# Patient Record
Sex: FEMALE | Race: WHITE | NOT HISPANIC OR LATINO | Employment: OTHER | URBAN - METROPOLITAN AREA
[De-identification: names, ages, dates, MRNs, and addresses within clinical notes are randomized per-mention and may not be internally consistent; named-entity substitution may affect disease eponyms.]

---

## 2017-04-17 ENCOUNTER — TELEPHONE (OUTPATIENT)
Dept: NEUROLOGY | Facility: CLINIC | Age: 72
End: 2017-04-17

## 2017-04-17 NOTE — TELEPHONE ENCOUNTER
Only seen once in 2015.  May be appropriate to see before asking about enrolling in clinical trial?

## 2017-04-17 NOTE — TELEPHONE ENCOUNTER
----- Message from Jadiel Billingsley MD sent at 4/11/2017  9:13 AM CDT -----  Regarding: RE: SURE PD 3  Yes.  Netta patient, cc'ing her.  Not seen in a while and nothing scheduled.    GL - let me know if you'd like to consider her for SURE-PD3 (inosine).  No red flags on your exam.  DaTSCAN performed as part of the study.    [facility]  ----- Message -----     From: Kayla Walter     Sent: 4/10/2017   3:29 PM       To: Jadiel Billingsley MD  Subject: SURE PD 3                                        Hey,    Maybe a possible candidate for sure pd3? Let me know what you think.    My best,  Kayla

## 2017-04-20 ENCOUNTER — OFFICE VISIT (OUTPATIENT)
Dept: FAMILY MEDICINE | Facility: CLINIC | Age: 72
End: 2017-04-20
Payer: MEDICARE

## 2017-04-20 ENCOUNTER — HOSPITAL ENCOUNTER (OUTPATIENT)
Dept: RADIOLOGY | Facility: CLINIC | Age: 72
Discharge: HOME | End: 2017-04-20
Attending: FAMILY MEDICINE
Payer: MEDICARE

## 2017-04-20 VITALS
HEART RATE: 61 BPM | SYSTOLIC BLOOD PRESSURE: 142 MMHG | HEIGHT: 64 IN | RESPIRATION RATE: 18 BRPM | DIASTOLIC BLOOD PRESSURE: 76 MMHG | BODY MASS INDEX: 21.68 KG/M2 | WEIGHT: 127 LBS | TEMPERATURE: 98 F

## 2017-04-20 DIAGNOSIS — S30.1XXA CONTUSION, FLANK, INITIAL ENCOUNTER: ICD-10-CM

## 2017-04-20 DIAGNOSIS — S30.1XXA CONTUSION, FLANK, INITIAL ENCOUNTER: Primary | ICD-10-CM

## 2017-04-20 PROCEDURE — 99213 OFFICE O/P EST LOW 20 MIN: CPT | Mod: S$PBB,,, | Performed by: FAMILY MEDICINE

## 2017-04-20 PROCEDURE — 71010 XR CHEST 1 VIEW: CPT | Mod: 26,,, | Performed by: RADIOLOGY

## 2017-04-20 PROCEDURE — 71010 XR CHEST 1 VIEW: CPT | Mod: TC,PO

## 2017-04-20 PROCEDURE — 71100 X-RAY EXAM RIBS UNI 2 VIEWS: CPT | Mod: 26,59,, | Performed by: RADIOLOGY

## 2017-04-20 PROCEDURE — 71100 X-RAY EXAM RIBS UNI 2 VIEWS: CPT | Mod: TC,PO

## 2017-04-20 PROCEDURE — 99999 PR PBB SHADOW E&M-EST. PATIENT-LVL III: CPT | Mod: PBBFAC,,, | Performed by: FAMILY MEDICINE

## 2017-04-20 RX ORDER — METHOCARBAMOL 750 MG/1
TABLET, FILM COATED ORAL
Refills: 0 | COMMUNITY
Start: 2017-01-13 | End: 2017-10-05

## 2017-04-20 NOTE — PROGRESS NOTES
Subjective:       Patient ID: Ariana Tiwari is a 72 y.o. female.    Chief Complaint: Fall (s/p fall X 4 days ago, c/o left lower rib pain and bruising)    Fall   The accident occurred 3 to 5 days ago. She landed on hard floor (Hit a waste basket). Point of impact: buttocks and left side. Pain location: left flank. The pain is mild. The symptoms are aggravated by flexion, rotation and pressure on injury. Pertinent negatives include no abdominal pain, fever, nausea, numbness or tingling. Associated symptoms comments: No SOB. She has tried heat for the symptoms. The treatment provided moderate relief.     Review of Systems   Constitutional: Negative for fever.   Respiratory: Negative for shortness of breath.    Cardiovascular: Negative for chest pain.   Gastrointestinal: Negative for abdominal pain and nausea.   Skin: Negative for rash.   Neurological: Negative for tingling and numbness.   All other systems reviewed and are negative.      Objective:      Physical Exam   Constitutional: She appears well-developed. No distress.   Cardiovascular: Normal rate and regular rhythm.    No murmur heard.  Pulmonary/Chest: Effort normal and breath sounds normal.   Musculoskeletal:   TTP at lower left chest wall area and below that is large contusion.   Skin: Skin is warm and dry.       Assessment:       1. Contusion, flank, initial encounter        Plan:         Ariana was seen today for fall.    Diagnoses and all orders for this visit:    Contusion, flank, initial encounter  -     X-Ray Ribs 2 View Left; Future  -     X-Ray Chest 1 View; Future    Rest; heat TID; ibuprofen prn

## 2017-04-20 NOTE — MR AVS SNAPSHOT
[facility]  [address]  [place] [address]  Phone: [phone]  Fax: [phone]                  Ariana Tiwari   2017 10:00 AM   Office Visit    Description:  Female : 1945   Provider:  Nba Lopes MD   Department:  [facility]           Reason for Visit     Fall           Diagnoses this Visit        Comments    Contusion, flank, initial encounter    -  Primary            To Do List           Future Appointments        Provider Department Dept Phone    2017 10:30 AM [facility] XR1 [facility]- X-Ray [phone]      Goals (5 Years of Data)     None      [facility]     [facility] Nurse Care Line -  Assistance  Unless otherwise directed by your provider, please contact [facility], our nurse care line that is available for  assistance.     Registered nurses in the [facility] provide: appointment scheduling, clinical advisement, health education, and other advisory services.  Call: [phone] (toll free)               Medications           Message regarding Medications     Verify the changes and/or additions to your medication regime listed below are the same as discussed with your clinician today.  If any of these changes or additions are incorrect, please notify your healthcare provider.        STOP taking these medications     nystatin-triamcinolone (MYCOLOG II) cream Apply topically 2 (two) times daily.    THEANINE ORAL Take 100 mg by mouth 3 (three) times daily. L-theanine           Verify that the below list of medications is an accurate representation of the medications you are currently taking.  If none reported, the list may be blank. If incorrect, please contact your healthcare provider. Carry this list with you in case of emergency.           Current Medications     aspirin (ECOTRIN) 81 MG EC tablet Take 81 mg by mouth once daily.    cholecalciferol, vitamin D3, (VITAMIN D3) 2,000 unit Cap Take 1  "capsule by mouth once daily.    cinnamon bark 500 mg capsule Take 500 mg by mouth once daily.    CYANOCOBALAMIN, VITAMIN B-12, (VITAMIN B-12 ORAL) Take 1 tablet by mouth once daily.    GLUCOSAM HCL/CHONDRO INFANTE A/C/MN (GLUCOSAMINE-CHONDROITIN COMPLX ORAL) Take 1 tablet by mouth once daily.     ibuprofen (ADVIL,MOTRIN) 200 MG tablet Take 200 mg by mouth every 6 (six) hours as needed for Pain.    levothyroxine (SYNTHROID) 50 MCG tablet TAKE ONE TABLET BY MOUTH ONCE DAILY    methocarbamol (ROBAXIN) 750 MG Tab take 1 tablet by mouth EVERY 8 TO 12 HOURS AS NEEDED FOR SPASM           Clinical Reference Information           Your Vitals Were     BP Pulse Temp Resp    142/76 (BP Location: Left arm, Patient Position: Sitting, BP Method: Manual) 61 97.8 °F (36.6 °C) (Oral) 18    Height Weight BMI    5' 3.5" (1.613 m) 57.6 kg (126 lb 15.8 oz) 22.14 kg/m2      Blood Pressure          Most Recent Value    BP  (!)  142/76      Allergies as of 4/20/2017     Gluten      Immunizations Administered on Date of Encounter - 4/20/2017     None      Orders Placed During Today's Visit     Future Labs/Procedures Expected by Expires    X-Ray Chest 1 View  4/20/2017 4/20/2018    X-Ray Ribs 2 View Left  4/20/2017 4/20/2018      [facility] Sign-Up     Activating your [facility] account is as easy as 1-2-3!     1) Visit [email or web address], select Sign Up Now, enter this activation code and your date of birth, then select Next.  [email or web address]  Expires: 6/4/2017 10:10 AM      2) Create a username and password to use when you visit [facility] in the future and select a security question in case you lose your password and select Next.    3) Enter your e-mail address and click Sign Up!    Additional Information  If you have questions, please e-mail [email or web address] or call [phone] to talk to our [facility] staff. Remember, [facility] is NOT to be used for urgent needs. For medical emergencies, dial 911.         Language Assistance Services     " ATTENTION: Language assistance services are available, free of charge. Please call [phone].      ATENCIÓN: Si habla cheli, tiene a santizo disposición servicios gratuitos de asistencia lingüística. Llame al [phone].     CHÚ Ý: N?u b?n nói Ti?ng Vi?t, có các d?ch v? h? tr? ngôn ng? mi?n phí dành cho b?n. G?i s? [phone].         [facility] complies with applicable Federal civil rights laws and does not discriminate on the basis of race, color, national origin, age, disability, or sex.

## 2017-08-21 DIAGNOSIS — E03.9 HYPOTHYROIDISM: ICD-10-CM

## 2017-08-21 DIAGNOSIS — Z78.0 MENOPAUSE: Primary | ICD-10-CM

## 2017-08-21 RX ORDER — LEVOTHYROXINE SODIUM 50 UG/1
TABLET ORAL
Qty: 30 TABLET | Refills: 0 | Status: SHIPPED | OUTPATIENT
Start: 2017-08-21 | End: 2017-08-23 | Stop reason: SDUPTHER

## 2017-08-23 DIAGNOSIS — E03.9 HYPOTHYROIDISM: ICD-10-CM

## 2017-08-23 DIAGNOSIS — Z78.0 MENOPAUSE: Primary | ICD-10-CM

## 2017-08-23 RX ORDER — LEVOTHYROXINE SODIUM 50 UG/1
TABLET ORAL
Qty: 30 TABLET | Refills: 0 | Status: SHIPPED | OUTPATIENT
Start: 2017-08-23 | End: 2017-10-19 | Stop reason: SDUPTHER

## 2017-08-23 NOTE — TELEPHONE ENCOUNTER
She is due for TSH level in September.  Orders are in    Has appt for dexa august 28, may want to do at the same time

## 2017-08-28 ENCOUNTER — HOSPITAL ENCOUNTER (OUTPATIENT)
Dept: RADIOLOGY | Facility: CLINIC | Age: 72
Discharge: HOME | End: 2017-08-28
Attending: FAMILY MEDICINE
Payer: MEDICARE

## 2017-08-28 DIAGNOSIS — Z12.31 ENCOUNTER FOR SCREENING MAMMOGRAM FOR MALIGNANT NEOPLASM OF BREAST: ICD-10-CM

## 2017-08-28 DIAGNOSIS — Z78.0 MENOPAUSE: ICD-10-CM

## 2017-08-28 PROCEDURE — 77063 BREAST TOMOSYNTHESIS BI: CPT | Mod: 26,,, | Performed by: RADIOLOGY

## 2017-08-28 PROCEDURE — 77067 SCR MAMMO BI INCL CAD: CPT | Mod: 26,,, | Performed by: RADIOLOGY

## 2017-08-28 PROCEDURE — 77080 DXA BONE DENSITY AXIAL: CPT | Mod: TC,PO

## 2017-08-28 PROCEDURE — 77080 DXA BONE DENSITY AXIAL: CPT | Mod: 26,,, | Performed by: RADIOLOGY

## 2017-09-28 ENCOUNTER — DOCUMENTATION ONLY (OUTPATIENT)
Dept: FAMILY MEDICINE | Facility: CLINIC | Age: 72
End: 2017-09-28

## 2017-09-28 NOTE — PROGRESS NOTES
Pre-Visit Chart Review  For Appointment Scheduled on 10-5-17    Health Maintenance Due   Topic Date Due    TETANUS VACCINE  03/28/1962    Pneumococcal (65+) (1 of 2 - PCV13) 03/28/2009    Influenza Vaccine  08/01/2017

## 2017-10-05 ENCOUNTER — OFFICE VISIT (OUTPATIENT)
Dept: FAMILY MEDICINE | Facility: CLINIC | Age: 72
End: 2017-10-05
Payer: MEDICARE

## 2017-10-05 VITALS
WEIGHT: 127.88 LBS | DIASTOLIC BLOOD PRESSURE: 75 MMHG | HEIGHT: 64 IN | HEART RATE: 61 BPM | BODY MASS INDEX: 21.83 KG/M2 | TEMPERATURE: 99 F | SYSTOLIC BLOOD PRESSURE: 116 MMHG

## 2017-10-05 DIAGNOSIS — M85.80 OSTEOPENIA, UNSPECIFIED LOCATION: Primary | ICD-10-CM

## 2017-10-05 DIAGNOSIS — Z23 IMMUNIZATION DUE: ICD-10-CM

## 2017-10-05 PROCEDURE — G0008 ADMIN INFLUENZA VIRUS VAC: HCPCS | Mod: PBBFAC,PO

## 2017-10-05 PROCEDURE — 99999 PR PBB SHADOW E&M-EST. PATIENT-LVL III: CPT | Mod: PBBFAC,,, | Performed by: FAMILY MEDICINE

## 2017-10-05 PROCEDURE — 99213 OFFICE O/P EST LOW 20 MIN: CPT | Mod: PBBFAC,PO | Performed by: FAMILY MEDICINE

## 2017-10-05 PROCEDURE — 99213 OFFICE O/P EST LOW 20 MIN: CPT | Mod: S$PBB,,, | Performed by: FAMILY MEDICINE

## 2017-10-05 RX ORDER — IBANDRONATE SODIUM 150 MG/1
150 TABLET, FILM COATED ORAL
Qty: 1 TABLET | Refills: 11 | Status: SHIPPED | OUTPATIENT
Start: 2017-10-05 | End: 2018-10-09 | Stop reason: SDUPTHER

## 2017-10-05 NOTE — PROGRESS NOTES
Subjective:       Patient ID: Ariana Tiwari is a 72 y.o. female.    Chief Complaint: Tremors    72-year-old female in to discuss results of her DEXA scan osteoporosis screening which did return positive for osteopenia with the FRAX score of 3.4 at the hip indicating need for treatment.  She's done her own research and it did not think she wanted to take injections but would prefer to use the pill.  She is actually a regular competitive runner and recently ran a race in [place] which she came in first.  We also are discussing vaccinations as she is due for a flu and Prevnar 13.  She's never taken a flu vaccine she generally does not like to take vaccines but she is willing to take the flu shot although she is quite ambivalent and changes her mind repeatedly during the interview.    Past Medical History:  No date: Allergy  No date: Gluten enteropathy  No date: Hernia  No date: Hypothyroidism  9/16/2015: PD (Parkinson's disease)      Comment: Right tremor type  No date: Right shoulder pain      Comment: Cirtisone injection 3/26/15 - Dr. Tolbert  No date: Ulcer    Past Surgical History:  No date: ADENOIDECTOMY  No date: COSMETIC SURGERY      Comment: Broken nose  left wrist: FRACTURE SURGERY      Comment: With pin  No date: HEMORRHOID SURGERY  4-9-2015: HERNIA REPAIR Left      Comment: Dr Lo   No date: RHINOPLASTY TIP  No date: TONSILLECTOMY      Current Outpatient Prescriptions:     aspirin (ECOTRIN) 81 MG EC tablet, Take 81 mg by mouth once daily., Disp: , Rfl:     cholecalciferol, vitamin D3, (VITAMIN D3) 2,000 unit Cap, Take 1 capsule by mouth once daily., Disp: , Rfl:     cinnamon bark 500 mg capsule, Take 500 mg by mouth once daily., Disp: , Rfl:     CYANOCOBALAMIN, VITAMIN B-12, (VITAMIN B-12 ORAL), Take 1 tablet by mouth once daily., Disp: , Rfl:     GLUCOSAM HCL/CHONDRO INFANTE A/C/MN (GLUCOSAMINE-CHONDROITIN COMPLX ORAL), Take 1 tablet by mouth once daily. , Disp: , Rfl:     ibuprofen (ADVIL,MOTRIN)  200 MG tablet, Take 200 mg by mouth every 6 (six) hours as needed for Pain., Disp: , Rfl:     levothyroxine (SYNTHROID) 50 MCG tablet, TAKE ONE TABLET BY MOUTH ONCE DAILY, Disp: 30 tablet, Rfl: 0    TETANUS VACCINE due on 03/28/1962-declines  Pneumococcal (65+)(1 of 2 - PCV13) due on 03/28/2009-declines  Influenza Vaccine due on 08/01/2017        Review of Systems   Constitutional: Negative for chills and fever.   Musculoskeletal: Negative for arthralgias and myalgias.       Objective:      Physical Exam   Constitutional: She is oriented to person, place, and time. She appears well-developed and well-nourished. No distress.   Good blood pressure control  Normal weight with BMI 22.3.  She is up 4.8 pounds from her last visit with me June 6, 2016   Neurological: She is alert and oriented to person, place, and time.   Skin: She is not diaphoretic.   Psychiatric: She has a normal mood and affect. Her behavior is normal. Thought content normal.   Nursing note and vitals reviewed.      Assessment:       1. Osteopenia, unspecified location    2. Immunization due        Plan:       1. Osteopenia, unspecified location  - ibandronate (BONIVA) 150 mg tablet; Take 1 tablet (150 mg total) by mouth every 30 days.  Dispense: 1 tablet; Refill: 11    2. Immunization due  - Influenza - High Dose (65+) (PF) (IM)

## 2017-10-19 DIAGNOSIS — E03.9 ACQUIRED HYPOTHYROIDISM: ICD-10-CM

## 2017-10-19 DIAGNOSIS — E03.9 HYPOTHYROIDISM: ICD-10-CM

## 2017-10-19 RX ORDER — LEVOTHYROXINE SODIUM 50 UG/1
50 TABLET ORAL DAILY
Qty: 90 TABLET | Refills: 2 | Status: SHIPPED | OUTPATIENT
Start: 2017-10-19 | End: 2018-07-11 | Stop reason: SDUPTHER

## 2017-10-19 RX ORDER — LEVOTHYROXINE SODIUM 50 UG/1
TABLET ORAL
Qty: 30 TABLET | Refills: 0 | Status: CANCELLED | OUTPATIENT
Start: 2017-10-19

## 2017-10-19 NOTE — TELEPHONE ENCOUNTER
----- Message from Adelaida Parks sent at 10/19/2017 11:34 AM CDT -----  Contact: self  Patient called regarding refill of medication. Stating pharmacy need a PA. Please contact [phone] (home)     levothyroxine (SYNTHROID) 50 MCG tablet    [id number]  [place] - [address]  [place] [address]  Phone: [phone] Fax: [phone]

## 2018-01-16 ENCOUNTER — OFFICE VISIT (OUTPATIENT)
Dept: GASTROENTEROLOGY | Facility: CLINIC | Age: 73
End: 2018-01-16
Payer: MEDICARE

## 2018-01-16 ENCOUNTER — HOSPITAL ENCOUNTER (OUTPATIENT)
Dept: RADIOLOGY | Facility: HOSPITAL | Age: 73
Discharge: HOME | End: 2018-01-16
Attending: NURSE PRACTITIONER
Payer: MEDICARE

## 2018-01-16 VITALS
BODY MASS INDEX: 21.94 KG/M2 | DIASTOLIC BLOOD PRESSURE: 75 MMHG | HEART RATE: 61 BPM | HEIGHT: 64 IN | SYSTOLIC BLOOD PRESSURE: 165 MMHG | WEIGHT: 128.5 LBS

## 2018-01-16 DIAGNOSIS — K52.9 CHRONIC DIARRHEA: Primary | ICD-10-CM

## 2018-01-16 DIAGNOSIS — R15.9 FULL INCONTINENCE OF FECES: ICD-10-CM

## 2018-01-16 DIAGNOSIS — R15.2 FECAL URGENCY: ICD-10-CM

## 2018-01-16 DIAGNOSIS — K92.1 HEMATOCHEZIA: ICD-10-CM

## 2018-01-16 DIAGNOSIS — Z86.19 HISTORY OF HELICOBACTER PYLORI INFECTION: ICD-10-CM

## 2018-01-16 DIAGNOSIS — K52.9 CHRONIC DIARRHEA: ICD-10-CM

## 2018-01-16 DIAGNOSIS — K90.41 GLUTEN INTOLERANCE: ICD-10-CM

## 2018-01-16 PROCEDURE — 74019 RADEX ABDOMEN 2 VIEWS: CPT | Mod: TC,FY

## 2018-01-16 PROCEDURE — 99214 OFFICE O/P EST MOD 30 MIN: CPT | Mod: S$PBB,,, | Performed by: NURSE PRACTITIONER

## 2018-01-16 PROCEDURE — 99999 PR PBB SHADOW E&M-EST. PATIENT-LVL IV: CPT | Mod: PBBFAC,,, | Performed by: NURSE PRACTITIONER

## 2018-01-16 PROCEDURE — 99214 OFFICE O/P EST MOD 30 MIN: CPT | Mod: PBBFAC,PO | Performed by: NURSE PRACTITIONER

## 2018-01-16 PROCEDURE — 74019 RADEX ABDOMEN 2 VIEWS: CPT | Mod: 26,,, | Performed by: RADIOLOGY

## 2018-01-16 RX ORDER — DICYCLOMINE HYDROCHLORIDE 10 MG/1
10 CAPSULE ORAL
Qty: 120 CAPSULE | Refills: 1 | Status: SHIPPED | OUTPATIENT
Start: 2018-01-16 | End: 2018-03-16 | Stop reason: SDUPTHER

## 2018-01-16 NOTE — PROGRESS NOTES
Subjective:       Patient ID: Ariana Tiwari is a 72 y.o. female Body mass index is 22.06 kg/m².    Chief Complaint: Diarrhea (stools too soft)    This patient is new to me.  Established patient of Dr. Gomez.    Diarrhea    This is a chronic problem. Episode onset: started in 2009. The problem occurs 2 to 4 times per day. The problem has been waxing and waning (improved since on gluten free diet 2009, but has worsened over the past 2 years while remaining on a gluten free diet). Diarrhea characteristics: mushy consistency; occurs in the morning. The patient states that diarrhea awakens (occasional) her from sleep. Associated symptoms include bloating (occasional if she eats potatoes or rice). Pertinent negatives include no abdominal pain, chills, coughing, fever, increased  flatus, vomiting or weight loss. Associated symptoms comments: Fecal incontinence occasional, started 1-2 years ago. The symptoms are aggravated by rye/wheat (caffeine, gluten). There are no known risk factors (denies antibiotic use/hospitalization, foreign travel (lived in [place] for 5 years), ill contacts, suspect food intake, or new/change in medications). Treatments tried: gluten free diet; avoiding caffeine & avoiding artifical sweetners. There is no history of inflammatory bowel disease.     Review of Systems   Constitutional: Negative for appetite change, chills, fatigue, fever, unexpected weight change and weight loss.        History of parkinson's disease   HENT: Negative for sore throat and trouble swallowing.    Respiratory: Negative for cough, choking and shortness of breath.    Cardiovascular: Negative for chest pain.   Gastrointestinal: Positive for anal bleeding (a drop of bright red blood in toilet bowl noticed a month ago; has resolved), bloating (occasional if she eats potatoes or rice) and diarrhea. Negative for abdominal pain, blood in stool, constipation, flatus, nausea, rectal pain and vomiting.   Neurological: Negative  for weakness.       Past Medical History:   Diagnosis Date    Allergy     Diverticulosis     Gluten enteropathy     Hernia     Hypothyroidism     PD (Parkinson's disease) 9/16/2015    Right tremor type    Peptic ulcer disease     Right shoulder pain     Cirtisone injection 3/26/15 - Dr. Tolbert    Ulcer      Past Surgical History:   Procedure Laterality Date    ADENOIDECTOMY      COLONOSCOPY  03/01/2012    Dr. Teresa, repeat in 10 years for screening    COSMETIC SURGERY      Broken nose    FRACTURE SURGERY  left wrist    With pin    HEMORRHOID SURGERY      HERNIA REPAIR Left 04/09/2015    Dr Lo; left inguinal    RHINOPLASTY TIP      TONSILLECTOMY      UPPER GASTROINTESTINAL ENDOSCOPY  05/21/2015    Dr. Gomez     Family History   Problem Relation Age of Onset    Diabetes Mother     Diabetes Son     Obesity Sister     Alcohol abuse Brother     Heart disease Brother     No Known Problems Father     No Known Problems Maternal Grandmother     No Known Problems Maternal Grandfather     No Known Problems Paternal Grandmother     No Known Problems Paternal Grandfather     Cancer Paternal Aunt     No Known Problems Daughter     No Known Problems Maternal Aunt     No Known Problems Maternal Uncle     No Known Problems Paternal Uncle     Breast cancer Neg Hx     Colon cancer Neg Hx     Ovarian cancer Neg Hx     Colon polyps Neg Hx     Crohn's disease Neg Hx     Ulcerative colitis Neg Hx     Celiac disease Neg Hx      Wt Readings from Last 10 Encounters:   01/16/18 58.3 kg (128 lb 8.5 oz)   10/05/17 58 kg (127 lb 13.9 oz)   04/20/17 57.6 kg (126 lb 15.8 oz)   08/01/16 55.5 kg (122 lb 5.7 oz)   07/01/16 54.5 kg (120 lb 2.4 oz)   06/28/16 54.7 kg (120 lb 9.5 oz)   06/06/16 55.8 kg (123 lb 0.3 oz)   09/16/15 62 kg (136 lb 11.2 oz)   09/01/15 61.4 kg (135 lb 5 oz)   05/21/15 59 kg (130 lb)     Lab Results   Component Value Date    WBC 7.75 04/21/2015    HGB 14.7 04/21/2015    HCT 44.1  04/21/2015    MCV 97 04/21/2015     04/21/2015     CMP  Sodium   Date Value Ref Range Status   04/21/2015 138 136 - 145 mmol/L Final     Potassium   Date Value Ref Range Status   04/21/2015 3.6 3.5 - 5.1 mmol/L Final     Chloride   Date Value Ref Range Status   04/21/2015 106 95 - 110 mmol/L Final     CO2   Date Value Ref Range Status   04/21/2015 19 (L) 23 - 29 mmol/L Final     Glucose   Date Value Ref Range Status   04/21/2015 89 70 - 110 mg/dL Final     BUN, Bld   Date Value Ref Range Status   04/21/2015 8 8 - 23 mg/dL Final     Creatinine   Date Value Ref Range Status   09/16/2015 0.6 0.5 - 1.4 mg/dL Final     Calcium   Date Value Ref Range Status   09/16/2015 9.4 8.7 - 10.5 mg/dL Final     Total Protein   Date Value Ref Range Status   04/21/2015 7.1 6.0 - 8.4 g/dL Final     Albumin   Date Value Ref Range Status   04/21/2015 4.1 3.5 - 5.2 g/dL Final     Total Bilirubin   Date Value Ref Range Status   04/21/2015 0.6 0.1 - 1.0 mg/dL Final     Comment:     For infants and newborns, interpretation of results should be based  on gestational age, weight and in agreement with clinical  observations.  Premature Infant recommended reference ranges:  Up to 24 hours.............<8.0 mg/dL  Up to 48 hours............<12.0 mg/dL  3-5 days..................<15.0 mg/dL  6-29 days.................<15.0 mg/dL       Alkaline Phosphatase   Date Value Ref Range Status   04/21/2015 95 55 - 135 U/L Final     AST   Date Value Ref Range Status   04/21/2015 23 10 - 40 U/L Final     ALT   Date Value Ref Range Status   04/21/2015 21 10 - 44 U/L Final     Anion Gap   Date Value Ref Range Status   04/21/2015 13 8 - 16 mmol/L Final     eGFR if    Date Value Ref Range Status   09/16/2015 >60.0 >60 mL/min/1.73 m^2 Final     eGFR if non    Date Value Ref Range Status   09/16/2015 >60.0 >60 mL/min/1.73 m^2 Final     Comment:     Calculation used to obtain the estimated glomerular filtration  rate (eGFR) is  "the CKD-EPI equation. Since race is unknown   in our information system, the eGFR values for   -American and Non--American patients are given   for each creatinine result.       Lab Results   Component Value Date    TSH 4.182 (H) 08/28/2017     5/21/15 EGD was reviewed and procedure report states:   "Findings:       The upper third of the esophagus and middle third of the esophagus        were normal.       The Z-line was irregular and was found 40 cm from the incisors.        Biopsies were taken with a cold forceps for histology.       A medium-sized hiatus hernia was present.       Diffuse moderate inflammation characterized by congestion (edema),        erosions, erythema and granularity was found in the gastric body and        in the gastric antrum. Biopsies were taken with a cold forceps for        histology.       Diffuse moderate inflammation characterized by congestion (edema),        erosions, erythema and granularity was found in the duodenal bulb        and in the second part of the duodenum. Biopsies were taken with a        cold forceps for histology. Biopsies for histology were taken with a        cold forceps for for evaluation of celiac disease.       A large diverticulum was found in the second part of the duodenum.       Food (residue) was found in the second part of the duodenum.  Impression:          - Normal upper third of esophagus and middle third                        of esophagus.                       - Z-line irregular, 40 cm from the incisors.                        Biopsied.                       - Medium-sized hiatus hernia.                       - Gastritis. Biopsied.                       - Duodenitis with erosions. Biopsied.                       - Duodenal diverticulum.                       - Retained food in the duodenum within the                        diverticulum.  Recommendation:      - Patient has a contact number available for                        " "emergencies. The signs and symptoms of potential                        delayed complications were discussed with the                        patient. Return to normal activities tomorrow.                        Written discharge instructions were provided to the                        patient.                       - Discharge patient to home (ambulatory).                       - Resume previous diet.                       - Continue present medications.                       - Use Protonix (pantoprazole) 40 mg PO daily for 8                        weeks.                       - No aspirin, ibuprofen, naproxen, or other                        non-steroidal anti-inflammatory drugs.                       - Await pathology results.                       - Return to my office in 8 weeks. ".  Biopsy results:   "FINAL PATHOLOGIC DIAGNOSIS  1. Duodenum, biopsy:  Subtle increase in intraepithelial lymphocytes. SEE COMMENT.  Villous architecture intact.  Negative for active inflammation.  Negative for dysplasia.  COMMENT: The findings are non-specific. Considerations include drug effect, post viral changes, early or partially  treated sprue/celiac among others.  2. Stomach, antrum and body, biopsy:  Chronic active gastritis.  POSITIVE for H. pylori species by immunostain with appropriately reactive controls.  Negative for dysplasia.  3. GE junction, biopsy:  Squamous mucosa with reactive atypia and no intraepithelial eosinophils.  Chronic active gastritis.  Negative for intestinal metaplasia, dysplasia, and malignancy."    3/1/12 Colonoscopy was reviewed and procedure report states:   " Findings:       The perianal exam was abnormal. Findings include non-thrombosed        internal hemorrhoids. A few medium-mouthed diverticula were found in        the sigmoid colon. The rectum, descending colon, transverse colon,        ascending colon and cecum appeared normal.  Impression:          - Non-thrombosed internal hemorrhoids.     " "                  - Diverticulosis in the sigmoid colon.                       - The rectum, descending colon, transverse colon,                        ascending colon and cecum are normal.  Recommendation:      - High fiber diet indefinitely.                       - Use Benefiber at one tablespoon in eight ounces of                        any non-dairy beverage or mixed with soft food by                        mouth once a day.                       - Repeat colonoscopy in 10 years for screening                        purposes. ".  Objective:      Physical Exam   Constitutional: She is oriented to person, place, and time. She appears well-developed and well-nourished. No distress.   HENT:   Mouth/Throat: Oropharynx is clear and moist and mucous membranes are normal. No oral lesions. No oropharyngeal exudate.   Eyes: Conjunctivae are normal. Pupils are equal, round, and reactive to light. No scleral icterus.   Cardiovascular: Normal rate.    Pulmonary/Chest: Effort normal and breath sounds normal. No respiratory distress. She has no wheezes.   Abdominal: Soft. Normal appearance and bowel sounds are normal. She exhibits no distension, no abdominal bruit and no mass. There is no hepatosplenomegaly. There is no tenderness. There is no rigidity, no rebound, no guarding, no tenderness at McBurney's point and negative Mitchell's sign. No hernia.   Patient declined rectal exam.   Neurological: She is alert and oriented to person, place, and time.   Skin: Skin is warm and dry. No rash noted. She is not diaphoretic. No erythema. No pallor.   Non-jaundiced   Psychiatric: She has a normal mood and affect. Her behavior is normal. Judgment and thought content normal.   Nursing note and vitals reviewed.      Assessment:       1. Chronic diarrhea    2. History of Helicobacter pylori infection    3. Gluten intolerance    4. Hematochezia    5. Full incontinence of feces    6. Fecal urgency        Plan:       Chronic diarrhea  -     " IgA; Future; Expected date: 01/16/2018  -     Tissue transglutaminase, IgA; Future; Expected date: 01/16/2018  -     Stool Exam-Ova,Cysts,Parasites; Future; Expected date: 01/16/2018  -     Adenovirus Antigen EIA, Stool; Future; Expected date: 01/16/2018  -     Giardia / Cryptosporidum, EIA; Future; Expected date: 01/16/2018  -     Fecal fat, qualitative; Future; Expected date: 01/16/2018  -     Occult blood x 1, stool; Future; Expected date: 01/16/2018  -     pH, stool; Future; Expected date: 01/16/2018  -     Rotavirus antigen, stool; Future; Expected date: 01/16/2018  -     WBC, Stool; Future; Expected date: 01/16/2018  -     Stool culture; Future; Expected date: 01/16/2018  -     Clostridium difficile EIA; Future; Expected date: 01/16/2018  -     CBC auto differential; Future; Expected date: 01/16/2018  -     Comprehensive metabolic panel; Future; Expected date: 01/16/2018  -     X-Ray Abdomen Flat And Erect; Future; Expected date: 01/16/2018  -  START   dicyclomine (BENTYL) 10 MG capsule; Take 1 capsule (10 mg total) by mouth 4 (four) times daily before meals and nightly.  Dispense: 120 capsule; Refill: 1  -     Case request GI: COLONOSCOPY, - schedule Colonoscopy, discussed procedure with the patient, patient verbalized understanding  - recommended OTC probiotic, such as Align or Culturelle, as directed  - avoid lactose  - continue gluten free diet since symptoms are better when on gluten free diet    History of Helicobacter pylori infection  -     H. pylori antigen, stool; Future; Expected date: 01/16/2018    Gluten intolerance  -     IgA; Future; Expected date: 01/16/2018  -     Tissue transglutaminase, IgA; Future; Expected date: 01/16/2018  - Discussed about diagnosis and that testing may be more accurate when gluten is in the diet; patient verbalized understanding but patient report she is not willing to add gluten to diet since her symptoms worsen when she eats gluten; continue gluten free diet since  symptoms are better when on gluten free diet    Hematochezia  -     Case request GI: COLONOSCOPY, - schedule Colonoscopy, discussed procedure with the patient, patient verbalized understanding  - discussed about different etiologies that can cause rectal bleeding, such as diverticulosis, polyps, colon inflammation or infection, anal fissure or hemorrhoids.   - You may resume normal activity as long as you feel well.  - Avoid/minimize aspirin and anti-inflammatory drugs such as ibuprofen (Advil, Motrin) and naproxen (Aleve and Naprosyn).  - Avoid alcohol.    Full incontinence of feces & Fecal urgency  - START    dicyclomine (BENTYL) 10 MG capsule; Take 1 capsule (10 mg total) by mouth 4 (four) times daily before meals and nightly.  Dispense: 120 capsule; Refill: 1  -     Case request GI: COLONOSCOPY, - schedule Colonoscopy, discussed procedure with the patient, patient verbalized understanding  - recommend high fiber diet to help bulk up the stool, especially soluble fiber since this can help bulk up the stool consistency and may help to slow down how fast the stool goes through the colon and can prevent diarrhea  - possible trial of daily low dose loperamide  - possible referral to general surgery ([facility]) for fecal incontinence surgical options, if symptoms persist despite use of above recommendations    Follow-up in about 1 month (around 2/16/2018), or if symptoms worsen or fail to improve.      If no improvement in symptoms or symptoms worsen, call/follow-up at clinic or go to ER.

## 2018-01-16 NOTE — PATIENT INSTRUCTIONS
Uncertain Causes of Diarrhea (Adult)    Diarrhea is when stools are loose and watery. This can be caused by:  · Viral infections  · Bacterial infections  · Food poisoning  · Parasites  · Irritable bowel syndrome (IBS)  · Inflammatory bowel diseases such as ulcerative colitis, Crohn's disease, and celiac disease  · Food intolerance, such as to lactose, the sugar found in milk and milk products  · Reaction to medicines like antibiotics, laxatives, cancer drugs, and antacids  Along with diarrhea, you may also have:  · Abdominal pain and cramping  · Nausea and vomiting  · Loss of bowel control  · Fever and chills  · Bloody stools  In some cases, antibiotics may help to treat diarrhea. You may have a stool sample test. This is done to see what is causing your diarrhea, and if antibiotics will help treat it. The results of a stool sample test may take up to 2 days. The healthcare provider may not give you antibiotics until he or she has the stool test results.  Diarrhea can cause dehydration. This is the loss of too much water and other fluids from the body. When this occurs, body fluid must be replaced. This can be done with oral rehydration solutions. Oral rehydration solutions are available at drugstores and grocery stores without a prescription.  Home care  Follow all instructions given by your healthcare provider. Rest at home for the next 24 hours, or until you feel better. Avoid caffeine, tobacco, and alcohol. These can make diarrhea, cramping, and pain worse.  If taking medicines:  · Dont take over-the-counter diarrhea or nausea medicines unless your healthcare provider tells you to.  · You may use acetaminophen or NSAID medicines like ibuprofen or naproxen to reduce pain and fever. Dont use these if you have chronic liver or kidney disease, or ever had a stomach ulcer or gastrointestinal bleeding. Don't use NSAID medicines if you are already taking one for another condition (like arthritis) or are on daily  aspirin therapy (such as for heart disease or after a stroke). Talk with your healthcare provider first.  · If antibiotics were prescribed, be sure you take them until they are finished. Dont stop taking them even when you feel better. Antibiotics must be taken as a full course.  To prevent the spread of illness:  · Remember that washing with soap and water and using alcohol-based  is the best way to prevent the spread of infection.  · Clean the toilet after each use.  · Wash your hands before eating.  · Wash your hands before and after preparing food. Keep in mind that people with diarrhea or vomiting should not prepare food for others.  · Wash your hands after using cutting boards, countertops, and knives that have been in contact with raw foods.  · Wash and then peel fruits and vegetables.  · Keep uncooked meats away from cooked and ready-to-eat foods.  · Use a food thermometer when cooking. Cook poultry to at least 165°F (74°C). Cook ground meat (beef, veal, pork, lamb) to at least 160°F (71°C). Cook fresh beef, veal, lamb, and pork to at least 145°F (63°C).  · Dont eat raw or undercooked eggs (poached or usama side up), poultry, meat, or unpasteurized milk and juices.  Food and drinks  The main goal while treating vomiting or diarrhea is to prevent dehydration. This is done by taking small amounts of liquids often.  · Keep in mind that liquids are more important than food right now.  · Drink only small amounts of liquids at a time.  · Dont force yourself to eat, especially if you are having cramping, vomiting, or diarrhea. Dont eat large amounts at a time, even if you are hungry.  · If you eat, avoid fatty, greasy, spicy, or fried foods.  · Dont eat dairy foods or drink milk if you have diarrhea. These can make diarrhea worse.  During the first 24 hours you can try:  · Oral rehydration solutions. Do not use sports drinks. They have too much sugar and not enough electrolytes.  · Soft drinks without  caffeine  · Ginger ale  · Water (plain or flavored)  · Decaf tea or coffee  · Clear broth, consommé, or bouillon  · Gelatin, popsicles, or frozen fruit juice bars  The second 24 hours, if you are feeling better, you can add:  · Hot cereal, plain toast, bread, rolls, or crackers  · Plain noodles, rice, mashed potatoes, chicken noodle soup, or rice soup  · Unsweetened canned fruit (no pineapple)  · Bananas  As you recover:  · Limit fat intake to less than 15 grams per day. Dont eat margarine, butter, oils, mayonnaise, sauces, gravies, fried foods, peanut butter, meat, poultry, or fish.  · Limit fiber. Dont eat raw or cooked vegetables, fresh fruits except bananas, or bran cereals.  · Limit caffeine and chocolate.  · Limit dairy.  · Dont use spices or seasonings except salt.  · Go back to your normal diet over time, as you feel better and your symptoms improve.  · If the symptoms come back, go back to a simple diet or clear liquids.  Follow-up care  Follow up with your healthcare provider, or as advised. If a stool sample was taken or cultures were done, call the healthcare provider for the results as instructed.  Call 911  Call 911 if you have any of these symptoms:  · Trouble breathing  · Confusion  · Extreme drowsiness or trouble walking  · Loss of consciousness  · Rapid heart rate  · Chest pain  · Stiff neck  · Seizure  When to seek medical advice  Call your healthcare provider right away if any of these occur:  · Abdominal pain that gets worse  · Constant lower right abdominal pain  · Continued vomiting and inability to keep liquids down  · Diarrhea more than 5 times a day  · Blood in vomit or stool  · Dark urine or no urine for 8 hours, dry mouth and tongue, tiredness, weakness, or dizziness  · Drowsiness  · New rash  · You dont get better in 2 to 3 days  · Fever of 100.4°F (38°C) or higher that doesnt get lower with medicine  Date Last Reviewed: 1/3/2016  © 9208-8136 [employer]. [address] [place] [address]  "[place] [address]. All rights reserved. This information is not intended as a substitute for professional medical care. Always follow your healthcare professional's instructions.        Gluten-Free Diet for Celiac Disease     Reading food labels will help you avoid foods with gluten.   You've been told that you have celiac disease. This means that you are sensitive to a protein called gluten. Gluten is found in certain grains. When you ingest gluten, your immune system causes harm to your small intestines. The treatment for celiac disease is to avoid foods and products that contain gluten. You will need to do this for the rest of your life. Avoid the temptation to "cheat," even a small amount of gluten can cause symptoms to return. And it can harm your body. This sheet gives you the basics about a gluten-free diet. If you need help, a registered dietitian (RD) can teach you what foods and other products have gluten and how to avoid them.  Always read labels!  Many foods may contain gluten, even if you think they don't. Get into the habit of reading ingredient labels before you eat.   Choosing foods  The most common source of gluten is wheat flour (this includes "white" flour). Wheat flour is used to make many baked goods, including breads, pastas, cereals, pastries, and pizza dough. But gluten is also found in many foods that you might not think would have it. You will need to read food labels to look for gluten in everything you eat. But your diet does not need to be boring. Many foods are naturally gluten-free. And many foods commonly made with wheat flour now come in gluten-free forms. But keep in mind that if something is labeled "wheat-free" it may not be also gluten-free.  Foods to Avoid Foods You Can Eat   Bread, cereals, pasta, pastries, couscous, or pizza dough made with wheat flour (this includes white flour, farina, farro, emmer, durum, margy, and semolina) Bread, cereals, pasta, pastries, or " "pizza dough made with rice flour, almond flour, beans, potatoes, and other gluten-free substitutes   Foods containing rye, barley (including malt), spelt, kamut, triticale, obrien's yeast, and bulgur Foods containing corn, cassava, rice, amaranth, buckwheat, millet, quinoa, arrowroot, teff, soy, and tapioca   Processed meats Fresh meats and seafood (beef, chicken, turkey, lamb, pork, fish, shellfish), beans, and tofu   Some dairy products with additives Many plain dairy products   Many sauces, gravies, dressings, and condiments, including traditional soy sauce Vinegar, oils, and gluten-free substitutes   Some granola bars and energy bars Gluten-free granola bars and energy bars   Some beers and spirits Wine, and gluten-free beers and distilled spirits   Some soups Gluten-free soups   Fruits and vegetables that are fried or breaded Fresh fruits and vegetables   Many packaged foods Packaged foods labeled "gluten-free"   Oats (check with your healthcare provider) Gluten-free oats   Communion wafers Gluten-free communion wafers   Avoiding accidental exposure to gluten  Staying gluten-free means always being aware. Even if you are very careful, mistakes can happen. The food you eat can't come into contact with gluten. Your meals must be made with utensils that have not touched foods that contain gluten. Shared knives, cutting boards, toasters, and storage containers are risks for gluten exposure. Shared condiments may have crumbs that contain gluten. At restaurants, parties, and other places where you eat food prepared by others, ask how the food was made. Gluten can also be found in some non-food items. Some medications contain gluten. So do some vitamin supplements. Ask your pharmacist before taking a medication or supplement. Also, some shampoos, lotions, toothpastes, makeup, lipstick (lip gloss and lip balm), glues, soaps, and other products contain gluten. It can be possible to ingest some gluten when using these " projects. This is called cross-contamination. For example, this can happen if you use a lotion that has gluten and then touch food you eat. Also note that Play-Olga and similar products have gluten. Any adult or child with celiac disease should wash their hands after handling these.  Coping with gluten-free living  Living gluten-free can be hard. While there are many gluten-free foods now that you can buy, it is still a big change for many people. You may be upset that you can't eat your favorite foods, eat freely at restaurants, parties, or over the holidays. Household members may also be upset by the strict controls over food. If you face problems like these, think about joining a celiac disease support group. Support groups offer tips on how to make a gluten-free lifestyle easier on you and the people you live with. You can find ways to involve the people in your household. There are many ways to make gluten-free group meals. See More Resources below for help in finding a group.  Bring safe foods that you enjoy to parties and school or work events. This can help you avoid the urge to grab something you shouldnt eat.   Following up with your health care provider  You should see your health care provider at least once a year for a celiac checkup. A simple blood test can show if your celiac disease is under control. If you are having symptoms, your health care provider can help you find sources of gluten you may have missed.  More resources  To learn more about managing celiac disease, try these resources:  · Celiac Disease Foundation  www.celiac.org  · Academy of Nutrition and Dietetics  www.eatright.org  · National Digestive Diseases Information Clearinghouse  www.digestive.niddk.nih.gov  Date Last Reviewed: 6/19/2015  © 8921-4170 [employer]. [address] [place] [address], [place] [address]. All rights reserved. This information is not intended as a substitute for professional medical care. Always follow  your healthcare professional's instructions.

## 2018-01-17 ENCOUNTER — TELEPHONE (OUTPATIENT)
Dept: GASTROENTEROLOGY | Facility: CLINIC | Age: 73
End: 2018-01-17

## 2018-01-17 DIAGNOSIS — R93.5 ABNORMAL X-RAY OF ABDOMEN: ICD-10-CM

## 2018-01-17 DIAGNOSIS — K52.9 CHRONIC DIARRHEA: Primary | ICD-10-CM

## 2018-01-17 NOTE — TELEPHONE ENCOUNTER
Please call to inform & review the results with the patient- radiology report of the Abdominal x-ray showed nonspecific bowel gas pattern with some fluid noted in the small bowel and colon which can indicate acute enterocolitis. Complete stool studies ASAP and repeat x-ray in 1-2 weeks. Possible ct scan pending results of testing and if symptoms persist. Otherwise unremarkable findings.    Some lab work is still processing and we will notify you once received and reviewed; the results we have received show normal electrolytes, kidney & liver function levels and stable blood counts (no anemia).  Continue with previous recommendations. If no improvement in symptoms or symptoms worsen, call/follow-up at clinic or go to ER.  Please release results to patient's mychart once you have discussed results and recommendations with patient.  Thanks,  Alma Delia JENSEN

## 2018-01-18 NOTE — TELEPHONE ENCOUNTER
----- Message from Ana Rosa Bennett sent at 1/18/2018 12:56 PM CST -----  Contact: Patient  Patient returned call for results. She can be contacted at [phone].    Thanks,  Ana Rosa

## 2018-01-18 NOTE — TELEPHONE ENCOUNTER
Pt notified labs, instr to bring in stool sample ASAP to any [facility] lab for testing. Instr has a fluid collection in colon may suggest infection. States will bring in tomorrow. Instr to repeat xray in 2 weeks, verb understanding

## 2018-01-19 ENCOUNTER — LAB VISIT (OUTPATIENT)
Dept: LAB | Facility: HOSPITAL | Age: 73
End: 2018-01-19
Attending: NURSE PRACTITIONER
Payer: MEDICARE

## 2018-01-19 DIAGNOSIS — K52.9 CHRONIC DIARRHEA: ICD-10-CM

## 2018-01-19 DIAGNOSIS — Z86.19 HISTORY OF HELICOBACTER PYLORI INFECTION: ICD-10-CM

## 2018-01-19 LAB
OB PNL STL: NEGATIVE
RV AG STL QL IA.RAPID: NEGATIVE
WBC #/AREA STL HPF: NORMAL /[HPF]

## 2018-01-19 PROCEDURE — 87427 SHIGA-LIKE TOXIN AG IA: CPT

## 2018-01-19 PROCEDURE — 87425 ROTAVIRUS AG IA: CPT

## 2018-01-19 PROCEDURE — 87338 HPYLORI STOOL AG IA: CPT

## 2018-01-19 PROCEDURE — 87209 SMEAR COMPLEX STAIN: CPT

## 2018-01-19 PROCEDURE — 89055 LEUKOCYTE ASSESSMENT FECAL: CPT

## 2018-01-19 PROCEDURE — 82272 OCCULT BLD FECES 1-3 TESTS: CPT

## 2018-01-19 PROCEDURE — 87449 NOS EACH ORGANISM AG IA: CPT

## 2018-01-19 PROCEDURE — 87045 FECES CULTURE AEROBIC BACT: CPT

## 2018-01-19 PROCEDURE — 87046 STOOL CULTR AEROBIC BACT EA: CPT | Mod: 59

## 2018-01-19 PROCEDURE — 83986 ASSAY PH BODY FLUID NOS: CPT

## 2018-01-19 PROCEDURE — 87301 ADENOVIRUS AG IA: CPT

## 2018-01-19 PROCEDURE — 89125 SPECIMEN FAT STAIN: CPT

## 2018-01-19 PROCEDURE — 87329 GIARDIA AG IA: CPT

## 2018-01-20 LAB
C DIFF GDH STL QL: NEGATIVE
C DIFF TOX A+B STL QL IA: NEGATIVE

## 2018-01-21 LAB
CRYPTOSP AG STL QL IA: NEGATIVE
E COLI SXT1 STL QL IA: NEGATIVE
E COLI SXT2 STL QL IA: NEGATIVE
G LAMBLIA AG STL QL IA: NEGATIVE

## 2018-01-22 LAB
FAT STL SUDAN IV STN: NORMAL
O+P STL TRI STN: NORMAL
PH STL: NORMAL [PH]

## 2018-01-23 LAB — BACTERIA STL CULT: NORMAL

## 2018-01-24 LAB — HADV AG STL QL IA: NOT DETECTED

## 2018-01-26 ENCOUNTER — TELEPHONE (OUTPATIENT)
Dept: GASTROENTEROLOGY | Facility: CLINIC | Age: 73
End: 2018-01-26

## 2018-01-26 DIAGNOSIS — A04.8 H. PYLORI INFECTION: Primary | ICD-10-CM

## 2018-01-26 LAB — H PYLORI AG STL QL: DETECTED

## 2018-01-26 RX ORDER — TETRACYCLINE HYDROCHLORIDE 500 MG/1
500 CAPSULE ORAL 4 TIMES DAILY
Qty: 56 CAPSULE | Refills: 0 | Status: SHIPPED | OUTPATIENT
Start: 2018-01-26 | End: 2018-02-09

## 2018-01-26 RX ORDER — OMEPRAZOLE 20 MG/1
20 CAPSULE, DELAYED RELEASE ORAL
Qty: 28 CAPSULE | Refills: 0 | Status: SHIPPED | OUTPATIENT
Start: 2018-01-26 | End: 2019-05-16

## 2018-01-26 RX ORDER — METRONIDAZOLE 250 MG/1
250 TABLET ORAL 4 TIMES DAILY
Qty: 56 TABLET | Refills: 0 | Status: SHIPPED | OUTPATIENT
Start: 2018-01-26 | End: 2018-02-09

## 2018-01-26 NOTE — TELEPHONE ENCOUNTER
Please call to inform & review the results with the patient- h pylori stool study was positive. We need to treat this with a course of antibiotics, bismuth, and acid reducing medication. These prescriptions have been sent to pharmacy on file. Repeat stool study in 2 months to confirm successful treatment.  Continue with previous recommendations. If no improvement in symptoms or symptoms worsen, call/follow-up at clinic or go to ER.  Please release results to patient's mychart once you have discussed results and recommendations with patient.  Thanks,  Alma Delia JENSEN

## 2018-01-26 NOTE — TELEPHONE ENCOUNTER
Spoke to Paramjit at [facility] pharmacy, states he could not reach pt by phone either. I instr pt we would like to stay with Tetracycline. Ok to order. Verb understanding

## 2018-01-26 NOTE — TELEPHONE ENCOUNTER
Patient was treated with amoxil and biaxin with previous h pylori infection in 2015. I recommend to continue with prescribed course of flagyl, tetracycline, bismuth and omeprazole. We can send in the prescription to a different pharmacy or patient can wait until they get medication. Let me know what she decides.  Thanks  LAINEY

## 2018-01-26 NOTE — TELEPHONE ENCOUNTER
----- Message from Jeanine Marshall LPN sent at 1/26/2018 11:26 AM CST -----  Contact: Gabriella       ----- Message -----  From: Mini Sibley  Sent: 1/26/2018  11:03 AM  To: Jaiden PEREZ Staff    [facility] calling about tetracycline (ACHROMYCIN,SUMYCIN) 500 MG capsule that was just e-scribed for the pt, not available to next week so wants to knwo if want to call in something else...  [id number] - [place], [place] - [address]  [place] [address]  Phone: [phone] Fax: [phone]

## 2018-01-29 NOTE — TELEPHONE ENCOUNTER
LM for pt to call clinic, urgent that we speak with her regarding stool studies & antibiotics. Letter sent also

## 2018-01-30 ENCOUNTER — TELEPHONE (OUTPATIENT)
Dept: GASTROENTEROLOGY | Facility: CLINIC | Age: 73
End: 2018-01-30

## 2018-01-30 NOTE — TELEPHONE ENCOUNTER
Pt notified stool study pos for H pylori meds sent to pharm.  Instr will need to repeat stool studies in 2 months to see if H pylori negative. Verbalized understanding

## 2018-01-30 NOTE — TELEPHONE ENCOUNTER
----- Message from Mere Aguillon sent at 1/30/2018  8:37 AM CST -----  Contact: self  Patient has been out of town and missed a few phone calls from office  Please call back [phone]

## 2018-02-01 ENCOUNTER — TELEPHONE (OUTPATIENT)
Dept: GASTROENTEROLOGY | Facility: CLINIC | Age: 73
End: 2018-02-01

## 2018-02-01 NOTE — TELEPHONE ENCOUNTER
----- Message from Vu Walter sent at 2/1/2018  1:38 PM CST -----  Contact: Patient  Ariana, [phone], would like to know if she should discontinue Rx dicyclomine (BENTYL) 10 MG capsule. Since she was prescribed two new medications. Please advise. Thanks.

## 2018-02-15 ENCOUNTER — TELEPHONE (OUTPATIENT)
Dept: GASTROENTEROLOGY | Facility: CLINIC | Age: 73
End: 2018-02-15

## 2018-02-15 NOTE — TELEPHONE ENCOUNTER
diarrhea is not a common side effect of bentyl. Constipation can occur as a side effect of bentyl.  Thanks  KTP

## 2018-02-15 NOTE — TELEPHONE ENCOUNTER
----- Message from Reyna Nicolas sent at 2/15/2018 11:28 AM CST -----  Contact: self  Patient [phone] is calling to ask if Dicyclomine should give her diarrhea/please advise

## 2018-02-21 ENCOUNTER — HOSPITAL ENCOUNTER (OUTPATIENT)
Facility: HOSPITAL | Age: 73
Discharge: HOME | End: 2018-02-21
Attending: INTERNAL MEDICINE | Admitting: INTERNAL MEDICINE
Payer: MEDICARE

## 2018-02-21 ENCOUNTER — ANESTHESIA EVENT (OUTPATIENT)
Dept: ENDOSCOPY | Facility: HOSPITAL | Age: 73
End: 2018-02-21
Payer: MEDICARE

## 2018-02-21 ENCOUNTER — SURGERY (OUTPATIENT)
Age: 73
End: 2018-02-21

## 2018-02-21 ENCOUNTER — ANESTHESIA (OUTPATIENT)
Dept: ENDOSCOPY | Facility: HOSPITAL | Age: 73
End: 2018-02-21
Payer: MEDICARE

## 2018-02-21 DIAGNOSIS — K52.9 CHRONIC DIARRHEA: ICD-10-CM

## 2018-02-21 PROBLEM — K64.9 HEMORRHOIDS: Status: ACTIVE | Noted: 2018-02-21

## 2018-02-21 PROBLEM — K57.90 DIVERTICULOSIS: Status: ACTIVE | Noted: 2018-02-21

## 2018-02-21 PROCEDURE — D9220A PRA ANESTHESIA: Mod: ANES,,, | Performed by: ANESTHESIOLOGY

## 2018-02-21 PROCEDURE — 45380 COLONOSCOPY AND BIOPSY: CPT | Performed by: INTERNAL MEDICINE

## 2018-02-21 PROCEDURE — D9220A PRA ANESTHESIA: Mod: CRNA,,, | Performed by: NURSE ANESTHETIST, CERTIFIED REGISTERED

## 2018-02-21 PROCEDURE — 25000003 PHARM REV CODE 250: Performed by: INTERNAL MEDICINE

## 2018-02-21 PROCEDURE — 27201012 HC FORCEPS, HOT/COLD, DISP: Performed by: INTERNAL MEDICINE

## 2018-02-21 PROCEDURE — 88305 TISSUE EXAM BY PATHOLOGIST: CPT | Performed by: PATHOLOGY

## 2018-02-21 PROCEDURE — 37000009 HC ANESTHESIA EA ADD 15 MINS: Performed by: INTERNAL MEDICINE

## 2018-02-21 PROCEDURE — 37000008 HC ANESTHESIA 1ST 15 MINUTES: Performed by: INTERNAL MEDICINE

## 2018-02-21 PROCEDURE — 63600175 PHARM REV CODE 636 W HCPCS: Performed by: NURSE ANESTHETIST, CERTIFIED REGISTERED

## 2018-02-21 PROCEDURE — 45380 COLONOSCOPY AND BIOPSY: CPT | Mod: ,,, | Performed by: INTERNAL MEDICINE

## 2018-02-21 RX ORDER — LIDOCAINE HCL/PF 100 MG/5ML
SYRINGE (ML) INTRAVENOUS
Status: DISCONTINUED | OUTPATIENT
Start: 2018-02-21 | End: 2018-02-21

## 2018-02-21 RX ORDER — SODIUM CHLORIDE 9 MG/ML
INJECTION, SOLUTION INTRAVENOUS CONTINUOUS
Status: DISCONTINUED | OUTPATIENT
Start: 2018-02-21 | End: 2018-02-21 | Stop reason: HOSPADM

## 2018-02-21 RX ORDER — PROPOFOL 10 MG/ML
VIAL (ML) INTRAVENOUS
Status: DISCONTINUED | OUTPATIENT
Start: 2018-02-21 | End: 2018-02-21

## 2018-02-21 RX ORDER — PROPOFOL 10 MG/ML
INJECTION, EMULSION INTRAVENOUS
Status: COMPLETED
Start: 2018-02-21 | End: 2018-02-21

## 2018-02-21 RX ORDER — LIDOCAINE HYDROCHLORIDE 20 MG/ML
INJECTION, SOLUTION EPIDURAL; INFILTRATION; INTRACAUDAL; PERINEURAL
Status: DISCONTINUED
Start: 2018-02-21 | End: 2018-02-21 | Stop reason: HOSPADM

## 2018-02-21 RX ADMIN — PROPOFOL 30 MG: 10 INJECTION, EMULSION INTRAVENOUS at 08:02

## 2018-02-21 RX ADMIN — LIDOCAINE HYDROCHLORIDE 75 MG: 20 INJECTION, SOLUTION INTRAVENOUS at 08:02

## 2018-02-21 RX ADMIN — PROPOFOL 20 MG: 10 INJECTION, EMULSION INTRAVENOUS at 08:02

## 2018-02-21 RX ADMIN — PROPOFOL 80 MG: 10 INJECTION, EMULSION INTRAVENOUS at 08:02

## 2018-02-21 RX ADMIN — SODIUM CHLORIDE: 0.9 INJECTION, SOLUTION INTRAVENOUS at 08:02

## 2018-02-21 NOTE — ANESTHESIA POSTPROCEDURE EVALUATION
"Anesthesia Post Evaluation    Patient: Ariana Tiwrai    Procedure(s) Performed: Procedure(s) (LRB):  COLONOSCOPY (N/A)    Final Anesthesia Type: general  Patient location during evaluation: PACU  Patient participation: Yes- Able to Participate  Level of consciousness: awake and alert  Post-procedure vital signs: reviewed and stable  Pain management: adequate  Airway patency: patent  PONV status at discharge: No PONV  Anesthetic complications: no      Cardiovascular status: blood pressure returned to baseline and hemodynamically stable  Respiratory status: unassisted  Hydration status: euvolemic  Follow-up not needed.        Visit Vitals  BP (!) 143/68   Pulse (!) 47   Temp 36.7 °C (98.1 °F)   Resp 16   Ht 5' 3.5" (1.613 m)   Wt 55.3 kg (122 lb)   SpO2 95%   BMI 21.27 kg/m²       Pain/Wade Score: Pain Assessment Performed: Yes (2/21/2018  9:43 AM)  Presence of Pain: denies (2/21/2018  9:43 AM)  Wade Score: 10 (2/21/2018  9:43 AM)      "

## 2018-02-21 NOTE — TRANSFER OF CARE
"Anesthesia Transfer of Care Note    Patient: Ariana Tiwari    Procedure(s) Performed: Procedure(s) (LRB):  COLONOSCOPY (N/A)    Patient location: PACU    Anesthesia Type: general    Transport from OR: Transported from OR on room air with adequate spontaneous ventilation    Post pain: adequate analgesia    Post assessment: no apparent anesthetic complications    Post vital signs: stable    Level of consciousness: awake    Nausea/Vomiting: no nausea/vomiting    Complications: none    Transfer of care protocol was followed      Last vitals:   Visit Vitals  BP (!) 154/67   Pulse (!) 55   Temp 36.7 °C (98.1 °F)   Resp 15   Ht 5' 3.5" (1.613 m)   Wt 55.3 kg (122 lb)   SpO2 98%   BMI 21.27 kg/m²     "

## 2018-02-21 NOTE — H&P
[facility] Note    CC: Diarrhea, blood in stool    HPI 72 y.o. female presents for evaluation of chronic diarrhea and blood in stool     Past Medical History:   Diagnosis Date    Allergy     Diverticulosis     Gluten enteropathy     Hernia     Hypothyroidism     PD (Parkinson's disease) 9/16/2015    Right tremor type    Peptic ulcer disease     Right shoulder pain     Cirtisone injection 3/26/15 - Dr. Tolbert    Ulcer          Review of Systems  General ROS: negative for - chills, fever or weight loss  Cardiovascular ROS: no chest pain or dyspnea on exertion  Gastrointestinal ROS: + diarrhea, + blood in stool, no hematemesis    Physical Examination  There were no vitals taken for this visit.  General appearance: alert, cooperative, no distress  HENT: Normocephalic, atraumatic, neck symmetrical, no nasal discharge, sclera anicteric   Lungs: clear to auscultation bilaterally, symmetric chest wall expansion bilaterally  Heart: regular rate and rhythm without rub; no displacement of the PMI   Abdomen: soft, nontender,nondistended, BS normoactive  Extremities: extremities symmetric; no clubbing, cyanosis, or edema        Labs:  Lab Results   Component Value Date    WBC 7.50 01/16/2018    HGB 14.1 01/16/2018    HCT 42.2 01/16/2018    MCV 91 01/16/2018     01/16/2018         Assessment:   72 y.o. female presents for evaluation of chronic diarrhea and blood in stool    Plan:  -Proceed to colonoscopy    Radha Deng MD  [facility]  [address], [address]  [place] [address]  Office: [phone]  Fax: [phone]

## 2018-02-21 NOTE — OR NURSING
Have you had a colonoscopy LESS THAN 3 years ago?   * If YES, answer these questions*: no 03/01/2012    1. Did patient have a prior colonic polyp in a previous surveillance/diagnostic colonoscopy and is 18 years or older on date of encounter?  2. Documentation of < 3 year interval since the patients last colonoscopy due to medical reasons (eg., last colonoscopy incomplete, last colonoscopy had inadequate prep, piecemeal removal of adenomas, or last colonoscopy found > 10 adenomas) ?

## 2018-02-21 NOTE — DISCHARGE INSTRUCTIONS
Understanding Diverticulosis and Diverticulitis     Pouches or diverticula usually occur in the lower part of the colon called the sigmoid.     The colon (large intestine) is the last part of the digestive tract. It absorbs water from stool and changes it from a liquid to a solid. In certain cases, small pouches called diverticula can form in the colon wall. This condition is called diverticulosis. The pouches can become infected. If this happens, it becomes a more serious problem called diverticulitis. These problems can be painful. But they can be managed.  Managing your condition  Diet changes or medicines may be prescribed.   If you have diverticulosis  Recommendations include:  · Diet changes are often enough to control symptoms. The main changes are adding fiber (roughage) and drinking more water. Fiber absorbs water as it travels through your colon. This helps your stool stay soft and move smoothly. Water helps this process.  · If needed, you may be told to take over-the-counter stool softeners.  · To help relieve pain, antispasmodic medicines may be prescribed.  · Watch for changes in your bowel movements. Tell the healthcare provider if you notice any changes.  · Begin an exercise program. Ask your healthcare provider how to get started.  · Get plenty of rest and sleep.   If you have diverticulitis  Treatment depends on how bad your symptoms are.  · For mild symptoms. You may be put on a liquid diet for a short time. Antibiotics are usually prescribed. If these two steps relieve your symptoms, you may then be prescribed a high-fiber diet. If you still have symptoms, your healthcare provider will discuss more treatment choices with you.  · For severe symptoms. You may need to be admitted to the hospital. There, you can be given IV antibiotics and fluids. You will also be put on a low-fiber or liquid diet. Although not common, surgery is needed in some people with severe symptoms.  [place] to colon health      Diverticulitis occurs when the pouches become infected or inflamed.     Help keep your colon healthy with a diet that includes plenty of high-fiber fruits, vegetables, and whole grains. Drink plenty of liquids like water and juice. Maintain a healthy lifestyle including regular exercise, stress management, and adequate rest and sleep.   Date Last Reviewed: 7/1/2016 © 2000-2017 [employer]. [address] [place] [address], [place] [address]. All rights reserved. This information is not intended as a substitute for professional medical care. Always follow your healthcare professional's instructions.        Hemorrhoids    Hemorrhoids are swollen and inflamed veins inside the rectum and near the anus. The rectum is the last several inches of the colon. The anus is the passage between the rectum and the outside of the body.  Causes  The veins can become swollen due to increased pressure in them. This is most often caused by:  · Chronic constipation or diarrhea  · Straining when having a bowel movement  · Sitting too long on the toilet  · A low-fiber diet  · Pregnancy  Symptoms  · Bleeding from the rectum (this may be noticeable after bowel movements)  · Lump near the anus  · Itching around the anus  · Pain around the anus  There are different types of hemorrhoids. Depending on the type you have and the severity, you may be able to treat yourself at home. In some cases, a procedure may be the best treatment option. Your healthcare provider can tell you more about this, if needed.  Home care  General care  · To get relief from pain or itching, try:  ¨ Topical products. Your healthcare provider may prescribe or recommend creams, ointments, or pads that can be applied to the hemorrhoid. Use these exactly as directed.  ¨ Medicines. Your healthcare provider may recommend stool softeners, suppositories, or laxatives to help manage constipation. Use these exactly as directed.  ¨ Sitz baths. A sitz bath involves sitting  in a few inches of warm bath water. Be careful not to make the water so hot that you burn yourself--test it before sitting in it. Soak for about 10 to 15 minutes a few times a day. This may help relieve pain.  Tips to help prevent hemorrhoids  · Eat more fiber. Fiber adds bulk to stool and absorbs water as it moves through your colon. This makes stool softer and easier to pass.  ¨ Increase the fiber in your diet with more fiber-rich foods. These include fresh fruit, vegetables, and whole grains.  ¨ Take a fiber supplement or bulking agent, if advised to by your provider. These include products such as psyllium or methylcellulose.  · Drink plenty of water, if directed to by your provider. This can help keep stool soft.  · Be more active. Frequent exercise aids digestion and helps prevent constipation. It may also help make bowel movements more regular.  · Dont strain during bowel movements. This can make hemorrhoids more likely. Also, dont sit on the toilet for long periods of time.  Follow-up care  Follow up with your healthcare provider, or as advised. If a culture or imaging tests were done, you will be notified of the results when they are ready. This may take a few days or longer.  When to seek medical advice  Call your healthcare provider right away if any of these occur:  · Increased bleeding from the rectum  · Increased pain around the rectum or anus  · Weakness or dizziness  Call 911  Call 911 or return to the emergency department right away if any of these occur:  · Trouble breathing or swallowing  · Fainting or loss of consciousness  · Unusually fast heart rate  · Vomiting blood  · Large amounts of blood in stool  Date Last Reviewed: 6/22/2015 © 2000-2017 [employer]. [address] [place] [address], [place] [address]. All rights reserved. This information is not intended as a substitute for professional medical care. Always follow your healthcare professional's instructions.

## 2018-02-21 NOTE — ANESTHESIA PREPROCEDURE EVALUATION
02/21/2018  Ariana Tiwari is a 72 y.o., female.    Anesthesia Evaluation    I have reviewed the Patient Summary Reports.    I have reviewed the Nursing Notes.      Review of Systems  Anesthesia Hx:  No problems with previous Anesthesia    Hepatic/GI:   PUD,    Neurological:   Tremor right arm   Endocrine:   Hypothyroidism        Physical Exam  General:  Well nourished                 Anesthesia Plan  Type of Anesthesia, risks & benefits discussed:  Anesthesia Type:  general  Patient's Preference:   Intra-op Monitoring Plan:   Intra-op Monitoring Plan Comments:   Post Op Pain Control Plan:   Post Op Pain Control Plan Comments:   Induction:   IV  Beta Blocker:  Patient is not currently on a Beta-Blocker (No further documentation required).       Informed Consent: Patient understands risks and agrees with Anesthesia plan.  Questions answered. Anesthesia consent signed with patient.  ASA Score: 2     Day of Surgery Review of History & Physical:    H&P update referred to the surgeon.         Ready For Surgery From Anesthesia Perspective.

## 2018-02-22 VITALS
TEMPERATURE: 98 F | BODY MASS INDEX: 20.83 KG/M2 | HEIGHT: 64 IN | RESPIRATION RATE: 16 BRPM | WEIGHT: 122 LBS | DIASTOLIC BLOOD PRESSURE: 68 MMHG | HEART RATE: 47 BPM | SYSTOLIC BLOOD PRESSURE: 143 MMHG | OXYGEN SATURATION: 95 %

## 2018-03-01 ENCOUNTER — HOSPITAL ENCOUNTER (OUTPATIENT)
Dept: RADIOLOGY | Facility: HOSPITAL | Age: 73
Discharge: HOME | End: 2018-03-01
Attending: INTERNAL MEDICINE
Payer: MEDICARE

## 2018-03-01 DIAGNOSIS — K52.9 CHRONIC DIARRHEA: ICD-10-CM

## 2018-03-01 PROCEDURE — 74018 RADEX ABDOMEN 1 VIEW: CPT | Mod: 26,,, | Performed by: RADIOLOGY

## 2018-03-01 PROCEDURE — 74018 RADEX ABDOMEN 1 VIEW: CPT | Mod: TC,FY

## 2018-03-07 DIAGNOSIS — K92.2 UGI BLEED: ICD-10-CM

## 2018-03-08 ENCOUNTER — TELEPHONE (OUTPATIENT)
Dept: GASTROENTEROLOGY | Facility: CLINIC | Age: 73
End: 2018-03-08

## 2018-03-08 NOTE — TELEPHONE ENCOUNTER
----- Message from Radha Deng MD sent at 3/7/2018  4:34 PM CST -----  Please let patient know her abdominal xray continues to show mild inflammation in her small bowel. I will order CT to further investigate.

## 2018-03-12 ENCOUNTER — HOSPITAL ENCOUNTER (OUTPATIENT)
Dept: RADIOLOGY | Facility: HOSPITAL | Age: 73
Discharge: HOME | End: 2018-03-12
Attending: INTERNAL MEDICINE
Payer: MEDICARE

## 2018-03-12 DIAGNOSIS — K92.2 UGI BLEED: ICD-10-CM

## 2018-03-12 DIAGNOSIS — R19.7 DIARRHEA, UNSPECIFIED TYPE: Primary | ICD-10-CM

## 2018-03-12 PROCEDURE — 74177 CT ABD & PELVIS W/CONTRAST: CPT | Mod: TC

## 2018-03-12 PROCEDURE — 74177 CT ABD & PELVIS W/CONTRAST: CPT | Mod: 26,,, | Performed by: RADIOLOGY

## 2018-03-12 PROCEDURE — 25500020 PHARM REV CODE 255

## 2018-03-12 RX ORDER — SODIUM CHLORIDE 9 MG/ML
INJECTION, SOLUTION INTRAVENOUS
Status: DISPENSED
Start: 2018-03-12 | End: 2018-03-12

## 2018-03-12 RX ADMIN — IOHEXOL 120 ML: 350 INJECTION, SOLUTION INTRAVENOUS at 09:03

## 2018-03-16 DIAGNOSIS — R15.2 FECAL URGENCY: ICD-10-CM

## 2018-03-16 DIAGNOSIS — K52.9 CHRONIC DIARRHEA: ICD-10-CM

## 2018-03-16 RX ORDER — DICYCLOMINE HYDROCHLORIDE 10 MG/1
CAPSULE ORAL
Qty: 120 CAPSULE | Refills: 1 | Status: SHIPPED | OUTPATIENT
Start: 2018-03-16 | End: 2018-05-20 | Stop reason: SDUPTHER

## 2018-03-19 ENCOUNTER — TELEPHONE (OUTPATIENT)
Dept: GASTROENTEROLOGY | Facility: CLINIC | Age: 73
End: 2018-03-19

## 2018-03-19 NOTE — TELEPHONE ENCOUNTER
----- Message from Radha Deng MD sent at 3/16/2018  3:07 PM CDT -----  Please let patient know her CT showed a pancreatic cyst for which I will refer her to a special pancreas clinic in [place]. It also shows very mild dilation of small bowel with very mild wall thickening. If her diarrhea continues we should schedule her for an outpatient capsule endoscopy. She should follow up with Ramandeep Hwang.

## 2018-03-21 ENCOUNTER — TELEPHONE (OUTPATIENT)
Dept: GASTROENTEROLOGY | Facility: CLINIC | Age: 73
End: 2018-03-21

## 2018-04-12 ENCOUNTER — OFFICE VISIT (OUTPATIENT)
Dept: GASTROENTEROLOGY | Facility: CLINIC | Age: 73
End: 2018-04-12
Payer: MEDICARE

## 2018-04-12 VITALS
HEART RATE: 74 BPM | SYSTOLIC BLOOD PRESSURE: 139 MMHG | HEIGHT: 63 IN | BODY MASS INDEX: 22.73 KG/M2 | DIASTOLIC BLOOD PRESSURE: 74 MMHG | WEIGHT: 128.31 LBS

## 2018-04-12 DIAGNOSIS — K86.2 PANCREAS CYST: ICD-10-CM

## 2018-04-12 PROCEDURE — 99214 OFFICE O/P EST MOD 30 MIN: CPT | Mod: S$PBB,,, | Performed by: INTERNAL MEDICINE

## 2018-04-12 PROCEDURE — 99999 PR PBB SHADOW E&M-EST. PATIENT-LVL III: CPT | Mod: PBBFAC,,, | Performed by: INTERNAL MEDICINE

## 2018-04-12 PROCEDURE — 99213 OFFICE O/P EST LOW 20 MIN: CPT | Mod: PBBFAC | Performed by: INTERNAL MEDICINE

## 2018-04-12 NOTE — PROGRESS NOTES
Gastroenterology: [facility]      SUBJECTIVE:         Chief Complaint: Here for evaluation of a pancreatic cyst     History of Present Illness:  Patient is a 73 y.o. female presents with a pancreatic cyst. The cyst was first noted last month 2018. It's located in the the tail.  It measures 10 mm  in size.  It is not symptomatic. Previous studies include only the  CT scan.   She had the CT in work up of her chronic diarrhea.  She also has a history of chronic diarrhea Previous FNA of the cyst has not been done    There is not a family history of pancreatic cancer     The patient does not smoke.    ECOG status 0      (Not in a hospital admission)    Review of patient's allergies indicates:   Allergen Reactions    Gluten Diarrhea        Past Medical History:   Diagnosis Date    Allergy     Diverticulosis     Gluten enteropathy     Hernia     Hypothyroidism     PD (Parkinson's disease) 9/16/2015    Right tremor type    Peptic ulcer disease     Right shoulder pain     Cirtisone injection 3/26/15 - Dr. Tolbert    Ulcer      Past Surgical History:   Procedure Laterality Date    ADENOIDECTOMY      COLONOSCOPY  03/01/2012    Dr. Teresa, repeat in 10 years for screening    COLONOSCOPY N/A 2/21/2018    Procedure: COLONOSCOPY;  Surgeon: Radha Deng MD;  [facility];  Service: Endoscopy;  Laterality: N/A;    COSMETIC SURGERY      Broken nose    FRACTURE SURGERY  left wrist    With pin    HEMORRHOID SURGERY      HERNIA REPAIR Left 04/09/2015    Dr Lo; left inguinal    RHINOPLASTY TIP      TONSILLECTOMY      UPPER GASTROINTESTINAL ENDOSCOPY  05/21/2015    Dr. Gomez     Family History   Problem Relation Age of Onset    Diabetes Mother     Diabetes Son     Obesity Sister     Alcohol abuse Brother     Heart disease Brother     No Known Problems Father     No Known Problems Maternal Grandmother     No Known Problems Maternal Grandfather     No Known Problems Paternal  Grandmother     No Known Problems Paternal Grandfather     Cancer Paternal Aunt     No Known Problems Daughter     No Known Problems Maternal Aunt     No Known Problems Maternal Uncle     No Known Problems Paternal Uncle     Breast cancer Neg Hx     Colon cancer Neg Hx     Ovarian cancer Neg Hx     Colon polyps Neg Hx     Crohn's disease Neg Hx     Ulcerative colitis Neg Hx     Celiac disease Neg Hx      Social History   Substance Use Topics    Smoking status: Never Smoker    Smokeless tobacco: Never Used    Alcohol use 7.0 oz/week     14 Standard drinks or equivalent per week      Comment: 2 glasses wine per dinner       Review of Systems:  A complete review of systems was performed and other than described in the HPI and below is negative   Gastrointestinal: diarrhea and old history of weight loss    OBJECTIVE:     Vital Signs (Most Recent)  Pulse: 74 (04/12/18 0945)  BP: 139/74 (04/12/18 0945)      Diagnostic Results:  CT: Reviewed        ASSESSMENT/PLAN:     Pancreatic cyst in the the tail. Measuring 10mm in size, This is the first imaging study.  Most likely etiology at this point is a undetermined at this point   Year 0  We discussed in detail the natural history of pancreatic cysts, the therapy involved, and the benefits of multimodality imaging including Ct, MRI, and EUS with FNA    We will be discussing her case at the pancreactic cyst clinic multidisciplinary conference to finalize a treatment plan.    Current ACR guideline would suggets a repeat imaging study in 2 years for her 10mm cyst discovered at the age of 73    Plan:   We'll tentatively plan for a repeat CT pancreas protocol in 2 years.    Given her chronic diarrhea, I would suggest a fecal elastase to her work up though her imaging and history are not greatly suggestive of pancreas insufficiency    We spent 25 minute in today's clinic with greater than 50% of the time dedicated to counseling and arranging care.

## 2018-04-26 ENCOUNTER — OFFICE VISIT (OUTPATIENT)
Dept: FAMILY MEDICINE | Facility: CLINIC | Age: 73
End: 2018-04-26
Attending: FAMILY MEDICINE
Payer: MEDICARE

## 2018-04-26 ENCOUNTER — HOSPITAL ENCOUNTER (OUTPATIENT)
Dept: RADIOLOGY | Facility: CLINIC | Age: 73
Discharge: HOME | End: 2018-04-26
Attending: FAMILY MEDICINE
Payer: MEDICARE

## 2018-04-26 VITALS
OXYGEN SATURATION: 98 % | RESPIRATION RATE: 16 BRPM | DIASTOLIC BLOOD PRESSURE: 80 MMHG | WEIGHT: 130.94 LBS | HEART RATE: 61 BPM | SYSTOLIC BLOOD PRESSURE: 142 MMHG | TEMPERATURE: 98 F | BODY MASS INDEX: 23.2 KG/M2 | HEIGHT: 63 IN

## 2018-04-26 DIAGNOSIS — M79.602 LEFT ARM PAIN: ICD-10-CM

## 2018-04-26 DIAGNOSIS — M25.512 ACUTE PAIN OF LEFT SHOULDER: ICD-10-CM

## 2018-04-26 DIAGNOSIS — M79.602 LEFT ARM PAIN: Primary | ICD-10-CM

## 2018-04-26 PROCEDURE — 73030 X-RAY EXAM OF SHOULDER: CPT | Mod: 26,LT,S$GLB, | Performed by: RADIOLOGY

## 2018-04-26 PROCEDURE — 99999 PR PBB SHADOW E&M-EST. PATIENT-LVL IV: CPT | Mod: PBBFAC,,, | Performed by: FAMILY MEDICINE

## 2018-04-26 PROCEDURE — 73030 X-RAY EXAM OF SHOULDER: CPT | Mod: TC,FY,PO,LT

## 2018-04-26 PROCEDURE — 73080 X-RAY EXAM OF ELBOW: CPT | Mod: 26,LT,S$GLB, | Performed by: RADIOLOGY

## 2018-04-26 PROCEDURE — 99214 OFFICE O/P EST MOD 30 MIN: CPT | Mod: PBBFAC,PO | Performed by: FAMILY MEDICINE

## 2018-04-26 PROCEDURE — 99213 OFFICE O/P EST LOW 20 MIN: CPT | Mod: S$PBB,,, | Performed by: FAMILY MEDICINE

## 2018-04-26 PROCEDURE — 73080 X-RAY EXAM OF ELBOW: CPT | Mod: TC,FY,PO,LT

## 2018-04-26 NOTE — PROGRESS NOTES
"Subjective:       Patient ID: Ariana Tiwari is a 73 y.o. female.    Chief Complaint: Shoulder Pain and Arm Pain    73-year-old female comes in with recent onset of pain she describes as being in the left arm and shoulder.  It started after about 3 months of using a resistance exercise device.  She states that she had not increased the resistance or intensity when it started and she had no fall or other injury but she began experiencing pain in the distal medial anterior left forearm above the medial epicondyle.  Pain is worse with abduction of the shoulder above 85-90° and with flexion above °.  Her greatest pain is when she tries to reach behind her back such as when taking off clothing.  The pain is referred to the distal forearm more than the shoulder with shoulder movement will produce the pain.  She has used some ibuprofen without relief.  She has not tried ice or heat.  She is unable to give a good description of the pain, when I asked her if it felt like it might be burning or tingling such as a neuropathic pain she stated "it might be".    Past Medical History:  No date: Allergy  No date: Diverticulosis  No date: Gluten enteropathy  No date: Hernia  No date: Hypothyroidism  9/16/2015: PD (Parkinson's disease)      Comment: Right tremor type  No date: Peptic ulcer disease  No date: Right shoulder pain      Comment: Cirtisone injection 3/26/15 - Dr. Tolbert  No date: Ulcer    Past Surgical History:  No date: ADENOIDECTOMY  03/01/2012: COLONOSCOPY      Comment: Dr. Teresa, repeat in 10 years for screening  2/21/2018: COLONOSCOPY N/A      Comment: Procedure: COLONOSCOPY;  Surgeon: Radha Deng MD;  Location: [facility];  Service:                Endoscopy;  Laterality: N/A;  No date: COSMETIC SURGERY      Comment: Broken nose  left wrist: FRACTURE SURGERY      Comment: With pin  No date: HEMORRHOID SURGERY  04/09/2015: HERNIA REPAIR Left      Comment: Dr Lo; left inguinal  No date: " RHINOPLASTY TIP  No date: TONSILLECTOMY  05/21/2015: UPPER GASTROINTESTINAL ENDOSCOPY      Comment: Dr. Gomez          Review of Systems   Musculoskeletal: Positive for arthralgias and myalgias. Negative for joint swelling.       Objective:      Physical Exam   Constitutional: She appears well-developed and well-nourished. No distress.   Mildly elevated systolic blood pressure  Normal weight with BMI 23.2 she is up 3 pounds from her last visit with me October 5, 2017   Musculoskeletal:        Left shoulder: She exhibits decreased range of motion and pain. She exhibits no tenderness, no bony tenderness, no swelling, no effusion, no crepitus and no deformity.        Left elbow: Normal. She exhibits normal range of motion, no swelling, no effusion and no deformity. No tenderness found. No radial head, no medial epicondyle, no lateral epicondyle and no olecranon process tenderness noted.        Left upper arm: She exhibits no tenderness, no bony tenderness, no swelling, no edema and no deformity.        Arms:  Skin: She is not diaphoretic.   Nursing note and vitals reviewed.      Assessment:       1. Left arm pain    2. Acute pain of left shoulder        Plan:       1. Left arm pain  Unclear what the etiology is of her pain.  It appears to be related more to shoulder movement but refers to the distal left arm  - X-Ray Elbow Complete Left; Future  - Ambulatory referral to Physical Medicine Rehab    2. Acute pain of left shoulder  - X-Ray Shoulder 2 or More Views Left; Future  - Ambulatory referral to Physical Medicine Rehab

## 2018-04-30 ENCOUNTER — TELEPHONE (OUTPATIENT)
Dept: GASTROENTEROLOGY | Facility: CLINIC | Age: 73
End: 2018-04-30

## 2018-05-02 ENCOUNTER — INITIAL CONSULT (OUTPATIENT)
Dept: PHYSICAL MEDICINE AND REHAB | Facility: CLINIC | Age: 73
End: 2018-05-02
Attending: FAMILY MEDICINE
Payer: MEDICARE

## 2018-05-02 VITALS
DIASTOLIC BLOOD PRESSURE: 80 MMHG | WEIGHT: 130 LBS | BODY MASS INDEX: 23.04 KG/M2 | HEIGHT: 63 IN | HEART RATE: 64 BPM | SYSTOLIC BLOOD PRESSURE: 154 MMHG

## 2018-05-02 DIAGNOSIS — M67.912 TENDINOPATHY OF LEFT ROTATOR CUFF: ICD-10-CM

## 2018-05-02 DIAGNOSIS — M25.519 CHRONIC SHOULDER PAIN, UNSPECIFIED LATERALITY: Primary | ICD-10-CM

## 2018-05-02 DIAGNOSIS — G89.29 CHRONIC SHOULDER PAIN, UNSPECIFIED LATERALITY: Primary | ICD-10-CM

## 2018-05-02 PROCEDURE — 99214 OFFICE O/P EST MOD 30 MIN: CPT | Mod: 25,S$PBB,, | Performed by: PHYSICAL MEDICINE & REHABILITATION

## 2018-05-02 PROCEDURE — 99213 OFFICE O/P EST LOW 20 MIN: CPT | Mod: PBBFAC,PN,25 | Performed by: PHYSICAL MEDICINE & REHABILITATION

## 2018-05-02 PROCEDURE — 76881 US COMPL JOINT R-T W/IMG: CPT | Mod: 26,S$PBB,, | Performed by: PHYSICAL MEDICINE & REHABILITATION

## 2018-05-02 PROCEDURE — 76881 US COMPL JOINT R-T W/IMG: CPT | Mod: PBBFAC,PN | Performed by: PHYSICAL MEDICINE & REHABILITATION

## 2018-05-02 PROCEDURE — 99999 PR PBB SHADOW E&M-EST. PATIENT-LVL III: CPT | Mod: PBBFAC,,, | Performed by: PHYSICAL MEDICINE & REHABILITATION
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
## 2018-05-09 DIAGNOSIS — M67.912 TENDINOPATHY OF LEFT ROTATOR CUFF: Primary | ICD-10-CM

## 2018-05-10 ENCOUNTER — CLINICAL SUPPORT (OUTPATIENT)
Dept: REHABILITATION | Facility: HOSPITAL | Age: 73
End: 2018-05-10
Attending: PHYSICAL MEDICINE & REHABILITATION
Payer: MEDICARE

## 2018-05-10 DIAGNOSIS — G89.29 CHRONIC LEFT SHOULDER PAIN: ICD-10-CM

## 2018-05-10 DIAGNOSIS — M25.512 CHRONIC LEFT SHOULDER PAIN: ICD-10-CM

## 2018-05-10 PROCEDURE — 97140 MANUAL THERAPY 1/> REGIONS: CPT | Mod: PN

## 2018-05-10 PROCEDURE — G8984 CARRY CURRENT STATUS: HCPCS | Mod: CK,PN

## 2018-05-10 PROCEDURE — 97110 THERAPEUTIC EXERCISES: CPT | Mod: PN

## 2018-05-10 PROCEDURE — 97161 PT EVAL LOW COMPLEX 20 MIN: CPT | Mod: PN

## 2018-05-10 PROCEDURE — G8985 CARRY GOAL STATUS: HCPCS | Mod: CJ,PN

## 2018-05-10 NOTE — PATIENT INSTRUCTIONS
Levator Stretch        Turn head toward other side and look down toward arm pit. Hold 30 seconds. Repeat on other side.  Repeat 3 times. Do 3 sessions per day.     [email or web address]     Scapular Retraction (Standing)        With arms at sides, pinch shoulder blades together.  Repeat __10__ times per set. Do __2__ sets per session. Do __3__ sessions per day.     [email or web address]     Copyright © I. All rights reserved.
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
## 2018-05-18 ENCOUNTER — PATIENT MESSAGE (OUTPATIENT)
Dept: PHYSICAL MEDICINE AND REHAB | Facility: CLINIC | Age: 73
End: 2018-05-18

## 2018-05-20 DIAGNOSIS — K52.9 CHRONIC DIARRHEA: ICD-10-CM

## 2018-05-20 DIAGNOSIS — R15.2 FECAL URGENCY: ICD-10-CM

## 2018-05-21 RX ORDER — DICYCLOMINE HYDROCHLORIDE 10 MG/1
CAPSULE ORAL
Qty: 120 CAPSULE | Refills: 0 | Status: SHIPPED | OUTPATIENT
Start: 2018-05-21 | End: 2018-06-25 | Stop reason: SDUPTHER

## 2018-05-24 ENCOUNTER — TELEPHONE (OUTPATIENT)
Dept: GASTROENTEROLOGY | Facility: CLINIC | Age: 73
End: 2018-05-24

## 2018-05-24 NOTE — TELEPHONE ENCOUNTER
Patient wants to schedule a procedure. Your note and cyst discussion was for CT in 2 years. Do you want to schedule her for an EUS?

## 2018-05-24 NOTE — TELEPHONE ENCOUNTER
----- Message from Johana Joshi sent at 5/24/2018 10:28 AM CDT -----  Contact: Son- Je- [phone]  Jacob- pts son called to schedule the pts procedure with Dr. James- please contact Je at [phone]

## 2018-05-25 NOTE — TELEPHONE ENCOUNTER
Message   Received: Today   Message Contents   MD Kerrie Bashir MA   Caller: Unspecified (Yesterday,  4:42 PM)             I spoke to her son, she was confused.  Keep to the 2 year surveillance   Thanks

## 2018-05-28 ENCOUNTER — PATIENT MESSAGE (OUTPATIENT)
Dept: GASTROENTEROLOGY | Facility: CLINIC | Age: 73
End: 2018-05-28

## 2018-05-28 ENCOUNTER — TELEPHONE (OUTPATIENT)
Dept: GASTROENTEROLOGY | Facility: CLINIC | Age: 73
End: 2018-05-28

## 2018-05-28 DIAGNOSIS — R93.3 ABNORMAL CT SCAN, SMALL BOWEL: Primary | ICD-10-CM

## 2018-05-28 NOTE — TELEPHONE ENCOUNTER
----- Message from Judith Ortega sent at 5/28/2018 11:07 AM CDT -----  Type:  Patient Returning Call    Who Called:  Ariana Montiel Left Message for Patient:  nurse  Does the patient know what this is regarding?:  appt for procedure  Best Call Back Number:  [phone]  Additional Information: n/a

## 2018-05-28 NOTE — TELEPHONE ENCOUNTER
Contacted pt to schedule her VCE with . Pt is scheduled for 6/6/18 at 7am. Instructions sent via message portal.

## 2018-05-30 ENCOUNTER — TELEPHONE (OUTPATIENT)
Dept: GASTROENTEROLOGY | Facility: CLINIC | Age: 73
End: 2018-05-30

## 2018-05-30 NOTE — TELEPHONE ENCOUNTER
Returned call to pt and her sister regarding her upcoming procedure instructions. Pt stated she can't get on the computer with the login/pw her son has this info. Pt also states her son will be back on Sun so maybe he could get her instructions from her message portal. Pt said she will call our office back.

## 2018-05-30 NOTE — TELEPHONE ENCOUNTER
----- Message from Shelly Randolph sent at 5/30/2018  9:09 AM CDT -----  Patient  Sister sarah  Is  Calling  To  Get  Instruction   For a  Test  Pt  Is  Taking // please call  Back [phone]

## 2018-05-30 NOTE — TELEPHONE ENCOUNTER
----- Message from Alisa Stone sent at 5/30/2018 10:14 AM CDT -----  Contact: Sister  Jojo, sister [phone] calling to speak with nurse in office to let her know they will be coming to  the pre-op instructions, please call when ready. Placed call to POD, no answer. Please advise. Thanks!

## 2018-06-05 ENCOUNTER — TELEPHONE (OUTPATIENT)
Dept: GASTROENTEROLOGY | Facility: CLINIC | Age: 73
End: 2018-06-05

## 2018-06-05 NOTE — OR NURSING
"Pt states not feeling well and does not want capsule study done "cannot go without eating" Dr Deng aware and I spoke to pt and was instructed by Dr Deng that she could eat until 12 N light lunch today  ([facility] 06/06/2018)  Pt then stated not interested in procedure and son going out of town.  Instructed pt to call us back when ready to do capsule.  Dr Deng aware and no further orders given.  Instructed pt to call office and [facility] appointment to speak to Dr Deng or seek further care as needed.  "

## 2018-06-05 NOTE — TELEPHONE ENCOUNTER
Bel SALAZAR from [facility] receive a call from pt stating she wanted to reschedule VCE. Pt have not decided on a date yet, says she will call our office back.

## 2018-06-25 DIAGNOSIS — R15.2 FECAL URGENCY: ICD-10-CM

## 2018-06-25 DIAGNOSIS — K52.9 CHRONIC DIARRHEA: ICD-10-CM

## 2018-06-25 RX ORDER — DICYCLOMINE HYDROCHLORIDE 10 MG/1
CAPSULE ORAL
Qty: 120 CAPSULE | Refills: 0 | Status: SHIPPED | OUTPATIENT
Start: 2018-06-25 | End: 2018-07-18 | Stop reason: SDUPTHER

## 2018-06-26 DIAGNOSIS — R15.2 FECAL URGENCY: ICD-10-CM

## 2018-06-26 DIAGNOSIS — K52.9 CHRONIC DIARRHEA: ICD-10-CM

## 2018-06-26 RX ORDER — DICYCLOMINE HYDROCHLORIDE 10 MG/1
CAPSULE ORAL
Qty: 120 CAPSULE | Refills: 0 | OUTPATIENT
Start: 2018-06-26

## 2018-06-26 NOTE — TELEPHONE ENCOUNTER
----- Message from Alisa Stone sent at 6/26/2018  8:47 AM CDT -----  Contact: Patient  Ariana, patient [phone] calling because she is having issues refilling her medication dicyclomine (BENTYL) 10 MG capsule. Please advise. Thanks.    [facility] [address]  [place] [address]  Phone: [phone] Fax: [phone]

## 2018-06-26 NOTE — TELEPHONE ENCOUNTER
""     Date/Time Signed: 6/25/2018 16:21       E-Prescribing Status: Receipt confirmed by pharmacy (6/25/2018  4:21 PM CDT)     "  "

## 2018-07-02 ENCOUNTER — TELEPHONE (OUTPATIENT)
Dept: GASTROENTEROLOGY | Facility: CLINIC | Age: 73
End: 2018-07-02

## 2018-07-02 NOTE — TELEPHONE ENCOUNTER
Returned call to pt regarding scheduling her VCE. Pt is scheduled for 7/10/18 at 7am. Pt has prep instructions.

## 2018-07-02 NOTE — TELEPHONE ENCOUNTER
----- Message from Dian Johnson sent at 7/2/2018  8:11 AM CDT -----  Contact: Self  Patient would like to reschedule her endo procedure that she had to cancel on 6/6.  Please call back at [phone] (home).  Thank you!

## 2018-07-10 ENCOUNTER — HOSPITAL ENCOUNTER (OUTPATIENT)
Facility: HOSPITAL | Age: 73
Discharge: HOME | End: 2018-07-10
Attending: INTERNAL MEDICINE | Admitting: INTERNAL MEDICINE
Payer: MEDICARE

## 2018-07-10 ENCOUNTER — SURGERY (OUTPATIENT)
Age: 73
End: 2018-07-10

## 2018-07-10 VITALS
TEMPERATURE: 99 F | HEART RATE: 64 BPM | SYSTOLIC BLOOD PRESSURE: 155 MMHG | RESPIRATION RATE: 20 BRPM | DIASTOLIC BLOOD PRESSURE: 68 MMHG | OXYGEN SATURATION: 97 %

## 2018-07-10 DIAGNOSIS — R93.89 ABNORMAL CT SCAN: ICD-10-CM

## 2018-07-10 PROCEDURE — 91110 GI TRC IMG INTRAL ESOPH-ILE: CPT | Performed by: INTERNAL MEDICINE

## 2018-07-10 PROCEDURE — 91110 GI TRC IMG INTRAL ESOPH-ILE: CPT | Mod: 26,,, | Performed by: INTERNAL MEDICINE

## 2018-07-10 PROCEDURE — 25000003 PHARM REV CODE 250: Performed by: INTERNAL MEDICINE

## 2018-07-10 RX ORDER — DEXTROMETHORPHAN/PSEUDOEPHED 2.5-7.5/.8
DROPS ORAL
Status: DISCONTINUED
Start: 2018-07-10 | End: 2018-07-10 | Stop reason: HOSPADM

## 2018-07-10 RX ORDER — DEXTROMETHORPHAN/PSEUDOEPHED 2.5-7.5/.8
40 DROPS ORAL ONCE
Status: COMPLETED | OUTPATIENT
Start: 2018-07-10 | End: 2018-07-10

## 2018-07-10 RX ADMIN — SIMETHICONE 40 MG: 20 SUSPENSION/ DROPS ORAL at 07:07

## 2018-07-10 NOTE — H&P
[facility] Note    CC: Diarrhea, abnormal small bowel imaging    HPI 73 y.o. female presents for evaluation of diarrhea with abnormal small bowel imaging    Past Medical History:   Diagnosis Date    Allergy     Diverticulosis     Gluten enteropathy     Hernia     Hypothyroidism     PD (Parkinson's disease) 9/16/2015    Right tremor type    Peptic ulcer disease     Right shoulder pain     Cirtisone injection 3/26/15 - Dr. Tolbert    Ulcer              Assessment:   73 y.o. female presents for evaluation of diarrhea with abnormal small bowel imaging    Plan:  -[facility]    [facility]  [facility], [address]  [place] [address]  Office: [phone]  Fax: [phone]

## 2018-07-11 DIAGNOSIS — E03.9 ACQUIRED HYPOTHYROIDISM: ICD-10-CM

## 2018-07-11 RX ORDER — LEVOTHYROXINE SODIUM 50 UG/1
TABLET ORAL
Qty: 90 TABLET | Refills: 0 | Status: SHIPPED | OUTPATIENT
Start: 2018-07-11 | End: 2018-07-13 | Stop reason: DRUGHIGH

## 2018-07-12 ENCOUNTER — LAB VISIT (OUTPATIENT)
Dept: LAB | Facility: HOSPITAL | Age: 73
End: 2018-07-12
Attending: FAMILY MEDICINE
Payer: MEDICARE

## 2018-07-12 DIAGNOSIS — E03.9 ACQUIRED HYPOTHYROIDISM: ICD-10-CM

## 2018-07-12 LAB
T4 FREE SERPL-MCNC: 0.82 NG/DL
TSH SERPL DL<=0.005 MIU/L-ACNC: 7.07 UIU/ML

## 2018-07-12 PROCEDURE — 36415 COLL VENOUS BLD VENIPUNCTURE: CPT | Mod: PO

## 2018-07-12 PROCEDURE — 84439 ASSAY OF FREE THYROXINE: CPT

## 2018-07-12 PROCEDURE — 84443 ASSAY THYROID STIM HORMONE: CPT

## 2018-07-13 ENCOUNTER — TELEPHONE (OUTPATIENT)
Dept: FAMILY MEDICINE | Facility: CLINIC | Age: 73
End: 2018-07-13

## 2018-07-13 DIAGNOSIS — R19.7 DIARRHEA, UNSPECIFIED TYPE: Primary | ICD-10-CM

## 2018-07-13 DIAGNOSIS — E03.9 HYPOTHYROIDISM, UNSPECIFIED TYPE: Primary | ICD-10-CM

## 2018-07-13 RX ORDER — LEVOTHYROXINE SODIUM 125 UG/1
125 TABLET ORAL DAILY
Qty: 45 TABLET | Refills: 0 | Status: SHIPPED | OUTPATIENT
Start: 2018-07-13 | End: 2018-10-05 | Stop reason: SDUPTHER

## 2018-07-13 NOTE — TELEPHONE ENCOUNTER
----- Message from Mahnaz Gusman sent at 7/13/2018 10:15 AM CDT -----  Contact: patient  Returning missed call. Please call back [phone]

## 2018-07-13 NOTE — TELEPHONE ENCOUNTER
----- Message from Nba Petty MD sent at 7/12/2018  7:22 PM CDT -----  Her thyroid is still a little low.  The next dose upward from her current one is a 75 µg which may be a little too strong.  We could use a 112 or 125 and take one half which would give us a 56 or 62 µg dose.  I would suggest taking one half of the 125.    Results sent by email

## 2018-07-13 NOTE — [employer] PATIENT INSTRUCTIONS
Discharge Summary/Instructions after an Endoscopic Procedure  Patient Name: Ariana Tiwari  Patient MRN: [id number]  Patient YOB: 1945  Tuesday, July 10, 2018  Radha Deng MD  RESTRICTIONS:  During your procedure today, you received medications for sedation.  These   medications may affect your judgment, balance and coordination.  Therefore,   for 24 hours, you have the following restrictions:   - DO NOT drive a car, operate machinery, make legal/financial decisions,   sign important papers or drink alcohol.    ACTIVITY:  Today: no heavy lifting, straining or running due to procedural   sedation/anesthesia.  The following day: return to full activity including work.  DIET:  Eat and drink normally unless instructed otherwise.     TREATMENT FOR COMMON SIDE EFFECTS:  - Mild abdominal pain, nausea, belching, bloating or excessive gas:  rest,   eat lightly and use a heating pad.  - Sore Throat: treat with throat lozenges and/or gargle with warm salt   water.  - Because air was used during the procedure, expelling large amounts of air   from your rectum or belching is normal.  - If a bowel prep was taken, you may not have a bowel movement for 1-3 days.    This is normal.  SYMPTOMS TO WATCH FOR AND REPORT TO YOUR PHYSICIAN:  1. Abdominal pain or bloating, other than gas cramps.  2. Chest pain.  3. Back pain.  4. Signs of infection such as: chills or fever occurring within 24 hours   after the procedure.  5. Rectal bleeding, which would show as bright red, maroon, or black stools.   (A tablespoon of blood from the rectum is not serious, especially if   hemorrhoids are present.)  6. Vomiting.  7. Weakness or dizziness.  GO DIRECTLY TO THE NEAREST EMERGENCY ROOM IF YOU HAVE ANY OF THE FOLLOWING:      Difficulty breathing              Chills and/or fever over 101 F   Persistent vomiting and/or vomiting blood   Severe abdominal pain   Severe chest pain   Black, tarry stools   Bleeding- more than one  tablespoon   Any other symptom or condition that you feel may need urgent attention  Your doctor recommends these additional instructions:  If any biopsies were taken, your doctors clinic will contact you in 1 to 2   weeks with any results.  -Abdominal xray early next week  -Schedule EGD with push enteroscopy  For questions, problems or results please call your physician - Radha Deng MD at Work:  [phone].  [facility] RO[facility] PHONE NUMBER: [phone]  IF A COMPLICATION OR EMERGENCY SITUATION ARISES AND YOU ARE UNABLE TO REACH   YOUR PHYSICIAN - GO DIRECTLY TO THE EMERGENCY ROOM.  Radha Deng MD  7/13/2018 1:45:30 PM  This report has been verified and signed electronically.  [employer]

## 2018-07-13 NOTE — TELEPHONE ENCOUNTER
----- Message from Modesta Justice sent at 7/13/2018 10:52 AM CDT -----  Type: Needs Medical Advice    Who Called:  Patient  Best Call Back Number: [phone]  Additional Information: Patient needs to speak with nurse concerning thyroid medication/has question/please call back to advise.

## 2018-07-13 NOTE — TELEPHONE ENCOUNTER
Lab results discussed with patient- sign order please-I phone in med since patient was at [facility]-  Levothyroxine 125mcg- half tab daily #45- lab recheck appt made for 8 wks.

## 2018-07-17 ENCOUNTER — TELEPHONE (OUTPATIENT)
Dept: GASTROENTEROLOGY | Facility: CLINIC | Age: 73
End: 2018-07-17

## 2018-07-17 ENCOUNTER — HOSPITAL ENCOUNTER (OUTPATIENT)
Dept: RADIOLOGY | Facility: HOSPITAL | Age: 73
Discharge: HOME | End: 2018-07-17
Attending: INTERNAL MEDICINE
Payer: MEDICARE

## 2018-07-17 DIAGNOSIS — R19.7 DIARRHEA, UNSPECIFIED TYPE: ICD-10-CM

## 2018-07-17 PROCEDURE — 74018 RADEX ABDOMEN 1 VIEW: CPT | Mod: 26,,, | Performed by: RADIOLOGY

## 2018-07-17 PROCEDURE — 74018 RADEX ABDOMEN 1 VIEW: CPT | Mod: TC,FY

## 2018-07-17 NOTE — TELEPHONE ENCOUNTER
----- Message from Mini Sibley sent at 7/17/2018 10:34 AM CDT -----  Contact: pt  Pt calling would like the result of the procedure down on 7/10,please....[phone] (home)

## 2018-07-17 NOTE — TELEPHONE ENCOUNTER
Spoke to pt, results given and recommendations from . Pt understands. Pt will have the X-ray done today and pt states she leaves for [place] on tomorrow when she returns she will contact our office to schedule next procedure.

## 2018-07-18 DIAGNOSIS — R15.2 FECAL URGENCY: ICD-10-CM

## 2018-07-18 DIAGNOSIS — K52.9 CHRONIC DIARRHEA: ICD-10-CM

## 2018-07-18 DIAGNOSIS — R93.3 ABNORMAL FINDINGS ON DIAGNOSTIC IMAGING OF OTHER PARTS OF DIGESTIVE TRACT: Primary | ICD-10-CM

## 2018-07-18 RX ORDER — DICYCLOMINE HYDROCHLORIDE 10 MG/1
CAPSULE ORAL
Qty: 120 CAPSULE | Refills: 0 | Status: SHIPPED | OUTPATIENT
Start: 2018-07-18 | End: 2018-09-20 | Stop reason: ALTCHOICE

## 2018-08-09 ENCOUNTER — SURGERY (OUTPATIENT)
Age: 73
End: 2018-08-09

## 2018-08-09 ENCOUNTER — ANESTHESIA EVENT (OUTPATIENT)
Dept: ENDOSCOPY | Facility: HOSPITAL | Age: 73
End: 2018-08-09
Payer: MEDICARE

## 2018-08-09 ENCOUNTER — HOSPITAL ENCOUNTER (OUTPATIENT)
Facility: HOSPITAL | Age: 73
Discharge: HOME | End: 2018-08-09
Attending: INTERNAL MEDICINE | Admitting: INTERNAL MEDICINE
Payer: MEDICARE

## 2018-08-09 ENCOUNTER — ANESTHESIA (OUTPATIENT)
Dept: ENDOSCOPY | Facility: HOSPITAL | Age: 73
End: 2018-08-09
Payer: MEDICARE

## 2018-08-09 DIAGNOSIS — R19.7 DIARRHEA: ICD-10-CM

## 2018-08-09 PROCEDURE — 25000003 PHARM REV CODE 250: Performed by: INTERNAL MEDICINE

## 2018-08-09 PROCEDURE — 27201012 HC FORCEPS, HOT/COLD, DISP: Performed by: INTERNAL MEDICINE

## 2018-08-09 PROCEDURE — D9220A PRA ANESTHESIA: Mod: ANES,,, | Performed by: ANESTHESIOLOGY

## 2018-08-09 PROCEDURE — 37000008 HC ANESTHESIA 1ST 15 MINUTES: Performed by: INTERNAL MEDICINE

## 2018-08-09 PROCEDURE — 88305 TISSUE EXAM BY PATHOLOGIST: CPT | Mod: 26,,, | Performed by: PATHOLOGY

## 2018-08-09 PROCEDURE — 63600175 PHARM REV CODE 636 W HCPCS: Performed by: NURSE ANESTHETIST, CERTIFIED REGISTERED

## 2018-08-09 PROCEDURE — 37000009 HC ANESTHESIA EA ADD 15 MINS: Performed by: INTERNAL MEDICINE

## 2018-08-09 PROCEDURE — 44361 SMALL BOWEL ENDOSCOPY/BIOPSY: CPT | Mod: ,,, | Performed by: INTERNAL MEDICINE

## 2018-08-09 PROCEDURE — 88341 IMHCHEM/IMCYTCHM EA ADD ANTB: CPT | Mod: 26,,, | Performed by: PATHOLOGY

## 2018-08-09 PROCEDURE — 88305 TISSUE EXAM BY PATHOLOGIST: CPT | Performed by: PATHOLOGY

## 2018-08-09 PROCEDURE — D9220A PRA ANESTHESIA: Mod: CRNA,,, | Performed by: NURSE ANESTHETIST, CERTIFIED REGISTERED

## 2018-08-09 PROCEDURE — 44361 SMALL BOWEL ENDOSCOPY/BIOPSY: CPT | Performed by: INTERNAL MEDICINE

## 2018-08-09 PROCEDURE — 88342 IMHCHEM/IMCYTCHM 1ST ANTB: CPT | Mod: 26,,, | Performed by: PATHOLOGY

## 2018-08-09 RX ORDER — LIDOCAINE HCL/PF 100 MG/5ML
SYRINGE (ML) INTRAVENOUS
Status: DISCONTINUED | OUTPATIENT
Start: 2018-08-09 | End: 2018-08-09

## 2018-08-09 RX ORDER — SODIUM CHLORIDE 9 MG/ML
INJECTION, SOLUTION INTRAVENOUS CONTINUOUS
Status: DISCONTINUED | OUTPATIENT
Start: 2018-08-09 | End: 2018-08-09 | Stop reason: HOSPADM

## 2018-08-09 RX ORDER — DEXTROMETHORPHAN/PSEUDOEPHED 2.5-7.5/.8
DROPS ORAL
Status: COMPLETED | OUTPATIENT
Start: 2018-08-09 | End: 2018-08-09

## 2018-08-09 RX ORDER — PROPOFOL 10 MG/ML
INJECTION, EMULSION INTRAVENOUS
Status: DISCONTINUED | OUTPATIENT
Start: 2018-08-09 | End: 2018-08-09

## 2018-08-09 RX ORDER — DEXTROMETHORPHAN/PSEUDOEPHED 2.5-7.5/.8
DROPS ORAL
Status: DISCONTINUED
Start: 2018-08-09 | End: 2018-08-09 | Stop reason: HOSPADM

## 2018-08-09 RX ADMIN — SIMETHICONE 40 MG: 20 SUSPENSION/ DROPS ORAL at 09:08

## 2018-08-09 RX ADMIN — PROPOFOL 50 MG: 10 INJECTION, EMULSION INTRAVENOUS at 09:08

## 2018-08-09 RX ADMIN — LIDOCAINE HYDROCHLORIDE 50 MG: 20 INJECTION, SOLUTION INTRAVENOUS at 09:08

## 2018-08-09 RX ADMIN — SODIUM CHLORIDE: 0.9 INJECTION, SOLUTION INTRAVENOUS at 09:08

## 2018-08-09 NOTE — ANESTHESIA POSTPROCEDURE EVALUATION
"Anesthesia Post Evaluation    Patient: Ariana Tiwari    Procedure(s) Performed: Procedure(s) (LRB):  ENTEROSCOPY (N/A)    Final Anesthesia Type: general  Patient location during evaluation: PACU  Patient participation: Yes- Able to Participate  Level of consciousness: awake and alert and oriented  Post-procedure vital signs: reviewed and stable  Pain management: adequate  Airway patency: patent  PONV status at discharge: No PONV  Anesthetic complications: no      Cardiovascular status: blood pressure returned to baseline and stable  Respiratory status: unassisted and spontaneous ventilation  Hydration status: euvolemic  Follow-up not needed.        Visit Vitals  /60   Pulse (!) 56   Temp 36.7 °C (98 °F)   Resp 17   Ht 5' 3" (1.6 m)   Wt 55.3 kg (122 lb)   SpO2 95%   Breastfeeding? No   BMI 21.61 kg/m²       Pain/Wade Score: Pain Assessment Performed: Yes (8/9/2018  9:50 AM)  Presence of Pain: denies (8/9/2018 10:30 AM)  Wade Score: 10 (8/9/2018 10:30 AM)      "

## 2018-08-09 NOTE — ANESTHESIA PREPROCEDURE EVALUATION
08/09/2018  Ariana Tiwari is a 73 y.o., female.    Anesthesia Evaluation    I have reviewed the Patient Summary Reports.    I have reviewed the Nursing Notes.   I have reviewed the Medications.     Review of Systems  Anesthesia Hx:  No problems with previous Anesthesia    Social:  Non-Smoker    Cardiovascular:  Cardiovascular Normal     Pulmonary:  Pulmonary Normal    Renal/:  Renal/ Normal     Hepatic/GI:   PUD,    Neurological:  Neurology Normal Parkinson's Dz   Endocrine:   Hypothyroidism        Physical Exam  General:  Well nourished    Airway/Jaw/Neck:  Airway Findings: Mouth Opening: Normal Tongue: Normal  General Airway Assessment: Adult  Oropharynx Findings:  Mallampati: II  Jaw/Neck Findings:  Neck ROM: Normal ROM     Eyes/Ears/Nose:  Eyes/Ears/Nose Findings:    Dental:  Dental Findings:   Chest/Lungs:  Chest/Lungs Findings: Normal Respiratory Rate     Heart/Vascular:  Heart Findings: Rate: Normal  Rhythm: Regular Rhythm        Mental Status:  Mental Status Findings:  Cooperative, Alert and Oriented         Anesthesia Plan  Type of Anesthesia, risks & benefits discussed:  Anesthesia Type:  general  Patient's Preference:   Intra-op Monitoring Plan: standard ASA monitors  Intra-op Monitoring Plan Comments:   Post Op Pain Control Plan: multimodal analgesia  Post Op Pain Control Plan Comments:   Induction:   IV  Beta Blocker:  Patient is not currently on a Beta-Blocker (No further documentation required).       Informed Consent: Patient understands risks and agrees with Anesthesia plan.  Questions answered. Anesthesia consent signed with patient.  ASA Score: 3     Day of Surgery Review of History & Physical:  There are no significant changes.   H&P completed by Anesthesiologist.       Ready For Surgery From Anesthesia Perspective.

## 2018-08-09 NOTE — TRANSFER OF CARE
"Anesthesia Transfer of Care Note    Patient: Ariana Tiwari    Procedure(s) Performed: Procedure(s) (LRB):  ENTEROSCOPY (N/A)    Patient location: PACU    Anesthesia Type: general    Transport from OR: Transported from OR on 2-3 L/min O2 by NC with adequate spontaneous ventilation    Post pain: adequate analgesia    Post assessment: no apparent anesthetic complications and tolerated procedure well    Post vital signs: stable    Level of consciousness: awake, alert and oriented    Nausea/Vomiting: no nausea/vomiting    Complications: none    Transfer of care protocol was followed      Last vitals:   Visit Vitals  BP (!) 146/68   Pulse 68   Temp 36.7 °C (98.1 °F)   Resp (!) 23   Ht 5' 3" (1.6 m)   Wt 55.3 kg (122 lb)   SpO2 96%   Breastfeeding? No   BMI 21.61 kg/m²     "

## 2018-08-09 NOTE — H&P
[facility]    CC: Abnormal small bowel imaging, diarrhea    HPI 73 y.o. female with history of intermittent diarrhea and abnormal small bowel dilation on CTE , presents for EGD with push enteroscopy    Past Medical History:   Diagnosis Date    Allergy     Diverticulosis     Gluten enteropathy     Hernia     Hypothyroidism     PD (Parkinson's disease) 9/16/2015    Right tremor type    Peptic ulcer disease     Right shoulder pain     Cirtisone injection 3/26/15 - Dr. Tolbert    Ulcer          Review of Systems  General ROS: negative for - chills, fever or weight loss  Cardiovascular ROS: no chest pain or dyspnea on exertion  Gastrointestinal ROS: + diarrhea, no abdominal pain     Physical Examination  There were no vitals taken for this visit.  General appearance: alert, cooperative, no distress  HENT: Normocephalic, atraumatic, neck symmetrical, no nasal discharge, sclera anicteric   Lungs: clear to auscultation bilaterally, symmetric chest wall expansion bilaterally  Heart: regular rate and rhythm without rub; no displacement of the PMI   Abdomen: soft, nontender,nondistended  Extremities: extremities symmetric; no clubbing, cyanosis, or edema      Assessment:   73 y.o. female with chronic diarrhea and abnormal small bowel on CTE presents for evaluation    Plan:  -Proceed to EGD with push enteroscopy     Radha Deng MD  [facility]  [address], [address]  [place] [address]  Office: [phone]  Fax: [phone]

## 2018-08-09 NOTE — DISCHARGE INSTRUCTIONS
Diverticulosis    Diverticulosis means that small pouches have formed in the wall of your large intestine (colon). Most often, this problem causes no symptoms and is common as people age. But the pouches in the colon are at risk of becoming infected. When this happens, the condition is called diverticulitis. Although most people with diverticulosis never develop diverticulitis, it is still not uncommon. Rectal bleeding can also occur and in less common situations, a type of colon inflammation called colitis.  While most people do not have symptoms, some people with diverticulosis may have:  · Abdominal cramps and pain  · Bloating  · Constipation  · Change in bowel habits  Causes  The exact cause of diverticulosis (and diverticulitis) has not been proved, but a few things are associated with the condition:  · Low-fiber diet  · Constipation  · Lack of exercise  Your healthcare provider will talk with you about how to manage your condition. Diet changes may be all that are needed to help control diverticulosis and prevent progression to diverticulitis. If you develop diverticulitis, you will likely need other treatments.  Home care  You may be told to take fiber supplements daily. Fiber adds bulk to the stool so that it passes through the colon more easily. Stool softeners may be recommended. You may also be given medications for pain relief. Be sure to take all medications as directed.  In the past, people were told to avoid corn, nuts, and seeds. This is no longer necessary.  Follow these guidelines when caring for yourself at home:  · Eat unprocessed foods that are high in fiber. Whole grains, fruits, and vegetables are good choices.  · Drink 6 to 8 glasses of water every day unless your healthcare provider has you limit how much fluid you should have.  · Watch for changes in your bowel movements. Tell your provider if you notice any changes.  · Begin an exercise program. Ask your provider how to get started.  Generally, walking is the best.  · Get plenty of rest and sleep.  Follow-up care  Follow up with your healthcare provider, or as advised. Regular visits may be needed to check on your health. Sometimes special procedures such as colonoscopy, are needed after an episode of diverticulitis or blooding. Be sure to keep all your appointments.  If a stool sample was taken, or cultures were done, you should be told if they are positive, or if your treatment needs to be changed. You can call as directed for the results.  If X-rays were done, a radiologist will look at them. You will be told if there is a change in your treatment.  If antibiotics were prescribed, be sure to finish them all.  When to seek medical advice  Call your healthcare provider right away if any of these occur:  · Fever of 100.4°F (38°C) or higher, or as directed by your healthcare provider  · Severe cramps in the lower left side of the abdomen or pain that is getting worse  · Tenderness in the lower left side of the abdomen or worsening pain throughout the abdomen  · Diarrhea or constipation that doesn't get better within 24 hours  · Nausea and vomiting  · Bleeding from the rectum  Call 911  Call emergency services if any of the following occur:  · Trouble breathing  · Confusion  · Very drowsy or trouble awakening  · Fainting or loss of consciousness  · Rapid heart rate  · Chest pain  Date Last Reviewed: 12/30/2015 © 2000-2017 [employer]. [address] [place] [address]. All rights reserved. This information is not intended as a substitute for professional medical care. Always follow your healthcare professional's instructions.      Discharge Instructions: After Your Surgery/Procedure  Youve just had surgery. During surgery you were given medicine called anesthesia to keep you relaxed and free of pain. After surgery you may have some pain or nausea. This is common. Here are some tips for feeling better and getting well after  "surgery.     Stay on schedule with your medication.   Going home  Your doctor or nurse will show you how to take care of yourself when you go home. He or she will also answer your questions. Have an adult family member or friend drive you home.      For your safety we recommend these precaution for the first 24 hours after your procedure:  · Do not drive or use heavy equipment.  · Do not make important decisions or sign legal papers.  · Do not drink alcohol.  · Have someone stay with you, if needed. He or she can watch for problems and help keep you safe.  · Your concentration, balance, coordination, and judgement may be impaired for many hours after anesthesia.  Use caution when ambulating or standing up.     · You may feel weak and "washed out" after anesthesia and surgery.      Subtle residual effects of general anesthesia or sedation with regional / local anesthesia can last more than 24 hours.  Rest for the remainder of the day or longer if your Doctor/Surgeon has advised you to do so.  Although you may feel normal within the first 24 hours, your reflexes and mental ability may be impaired without you realizing it.  You may feel dizzy, lightheaded or sleepy for 24 hours or longer.      Be sure to go to all follow-up visits with your doctor. And rest after your surgery for as long as your doctor tells you to.  Coping with pain  If you have pain after surgery, pain medicine will help you feel better. Take it as told, before pain becomes severe. Also, ask your doctor or pharmacist about other ways to control pain. This might be with heat, ice, or relaxation. And follow any other instructions your surgeon or nurse gives you.  Tips for taking pain medicine  To get the best relief possible, remember these points:  · Pain medicines can upset your stomach. Taking them with a little food may help.  · Most pain relievers taken by mouth need at least 20 to 30 minutes to start to work.  · Taking medicine on a schedule can " help you remember to take it. Try to time your medicine so that you can take it before starting an activity. This might be before you get dressed, go for a walk, or sit down for dinner.  · Constipation is a common side effect of pain medicines. Call your doctor before taking any medicines such as laxatives or stool softeners to help ease constipation. Also ask if you should skip any foods. Drinking lots of fluids and eating foods such as fruits and vegetables that are high in fiber can also help. Remember, do not take laxatives unless your surgeon has prescribed them.  · Drinking alcohol and taking pain medicine can cause dizziness and slow your breathing. It can even be deadly. Do not drink alcohol while taking pain medicine.  · Pain medicine can make you react more slowly to things. Do not drive or run machinery while taking pain medicine.  Your health care provider may tell you to take acetaminophen to help ease your pain. Ask him or her how much you are supposed to take each day. Acetaminophen or other pain relievers may interact with your prescription medicines or other over-the-counter (OTC) drugs. Some prescription medicines have acetaminophen and other ingredients. Using both prescription and OTC acetaminophen for pain can cause you to overdose. Read the labels on your OTC medicines with care. This will help you to clearly know the list of ingredients, how much to take, and any warnings. It may also help you not take too much acetaminophen. If you have questions or do not understand the information, ask your pharmacist or health care provider to explain it to you before you take the OTC medicine.  Managing nausea  Some people have an upset stomach after surgery. This is often because of anesthesia, pain, or pain medicine, or the stress of surgery. These tips will help you handle nausea and eat healthy foods as you get better. If you were on a special food plan before surgery, ask your doctor if you should  follow it while you get better. These tips may help:  · Do not push yourself to eat. Your body will tell you when to eat and how much.  · Start off with clear liquids and soup. They are easier to digest.  · Next try semi-solid foods, such as mashed potatoes, applesauce, and gelatin, as you feel ready.  · Slowly move to solid foods. Dont eat fatty, rich, or spicy foods at first.  · Do not force yourself to have 3 large meals a day. Instead eat smaller amounts more often.  · Take pain medicines with a small amount of solid food, such as crackers or toast, to avoid nausea.     Call your surgeon if  · You still have pain an hour after taking medicine. The medicine may not be strong enough.  · You feel too sleepy, dizzy, or groggy. The medicine may be too strong.  · You have side effects like nausea, vomiting, or skin changes, such as rash, itching, or hives.       If you have obstructive sleep apnea  You were given anesthesia medicine during surgery to keep you comfortable and free of pain. After surgery, you may have more apnea spells because of this medicine and other medicines you were given. The spells may last longer than usual.   At home:  · Keep using the continuous positive airway pressure (CPAP) device when you sleep. Unless your health care provider tells you not to, use it when you sleep, day or night. CPAP is a common device used to treat obstructive sleep apnea.  · Talk with your provider before taking any pain medicine, muscle relaxants, or sedatives. Your provider will tell you about the possible dangers of taking these medicines.  © 3562-6589 [employer]. [address] [place] [address], [place] [address]. All rights reserved. This information is not intended as a substitute for professional medical care. Always follow your healthcare professional's instructions.

## 2018-08-09 NOTE — [employer] PATIENT INSTRUCTIONS
Discharge Summary/Instructions after an Endoscopic Procedure  Patient Name: Ariana Tiwari  Patient MRN: [id number]  Patient YOB: 1945  Thursday, August 09, 2018  Radha Deng MD  RESTRICTIONS:  During your procedure today, you received medications for sedation.  These   medications may affect your judgment, balance and coordination.  Therefore,   for 24 hours, you have the following restrictions:   - DO NOT drive a car, operate machinery, make legal/financial decisions,   sign important papers or drink alcohol.    ACTIVITY:  Today: no heavy lifting, straining or running due to procedural   sedation/anesthesia.  The following day: return to full activity including work.  DIET:  Eat and drink normally unless instructed otherwise.     TREATMENT FOR COMMON SIDE EFFECTS:  - Mild abdominal pain, nausea, belching, bloating or excessive gas:  rest,   eat lightly and use a heating pad.  - Sore Throat: treat with throat lozenges and/or gargle with warm salt   water.  - Because air was used during the procedure, expelling large amounts of air   from your rectum or belching is normal.  - If a bowel prep was taken, you may not have a bowel movement for 1-3 days.    This is normal.  SYMPTOMS TO WATCH FOR AND REPORT TO YOUR PHYSICIAN:  1. Abdominal pain or bloating, other than gas cramps.  2. Chest pain.  3. Back pain.  4. Signs of infection such as: chills or fever occurring within 24 hours   after the procedure.  5. Rectal bleeding, which would show as bright red, maroon, or black stools.   (A tablespoon of blood from the rectum is not serious, especially if   hemorrhoids are present.)  6. Vomiting.  7. Weakness or dizziness.  GO DIRECTLY TO THE NEAREST EMERGENCY ROOM IF YOU HAVE ANY OF THE FOLLOWING:      Difficulty breathing              Chills and/or fever over 101 F   Persistent vomiting and/or vomiting blood   Severe abdominal pain   Severe chest pain   Black, tarry stools   Bleeding- more than one  tablespoon   Any other symptom or condition that you feel may need urgent attention  Your doctor recommends these additional instructions:  If any biopsies were taken, your doctors clinic will contact you in 1 to 2   weeks with any results.  -Continue dietary restrictions. If diarrhea does not improve or returns will   refer for deep enteroscopy  -Check CRP  - Discharge patient to home (with escort).   - Patient has a contact number available for emergencies.  The signs and   symptoms of potential delayed complications were discussed with the   patient.  Return to normal activities tomorrow.  Written discharge   instructions were provided to the patient.   - Resume previous diet.   - Return to my office in 6 months.  For questions, problems or results please call your physician - Radha Deng MD at Work:  [phone].  [facility], EMERGENCY ROOM PHONE NUMBER: [phone]  IF A COMPLICATION OR EMERGENCY SITUATION ARISES AND YOU ARE UNABLE TO REACH   YOUR PHYSICIAN - GO DIRECTLY TO THE EMERGENCY ROOM.  Radha Deng MD  8/9/2018 9:42:51 AM  This report has been verified and signed electronically.  [employer]

## 2018-08-10 VITALS
BODY MASS INDEX: 21.62 KG/M2 | TEMPERATURE: 98 F | RESPIRATION RATE: 17 BRPM | SYSTOLIC BLOOD PRESSURE: 138 MMHG | HEIGHT: 63 IN | WEIGHT: 122 LBS | DIASTOLIC BLOOD PRESSURE: 60 MMHG | OXYGEN SATURATION: 95 % | HEART RATE: 56 BPM

## 2018-08-29 DIAGNOSIS — K52.9 CHRONIC DIARRHEA: ICD-10-CM

## 2018-08-29 DIAGNOSIS — R15.2 FECAL URGENCY: ICD-10-CM

## 2018-08-30 ENCOUNTER — PATIENT MESSAGE (OUTPATIENT)
Dept: GASTROENTEROLOGY | Facility: CLINIC | Age: 73
End: 2018-08-30

## 2018-08-30 RX ORDER — DICYCLOMINE HYDROCHLORIDE 10 MG/1
CAPSULE ORAL
Qty: 120 CAPSULE | Refills: 0 | OUTPATIENT
Start: 2018-08-30

## 2018-08-30 RX ORDER — DICYCLOMINE HYDROCHLORIDE 10 MG/1
CAPSULE ORAL
Qty: 120 CAPSULE | Refills: 2 | Status: SHIPPED | OUTPATIENT
Start: 2018-08-30 | End: 2018-09-20 | Stop reason: ALTCHOICE

## 2018-09-13 ENCOUNTER — LAB VISIT (OUTPATIENT)
Dept: LAB | Facility: HOSPITAL | Age: 73
End: 2018-09-13
Attending: FAMILY MEDICINE
Payer: MEDICARE

## 2018-09-13 DIAGNOSIS — E03.9 HYPOTHYROIDISM, UNSPECIFIED TYPE: ICD-10-CM

## 2018-09-13 LAB — TSH SERPL DL<=0.005 MIU/L-ACNC: 3.29 UIU/ML

## 2018-09-13 PROCEDURE — 84443 ASSAY THYROID STIM HORMONE: CPT

## 2018-09-13 PROCEDURE — 36415 COLL VENOUS BLD VENIPUNCTURE: CPT

## 2018-09-14 ENCOUNTER — TELEPHONE (OUTPATIENT)
Dept: GASTROENTEROLOGY | Facility: CLINIC | Age: 73
End: 2018-09-14

## 2018-09-14 NOTE — TELEPHONE ENCOUNTER
----- Message from Baljeet Velazquez sent at 9/14/2018  1:36 PM CDT -----  Type: Needs Medical Advice    Who Called:  Patient    Pharmacy name and phone #:    [id number] [place] - [address] [facility]  [address] [facility] [place] [address]  Phone: [phone] Fax: [phone]      Best Call Back Number: [phone]  Additional Information: Patient calling.  States that she needs to talk to the doctor about her medications as her current prescription isn't helping.  dicyclomine (BENTYL) 10 MG capsule

## 2018-09-20 ENCOUNTER — TELEPHONE (OUTPATIENT)
Dept: GASTROENTEROLOGY | Facility: CLINIC | Age: 73
End: 2018-09-20

## 2018-09-20 RX ORDER — DIPHENOXYLATE HYDROCHLORIDE AND ATROPINE SULFATE 2.5; .025 MG/1; MG/1
1 TABLET ORAL 4 TIMES DAILY PRN
Qty: 90 TABLET | Refills: 1 | Status: SHIPPED | OUTPATIENT
Start: 2018-09-20 | End: 2018-10-20

## 2018-09-21 ENCOUNTER — TELEPHONE (OUTPATIENT)
Dept: GASTROENTEROLOGY | Facility: CLINIC | Age: 73
End: 2018-09-21

## 2018-09-21 NOTE — TELEPHONE ENCOUNTER
----- Message from Tomasa Washington sent at 9/21/2018  9:55 AM CDT -----  Contact: self  Returning your call-not sure what it is regarding. Please call back at [phone] (jnmb)

## 2018-09-21 NOTE — TELEPHONE ENCOUNTER
Returned call to pt. Informed pt on new medication ordered and made a F/U appt for 11/6 at 130pm. Pt requested new Rx is faxed and if not put in the mail.

## 2018-10-04 ENCOUNTER — TELEPHONE (OUTPATIENT)
Dept: GASTROENTEROLOGY | Facility: CLINIC | Age: 73
End: 2018-10-04

## 2018-10-04 NOTE — TELEPHONE ENCOUNTER
----- Message from Tony Wei sent at 10/4/2018 10:33 AM CDT -----  Contact: self   Patient need to speak with a nurse regarding a medication, please call back at [phone] (home)

## 2018-10-05 RX ORDER — LEVOTHYROXINE SODIUM 125 UG/1
TABLET ORAL
Qty: 45 TABLET | Refills: 0 | Status: SHIPPED | OUTPATIENT
Start: 2018-10-05 | End: 2018-12-10 | Stop reason: SDUPTHER

## 2018-10-09 DIAGNOSIS — M85.80 OSTEOPENIA, UNSPECIFIED LOCATION: ICD-10-CM

## 2018-10-09 RX ORDER — IBANDRONATE SODIUM 150 MG/1
TABLET, FILM COATED ORAL
Qty: 1 TABLET | Refills: 11 | Status: SHIPPED | OUTPATIENT
Start: 2018-10-09 | End: 2019-09-17 | Stop reason: DRUGHIGH

## 2018-11-01 ENCOUNTER — TELEPHONE (OUTPATIENT)
Dept: GASTROENTEROLOGY | Facility: CLINIC | Age: 73
End: 2018-11-01

## 2018-11-01 RX ORDER — DIPHENOXYLATE HYDROCHLORIDE AND ATROPINE SULFATE 2.5; .025 MG/1; MG/1
1 TABLET ORAL 4 TIMES DAILY PRN
Qty: 90 TABLET | Refills: 1 | Status: CANCELLED | OUTPATIENT
Start: 2018-11-01 | End: 2018-12-01

## 2018-11-01 RX ORDER — DIPHENOXYLATE HYDROCHLORIDE AND ATROPINE SULFATE 2.5; .025 MG/1; MG/1
1 TABLET ORAL 4 TIMES DAILY PRN
Qty: 40 TABLET | Refills: 1 | Status: SHIPPED | OUTPATIENT
Start: 2018-11-01 | End: 2018-11-11

## 2018-11-01 NOTE — TELEPHONE ENCOUNTER
Sent in a refill for lomotil and recommend scheduling a follow-up if diarrhea has persisted.  Thanks  LAINEY

## 2018-11-01 NOTE — TELEPHONE ENCOUNTER
----- Message from Linda Faria LPN sent at 11/1/2018 10:13 AM CDT -----  Patient is requesting a refill on Lomotil 2.5mg ( ordered it on 9/20/18). Please send to her [facility] pharmacy in her chart.    Thank you,  Linda HESS

## 2018-11-08 ENCOUNTER — NURSE TRIAGE (OUTPATIENT)
Dept: ADMINISTRATIVE | Facility: CLINIC | Age: 73
End: 2018-11-08

## 2018-11-08 ENCOUNTER — OFFICE VISIT (OUTPATIENT)
Dept: URGENT CARE | Facility: CLINIC | Age: 73
End: 2018-11-08
Payer: MEDICARE

## 2018-11-08 VITALS
SYSTOLIC BLOOD PRESSURE: 146 MMHG | HEART RATE: 71 BPM | OXYGEN SATURATION: 98 % | TEMPERATURE: 97 F | DIASTOLIC BLOOD PRESSURE: 69 MMHG | BODY MASS INDEX: 21.79 KG/M2 | WEIGHT: 123 LBS | HEIGHT: 63 IN

## 2018-11-08 DIAGNOSIS — R19.7 DIARRHEA, UNSPECIFIED TYPE: Primary | ICD-10-CM

## 2018-11-08 PROCEDURE — 99214 OFFICE O/P EST MOD 30 MIN: CPT | Mod: S$GLB,,, | Performed by: PHYSICIAN ASSISTANT

## 2018-11-09 ENCOUNTER — TELEPHONE (OUTPATIENT)
Dept: GASTROENTEROLOGY | Facility: CLINIC | Age: 73
End: 2018-11-09

## 2018-11-09 NOTE — TELEPHONE ENCOUNTER
----- Message from RT Ameya sent at 11/9/2018  4:47 PM CST -----  Contact: pt    pt , requesting medication refill: amna Ash.

## 2018-11-09 NOTE — TELEPHONE ENCOUNTER
"Hx of diarrhea due to gluten intolerance x several years, on medication for it, lomotil, it seemed to work for a few days, but it's really bad today.      Reason for Disposition   [1] SEVERE diarrhea (e.g., 7 or more times / day more than normal) AND [2]  age > 60 years    Answer Assessment - Initial Assessment Questions  1. DIARRHEA SEVERITY: "How bad is the diarrhea?" "How many extra stools have you had in the past 24 hours than normal?"     - MILD: Few loose or mushy BMs; increase of 1-3 stools over normal daily number of stools; mild increase in ostomy output.    - MODERATE: Increase of 4-6 stools daily over normal; moderate increase in ostomy output.    - SEVERE (or Worst Possible): Increase of 7 or more stools daily over normal; moderate increase in ostomy output; incontinence.     betweeen 10-30 times  2. ONSET: "When did the diarrhea begin?"       It's been increasing in frequency over the last few days, but today is the worst  3. BM CONSISTENCY: "How loose or watery is the diarrhea?"       Very watery today  4. VOMITING: "Are you also vomiting?" If so, ask: "How many times in the past 24 hours?"       no  5. ABDOMINAL PAIN: "Are you having any abdominal pain?" If yes: "What does it feel like?" (e.g., crampy, dull, intermittent, constant)       no  6. ABDOMINAL PAIN SEVERITY: If present, ask: "How bad is the pain?"  (e.g., Scale 1-10; mild, moderate, or severe)     - MILD (1-3): doesn't interfere with normal activities, abdomen soft and not tender to touch      - MODERATE (4-7): interferes with normal activities or awakens from sleep, tender to touch      - SEVERE (8-10): excruciating pain, doubled over, unable to do any normal activities        na  7. ORAL INTAKE: If vomiting, "Have you been able to drink liquids?" "How much fluids have you had in the past 24 hours?"      Able to drink lots of water, 1 cup of coffee this am ( first coffee in a week)  And a protein shake made with almond milk  8. HYDRATION: " ""Any signs of dehydration?" (e.g., dry mouth [not just dry lips], too weak to stand, dizziness, new weight loss) "When did you last urinate?"      Some dizziness, some  Mild weakness  9. EXPOSURE: "Have you traveled to a foreign country recently?" "Have you been exposed to anyone with diarrhea?" "Could you have eaten any food that was spoiled?"      no  10. OTHER SYMPTOMS: "Do you have any other symptoms?" (e.g., fever, blood in stool)        no  11. PREGNANCY: "Is there any chance you are pregnant?" "When was your last menstrual period?"        Na    Protocols used: ST DIARRHEA-A-      "

## 2018-11-09 NOTE — TELEPHONE ENCOUNTER
----- Message from Hollie Tovar sent at 11/9/2018  4:31 PM CST -----  Contact: self [phone]  1. What is the name of the medication you are requesting? diphen/atrop  2. What is the dose? 2.5mg  3. How do you take the medication? Orally, topically, etc? orally  4. How often do you take this medication? 4x daily  5. Do you need a 30 day or 90 day supply? 30 day  6. How many refills are you requesting? 3  7. What is your preferred pharmacy and location of the pharmacy?     [id number]  [place], [place] - [address] [facility].  [address] [place] [address]  [place] [address]  Phone: [phone] Fax: [phone]    8. Who can we contact with further questions? Pt [phone]

## 2018-11-09 NOTE — TELEPHONE ENCOUNTER
Returned call to pt. Pt stated she is out of pills (Lomotil) pt states she takes medication for diarrhea. Informed pt that a new script was sent to her pharmacy by Ramandeep ADLER on 11/1/18 and it was 40 tablets with one refill, pt should have tablets left. Pt stated she has 4 pills left and is going out of town. Offered pt an appt in clinic to see NP regarding long term diarrhea, pt declined at this time. Pt wanted number to NP Ramandeep's office to call for more pills. [facility] GI clinic number given to pt.

## 2018-11-09 NOTE — PROGRESS NOTES
"Subjective:       Patient ID: Ariana Tiwari is a 73 y.o. female.    Vitals:  height is 5' 3" (1.6 m) and weight is 55.8 kg (123 lb). Her oral temperature is 97.1 °F (36.2 °C). Her blood pressure is 146/69 (abnormal) and her pulse is 71. Her oxygen saturation is 98%.     Chief Complaint: Diarrhea    Diarrhea started about 1 week ago but was 10 times today.      Diarrhea    This is a chronic problem. The current episode started in the past 7 days. The problem occurs 5 to 10 times per day. The problem has been rapidly worsening. The stool consistency is described as watery. The patient states that diarrhea awakens her from sleep. Pertinent negatives include no abdominal pain, chills, fever or vomiting. She has tried anti-motility drug for the symptoms.     Review of Systems   Constitution: Negative for chills and fever.   Cardiovascular: Negative for chest pain and palpitations.   Respiratory: Negative for shortness of breath.    Musculoskeletal: Negative for back pain.   Gastrointestinal: Positive for diarrhea. Negative for abdominal pain, constipation, hematochezia, melena, nausea and vomiting.   Genitourinary: Negative for dysuria and flank pain.       Objective:      Physical Exam   Constitutional: She is oriented to person, place, and time. She appears well-developed and well-nourished.   HENT:   Head: Normocephalic and atraumatic.   Right Ear: External ear normal.   Left Ear: External ear normal.   Nose: Nose normal.   Mouth/Throat: Mucous membranes are normal.   Eyes: Conjunctivae and lids are normal.   Neck: Trachea normal and full passive range of motion without pain. Neck supple.   Cardiovascular: Normal rate, regular rhythm and normal heart sounds.   Pulmonary/Chest: Effort normal and breath sounds normal. No respiratory distress.   Abdominal: Soft. Normal appearance. She exhibits no distension, no abdominal bruit, no pulsatile midline mass and no mass. Bowel sounds are increased. There is no tenderness. "   Musculoskeletal: Normal range of motion. She exhibits no edema.   Neurological: She is alert and oriented to person, place, and time. She has normal strength.   Skin: Skin is warm, dry and intact. She is not diaphoretic. No pallor.   Psychiatric: She has a normal mood and affect. Her speech is normal and behavior is normal. Judgment and thought content normal. Cognition and memory are normal.   Nursing note and vitals reviewed.      Assessment:       1. Diarrhea, unspecified type        Plan:         Diarrhea, unspecified type  -     Ambulatory referral to Allergy     Patient was recently switched from Bentyl to Lomotil.  She states that she is slowly had an increase in number of stools per day since that time.  She denies any blood or mucus in the stool.  She denies any abdominal pain. No associated nausea vomiting or fever.  Patient has not been on any recent antibiotics.  She has not been in the hospital recently.  She has a history of gluten allergy.  She is wondering if she may be allergic to other things as well.  She would like a referral to have food allergy testing.  We had a long talk about tips for loose stools.  I encouraged her to switch back to the Bentyl which worked better for her.  I have also encouraged her to take fiber tablets.  I have asked her to call her GI physician tomorrow morning to set follow-up.  I have asked patient to go immediately to the ED if she develops any fever, chills, nausea, vomiting or abdominal pain along with diarrhea.  Patient states understanding and agrees with the plan.    Visit time was 25 min with over 50% spent counseling.

## 2018-11-11 ENCOUNTER — TELEPHONE (OUTPATIENT)
Dept: URGENT CARE | Facility: CLINIC | Age: 73
End: 2018-11-11

## 2018-11-11 NOTE — TELEPHONE ENCOUNTER
Pt is not eating the correct foods for her condition. She will contact her PCP to get in touch with nutriacian so can live with her condition.

## 2018-11-12 RX ORDER — DIPHENOXYLATE HYDROCHLORIDE AND ATROPINE SULFATE 2.5; .025 MG/1; MG/1
1 TABLET ORAL 3 TIMES DAILY PRN
Qty: 40 TABLET | Refills: 1 | OUTPATIENT
Start: 2018-11-12 | End: 2018-11-22

## 2018-11-12 NOTE — TELEPHONE ENCOUNTER
Refill for lomotil was sent on 11/1/18 with 1 additional refill. Patient should still have the additional refill available. I recommend scheduling a follow-up if diarrhea has persisted. Similar message sent on 11/1/18, patient is not currently set up for a follow-up appointment. Please assist patient with scheduling this. Also, patient should only be taking the lomotil PRN not around the clock.  Thanks  LAINEY

## 2018-11-13 ENCOUNTER — TELEPHONE (OUTPATIENT)
Dept: GASTROENTEROLOGY | Facility: CLINIC | Age: 73
End: 2018-11-13

## 2018-11-19 ENCOUNTER — TELEPHONE (OUTPATIENT)
Dept: GASTROENTEROLOGY | Facility: CLINIC | Age: 73
End: 2018-11-19

## 2018-11-19 NOTE — TELEPHONE ENCOUNTER
----- Message from Laila Lyn sent at 11/19/2018 10:25 AM CST -----  Contact: pt [phone]  Patient called to set up a procedure she discussed with you in the  Office. Patient is asking for a call back.

## 2018-11-19 NOTE — TELEPHONE ENCOUNTER
Returned call to pt. Pt wanted to see someone regarding removal of hemorrhoids. Scheduled pt with Gen Surg clinic 11/27/18 at 1015am.

## 2018-11-27 ENCOUNTER — OFFICE VISIT (OUTPATIENT)
Dept: SURGERY | Facility: CLINIC | Age: 73
End: 2018-11-27
Payer: MEDICARE

## 2018-11-27 VITALS
WEIGHT: 125.25 LBS | HEIGHT: 63 IN | DIASTOLIC BLOOD PRESSURE: 65 MMHG | HEART RATE: 65 BPM | BODY MASS INDEX: 22.19 KG/M2 | SYSTOLIC BLOOD PRESSURE: 148 MMHG | TEMPERATURE: 99 F

## 2018-11-27 DIAGNOSIS — K60.2 FISSURE IN ANO: Primary | ICD-10-CM

## 2018-11-27 PROCEDURE — 99213 OFFICE O/P EST LOW 20 MIN: CPT | Mod: PBBFAC,PO,25 | Performed by: SURGERY

## 2018-11-27 PROCEDURE — 46600 DIAGNOSTIC ANOSCOPY SPX: CPT | Mod: S$PBB,,, | Performed by: SURGERY

## 2018-11-27 PROCEDURE — 99999 PR PBB SHADOW E&M-EST. PATIENT-LVL III: CPT | Mod: PBBFAC,,, | Performed by: SURGERY

## 2018-11-27 PROCEDURE — 99203 OFFICE O/P NEW LOW 30 MIN: CPT | Mod: 25,S$PBB,, | Performed by: SURGERY

## 2018-11-27 PROCEDURE — 46600 DIAGNOSTIC ANOSCOPY SPX: CPT | Mod: PBBFAC,PO | Performed by: SURGERY

## 2018-11-27 NOTE — PROGRESS NOTES
Subjective:       Patient ID: Ariana Tiwari is a 73 y.o. female.    Chief Complaint: Consult (Discuss hemorrhoids)    HPI  Pleasant 74 yo F referred to me for evaluation of difficulty with BM and occasional rectal pain. Pt notes that she occasionally has difficulty with BMs, noting that she will have pain and inability to pass her BM. She feels as if a blockage is in the anus preventing BMs.  She thinks it may be her hemorrhoids.  She has had a colonoscopy earlier this year with no significant findings.  Pt denies bleeding per rectum  Pt suffers with Parkinsons disesae, , diarrhea.  SH ehas had some type of hemorrhoid surgery in the past  Review of Systems   Constitutional: Negative for activity change, appetite change, fever and unexpected weight change.   HENT: Negative for congestion and ear pain.    Respiratory: Negative for chest tightness, shortness of breath and wheezing.    Cardiovascular: Negative for chest pain.   Gastrointestinal: Positive for diarrhea and rectal pain. Negative for abdominal distention, abdominal pain, anal bleeding, blood in stool, constipation, nausea and vomiting.   Genitourinary: Negative for difficulty urinating, dysuria and frequency.   Skin: Negative for color change and wound.   Neurological: Positive for tremors. Negative for dizziness.   Hematological: Negative for adenopathy. Does not bruise/bleed easily.   Psychiatric/Behavioral: Positive for decreased concentration. Negative for agitation.       Objective:      Physical Exam   Constitutional: She is oriented to person, place, and time. She appears well-nourished. No distress.   HENT:   Head: Normocephalic and atraumatic.   Eyes: Pupils are equal, round, and reactive to light.   Cardiovascular: Normal rate and regular rhythm.   Pulmonary/Chest: Effort normal and breath sounds normal. No stridor. No respiratory distress.   Abdominal: Soft. She exhibits no distension. There is no rebound and no guarding.   Genitourinary:    Genitourinary Comments: Ext exam demonstrates moderatly large hemorrhoids in R post and L post position.  AUGUSTIN with normal sphincter tone.  No masses.  Anoscopy demonstrates a post midline fissure in ano   Neurological: She is alert and oriented to person, place, and time.   Skin: She is not diaphoretic.   Psychiatric: She has a normal mood and affect.   Vitals reviewed.      Assessment:     Fissure in ano  Slight hemorrhoids  Diarrha  No diagnosis found.    Plan:       Lengthy d/w pt.  Regarding the fissure  I have recommended and given prescription for topical diltiazem.  Instruction on use given. I have also recommended bulking agent with fiber.  Pt expresses understanding and will f/u with me in 6 weeks.

## 2018-12-10 RX ORDER — LEVOTHYROXINE SODIUM 125 UG/1
TABLET ORAL
Qty: 45 TABLET | Refills: 2 | Status: SHIPPED | OUTPATIENT
Start: 2018-12-10 | End: 2019-09-15 | Stop reason: SDUPTHER

## 2018-12-31 DIAGNOSIS — M85.80 OSTEOPENIA, UNSPECIFIED LOCATION: ICD-10-CM

## 2018-12-31 RX ORDER — IBANDRONATE SODIUM 150 MG/1
TABLET, FILM COATED ORAL
Qty: 1 TABLET | Refills: 11 | OUTPATIENT
Start: 2018-12-31

## 2019-05-13 ENCOUNTER — DOCUMENTATION ONLY (OUTPATIENT)
Dept: FAMILY MEDICINE | Facility: CLINIC | Age: 74
End: 2019-05-13

## 2019-05-13 NOTE — PROGRESS NOTES
Pre-Visit Chart Review  For Appointment Scheduled on 4-16-19    Health Maintenance Due   Topic Date Due    TETANUS VACCINE  03/28/1963    Pneumococcal Vaccine (65+ Low/Medium Risk) (1 of 2 - PCV13) 03/28/2009    Lipid Panel  04/23/2019

## 2019-05-16 ENCOUNTER — TELEPHONE (OUTPATIENT)
Dept: FAMILY MEDICINE | Facility: CLINIC | Age: 74
End: 2019-05-16

## 2019-05-16 ENCOUNTER — OFFICE VISIT (OUTPATIENT)
Dept: FAMILY MEDICINE | Facility: CLINIC | Age: 74
End: 2019-05-16
Attending: FAMILY MEDICINE
Payer: MEDICARE

## 2019-05-16 VITALS
TEMPERATURE: 97 F | HEIGHT: 63 IN | OXYGEN SATURATION: 95 % | DIASTOLIC BLOOD PRESSURE: 76 MMHG | WEIGHT: 125.44 LBS | RESPIRATION RATE: 12 BRPM | HEART RATE: 90 BPM | BODY MASS INDEX: 22.23 KG/M2 | SYSTOLIC BLOOD PRESSURE: 140 MMHG

## 2019-05-16 DIAGNOSIS — R19.7 DIARRHEA, UNSPECIFIED TYPE: Primary | ICD-10-CM

## 2019-05-16 DIAGNOSIS — G20.A1 PD (PARKINSON'S DISEASE): ICD-10-CM

## 2019-05-16 DIAGNOSIS — R14.0 ABDOMINAL BLOATING: ICD-10-CM

## 2019-05-16 DIAGNOSIS — J30.0 CHRONIC VASOMOTOR RHINITIS: ICD-10-CM

## 2019-05-16 DIAGNOSIS — K52.9 CHRONIC DIARRHEA: Primary | ICD-10-CM

## 2019-05-16 DIAGNOSIS — K90.41 GLUTEN ENTEROPATHY: ICD-10-CM

## 2019-05-16 DIAGNOSIS — K57.90 DIVERTICULOSIS OF INTESTINE WITHOUT BLEEDING, UNSPECIFIED INTESTINAL TRACT LOCATION: ICD-10-CM

## 2019-05-16 DIAGNOSIS — E03.9 HYPOTHYROIDISM, UNSPECIFIED TYPE: ICD-10-CM

## 2019-05-16 PROCEDURE — 99214 OFFICE O/P EST MOD 30 MIN: CPT | Mod: PBBFAC,PO | Performed by: FAMILY MEDICINE

## 2019-05-16 PROCEDURE — 99999 PR PBB SHADOW E&M-EST. PATIENT-LVL IV: ICD-10-PCS | Mod: PBBFAC,,, | Performed by: FAMILY MEDICINE

## 2019-05-16 PROCEDURE — 99214 OFFICE O/P EST MOD 30 MIN: CPT | Mod: S$PBB,,, | Performed by: FAMILY MEDICINE

## 2019-05-16 PROCEDURE — 99999 PR PBB SHADOW E&M-EST. PATIENT-LVL IV: CPT | Mod: PBBFAC,,, | Performed by: FAMILY MEDICINE

## 2019-05-16 PROCEDURE — 99214 PR OFFICE/OUTPT VISIT, EST, LEVL IV, 30-39 MIN: ICD-10-PCS | Mod: S$PBB,,, | Performed by: FAMILY MEDICINE

## 2019-05-16 RX ORDER — PRAMIPEXOLE 1.5 MG/1
TABLET, EXTENDED RELEASE ORAL
Refills: 2 | COMMUNITY
Start: 2019-04-15 | End: 2019-05-16 | Stop reason: DRUGHIGH

## 2019-05-16 RX ORDER — PRAMIPEXOLE 2.25 MG/1
1 TABLET, EXTENDED RELEASE ORAL DAILY
COMMUNITY
End: 2019-09-17

## 2019-05-16 RX ORDER — CHOLESTYRAMINE 4 G/9G
POWDER, FOR SUSPENSION ORAL
Qty: 270 PACKET | Refills: 3 | Status: SHIPPED | OUTPATIENT
Start: 2019-05-16 | End: 2019-09-17 | Stop reason: DRUGHIGH

## 2019-05-16 RX ORDER — IPRATROPIUM BROMIDE 42 UG/1
2 SPRAY, METERED NASAL 4 TIMES DAILY
Qty: 15 ML | Refills: 11 | Status: SHIPPED | OUTPATIENT
Start: 2019-05-16 | End: 2020-05-18

## 2019-05-16 NOTE — PROGRESS NOTES
Subjective:       Patient ID: Ariana Tiwari is a 74 y.o. female.    Chief Complaint: Hospital Follow Up    74 year old female with a 11 year history of chronic diarrhea often affected by changes in diet and within extensive workup in several locations over that time comes in for a hospital follow-up after a brief stay at [facility] on March 29th when she presented with increase in diarrhea following use of Augmentin for urinary tract infection associated with dehydration.  She was found to be dehydrated and with a low potassium and responded to rehydration and repletion of electrolytes.  She was discharged soon after in her normal state.  The patient has been diagnosed with Parkinson's disease and recently was placed on pramipexole by Dr. Polanco.  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  She has never had a cholecystectomy and supposedly her gallbladder was evaluated prior to moving to this area.  Reviewing the images revealed a finding of possible gallbladder polyps and adenomyosis of the gallbladder on the CT scan in March of 2018.  She was found to have a 10 mm pancreatic cyst at that time and was referred for an endoscopic ultrasound but never completed that evaluation.  She has no complaints of blood in the stool or melanotic stool but she does complain of bloating which is a relatively new complaint.  She is also complaining of profuse watery nasal discharge which is relatively new as well.  Mentally she is somewhat depressed with her physical limitations having been quite active and a regular runner before diagnosis of Parkinson's was made.  Her physical activity has been greatly impaired by her current condition.  She uses a adult tricycle and brief walks to get what exercise she can.    Past Medical History:  No  date: Allergy  No date: Diverticulosis  No date: Gluten enteropathy  No date: Gluten intolerance  No date: Hernia  No date: Hypothyroidism  9/16/2015: PD (Parkinson's disease)      Comment:  Right tremor type  No date: Peptic ulcer disease  No date: Right shoulder pain      Comment:  Cirtisone injection 3/26/15 - Dr. Tolbert  No date: Ulcer    Past Surgical History:  No date: ADENOIDECTOMY  03/01/2012: COLONOSCOPY      Comment:  Dr. Teresa, repeat in 10 years for screening  2/21/2018: COLONOSCOPY; N/A      Comment:  Performed by Radha Deng MD at [facility] ENDO  3/1/2012: COLONOSCOPY; N/A      Comment:  Performed by Molina Teresa MD at [facility] ENDO  No date: COSMETIC SURGERY      Comment:  Broken nose  8/9/2018: ENTEROSCOPY; N/A      Comment:  Performed by Radha Deng MD at [facility] ENDO  5/21/2015: ESOPHAGOGASTRODUODENOSCOPY (EGD); N/A      Comment:  Performed by Shiva Ernandez MD at [facility] ENDO  4/9/2015: EXCISION-CYST; Right      Comment:  Performed by Jf Lo MD at [facility] OR  left wrist: FRACTURE SURGERY      Comment:  With pin  No date: HEMORRHOID SURGERY  04/09/2015: HERNIA REPAIR; Left      Comment:  Dr Lo; left inguinal  7/10/2018: IMAGING, GI TRACT, INTRALUMINAL, VIA CAPSULE; N/A      Comment:  Performed by Radha Deng MD at [facility] ENDO  4/9/2015: REPAIR-HERNIA-INGUINAL; Left      Comment:  Performed by Jf Lo MD at [facility] OR  No date: RHINOPLASTY TIP  No date: TONSILLECTOMY  05/21/2015: UPPER GASTROINTESTINAL ENDOSCOPY      Comment:  Dr. Ernandez    Current Outpatient Medications on File Prior to Visit:  cholecalciferol, vitamin D3, (VITAMIN D3) 2,000 unit Cap, Take 1 capsule by mouth once daily., Disp: , Rfl:   cinnamon bark 500 mg capsule, Take 500 mg by mouth once daily., Disp: , Rfl:   CYANOCOBALAMIN, VITAMIN B-12, (VITAMIN B-12 ORAL), Take 1 tablet by mouth once daily., Disp: , Rfl:   GLUCOSAM HCL/CHONDRO INFANTE A/C/MN (GLUCOSAMINE-CHONDROITIN COMPLX ORAL), Take 1 tablet by mouth  once daily. , Disp: , Rfl:   ibandronate (BONIVA) 150 mg tablet, TAKE ONE TABLET BY MOUTH ONCE EVERY MONTH, Disp: 1 tablet, Rfl: 11  ibuprofen (ADVIL,MOTRIN) 200 MG tablet, Take 400 mg by mouth daily as needed for Pain. , Disp: , Rfl:   levothyroxine (SYNTHROID) 125 MCG tablet, TAKE 1/2 (ONE-HALF) TABLET BY MOUTH ONCE DAILY, Disp: 45 tablet, Rfl: 2  pramipexole (MIRAPEX ER) 2.25 mg Tb24, Take 1 tablet by mouth once daily., Disp: , Rfl:   (DISCONTINUED) diltiazem HCl (DILTIAZEM 2% CREAM), Apply topically 3 (three) times daily. Apply topically to anal area., Disp: 30 g, Rfl: 0  (DISCONTINUED) omeprazole (PRILOSEC) 20 MG capsule, Take 1 capsule (20 mg total) by mouth 2 (two) times daily before meals., Disp: 28 capsule, Rfl: 0  (DISCONTINUED) pramipexole 1.5 mg Tb24, , Disp: , Rfl: 2    No current facility-administered medications on file prior to visit.         Review of Systems   Constitutional: Negative for chills, diaphoresis and fever.   HENT: Positive for rhinorrhea and trouble swallowing. Negative for congestion, ear discharge, ear pain, hearing loss, sinus pressure and sinus pain.    Respiratory: Negative for chest tightness and shortness of breath.    Cardiovascular: Negative for chest pain and palpitations.   Gastrointestinal: Positive for abdominal distention and diarrhea. Negative for abdominal pain, blood in stool, constipation, nausea and vomiting.   Genitourinary: Negative for dysuria, frequency and hematuria.   Musculoskeletal: Positive for gait problem.   Neurological: Positive for tremors and weakness. Negative for seizures.   Psychiatric/Behavioral: Positive for dysphoric mood. The patient is nervous/anxious.        Objective:      Physical Exam   Constitutional: She is oriented to person, place, and time. She appears well-developed and well-nourished. No distress.   Normal weight with a BMI of 22.2 she is down 5.5 lb from her last visit with me April 26, 2018   HENT:   Head: Normocephalic and  atraumatic.   Eyes: Pupils are equal, round, and reactive to light. EOM are normal. No scleral icterus.   Cardiovascular: Normal rate, regular rhythm and normal heart sounds. Exam reveals no gallop and no friction rub.   No murmur heard.  Pulmonary/Chest: Effort normal and breath sounds normal. No stridor. No respiratory distress. She has no wheezes. She has no rales. She exhibits no tenderness.   Abdominal: Soft. Bowel sounds are normal. She exhibits no distension and no mass. There is no tenderness. There is no rebound and no guarding. No hernia.   Neurological: She is alert and oriented to person, place, and time. She displays tremor (Mild resting tremor). She displays no atrophy. She exhibits abnormal muscle tone. She displays no seizure activity. GCS eye subscore is 4. GCS verbal subscore is 5. GCS motor subscore is 6.   Skin: She is not diaphoretic.   Psychiatric: Her speech is normal. Judgment and thought content normal. Her mood appears not anxious. Her affect is not angry, not blunt, not labile and not inappropriate. She is slowed. Cognition and memory are normal. She does not express impulsivity or inappropriate judgment. She exhibits a depressed mood. She is attentive.   Nursing note and vitals reviewed.      Assessment:       1. Chronic diarrhea    2. Chronic vasomotor rhinitis    3. PD (Parkinson's disease)    4. Hypothyroidism, unspecified type    5. Gluten enteropathy    6. Diverticulosis of intestine without bleeding, unspecified intestinal tract location        Plan:       1. Chronic diarrhea  Longstanding and often improved after dietary changes but only for a short time.  We will try some Questran to see if we can give her relief of the diarrhea while continuing to evaluate her.  After review of the CT from March of 2018 I would like to repeat an ultrasound of the gallbladder  - cholestyramine (QUESTRAN) 4 gram packet; Take 1 a day for 3 days then 2 a day for 3 days, then 3 a day for 3 days up to  4 a day until diarrhea controlled.  Dispense: 270 packet; Refill: 3    2. Chronic vasomotor rhinitis  Trial Atrovent nasal spray.  May add Astelin to it if needed  - ipratropium (ATROVENT) 42 mcg (0.06 %) nasal spray; 2 sprays by Nasal route 4 (four) times daily.  Dispense: 15 mL; Refill: 11    3. PD (Parkinson's disease)  Followed by Dr. Polanco    4. Hypothyroidism, unspecified type  Lab Results   Component Value Date    TSH 3.285 09/13/2018         5. Gluten enteropathy  Questionable significance, multiple diet changes have all been shown to help temporarily and she has not been found to have a gluten allergy    6. Diverticulosis of intestine without bleeding, unspecified intestinal tract location  Does not appear to be symptomatic

## 2019-05-16 NOTE — TELEPHONE ENCOUNTER
After she left today I was going over all her old records looking for any clue as to what may be causing her problem with diarrhea and bloating.  When she had her CT enterography in March of 2018 she had some abnormalities in the gallbladder that looked like small polyps and/or invasion of the muscle layer with glandular tissue.  Her symptoms are similar to what can happen with gallbladder disease.  I would suggest we get a gallbladder ultrasound now to take a second look at it and see if we might get a hint of what is happening.    Order in for ultrasound of gallbladder.

## 2019-05-16 NOTE — PATIENT INSTRUCTIONS
Controlling High Blood Pressure  High blood pressure (hypertension) is often called the silent killer. This is because many people who have it dont know it. High blood pressure is defined as 140/90 mm Hg or higher. Know your blood pressure and remember to check it regularly. Doing so can save your life. Here are some things you can do to help control your blood pressure.    Choose heart-healthy foods  · Select low-salt, low-fat foods. Limit sodium intake to 2,400 mg per day or the amount suggested by your healthcare provider.  · Limit canned, dried, cured, packaged, and fast foods. These can contain a lot of salt.  · Eat 8 to 10 servings of fruits and vegetables every day.  · Choose lean meats, fish, or chicken.  · Eat whole-grain pasta, brown rice, and beans.  · Eat 2 to 3 servings of low-fat or fat-free dairy products.  · Ask your doctor about the DASH eating plan. This plan helps reduce blood pressure.  · When you go to a restaurant, ask that your meal be prepared with no added salt.  Maintain a healthy weight  · Ask your healthcare provider how many calories to eat a day. Then stick to that number.  · Ask your healthcare provider what weight range is healthiest for you. If you are overweight, a weight loss of only 3% to 5% of your body weight can help lower blood pressure. Generally, a good weight loss goal is to lose 10% of your body weight in a year.  · Limit snacks and sweets.  · Get regular exercise.  Get up and get active  · Choose activities you enjoy. Find ones you can do with friends or family. This includes bicycling, dancing, walking, and jogging.  · Park farther away from building entrances.  · Use stairs instead of the elevator.  · When you can, walk or bike instead of driving.  · [place] leaves, garden, or do household repairs.  · Be active at a moderate to vigorous level of physical activity for at least 40 minutes for a minimum of 3 to 4 days a week.   Manage stress  · Make time to relax and enjoy  life. Find time to laugh.  · Communicate your concerns with your loved ones and your healthcare provider.  · Visit with family and friends, and keep up with hobbies.  Limit alcohol and quit smoking  · Men should have no more than 2 drinks per day.  · Women should have no more than 1 drink per day.  · Talk with your healthcare provider about quitting smoking. Smoking significantly increases your risk for heart disease and stroke. Ask your healthcare provider about community smoking cessation programs and other options.  Medicines  If lifestyle changes arent enough, your healthcare provider may prescribe high blood pressure medicine. Take all medicines as prescribed. If you have any questions about your medicines, ask your healthcare provider before stopping or changing them.   Date Last Reviewed: 4/27/2016  © 8567-0787 [employer]. [address] [place] [address], [place] [address]. All rights reserved. This information is not intended as a substitute for professional medical care. Always follow your healthcare professional's instructions.

## 2019-05-29 ENCOUNTER — HOSPITAL ENCOUNTER (OUTPATIENT)
Dept: RADIOLOGY | Facility: CLINIC | Age: 74
Discharge: HOME | End: 2019-05-29
Attending: FAMILY MEDICINE
Payer: MEDICARE

## 2019-05-29 DIAGNOSIS — R19.7 DIARRHEA, UNSPECIFIED TYPE: ICD-10-CM

## 2019-05-29 DIAGNOSIS — R14.0 ABDOMINAL BLOATING: ICD-10-CM

## 2019-05-29 PROCEDURE — 76705 US ABDOMEN LIMITED: ICD-10-PCS | Mod: 26,,, | Performed by: RADIOLOGY

## 2019-05-29 PROCEDURE — 76705 ECHO EXAM OF ABDOMEN: CPT | Mod: TC,PO

## 2019-05-29 PROCEDURE — 76705 ECHO EXAM OF ABDOMEN: CPT | Mod: 26,,, | Performed by: RADIOLOGY

## 2019-09-15 DIAGNOSIS — Z13.6 ENCOUNTER FOR LIPID SCREENING FOR CARDIOVASCULAR DISEASE: ICD-10-CM

## 2019-09-15 DIAGNOSIS — Z51.81 THERAPEUTIC DRUG MONITORING: Primary | ICD-10-CM

## 2019-09-15 DIAGNOSIS — E78.5 HYPERLIPIDEMIA, UNSPECIFIED HYPERLIPIDEMIA TYPE: ICD-10-CM

## 2019-09-15 DIAGNOSIS — E03.9 ACQUIRED HYPOTHYROIDISM: ICD-10-CM

## 2019-09-15 DIAGNOSIS — Z13.220 ENCOUNTER FOR LIPID SCREENING FOR CARDIOVASCULAR DISEASE: ICD-10-CM

## 2019-09-16 RX ORDER — LEVOTHYROXINE SODIUM 125 UG/1
TABLET ORAL
Qty: 45 TABLET | Refills: 0 | Status: SHIPPED | OUTPATIENT
Start: 2019-09-16 | End: 2019-09-17 | Stop reason: DRUGHIGH

## 2019-09-17 ENCOUNTER — HOSPITAL ENCOUNTER (INPATIENT)
Facility: HOSPITAL | Age: 74
LOS: 3 days | Discharge: HOME | DRG: 388 | End: 2019-09-21
Attending: EMERGENCY MEDICINE | Admitting: FAMILY MEDICINE
Payer: MEDICARE

## 2019-09-17 ENCOUNTER — OFFICE VISIT (OUTPATIENT)
Dept: FAMILY MEDICINE | Facility: CLINIC | Age: 74
End: 2019-09-17
Payer: MEDICARE

## 2019-09-17 ENCOUNTER — LAB VISIT (OUTPATIENT)
Dept: LAB | Facility: HOSPITAL | Age: 74
End: 2019-09-17
Attending: FAMILY MEDICINE
Payer: MEDICARE

## 2019-09-17 VITALS
TEMPERATURE: 99 F | OXYGEN SATURATION: 97 % | SYSTOLIC BLOOD PRESSURE: 120 MMHG | RESPIRATION RATE: 18 BRPM | DIASTOLIC BLOOD PRESSURE: 62 MMHG | HEART RATE: 87 BPM | BODY MASS INDEX: 20.04 KG/M2 | HEIGHT: 63 IN | WEIGHT: 113.13 LBS

## 2019-09-17 DIAGNOSIS — E87.6 HYPOKALEMIA: ICD-10-CM

## 2019-09-17 DIAGNOSIS — R50.9 FEVER, UNSPECIFIED FEVER CAUSE: Primary | ICD-10-CM

## 2019-09-17 DIAGNOSIS — R50.9 FEVER, UNSPECIFIED FEVER CAUSE: ICD-10-CM

## 2019-09-17 DIAGNOSIS — Z13.220 ENCOUNTER FOR LIPID SCREENING FOR CARDIOVASCULAR DISEASE: ICD-10-CM

## 2019-09-17 DIAGNOSIS — N39.0 URINARY TRACT INFECTION WITH HEMATURIA, SITE UNSPECIFIED: Primary | ICD-10-CM

## 2019-09-17 DIAGNOSIS — R07.9 CHEST PAIN: ICD-10-CM

## 2019-09-17 DIAGNOSIS — Z13.6 ENCOUNTER FOR LIPID SCREENING FOR CARDIOVASCULAR DISEASE: ICD-10-CM

## 2019-09-17 DIAGNOSIS — Z51.81 THERAPEUTIC DRUG MONITORING: ICD-10-CM

## 2019-09-17 DIAGNOSIS — R30.0 DYSURIA: ICD-10-CM

## 2019-09-17 DIAGNOSIS — K56.609 SBO (SMALL BOWEL OBSTRUCTION): ICD-10-CM

## 2019-09-17 DIAGNOSIS — E03.9 ACQUIRED HYPOTHYROIDISM: ICD-10-CM

## 2019-09-17 DIAGNOSIS — K52.9 ENTERITIS: ICD-10-CM

## 2019-09-17 DIAGNOSIS — R31.9 URINARY TRACT INFECTION WITH HEMATURIA, SITE UNSPECIFIED: Primary | ICD-10-CM

## 2019-09-17 DIAGNOSIS — E83.42 HYPOMAGNESEMIA SYNDROME: ICD-10-CM

## 2019-09-17 LAB
ALBUMIN SERPL BCP-MCNC: 4.1 G/DL (ref 3.5–5.2)
ALP SERPL-CCNC: 90 U/L (ref 55–135)
ALT SERPL W/O P-5'-P-CCNC: 19 U/L (ref 10–44)
ANION GAP SERPL CALC-SCNC: 10 MMOL/L (ref 8–16)
ANION GAP SERPL CALC-SCNC: 13 MMOL/L (ref 8–16)
AST SERPL-CCNC: 13 U/L (ref 10–40)
BACTERIA #/AREA URNS HPF: NEGATIVE /HPF
BASOPHILS # BLD AUTO: 0.04 K/UL (ref 0–0.2)
BASOPHILS # BLD AUTO: 0.04 K/UL (ref 0–0.2)
BASOPHILS NFR BLD: 0.3 % (ref 0–1.9)
BASOPHILS NFR BLD: 0.3 % (ref 0–1.9)
BILIRUB SERPL-MCNC: 0.9 MG/DL (ref 0.1–1)
BILIRUB SERPL-MCNC: ABNORMAL MG/DL
BILIRUB UR QL STRIP: NEGATIVE
BLOOD URINE, POC: 250
BUN SERPL-MCNC: 9 MG/DL (ref 8–23)
BUN SERPL-MCNC: 9 MG/DL (ref 8–23)
CALCIUM SERPL-MCNC: 8.5 MG/DL (ref 8.7–10.5)
CALCIUM SERPL-MCNC: 9.1 MG/DL (ref 8.7–10.5)
CHLORIDE SERPL-SCNC: 103 MMOL/L (ref 95–110)
CHLORIDE SERPL-SCNC: 106 MMOL/L (ref 95–110)
CHOLEST SERPL-MCNC: 109 MG/DL (ref 120–199)
CHOLEST/HDLC SERPL: 2.3 {RATIO} (ref 2–5)
CK SERPL-CCNC: 37 U/L (ref 20–180)
CLARITY UR: CLEAR
CO2 SERPL-SCNC: 19 MMOL/L (ref 23–29)
CO2 SERPL-SCNC: 23 MMOL/L (ref 23–29)
COLOR UR: YELLOW
COLOR, POC UA: YELLOW
CREAT SERPL-MCNC: 0.4 MG/DL (ref 0.5–1.4)
CREAT SERPL-MCNC: 0.5 MG/DL (ref 0.5–1.4)
DIFFERENTIAL METHOD: ABNORMAL
DIFFERENTIAL METHOD: ABNORMAL
EOSINOPHIL # BLD AUTO: 0 K/UL (ref 0–0.5)
EOSINOPHIL # BLD AUTO: 0 K/UL (ref 0–0.5)
EOSINOPHIL NFR BLD: 0.1 % (ref 0–8)
EOSINOPHIL NFR BLD: 0.2 % (ref 0–8)
ERYTHROCYTE [DISTWIDTH] IN BLOOD BY AUTOMATED COUNT: 13.2 % (ref 11.5–14.5)
ERYTHROCYTE [DISTWIDTH] IN BLOOD BY AUTOMATED COUNT: 13.5 % (ref 11.5–14.5)
EST. GFR  (AFRICAN AMERICAN): >60 ML/MIN/1.73 M^2
EST. GFR  (AFRICAN AMERICAN): >60 ML/MIN/1.73 M^2
EST. GFR  (NON AFRICAN AMERICAN): >60 ML/MIN/1.73 M^2
EST. GFR  (NON AFRICAN AMERICAN): >60 ML/MIN/1.73 M^2
GLUCOSE SERPL-MCNC: 81 MG/DL (ref 70–110)
GLUCOSE SERPL-MCNC: 97 MG/DL (ref 70–110)
GLUCOSE UR QL STRIP: NEGATIVE
GLUCOSE UR QL STRIP: NORMAL
HCT VFR BLD AUTO: 39.8 % (ref 37–48.5)
HCT VFR BLD AUTO: 40.7 % (ref 37–48.5)
HDLC SERPL-MCNC: 47 MG/DL (ref 40–75)
HDLC SERPL: 43.1 % (ref 20–50)
HGB BLD-MCNC: 13 G/DL (ref 12–16)
HGB BLD-MCNC: 13.2 G/DL (ref 12–16)
HGB UR QL STRIP: ABNORMAL
HYALINE CASTS #/AREA URNS LPF: 4 /LPF
IMM GRANULOCYTES # BLD AUTO: 0.06 K/UL (ref 0–0.04)
IMM GRANULOCYTES # BLD AUTO: 0.08 K/UL (ref 0–0.04)
IMM GRANULOCYTES NFR BLD AUTO: 0.4 % (ref 0–0.5)
IMM GRANULOCYTES NFR BLD AUTO: 0.5 % (ref 0–0.5)
INFLUENZA A, MOLECULAR: NEGATIVE
INFLUENZA B, MOLECULAR: NEGATIVE
KETONES UR QL STRIP: ABNORMAL
KETONES UR QL STRIP: NEGATIVE
LACTATE SERPL-SCNC: 1.1 MMOL/L (ref 0.5–1.9)
LDLC SERPL CALC-MCNC: 55.8 MG/DL (ref 63–159)
LEUKOCYTE ESTERASE UR QL STRIP: NEGATIVE
LEUKOCYTE ESTERASE URINE, POC: ABNORMAL
LIPASE SERPL-CCNC: 24 U/L (ref 4–60)
LYMPHOCYTES # BLD AUTO: 1.2 K/UL (ref 1–4.8)
LYMPHOCYTES # BLD AUTO: 1.5 K/UL (ref 1–4.8)
LYMPHOCYTES NFR BLD: 10.4 % (ref 18–48)
LYMPHOCYTES NFR BLD: 8.2 % (ref 18–48)
MAGNESIUM SERPL-MCNC: 1.4 MG/DL (ref 1.6–2.6)
MCH RBC QN AUTO: 30.8 PG (ref 27–31)
MCH RBC QN AUTO: 31 PG (ref 27–31)
MCHC RBC AUTO-ENTMCNC: 32.4 G/DL (ref 32–36)
MCHC RBC AUTO-ENTMCNC: 32.7 G/DL (ref 32–36)
MCV RBC AUTO: 95 FL (ref 82–98)
MCV RBC AUTO: 95 FL (ref 82–98)
MICROSCOPIC COMMENT: ABNORMAL
MONOCYTES # BLD AUTO: 0.9 K/UL (ref 0.3–1)
MONOCYTES # BLD AUTO: 1.1 K/UL (ref 0.3–1)
MONOCYTES NFR BLD: 6 % (ref 4–15)
MONOCYTES NFR BLD: 7.5 % (ref 4–15)
NEUTROPHILS # BLD AUTO: 11.5 K/UL (ref 1.8–7.7)
NEUTROPHILS # BLD AUTO: 12.5 K/UL (ref 1.8–7.7)
NEUTROPHILS NFR BLD: 81.2 % (ref 38–73)
NEUTROPHILS NFR BLD: 84.9 % (ref 38–73)
NITRITE UR QL STRIP: NEGATIVE
NITRITE, POC UA: ABNORMAL
NONHDLC SERPL-MCNC: 62 MG/DL
NRBC BLD-RTO: 0 /100 WBC
NRBC BLD-RTO: 0 /100 WBC
PH UR STRIP: 6 [PH] (ref 5–8)
PH, POC UA: 5
PHOSPHATE SERPL-MCNC: 2.2 MG/DL (ref 2.7–4.5)
PLATELET # BLD AUTO: 166 K/UL (ref 150–350)
PLATELET # BLD AUTO: 175 K/UL (ref 150–350)
PMV BLD AUTO: 11.2 FL (ref 9.2–12.9)
PMV BLD AUTO: 11.5 FL (ref 9.2–12.9)
POTASSIUM SERPL-SCNC: 2.7 MMOL/L (ref 3.5–5.1)
POTASSIUM SERPL-SCNC: 3.2 MMOL/L (ref 3.5–5.1)
PROCALCITONIN SERPL IA-MCNC: <0.05 NG/ML (ref 0–0.5)
PROT SERPL-MCNC: 7.4 G/DL (ref 6–8.4)
PROT UR QL STRIP: ABNORMAL
PROTEIN, POC: 100
RBC # BLD AUTO: 4.2 M/UL (ref 4–5.4)
RBC # BLD AUTO: 4.28 M/UL (ref 4–5.4)
RBC #/AREA URNS HPF: 5 /HPF (ref 0–4)
SODIUM SERPL-SCNC: 136 MMOL/L (ref 136–145)
SODIUM SERPL-SCNC: 138 MMOL/L (ref 136–145)
SP GR UR STRIP: 1.01 (ref 1–1.03)
SPECIFIC GRAVITY, POC UA: 1.01
SPECIMEN SOURCE: NORMAL
SQUAMOUS #/AREA URNS HPF: 3 /HPF
TRIGL SERPL-MCNC: 31 MG/DL (ref 30–150)
TROPONIN I SERPL DL<=0.01 NG/ML-MCNC: <0.03 NG/ML (ref 0.02–0.04)
TSH SERPL DL<=0.005 MIU/L-ACNC: 2.78 UIU/ML (ref 0.4–4)
URN SPEC COLLECT METH UR: ABNORMAL
UROBILINOGEN UR STRIP-ACNC: NEGATIVE EU/DL
UROBILINOGEN, POC UA: NORMAL
WBC # BLD AUTO: 14.19 K/UL (ref 3.9–12.7)
WBC # BLD AUTO: 14.67 K/UL (ref 3.9–12.7)
WBC #/AREA URNS HPF: 2 /HPF (ref 0–5)

## 2019-09-17 PROCEDURE — 25000003 PHARM REV CODE 250: Performed by: EMERGENCY MEDICINE

## 2019-09-17 PROCEDURE — 80053 COMPREHEN METABOLIC PANEL: CPT

## 2019-09-17 PROCEDURE — 99999 PR PBB SHADOW E&M-EST. PATIENT-LVL IV: CPT | Mod: PBBFAC,,, | Performed by: NURSE PRACTITIONER

## 2019-09-17 PROCEDURE — 96375 TX/PRO/DX INJ NEW DRUG ADDON: CPT

## 2019-09-17 PROCEDURE — 85025 COMPLETE CBC W/AUTO DIFF WBC: CPT

## 2019-09-17 PROCEDURE — 87086 URINE CULTURE/COLONY COUNT: CPT | Mod: 59

## 2019-09-17 PROCEDURE — 83735 ASSAY OF MAGNESIUM: CPT

## 2019-09-17 PROCEDURE — 81002 URINALYSIS NONAUTO W/O SCOPE: CPT | Mod: PBBFAC,PO | Performed by: NURSE PRACTITIONER

## 2019-09-17 PROCEDURE — 83690 ASSAY OF LIPASE: CPT

## 2019-09-17 PROCEDURE — 83605 ASSAY OF LACTIC ACID: CPT

## 2019-09-17 PROCEDURE — 96367 TX/PROPH/DG ADDL SEQ IV INF: CPT

## 2019-09-17 PROCEDURE — 84443 ASSAY THYROID STIM HORMONE: CPT

## 2019-09-17 PROCEDURE — 99214 PR OFFICE/OUTPT VISIT, EST, LEVL IV, 30-39 MIN: ICD-10-PCS | Mod: S$PBB,,, | Performed by: NURSE PRACTITIONER

## 2019-09-17 PROCEDURE — 81001 URINALYSIS AUTO W/SCOPE: CPT

## 2019-09-17 PROCEDURE — 96366 THER/PROPH/DIAG IV INF ADDON: CPT

## 2019-09-17 PROCEDURE — 84484 ASSAY OF TROPONIN QUANT: CPT

## 2019-09-17 PROCEDURE — 99285 EMERGENCY DEPT VISIT HI MDM: CPT | Mod: 25

## 2019-09-17 PROCEDURE — 36415 COLL VENOUS BLD VENIPUNCTURE: CPT

## 2019-09-17 PROCEDURE — 80061 LIPID PANEL: CPT

## 2019-09-17 PROCEDURE — 36415 COLL VENOUS BLD VENIPUNCTURE: CPT | Mod: PO

## 2019-09-17 PROCEDURE — 87086 URINE CULTURE/COLONY COUNT: CPT

## 2019-09-17 PROCEDURE — 96368 THER/DIAG CONCURRENT INF: CPT

## 2019-09-17 PROCEDURE — 87040 BLOOD CULTURE FOR BACTERIA: CPT

## 2019-09-17 PROCEDURE — 80048 BASIC METABOLIC PNL TOTAL CA: CPT

## 2019-09-17 PROCEDURE — 85025 COMPLETE CBC W/AUTO DIFF WBC: CPT | Mod: 91

## 2019-09-17 PROCEDURE — 99214 OFFICE O/P EST MOD 30 MIN: CPT | Mod: PBBFAC,PO | Performed by: NURSE PRACTITIONER

## 2019-09-17 PROCEDURE — 99999 PR PBB SHADOW E&M-EST. PATIENT-LVL IV: ICD-10-PCS | Mod: PBBFAC,,, | Performed by: NURSE PRACTITIONER

## 2019-09-17 PROCEDURE — 63600175 PHARM REV CODE 636 W HCPCS: Performed by: EMERGENCY MEDICINE

## 2019-09-17 PROCEDURE — 84100 ASSAY OF PHOSPHORUS: CPT

## 2019-09-17 PROCEDURE — 82550 ASSAY OF CK (CPK): CPT

## 2019-09-17 PROCEDURE — 84145 PROCALCITONIN (PCT): CPT

## 2019-09-17 PROCEDURE — 87502 INFLUENZA DNA AMP PROBE: CPT

## 2019-09-17 PROCEDURE — 96365 THER/PROPH/DIAG IV INF INIT: CPT

## 2019-09-17 PROCEDURE — 99214 OFFICE O/P EST MOD 30 MIN: CPT | Mod: S$PBB,,, | Performed by: NURSE PRACTITIONER

## 2019-09-17 RX ORDER — SULFAMETHOXAZOLE AND TRIMETHOPRIM 800; 160 MG/1; MG/1
1 TABLET ORAL 2 TIMES DAILY
Qty: 14 TABLET | Refills: 0 | Status: SHIPPED | OUTPATIENT
Start: 2019-09-17 | End: 2019-09-24

## 2019-09-17 RX ORDER — ACETAMINOPHEN 10 MG/ML
1000 INJECTION, SOLUTION INTRAVENOUS EVERY 8 HOURS
Status: COMPLETED | OUTPATIENT
Start: 2019-09-17 | End: 2019-09-18

## 2019-09-17 RX ORDER — IBANDRONATE SODIUM 150 MG/1
150 TABLET, FILM COATED ORAL
COMMUNITY
End: 2019-11-03 | Stop reason: SDUPTHER

## 2019-09-17 RX ORDER — LEVOTHYROXINE SODIUM 125 UG/1
62.5 TABLET ORAL
COMMUNITY
End: 2020-01-06

## 2019-09-17 RX ORDER — CHOLESTYRAMINE 4 G/9G
4 POWDER, FOR SUSPENSION ORAL 4 TIMES DAILY
Status: ON HOLD | COMMUNITY
End: 2020-01-17 | Stop reason: HOSPADM

## 2019-09-17 RX ORDER — ACETAMINOPHEN 500 MG
500 TABLET ORAL EVERY 6 HOURS PRN
Status: ON HOLD | COMMUNITY
End: 2020-01-17 | Stop reason: HOSPADM

## 2019-09-17 RX ORDER — MAGNESIUM SULFATE 1 G/100ML
1 INJECTION INTRAVENOUS ONCE
Status: COMPLETED | OUTPATIENT
Start: 2019-09-17 | End: 2019-09-18

## 2019-09-17 RX ADMIN — SODIUM CHLORIDE 1566 ML: 9 INJECTION, SOLUTION INTRAVENOUS at 06:09

## 2019-09-17 RX ADMIN — POTASSIUM BICARBONATE 50 MEQ: 25 TABLET, EFFERVESCENT ORAL at 08:09

## 2019-09-17 RX ADMIN — MAGNESIUM SULFATE IN DEXTROSE 1 G: 10 INJECTION, SOLUTION INTRAVENOUS at 08:09

## 2019-09-17 RX ADMIN — PIPERACILLIN AND TAZOBACTAM 4.5 G: 4; .5 INJECTION, POWDER, LYOPHILIZED, FOR SOLUTION INTRAVENOUS; PARENTERAL at 08:09

## 2019-09-17 RX ADMIN — CEFTRIAXONE SODIUM 2 G: 2 INJECTION, SOLUTION INTRAVENOUS at 06:09

## 2019-09-17 RX ADMIN — ACETAMINOPHEN 1000 MG: 10 INJECTION, SOLUTION INTRAVENOUS at 07:09

## 2019-09-17 NOTE — PATIENT INSTRUCTIONS

## 2019-09-17 NOTE — PROGRESS NOTES
Subjective:       Patient ID: Ariana Tiwari is a 74 y.o. female.    Chief Complaint: Diarrhea; Fever; and Fatigue    Patient who is new to me presents with fever and urinary frequency. Past couple of days she has been more fatigue and weak. She typically rides her bike but she has been feeling too fatigued to ride her bike. Patient lives with her daughter who is here at the clinic with her.     Fever    This is a new problem. The current episode started yesterday. The problem occurs daily. The problem has been unchanged. The maximum temperature noted was 101 to 101.9 F. Associated symptoms include abdominal pain, congestion, coughing, diarrhea (chronic) and urinary pain. Pertinent negatives include no chest pain, ear pain, headaches, muscle aches, nausea, rash, sleepiness, sore throat, vomiting or wheezing. She has tried acetaminophen for the symptoms. The treatment provided moderate relief.   Urinary Tract Infection    This is a new problem. The current episode started yesterday. The problem occurs every urination. The problem has been gradually worsening. The pain is at a severity of 4/10. The maximum temperature recorded prior to her arrival was 101 - 101.9 F. She is not sexually active. There is no history of pyelonephritis. Associated symptoms include frequency and urgency. Pertinent negatives include no chills, discharge, flank pain, hematuria, hesitancy, nausea, vomiting, constipation or rash. She has tried increased fluids for the symptoms. The treatment provided mild relief.     Review of Systems   Constitutional: Positive for fever. Negative for chills and fatigue.   HENT: Positive for congestion. Negative for ear pain, sinus pressure, sinus pain, sneezing and sore throat.    Respiratory: Positive for cough. Negative for chest tightness, shortness of breath and wheezing.    Cardiovascular: Negative for chest pain, palpitations and leg swelling.   Gastrointestinal: Positive for abdominal pain and  diarrhea (chronic). Negative for abdominal distention, constipation, nausea and vomiting.   Genitourinary: Positive for dysuria, frequency and urgency. Negative for decreased urine volume, difficulty urinating, flank pain, hematuria and hesitancy.   Musculoskeletal: Negative for arthralgias, gait problem, joint swelling and myalgias.   Skin: Negative for rash and wound.   Neurological: Positive for dizziness (when standing) and weakness. Negative for light-headedness, numbness and headaches.       Objective:      Physical Exam   Constitutional: She is oriented to person, place, and time. She appears well-developed and well-nourished.   Frail appearing elderly woman   HENT:   Head: Normocephalic and atraumatic.   Right Ear: External ear normal.   Left Ear: External ear normal.   Nose: Nose normal.   Mouth/Throat: Oropharynx is clear and moist.   Eyes: Pupils are equal, round, and reactive to light.   Neck: Normal range of motion.   Cardiovascular: Normal rate, regular rhythm, normal heart sounds and intact distal pulses.   Pulmonary/Chest: Effort normal and breath sounds normal.   Abdominal: Soft. Bowel sounds are normal. There is tenderness in the suprapubic area. There is no rigidity, no rebound, no guarding and no CVA tenderness.   Musculoskeletal: Normal range of motion.   Neurological: She is alert and oriented to person, place, and time.   Fine tremor noted   Skin: Skin is warm and dry.   Nursing note and vitals reviewed.      Assessment:       1. Urinary tract infection with hematuria, site unspecified    2. Dysuria    3. Fever, unspecified fever cause        Plan:       Ariana was seen today for diarrhea, fever and fatigue.    Diagnoses and all orders for this visit:    Urinary tract infection with hematuria, site unspecified  -     POCT URINE DIPSTICK WITHOUT MICROSCOPE  -     Urine culture  -     sulfamethoxazole-trimethoprim 800-160mg (BACTRIM DS) 800-160 mg Tab; Take 1 tablet by mouth 2 (two) times daily.  for 7 days    Dysuria  -     POCT URINE DIPSTICK WITHOUT MICROSCOPE  -     Urine culture    Fever, unspecified fever cause  -     POCT URINE DIPSTICK WITHOUT MICROSCOPE  -     Urine culture    Will check labs that were drawn today prior to clinic. Discussed worsening signs/symptoms and when to return to clinic or go to ED.   Patient expresses understanding and agrees with treatment plan.   Advised to go to ER for fever over 100.4 and confusion. No fever in clinic today and vital signs stable. Will have patient follow up in 2-3 days.

## 2019-09-18 PROBLEM — E87.6 HYPOKALEMIA: Status: ACTIVE | Noted: 2019-09-18

## 2019-09-18 PROBLEM — R50.9 FEVER: Status: ACTIVE | Noted: 2019-09-18

## 2019-09-18 LAB
ALBUMIN SERPL BCP-MCNC: 3 G/DL (ref 3.5–5.2)
ALP SERPL-CCNC: 72 U/L (ref 55–135)
ALT SERPL W/O P-5'-P-CCNC: 15 U/L (ref 10–44)
AMYLASE SERPL-CCNC: 20 U/L (ref 20–110)
ANION GAP SERPL CALC-SCNC: 7 MMOL/L (ref 8–16)
AST SERPL-CCNC: 11 U/L (ref 10–40)
BASOPHILS # BLD AUTO: 0.03 K/UL (ref 0–0.2)
BASOPHILS NFR BLD: 0.3 % (ref 0–1.9)
BILIRUB SERPL-MCNC: 0.7 MG/DL (ref 0.1–1)
BUN SERPL-MCNC: 8 MG/DL (ref 8–23)
CALCIUM SERPL-MCNC: 7.6 MG/DL (ref 8.7–10.5)
CHLORIDE SERPL-SCNC: 109 MMOL/L (ref 95–110)
CO2 SERPL-SCNC: 24 MMOL/L (ref 23–29)
CREAT SERPL-MCNC: 0.4 MG/DL (ref 0.5–1.4)
DIFFERENTIAL METHOD: ABNORMAL
EOSINOPHIL # BLD AUTO: 0 K/UL (ref 0–0.5)
EOSINOPHIL NFR BLD: 0.4 % (ref 0–8)
ERYTHROCYTE [DISTWIDTH] IN BLOOD BY AUTOMATED COUNT: 13.3 % (ref 11.5–14.5)
EST. GFR  (AFRICAN AMERICAN): >60 ML/MIN/1.73 M^2
EST. GFR  (NON AFRICAN AMERICAN): >60 ML/MIN/1.73 M^2
ESTIMATED AVG GLUCOSE: 88 MG/DL (ref 68–131)
GLUCOSE SERPL-MCNC: 87 MG/DL (ref 70–110)
HBA1C MFR BLD HPLC: 4.7 % (ref 4.5–6.2)
HCT VFR BLD AUTO: 33.5 % (ref 37–48.5)
HGB BLD-MCNC: 10.9 G/DL (ref 12–16)
IMM GRANULOCYTES # BLD AUTO: 0.04 K/UL (ref 0–0.04)
IMM GRANULOCYTES NFR BLD AUTO: 0.4 % (ref 0–0.5)
INR PPP: 1.6
LIPASE SERPL-CCNC: 22 U/L (ref 4–60)
LYMPHOCYTES # BLD AUTO: 1.1 K/UL (ref 1–4.8)
LYMPHOCYTES NFR BLD: 10.6 % (ref 18–48)
MAGNESIUM SERPL-MCNC: 1.7 MG/DL (ref 1.6–2.6)
MCH RBC QN AUTO: 31 PG (ref 27–31)
MCHC RBC AUTO-ENTMCNC: 32.5 G/DL (ref 32–36)
MCV RBC AUTO: 95 FL (ref 82–98)
MONOCYTES # BLD AUTO: 0.8 K/UL (ref 0.3–1)
MONOCYTES NFR BLD: 7.1 % (ref 4–15)
NEUTROPHILS # BLD AUTO: 8.6 K/UL (ref 1.8–7.7)
NEUTROPHILS NFR BLD: 81.2 % (ref 38–73)
NRBC BLD-RTO: 0 /100 WBC
PHOSPHATE SERPL-MCNC: 1.9 MG/DL (ref 2.7–4.5)
PLATELET # BLD AUTO: 134 K/UL (ref 150–350)
PMV BLD AUTO: 11.1 FL (ref 9.2–12.9)
POTASSIUM SERPL-SCNC: 2.9 MMOL/L (ref 3.5–5.1)
PROT SERPL-MCNC: 5.7 G/DL (ref 6–8.4)
PROTHROMBIN TIME: 18.2 SEC (ref 11.7–14)
RBC # BLD AUTO: 3.52 M/UL (ref 4–5.4)
SODIUM SERPL-SCNC: 140 MMOL/L (ref 136–145)
TROPONIN I SERPL DL<=0.01 NG/ML-MCNC: <0.03 NG/ML (ref 0.02–0.04)
WBC # BLD AUTO: 10.58 K/UL (ref 3.9–12.7)

## 2019-09-18 PROCEDURE — 83690 ASSAY OF LIPASE: CPT

## 2019-09-18 PROCEDURE — 97116 GAIT TRAINING THERAPY: CPT

## 2019-09-18 PROCEDURE — 97530 THERAPEUTIC ACTIVITIES: CPT

## 2019-09-18 PROCEDURE — 82150 ASSAY OF AMYLASE: CPT

## 2019-09-18 PROCEDURE — 94761 N-INVAS EAR/PLS OXIMETRY MLT: CPT

## 2019-09-18 PROCEDURE — 63600175 PHARM REV CODE 636 W HCPCS: Performed by: FAMILY MEDICINE

## 2019-09-18 PROCEDURE — 83036 HEMOGLOBIN GLYCOSYLATED A1C: CPT

## 2019-09-18 PROCEDURE — 99900035 HC TECH TIME PER 15 MIN (STAT)

## 2019-09-18 PROCEDURE — 84484 ASSAY OF TROPONIN QUANT: CPT | Mod: 91

## 2019-09-18 PROCEDURE — 63600175 PHARM REV CODE 636 W HCPCS: Performed by: INTERNAL MEDICINE

## 2019-09-18 PROCEDURE — 97165 OT EVAL LOW COMPLEX 30 MIN: CPT

## 2019-09-18 PROCEDURE — 84100 ASSAY OF PHOSPHORUS: CPT

## 2019-09-18 PROCEDURE — 85025 COMPLETE CBC W/AUTO DIFF WBC: CPT

## 2019-09-18 PROCEDURE — 12000002 HC ACUTE/MED SURGE SEMI-PRIVATE ROOM

## 2019-09-18 PROCEDURE — 87086 URINE CULTURE/COLONY COUNT: CPT

## 2019-09-18 PROCEDURE — 80053 COMPREHEN METABOLIC PANEL: CPT

## 2019-09-18 PROCEDURE — 85610 PROTHROMBIN TIME: CPT

## 2019-09-18 PROCEDURE — 36415 COLL VENOUS BLD VENIPUNCTURE: CPT

## 2019-09-18 PROCEDURE — 83735 ASSAY OF MAGNESIUM: CPT

## 2019-09-18 PROCEDURE — 97162 PT EVAL MOD COMPLEX 30 MIN: CPT

## 2019-09-18 RX ORDER — POTASSIUM CHLORIDE 7.45 MG/ML
10 INJECTION INTRAVENOUS ONCE
Status: COMPLETED | OUTPATIENT
Start: 2019-09-18 | End: 2019-09-18

## 2019-09-18 RX ORDER — MAGNESIUM SULFATE HEPTAHYDRATE 40 MG/ML
2 INJECTION, SOLUTION INTRAVENOUS
Status: DISCONTINUED | OUTPATIENT
Start: 2019-09-18 | End: 2019-09-21 | Stop reason: HOSPADM

## 2019-09-18 RX ORDER — IPRATROPIUM BROMIDE AND ALBUTEROL SULFATE 2.5; .5 MG/3ML; MG/3ML
3 SOLUTION RESPIRATORY (INHALATION) EVERY 6 HOURS PRN
Status: DISCONTINUED | OUTPATIENT
Start: 2019-09-18 | End: 2019-09-18

## 2019-09-18 RX ORDER — SODIUM,POTASSIUM PHOSPHATES 280-250MG
2 POWDER IN PACKET (EA) ORAL EVERY 4 HOURS PRN
Status: DISCONTINUED | OUTPATIENT
Start: 2019-09-18 | End: 2019-09-21 | Stop reason: HOSPADM

## 2019-09-18 RX ORDER — LANOLIN ALCOHOL/MO/W.PET/CERES
400 CREAM (GRAM) TOPICAL EVERY 4 HOURS PRN
Status: DISCONTINUED | OUTPATIENT
Start: 2019-09-18 | End: 2019-09-21 | Stop reason: HOSPADM

## 2019-09-18 RX ORDER — IBUPROFEN 200 MG
16 TABLET ORAL
Status: DISCONTINUED | OUTPATIENT
Start: 2019-09-18 | End: 2019-09-21 | Stop reason: HOSPADM

## 2019-09-18 RX ORDER — GLUCAGON 1 MG
1 KIT INJECTION
Status: DISCONTINUED | OUTPATIENT
Start: 2019-09-18 | End: 2019-09-21 | Stop reason: HOSPADM

## 2019-09-18 RX ORDER — SODIUM,POTASSIUM PHOSPHATES 280-250MG
1 POWDER IN PACKET (EA) ORAL EVERY 4 HOURS PRN
Status: DISCONTINUED | OUTPATIENT
Start: 2019-09-18 | End: 2019-09-21 | Stop reason: HOSPADM

## 2019-09-18 RX ORDER — IBUPROFEN 200 MG
24 TABLET ORAL
Status: DISCONTINUED | OUTPATIENT
Start: 2019-09-18 | End: 2019-09-21 | Stop reason: HOSPADM

## 2019-09-18 RX ORDER — IPRATROPIUM BROMIDE AND ALBUTEROL SULFATE 2.5; .5 MG/3ML; MG/3ML
3 SOLUTION RESPIRATORY (INHALATION) EVERY 6 HOURS PRN
Status: DISCONTINUED | OUTPATIENT
Start: 2019-09-18 | End: 2019-09-21 | Stop reason: HOSPADM

## 2019-09-18 RX ORDER — POTASSIUM CHLORIDE 20 MEQ/1
20 TABLET, EXTENDED RELEASE ORAL
Status: DISCONTINUED | OUTPATIENT
Start: 2019-09-18 | End: 2019-09-21 | Stop reason: HOSPADM

## 2019-09-18 RX ORDER — ACETAMINOPHEN 650 MG/1
650 SUPPOSITORY RECTAL EVERY 4 HOURS PRN
Status: DISCONTINUED | OUTPATIENT
Start: 2019-09-18 | End: 2019-09-21 | Stop reason: HOSPADM

## 2019-09-18 RX ORDER — SODIUM CHLORIDE 0.9 % (FLUSH) 0.9 %
10 SYRINGE (ML) INJECTION
Status: DISCONTINUED | OUTPATIENT
Start: 2019-09-18 | End: 2019-09-21 | Stop reason: HOSPADM

## 2019-09-18 RX ORDER — SODIUM CHLORIDE AND POTASSIUM CHLORIDE 150; 900 MG/100ML; MG/100ML
INJECTION, SOLUTION INTRAVENOUS CONTINUOUS
Status: DISCONTINUED | OUTPATIENT
Start: 2019-09-18 | End: 2019-09-21 | Stop reason: HOSPADM

## 2019-09-18 RX ORDER — IBUPROFEN 400 MG/1
400 TABLET ORAL EVERY 6 HOURS PRN
Status: DISCONTINUED | OUTPATIENT
Start: 2019-09-18 | End: 2019-09-21 | Stop reason: HOSPADM

## 2019-09-18 RX ORDER — LANOLIN ALCOHOL/MO/W.PET/CERES
800 CREAM (GRAM) TOPICAL EVERY 4 HOURS PRN
Status: DISCONTINUED | OUTPATIENT
Start: 2019-09-18 | End: 2019-09-21 | Stop reason: HOSPADM

## 2019-09-18 RX ORDER — HYDRALAZINE HYDROCHLORIDE 20 MG/ML
10 INJECTION INTRAMUSCULAR; INTRAVENOUS EVERY 4 HOURS PRN
Status: DISCONTINUED | OUTPATIENT
Start: 2019-09-18 | End: 2019-09-21 | Stop reason: HOSPADM

## 2019-09-18 RX ORDER — ONDANSETRON 2 MG/ML
4 INJECTION INTRAMUSCULAR; INTRAVENOUS EVERY 6 HOURS PRN
Status: DISCONTINUED | OUTPATIENT
Start: 2019-09-18 | End: 2019-09-21 | Stop reason: HOSPADM

## 2019-09-18 RX ORDER — ENOXAPARIN SODIUM 100 MG/ML
40 INJECTION SUBCUTANEOUS EVERY 24 HOURS
Status: DISCONTINUED | OUTPATIENT
Start: 2019-09-18 | End: 2019-09-21 | Stop reason: HOSPADM

## 2019-09-18 RX ORDER — ACETAMINOPHEN 500 MG
1000 TABLET ORAL EVERY 8 HOURS PRN
Status: DISCONTINUED | OUTPATIENT
Start: 2019-09-18 | End: 2019-09-21 | Stop reason: HOSPADM

## 2019-09-18 RX ADMIN — POTASSIUM CHLORIDE 10 MEQ: 7.46 INJECTION, SOLUTION INTRAVENOUS at 09:09

## 2019-09-18 RX ADMIN — PIPERACILLIN SODIUM,TAZOBACTAM SODIUM 3.38 G: 3; .375 INJECTION, POWDER, FOR SOLUTION INTRAVENOUS at 11:09

## 2019-09-18 RX ADMIN — PIPERACILLIN SODIUM,TAZOBACTAM SODIUM 3.38 G: 3; .375 INJECTION, POWDER, FOR SOLUTION INTRAVENOUS at 05:09

## 2019-09-18 RX ADMIN — SODIUM CHLORIDE AND POTASSIUM CHLORIDE: 9; 1.49 INJECTION, SOLUTION INTRAVENOUS at 02:09

## 2019-09-18 RX ADMIN — ACETAMINOPHEN 1000 MG: 10 INJECTION, SOLUTION INTRAVENOUS at 02:09

## 2019-09-18 RX ADMIN — PIPERACILLIN SODIUM,TAZOBACTAM SODIUM 3.38 G: 3; .375 INJECTION, POWDER, FOR SOLUTION INTRAVENOUS at 09:09

## 2019-09-18 RX ADMIN — ACETAMINOPHEN 1000 MG: 10 INJECTION, SOLUTION INTRAVENOUS at 05:09

## 2019-09-18 RX ADMIN — ENOXAPARIN SODIUM 40 MG: 100 INJECTION SUBCUTANEOUS at 05:09

## 2019-09-18 NOTE — CONSULTS
[facility]  General Surgery  Consult Note    Inpatient consult to General Surgery  Consult performed by: Sterling Vigil III, MD  Consult ordered by: YESSI Isaacs MD  Reason for consult: Small-bowel obstruction versus enteritis with ileus        Subjective:     Chief Complaint/Reason for Admission:  Fever, weakness, abdominal pain    History of Present Illness:  Patient is 74-year-old female with past medical history of gluten allergy and chronic diarrhea.  She presented to the emergency department overnight with increasing abdominal distension with lower abdominal pain, fever to 102 and generalized weakness.  UA showed small amount of occult blood with 5 RBC.  White count elevated to 14.  CT scan chest, abdomen pelvis without IV or p.o. contrast was performed that showed appearance suspicious for segmental enteritis in the right lower quadrant of the abdomen with small bowel dilatation consistent with ileus or low-grade small-bowel obstruction.  There is no evidence of free air or ascites.  It was negative for pneumonia, ureteral or kidney stones.  She was admitted and NG tube was placed. She states her abdominal pain and distention are improved today.  Repeat x-ray today shows slight improvement in the gas bowel pattern. Patient states she had normal bowel movement yesterday.  She has not passed diarrhea or flatus since admission.  She has been hemodynamically stable.  She was febrile on admission but has not had fever today.  She reports chronic GI issues. She states she is chronically distended.  Her diarrhea has improved somewhat with diet modifications to exclude gluten.  She had CT enterography in March 2018 that showed nonspecific mild dilatation of the small bowel loops with mild wall thickening.  She had other incidental findings of a 10 mm pancreatic cyst and liver hemangioma. She had capsule endoscopy in June of 2018 that showed a large duodenal diverticulum that caused capsule  retention.  EGD showed normal mucosa found throughout the entire duodenum.  Duodenal diverticulum was appreciated as well.  Her last colonoscopy was in February 2018 which showed diverticulosis without any other abnormalities. She reports past abdominal surgical history of inguinal hernia, possible umbilical hernia repair.      No current facility-administered medications on file prior to encounter.      Current Outpatient Medications on File Prior to Encounter   Medication Sig    acetaminophen (TYLENOL) 500 MG tablet Take 500 mg by mouth every 6 (six) hours as needed for Pain.    cholecalciferol, vitamin D3, (VITAMIN D3) 2,000 unit Cap Take 1 capsule by mouth once daily.     cholestyramine (QUESTRAN) 4 gram packet Take 4 g by mouth 4 (four) times daily.    CINNAMON BARK ORAL Take 1,000 mg by mouth once daily.    GLUCOSAMINE HCL ORAL Take 1,500 mg by mouth once daily.    ibandronate (BONIVA) 150 mg tablet Take 150 mg by mouth every 30 days.    ibuprofen (ADVIL,MOTRIN) 200 MG tablet Take 400 mg by mouth daily as needed for Pain.     ipratropium (ATROVENT) 42 mcg (0.06 %) nasal spray 2 sprays by Nasal route 4 (four) times daily.    levothyroxine (SYNTHROID) 125 MCG tablet Take 62.5 mcg by mouth before breakfast.    sulfamethoxazole-trimethoprim 800-160mg (BACTRIM DS) 800-160 mg Tab Take 1 tablet by mouth 2 (two) times daily. for 7 days       Review of patient's allergies indicates:   Allergen Reactions    Gluten Diarrhea       Past Medical History:   Diagnosis Date    Allergy     Diverticulosis     Gluten enteropathy     Gluten intolerance     Hernia     Hypothyroidism     PD (Parkinson's disease) 9/16/2015    Right tremor type    Peptic ulcer disease     Right shoulder pain     Cirtisone injection 3/26/15 - Dr. Tolbert    Ulcer      Past Surgical History:   Procedure Laterality Date    ADENOIDECTOMY      COLONOSCOPY  03/01/2012    Dr. Teresa, repeat in 10 years for screening    COLONOSCOPY N/A  2/21/2018    [facility]    COLONOSCOPY N/A 3/1/2012    [facility] by Molina [facility] at [facility]    COSMETIC SURGERY      Broken nose    ENTEROSCOPY N/A 8/9/2018    [facility]    ESOPHAGOGASTRODUODENOSCOPY (EGD) N/A 5/21/2015    [facility] at [facility] ENDO    EXCISION-CYST Right 4/9/2015    [facility] at [facility] OR    FRACTURE SURGERY  left wrist    With pin    HEMORRHOID SURGERY      HERNIA REPAIR Left 04/09/2015    Dr Lo; left inguinal    IMAGING, GI TRACT, INTRALUMINAL, VIA CAPSULE N/A 7/10/2018    [facility]    REPAIR-HERNIA-INGUINAL Left 4/9/2015    [facility] at [facility] OR    RHINOPLASTY TIP      TONSILLECTOMY      UPPER GASTROINTESTINAL ENDOSCOPY  05/21/2015    Dr. Ernandez     Family History     Problem Relation (Age of Onset)    Alcohol abuse Brother    Diabetes Mother, Son    Early death Brother    Heart disease Brother    No Known Problems Father    Obesity Sister        Tobacco Use    Smoking status: Never Smoker    Smokeless tobacco: Never Used   Substance and Sexual Activity    Alcohol use: Yes     Alcohol/week: 7.0 oz     Types: 14 Standard drinks or equivalent per week     Comment: 2 glasses wine per dinner    Drug use: No    Sexual activity: Never     Review of Systems   Constitutional: Positive for fatigue. Negative for appetite change, chills, fever and unexpected weight change.   HENT: Negative for hearing loss, rhinorrhea, sore throat and voice change.    Eyes: Negative for photophobia and visual disturbance.   Respiratory: Negative for cough, choking and shortness of breath.    Cardiovascular: Negative for chest pain, palpitations and leg swelling.   Gastrointestinal: Positive for abdominal distention, abdominal pain and constipation. Negative for blood in stool, diarrhea, nausea and vomiting.   Endocrine:  Negative for cold intolerance, heat intolerance, polydipsia and polyuria.   Musculoskeletal: Negative for arthralgias, back pain, joint swelling and neck stiffness.   Skin: Negative for color change, pallor and rash.   Neurological: Negative for dizziness, seizures, syncope and headaches.   Hematological: Negative for adenopathy. Does not bruise/bleed easily.   Psychiatric/Behavioral: Negative for agitation, behavioral problems and confusion.     Objective:     Vital Signs (Most Recent):  Temp: 98 °F (36.7 °C) (09/18/19 1600)  Pulse: 64 (09/18/19 1600)  Resp: 16 (09/18/19 1600)  BP: 112/63 (09/18/19 1600)  SpO2: (!) 94 % (09/18/19 1600) Vital Signs (24h Range):  Temp:  [97.4 °F (36.3 °C)-100.3 °F (37.9 °C)] 98 °F (36.7 °C)  Pulse:  [62-93] 64  Resp:  [16-22] 16  SpO2:  [93 %-99 %] 94 %  BP: ()/(53-63) 112/63     Weight: 52.2 kg (115 lb)  Body mass index is 20.37 kg/m².      Intake/Output Summary (Last 24 hours) at 9/18/2019 1850  Last data filed at 9/18/2019 1814  Gross per 24 hour   Intake 2352.08 ml   Output 875 ml   Net 1477.08 ml       Physical Exam   Constitutional: She appears well-developed. She is cooperative.  Non-toxic appearance. No distress.   HENT:   Head: Normocephalic and atraumatic. Head is without abrasion and without laceration.   Right Ear: External ear normal.   Left Ear: External ear normal.   Nose: Nose normal.   Mouth/Throat: Oropharynx is clear and moist.   Eyes: Pupils are equal, round, and reactive to light. EOM are normal.   Neck: Trachea normal. Neck supple. No tracheal deviation and normal range of motion present.   Cardiovascular: Normal rate and regular rhythm.   Pulmonary/Chest: Effort normal. No accessory muscle usage. No tachypnea. No respiratory distress.   Abdominal: Soft. Normal appearance. She exhibits distension. She exhibits no mass. There is no hepatosplenomegaly. There is tenderness in the suprapubic area. There is no rigidity, no rebound and no guarding. No hernia.    Lymphadenopathy:        Right: No supraclavicular adenopathy present.        Left: No supraclavicular adenopathy present.   Neurological: She is alert. Coordination and gait normal.   Skin: Skin is warm and intact.   Psychiatric: She has a normal mood and affect. Her speech is normal and behavior is normal.       Significant Labs:  CBC:   Recent Labs   Lab 09/18/19  0541   WBC 10.58   RBC 3.52*   HGB 10.9*   HCT 33.5*   *   MCV 95   MCH 31.0   MCHC 32.5     CMP:   Recent Labs   Lab 09/18/19  0541   GLU 87   CALCIUM 7.6*   ALBUMIN 3.0*   PROT 5.7*      K 2.9*   CO2 24      BUN 8   CREATININE 0.4*   ALKPHOS 72   ALT 15   AST 11   BILITOT 0.7       Significant Diagnostics:  I have reviewed all pertinent imaging results/findings within the past 24 hours.    Assessment/Plan:     Active Diagnoses:    Diagnosis Date Noted POA    PRINCIPAL PROBLEM:  SBO (small bowel obstruction) [K56.609] 09/17/2019 Yes    Fever [R50.9] 09/18/2019 Yes    Hypokalemia [E87.6] 09/18/2019 Unknown    Enteritis [K52.9] 09/17/2019 Yes    Hypomagnesemia [E83.42] 09/17/2019 Yes    Chronic diarrhea [K52.9] 02/21/2018 Yes    PD (Parkinson's disease) [G20] 09/16/2015 Yes    Dysphagia [R13.10] 05/21/2015 Yes    Hypothyroidism [E03.9]  Yes    Gluten enteropathy [K90.41] 04/11/2014 Yes      Problems Resolved During this Admission:    Patient has been admitted and NG tube has been placed. She has evidence of small-bowel obstruction versus an enteritis with an ileus.  Recommend continue antibiotics, NG tube, IV fluids and NPO again today.  No indication for emergent surgery. Urine cultures are pending. Will continue to follow.    Thank you for your consult. I will follow-up with patient. Please contact us if you have any additional questions.    Sterling Vigil III, MD  General Surgery  [facility]

## 2019-09-18 NOTE — ASSESSMENT & PLAN NOTE
CT abd and KUB read discussed with the radiologist who expressed concern for small bowel volvulus  Patient afebrile, no signs of peritonitis as of now  Keep NPO  NGT to low intermittent suction with mod drain since last night  General surgery consulted  Will replace electrolytes

## 2019-09-18 NOTE — ED PROVIDER NOTES
"Encounter Date: 9/17/2019       History     Chief Complaint   Patient presents with    Fever     being treated for UTI-1st dose of bactrim today. fever 101.8 at home after "she woke up from a nap"      This is a 74-year-old female who presents with her family with report of fever over the past 2 days.  T-max at home was 101.  Patient has been fatigued with decreased activity level and decreased appetite.  She did complain of lower abdominal pain yesterday but has not complained of any pain or discomfort today.  She has a history of Parkinson's.  The family states that when she has fever she is more tremulous.  She has not had any seizure activity.  She denies headache neck pain or stiffness. She denies any current pain or discomfort.  Family also states that she has chronic diarrhea and has had multiple urinary tract infections in the past secondary to the chronic diarrhea.  Family deny coughing or respiratory problems.  She has not had any vomiting. The family and the patient deny any other problems or complaints.        Review of patient's allergies indicates:   Allergen Reactions    Gluten Diarrhea     Past Medical History:   Diagnosis Date    Allergy     Diverticulosis     Gluten enteropathy     Gluten intolerance     Hernia     Hypothyroidism     PD (Parkinson's disease) 9/16/2015    Right tremor type    Peptic ulcer disease     Right shoulder pain     Cirtisone injection 3/26/15 - Dr. Tolbert    Ulcer      Past Surgical History:   Procedure Laterality Date    ADENOIDECTOMY      COLONOSCOPY  03/01/2012    Dr. Teresa, repeat in 10 years for screening    COLONOSCOPY N/A 2/21/2018    [facility]    COLONOSCOPY N/A 3/1/2012    [facility] END[facility]    COSMETIC SURGERY      Broken nose    ENTEROSCOPY N/A 8/9/2018    [facility]    ESOPHAGOGASTRODUODENOSCOPY (EGD) N/A 5/21/2015    Performed by Shiva" [facility]ANNE [facility] ENDO    EXCISION-CYST Right 4/9/2015    [facility] at [facility] OR    FRACTURE SURGERY  left wrist    With pin    HEMORRHOID SURGERY      HERNIA REPAIR Left 04/09/2015    Dr Lo; left inguinal    IMAGING, GI TRACT, INTRALUMINAL, VIA CAPSULE N/A 7/10/2018    [facility] at [facility] ENDO    REPAIR-HERNIA-INGUINAL Left 4/9/2015    [facility] OR    RHINOPLASTY TIP      TONSILLECTOMY      UPPER GASTROINTESTINAL ENDOSCOPY  05/21/2015    Dr. Ernandez     Family History   Problem Relation Age of Onset    Diabetes Mother     Diabetes Son     Obesity Sister     Alcohol abuse Brother     Heart disease Brother     No Known Problems Father     Early death Brother         self    Breast cancer Neg Hx     Colon cancer Neg Hx     Ovarian cancer Neg Hx     Colon polyps Neg Hx     Crohn's disease Neg Hx     Ulcerative colitis Neg Hx     Celiac disease Neg Hx      Social History     Tobacco Use    Smoking status: Never Smoker    Smokeless tobacco: Never Used   Substance Use Topics    Alcohol use: Yes     Alcohol/week: 7.0 oz     Types: 14 Standard drinks or equivalent per week     Comment: 2 glasses wine per dinner    Drug use: No     Review of Systems   Constitutional: Positive for activity change, appetite change, chills, fatigue and fever.   HENT: Negative.  Negative for congestion, dental problem, ear pain, rhinorrhea, sinus pressure, sinus pain, sore throat and trouble swallowing.    Eyes: Negative.  Negative for photophobia, pain, redness and visual disturbance.   Respiratory: Negative.  Negative for cough, chest tightness, shortness of breath and wheezing.    Cardiovascular: Negative.  Negative for chest pain, palpitations and leg swelling.   Gastrointestinal: Positive for diarrhea (Chronic diarrhea for many years.  Placed on Questran for this.  No sudden change or worsening.). Negative for abdominal distention,  abdominal pain, anal bleeding, blood in stool, constipation, nausea and vomiting.   Genitourinary: Negative.  Negative for decreased urine volume, difficulty urinating, dysuria, flank pain, frequency, genital sores, hematuria, pelvic pain and urgency.        History of urinary tract infections in the past.  No current urinary symptoms.  Has not had symptoms with prior infections.   Musculoskeletal: Negative.  Negative for arthralgias, back pain, gait problem, joint swelling, myalgias, neck pain and neck stiffness.   Skin: Negative.  Negative for pallor and rash.   Neurological: Negative for dizziness, tremors, seizures, speech difficulty, weakness, light-headedness, numbness and headaches.   Hematological: Does not bruise/bleed easily.   Psychiatric/Behavioral: Negative for agitation and behavioral problems.        No change in her normal level of consciousness.  Is more fatigued when the fever is present but when fever is controlled return       Physical Exam     Initial Vitals [09/17/19 1523]   BP Pulse Resp Temp SpO2   129/60 88 18 98.9 °F (37.2 °C) 96 %      MAP       --         Physical Exam    Nursing note and vitals reviewed.  Constitutional: She is cooperative.  Non-toxic appearance. She does not appear ill. No distress.   HENT:   Head: Normocephalic.   Nose: Nose normal.   Mouth/Throat: Uvula is midline. Mucous membranes are dry. No oropharyngeal exudate, posterior oropharyngeal edema or posterior oropharyngeal erythema.   Eyes: Conjunctivae, EOM and lids are normal. Pupils are equal, round, and reactive to light. No scleral icterus.   Neck: Trachea normal, normal range of motion, full passive range of motion without pain and phonation normal. Neck supple. No thyroid mass present. No spinous process tenderness present. No edema, no erythema and normal range of motion present. No neck rigidity. Carotid bruit is not present. No JVD present.   Cardiovascular: Normal rate, regular rhythm, normal heart sounds,  intact distal pulses and normal pulses.   No murmur heard.  Pulmonary/Chest: Effort normal and breath sounds normal. No accessory muscle usage. No tachypnea. No respiratory distress.   Abdominal: Soft. Normal appearance and bowel sounds are normal. She exhibits distension. She exhibits no abdominal bruit, no pulsatile midline mass and no mass. There is no tenderness. There is no rigidity, no guarding and no CVA tenderness.   Family report that this is the normal size of her abdomen.   Neurological: She is alert. She has normal strength. No cranial nerve deficit or sensory deficit.   No focal deficits noted. Family report baseline mental status.   Skin: Skin is intact. Capillary refill takes less than 2 seconds. No ecchymosis and no rash noted. No erythema. No pallor.   Psychiatric: She has a normal mood and affect. Her speech is normal and behavior is normal. Cognition and memory are normal.         ED Course   Procedures  Labs Reviewed   CBC W/ AUTO DIFFERENTIAL - Abnormal; Notable for the following components:       Result Value    WBC 14.67 (*)     Gran # (ANC) 12.5 (*)     Immature Grans (Abs) 0.08 (*)     Gran% 84.9 (*)     Lymph% 8.2 (*)     All other components within normal limits   COMPREHENSIVE METABOLIC PANEL - Abnormal; Notable for the following components:    Potassium 2.7 (*)     Creatinine 0.4 (*)     Calcium 8.5 (*)     All other components within normal limits    Narrative:        Potassium critical result(s) called and verbal readback obtained   from Josr Tracy RN/ED, 09/17/2019 16:48   MAGNESIUM - Abnormal; Notable for the following components:    Magnesium 1.4 (*)     All other components within normal limits   PHOSPHORUS - Abnormal; Notable for the following components:    Phosphorus 2.2 (*)     All other components within normal limits   URINALYSIS, REFLEX TO URINE CULTURE - Abnormal; Notable for the following components:    Protein, UA 1+ (*)     Occult Blood UA 1+ (*)     All other  components within normal limits    Narrative:     Preferred Collection Type->Urine, Clean Catch  Specimen Source->Urine   URINALYSIS MICROSCOPIC - Abnormal; Notable for the following components:    RBC, UA 5 (*)     Hyaline Casts, UA 4 (*)     All other components within normal limits    Narrative:     Preferred Collection Type->Urine, Clean Catch  Specimen Source->Urine   CULTURE, BLOOD   CULTURE, BLOOD   CULTURE, URINE   LACTIC ACID, PLASMA   CK   CK   LIPASE   INFLUENZA A AND B ANTIGEN   TROPONIN I   PROCALCITONIN          Imaging Results          CT Chest Abdomen Pelvis Without Contrast (XPD) (In process)  Result time 09/17/19 19:29:14   Procedure changed from CT Chest Without Contrast                X-Ray Chest AP Portable (Final result)  Result time 09/17/19 16:23:40   Procedure changed from X-Ray Chest 1 View     Final result by Juan Manuel Mejia MD (09/17/19 16:23:40)                 Impression:      No evidence of active cardiopulmonary disease.      Electronically signed by: Juan Manuel Mejia MD  Date:    09/17/2019  Time:    16:23             Narrative:    EXAMINATION:  XR CHEST AP PORTABLE    CLINICAL HISTORY:  Sepsis; fever, UTI.    FINDINGS:  Portable chest radiograph at 16:12 hours compared to multiple prior exams shows the cardiomediastinal silhouette and pulmonary vasculature are within normal limits.    The lungs are normally expanded, with no consolidation, pleural effusion, or evidence of pulmonary edema. No confluent infiltrates or pneumothorax.  The bones are diffusely osteopenic, with no acute osseous abnormalities.                                 Medical Decision Making:   Clinical Tests:   Lab Tests: Reviewed  Radiological Study: Reviewed  Medical Tests: Reviewed  ED Management:  Patient has had nonfocal generalized weakness fatigue and malaise.  She does have evidence of hypomagnesemia and hypokalemia.  Supplementation has been ordered.  She does appear dehydration.  IV fluids have been given.  Has  been noted to have a 102.7 rectal temperature in the emergency room.  Abdomen is distended although family reports chronically distended.  As urine did not appear to be the source of infection, CT scan of the abdomen was obtained with evidence of a possible small-bowel obstruction versus enteritis.  Blood cultures have been obtained.  IV antibiotics have been given.  I have discussed the case with the hospitalist physician who will evaluate the patient for admission for further care.                      Clinical Impression:       ICD-10-CM ICD-9-CM   1. Fever, unspecified fever cause R50.9 780.60   2. Enteritis K52.9 558.9   3. Hypomagnesemia syndrome E83.42 275.2   4. Hypokalemia E87.6 276.8                                Kimberly Daugherty MD  09/17/19 2582

## 2019-09-18 NOTE — PT/OT/SLP EVAL
Physical Therapy Evaluation    Patient Name:  Ariana Tiwari   MRN:  [id number]    Recommendations:     Discharge Recommendations:  home   Discharge Equipment Recommendations: cane, straight   Barriers to discharge: None    Assessment:     Ariana Tiwari is a 74 y.o. female admitted with a medical diagnosis of SBO (small bowel obstruction).  She presents with the following impairments/functional limitations:  weakness, gait instability, impaired balance, impaired endurance, decreased safety awareness, impaired functional mobilty. Pt states she has a cane at home, but does not like it. Pt and daughter in law educated on need for cane pt likes to increase stability and safety during ambulation.     Rehab Prognosis: Good; patient would benefit from acute skilled PT services to address these deficits and reach maximum level of function.    Recent Surgery: * No surgery found *      Plan:     During this hospitalization, patient to be seen 5 x/week to address the identified rehab impairments via gait training, therapeutic activities, therapeutic exercises and progress toward the following goals:    · Plan of Care Expires:  10/18/19    Subjective     Chief Complaint: none  Patient/Family Comments/goals: home  Pain/Comfort:  ·      Patients cultural, spiritual, Alevism conflicts given the current situation:      Living Environment:  Pt lives with son and daughter in law. 8 stairs to enter without handrail. Pt also frequently climbs the 30 stairs to the boat house.  Prior to admission, patients level of function was independent. She was not using an AD. Recent fall attributed to dog tripping her. Has 3 wheeled bicycle that she rides around the pond.   Equipment used at home: cane, straight.  DME owned (not currently used): single point cane.  Upon discharge, patient will have assistance from son and DIL.    Objective:     Communicated with RN prior to session.  Patient found supine with peripheral IV, telemetry, NG  "tube  upon PT entry to room.    General Precautions: Standard, fall, NPO   Orthopedic Precautions:N/A   Braces:       Exams:  · Cognitive Exam:  Patient is oriented to Person, Place, Time and Situation  · RUE ROM: Deficits: sh flexion  · LUE ROM: Deficits: sh flexion  · RLE Strength: WFL  · LLE Strength: WFL    Functional Mobility:  · Bed Mobility:     · Supine to Sit: contact guard assistance; "I am not dizzy which is surprising. I usually get dizzy when I sit up."   · Transfers:     · Sit to Stand:  minimum assistance with no AD  · Gait: 125' CGA; CoM shifted forward  · Balance: Standing: fair      Therapeutic Activities and Exercises:   pt educated on safe ambulation and transfers, use of SC to increase stability and reduce risk of falls    AM-PAC 6 CLICK MOBILITY  Total Score:      Patient left up in chair with call button in reach and DIL present.    GOALS:   Multidisciplinary Problems     Physical Therapy Goals        Problem: Physical Therapy Goal    Goal Priority Disciplines Outcome Goal Variances Interventions   Physical Therapy Goal     PT, PT/OT      Description:  Goals to be met by: D/C     Patient will increase functional independence with mobility by performin. Supine to sit with Stand-by Assistance  2. Sit to stand transfer with Supervision  3. Bed to chair transfer with Stand-by Assistance using Single-point Cane   4. Gait  x 250 feet with Supervision using Single-point Cane .   5. Pt will ascend/descend 8 stairs with S without railing using AD to allow safe return home.                       History:     Past Medical History:   Diagnosis Date    Allergy     Diverticulosis     Gluten enteropathy     Gluten intolerance     Hernia     Hypothyroidism     PD (Parkinson's disease) 2015    Right tremor type    Peptic ulcer disease     Right shoulder pain     Cirtisone injection 3/26/15 - Dr. Tolbert    Ulcer        Past Surgical History:   Procedure Laterality Date    ADENOIDECTOMY   "    COLONOSCOPY  03/01/2012    Dr. Teresa, repeat in 10 years for screening    COLONOSCOPY N/A 2/21/2018    [facility]    COLONOSCOPY N/A 3/1/2012    [facility] by Molina [facility] at [facility] END[facility]    COSMETIC SURGERY      Broken nose    ENTEROSCOPY N/A 8/9/2018    [facility]neg [facility] at [facility]    ESOPHAGOGASTRODUODENOSCOPY (EGD) N/A 5/21/2015    [facility] Ernandez [facility] at [facility] ENDO    EXCISION-CYST Right 4/9/2015    [facility] Lo [facility] at [facility] OR    FRACTURE SURGERY  left wrist    With pin    HEMORRHOID SURGERY      HERNIA REPAIR Left 04/09/2015    Dr Lo; left inguinal    IMAGING, GI TRACT, INTRALUMINAL, VIA CAPSULE N/A 7/10/2018    [facility] at [facility]    REPAIR-HERNIA-INGUINAL Left 4/9/2015    [facility] at [facility] OR    RHINOPLASTY TIP      TONSILLECTOMY      UPPER GASTROINTESTINAL ENDOSCOPY  05/21/2015    Dr. Ernandez       Time Tracking:     PT Received On: 09/18/19  PT Start Time: 0101     PT Stop Time: 0140  PT Total Time (min): 39 min     Billable Minutes: Evaluation 9, Gait Training 15 and Therapeutic Activity 15      Kylie Pink, PT  09/18/2019

## 2019-09-18 NOTE — PLAN OF CARE
Problem: Fall Injury Risk  Goal: Absence of Fall and Fall-Related Injury  Pt remains free of falls

## 2019-09-18 NOTE — PT/OT/SLP EVAL
Occupational Therapy   Evaluation    Name: Ariana Tiwari  MRN: [id number]  Admitting Diagnosis:  SBO (small bowel obstruction)      Recommendations:     Discharge Recommendations: home  Discharge Equipment Recommendations:  cane, straight  Barriers to discharge:  None    Assessment:     Ariana Tiwari is a 74 y.o. female with a medical diagnosis of SBO (small bowel obstruction).  Pt agreeable to OT therapy evaluation. Performance deficits affecting function: weakness, impaired endurance, impaired self care skills, impaired balance, gait instability, impaired functional mobilty, decreased safety awareness, decreased upper extremity function. Pt's son and daughter-in-law present at bedside. Pt just getting re-connected to NG suction on OT arrival.     Rehab Prognosis: Fair; patient would benefit from acute skilled OT services to address these deficits and reach maximum level of function.       Plan:     Patient to be seen 5 x/week to address the above listed problems via self-care/home management, therapeutic exercises, therapeutic activities  · Plan of Care Expires: 10/18/19  · Plan of Care Reviewed with: patient, family    Subjective     Chief Complaint: Nose pain   Patient/Family Comments/goals: to return home    Occupational Profile:  Living Environment: Pt lives with son and daughter-in-law with 8 stairs to enter with a handrail. Pt has a walk-in shower with a shower chair and wall bars. Pt also reports climbing 30 steps to their boat house.  Previous level of function: Independent, no currently using AD; Pt goes out shopping with her friends and daughter-in-law and does not require a scooter, is able to walk the whole time. DIL cooks and cleans.   Roles and Routines: active; goes out shopping; rides a 3 wheeled bicycle  Equipment Used at Home:  shower chair, grab bar; Pt reports she has a cane at home but does not like it.  Assistance upon Discharge: yes, from family    Pain/Comfort:  · Pain Rating 1: (pt  unable to rate)  · Location 1: nose  · Pain Addressed 1: Nurse notified    Patients cultural, spiritual, Yarsanism conflicts given the current situation: no    Objective:     Communicated with: nursing/PT prior to session.  Patient found up in chair with telemetry, peripheral IV, NG tube upon OT entry to room.    General Precautions: Standard, fall, NPO   Orthopedic Precautions:N/A   Braces: N/A     Occupational Performance:    Bed Mobility:    · Patient completed Scooting/Bridging with maximal assistance  · Patient completed Sit to Supine with contact guard assistance    Functional Mobility/Transfers:  · Patient completed Sit <> Stand Transfer with contact guard assistance  with  no assistive device   · Patient completed Bed <> Chair Transfer using Step Transfer technique with contact guard assistance with no assistive device  · Functional Mobility: pt amb from chair <>bed with CGA and no AD    Activities of Daily Living:  · Pt just re-connected to NG tube and ready to get back into bed.    Cognitive/Visual Perceptual:  Cognitive/Psychosocial Skills:     -       Oriented to: Person, Place and Time   -       Follows Commands/attention:Follows two-step commands  -       Communication: clear/fluent  -       Safety awareness/insight to disability: impaired   -       Mood/Affect/Coping skills/emotional control: Appropriate to situation, Cooperative and Pleasant    Physical Exam:  Balance:    -       sitting fair+; standing fair  Upper Extremity Range of Motion:     -       Right Upper Extremity: ~85 degrees shoulder flexion; WFL distally  -       Left Upper Extremity: WFL  Upper Extremity Strength:    -       Right Upper Extremity: 2+/5  -       Left Upper Extremity: 3-/5   Strength: -       Right Upper Extremity: fair  -       Left Upper Extremity: fair    AMPAC 6 Click ADL:  AMPAC Total Score: 19    Treatment & Education:  Pt educated on role of OT, POC, and safety precautions with transfers and ambulation.    Education:    Patient left HOB elevated with all lines intact, call button in reach, nursing notified and family present    GOALS:   Multidisciplinary Problems     Occupational Therapy Goals        Problem: Occupational Therapy Goal    Goal Priority Disciplines Outcome Interventions   Occupational Therapy Goal     OT, PT/OT     Description:  Goals to be met by: discharge     Patient will increase functional independence with ADLs by performing:    LE Dressing with Supervision.  Grooming while standing at sink with Supervision.  Toileting from toilet with Supervision for hygiene and clothing management.   Toilet transfer to toilet with Supervision.                      History:     Past Medical History:   Diagnosis Date    Allergy     Diverticulosis     Gluten enteropathy     Gluten intolerance     Hernia     Hypothyroidism     PD (Parkinson's disease) 9/16/2015    Right tremor type    Peptic ulcer disease     Right shoulder pain     Cirtisone injection 3/26/15 - Dr. Tolbert    Ulcer        Past Surgical History:   Procedure Laterality Date    ADENOIDECTOMY      COLONOSCOPY  03/01/2012    Dr. Teresa, repeat in 10 years for screening    COLONOSCOPY N/A 2/21/2018    [facility]    COLONOSCOPY N/A 3/1/2012    [facility] at [facility] ENDO    COSMETIC SURGERY      Broken nose    ENTEROSCOPY N/A 8/9/2018    [facility] at [facility] ENDO    ESOPHAGOGASTRODUODENOSCOPY (EGD) N/A 5/21/2015    [facility]andez [facility] at [facility] ENDO    EXCISION-CYST Right 4/9/2015    [facility] Lo [facility] at [facility] OR    FRACTURE SURGERY  left wrist    With pin    HEMORRHOID SURGERY      HERNIA REPAIR Left 04/09/2015    Dr Lo; left inguinal    IMAGING, GI TRACT, INTRALUMINAL, VIA CAPSULE N/A 7/10/2018    [facility] at [facility] ENDO    REPAIR-HERNIA-INGUINAL Left 4/9/2015    [facility] at [facility]  OR    RHINOPLASTY TIP      TONSILLECTOMY      UPPER GASTROINTESTINAL ENDOSCOPY  05/21/2015    Dr. Gomez       Time Tracking:     OT Date of Treatment: 09/18/19  OT Start Time: 1421  OT Stop Time: 1443  OT Total Time (min): 22 min    Billable Minutes:Evaluation 13  Therapeutic Activity 9    Carolina Oviedo OT  9/18/2019

## 2019-09-18 NOTE — H&P
"History & Physical  Department of Hospital Medicine      SUBJECTIVE:     CC/Reason for Admission to Hospital:   Chief Complaint   Patient presents with    Fever     being treated for UTI-1st dose of bactrim today. fever 101.8 at home after "she woke up from a nap"        History of Present Illness:     74-year-old  female with a history of Parkinson's disease, gluten enteropathy, hypothyroidism, dysphagia, chronic diarrhea presenting with a fever of last 24-48 hours.  She had been treated for a presumed urinary tract infection by her primary care provider.  She received a dose of Bactrim earlier today.  She had a fever 101.8 at home after she woke up from a nap.  She was found to have decreased p.o. intake over the last 48 hr with worsening abdominal distention.  She has chronic diarrhea.  CT chest done and pelvis revealed significant small bowel air-fluid levels and distention.  Her abdomen is still distended and tender to palpation.  She reports nausea but no vomiting currently.  Given Zosyn in the emergency room.  No evidence of urinary tract infection presently.  Patient potassium low and given supplementation.  Magnesium low and given supplementation in the emergency room.  Patient also given IV fluid.  Patient confused.  Unable to provide any past medical past surgical family history review of systems.  History obtained from daughter and and son-in-law at bedside.  Patient full code.  Patient has a history of having EGD for chronic diarrhea.  She also had a capsule endoscopy that was apparently sequestered in a either a small bowel or large bowel diverticulum.  This was performed at [facility].    (Not in a hospital admission)    Review of patient's allergies indicates:   Allergen Reactions    Gluten Diarrhea        Past Medical History:   Diagnosis Date    Allergy     Diverticulosis     Gluten enteropathy     Gluten intolerance     Hernia     Hypothyroidism     PD (Parkinson's " disease) 9/16/2015    Right tremor type    Peptic ulcer disease     Right shoulder pain     Cirtisone injection 3/26/15 - Dr. Tolbert    Ulcer      Past Surgical History:   Procedure Laterality Date    ADENOIDECTOMY      COLONOSCOPY  03/01/2012    Dr. Teresa, repeat in 10 years for screening    COLONOSCOPY N/A 2/21/2018    [facility] ENDO    COLONOSCOPY N/A 3/1/2012    [facility] by [facility] at [facility] ENDO    COSMETIC SURGERY      Broken nose    ENTEROSCOPY N/A 8/9/2018    [facility] by [facility] at [facility] ENDO    ESOPHAGOGASTRODUODENOSCOPY (EGD) N/A 5/21/2015    [facility] by [facility] Ernandez [facility] at [facility] ENDO    EXCISION-CYST Right 4/9/2015    [facility] by [facility]f Lo [facility] at [facility] OR    FRACTURE SURGERY  left wrist    With pin    HEMORRHOID SURGERY      HERNIA REPAIR Left 04/09/2015    Dr Lo; left inguinal    IMAGING, GI TRACT, INTRALUMINAL, VIA CAPSULE N/A 7/10/2018    Performed by [facility] at [facility] ENDO    REPAIR-HERNIA-INGUINAL Left 4/9/2015    [facility] by [facility]f Lo [facility] at [facility] OR    RHINOPLASTY TIP      TONSILLECTOMY      UPPER GASTROINTESTINAL ENDOSCOPY  05/21/2015    Dr. Ernandez     Family History   Problem Relation Age of Onset    Diabetes Mother     Diabetes Son     Obesity Sister     Alcohol abuse Brother     Heart disease Brother     No Known Problems Father     Early death Brother         self    Breast cancer Neg Hx     Colon cancer Neg Hx     Ovarian cancer Neg Hx     Colon polyps Neg Hx     Crohn's disease Neg Hx     Ulcerative colitis Neg Hx     Celiac disease Neg Hx      Social History     Tobacco Use    Smoking status: Never Smoker    Smokeless tobacco: Never Used   Substance Use Topics    Alcohol use: Yes     Alcohol/week: 7.0 oz     Types: 14 Standard drinks or equivalent per week     Comment: 2 glasses wine per dinner    Drug use: No         Review of Systems:  Patient unable to give any  complete review of system secondary to confusion    OBJECTIVE:     Vital Signs (Most Recent):  Temp: 100.3 °F (37.9 °C) (09/17/19 2111)  Pulse: 64 (09/17/19 2111)  Resp: 20 (09/17/19 2111)  BP: (!) 109/53 (09/17/19 2111)  SpO2: 97 % (09/17/19 2111)       Physical Exam:  General- Resting in bed, NAD, thin elderly female.  HEENT- PERRLA, EOMI, OP clear, MMM  Neck- No JVD, no LAD, no Thyromegaly  CV- Regular rate and rhythm, No Murmur, rubs, or gallops.  Resp- Lungs CTA Bilaterally, No increased WOB  GI- Soft, markedly distended abdomen.  High-pitched bowel sounds noted. Diaper in place.  Generalized abdominal tenderness noted.  Extrem- No cyanosis, clubbing, edema. 2+ DPs bilaterally.  Neuro- CN II-XII grossly intact, no sensory or motor deficits noted.  PSYC:  Awake and alert.  Not oriented to person or place.      Laboratory and Radilogy Data:  CBC:   Recent Labs   Lab 09/17/19  1538   WBC 14.67*   RBC 4.20   HGB 13.0   HCT 39.8      MCV 95   MCH 31.0   MCHC 32.7     BMP:   Recent Labs   Lab 09/17/19  1538   GLU 97      K 2.7*      CO2 23   BUN 9   CREATININE 0.4*   CALCIUM 8.5*   MG 1.4*     CMP:   Recent Labs   Lab 09/17/19  1538   GLU 97   CALCIUM 8.5*   ALBUMIN 4.1   PROT 7.4      K 2.7*   CO2 23      BUN 9   CREATININE 0.4*   ALKPHOS 90   ALT 19   AST 13   BILITOT 0.9     LFTs:   Recent Labs   Lab 09/17/19  1538   ALT 19   AST 13   ALKPHOS 90   BILITOT 0.9   PROT 7.4   ALBUMIN 4.1     Coagulation: No results for input(s): LABPROT, INR, APTT in the last 168 hours.  Cardiac markers:   Recent Labs   Lab 09/17/19 1819   TROPONINI <0.030     Microbiology Results (last 7 days)     Procedure Component Value Units Date/Time    Urine culture - High Risk [[id number]] Collected:  09/17/19 1818    Order Status:  Sent Specimen:  Urine, Clean Catch Updated:  09/17/19 1831    Blood culture x two cultures. Draw prior to antibiotics [[id number]] Collected:  09/17/19 1639    Order Status:  Sent  Specimen:  Blood from Peripheral, Antecubital, Left Updated:  09/17/19 1648    Blood culture x two cultures. Draw prior to antibiotics [[id number]] Collected:  09/17/19 1540    Order Status:  Sent Specimen:  Blood from Peripheral, Antecubital, Left Updated:  09/17/19 1618        Recent Labs   Lab 09/17/19  1818   COLORU Yellow   SPECGRAV 1.010   PHUR 6.0   PROTEINUA 1+*   BACTERIA Negative   NITRITE Negative   LEUKOCYTESUR Negative   UROBILINOGEN Negative   HYALINECASTS 4*       ASSESSMENT/PLAN:     Active Hospital Problems    Diagnosis  POA    *SBO (small bowel obstruction) [K56.609]  Yes    Enteritis [K52.9]  Yes    Hypomagnesemia [E83.42]  Yes    Chronic diarrhea [K52.9]  Yes    PD (Parkinson's disease) [G20]  Yes     Right tremor type      Dysphagia [R13.10]  Yes    Hypothyroidism [E03.9]  Yes    Gluten enteropathy [K90.41]  Yes      Resolved Hospital Problems   No resolved problems to display.       Plan: See orders dated today for the complete plan. Case discussed with Dr. Daugherty in the ER.     Acute SBO- patient with acute small-bowel obstruction on CT scan involving the majority of the small bowel.  Place nasogastric tube to low intermittent wall suction.  Continue intravenous fluids with potassium supplementation and replace electrolytes including magnesium phosphorus except her as needed.  Likely related to patient's acute enteritis.  Place NG tube in ER to low intermittent wall suction.  Consult General surgery.  Patient does have a history of chronic diarrhea.  She is not currently taking any Parkinson's medications secondary to worsening of her chronic diarrhea.    Acute metabolic encephalopathy- present on admission secondary to acute enteritis.  Continue to monitor.  Obtain neurology checks.  No need to consult Neurology currently.  Monitor for delirium.  Patient very high risk for worsening delirium while hospitalized.    Acute enteritis- continue intravenous fluids as well as intravenous  antibiotics.    Acute hypomagnesemia- replace and continue to monitor.    Chronic diarrhea- patient with gluten insensitivity with history of chronic diarrhea made worse with Parkinson medication.  Currently taking cholestyramine.  This will be held presently that she will be NPO.  No diarrhea.  No worsening abdominal pain over the last 24-48 hours initially reported by patient did relate worsening abdominal pain and abdominal distention over the last 48 hr.    Chronic Parkinson's disease- patient with chronic Parkinson's disease with right tremor.  Currently not on any Parkinson medication.    Chronic hypothyroidism- continue p.o. Synthroid.    DVT prophylaxis with Lovenox      Patient be admitted to acute inpatient status due to severity of illness.  NG tube to be placed.  High risk of worsening small bowel obstruction.

## 2019-09-18 NOTE — PLAN OF CARE
Problem: Skin Injury Risk Increased  Goal: Skin Health and Integrity  Outcome: Ongoing (interventions implemented as appropriate)  Patient encouraged to move around in bed. Turn Q2Hrs.

## 2019-09-18 NOTE — HOSPITAL COURSE
NG tube placed to low intermittent suction, patient comfortable. Imaging read discussed with radiologist, and there is a concern for small bowel volvulus. Gen surgery consulted    Managed conservatively, NG tube was discontinued. Tolerated PO diet

## 2019-09-18 NOTE — SUBJECTIVE & OBJECTIVE
Interval History: see above    Review of Systems   Unable to perform ROS: Mental status change     Objective:     Vital Signs (Most Recent):  Temp: 98.4 °F (36.9 °C) (09/18/19 0800)  Pulse: 62 (09/18/19 0800)  Resp: 18 (09/18/19 0800)  BP: (!) 95/56 (09/18/19 0800)  SpO2: 96 % (09/18/19 0800) Vital Signs (24h Range):  Temp:  [97.8 °F (36.6 °C)-102.7 °F (39.3 °C)] 98.4 °F (36.9 °C)  Pulse:  [62-93] 62  Resp:  [18-22] 18  SpO2:  [93 %-99 %] 96 %  BP: ()/(53-62) 95/56     Weight: 52.2 kg (115 lb)  Body mass index is 20.37 kg/m².    Intake/Output Summary (Last 24 hours) at 9/18/2019 0841  Last data filed at 9/18/2019 0600  Gross per 24 hour   Intake 725 ml   Output 400 ml   Net 325 ml      Physical Exam   Constitutional: She is oriented to person, place, and time. No distress.   Eyes: Pupils are equal, round, and reactive to light. No scleral icterus.   Cardiovascular: Normal rate and regular rhythm.   No murmur heard.  Pulmonary/Chest: Breath sounds normal. She has no wheezes. She has no rales.   Abdominal: Soft. She exhibits distension. Bowel sounds are increased. There is no tenderness.   Neurological: She is alert and oriented to person, place, and time.   Skin: Skin is warm. Capillary refill takes less than 2 seconds. No rash noted.   Psychiatric: She has a normal mood and affect.   Nursing note and vitals reviewed.      Significant Labs:   BMP:   Recent Labs   Lab 09/18/19  0541   GLU 87      K 2.9*      CO2 24   BUN 8   CREATININE 0.4*   CALCIUM 7.6*   MG 1.7     CBC:   Recent Labs   Lab 09/17/19  0818 09/17/19  1538 09/18/19  0541   WBC 14.19* 14.67* 10.58   HGB 13.2 13.0 10.9*   HCT 40.7 39.8 33.5*    166 134*     CMP:   Recent Labs   Lab 09/17/19  0818 09/17/19  1538 09/18/19  0541    136 140   K 3.2* 2.7* 2.9*    103 109   CO2 19* 23 24   GLU 81 97 87   BUN 9 9 8   CREATININE 0.5 0.4* 0.4*   CALCIUM 9.1 8.5* 7.6*   PROT  --  7.4 5.7*   ALBUMIN  --  4.1 3.0*   BILITOT  --   0.9 0.7   ALKPHOS  --  90 72   AST  --  13 11   ALT  --  19 15   ANIONGAP 13 10 7*   EGFRNONAA >60.0 >60.0 >60.0     Cardiac Markers: No results for input(s): CKMB, MYOGLOBIN, BNP, TROPISTAT in the last 48 hours.  Lactic Acid:   Recent Labs   Lab 09/17/19  1538   LACTATE 1.1     Magnesium:   Recent Labs   Lab 09/17/19  1538 09/18/19  0541   MG 1.4* 1.7       Significant Imaging: I have reviewed all pertinent imaging results/findings within the past 24 hours.

## 2019-09-18 NOTE — CARE UPDATE
09/18/19 1152   Patient Assessment/Suction   Level of Consciousness (AVPU) alert   Respiratory Effort Normal;Unlabored   Expansion/Accessory Muscles/Retractions no use of accessory muscles   All Lung Fields Breath Sounds clear;diminished   PRE-TX-O2   O2 Device (Oxygen Therapy) room air   SpO2 96 %   $ Pulse Oximetry - Multiple Charge Pulse Oximetry - Multiple   Pulse 63   Resp 18   Temp 97.4 °F (36.3 °C)   /61   Aerosol Therapy   $ Aerosol Therapy Charges PRN treatment not required   Daily Review of Necessity (SVN) completed

## 2019-09-18 NOTE — PROGRESS NOTES
[facility]  Progress Note    Patient Name: Ariana Tiwari  MRN: [id number]  Patient Class: IP- Inpatient   Admission Date: 9/17/2019  Length of Stay: 0 days  Attending Physician: Dianne Ibarra MD  Primary Care Provider: Nba Petty MD        Subjective:     Principal Problem:SBO (small bowel obstruction)        HPI:  No notes on file    Overview/Hospital Course:  NG tube placed to low intermittent suction, patient comfortable. Imaging read discussed with radiologist, and there is a concern for small bowel volvulus. Gen surgery consulted    Interval History: see above    Review of Systems   Unable to perform ROS: Mental status change     Objective:     Vital Signs (Most Recent):  Temp: 98.4 °F (36.9 °C) (09/18/19 0800)  Pulse: 62 (09/18/19 0800)  Resp: 18 (09/18/19 0800)  BP: (!) 95/56 (09/18/19 0800)  SpO2: 96 % (09/18/19 0800) Vital Signs (24h Range):  Temp:  [97.8 °F (36.6 °C)-102.7 °F (39.3 °C)] 98.4 °F (36.9 °C)  Pulse:  [62-93] 62  Resp:  [18-22] 18  SpO2:  [93 %-99 %] 96 %  BP: ()/(53-62) 95/56     Weight: 52.2 kg (115 lb)  Body mass index is 20.37 kg/m².    Intake/Output Summary (Last 24 hours) at 9/18/2019 0841  Last data filed at 9/18/2019 0600  Gross per 24 hour   Intake 725 ml   Output 400 ml   Net 325 ml      Physical Exam   Constitutional: She is oriented to person, place, and time. No distress.   Eyes: Pupils are equal, round, and reactive to light. No scleral icterus.   Cardiovascular: Normal rate and regular rhythm.   No murmur heard.  Pulmonary/Chest: Breath sounds normal. She has no wheezes. She has no rales.   Abdominal: Soft. She exhibits distension. Bowel sounds are increased. There is no tenderness.   Neurological: She is alert and oriented to person, place, and time.   Skin: Skin is warm. Capillary refill takes less than 2 seconds. No rash noted.   Psychiatric: She has a normal mood and affect.   Nursing note and vitals  reviewed.      Significant Labs:   BMP:   Recent Labs   Lab 09/18/19  0541   GLU 87      K 2.9*      CO2 24   BUN 8   CREATININE 0.4*   CALCIUM 7.6*   MG 1.7     CBC:   Recent Labs   Lab 09/17/19  0818 09/17/19  1538 09/18/19  0541   WBC 14.19* 14.67* 10.58   HGB 13.2 13.0 10.9*   HCT 40.7 39.8 33.5*    166 134*     CMP:   Recent Labs   Lab 09/17/19  0818 09/17/19  1538 09/18/19  0541    136 140   K 3.2* 2.7* 2.9*    103 109   CO2 19* 23 24   GLU 81 97 87   BUN 9 9 8   CREATININE 0.5 0.4* 0.4*   CALCIUM 9.1 8.5* 7.6*   PROT  --  7.4 5.7*   ALBUMIN  --  4.1 3.0*   BILITOT  --  0.9 0.7   ALKPHOS  --  90 72   AST  --  13 11   ALT  --  19 15   ANIONGAP 13 10 7*   EGFRNONAA >60.0 >60.0 >60.0     Cardiac Markers: No results for input(s): CKMB, MYOGLOBIN, BNP, TROPISTAT in the last 48 hours.  Lactic Acid:   Recent Labs   Lab 09/17/19  1538   LACTATE 1.1     Magnesium:   Recent Labs   Lab 09/17/19  1538 09/18/19  0541   MG 1.4* 1.7       Significant Imaging: I have reviewed all pertinent imaging results/findings within the past 24 hours.           Assessment/Plan:      * SBO (small bowel obstruction)  CT abd and KUB read discussed with the radiologist who expressed concern for small bowel volvulus  Patient afebrile, no signs of peritonitis as of now  Keep NPO  NGT to low intermittent suction with mod drain since last night  General surgery consulted  Will replace electrolytes        Hypokalemia  Replace per IV electrolyte sliding scale protocol      Hypomagnesemia  Replaced.  Monitor Mg levels      Chronic diarrhea        PD (Parkinson's disease)        Dysphagia        Hypothyroidism        Gluten enteropathy          VTE Risk Mitigation (From admission, onward)        Ordered     enoxaparin injection 40 mg  Daily      09/18/19 0117     IP VTE HIGH RISK PATIENT  Once      09/18/19 0117                Dianne Ibarra MD  Department of Hospital Medicine   [facility]

## 2019-09-19 LAB
ALBUMIN SERPL BCP-MCNC: 3.1 G/DL (ref 3.5–5.2)
ALP SERPL-CCNC: 79 U/L (ref 55–135)
ALT SERPL W/O P-5'-P-CCNC: 13 U/L (ref 10–44)
AMYLASE SERPL-CCNC: 22 U/L (ref 20–110)
ANION GAP SERPL CALC-SCNC: 13 MMOL/L (ref 8–16)
AST SERPL-CCNC: 10 U/L (ref 10–40)
BACTERIA UR CULT: NORMAL
BACTERIA UR CULT: NORMAL
BASOPHILS # BLD AUTO: 0.04 K/UL (ref 0–0.2)
BASOPHILS NFR BLD: 0.5 % (ref 0–1.9)
BILIRUB SERPL-MCNC: 1.3 MG/DL (ref 0.1–1)
BUN SERPL-MCNC: 12 MG/DL (ref 8–23)
CALCIUM SERPL-MCNC: 8 MG/DL (ref 8.7–10.5)
CHLORIDE SERPL-SCNC: 108 MMOL/L (ref 95–110)
CO2 SERPL-SCNC: 25 MMOL/L (ref 23–29)
CREAT SERPL-MCNC: 0.4 MG/DL (ref 0.5–1.4)
DIFFERENTIAL METHOD: ABNORMAL
EOSINOPHIL # BLD AUTO: 0.1 K/UL (ref 0–0.5)
EOSINOPHIL NFR BLD: 1.6 % (ref 0–8)
ERYTHROCYTE [DISTWIDTH] IN BLOOD BY AUTOMATED COUNT: 13.2 % (ref 11.5–14.5)
EST. GFR  (AFRICAN AMERICAN): >60 ML/MIN/1.73 M^2
EST. GFR  (NON AFRICAN AMERICAN): >60 ML/MIN/1.73 M^2
GLUCOSE SERPL-MCNC: 72 MG/DL (ref 70–110)
HCT VFR BLD AUTO: 35.8 % (ref 37–48.5)
HGB BLD-MCNC: 11.6 G/DL (ref 12–16)
IMM GRANULOCYTES # BLD AUTO: 0.02 K/UL (ref 0–0.04)
IMM GRANULOCYTES NFR BLD AUTO: 0.3 % (ref 0–0.5)
INR PPP: 1.4
LIPASE SERPL-CCNC: 22 U/L (ref 4–60)
LYMPHOCYTES # BLD AUTO: 1 K/UL (ref 1–4.8)
LYMPHOCYTES NFR BLD: 12.9 % (ref 18–48)
MAGNESIUM SERPL-MCNC: 1.7 MG/DL (ref 1.6–2.6)
MCH RBC QN AUTO: 31.1 PG (ref 27–31)
MCHC RBC AUTO-ENTMCNC: 32.4 G/DL (ref 32–36)
MCV RBC AUTO: 96 FL (ref 82–98)
MONOCYTES # BLD AUTO: 0.5 K/UL (ref 0.3–1)
MONOCYTES NFR BLD: 6.5 % (ref 4–15)
NEUTROPHILS # BLD AUTO: 5.8 K/UL (ref 1.8–7.7)
NEUTROPHILS NFR BLD: 78.2 % (ref 38–73)
NRBC BLD-RTO: 0 /100 WBC
PHOSPHATE SERPL-MCNC: 2.1 MG/DL (ref 2.7–4.5)
PLATELET # BLD AUTO: 144 K/UL (ref 150–350)
PMV BLD AUTO: 11.1 FL (ref 9.2–12.9)
POTASSIUM SERPL-SCNC: 2.9 MMOL/L (ref 3.5–5.1)
PROT SERPL-MCNC: 6.1 G/DL (ref 6–8.4)
PROTHROMBIN TIME: 16.4 SEC (ref 11.7–14)
RBC # BLD AUTO: 3.73 M/UL (ref 4–5.4)
SODIUM SERPL-SCNC: 146 MMOL/L (ref 136–145)
WBC # BLD AUTO: 7.42 K/UL (ref 3.9–12.7)

## 2019-09-19 PROCEDURE — 83735 ASSAY OF MAGNESIUM: CPT

## 2019-09-19 PROCEDURE — 97530 THERAPEUTIC ACTIVITIES: CPT

## 2019-09-19 PROCEDURE — 83690 ASSAY OF LIPASE: CPT

## 2019-09-19 PROCEDURE — 80053 COMPREHEN METABOLIC PANEL: CPT

## 2019-09-19 PROCEDURE — 85025 COMPLETE CBC W/AUTO DIFF WBC: CPT

## 2019-09-19 PROCEDURE — 97116 GAIT TRAINING THERAPY: CPT

## 2019-09-19 PROCEDURE — 36415 COLL VENOUS BLD VENIPUNCTURE: CPT

## 2019-09-19 PROCEDURE — 12000002 HC ACUTE/MED SURGE SEMI-PRIVATE ROOM

## 2019-09-19 PROCEDURE — 82150 ASSAY OF AMYLASE: CPT

## 2019-09-19 PROCEDURE — 63600175 PHARM REV CODE 636 W HCPCS: Performed by: FAMILY MEDICINE

## 2019-09-19 PROCEDURE — 97535 SELF CARE MNGMENT TRAINING: CPT

## 2019-09-19 PROCEDURE — 84100 ASSAY OF PHOSPHORUS: CPT

## 2019-09-19 PROCEDURE — 94761 N-INVAS EAR/PLS OXIMETRY MLT: CPT

## 2019-09-19 PROCEDURE — 85610 PROTHROMBIN TIME: CPT

## 2019-09-19 PROCEDURE — 99900035 HC TECH TIME PER 15 MIN (STAT)

## 2019-09-19 PROCEDURE — 25000003 PHARM REV CODE 250: Performed by: FAMILY MEDICINE

## 2019-09-19 PROCEDURE — 63600175 PHARM REV CODE 636 W HCPCS: Performed by: INTERNAL MEDICINE

## 2019-09-19 RX ORDER — POTASSIUM CHLORIDE 7.45 MG/ML
10 INJECTION INTRAVENOUS
Status: DISPENSED | OUTPATIENT
Start: 2019-09-19 | End: 2019-09-19

## 2019-09-19 RX ORDER — POTASSIUM CHLORIDE 7.45 MG/ML
20 INJECTION INTRAVENOUS ONCE
Status: DISCONTINUED | OUTPATIENT
Start: 2019-09-19 | End: 2019-09-19

## 2019-09-19 RX ADMIN — SODIUM CHLORIDE AND POTASSIUM CHLORIDE: 9; 1.49 INJECTION, SOLUTION INTRAVENOUS at 08:09

## 2019-09-19 RX ADMIN — POTASSIUM CHLORIDE 10 MEQ: 10 INJECTION, SOLUTION INTRAVENOUS at 10:09

## 2019-09-19 RX ADMIN — PIPERACILLIN SODIUM,TAZOBACTAM SODIUM 3.38 G: 3; .375 INJECTION, POWDER, FOR SOLUTION INTRAVENOUS at 09:09

## 2019-09-19 RX ADMIN — ACETAMINOPHEN 1000 MG: 500 TABLET, FILM COATED ORAL at 12:09

## 2019-09-19 RX ADMIN — POTASSIUM CHLORIDE 10 MEQ: 10 INJECTION, SOLUTION INTRAVENOUS at 12:09

## 2019-09-19 RX ADMIN — PIPERACILLIN SODIUM,TAZOBACTAM SODIUM 3.38 G: 3; .375 INJECTION, POWDER, FOR SOLUTION INTRAVENOUS at 05:09

## 2019-09-19 NOTE — PT/OT/SLP PROGRESS
Occupational Therapy   Treatment    Name: Ariana Tiwari  MRN: [id number]  Admitting Diagnosis:  SBO (small bowel obstruction)       Recommendations:     Discharge Recommendations: home  Discharge Equipment Recommendations:  cane, straight  Barriers to discharge:  None    Assessment:     Ariana Tiwari is a 74 y.o. female with a medical diagnosis of SBO (small bowel obstruction).  Pt agreeable to OT session. Performance deficits affecting function are weakness, impaired endurance, impaired self care skills, impaired balance, decreased safety awareness, decreased upper extremity function, impaired functional mobilty, gait instability. Pt more ready to participate in therapy today, reports feeling much better.    Rehab Prognosis:  Good; patient would benefit from acute skilled OT services to address these deficits and reach maximum level of function.       Plan:     Patient to be seen 5 x/week to address the above listed problems via self-care/home management, therapeutic activities, therapeutic exercises  · Plan of Care Expires: 10/18/19  · Plan of Care Reviewed with: patient    Subjective     Pain/Comfort:  · Pain Rating 1: 0/10    Objective:     Communicated with: nursing prior to session.  Patient found HOB elevated with telemetry, peripheral IV upon OT entry to room.    General Precautions: Standard, fall   Orthopedic Precautions:N/A   Braces:       Occupational Performance:     Bed Mobility:    · Patient completed Scooting/Bridging with stand by assistance  · Patient completed Supine to Sit with minimum assistance  · Patient completed Sit to Supine with stand by assistance     Functional Mobility/Transfers:  · Patient completed Sit <> Stand Transfer with stand by assistance.  · Functional Mobility: pt amb from bed <> sink with CGA    Activities of Daily Living:  · Grooming: contact guard assistance standing at sink    Treatment & Education:  Pt educated on safety while ambulating, with transfers, and during  ADLs. OT educated patient/family on importance of out of bed ADL activity to negate effects of prolonged bed rest.       Patient left HOB elevated with all lines intact, call button in reach, nursing notified and family presentEducation:      GOALS:   Multidisciplinary Problems     Occupational Therapy Goals        Problem: Occupational Therapy Goal    Goal Priority Disciplines Outcome Interventions   Occupational Therapy Goal     OT, PT/OT Ongoing (interventions implemented as appropriate)    Description:  Goals to be met by: discharge     Patient will increase functional independence with ADLs by performing:    LE Dressing with Supervision.  Grooming while standing at sink with Supervision.  Toileting from toilet with Supervision for hygiene and clothing management.   Toilet transfer to toilet with Supervision.                      Time Tracking:     OT Date of Treatment: 09/19/19  OT Start Time: 1427  OT Stop Time: 1447  OT Total Time (min): 20 min    Billable Minutes:Self Care/Home Management 20    Carolina Oviedo OT  9/19/2019

## 2019-09-19 NOTE — PROGRESS NOTES
[facility]  Progress Note    Patient Name: Ariana Tiwari  MRN: [id number]  Patient Class: IP- Inpatient   Admission Date: 9/17/2019  Length of Stay: 1 days  Attending Physician: Dianne Ibarra MD  Primary Care Provider: Nba Petty MD        Subjective:     Principal Problem:SBO (small bowel obstruction)        HPI:  No notes on file    Overview/Hospital Course:  NG tube placed to low intermittent suction, patient comfortable. Imaging read discussed with radiologist, and there is a concern for small bowel volvulus. Gen surgery consulted    Interval History: feels better this morning, ambulating to the bathroom. NGT drained 1200ml in the past 24 hrs     Review of Systems   Constitutional: Negative for chills and fever.   HENT: Negative for congestion.    Respiratory: Negative for shortness of breath and wheezing.    Cardiovascular: Negative for chest pain and palpitations.   Gastrointestinal: Positive for abdominal distention and nausea. Negative for constipation, diarrhea and vomiting.   Genitourinary: Negative for dysuria, flank pain, urgency and vaginal discharge.   Musculoskeletal: Negative for back pain and myalgias.   Neurological: Positive for headaches.     Objective:     Vital Signs (Most Recent):  Temp: 98.2 °F (36.8 °C) (09/19/19 0756)  Pulse: 66 (09/19/19 0835)  Resp: 18 (09/19/19 0835)  BP: (!) 153/76 (09/19/19 0756)  SpO2: 96 % (09/19/19 0835) Vital Signs (24h Range):  Temp:  [97.4 °F (36.3 °C)-98.5 °F (36.9 °C)] 98.2 °F (36.8 °C)  Pulse:  [53-72] 66  Resp:  [16-18] 18  SpO2:  [92 %-96 %] 96 %  BP: (102-153)/(54-76) 153/76     Weight: 52.2 kg (115 lb)  Body mass index is 20.37 kg/m².    Intake/Output Summary (Last 24 hours) at 9/19/2019 1101  Last data filed at 9/19/2019 0508  Gross per 24 hour   Intake 2177.08 ml   Output 1200 ml   Net 977.08 ml      Physical Exam   Constitutional: She is oriented to person, place, and time. No distress.   Eyes: Pupils are  equal, round, and reactive to light. No scleral icterus.   Cardiovascular: Normal rate and regular rhythm.   No murmur heard.  Pulmonary/Chest: Breath sounds normal. She has no wheezes. She has no rales.   Abdominal: Soft. She exhibits distension. There is no tenderness.   Neurological: She is alert and oriented to person, place, and time.   Skin: Skin is warm. Capillary refill takes less than 2 seconds. No rash noted.   Psychiatric: She has a normal mood and affect.   Nursing note and vitals reviewed.      Significant Labs:   BMP:   Recent Labs   Lab 09/19/19  0511   GLU 72   *   K 2.9*      CO2 25   BUN 12   CREATININE 0.4*   CALCIUM 8.0*   MG 1.7     CBC:   Recent Labs   Lab 09/17/19  1538 09/18/19  0541 09/19/19  0511   WBC 14.67* 10.58 7.42   HGB 13.0 10.9* 11.6*   HCT 39.8 33.5* 35.8*    134* 144*     CMP:   Recent Labs   Lab 09/17/19  1538 09/18/19  0541 09/19/19  0511    140 146*   K 2.7* 2.9* 2.9*    109 108   CO2 23 24 25   GLU 97 87 72   BUN 9 8 12   CREATININE 0.4* 0.4* 0.4*   CALCIUM 8.5* 7.6* 8.0*   PROT 7.4 5.7* 6.1   ALBUMIN 4.1 3.0* 3.1*   BILITOT 0.9 0.7 1.3*   ALKPHOS 90 72 79   AST 13 11 10   ALT 19 15 13   ANIONGAP 10 7* 13   EGFRNONAA >60.0 >60.0 >60.0       Significant Imaging: I have reviewed all pertinent imaging results/findings within the past 24 hours.      Assessment/Plan:      * SBO (small bowel obstruction)  Continue NGT to low suction  Serial KUB      Hypokalemia  20mEQ KCl in IV fluid  10mEq q1hr x3  Recheck K       Hypomagnesemia  Replaced.  Monitor Mg levels      Chronic diarrhea        PD (Parkinson's disease)        Dysphagia        Hypothyroidism        Gluten enteropathy          VTE Risk Mitigation (From admission, onward)        Ordered     enoxaparin injection 40 mg  Daily      09/18/19 0117     IP VTE HIGH RISK PATIENT  Once      09/18/19 0117                Dianne Ibarra MD  Department of Hospital Medicine   [facility]

## 2019-09-19 NOTE — PLAN OF CARE
Problem: Physical Therapy Goal  Goal: Physical Therapy Goal  Goals to be met by: D/C     Patient will increase functional independence with mobility by performin. Supine to sit with Stand-by Assistance  2. Sit to stand transfer with Supervision  3. Bed to chair transfer with Stand-by Assistance using Single-point Cane   4. Gait  x 250 feet with Supervision using Single-point Cane .      Outcome: Ongoing (interventions implemented as appropriate)  Patient participated in gait training, therapeutic exercise and therapeutic activities to improve and strength functional mobility and improve ADL's to reduce fall risk.

## 2019-09-19 NOTE — PLAN OF CARE
Problem: Occupational Therapy Goal  Goal: Occupational Therapy Goal  Goals to be met by: discharge     Patient will increase functional independence with ADLs by performing:    LE Dressing with Supervision.  Grooming while standing at sink with Supervision.  Toileting from toilet with Supervision for hygiene and clothing management.   Toilet transfer to toilet with Supervision.     Outcome: Ongoing (interventions implemented as appropriate)  Carolina Oviedo OT  09/19/2019

## 2019-09-19 NOTE — PLAN OF CARE
09/18/19 2100   Patient Assessment/Suction   Level of Consciousness (AVPU) alert   Respiratory Effort Unlabored   Expansion/Accessory Muscles/Retractions no use of accessory muscles   All Lung Fields Breath Sounds clear   PRE-TX-O2   O2 Device (Oxygen Therapy) room air   SpO2 95 %   Pulse 70   Resp 18   Aerosol Therapy   $ Aerosol Therapy Charges PRN treatment not required   Respiratory Treatment Status (SVN) PRN treatment not required

## 2019-09-19 NOTE — SUBJECTIVE & OBJECTIVE
Interval History: feels better this morning, ambulating to the bathroom. NGT drained 1200ml in the past 24 hrs     Review of Systems   Constitutional: Negative for chills and fever.   HENT: Negative for congestion.    Respiratory: Negative for shortness of breath and wheezing.    Cardiovascular: Negative for chest pain and palpitations.   Gastrointestinal: Positive for abdominal distention and nausea. Negative for constipation, diarrhea and vomiting.   Genitourinary: Negative for dysuria, flank pain, urgency and vaginal discharge.   Musculoskeletal: Negative for back pain and myalgias.   Neurological: Positive for headaches.     Objective:     Vital Signs (Most Recent):  Temp: 98.2 °F (36.8 °C) (09/19/19 0756)  Pulse: 66 (09/19/19 0835)  Resp: 18 (09/19/19 0835)  BP: (!) 153/76 (09/19/19 0756)  SpO2: 96 % (09/19/19 0835) Vital Signs (24h Range):  Temp:  [97.4 °F (36.3 °C)-98.5 °F (36.9 °C)] 98.2 °F (36.8 °C)  Pulse:  [53-72] 66  Resp:  [16-18] 18  SpO2:  [92 %-96 %] 96 %  BP: (102-153)/(54-76) 153/76     Weight: 52.2 kg (115 lb)  Body mass index is 20.37 kg/m².    Intake/Output Summary (Last 24 hours) at 9/19/2019 1101  Last data filed at 9/19/2019 0508  Gross per 24 hour   Intake 2177.08 ml   Output 1200 ml   Net 977.08 ml      Physical Exam   Constitutional: She is oriented to person, place, and time. No distress.   Eyes: Pupils are equal, round, and reactive to light. No scleral icterus.   Cardiovascular: Normal rate and regular rhythm.   No murmur heard.  Pulmonary/Chest: Breath sounds normal. She has no wheezes. She has no rales.   Abdominal: Soft. She exhibits distension. There is no tenderness.   Neurological: She is alert and oriented to person, place, and time.   Skin: Skin is warm. Capillary refill takes less than 2 seconds. No rash noted.   Psychiatric: She has a normal mood and affect.   Nursing note and vitals reviewed.      Significant Labs:   BMP:   Recent Labs   Lab 09/19/19  0511   GLU 72   *    K 2.9*      CO2 25   BUN 12   CREATININE 0.4*   CALCIUM 8.0*   MG 1.7     CBC:   Recent Labs   Lab 09/17/19  1538 09/18/19  0541 09/19/19  0511   WBC 14.67* 10.58 7.42   HGB 13.0 10.9* 11.6*   HCT 39.8 33.5* 35.8*    134* 144*     CMP:   Recent Labs   Lab 09/17/19  1538 09/18/19  0541 09/19/19  0511    140 146*   K 2.7* 2.9* 2.9*    109 108   CO2 23 24 25   GLU 97 87 72   BUN 9 8 12   CREATININE 0.4* 0.4* 0.4*   CALCIUM 8.5* 7.6* 8.0*   PROT 7.4 5.7* 6.1   ALBUMIN 4.1 3.0* 3.1*   BILITOT 0.9 0.7 1.3*   ALKPHOS 90 72 79   AST 13 11 10   ALT 19 15 13   ANIONGAP 10 7* 13   EGFRNONAA >60.0 >60.0 >60.0       Significant Imaging: I have reviewed all pertinent imaging results/findings within the past 24 hours.

## 2019-09-19 NOTE — PT/OT/SLP PROGRESS
Physical Therapy Treatment    Patient Name:  Ariana Tiwari   MRN:  [id number]    Recommendations:     Discharge Recommendations:  home   Discharge Equipment Recommendations: cane, straight   Barriers to discharge: None    Assessment:     Ariana Tiwari is a 74 y.o. female admitted with a medical diagnosis of SBO (small bowel obstruction).  She presents with the following impairments/functional limitations:  weakness, impaired endurance, impaired self care skills, impaired balance, gait instability, impaired functional mobilty, decreased safety awareness, decreased upper extremity function.  Pt ambulated 100' with SPC then 125' with RW. Pt not compliant with learning as she does not want to use either. She states she is not going to use anything at home because she is getting stronger every day.Pt is not safe ambulating without AD but needs to use is correctly. Continue with PT's POC.    Rehab Prognosis: Good; patient would benefit from acute skilled PT services to address these deficits and reach maximum level of function.    Recent Surgery: * No surgery found *      Plan:     During this hospitalization, patient to be seen 5 x/week to address the identified rehab impairments via gait training, therapeutic activities, therapeutic exercises and progress toward the following goals:    · Plan of Care Expires:  10/18/19    Subjective     Chief Complaint: NG tube bothers her  Patient/Family Comments/goals: ready to go home wants NG tube removed; hates it.  Pain/Comfort:  · Pain Rating 1: 0/10  · Pain Rating Post-Intervention 1: 0/10      Objective:     Communicated with MARIE Chung prior to session.  Patient found with HOB elevated with telemetry, peripheral IV, NG tube upon PT entry to room.     General Precautions: Standard, fall, NPO   Orthopedic Precautions:N/A   Braces: N/A     Functional Mobility:  · Bed Mobility:     · Rolling Right: modified independence  · Scooting: modified independence  · Supine to Sit:  minimum assistance  · Transfers:     · Sit to Stand:  minimum assistance with rolling walker  · Bed to Chair: minimum assistance with  rolling walker  using  Stand Pivot  · Gait: 100 with SPC and 125' with RW. Unsteady with SPC and RW due to non compliant with training for AD.      AM-PAC 6 CLICK MOBILITY          Therapeutic Activities and Exercises:   Patient sat on EOB for spinal orientation and to increase proprioception in sitting. Patient transferred to chair but only remained for a few minutes and then went BTB with min assistance.    Patient left up with HOB elevated and pillows placed at left side to assist pt with pressure relief and all lines intact, call button in reach and family present..    GOALS:   Multidisciplinary Problems     Physical Therapy Goals        Problem: Physical Therapy Goal    Goal Priority Disciplines Outcome Goal Variances Interventions   Physical Therapy Goal     PT, PT/OT Ongoing (interventions implemented as appropriate)     Description:  Goals to be met by: D/C     Patient will increase functional independence with mobility by performin. Supine to sit with Stand-by Assistance  2. Sit to stand transfer with Supervision  3. Bed to chair transfer with Stand-by Assistance using Single-point Cane   4. Gait  x 250 feet with Supervision using Single-point Cane .                       Time Tracking:     PT Received On: 19  PT Start Time: 1125     PT Stop Time: 1152  PT Total Time (min): 27 min     Billable Minutes: Gait Training 17 and Therapeutic Activity 10    Treatment Type: Treatment  PT/PTA: PTA     PTA Visit Number: 1     Tomasa Dailey, SALAZAR  2019

## 2019-09-19 NOTE — PROGRESS NOTES
"Subjective:       Patient ID: Ariana Tiwari is a 74 y.o. female.    Chief Complaint: Fever (being treated for UTI-1st dose of bactrim today. fever 101.8 at home after "she woke up from a nap" )    Fever       No acute events overnight.  Patient states she started passing flatus this morning.  She has not had any bowel movements.  X-ray shows gas throughout the small bowel and colon with a nonobstructive pattern.  Patient states her abdominal pain is much improved.  She states her abdominal distension is more to baseline.  She has been afebrile overnight.          Objective:      Physical Exam   Constitutional: She is oriented to person, place, and time. She appears well-developed. She is cooperative.  Non-toxic appearance. No distress.   Abdominal: Soft. She exhibits distension. There is tenderness in the suprapubic area. There is rebound. There is no guarding.   Distention much improved.  Very mild tenderness to palpation over the suprapubic position.  No guarding no rebound   Neurological: She is alert and oriented to person, place, and time.       Assessment:       1. Fever, unspecified fever cause    2. Enteritis    3. Hypomagnesemia syndrome    4. Hypokalemia    5. Chest pain        Plan:       Patient now passing flatus.  Will plan to clamp NG tube and start sips of clears.  I would continue the antibiotics to treat the enteritis.  No indication for emergent surgery today.      "

## 2019-09-20 LAB
ALBUMIN SERPL BCP-MCNC: 2.7 G/DL (ref 3.5–5.2)
ALP SERPL-CCNC: 65 U/L (ref 55–135)
ALT SERPL W/O P-5'-P-CCNC: 11 U/L (ref 10–44)
AMYLASE SERPL-CCNC: 21 U/L (ref 20–110)
ANION GAP SERPL CALC-SCNC: 8 MMOL/L (ref 8–16)
AST SERPL-CCNC: 10 U/L (ref 10–40)
BACTERIA UR CULT: NO GROWTH
BACTERIA UR CULT: NO GROWTH
BASOPHILS # BLD AUTO: 0.03 K/UL (ref 0–0.2)
BASOPHILS NFR BLD: 0.7 % (ref 0–1.9)
BILIRUB SERPL-MCNC: 0.3 MG/DL (ref 0.1–1)
BUN SERPL-MCNC: 6 MG/DL (ref 8–23)
CALCIUM SERPL-MCNC: 7.4 MG/DL (ref 8.7–10.5)
CHLORIDE SERPL-SCNC: 108 MMOL/L (ref 95–110)
CO2 SERPL-SCNC: 25 MMOL/L (ref 23–29)
CREAT SERPL-MCNC: 0.3 MG/DL (ref 0.5–1.4)
DIFFERENTIAL METHOD: ABNORMAL
EOSINOPHIL # BLD AUTO: 0.2 K/UL (ref 0–0.5)
EOSINOPHIL NFR BLD: 4 % (ref 0–8)
ERYTHROCYTE [DISTWIDTH] IN BLOOD BY AUTOMATED COUNT: 13.2 % (ref 11.5–14.5)
EST. GFR  (AFRICAN AMERICAN): >60 ML/MIN/1.73 M^2
EST. GFR  (NON AFRICAN AMERICAN): >60 ML/MIN/1.73 M^2
GLUCOSE SERPL-MCNC: 99 MG/DL (ref 70–110)
HCT VFR BLD AUTO: 33.3 % (ref 37–48.5)
HGB BLD-MCNC: 10.4 G/DL (ref 12–16)
IMM GRANULOCYTES # BLD AUTO: 0.01 K/UL (ref 0–0.04)
IMM GRANULOCYTES NFR BLD AUTO: 0.2 % (ref 0–0.5)
INR PPP: 1.3
LIPASE SERPL-CCNC: 20 U/L (ref 4–60)
LYMPHOCYTES # BLD AUTO: 1 K/UL (ref 1–4.8)
LYMPHOCYTES NFR BLD: 22.8 % (ref 18–48)
MAGNESIUM SERPL-MCNC: 1.5 MG/DL (ref 1.6–2.6)
MCH RBC QN AUTO: 30.1 PG (ref 27–31)
MCHC RBC AUTO-ENTMCNC: 31.2 G/DL (ref 32–36)
MCV RBC AUTO: 96 FL (ref 82–98)
MONOCYTES # BLD AUTO: 0.4 K/UL (ref 0.3–1)
MONOCYTES NFR BLD: 9.3 % (ref 4–15)
NEUTROPHILS # BLD AUTO: 2.7 K/UL (ref 1.8–7.7)
NEUTROPHILS NFR BLD: 63 % (ref 38–73)
NRBC BLD-RTO: 0 /100 WBC
PHOSPHATE SERPL-MCNC: 1.9 MG/DL (ref 2.7–4.5)
PLATELET # BLD AUTO: 148 K/UL (ref 150–350)
PMV BLD AUTO: 11 FL (ref 9.2–12.9)
POTASSIUM SERPL-SCNC: 2.8 MMOL/L (ref 3.5–5.1)
PROT SERPL-MCNC: 5.3 G/DL (ref 6–8.4)
PROTHROMBIN TIME: 15.3 SEC (ref 11.7–14)
RBC # BLD AUTO: 3.46 M/UL (ref 4–5.4)
SODIUM SERPL-SCNC: 141 MMOL/L (ref 136–145)
WBC # BLD AUTO: 4.21 K/UL (ref 3.9–12.7)

## 2019-09-20 PROCEDURE — 84100 ASSAY OF PHOSPHORUS: CPT

## 2019-09-20 PROCEDURE — 94761 N-INVAS EAR/PLS OXIMETRY MLT: CPT

## 2019-09-20 PROCEDURE — 25000003 PHARM REV CODE 250: Performed by: FAMILY MEDICINE

## 2019-09-20 PROCEDURE — 97530 THERAPEUTIC ACTIVITIES: CPT

## 2019-09-20 PROCEDURE — 63600175 PHARM REV CODE 636 W HCPCS: Performed by: FAMILY MEDICINE

## 2019-09-20 PROCEDURE — 83690 ASSAY OF LIPASE: CPT

## 2019-09-20 PROCEDURE — 97116 GAIT TRAINING THERAPY: CPT

## 2019-09-20 PROCEDURE — 25000003 PHARM REV CODE 250: Performed by: INTERNAL MEDICINE

## 2019-09-20 PROCEDURE — 85025 COMPLETE CBC W/AUTO DIFF WBC: CPT

## 2019-09-20 PROCEDURE — 99900035 HC TECH TIME PER 15 MIN (STAT)

## 2019-09-20 PROCEDURE — 85610 PROTHROMBIN TIME: CPT

## 2019-09-20 PROCEDURE — 83735 ASSAY OF MAGNESIUM: CPT

## 2019-09-20 PROCEDURE — 12000002 HC ACUTE/MED SURGE SEMI-PRIVATE ROOM

## 2019-09-20 PROCEDURE — 82150 ASSAY OF AMYLASE: CPT

## 2019-09-20 PROCEDURE — 80053 COMPREHEN METABOLIC PANEL: CPT

## 2019-09-20 PROCEDURE — 36415 COLL VENOUS BLD VENIPUNCTURE: CPT

## 2019-09-20 RX ORDER — POTASSIUM CHLORIDE 20 MEQ/1
40 TABLET, EXTENDED RELEASE ORAL ONCE
Status: COMPLETED | OUTPATIENT
Start: 2019-09-20 | End: 2019-09-20

## 2019-09-20 RX ORDER — LANOLIN ALCOHOL/MO/W.PET/CERES
400 CREAM (GRAM) TOPICAL ONCE
Status: COMPLETED | OUTPATIENT
Start: 2019-09-20 | End: 2019-09-20

## 2019-09-20 RX ADMIN — PIPERACILLIN SODIUM,TAZOBACTAM SODIUM 3.38 G: 3; .375 INJECTION, POWDER, FOR SOLUTION INTRAVENOUS at 08:09

## 2019-09-20 RX ADMIN — SODIUM CHLORIDE AND POTASSIUM CHLORIDE: 9; 1.49 INJECTION, SOLUTION INTRAVENOUS at 12:09

## 2019-09-20 RX ADMIN — POTASSIUM CHLORIDE 40 MEQ: 20 TABLET, EXTENDED RELEASE ORAL at 10:09

## 2019-09-20 RX ADMIN — PIPERACILLIN SODIUM,TAZOBACTAM SODIUM 3.38 G: 3; .375 INJECTION, POWDER, FOR SOLUTION INTRAVENOUS at 03:09

## 2019-09-20 RX ADMIN — LEVOTHYROXINE SODIUM 50 MCG: 0.03 TABLET ORAL at 05:09

## 2019-09-20 RX ADMIN — PIPERACILLIN SODIUM,TAZOBACTAM SODIUM 3.38 G: 3; .375 INJECTION, POWDER, FOR SOLUTION INTRAVENOUS at 12:09

## 2019-09-20 RX ADMIN — MAGNESIUM OXIDE 400 MG: 400 TABLET ORAL at 10:09

## 2019-09-20 RX ADMIN — PIPERACILLIN SODIUM,TAZOBACTAM SODIUM 3.38 G: 3; .375 INJECTION, POWDER, FOR SOLUTION INTRAVENOUS at 11:09

## 2019-09-20 RX ADMIN — SODIUM CHLORIDE AND POTASSIUM CHLORIDE: 9; 1.49 INJECTION, SOLUTION INTRAVENOUS at 10:09

## 2019-09-20 RX ADMIN — SODIUM CHLORIDE AND POTASSIUM CHLORIDE: 9; 1.49 INJECTION, SOLUTION INTRAVENOUS at 11:09

## 2019-09-20 RX ADMIN — ENOXAPARIN SODIUM 40 MG: 100 INJECTION SUBCUTANEOUS at 05:09

## 2019-09-20 NOTE — PT/OT/SLP PROGRESS
"Physical Therapy Treatment    Patient Name:  Ariana Tiwari   MRN:  [id number]    Recommendations:     Discharge Recommendations:  home   Discharge Equipment Recommendations: cane, straight   Barriers to discharge: Pt with poor safetyu awareness    Assessment:     Ariaan Tiwari is a 74 y.o. female admitted with a medical diagnosis of SBO (small bowel obstruction).  She presents with the following impairments/functional limitations:  weakness, impaired endurance, impaired self care skills, gait instability, decreased safety awareness, impaired balance . Pt ambulated in echeverria with RW and requested ambulate without RW. Pt continued ambulation with HHA and IV pole noting no major LOB. Pt with poor safety awareness throughout tx.  Continue with PT and POC.     Rehab Prognosis: Good; patient would benefit from acute skilled PT services to address these deficits and reach maximum level of function.    Recent Surgery: * No surgery found *      Plan:     During this hospitalization, patient to be seen 5 x/week to address the identified rehab impairments via gait training, therapeutic activities, therapeutic exercises and progress toward the following goals:    · Plan of Care Expires:  10/18/19    Subjective     Chief Complaint: "I don'd like using that RW."  Patient/Family Comments/goals: return home  Pain/Comfort:  · Pain Rating 1: 0/10      Objective:     Communicated with RN prior to session.  Patient found HOB elevated with telemetry, peripheral IV family present upon PT entry to room.     General Precautions: Standard, fall, NPO   Orthopedic Precautions:N/A   Braces:       Functional Mobility:  · Bed Mobility:     · Supine to Sit: stand by assistance  · Transfers:     · Sit to Stand:  stand by assistance with rolling walker  · Bed to Chair: contact guard assistance with  hand-held assist  using  Step Transfer  · Gait: 20 feet with RW and CGa; 100 feet with HHA and IV pole      AM-PAC 6 CLICK MOBILITY   "        Therapeutic Activities and Exercises:   bed mobility; sitting EOB for trunk control and midline orientation; sit<> stands; transfer training; gait training     Patient left up in chair with all lines intact, call button in reach and family present..    GOALS:   Multidisciplinary Problems     Physical Therapy Goals        Problem: Physical Therapy Goal    Goal Priority Disciplines Outcome Goal Variances Interventions   Physical Therapy Goal     PT, PT/OT Ongoing (interventions implemented as appropriate)     Description:  Goals to be met by: D/C     Patient will increase functional independence with mobility by performin. Supine to sit with Stand-by Assistance  2. Sit to stand transfer with Supervision  3. Bed to chair transfer with Stand-by Assistance using Single-point Cane   4. Gait  x 250 feet with Supervision using Single-point Cane .                       Time Tracking:     PT Received On: 19  PT Start Time: 1035     PT Stop Time: 1048  PT Total Time (min): 13 min     Billable Minutes: Gait Training 13    Treatment Type: Treatment  PT/PTA: PTA     PTA Visit Number: 2     Hillary Hammant, PTA  2019

## 2019-09-20 NOTE — PT/OT/SLP PROGRESS
Occupational Therapy   Treatment    Name: Ariana Tiwari  MRN: [id number]  Admitting Diagnosis:  SBO (small bowel obstruction)       Recommendations:     Discharge Recommendations: home  Discharge Equipment Recommendations:  cane, straight  Barriers to discharge:  None    Assessment:     Ariana Tiwari is a 74 y.o. female with a medical diagnosis of SBO (small bowel obstruction).  She presents with stoic affect with daughter in the room . Performance deficits affecting function are weakness, impaired endurance, impaired self care skills, impaired balance, gait instability, impaired functional mobilty.     Rehab Prognosis:  Good; patient would benefit from acute skilled OT services to address these deficits and reach maximum level of function.       Plan:     Patient to be seen 5 x/week to address the above listed problems via self-care/home management, therapeutic activities, therapeutic exercises  · Plan of Care Expires: 10/18/19  · Plan of Care Reviewed with: patient, daughter    Subjective     Pain/Comfort:  · Pain Rating 1: 0/10    Objective:     Communicated with: Nursing prior to session.  Patient found up in chair with peripheral IV, telemetry upon OT entry to room.    General Precautions: Standard, NPO   Orthopedic Precautions:N/A   Braces: N/A     Occupational Performance without DME per pt's desire at this time therefore CG provided with room mobility and daughter reporting that she is providing the pt with SBA in the room for increased mobility with family members       Functional Mobility/Transfers:  · Patient completed Sit <> Stand Transfer with stand by assistance  with  no assistive device and close proximity at this time needed due to unsteadiness    · Patient completed Bed <> Chair Transfer using Step Transfer technique with stand by assistance and close proximity (Close SBA)  with no assistive device  · Functional Mobility: in the room from bedside chair to sink to then stand at sink to then  return back to chair area; ~ 2 x 15 feet ambulation with close SBA/light CG     Activities of Daily Living:  · Lower Body Dressing: stand by assistance to doff and diane both socks seated in the chair while crossing her legs       AMPAC 6 Click ADL: 15    Treatment & Education:  Balance training: stepping forward and backwards with returning to neutral each step for each LE of 2 x 5 reps with light CG needed intermittently especially with swing phase of R LE  Patient left up in chair with all lines intact, call button in reach, nursing  notified and daughter  presentEducation:      GOALS:   Multidisciplinary Problems     Occupational Therapy Goals        Problem: Occupational Therapy Goal    Goal Priority Disciplines Outcome Interventions   Occupational Therapy Goal     OT, PT/OT Ongoing (interventions implemented as appropriate)    Description:  Goals to be met by: discharge     Patient will increase functional independence with ADLs by performing:    LE Dressing with Supervision.  Grooming while standing at sink with Supervision.  Toileting from toilet with Supervision for hygiene and clothing management.   Toilet transfer to toilet with Supervision.                      Time Tracking:     OT Date of Treatment: 09/20/19  OT Start Time: 1148  OT Stop Time: 1204  OT Total Time (min): 16 min    Billable Minutes:Therapeutic Activity 16    Amanda Ricci OT  9/20/2019

## 2019-09-20 NOTE — CONSULTS
"  [facility]  Adult Nutrition  Consult Note    SUMMARY     Recommendations    Recommendation/Intervention: 1.) Continue PO diet as tolerated by pt.  Goals: 1.) Pt to meet >75% estimated needs via oral diet  Nutrition Goal Status: new    Reason for Assessment    Reason For Assessment: consult  Diagnosis: other (see comments)(SBO)  Relevant Medical History: hx: Gluten intolerance, hypothyroidism, Parkinsons    Nutrition Risk Screen    Nutrition Risk Screen: no indicators present    Nutrition/Diet History    Patient Reported Diet/Restrictions/Preferences: gluten-free, general  Spiritual, Cultural Beliefs, Catholic Practices, Values that Affect Care: no  Food Allergies: other (see comments)(gluten)  Factors Affecting Nutritional Intake: None identified at this time    Anthropometrics    Temp: 97.8 °F (36.6 °C)  Height Method: Stated  Height: 5' 3" (160 cm)  Height (inches): 63 in  Weight Method: Bed Scale  Weight: 52.2 kg (115 lb)  Weight (lb): 115 lb  Ideal Body Weight (IBW), Female: 115 lb  % Ideal Body Weight, Female (lb): 100 lb  BMI (Calculated): 20.4  BMI Grade: 18.5-24.9 - normal  Weight Loss: (stable per family)       Lab/Procedures/Meds    Pertinent Labs Reviewed: reviewed    Lab Results   Component Value Date    WBC 4.21 09/20/2019    HGB 10.4 (L) 09/20/2019    HCT 33.3 (L) 09/20/2019    MCV 96 09/20/2019     (L) 09/20/2019     BMP  Lab Results   Component Value Date     09/20/2019    K 2.8 (LL) 09/20/2019     09/20/2019    CO2 25 09/20/2019    BUN 6 (L) 09/20/2019    CREATININE 0.3 (L) 09/20/2019    CALCIUM 7.4 (L) 09/20/2019    ANIONGAP 8 09/20/2019    ESTGFRAFRICA >60.0 09/20/2019    EGFRNONAA >60.0 09/20/2019       Pertinent Medications Reviewed: reviewed    Scheduled Meds:   enoxaparin  40 mg Subcutaneous Daily    levothyroxine  62.5 mcg Oral Before breakfast    piperacillin-tazobactam (ZOSYN) IVPB  3.375 g Intravenous Q8H     Continuous Infusions:   0/9% NACL & " POTASSIUM CHLORIDE 20 MEQ/L 125 mL/hr at 09/20/19 1024     Estimated/Assessed Needs    Weight Used For Calorie Calculations: 52.2 kg (115 lb 1.3 oz)  Energy Calorie Requirements (kcal): 1273-5177 (25-30 kcal/kg)  Energy Need Method: Kcal/kg  Protein Requirements: 62-78 g (1.2-1.5 g/kg)  Weight Used For Protein Calculations: 52.2 kg (115 lb 1.3 oz)     Estimated Fluid Requirement Method: RDA Method  RDA Method (mL): 1305         Nutrition Prescription Ordered    Current Diet Order: cardiac; gluten free    Evaluation of Received Nutrient/Fluid Intake    IV Fluid (mL): 3000(NS + 20 KCl)  Energy Calories Required: meeting needs  Protein Required: meeting needs  Fluid Required: meeting needs  Tolerance: tolerating  % Meal Intake: 75 - 100 %    Nutrition Risk    Level of Risk/Frequency of Follow-up: moderate - high     Assessment and Plan    Pt assessed 2' consult. Noted SBO resolved. + flatus, diarrhea improving. Consumed 90% lunch per pt. No N/V. Has on/off issues with diarrhea. No known cause, ? Accidental ingestion of gluten, ? MSG. Encouraged daughter to keep food diary which may reveal cause. Typical food diary reviewed; encouraged smaller meals, increased fiber when able, and increase fluids.    Monitor and Evaluation    Food and Nutrient Intake: food and beverage intake, energy intake  Food and Nutrient Adminstration: diet order  Physical Activity and Function: nutrition-related ADLs and IADLs  Anthropometric Measurements: weight change, weight  Biochemical Data, Medical Tests and Procedures: gastrointestinal profile, glucose/endocrine profile, electrolyte and renal panel  Nutrition-Focused Physical Findings: overall appearance       Nutrition Follow-Up    RD Follow-up?: Yes     Seema Fulton  09/20/2019  2:47 PM

## 2019-09-20 NOTE — NURSING
Called MD and informed about pt's low potassium. Potassium will be covered as soon as she puts the order.

## 2019-09-20 NOTE — PLAN OF CARE
Problem: Physical Therapy Goal  Goal: Physical Therapy Goal  Goals to be met by: D/C     Patient will increase functional independence with mobility by performin. Supine to sit with Stand-by Assistance  2. Sit to stand transfer with Supervision  3. Bed to chair transfer with Stand-by Assistance using Single-point Cane   4. Gait  x 250 feet with Supervision using Single-point Cane .      Outcome: Ongoing (interventions implemented as appropriate)    1. Supine to sit with Stand-by Assistance(MET)2019   2. Sit to stand transfer with Supervision  3. Bed to chair transfer with Stand-by Assistance using Single-point Cane   4. Gait x 250 feet with Supervision using Single-point Cane .

## 2019-09-20 NOTE — SUBJECTIVE & OBJECTIVE
Interval History: Patient's NG tube was pulled yesterday. She tolerated full liquid diet this morning. She also had a BM     Review of Systems   Constitutional: Negative for appetite change and fatigue.   Respiratory: Negative for cough and shortness of breath.    Cardiovascular: Negative for chest pain.   Gastrointestinal: Positive for diarrhea. Negative for abdominal pain, blood in stool, nausea and vomiting.   Genitourinary: Negative for dysuria and flank pain.   Neurological: Negative for dizziness and light-headedness.     Objective:     Vital Signs (Most Recent):  Temp: 97.5 °F (36.4 °C) (09/20/19 0752)  Pulse: (!) 57 (09/20/19 0752)  Resp: 18 (09/20/19 0752)  BP: 134/71 (09/20/19 0752)  SpO2: 97 % (09/20/19 0752) Vital Signs (24h Range):  Temp:  [97.5 °F (36.4 °C)-97.9 °F (36.6 °C)] 97.5 °F (36.4 °C)  Pulse:  [56-70] 57  Resp:  [16-18] 18  SpO2:  [93 %-97 %] 97 %  BP: (107-135)/(59-73) 134/71     Weight: 52.2 kg (115 lb)  Body mass index is 20.37 kg/m².    Intake/Output Summary (Last 24 hours) at 9/20/2019 1043  Last data filed at 9/20/2019 0600  Gross per 24 hour   Intake 3492.08 ml   Output 500 ml   Net 2992.08 ml      Physical Exam   Constitutional: She is oriented to person, place, and time. No distress.   Eyes: Pupils are equal, round, and reactive to light. No scleral icterus.   Cardiovascular: Normal rate and regular rhythm.   No murmur heard.  Pulmonary/Chest: Breath sounds normal. She has no wheezes. She has no rales.   Abdominal: Soft. She exhibits distension. Bowel sounds are increased. There is no tenderness.   Neurological: She is alert and oriented to person, place, and time.   Skin: Skin is warm. Capillary refill takes less than 2 seconds. No rash noted.   Psychiatric: She has a normal mood and affect.   Nursing note and vitals reviewed.      Significant Labs:   BMP:   Recent Labs   Lab 09/20/19  0556   GLU 99      K 2.8*      CO2 25   BUN 6*   CREATININE 0.3*   CALCIUM 7.4*   MG  1.5*     CBC:   Recent Labs   Lab 09/19/19  0511 09/20/19  0557   WBC 7.42 4.21   HGB 11.6* 10.4*   HCT 35.8* 33.3*   * 148*     Magnesium:   Recent Labs   Lab 09/19/19  0511 09/20/19  0556   MG 1.7 1.5*       Significant Imaging: I have reviewed all pertinent imaging results/findings within the past 24 hours.     KUB:  Impression       1.  Interval removal of the enteric tube.    2.  Essentially unchanged numerous gas and fluid-filled loops of small bowel throughout the abdomen suggesting a partial low grade mechanical small bowel obstruction versus ileus (with gas and stool seen to the level of the rectum).

## 2019-09-20 NOTE — PROGRESS NOTES
[facility]  General Surgery  Progress Note    Subjective:     Interval History: Patient is feeling better.  She is passing flatus and having bowel movements.  Pain is resolved.     Post-Op Info:  * No surgery found *          Medications:  Continuous Infusions:   0/9% NACL & POTASSIUM CHLORIDE 20 MEQ/L 125 mL/hr at 09/20/19 1024     Scheduled Meds:   enoxaparin  40 mg Subcutaneous Daily    levothyroxine  62.5 mcg Oral Before breakfast    piperacillin-tazobactam (ZOSYN) IVPB  3.375 g Intravenous Q8H     PRN Meds:acetaminophen, acetaminophen, albuterol-ipratropium, dextrose 50%, dextrose 50%, glucagon (human recombinant), glucose, glucose, hydrALAZINE, ibuprofen, magnesium oxide, magnesium oxide, magnesium oxide, magnesium sulfate IVPB, magnesium sulfate IVPB, ondansetron, phenol, potassium chloride, potassium chloride, potassium chloride, potassium, sodium phosphates, potassium, sodium phosphates, sodium chloride 0.9%, sodium phosphate IVPB, sodium phosphate IVPB     Objective:     Vital Signs (Most Recent):  Temp: 97.8 °F (36.6 °C) (09/20/19 1122)  Pulse: (!) 53 (09/20/19 1122)  Resp: 18 (09/20/19 1122)  BP: (!) 172/77 (09/20/19 1122)  SpO2: 97 % (09/20/19 1122) Vital Signs (24h Range):  Temp:  [97.5 °F (36.4 °C)-97.8 °F (36.6 °C)] 97.8 °F (36.6 °C)  Pulse:  [53-69] 53  Resp:  [16-18] 18  SpO2:  [95 %-97 %] 97 %  BP: (107-172)/(59-77) 172/77       Intake/Output Summary (Last 24 hours) at 9/20/2019 1607  Last data filed at 9/20/2019 0600  Gross per 24 hour   Intake 3012.08 ml   Output 500 ml   Net 2512.08 ml       Physical Exam   Constitutional: She is oriented to person, place, and time. She appears well-developed. She is cooperative.  Non-toxic appearance. No distress.   Abdominal: Soft. She exhibits no distension. There is no tenderness. There is no rigidity, no rebound and no guarding.   Neurological: She is alert and oriented to person, place, and time.         Assessment/Plan:     Active  Diagnoses:    Diagnosis Date Noted POA    PRINCIPAL PROBLEM:  SBO (small bowel obstruction) [K56.609] 09/17/2019 Yes    Fever [R50.9] 09/18/2019 Yes    Hypokalemia [E87.6] 09/18/2019 Unknown    Enteritis [K52.9] 09/17/2019 Yes    Hypomagnesemia [E83.42] 09/17/2019 Yes    Chronic diarrhea [K52.9] 02/21/2018 Yes    PD (Parkinson's disease) [G20] 09/16/2015 Yes    Hypothyroidism [E03.9]  Yes    Gluten enteropathy [K90.41] 04/11/2014 Yes      Problems Resolved During this Admission:    Diagnosis Date Noted Date Resolved POA    Dysphagia [R13.10] 05/21/2015 09/20/2019 Yes     OK to resume solid diet form surgery standpoint. OK to DC from surgery standpoint if tolerating diet. She has expressed concern about her chronic daily symptoms of diarrhea and bloating.  She has had several studies done for workup. She was found to have a gluten allergy.  I have recommended GI follow up as outpatient.     Sterling Vigil III, MD  General Surgery  [facility]

## 2019-09-20 NOTE — PLAN OF CARE
Problem: Diarrhea  Goal: Fluid and Electrolyte Balance  Outcome: Ongoing (interventions implemented as appropriate)  Tolerating PO diet; intake good. Diarrhea improving. Replace electrolytes as warranted.

## 2019-09-20 NOTE — PROGRESS NOTES
[facility] Medicine  Progress Note    Patient Name: Ariana Tiwari  MRN: [id number]  Patient Class: IP- Inpatient   Admission Date: 9/17/2019  Length of Stay: 2 days  Attending Physician: Dianne Ibarra MD  Primary Care Provider: Nba Petty MD        Subjective:     Principal Problem:SBO (small bowel obstruction)        HPI:  No notes on file    Overview/Hospital Course:  NG tube placed to low intermittent suction, patient comfortable. Imaging read discussed with radiologist, and there is a concern for small bowel volvulus. Gen surgery consulted    Interval History: Patient's NG tube was pulled yesterday. She tolerated full liquid diet this morning. She also had a BM     Review of Systems   Constitutional: Negative for appetite change and fatigue.   Respiratory: Negative for cough and shortness of breath.    Cardiovascular: Negative for chest pain.   Gastrointestinal: Positive for diarrhea. Negative for abdominal pain, blood in stool, nausea and vomiting.   Genitourinary: Negative for dysuria and flank pain.   Neurological: Negative for dizziness and light-headedness.     Objective:     Vital Signs (Most Recent):  Temp: 97.5 °F (36.4 °C) (09/20/19 0752)  Pulse: (!) 57 (09/20/19 0752)  Resp: 18 (09/20/19 0752)  BP: 134/71 (09/20/19 0752)  SpO2: 97 % (09/20/19 0752) Vital Signs (24h Range):  Temp:  [97.5 °F (36.4 °C)-97.9 °F (36.6 °C)] 97.5 °F (36.4 °C)  Pulse:  [56-70] 57  Resp:  [16-18] 18  SpO2:  [93 %-97 %] 97 %  BP: (107-135)/(59-73) 134/71     Weight: 52.2 kg (115 lb)  Body mass index is 20.37 kg/m².    Intake/Output Summary (Last 24 hours) at 9/20/2019 1043  Last data filed at 9/20/2019 0600  Gross per 24 hour   Intake 3492.08 ml   Output 500 ml   Net 2992.08 ml      Physical Exam   Constitutional: She is oriented to person, place, and time. No distress.   Eyes: Pupils are equal, round, and reactive to light. No scleral icterus.   Cardiovascular: Normal rate and regular  rhythm.   No murmur heard.  Pulmonary/Chest: Breath sounds normal. She has no wheezes. She has no rales.   Abdominal: Soft. She exhibits distension. Bowel sounds are increased. There is no tenderness.   Neurological: She is alert and oriented to person, place, and time.   Skin: Skin is warm. Capillary refill takes less than 2 seconds. No rash noted.   Psychiatric: She has a normal mood and affect.   Nursing note and vitals reviewed.      Significant Labs:   BMP:   Recent Labs   Lab 09/20/19  0556   GLU 99      K 2.8*      CO2 25   BUN 6*   CREATININE 0.3*   CALCIUM 7.4*   MG 1.5*     CBC:   Recent Labs   Lab 09/19/19  0511 09/20/19  0557   WBC 7.42 4.21   HGB 11.6* 10.4*   HCT 35.8* 33.3*   * 148*     Magnesium:   Recent Labs   Lab 09/19/19  0511 09/20/19  0556   MG 1.7 1.5*       Significant Imaging: I have reviewed all pertinent imaging results/findings within the past 24 hours.     KUB:  Impression       1.  Interval removal of the enteric tube.    2.  Essentially unchanged numerous gas and fluid-filled loops of small bowel throughout the abdomen suggesting a partial low grade mechanical small bowel obstruction versus ileus (with gas and stool seen to the level of the rectum).             Assessment/Plan:      * SBO (small bowel obstruction)  Resolving  Advance diet as tolerated  Increase mobiity  Continue IV antibiotics      Hypokalemia  Replace with oral potassium      Hypomagnesemia  Replaced.  Monitor Mg levels      Chronic diarrhea        PD (Parkinson's disease)        Hypothyroidism        Gluten enteropathy  Patient concerned about her diet after she goes home. She is anticipating diarrhea  Dietician consult        VTE Risk Mitigation (From admission, onward)        Ordered     enoxaparin injection 40 mg  Daily      09/18/19 0117     IP VTE HIGH RISK PATIENT  Once      09/18/19 0117                Dianne Ibarra MD  Department of Hospital Medicine   [facility]

## 2019-09-20 NOTE — ASSESSMENT & PLAN NOTE
Patient concerned about her diet after she goes home. She is anticipating diarrhea  Dietician consult

## 2019-09-21 VITALS
HEART RATE: 61 BPM | SYSTOLIC BLOOD PRESSURE: 121 MMHG | BODY MASS INDEX: 16.21 KG/M2 | RESPIRATION RATE: 18 BRPM | DIASTOLIC BLOOD PRESSURE: 70 MMHG | WEIGHT: 91.5 LBS | TEMPERATURE: 98 F | OXYGEN SATURATION: 99 % | HEIGHT: 63 IN

## 2019-09-21 PROBLEM — K56.609 SBO (SMALL BOWEL OBSTRUCTION): Status: RESOLVED | Noted: 2019-09-17 | Resolved: 2019-09-21

## 2019-09-21 PROBLEM — K52.9 ENTERITIS: Status: RESOLVED | Noted: 2019-09-17 | Resolved: 2019-09-21

## 2019-09-21 PROBLEM — R50.9 FEVER: Status: RESOLVED | Noted: 2019-09-18 | Resolved: 2019-09-21

## 2019-09-21 PROBLEM — E83.42 HYPOMAGNESEMIA: Status: RESOLVED | Noted: 2019-09-17 | Resolved: 2019-09-21

## 2019-09-21 PROBLEM — E87.6 HYPOKALEMIA: Status: RESOLVED | Noted: 2019-09-18 | Resolved: 2019-09-21

## 2019-09-21 LAB
ALBUMIN SERPL BCP-MCNC: 2.9 G/DL (ref 3.5–5.2)
ALP SERPL-CCNC: 67 U/L (ref 55–135)
ALT SERPL W/O P-5'-P-CCNC: 13 U/L (ref 10–44)
AMYLASE SERPL-CCNC: 37 U/L (ref 20–110)
ANION GAP SERPL CALC-SCNC: 4 MMOL/L (ref 8–16)
AST SERPL-CCNC: 14 U/L (ref 10–40)
BASOPHILS # BLD AUTO: 0.03 K/UL (ref 0–0.2)
BASOPHILS NFR BLD: 0.6 % (ref 0–1.9)
BILIRUB SERPL-MCNC: 0.3 MG/DL (ref 0.1–1)
BUN SERPL-MCNC: 8 MG/DL (ref 8–23)
CALCIUM SERPL-MCNC: 8 MG/DL (ref 8.7–10.5)
CHLORIDE SERPL-SCNC: 107 MMOL/L (ref 95–110)
CO2 SERPL-SCNC: 28 MMOL/L (ref 23–29)
CREAT SERPL-MCNC: 0.4 MG/DL (ref 0.5–1.4)
DIFFERENTIAL METHOD: ABNORMAL
EOSINOPHIL # BLD AUTO: 0.2 K/UL (ref 0–0.5)
EOSINOPHIL NFR BLD: 3.2 % (ref 0–8)
ERYTHROCYTE [DISTWIDTH] IN BLOOD BY AUTOMATED COUNT: 13.3 % (ref 11.5–14.5)
EST. GFR  (AFRICAN AMERICAN): >60 ML/MIN/1.73 M^2
EST. GFR  (NON AFRICAN AMERICAN): >60 ML/MIN/1.73 M^2
GLUCOSE SERPL-MCNC: 105 MG/DL (ref 70–110)
HCT VFR BLD AUTO: 33.7 % (ref 37–48.5)
HGB BLD-MCNC: 10.6 G/DL (ref 12–16)
IMM GRANULOCYTES # BLD AUTO: 0.02 K/UL (ref 0–0.04)
IMM GRANULOCYTES NFR BLD AUTO: 0.4 % (ref 0–0.5)
INR PPP: 1.1
LIPASE SERPL-CCNC: 23 U/L (ref 4–60)
LYMPHOCYTES # BLD AUTO: 1 K/UL (ref 1–4.8)
LYMPHOCYTES NFR BLD: 19 % (ref 18–48)
MAGNESIUM SERPL-MCNC: 1.6 MG/DL (ref 1.6–2.6)
MCH RBC QN AUTO: 30.1 PG (ref 27–31)
MCHC RBC AUTO-ENTMCNC: 31.5 G/DL (ref 32–36)
MCV RBC AUTO: 96 FL (ref 82–98)
MONOCYTES # BLD AUTO: 0.4 K/UL (ref 0.3–1)
MONOCYTES NFR BLD: 7.5 % (ref 4–15)
NEUTROPHILS # BLD AUTO: 3.7 K/UL (ref 1.8–7.7)
NEUTROPHILS NFR BLD: 69.3 % (ref 38–73)
NRBC BLD-RTO: 0 /100 WBC
PHOSPHATE SERPL-MCNC: 2.6 MG/DL (ref 2.7–4.5)
PLATELET # BLD AUTO: 170 K/UL (ref 150–350)
PMV BLD AUTO: 10.7 FL (ref 9.2–12.9)
POTASSIUM SERPL-SCNC: 3.4 MMOL/L (ref 3.5–5.1)
PROT SERPL-MCNC: 5.6 G/DL (ref 6–8.4)
PROTHROMBIN TIME: 13.5 SEC (ref 11.7–14)
RBC # BLD AUTO: 3.52 M/UL (ref 4–5.4)
SODIUM SERPL-SCNC: 139 MMOL/L (ref 136–145)
WBC # BLD AUTO: 5.36 K/UL (ref 3.9–12.7)

## 2019-09-21 PROCEDURE — 85610 PROTHROMBIN TIME: CPT

## 2019-09-21 PROCEDURE — 25000003 PHARM REV CODE 250: Performed by: INTERNAL MEDICINE

## 2019-09-21 PROCEDURE — 83690 ASSAY OF LIPASE: CPT

## 2019-09-21 PROCEDURE — 25000003 PHARM REV CODE 250: Performed by: FAMILY MEDICINE

## 2019-09-21 PROCEDURE — 83735 ASSAY OF MAGNESIUM: CPT

## 2019-09-21 PROCEDURE — 84100 ASSAY OF PHOSPHORUS: CPT

## 2019-09-21 PROCEDURE — 99900035 HC TECH TIME PER 15 MIN (STAT)

## 2019-09-21 PROCEDURE — 82150 ASSAY OF AMYLASE: CPT

## 2019-09-21 PROCEDURE — 80053 COMPREHEN METABOLIC PANEL: CPT

## 2019-09-21 PROCEDURE — 63600175 PHARM REV CODE 636 W HCPCS: Performed by: FAMILY MEDICINE

## 2019-09-21 PROCEDURE — 94761 N-INVAS EAR/PLS OXIMETRY MLT: CPT

## 2019-09-21 PROCEDURE — 85025 COMPLETE CBC W/AUTO DIFF WBC: CPT

## 2019-09-21 PROCEDURE — 36415 COLL VENOUS BLD VENIPUNCTURE: CPT

## 2019-09-21 RX ORDER — POTASSIUM CHLORIDE 20 MEQ/1
40 TABLET, EXTENDED RELEASE ORAL ONCE
Status: COMPLETED | OUTPATIENT
Start: 2019-09-21 | End: 2019-09-21

## 2019-09-21 RX ORDER — LEVOFLOXACIN 500 MG/1
500 TABLET, FILM COATED ORAL DAILY
Qty: 5 TABLET | Refills: 0 | Status: ON HOLD | OUTPATIENT
Start: 2019-09-21 | End: 2020-01-17 | Stop reason: HOSPADM

## 2019-09-21 RX ORDER — POTASSIUM CHLORIDE 750 MG/1
10 CAPSULE, EXTENDED RELEASE ORAL DAILY
Qty: 7 CAPSULE | Refills: 0 | Status: SHIPPED | OUTPATIENT
Start: 2019-09-21 | End: 2019-09-28

## 2019-09-21 RX ORDER — METRONIDAZOLE 500 MG/1
500 TABLET ORAL EVERY 8 HOURS
Qty: 15 TABLET | Refills: 0 | Status: SHIPPED | OUTPATIENT
Start: 2019-09-21 | End: 2019-09-26

## 2019-09-21 RX ADMIN — SODIUM CHLORIDE AND POTASSIUM CHLORIDE: 9; 1.49 INJECTION, SOLUTION INTRAVENOUS at 05:09

## 2019-09-21 RX ADMIN — POTASSIUM CHLORIDE 40 MEQ: 20 TABLET, EXTENDED RELEASE ORAL at 10:09

## 2019-09-21 RX ADMIN — LEVOTHYROXINE SODIUM 62.5 MCG: 0.03 TABLET ORAL at 05:09

## 2019-09-21 RX ADMIN — PIPERACILLIN SODIUM,TAZOBACTAM SODIUM 3.38 G: 3; .375 INJECTION, POWDER, FOR SOLUTION INTRAVENOUS at 09:09

## 2019-09-21 NOTE — NURSING
"Pt said "I am ready to go home. I want to go home."    She asked to speed her IV ABX. She is waiting to finish it to go home.  "

## 2019-09-21 NOTE — PLAN OF CARE
09/20/19 2001   Patient Assessment/Suction   Level of Consciousness (AVPU) alert   Respiratory Effort Normal;Unlabored   Expansion/Accessory Muscles/Retractions no use of accessory muscles;no retractions;expansion symmetric   All Lung Fields Breath Sounds clear   Rhythm/Pattern, Respiratory unlabored;pattern regular;depth regular   Cough Frequency no cough   PRE-TX-O2   O2 Device (Oxygen Therapy) room air   SpO2 96 %   Pulse Oximetry Type Intermittent   $ Pulse Oximetry - Multiple Charge Pulse Oximetry - Multiple   Pulse 60   Resp 18   Aerosol Therapy   $ Aerosol Therapy Charges PRN treatment not required   Respiratory Treatment Status (SVN) PRN treatment not required

## 2019-09-21 NOTE — NURSING
Pt DCed home. Instructions & teaching given (to pt and her daughter). Pt verbalized understanding. Pt wheeled outside by Andria charge nurse.

## 2019-09-21 NOTE — PLAN OF CARE
Problem: Fall Injury Risk  Goal: Absence of Fall and Fall-Related Injury    Intervention: Promote Injury-Free Environment     09/20/19 1710 09/20/19 1938   Optimize [place] and Functional Mobility   Environmental Safety Modification  --  assistive device/personal items within reach   Optimize Balance and Safe Activity   Safety Promotion/Fall Prevention assistive device/personal item within reach;bed alarm refused;Fall Risk reviewed with patient/family;lighting adjusted;medications reviewed;muscle strengthening facilitated;nonskid shoes/socks when out of bed;Fall Risk signage in place  --

## 2019-09-21 NOTE — PLAN OF CARE
Problem: Fall Injury Risk  Goal: Absence of Fall and Fall-Related Injury    Intervention: Identify and Manage Contributors to Fall Injury Risk     09/21/19 0527   Manage Acute Allergic Reaction   Medication Review/Management medications reviewed   Identify and Manage Contributors to Fall Injury Risk   Self-Care Promotion BADL personal objects within reach            3/2019, I haven't had sex, I'm still breast feeding 3/2019, I haven't had sex, I'm still breast feeding.  I'm pretty sure I'm not pregnant

## 2019-09-21 NOTE — DISCHARGE SUMMARY
[facility] Medicine  Discharge Summary      Patient Name: Ariana Tiwari  MRN: [id number]  Admission Date: 9/17/2019  Hospital Length of Stay: 3 days  Discharge Date and Time:  09/21/2019 12:05 PM  Attending Physician: No att. providers found   Discharging Provider: Jonathon Ibarra MD  Primary Care Provider: Nba Petty MD      HPI:   No notes on file    * No surgery found *      Hospital Course:   NG tube placed to low intermittent suction, patient comfortable. Imaging read discussed with radiologist, and there is a concern for small bowel volvulus. Gen surgery consulted    Managed conservatively, NG tube was discontinued. Tolerated PO diet     Consults:   Consults (From admission, onward)        Status Ordering Provider     Inpatient consult to General Surgery  Once     Provider:  Sterling Vigil III, MD    Completed YESSI WEATHERS     Inpatient consult to Hospitalist  Once     Provider:  Nba Flanagan MD    Acknowledged YESSI WEATHERS     Inpatient consult to Registered Dietitian/Nutritionist  Once     Provider:  (Not yet assigned)    JONATHON Najera new Assessment & Plan notes have been filed under this hospital service since the last note was generated.  Service: Hospital Medicine    Final Active Diagnoses:    Diagnosis Date Noted POA    Chronic diarrhea [K52.9] 02/21/2018 Yes    PD (Parkinson's disease) [G20] 09/16/2015 Yes    Hypothyroidism [E03.9]  Yes    Gluten enteropathy [K90.41] 04/11/2014 Yes      Problems Resolved During this Admission:    Diagnosis Date Noted Date Resolved POA    PRINCIPAL PROBLEM:  SBO (small bowel obstruction) [K56.609] 09/17/2019 09/21/2019 Yes    Hypokalemia [E87.6] 09/18/2019 09/21/2019 Yes    Hypomagnesemia [E83.42] 09/17/2019 09/21/2019 Yes    Fever [R50.9] 09/18/2019 09/21/2019 Yes    Enteritis [K52.9] 09/17/2019 09/21/2019 Yes    Dysphagia [R13.10] 05/21/2015 09/20/2019 Yes       Discharged  Condition: good    Disposition: Home or Self Care    Follow Up:  Follow-up Information     Nba Petty MD In 1 week.    Specialty:  Family Medicine  Contact information:  [address] [place] [address] [phone]                 Patient Instructions:      Diet Adult Regular     Activity as tolerated       Significant Diagnostic Studies: Labs:   BMP:   Recent Labs   Lab 09/20/19  0556 09/21/19  0533   GLU 99 105    139   K 2.8* 3.4*    107   CO2 25 28   BUN 6* 8   CREATININE 0.3* 0.4*   CALCIUM 7.4* 8.0*   MG 1.5* 1.6    and CBC   Recent Labs   Lab 09/20/19  0557 09/21/19  0533   WBC 4.21 5.36   HGB 10.4* 10.6*   HCT 33.3* 33.7*   * 170       Pending Diagnostic Studies:     None         Medications:  Reconciled Home Medications:      Medication List      START taking these medications    levoFLOXacin 500 MG tablet  Commonly known as:  LEVAQUIN  Take 1 tablet (500 mg total) by mouth once daily.     metroNIDAZOLE 500 MG tablet  Commonly known as:  FLAGYL  Take 1 tablet (500 mg total) by mouth every 8 (eight) hours. for 5 days     potassium chloride 10 MEQ Cpsr  Commonly known as:  MICRO-K  Take 1 capsule (10 mEq total) by mouth once daily. for 7 doses        CONTINUE taking these medications    acetaminophen 500 MG tablet  Commonly known as:  TYLENOL  Take 500 mg by mouth every 6 (six) hours as needed for Pain.     cholestyramine 4 gram packet  Commonly known as:  QUESTRAN  Take 4 g by mouth 4 (four) times daily.     CINNAMON BARK ORAL  Take 1,000 mg by mouth once daily.     GLUCOSAMINE HCL ORAL  Take 1,500 mg by mouth once daily.     ibandronate 150 mg tablet  Commonly known as:  BONIVA  Take 150 mg by mouth every 30 days.     ibuprofen 200 MG tablet  Commonly known as:  ADVIL,MOTRIN  Take 400 mg by mouth daily as needed for Pain.     ipratropium 42 mcg (0.06 %) nasal spray  Commonly known as:  ATROVENT  2 sprays by Nasal route 4 (four) times daily.     levothyroxine 125 MCG  tablet  Commonly known as:  SYNTHROID  Take 62.5 mcg by mouth before breakfast.     sulfamethoxazole-trimethoprim 800-160mg 800-160 mg Tab  Commonly known as:  BACTRIM DS  Take 1 tablet by mouth 2 (two) times daily. for 7 days     VITAMIN D3 2,000 unit Cap  Generic drug:  cholecalciferol (vitamin D3)  Take 1 capsule by mouth once daily.            Indwelling Lines/Drains at time of discharge:   Lines/Drains/Airways          None          Time spent on the discharge of patient: 25 minutes  Patient was seen and examined on the date of discharge and determined to be suitable for discharge.         Dianne Ibarra MD  Department of Hospital Medicine  [facility]

## 2019-09-21 NOTE — PLAN OF CARE
09/21/19 0749   Patient Assessment/Suction   Level of Consciousness (AVPU) alert   Respiratory Effort Normal;Unlabored   Expansion/Accessory Muscles/Retractions no retractions;no use of accessory muscles   All Lung Fields Breath Sounds clear   PRE-TX-O2   O2 Device (Oxygen Therapy) room air   SpO2 99 %   Pulse Oximetry Type Intermittent   $ Pulse Oximetry - Multiple Charge Pulse Oximetry - Multiple   Pulse 67   Resp 18   Aerosol Therapy   $ Aerosol Therapy Charges PRN treatment not required

## 2019-09-22 LAB
BACTERIA BLD CULT: NORMAL
BACTERIA BLD CULT: NORMAL

## 2019-09-25 NOTE — PT/OT/SLP DISCHARGE
Occupational Therapy Discharge Summary    Ariana Tiwari  MRN: [id number]   Principal Problem: SBO (small bowel obstruction)      Patient Discharged from acute Occupational Therapy on 09/21/2019.  Please refer to prior OT note dated 09/20/2019 for functional status.    Assessment:      Patient appropriate for care in another setting.    Objective:     GOALS:   Multidisciplinary Problems     Occupational Therapy Goals     Not on file          Multidisciplinary Problems (Resolved)        Problem: Occupational Therapy Goal    Goal Priority Disciplines Outcome Interventions   Occupational Therapy Goal   (Resolved)     OT, PT/OT Resolved for Upgrade    Description:  Goals to be met by: discharge     Patient will increase functional independence with ADLs by performing:    LE Dressing with Supervision.  Grooming while standing at sink with Supervision.  Toileting from toilet with Supervision for hygiene and clothing management.   Toilet transfer to toilet with Supervision.                      Reasons for Discontinuation of Therapy Services  Transfer to alternate level of care.      Plan:     Patient Discharged to: Home    Carolina Oviedo, OT  9/25/2019

## 2019-09-26 ENCOUNTER — TELEPHONE (OUTPATIENT)
Dept: FAMILY MEDICINE | Facility: CLINIC | Age: 74
End: 2019-09-26

## 2019-09-26 NOTE — TELEPHONE ENCOUNTER
Spoke with patient son informed him that Belle ADLER is out of the office today states he will call back at a later date to reschedule office visit.

## 2019-09-26 NOTE — TELEPHONE ENCOUNTER
----- Message from Laila Lyn sent at 9/26/2019  9:35 AM CDT -----  Contact: Daughter in law Micaela  Te [phone]  Patient's daughter in law Micaela called and asked for an hosp f/u today or tomorrow. Her appt was cancelled today.

## 2019-10-02 ENCOUNTER — OFFICE VISIT (OUTPATIENT)
Dept: FAMILY MEDICINE | Facility: CLINIC | Age: 74
End: 2019-10-02
Payer: MEDICARE

## 2019-10-02 ENCOUNTER — HOSPITAL ENCOUNTER (OUTPATIENT)
Dept: RADIOLOGY | Facility: CLINIC | Age: 74
Discharge: HOME | End: 2019-10-02
Attending: NURSE PRACTITIONER
Payer: MEDICARE

## 2019-10-02 VITALS
WEIGHT: 117.94 LBS | HEIGHT: 63 IN | DIASTOLIC BLOOD PRESSURE: 60 MMHG | SYSTOLIC BLOOD PRESSURE: 122 MMHG | TEMPERATURE: 98 F | BODY MASS INDEX: 20.9 KG/M2 | OXYGEN SATURATION: 97 % | HEART RATE: 70 BPM

## 2019-10-02 DIAGNOSIS — R14.0 ABDOMINAL BLOATING: ICD-10-CM

## 2019-10-02 DIAGNOSIS — K56.609 SBO (SMALL BOWEL OBSTRUCTION): Primary | ICD-10-CM

## 2019-10-02 DIAGNOSIS — K52.9 CHRONIC DIARRHEA: ICD-10-CM

## 2019-10-02 DIAGNOSIS — G20.A1 PD (PARKINSON'S DISEASE): ICD-10-CM

## 2019-10-02 DIAGNOSIS — E87.6 HYPOKALEMIA: ICD-10-CM

## 2019-10-02 DIAGNOSIS — E03.9 HYPOTHYROIDISM, UNSPECIFIED TYPE: ICD-10-CM

## 2019-10-02 DIAGNOSIS — K56.609 SBO (SMALL BOWEL OBSTRUCTION): ICD-10-CM

## 2019-10-02 PROCEDURE — 99999 PR PBB SHADOW E&M-EST. PATIENT-LVL IV: ICD-10-PCS | Mod: PBBFAC,,, | Performed by: NURSE PRACTITIONER

## 2019-10-02 PROCEDURE — 74018 RADEX ABDOMEN 1 VIEW: CPT | Mod: 26,,, | Performed by: RADIOLOGY

## 2019-10-02 PROCEDURE — 99999 PR PBB SHADOW E&M-EST. PATIENT-LVL IV: CPT | Mod: PBBFAC,,, | Performed by: NURSE PRACTITIONER

## 2019-10-02 PROCEDURE — 74018 RADEX ABDOMEN 1 VIEW: CPT | Mod: TC,FY,PO

## 2019-10-02 PROCEDURE — 74018 XR ABDOMEN AP 1 VIEW: ICD-10-PCS | Mod: 26,,, | Performed by: RADIOLOGY

## 2019-10-02 PROCEDURE — 99214 OFFICE O/P EST MOD 30 MIN: CPT | Mod: PBBFAC,25,PO | Performed by: NURSE PRACTITIONER

## 2019-10-02 PROCEDURE — 99214 PR OFFICE/OUTPT VISIT, EST, LEVL IV, 30-39 MIN: ICD-10-PCS | Mod: S$PBB,,, | Performed by: NURSE PRACTITIONER

## 2019-10-02 PROCEDURE — 99214 OFFICE O/P EST MOD 30 MIN: CPT | Mod: S$PBB,,, | Performed by: NURSE PRACTITIONER

## 2019-10-02 RX ORDER — BROMPHENIRAMINE MALEATE, DEXTROMETHORPHAN HBR, PHENYLEPHRINE HCL, DIPHENHYDRAMINE HCL, PHENYLEPHRINE HCL 0.52G
0.52 KIT ORAL 2 TIMES DAILY
Status: ON HOLD | COMMUNITY
End: 2020-01-17 | Stop reason: HOSPADM

## 2019-10-02 NOTE — PROGRESS NOTES
Subjective:       Patient ID: Ariana Tiwari is a 74 y.o. female.    Chief Complaint: Hospital Follow Up (small bowel obstruction)    Admission Date: 9/17/2019  Hospital Length of Stay: 3 days  Discharge Date and Time:  09/21/2019 12:05 PM      Patient presents today with daughter for Hospital follow up. Patient has a recent 3 day stay at [facility] for small bowel obstruction. Patient has a NG tube for decompression. Patient was followed by General surgery. Patient pulled out NG tube due to confusion. Patient tolerated diet, passing gas and had a BM. Patient was discharge         Past Medical History:   Diagnosis Date    Allergy     Diverticulosis     Gluten enteropathy     Gluten intolerance     Hernia     Hypothyroidism     PD (Parkinson's disease) 9/16/2015    Right tremor type    Peptic ulcer disease     Right shoulder pain     Cirtisone injection 3/26/15 - Dr. Tolbert    Ulcer        Review of patient's allergies indicates:   Allergen Reactions    Gluten Diarrhea         Current Outpatient Medications:     cholecalciferol, vitamin D3, (VITAMIN D3) 2,000 unit Cap, Take 1 capsule by mouth once daily. , Disp: , Rfl:     cholestyramine (QUESTRAN) 4 gram packet, Take 4 g by mouth 4 (four) times daily., Disp: , Rfl:     CINNAMON BARK ORAL, Take 1,000 mg by mouth once daily., Disp: , Rfl:     GLUCOSAMINE HCL ORAL, Take 1,500 mg by mouth once daily., Disp: , Rfl:     ibandronate (BONIVA) 150 mg tablet, Take 150 mg by mouth every 30 days., Disp: , Rfl:     ipratropium (ATROVENT) 42 mcg (0.06 %) nasal spray, 2 sprays by Nasal route 4 (four) times daily., Disp: 15 mL, Rfl: 11    levothyroxine (SYNTHROID) 125 MCG tablet, Take 62.5 mcg by mouth before breakfast., Disp: , Rfl:     psyllium 0.52 gram capsule, Take 0.52 g by mouth 2 (two) times daily., Disp: , Rfl:     acetaminophen (TYLENOL) 500 MG tablet, Take 500 mg by mouth every 6 (six) hours as needed for Pain., Disp: , Rfl:      "ibuprofen (ADVIL,MOTRIN) 200 MG tablet, Take 400 mg by mouth daily as needed for Pain. , Disp: , Rfl:     levoFLOXacin (LEVAQUIN) 500 MG tablet, Take 1 tablet (500 mg total) by mouth once daily. (Patient not taking: Reported on 10/2/2019), Disp: 5 tablet, Rfl: 0    Review of Systems   Constitutional: Negative for unexpected weight change.   HENT: Negative for trouble swallowing.    Eyes: Negative for visual disturbance.   Respiratory: Negative for shortness of breath.    Cardiovascular: Negative for chest pain, palpitations and leg swelling.   Gastrointestinal: Positive for abdominal distention. Negative for abdominal pain, blood in stool, constipation, diarrhea, nausea and vomiting.   Genitourinary: Negative for hematuria.   Skin: Negative for rash.   Allergic/Immunologic: Negative for immunocompromised state.   Neurological: Negative for headaches.   Hematological: Does not bruise/bleed easily.   Psychiatric/Behavioral: Negative for agitation. The patient is not nervous/anxious.        Objective:      /60   Pulse 70   Temp 97.6 °F (36.4 °C)   Ht 5' 3" (1.6 m)   Wt 53.5 kg (117 lb 15.1 oz)   LMP  (LMP Unknown)   SpO2 97%   BMI 20.89 kg/m²   Physical Exam   Constitutional: She is oriented to person, place, and time. She appears well-developed and well-nourished.   Eyes: Pupils are equal, round, and reactive to light. EOM are normal.   Neck: Normal range of motion.   Cardiovascular: Normal rate, regular rhythm and normal heart sounds.   Pulmonary/Chest: Effort normal and breath sounds normal.   Abdominal: Soft. She exhibits distension. Bowel sounds are increased. There is no tenderness.   Musculoskeletal: Normal range of motion.   Neurological: She is alert and oriented to person, place, and time.   Skin: Skin is warm and dry.   Psychiatric: She has a normal mood and affect. Her behavior is normal. Judgment and thought content normal.       Assessment:       1. SBO (small bowel obstruction)    2. " Abdominal bloating    3. Chronic diarrhea    4. Hypokalemia    5. PD (Parkinson's disease)    6. Hypothyroidism, unspecified type    7. BMI 20.0-20.9, adult        Plan:       SBO (small bowel obstruction)  -     Ambulatory referral to Gastroenterology  -     X-Ray Abdomen AP 1 View; Future; Expected date: 10/02/2019    Abdominal bloating  -     Ambulatory referral to Gastroenterology  -     X-Ray Abdomen AP 1 View; Future; Expected date: 10/02/2019    Chronic diarrhea  -     Ambulatory referral to Gastroenterology  -     X-Ray Abdomen AP 1 View; Future; Expected date: 10/02/2019    Hypokalemia  -     Comprehensive metabolic panel; Future; Expected date: 10/02/2019  -     Magnesium; Future; Expected date: 10/02/2019  -     PHOSPHORUS; Future; Expected date: 10/02/2019    PD (Parkinson's disease)  Stable, continue medication  Followed by Neurology-Dr.Thomas Polanco  Hypothyroidism, unspecified type  Stable, continue medication  BMI 20.0-20.9, adult  Healthy weight in patinet        Time spent with patient: 40 minutes    Patient with be reevaluated in 6 months or sooner prn    Greater than 50% of this visit was spent counseling as described in above documentation:Yes

## 2019-10-14 NOTE — PROGRESS NOTES
Spoke with patient regarding Mrs. Waller response (question stated on 10/9) patient expressed understanding.

## 2019-11-03 DIAGNOSIS — M85.80 OSTEOPENIA, UNSPECIFIED LOCATION: ICD-10-CM

## 2019-11-03 RX ORDER — IBANDRONATE SODIUM 150 MG/1
TABLET, FILM COATED ORAL
Qty: 1 TABLET | Refills: 11 | Status: SHIPPED | OUTPATIENT
Start: 2019-11-03 | End: 2020-01-11 | Stop reason: CLARIF

## 2019-11-20 DIAGNOSIS — R10.32 LEFT LOWER QUADRANT PAIN: Primary | ICD-10-CM

## 2019-11-22 ENCOUNTER — HOSPITAL ENCOUNTER (OUTPATIENT)
Dept: RADIOLOGY | Facility: HOSPITAL | Age: 74
Discharge: HOME | End: 2019-11-22
Attending: INTERNAL MEDICINE
Payer: MEDICARE

## 2019-11-22 DIAGNOSIS — R10.32 LEFT LOWER QUADRANT PAIN: ICD-10-CM

## 2019-11-22 PROCEDURE — 74177 CT ABDOMEN PELVIS WITH CONTRAST: ICD-10-PCS | Mod: 26,,, | Performed by: RADIOLOGY

## 2019-11-22 PROCEDURE — 74177 CT ABD & PELVIS W/CONTRAST: CPT | Mod: TC

## 2019-11-22 PROCEDURE — 74177 CT ABD & PELVIS W/CONTRAST: CPT | Mod: 26,,, | Performed by: RADIOLOGY

## 2019-11-22 PROCEDURE — 25500020 PHARM REV CODE 255: Performed by: INTERNAL MEDICINE

## 2019-11-22 RX ADMIN — IOHEXOL 75 ML: 350 INJECTION, SOLUTION INTRAVENOUS at 09:11

## 2019-11-26 DIAGNOSIS — K56.609 UNSPECIFIED INTESTINAL OBSTRUCTION, UNSPECIFIED AS TO PARTIAL VERSUS COMPLETE OBSTRUCTION: Primary | ICD-10-CM

## 2019-12-09 ENCOUNTER — HOSPITAL ENCOUNTER (OUTPATIENT)
Dept: RADIOLOGY | Facility: HOSPITAL | Age: 74
Discharge: HOME | End: 2019-12-09
Attending: INTERNAL MEDICINE
Payer: MEDICARE

## 2019-12-09 DIAGNOSIS — K56.609 UNSPECIFIED INTESTINAL OBSTRUCTION, UNSPECIFIED AS TO PARTIAL VERSUS COMPLETE OBSTRUCTION: ICD-10-CM

## 2019-12-09 PROCEDURE — 74245 FL UPPER GI WITH SMALL BOWEL: CPT | Mod: 26,,, | Performed by: RADIOLOGY

## 2019-12-09 PROCEDURE — 74245 FL UPPER GI WITH SMALL BOWEL: CPT | Mod: TC,FY

## 2019-12-09 PROCEDURE — 25500020 PHARM REV CODE 255: Performed by: INTERNAL MEDICINE

## 2019-12-09 PROCEDURE — A9698 NON-RAD CONTRAST MATERIALNOC: HCPCS | Performed by: INTERNAL MEDICINE

## 2019-12-09 PROCEDURE — 74245 FL UPPER GI WITH SMALL BOWEL: ICD-10-PCS | Mod: 26,,, | Performed by: RADIOLOGY

## 2019-12-09 RX ADMIN — BARIUM SULFATE 60 ML: 980 POWDER, FOR SUSPENSION ORAL at 01:12

## 2020-01-06 RX ORDER — LEVOTHYROXINE SODIUM 125 UG/1
TABLET ORAL
Qty: 45 TABLET | Refills: 2 | Status: SHIPPED | OUTPATIENT
Start: 2020-01-06 | End: 2020-01-11 | Stop reason: CLARIF

## 2020-01-10 PROCEDURE — 96372 THER/PROPH/DIAG INJ SC/IM: CPT | Mod: 59

## 2020-01-10 PROCEDURE — 96361 HYDRATE IV INFUSION ADD-ON: CPT

## 2020-01-10 PROCEDURE — 96375 TX/PRO/DX INJ NEW DRUG ADDON: CPT

## 2020-01-10 PROCEDURE — 99285 EMERGENCY DEPT VISIT HI MDM: CPT | Mod: 25

## 2020-01-10 PROCEDURE — 96365 THER/PROPH/DIAG IV INF INIT: CPT

## 2020-01-11 ENCOUNTER — ANESTHESIA EVENT (OUTPATIENT)
Dept: SURGERY | Facility: HOSPITAL | Age: 75
DRG: 344 | End: 2020-01-11
Payer: MEDICARE

## 2020-01-11 ENCOUNTER — HOSPITAL ENCOUNTER (INPATIENT)
Facility: HOSPITAL | Age: 75
LOS: 6 days | Discharge: HOME | DRG: 344 | End: 2020-01-17
Attending: EMERGENCY MEDICINE | Admitting: INTERNAL MEDICINE
Payer: MEDICARE

## 2020-01-11 ENCOUNTER — ANESTHESIA (OUTPATIENT)
Dept: SURGERY | Facility: HOSPITAL | Age: 75
DRG: 344 | End: 2020-01-11
Payer: MEDICARE

## 2020-01-11 DIAGNOSIS — A41.9 SEPSIS, DUE TO UNSPECIFIED ORGANISM, UNSPECIFIED WHETHER ACUTE ORGAN DYSFUNCTION PRESENT: ICD-10-CM

## 2020-01-11 DIAGNOSIS — R10.9 ABDOMINAL PAIN, UNSPECIFIED ABDOMINAL LOCATION: ICD-10-CM

## 2020-01-11 DIAGNOSIS — A41.9 SEPSIS: ICD-10-CM

## 2020-01-11 DIAGNOSIS — K57.00 PERFORATED DIVERTICULUM OF SMALL INTESTINE: Primary | ICD-10-CM

## 2020-01-11 DIAGNOSIS — K56.2 VOLVULUS: ICD-10-CM

## 2020-01-11 LAB
ALBUMIN SERPL BCP-MCNC: 4.4 G/DL (ref 3.5–5.2)
ALP SERPL-CCNC: 133 U/L (ref 55–135)
ALT SERPL W/O P-5'-P-CCNC: 23 U/L (ref 10–44)
ANION GAP SERPL CALC-SCNC: 13 MMOL/L (ref 8–16)
AST SERPL-CCNC: 21 U/L (ref 10–40)
BACTERIA #/AREA URNS HPF: NEGATIVE /HPF
BASOPHILS # BLD AUTO: 0.05 K/UL (ref 0–0.2)
BASOPHILS NFR BLD: 0.4 % (ref 0–1.9)
BILIRUB SERPL-MCNC: 1.2 MG/DL (ref 0.1–1)
BILIRUB UR QL STRIP: NEGATIVE
BUN SERPL-MCNC: 15 MG/DL (ref 8–23)
CALCIUM SERPL-MCNC: 8.6 MG/DL (ref 8.7–10.5)
CHLORIDE SERPL-SCNC: 101 MMOL/L (ref 95–110)
CLARITY UR: CLEAR
CO2 SERPL-SCNC: 22 MMOL/L (ref 23–29)
COLOR UR: YELLOW
CREAT SERPL-MCNC: 0.4 MG/DL (ref 0.5–1.4)
DIFFERENTIAL METHOD: ABNORMAL
EOSINOPHIL # BLD AUTO: 0 K/UL (ref 0–0.5)
EOSINOPHIL NFR BLD: 0.1 % (ref 0–8)
ERYTHROCYTE [DISTWIDTH] IN BLOOD BY AUTOMATED COUNT: 15.7 % (ref 11.5–14.5)
EST. GFR  (AFRICAN AMERICAN): >60 ML/MIN/1.73 M^2
EST. GFR  (NON AFRICAN AMERICAN): >60 ML/MIN/1.73 M^2
GLUCOSE SERPL-MCNC: 152 MG/DL (ref 70–110)
GLUCOSE UR QL STRIP: NEGATIVE
HCT VFR BLD AUTO: 45.9 % (ref 37–48.5)
HGB BLD-MCNC: 14.6 G/DL (ref 12–16)
HGB UR QL STRIP: ABNORMAL
HYALINE CASTS #/AREA URNS LPF: 5 /LPF
IMM GRANULOCYTES # BLD AUTO: 0.06 K/UL (ref 0–0.04)
IMM GRANULOCYTES NFR BLD AUTO: 0.5 % (ref 0–0.5)
KETONES UR QL STRIP: NEGATIVE
LACTATE SERPL-SCNC: 0.9 MMOL/L (ref 0.5–1.9)
LACTATE SERPL-SCNC: 2.1 MMOL/L (ref 0.5–1.9)
LEUKOCYTE ESTERASE UR QL STRIP: NEGATIVE
LIPASE SERPL-CCNC: 26 U/L (ref 4–60)
LYMPHOCYTES # BLD AUTO: 0.9 K/UL (ref 1–4.8)
LYMPHOCYTES NFR BLD: 6.9 % (ref 18–48)
MCH RBC QN AUTO: 28.3 PG (ref 27–31)
MCHC RBC AUTO-ENTMCNC: 31.8 G/DL (ref 32–36)
MCV RBC AUTO: 89 FL (ref 82–98)
MICROSCOPIC COMMENT: ABNORMAL
MONOCYTES # BLD AUTO: 0.6 K/UL (ref 0.3–1)
MONOCYTES NFR BLD: 4.5 % (ref 4–15)
NEUTROPHILS # BLD AUTO: 11.5 K/UL (ref 1.8–7.7)
NEUTROPHILS NFR BLD: 87.6 % (ref 38–73)
NITRITE UR QL STRIP: NEGATIVE
NRBC BLD-RTO: 0 /100 WBC
PH UR STRIP: 6 [PH] (ref 5–8)
PLATELET # BLD AUTO: 170 K/UL (ref 150–350)
PMV BLD AUTO: 10.4 FL (ref 9.2–12.9)
POTASSIUM SERPL-SCNC: 3.4 MMOL/L (ref 3.5–5.1)
PROT SERPL-MCNC: 8 G/DL (ref 6–8.4)
PROT UR QL STRIP: ABNORMAL
RBC # BLD AUTO: 5.15 M/UL (ref 4–5.4)
RBC #/AREA URNS HPF: 4 /HPF (ref 0–4)
SODIUM SERPL-SCNC: 136 MMOL/L (ref 136–145)
SP GR UR STRIP: >1.03 (ref 1–1.03)
SQUAMOUS #/AREA URNS HPF: 1 /HPF
URN SPEC COLLECT METH UR: ABNORMAL
UROBILINOGEN UR STRIP-ACNC: NEGATIVE EU/DL
WBC # BLD AUTO: 13.15 K/UL (ref 3.9–12.7)
WBC #/AREA URNS HPF: 1 /HPF (ref 0–5)

## 2020-01-11 PROCEDURE — 36415 COLL VENOUS BLD VENIPUNCTURE: CPT

## 2020-01-11 PROCEDURE — 27000221 HC OXYGEN, UP TO 24 HOURS

## 2020-01-11 PROCEDURE — 87206 SMEAR FLUORESCENT/ACID STAI: CPT

## 2020-01-11 PROCEDURE — 83605 ASSAY OF LACTIC ACID: CPT | Mod: 91

## 2020-01-11 PROCEDURE — 83605 ASSAY OF LACTIC ACID: CPT

## 2020-01-11 PROCEDURE — 87205 SMEAR GRAM STAIN: CPT

## 2020-01-11 PROCEDURE — 27201107 HC STYLET, STANDARD: Performed by: ANESTHESIOLOGY

## 2020-01-11 PROCEDURE — 87185 SC STD ENZYME DETCJ PER NZM: CPT | Mod: 59

## 2020-01-11 PROCEDURE — 63600175 PHARM REV CODE 636 W HCPCS: Performed by: EMERGENCY MEDICINE

## 2020-01-11 PROCEDURE — 87040 BLOOD CULTURE FOR BACTERIA: CPT

## 2020-01-11 PROCEDURE — 36000707: Performed by: SURGERY

## 2020-01-11 PROCEDURE — 27202107 HC XP QUATRO SENSOR: Performed by: ANESTHESIOLOGY

## 2020-01-11 PROCEDURE — 83690 ASSAY OF LIPASE: CPT

## 2020-01-11 PROCEDURE — 44120 REMOVAL OF SMALL INTESTINE: CPT | Mod: ,,, | Performed by: SURGERY

## 2020-01-11 PROCEDURE — 87116 MYCOBACTERIA CULTURE: CPT

## 2020-01-11 PROCEDURE — 21400001 HC TELEMETRY ROOM

## 2020-01-11 PROCEDURE — 27000653 HC CATH, IV CATHLN: Performed by: ANESTHESIOLOGY

## 2020-01-11 PROCEDURE — 25000003 PHARM REV CODE 250: Performed by: NURSE ANESTHETIST, CERTIFIED REGISTERED

## 2020-01-11 PROCEDURE — 27000675 HC TUBING MICRODRIP: Performed by: ANESTHESIOLOGY

## 2020-01-11 PROCEDURE — 25000003 PHARM REV CODE 250: Performed by: EMERGENCY MEDICINE

## 2020-01-11 PROCEDURE — 27202103: Performed by: ANESTHESIOLOGY

## 2020-01-11 PROCEDURE — 27000673 HC TUBING BLOOD Y: Performed by: ANESTHESIOLOGY

## 2020-01-11 PROCEDURE — 71000033 HC RECOVERY, INTIAL HOUR: Performed by: SURGERY

## 2020-01-11 PROCEDURE — 93005 ELECTROCARDIOGRAM TRACING: CPT

## 2020-01-11 PROCEDURE — 87118 MYCOBACTERIC IDENTIFICATION: CPT

## 2020-01-11 PROCEDURE — 63600175 PHARM REV CODE 636 W HCPCS: Performed by: SURGERY

## 2020-01-11 PROCEDURE — 27201423 OPTIME MED/SURG SUP & DEVICES STERILE SUPPLY: Performed by: SURGERY

## 2020-01-11 PROCEDURE — 87076 CULTURE ANAEROBE IDENT EACH: CPT

## 2020-01-11 PROCEDURE — 85025 COMPLETE CBC W/AUTO DIFF WBC: CPT

## 2020-01-11 PROCEDURE — 36000706: Performed by: SURGERY

## 2020-01-11 PROCEDURE — 87070 CULTURE OTHR SPECIMN AEROBIC: CPT

## 2020-01-11 PROCEDURE — S0028 INJECTION, FAMOTIDINE, 20 MG: HCPCS | Performed by: NURSE ANESTHETIST, CERTIFIED REGISTERED

## 2020-01-11 PROCEDURE — 37000008 HC ANESTHESIA 1ST 15 MINUTES: Performed by: SURGERY

## 2020-01-11 PROCEDURE — 99223 PR INITIAL HOSPITAL CARE,LEVL III: ICD-10-PCS | Mod: 57,,, | Performed by: SURGERY

## 2020-01-11 PROCEDURE — 87102 FUNGUS ISOLATION CULTURE: CPT

## 2020-01-11 PROCEDURE — 63600175 PHARM REV CODE 636 W HCPCS: Performed by: ANESTHESIOLOGY

## 2020-01-11 PROCEDURE — 71000039 HC RECOVERY, EACH ADD'L HOUR: Performed by: SURGERY

## 2020-01-11 PROCEDURE — 99900035 HC TECH TIME PER 15 MIN (STAT)

## 2020-01-11 PROCEDURE — 44120 PR RESECT SMALL INTEST,SINGL RESEC/ANAS: ICD-10-PCS | Mod: ,,, | Performed by: SURGERY

## 2020-01-11 PROCEDURE — 87077 CULTURE AEROBIC IDENTIFY: CPT

## 2020-01-11 PROCEDURE — 87075 CULTR BACTERIA EXCEPT BLOOD: CPT

## 2020-01-11 PROCEDURE — 88307 TISSUE EXAM BY PATHOLOGIST: CPT | Mod: TC

## 2020-01-11 PROCEDURE — 63600175 PHARM REV CODE 636 W HCPCS: Performed by: NURSE ANESTHETIST, CERTIFIED REGISTERED

## 2020-01-11 PROCEDURE — 27000671 HC TUBING MICROBORE EXT: Performed by: ANESTHESIOLOGY

## 2020-01-11 PROCEDURE — 63600175 PHARM REV CODE 636 W HCPCS: Performed by: INTERNAL MEDICINE

## 2020-01-11 PROCEDURE — 99223 1ST HOSP IP/OBS HIGH 75: CPT | Mod: 57,,, | Performed by: SURGERY

## 2020-01-11 PROCEDURE — 25500020 PHARM REV CODE 255: Performed by: EMERGENCY MEDICINE

## 2020-01-11 PROCEDURE — 81001 URINALYSIS AUTO W/SCOPE: CPT

## 2020-01-11 PROCEDURE — 37000009 HC ANESTHESIA EA ADD 15 MINS: Performed by: SURGERY

## 2020-01-11 PROCEDURE — 87186 SC STD MICRODIL/AGAR DIL: CPT | Mod: 59

## 2020-01-11 PROCEDURE — 94761 N-INVAS EAR/PLS OXIMETRY MLT: CPT

## 2020-01-11 PROCEDURE — 80053 COMPREHEN METABOLIC PANEL: CPT

## 2020-01-11 RX ORDER — MEPERIDINE HYDROCHLORIDE 50 MG/ML
12.5 INJECTION INTRAMUSCULAR; INTRAVENOUS; SUBCUTANEOUS EVERY 10 MIN PRN
Status: DISCONTINUED | OUTPATIENT
Start: 2020-01-11 | End: 2020-01-11

## 2020-01-11 RX ORDER — DEXAMETHASONE SODIUM PHOSPHATE 4 MG/ML
INJECTION, SOLUTION INTRA-ARTICULAR; INTRALESIONAL; INTRAMUSCULAR; INTRAVENOUS; SOFT TISSUE
Status: DISCONTINUED | OUTPATIENT
Start: 2020-01-11 | End: 2020-01-11

## 2020-01-11 RX ORDER — ONDANSETRON 2 MG/ML
4 INJECTION INTRAMUSCULAR; INTRAVENOUS
Status: COMPLETED | OUTPATIENT
Start: 2020-01-11 | End: 2020-01-11

## 2020-01-11 RX ORDER — MAGNESIUM SULFATE HEPTAHYDRATE 40 MG/ML
2 INJECTION, SOLUTION INTRAVENOUS
Status: DISCONTINUED | OUTPATIENT
Start: 2020-01-11 | End: 2020-01-17 | Stop reason: HOSPADM

## 2020-01-11 RX ORDER — ONDANSETRON 2 MG/ML
4 INJECTION INTRAMUSCULAR; INTRAVENOUS EVERY 8 HOURS PRN
Status: DISCONTINUED | OUTPATIENT
Start: 2020-01-11 | End: 2020-01-17 | Stop reason: HOSPADM

## 2020-01-11 RX ORDER — SUCCINYLCHOLINE CHLORIDE 20 MG/ML
INJECTION INTRAMUSCULAR; INTRAVENOUS
Status: DISCONTINUED | OUTPATIENT
Start: 2020-01-11 | End: 2020-01-11

## 2020-01-11 RX ORDER — OXYCODONE HYDROCHLORIDE 5 MG/1
5 TABLET ORAL
Status: DISCONTINUED | OUTPATIENT
Start: 2020-01-11 | End: 2020-01-11

## 2020-01-11 RX ORDER — MIDAZOLAM HYDROCHLORIDE 1 MG/ML
INJECTION INTRAMUSCULAR; INTRAVENOUS
Status: DISCONTINUED | OUTPATIENT
Start: 2020-01-11 | End: 2020-01-11

## 2020-01-11 RX ORDER — DIPHENHYDRAMINE HYDROCHLORIDE 50 MG/ML
12.5 INJECTION INTRAMUSCULAR; INTRAVENOUS
Status: DISCONTINUED | OUTPATIENT
Start: 2020-01-11 | End: 2020-01-11

## 2020-01-11 RX ORDER — ENOXAPARIN SODIUM 100 MG/ML
40 INJECTION SUBCUTANEOUS EVERY 24 HOURS
Status: DISCONTINUED | OUTPATIENT
Start: 2020-01-12 | End: 2020-01-17 | Stop reason: HOSPADM

## 2020-01-11 RX ORDER — POTASSIUM CHLORIDE 20 MEQ/1
20 TABLET, EXTENDED RELEASE ORAL
Status: DISCONTINUED | OUTPATIENT
Start: 2020-01-11 | End: 2020-01-17 | Stop reason: HOSPADM

## 2020-01-11 RX ORDER — FAMOTIDINE 10 MG/ML
INJECTION INTRAVENOUS
Status: DISCONTINUED | OUTPATIENT
Start: 2020-01-11 | End: 2020-01-11

## 2020-01-11 RX ORDER — IBANDRONATE SODIUM 150 MG/1
150 TABLET, FILM COATED ORAL
Status: ON HOLD | COMMUNITY
End: 2020-01-17 | Stop reason: HOSPADM

## 2020-01-11 RX ORDER — HYDROMORPHONE HYDROCHLORIDE 1 MG/ML
0.5 INJECTION, SOLUTION INTRAMUSCULAR; INTRAVENOUS; SUBCUTANEOUS EVERY 4 HOURS PRN
Status: DISCONTINUED | OUTPATIENT
Start: 2020-01-11 | End: 2020-01-17 | Stop reason: HOSPADM

## 2020-01-11 RX ORDER — LEVOTHYROXINE SODIUM 125 UG/1
62.5 TABLET ORAL DAILY
Status: ON HOLD | COMMUNITY
End: 2020-01-17 | Stop reason: HOSPADM

## 2020-01-11 RX ORDER — POTASSIUM CHLORIDE 20 MEQ/1
40 TABLET, EXTENDED RELEASE ORAL
Status: DISCONTINUED | OUTPATIENT
Start: 2020-01-11 | End: 2020-01-17 | Stop reason: HOSPADM

## 2020-01-11 RX ORDER — ROCURONIUM BROMIDE 10 MG/ML
INJECTION, SOLUTION INTRAVENOUS
Status: DISCONTINUED | OUTPATIENT
Start: 2020-01-11 | End: 2020-01-11

## 2020-01-11 RX ORDER — SODIUM CHLORIDE, SODIUM LACTATE, POTASSIUM CHLORIDE, CALCIUM CHLORIDE 600; 310; 30; 20 MG/100ML; MG/100ML; MG/100ML; MG/100ML
INJECTION, SOLUTION INTRAVENOUS CONTINUOUS
Status: DISCONTINUED | OUTPATIENT
Start: 2020-01-11 | End: 2020-01-17 | Stop reason: HOSPADM

## 2020-01-11 RX ORDER — MAGNESIUM SULFATE HEPTAHYDRATE 40 MG/ML
4 INJECTION, SOLUTION INTRAVENOUS
Status: DISCONTINUED | OUTPATIENT
Start: 2020-01-11 | End: 2020-01-17 | Stop reason: HOSPADM

## 2020-01-11 RX ORDER — POTASSIUM CHLORIDE 7.45 MG/ML
INJECTION INTRAVENOUS
Status: DISCONTINUED | OUTPATIENT
Start: 2020-01-11 | End: 2020-01-11

## 2020-01-11 RX ORDER — CALCIUM CHLORIDE IN 0.9 % NACL 1 G/100 ML
1 INTRAVENOUS SOLUTION, PIGGYBACK (ML) INTRAVENOUS
Status: DISCONTINUED | OUTPATIENT
Start: 2020-01-11 | End: 2020-01-17 | Stop reason: HOSPADM

## 2020-01-11 RX ORDER — MORPHINE SULFATE 4 MG/ML
4 INJECTION, SOLUTION INTRAMUSCULAR; INTRAVENOUS
Status: COMPLETED | OUTPATIENT
Start: 2020-01-11 | End: 2020-01-11

## 2020-01-11 RX ORDER — SODIUM CHLORIDE 9 MG/ML
INJECTION, SOLUTION INTRAVENOUS CONTINUOUS PRN
Status: DISCONTINUED | OUTPATIENT
Start: 2020-01-11 | End: 2020-01-11

## 2020-01-11 RX ORDER — ONDANSETRON 2 MG/ML
INJECTION INTRAMUSCULAR; INTRAVENOUS
Status: DISCONTINUED | OUTPATIENT
Start: 2020-01-11 | End: 2020-01-11

## 2020-01-11 RX ORDER — MORPHINE SULFATE 4 MG/ML
4 INJECTION, SOLUTION INTRAMUSCULAR; INTRAVENOUS
Status: DISCONTINUED | OUTPATIENT
Start: 2020-01-11 | End: 2020-01-17 | Stop reason: HOSPADM

## 2020-01-11 RX ORDER — HYDROMORPHONE HYDROCHLORIDE 1 MG/ML
0.2 INJECTION, SOLUTION INTRAMUSCULAR; INTRAVENOUS; SUBCUTANEOUS
Status: DISCONTINUED | OUTPATIENT
Start: 2020-01-11 | End: 2020-01-11

## 2020-01-11 RX ORDER — ONDANSETRON 2 MG/ML
4 INJECTION INTRAMUSCULAR; INTRAVENOUS EVERY 12 HOURS PRN
Status: DISCONTINUED | OUTPATIENT
Start: 2020-01-11 | End: 2020-01-11

## 2020-01-11 RX ORDER — PANTOPRAZOLE SODIUM 40 MG/1
40 TABLET, DELAYED RELEASE ORAL
Status: DISCONTINUED | OUTPATIENT
Start: 2020-01-12 | End: 2020-01-17 | Stop reason: HOSPADM

## 2020-01-11 RX ORDER — KETAMINE HYDROCHLORIDE 50 MG/ML
INJECTION, SOLUTION INTRAMUSCULAR; INTRAVENOUS
Status: DISCONTINUED | OUTPATIENT
Start: 2020-01-11 | End: 2020-01-11

## 2020-01-11 RX ORDER — POTASSIUM CHLORIDE 7.45 MG/ML
40 INJECTION INTRAVENOUS
Status: DISCONTINUED | OUTPATIENT
Start: 2020-01-11 | End: 2020-01-17 | Stop reason: HOSPADM

## 2020-01-11 RX ORDER — MORPHINE SULFATE 2 MG/ML
2 INJECTION, SOLUTION INTRAMUSCULAR; INTRAVENOUS
Status: DISCONTINUED | OUTPATIENT
Start: 2020-01-11 | End: 2020-01-17 | Stop reason: HOSPADM

## 2020-01-11 RX ORDER — FENTANYL CITRATE 50 UG/ML
INJECTION, SOLUTION INTRAMUSCULAR; INTRAVENOUS
Status: DISCONTINUED | OUTPATIENT
Start: 2020-01-11 | End: 2020-01-11

## 2020-01-11 RX ORDER — PROPOFOL 10 MG/ML
VIAL (ML) INTRAVENOUS
Status: DISCONTINUED | OUTPATIENT
Start: 2020-01-11 | End: 2020-01-11

## 2020-01-11 RX ORDER — HYDROCODONE BITARTRATE AND ACETAMINOPHEN 5; 325 MG/1; MG/1
1 TABLET ORAL EVERY 4 HOURS PRN
Status: DISCONTINUED | OUTPATIENT
Start: 2020-01-11 | End: 2020-01-17 | Stop reason: HOSPADM

## 2020-01-11 RX ORDER — ONDANSETRON 2 MG/ML
4 INJECTION INTRAMUSCULAR; INTRAVENOUS DAILY PRN
Status: DISCONTINUED | OUTPATIENT
Start: 2020-01-11 | End: 2020-01-11

## 2020-01-11 RX ORDER — ENOXAPARIN SODIUM 100 MG/ML
40 INJECTION SUBCUTANEOUS EVERY 24 HOURS
Status: DISCONTINUED | OUTPATIENT
Start: 2020-01-12 | End: 2020-01-11

## 2020-01-11 RX ORDER — LIDOCAINE HYDROCHLORIDE 20 MG/ML
INJECTION, SOLUTION EPIDURAL; INFILTRATION; INTRACAUDAL; PERINEURAL
Status: DISCONTINUED | OUTPATIENT
Start: 2020-01-11 | End: 2020-01-11

## 2020-01-11 RX ORDER — MAGNESIUM SULFATE 1 G/100ML
1 INJECTION INTRAVENOUS
Status: DISCONTINUED | OUTPATIENT
Start: 2020-01-11 | End: 2020-01-17 | Stop reason: HOSPADM

## 2020-01-11 RX ORDER — LANOLIN ALCOHOL/MO/W.PET/CERES
800 CREAM (GRAM) TOPICAL
Status: DISCONTINUED | OUTPATIENT
Start: 2020-01-11 | End: 2020-01-17 | Stop reason: HOSPADM

## 2020-01-11 RX ORDER — SODIUM CHLORIDE 0.9 % (FLUSH) 0.9 %
10 SYRINGE (ML) INJECTION
Status: DISCONTINUED | OUTPATIENT
Start: 2020-01-11 | End: 2020-01-11

## 2020-01-11 RX ORDER — POTASSIUM CHLORIDE 7.45 MG/ML
20 INJECTION INTRAVENOUS
Status: DISCONTINUED | OUTPATIENT
Start: 2020-01-11 | End: 2020-01-17 | Stop reason: HOSPADM

## 2020-01-11 RX ORDER — SODIUM CHLORIDE, SODIUM LACTATE, POTASSIUM CHLORIDE, CALCIUM CHLORIDE 600; 310; 30; 20 MG/100ML; MG/100ML; MG/100ML; MG/100ML
INJECTION, SOLUTION INTRAVENOUS CONTINUOUS PRN
Status: DISCONTINUED | OUTPATIENT
Start: 2020-01-11 | End: 2020-01-11

## 2020-01-11 RX ORDER — VANCOMYCIN HCL IN 5 % DEXTROSE 1G/250ML
1000 PLASTIC BAG, INJECTION (ML) INTRAVENOUS ONCE
Status: COMPLETED | OUTPATIENT
Start: 2020-01-11 | End: 2020-01-11

## 2020-01-11 RX ORDER — VANCOMYCIN HCL IN 5 % DEXTROSE 1G/250ML
1000 PLASTIC BAG, INJECTION (ML) INTRAVENOUS
Status: DISCONTINUED | OUTPATIENT
Start: 2020-01-12 | End: 2020-01-13

## 2020-01-11 RX ADMIN — PROPOFOL 30 MG: 10 INJECTION, EMULSION INTRAVENOUS at 02:01

## 2020-01-11 RX ADMIN — MIDAZOLAM HYDROCHLORIDE 0.5 MG: 1 INJECTION, SOLUTION INTRAMUSCULAR; INTRAVENOUS at 01:01

## 2020-01-11 RX ADMIN — FENTANYL CITRATE 25 MCG: 50 INJECTION INTRAMUSCULAR; INTRAVENOUS at 02:01

## 2020-01-11 RX ADMIN — SODIUM CHLORIDE, SODIUM LACTATE, POTASSIUM CHLORIDE, AND CALCIUM CHLORIDE: .6; .31; .03; .02 INJECTION, SOLUTION INTRAVENOUS at 05:01

## 2020-01-11 RX ADMIN — LIDOCAINE HYDROCHLORIDE 60 MG: 20 INJECTION, SOLUTION EPIDURAL; INFILTRATION; INTRACAUDAL; PERINEURAL at 01:01

## 2020-01-11 RX ADMIN — SODIUM CHLORIDE: 9 INJECTION, SOLUTION INTRAVENOUS at 02:01

## 2020-01-11 RX ADMIN — FAMOTIDINE 20 MG: 10 INJECTION, SOLUTION INTRAVENOUS at 02:01

## 2020-01-11 RX ADMIN — MORPHINE SULFATE 4 MG: 4 INJECTION INTRAVENOUS at 03:01

## 2020-01-11 RX ADMIN — HYDROMORPHONE HYDROCHLORIDE 0.2 MG: 1 INJECTION, SOLUTION INTRAMUSCULAR; INTRAVENOUS; SUBCUTANEOUS at 04:01

## 2020-01-11 RX ADMIN — VANCOMYCIN HYDROCHLORIDE 1000 MG: 1 INJECTION, POWDER, LYOPHILIZED, FOR SOLUTION INTRAVENOUS at 12:01

## 2020-01-11 RX ADMIN — ACETAMINOPHEN 975 MG: 325 SUPPOSITORY RECTAL at 09:01

## 2020-01-11 RX ADMIN — ONDANSETRON HYDROCHLORIDE 4 MG: 2 INJECTION, SOLUTION INTRAMUSCULAR; INTRAVENOUS at 03:01

## 2020-01-11 RX ADMIN — KETAMINE HYDROCHLORIDE 25 MG: 50 INJECTION INTRAMUSCULAR; INTRAVENOUS at 02:01

## 2020-01-11 RX ADMIN — SUGAMMADEX 110 MG: 100 INJECTION, SOLUTION INTRAVENOUS at 03:01

## 2020-01-11 RX ADMIN — POTASSIUM CHLORIDE 10 MEQ: 7.46 INJECTION, SOLUTION INTRAVENOUS at 02:01

## 2020-01-11 RX ADMIN — ROCURONIUM BROMIDE 15 MG: 10 INJECTION, SOLUTION INTRAVENOUS at 02:01

## 2020-01-11 RX ADMIN — PROPOFOL 100 MG: 10 INJECTION, EMULSION INTRAVENOUS at 02:01

## 2020-01-11 RX ADMIN — FENTANYL CITRATE 50 MCG: 50 INJECTION INTRAMUSCULAR; INTRAVENOUS at 01:01

## 2020-01-11 RX ADMIN — ROCURONIUM BROMIDE 5 MG: 10 INJECTION, SOLUTION INTRAVENOUS at 02:01

## 2020-01-11 RX ADMIN — SUCCINYLCHOLINE CHLORIDE 100 MG: 20 INJECTION, SOLUTION INTRAMUSCULAR; INTRAVENOUS at 02:01

## 2020-01-11 RX ADMIN — IOHEXOL 100 ML: 350 INJECTION, SOLUTION INTRAVENOUS at 05:01

## 2020-01-11 RX ADMIN — SODIUM CHLORIDE, SODIUM LACTATE, POTASSIUM CHLORIDE, AND CALCIUM CHLORIDE 1000 ML: .6; .31; .03; .02 INJECTION, SOLUTION INTRAVENOUS at 03:01

## 2020-01-11 RX ADMIN — PIPERACILLIN SODIUM AND TAZOBACTAM SODIUM 3.38 G: 3; .375 INJECTION, POWDER, LYOPHILIZED, FOR SOLUTION INTRAVENOUS at 05:01

## 2020-01-11 RX ADMIN — DEXAMETHASONE SODIUM PHOSPHATE 8 MG: 4 INJECTION, SOLUTION INTRAMUSCULAR; INTRAVENOUS at 02:01

## 2020-01-11 RX ADMIN — PIPERACILLIN AND TAZOBACTAM 3.38 G: 3; .375 INJECTION, POWDER, LYOPHILIZED, FOR SOLUTION INTRAVENOUS; PARENTERAL at 08:01

## 2020-01-11 RX ADMIN — SODIUM CHLORIDE, SODIUM LACTATE, POTASSIUM CHLORIDE, AND CALCIUM CHLORIDE: .6; .31; .03; .02 INJECTION, SOLUTION INTRAVENOUS at 01:01

## 2020-01-11 RX ADMIN — SODIUM CHLORIDE, SODIUM LACTATE, POTASSIUM CHLORIDE, AND CALCIUM CHLORIDE 566 ML: .6; .31; .03; .02 INJECTION, SOLUTION INTRAVENOUS at 08:01

## 2020-01-11 RX ADMIN — ONDANSETRON 4 MG: 2 INJECTION INTRAMUSCULAR; INTRAVENOUS at 01:01

## 2020-01-11 NOTE — H&P
[facility] Medicine  History & Physical    Patient Name: Ariana Tiwari  MRN: [id number]  Admission Date: 1/11/2020  Attending Physician: Je Thomas MD  Primary Care Provider: Nba Petty MD         Patient information was obtained from patient, relative(s) and ER records.     Subjective:     Principal Problem:Volvulus    Chief Complaint:   Chief Complaint   Patient presents with    Abdominal Pain     c/o marked abd. distention and abd. pain.      Has had sharp pain since Friday morning, but has had chronic abd. pain and distention for long period        HPI: 74 year old female presented to ED complaining of 1 day history of LLQ pain: aching to sharp, waxing and waning of own accord 7-8/10. She has had nausea, but no vomiting until she drank contrast in ED. Then the vomitus was contrast material only. She was given antiemetic and was able to finish drinking contrast. No nausea currently. Pain is persisting. CT Abdo/Pelvis = volvulus. Spoke personally with ED physician, who related the patient had spiked temp 102F. She was cultured and started on vancomycin and piperacillin. General Surgery was notified, and related to place NGT and patient would possibly require surgery. The patient denies CP/SOB. Labs personally reviewed: there is leukocytosis, no anemia; mild hypokalemia with normal renal function. Telemetry personally reviewed = NSR      Past Medical History:   Diagnosis Date    Allergy     Diverticulosis     Gluten enteropathy     Gluten intolerance     Hernia     Hypothyroidism     PD (Parkinson's disease) 9/16/2015    Right tremor type    Peptic ulcer disease     Right shoulder pain     Cirtisone injection 3/26/15 - Dr. Tolbert    Ulcer        Past Surgical History:   Procedure Laterality Date    ADENOIDECTOMY      COLONOSCOPY  03/01/2012    Dr. Teresa, repeat in 10 years for screening    COLONOSCOPY N/A 2/21/2018    Procedure: COLONOSCOPY;  Surgeon: Radha TOWNSEND  MD Ish;  Location: [facility] ENDO;  Service: Endoscopy;  Laterality: N/A;    COSMETIC SURGERY      Broken nose    FRACTURE SURGERY  left wrist    With pin    HEMORRHOID SURGERY      HERNIA REPAIR Left 04/09/2015    Dr Lo; left inguinal    INTRALUMINAL GASTROINTESTINAL TRACT IMAGING VIA CAPSULE N/A 7/10/2018    Procedure: IMAGING, GI TRACT, INTRALUMINAL, VIA CAPSULE;  Surgeon: Radha Deng MD;  Location: [facility] ENDO;  Service: Endoscopy;  Laterality: N/A;    RHINOPLASTY TIP      TONSILLECTOMY      UPPER GASTROINTESTINAL ENDOSCOPY  05/21/2015    Dr. Gomez       Review of patient's allergies indicates:   Allergen Reactions    Gluten Diarrhea       No current facility-administered medications on file prior to encounter.      Current Outpatient Medications on File Prior to Encounter   Medication Sig    acetaminophen (TYLENOL) 500 MG tablet Take 500 mg by mouth every 6 (six) hours as needed for Pain.    cholecalciferol, vitamin D3, (VITAMIN D3) 2,000 unit Cap Take 1 capsule by mouth once daily.     cholestyramine (QUESTRAN) 4 gram packet Take 4 g by mouth 4 (four) times daily.    CINNAMON BARK ORAL Take 1,000 mg by mouth once daily.    GLUCOSAMINE HCL ORAL Take 1,500 mg by mouth once daily.    ibandronate (BONIVA) 150 mg tablet Take 150 mg by mouth every 30 days.    ipratropium (ATROVENT) 42 mcg (0.06 %) nasal spray 2 sprays by Nasal route 4 (four) times daily.    L-THREONINE MISC Take 100 mg by mouth daily as needed.    levothyroxine (SYNTHROID) 125 MCG tablet Take 62.5 mcg by mouth once daily.    ibuprofen (ADVIL,MOTRIN) 200 MG tablet Take 400 mg by mouth daily as needed for Pain.     levoFLOXacin (LEVAQUIN) 500 MG tablet Take 1 tablet (500 mg total) by mouth once daily. (Patient not taking: Reported on 10/2/2019)    psyllium 0.52 gram capsule Take 0.52 g by mouth 2 (two) times daily.    [DISCONTINUED] ibandronate (BONIVA) 150 mg tablet  TAKE 1 TABLET BY MOUTH EVERY MONTH    [DISCONTINUED]  levothyroxine (SYNTHROID) 125 MCG tablet TAKE 1/2 (ONE-HALF) TABLET BY MOUTH ONCE DAILY     Family History     Problem Relation (Age of Onset)    Alcohol abuse Brother    Diabetes Mother, Son    Early death Brother    Heart disease Brother    No Known Problems Father    Obesity Sister        Tobacco Use    Smoking status: Never Smoker    Smokeless tobacco: Never Used   Substance and Sexual Activity    Alcohol use: Yes     Alcohol/week: 11.7 standard drinks     Types: 14 Standard drinks or equivalent per week     Comment: 2 glasses wine per dinner    Drug use: No    Sexual activity: Never     Review of Systems   Constitutional: Negative.    HENT: Negative.    Eyes: Negative.    Respiratory: Negative.    Cardiovascular: Negative.    Gastrointestinal: Positive for abdominal distention, abdominal pain and nausea.   Endocrine: Negative.    Genitourinary: Negative.    Musculoskeletal: Negative.    Skin: Negative.    Allergic/Immunologic: Negative.    Neurological: Negative.    Hematological: Negative.    All other systems reviewed and are negative.    Objective:     Vital Signs (Most Recent):  Temp: (!) 102.2 °F (39 °C) (01/11/20 0814)  Pulse: 96 (01/11/20 0930)  Resp: (!) 27 (01/11/20 0930)  BP: 123/60 (01/11/20 0930)  SpO2: (!) 91 % (01/11/20 0930) Vital Signs (24h Range):  Temp:  [97.8 °F (36.6 °C)-102.2 °F (39 °C)] 102.2 °F (39 °C)  Pulse:  [77-96] 96  Resp:  [20-33] 27  SpO2:  [88 %-98 %] 91 %  BP: (118-189)/(58-87) 123/60     Weight: 52.2 kg (115 lb)  Body mass index is 20.37 kg/m².    Physical Exam   Constitutional: She is oriented to person, place, and time. She appears well-developed and well-nourished.   HENT:   Head: Normocephalic and atraumatic.   Eyes: Pupils are equal, round, and reactive to light. Conjunctivae and EOM are normal.   Neck: Normal range of motion. Neck supple.   Cardiovascular: Normal rate, regular rhythm, normal heart sounds and intact distal pulses.   Pulmonary/Chest: Effort normal and  breath sounds normal.   Decreased entry bases without adventitious sounds   Abdominal: Bowel sounds are normal. She exhibits distension. There is tenderness.   Tender LLQ to minimal palpation   Musculoskeletal: Normal range of motion.   Neurological: She is alert and oriented to person, place, and time.   Skin: Skin is warm and dry. Capillary refill takes less than 2 seconds.   Psychiatric: She has a normal mood and affect. Her behavior is normal. Judgment and thought content normal.   Nursing note and vitals reviewed.        CRANIAL NERVES     CN III, IV, VI   Pupils are equal, round, and reactive to light.  Extraocular motions are normal.        Significant Labs:   CBC:   Recent Labs   Lab 01/11/20  0330   WBC 13.15*   HGB 14.6   HCT 45.9        CMP:   Recent Labs   Lab 01/11/20  0330      K 3.4*      CO2 22*   *   BUN 15   CREATININE 0.4*   CALCIUM 8.6*   PROT 8.0   ALBUMIN 4.4   BILITOT 1.2*   ALKPHOS 133   AST 21   ALT 23   ANIONGAP 13   EGFRNONAA >60.0       Significant Imaging: I have reviewed and interpreted all pertinent imaging results/findings within the past 24 hours.    Assessment/Plan:     No notes have been filed under this hospital service.  Service: Hospital Medicine    VTE Risk Mitigation (From admission, onward)    None             Je Thomas MD  Department of Hospital Medicine   [facility]

## 2020-01-11 NOTE — HPI
74-year-old  female presents to the emergency department with acute onset of generalized abdominal pain. She reports she has had episodes of small-bowel obstruction which have been treated conservatively in the past.  She reports that the pain has got much severe this morning and brought to the emergency department for further treatment.  She does not answer questions well as she appears to be in distress.

## 2020-01-11 NOTE — HPI
74 year old female presented to ED complaining of 1 day history of LLQ pain: aching to sharp, waxing and waning of own accord 7-8/10. She has had nausea, but no vomiting until she drank contrast in ED. Then the vomitus was contrast material only. She was given antiemetic and was able to finish drinking contrast. No nausea currently. Pain is persisting. CT Abdo/Pelvis = volvulus. Spoke personally with ED physician, who related the patient had spiked temp 102F. She was cultured and started on vancomycin and piperacillin. General Surgery was notified, and related to place NGT and patient would possibly require surgery. The patient denies CP/SOB. Labs personally reviewed: there is leukocytosis, no anemia; mild hypokalemia with normal renal function. Telemetry personally reviewed = NSR

## 2020-01-11 NOTE — SUBJECTIVE & OBJECTIVE
No current facility-administered medications on file prior to encounter.      Current Outpatient Medications on File Prior to Encounter   Medication Sig    acetaminophen (TYLENOL) 500 MG tablet Take 500 mg by mouth every 6 (six) hours as needed for Pain.    cholecalciferol, vitamin D3, (VITAMIN D3) 2,000 unit Cap Take 1 capsule by mouth once daily.     cholestyramine (QUESTRAN) 4 gram packet Take 4 g by mouth 4 (four) times daily.    CINNAMON BARK ORAL Take 1,000 mg by mouth once daily.    GLUCOSAMINE HCL ORAL Take 1,500 mg by mouth once daily.    ibandronate (BONIVA) 150 mg tablet Take 150 mg by mouth every 30 days.    ipratropium (ATROVENT) 42 mcg (0.06 %) nasal spray 2 sprays by Nasal route 4 (four) times daily.    L-THREONINE MISC Take 100 mg by mouth daily as needed.    levothyroxine (SYNTHROID) 125 MCG tablet Take 62.5 mcg by mouth once daily.    ibuprofen (ADVIL,MOTRIN) 200 MG tablet Take 400 mg by mouth daily as needed for Pain.     levoFLOXacin (LEVAQUIN) 500 MG tablet Take 1 tablet (500 mg total) by mouth once daily. (Patient not taking: Reported on 10/2/2019)    psyllium 0.52 gram capsule Take 0.52 g by mouth 2 (two) times daily.    [DISCONTINUED] ibandronate (BONIVA) 150 mg tablet  TAKE 1 TABLET BY MOUTH EVERY MONTH    [DISCONTINUED] levothyroxine (SYNTHROID) 125 MCG tablet TAKE 1/2 (ONE-HALF) TABLET BY MOUTH ONCE DAILY       Review of patient's allergies indicates:   Allergen Reactions    Gluten Diarrhea       Past Medical History:   Diagnosis Date    Allergy     Diverticulosis     Gluten enteropathy     Gluten intolerance     Hernia     Hypothyroidism     PD (Parkinson's disease) 9/16/2015    Right tremor type    Peptic ulcer disease     Right shoulder pain     Cirtisone injection 3/26/15 - Dr. Tolbert    Ulcer      Past Surgical History:   Procedure Laterality Date    ADENOIDECTOMY      COLONOSCOPY  03/01/2012    Dr. Teresa, repeat in 10 years for screening    COLONOSCOPY  N/A 2/21/2018    Procedure: COLONOSCOPY;  Surgeon: Radha Deng MD;  Location: [facility];  Service: Endoscopy;  Laterality: N/A;    COSMETIC SURGERY      Broken nose    FRACTURE SURGERY  left wrist    With pin    HEMORRHOID SURGERY      HERNIA REPAIR Left 04/09/2015    Dr Lo; left inguinal    INTRALUMINAL GASTROINTESTINAL TRACT IMAGING VIA CAPSULE N/A 7/10/2018    Procedure: IMAGING, GI TRACT, INTRALUMINAL, VIA CAPSULE;  Surgeon: Radha Deng MD;  Location: [facility];  Service: Endoscopy;  Laterality: N/A;    RHINOPLASTY TIP      TONSILLECTOMY      UPPER GASTROINTESTINAL ENDOSCOPY  05/21/2015    Dr. Gomez     Family History     Problem Relation (Age of Onset)    Alcohol abuse Brother    Diabetes Mother, Son    Early death Brother    Heart disease Brother    No Known Problems Father    Obesity Sister        Tobacco Use    Smoking status: Never Smoker    Smokeless tobacco: Never Used   Substance and Sexual Activity    Alcohol use: Yes     Alcohol/week: 11.7 standard drinks     Types: 14 Standard drinks or equivalent per week     Comment: 2 glasses wine per dinner    Drug use: No    Sexual activity: Never     Review of Systems   Unable to perform ROS: Acuity of condition     Objective:     Vital Signs (Most Recent):  Temp: 100 °F (37.8 °C) (01/11/20 1238)  Pulse: 90 (01/11/20 1234)  Resp: (!) 26 (01/11/20 1234)  BP: 125/65 (01/11/20 1234)  SpO2: (!) 90 % (01/11/20 1234) Vital Signs (24h Range):  Temp:  [97.8 °F (36.6 °C)-102.2 °F (39 °C)] 100 °F (37.8 °C)  Pulse:  [77-96] 90  Resp:  [20-37] 26  SpO2:  [88 %-98 %] 90 %  BP: (118-189)/(56-87) 125/65     Weight: 52.2 kg (115 lb)  Body mass index is 20.37 kg/m².    Physical Exam   Constitutional: She is oriented to person, place, and time. She appears well-developed and well-nourished. No distress.   HENT:   Head: Normocephalic and atraumatic.   Mouth/Throat: No oropharyngeal exudate.   Eyes: Pupils are equal, round, and reactive to light.  Conjunctivae and EOM are normal. No scleral icterus.   Neck: Normal range of motion. Neck supple. No JVD present. No tracheal deviation present. No thyromegaly present.   Cardiovascular: Normal rate, regular rhythm and normal heart sounds. Exam reveals no gallop and no friction rub.   No murmur heard.  Pulmonary/Chest: Effort normal and breath sounds normal. No stridor. No respiratory distress. She has no wheezes. She has no rales. She exhibits no tenderness.   Abdominal: Soft. Bowel sounds are normal. She exhibits no distension and no mass. There is tenderness. There is rebound and guarding.   Peritoneal signs all along the left side of the abdomen she is tender on the right side of the abdomen as well   Musculoskeletal: Normal range of motion. She exhibits edema. She exhibits no tenderness.   Lymphadenopathy:     She has no cervical adenopathy.   Neurological: She is alert and oriented to person, place, and time. No cranial nerve deficit.   Skin: Skin is warm and dry. No rash noted. She is not diaphoretic. No erythema.   Psychiatric: She has a normal mood and affect. Her behavior is normal.   Nursing note and vitals reviewed.      Significant Labs:  CBC:   Recent Labs   Lab 01/11/20  0330   WBC 13.15*   RBC 5.15   HGB 14.6   HCT 45.9      MCV 89   MCH 28.3   MCHC 31.8*     BMP:   Recent Labs   Lab 01/11/20  0330   *      K 3.4*      CO2 22*   BUN 15   CREATININE 0.4*   CALCIUM 8.6*       Significant Diagnostics:  CT: I have reviewed all pertinent results/findings within the past 24 hours and my personal findings are:  Mesenteric swirling seen, signs of obstruction in her intestines

## 2020-01-11 NOTE — ED NOTES
Blood cultures in progress.  Reassessed by MD. NEWBY.  No RD. Remains with tachypnea and febrile. Opens eyes and can say name.

## 2020-01-11 NOTE — ANESTHESIA PROCEDURE NOTES
Intubation  Performed by: Dana Del Castillo CRNA  Authorized by: Nicola Dixon MD     Intubation:     Induction:  Intravenous    Intubated:  Postinduction    Mask Ventilation:  N/a (RSI)    Attempts:  1    Attempted By:  CRNA    Method of Intubation:  Direct    Blade:  Odell 2    Laryngeal View Grade: Grade IIA - cords partially seen      Difficult Airway Encountered?: No      Complications:  None    Airway Device:  Oral endotracheal tube    Airway Device Size:  7.5    Style/Cuff Inflation:  Cuffed    Tube secured:  21    Secured at:  The lips    Placement Verified By:  Capnometry    Complicating Factors:  Small mouth    Findings Post-Intubation:  BS equal bilateral and atraumatic/condition of teeth unchanged

## 2020-01-11 NOTE — PROGRESS NOTES
VANCOMYCIN PHARMACOKINETIC NOTE:  Vancomycin Day # 1    Objective/Assessment:    Diagnosis/Indication for Vancomycin: Intra-abdominal     74 y.o., female; Actual Body Weight = 52.2 kg (115 lb).    The patient has the following labs:     1/11/2020 Estimated Creatinine Clearance: 100.1 mL/min (A) (based on SCr of 0.4 mg/dL (L)). Lab Results   Component Value Date    BUN 15 01/11/2020       Lab Results   Component Value Date    WBC 13.15 (H) 01/11/2020          Plan:  Adjust vancomycin dose and/or frequency based on the patient's actual weight and renal function:  Initiate Vancomycin 1000 mg IV every 12 hours.  Orders have been entered into patient's chart.    Vancomycin dose = 19.2 mg/kg actual body weight    Vancomycin trough level has been ordered for prior to the 4th dose.    Pharmacy will manage vancomycin therapy, monitor serum vancomycin levels, monitor renal function and adjust regimen as necessary.    Thank you for allowing us to participate in this patient's care.     Rahul Katz 1/11/2020 12:43 PM  Department of Pharmacy  Ext [phone]

## 2020-01-11 NOTE — ED PROVIDER NOTES
Encounter Date: 1/10/2020       History     Chief Complaint   Patient presents with    Abdominal Pain     c/o marked abd. distention and abd. pain.      Has had sharp pain since Friday morning, but has had chronic abd. pain and distention for long period     HPI   74-year-old female with history of left inguinal hernia, recent partial bowel obstruction, Parkinson's, diverticulosis presenting with acute on chronic abdominal pain.  Patient has had abdominal distention and baseline pain for over 1 year.  However, she states that pain became severe and localized to the left lower quadrant earlier this morning.  Denies radiation of pain.  Describes it as sharp.  Constipated intermittently worsens but no particular exacerbating factors.  Reports nausea without vomiting. Last BM 2 days ago.  Passing flatus today.  Of note, patient had recent hospital admission in September 2019 for partial small-bowel obstruction that was managed nonsurgically.  Denies fever, urinary symptoms.  Review of patient's allergies indicates:   Allergen Reactions    Gluten Diarrhea     Past Medical History:   Diagnosis Date    Allergy     Diverticulosis     Gluten enteropathy     Gluten intolerance     Hernia     Hypothyroidism     PD (Parkinson's disease) 9/16/2015    Right tremor type    Peptic ulcer disease     Right shoulder pain     Cirtisone injection 3/26/15 - Dr. Tolbert    Ulcer      Past Surgical History:   Procedure Laterality Date    ADENOIDECTOMY      COLONOSCOPY  03/01/2012    Dr. Teresa, repeat in 10 years for screening    COLONOSCOPY N/A 2/21/2018    Procedure: COLONOSCOPY;  Surgeon: Radha Deng MD;  [facility];  Service: Endoscopy;  Laterality: N/A;    COSMETIC SURGERY      Broken nose    FRACTURE SURGERY  left wrist    With pin    HEMORRHOID SURGERY      HERNIA REPAIR Left 04/09/2015    Dr Lo; left inguinal    INTRALUMINAL GASTROINTESTINAL TRACT IMAGING VIA CAPSULE N/A 7/10/2018    Procedure:  IMAGING, GI TRACT, INTRALUMINAL, VIA CAPSULE;  Surgeon: Radha Deng MD;  Location: [facility];  Service: Endoscopy;  Laterality: N/A;    RHINOPLASTY TIP      TONSILLECTOMY      UPPER GASTROINTESTINAL ENDOSCOPY  05/21/2015    Dr. Gomez     Family History   Problem Relation Age of Onset    Diabetes Mother     Diabetes Son     Obesity Sister     Alcohol abuse Brother     Heart disease Brother     No Known Problems Father     Early death Brother         self    Breast cancer Neg Hx     Colon cancer Neg Hx     Ovarian cancer Neg Hx     Colon polyps Neg Hx     Crohn's disease Neg Hx     Ulcerative colitis Neg Hx     Celiac disease Neg Hx      Social History     Tobacco Use    Smoking status: Never Smoker    Smokeless tobacco: Never Used   Substance Use Topics    Alcohol use: Yes     Alcohol/week: 11.7 standard drinks     Types: 14 Standard drinks or equivalent per week     Comment: 2 glasses wine per dinner    Drug use: No     Review of Systems   Constitutional: Positive for chills. Negative for fever.   HENT: Negative for sore throat and trouble swallowing.    Eyes: Negative for photophobia and visual disturbance.   Respiratory: Negative for shortness of breath and wheezing.    Cardiovascular: Negative for chest pain and palpitations.   Gastrointestinal: Positive for abdominal distention, abdominal pain and nausea. Negative for vomiting.   Genitourinary: Negative for dysuria and hematuria.   Musculoskeletal: Negative for back pain and joint swelling.   Skin: Negative for rash and wound.   Neurological: Positive for tremors. Negative for weakness.   Hematological: Does not bruise/bleed easily.   Psychiatric/Behavioral: Negative for confusion and decreased concentration.       Physical Exam     Initial Vitals [01/11/20 0126]   BP Pulse Resp Temp SpO2   137/62 78 20 97.8 °F (36.6 °C) 98 %      MAP       --         Physical Exam    Nursing note and vitals reviewed.  Constitutional: She appears  well-developed and well-nourished. She is not diaphoretic. She appears distressed (secondary to pain).   HENT:   Head: Normocephalic and atraumatic.   Eyes: Conjunctivae and EOM are normal. Right eye exhibits no discharge. Left eye exhibits no discharge. No scleral icterus.   Neck: Normal range of motion. Neck supple.   Cardiovascular: Normal rate, regular rhythm, normal heart sounds and intact distal pulses. Exam reveals no gallop and no friction rub.    No murmur heard.  Pulmonary/Chest: Breath sounds normal. No respiratory distress. She has no wheezes. She has no rhonchi. She has no rales. She exhibits no tenderness.   Abdominal: Soft. Bowel sounds are normal. She exhibits distension ( markedly distended). There is tenderness ( moderate left lower quadrant tenderness to palpation with guarding. Mild tenderness diffusely). There is guarding. There is no rebound.   Musculoskeletal: Normal range of motion. She exhibits no edema.   Neurological: She is alert and oriented to person, place, and time. GCS score is 15. GCS eye subscore is 4. GCS verbal subscore is 5. GCS motor subscore is 6.   Tremor noted, greater on the right   Skin: Skin is warm and dry. Capillary refill takes 2 to 3 seconds.   Psychiatric: She has a normal mood and affect.         ED Course   Procedures  Labs Reviewed   CBC W/ AUTO DIFFERENTIAL   COMPREHENSIVE METABOLIC PANEL   LIPASE   URINALYSIS   LACTIC ACID, PLASMA          Imaging Results    None                APC / Resident Notes:   74-year-old female with history of left inguinal hernia, partial small bowel obstruction, diverticulosis, Parkinson's presenting with acute on chronic left lower quadrant abdominal pain that began approximately 1 day ago.  Afebrile, vital signs stable, uncomfortable appearing secondary to pain. Differential diagnosis includes not limited to diverticulitis, partial SBO, incarcerated hernia, constipation, pancreatitis, urinary tract infection, infectious colitis.  We  will obtain GI workup and CT abdomen pelvis.  Pain control and hydration while workup pending.    Je Callaway, PGY-4  2:46 AM        Labs notable for leukocytosis with WBC 13.2k, CMP with mild decrease in bicarb at 22 with no elevated anion gap.  Lipase within normal limits. Lactic acid 2.1.  CT abdomen pelvis is pending.    Je Callaway, PGY-4  5:52 AM          Attending Attestation:             Attending ED Notes:   This 74-year-old female presented with complaints of abdominal pain, had a CT scan of the abdomen and pelvis which showed possible volvulus at the root of the mesentery with a partial obstruction.  Labs showed a normal urinalysis.  Lactate was 2.1.  Blood sugar is 152 and a potassium of 3.4.  CBC had a white count of 13.2 and H&H of 14.6 and 45.9.  During the ED course the patient did spike a fever of 102 cultures of blood and urine were obtained. She was given 30 cc/kilos crystalloid.  An NG tube will be placed.  The patient is started on vancomycin and Zosyn.  A consult was placed to General surgery Dr. Mcarthur is aware of the patient will be seeing her in consultation.  Hospital Medicine is consulted Dr. Thomas will be admitting the patient.                        Clinical Impression:       ICD-10-CM ICD-9-CM   1. Abdominal pain, unspecified abdominal location R10.9 789.00   2. Volvulus K56.2 560.2   3. Sepsis, due to unspecified organism, unspecified whether acute organ dysfunction present A41.9 038.9     995.91                             Jonathon Vasquez Jr., MD  01/11/20 [id number]

## 2020-01-11 NOTE — CONSULTS
[facility]  General Surgery  Consult Note    Patient Name: Ariana Tiwari  MRN: [id number]  Code Status: Prior  Admission Date: 1/11/2020  Hospital Length of Stay: 0 days  Attending Physician: Jonathon Vasquez Jr., *  Primary Care Provider: Nba Petty MD    Patient information was obtained from patient and ER records.     Inpatient consult to General surgery  Consult performed by: Sandra Mcarthur MD  Consult ordered by: Jonathon Vasquez Jr., MD  Reason for consult: volvulus  Assessment/Recommendations: Urgent surgery        Subjective:     Principal Problem: Volvulus    History of Present Illness: 74-year-old  female presents to the emergency department with acute onset of generalized abdominal pain. She reports she has had episodes of small-bowel obstruction which have been treated conservatively in the past.  She reports that the pain has got much severe this morning and brought to the emergency department for further treatment.  She does not answer questions well as she appears to be in distress.    No current facility-administered medications on file prior to encounter.      Current Outpatient Medications on File Prior to Encounter   Medication Sig    acetaminophen (TYLENOL) 500 MG tablet Take 500 mg by mouth every 6 (six) hours as needed for Pain.    cholecalciferol, vitamin D3, (VITAMIN D3) 2,000 unit Cap Take 1 capsule by mouth once daily.     cholestyramine (QUESTRAN) 4 gram packet Take 4 g by mouth 4 (four) times daily.    CINNAMON BARK ORAL Take 1,000 mg by mouth once daily.    GLUCOSAMINE HCL ORAL Take 1,500 mg by mouth once daily.    ibandronate (BONIVA) 150 mg tablet Take 150 mg by mouth every 30 days.    ipratropium (ATROVENT) 42 mcg (0.06 %) nasal spray 2 sprays by Nasal route 4 (four) times daily.    L-THREONINE MISC Take 100 mg by mouth daily as needed.    levothyroxine (SYNTHROID) 125 MCG tablet Take 62.5 mcg by mouth once daily.    ibuprofen  (ADVIL,MOTRIN) 200 MG tablet Take 400 mg by mouth daily as needed for Pain.     levoFLOXacin (LEVAQUIN) 500 MG tablet Take 1 tablet (500 mg total) by mouth once daily. (Patient not taking: Reported on 10/2/2019)    psyllium 0.52 gram capsule Take 0.52 g by mouth 2 (two) times daily.    [DISCONTINUED] ibandronate (BONIVA) 150 mg tablet  TAKE 1 TABLET BY MOUTH EVERY MONTH    [DISCONTINUED] levothyroxine (SYNTHROID) 125 MCG tablet TAKE 1/2 (ONE-HALF) TABLET BY MOUTH ONCE DAILY       Review of patient's allergies indicates:   Allergen Reactions    Gluten Diarrhea       Past Medical History:   Diagnosis Date    Allergy     Diverticulosis     Gluten enteropathy     Gluten intolerance     Hernia     Hypothyroidism     PD (Parkinson's disease) 9/16/2015    Right tremor type    Peptic ulcer disease     Right shoulder pain     Cirtisone injection 3/26/15 - Dr. Tolbert    Ulcer      Past Surgical History:   Procedure Laterality Date    ADENOIDECTOMY      COLONOSCOPY  03/01/2012    Dr. Teresa, repeat in 10 years for screening    COLONOSCOPY N/A 2/21/2018    Procedure: COLONOSCOPY;  Surgeon: Radha Deng MD;  Location: [facility] END[facility];  Service: Endoscopy;  Laterality: N/A;    COSMETIC SURGERY      Broken nose    FRACTURE SURGERY  left wrist    With pin    HEMORRHOID SURGERY      HERNIA REPAIR Left 04/09/2015    Dr Lo; left inguinal    INTRALUMINAL GASTROINTESTINAL TRACT IMAGING VIA CAPSULE N/A 7/10/2018    Procedure: IMAGING, GI TRACT, INTRALUMINAL, VIA CAPSULE;  Surgeon: Radha Deng MD;  Location: [facility] ENDO;  Service: Endoscopy;  Laterality: N/A;    RHINOPLASTY TIP      TONSILLECTOMY      UPPER GASTROINTESTINAL ENDOSCOPY  05/21/2015    Dr. Gomez     Family History     Problem Relation (Age of Onset)    Alcohol abuse Brother    Diabetes Mother, Son    Early death Brother    Heart disease Brother    No Known Problems Father    Obesity Sister        Tobacco Use    Smoking status: Never Smoker     Smokeless tobacco: Never Used   Substance and Sexual Activity    Alcohol use: Yes     Alcohol/week: 11.7 standard drinks     Types: 14 Standard drinks or equivalent per week     Comment: 2 glasses wine per dinner    Drug use: No    Sexual activity: Never     Review of Systems   Unable to perform ROS: Acuity of condition     Objective:     Vital Signs (Most Recent):  Temp: 100 °F (37.8 °C) (01/11/20 1238)  Pulse: 90 (01/11/20 1234)  Resp: (!) 26 (01/11/20 1234)  BP: 125/65 (01/11/20 1234)  SpO2: (!) 90 % (01/11/20 1234) Vital Signs (24h Range):  Temp:  [97.8 °F (36.6 °C)-102.2 °F (39 °C)] 100 °F (37.8 °C)  Pulse:  [77-96] 90  Resp:  [20-37] 26  SpO2:  [88 %-98 %] 90 %  BP: (118-189)/(56-87) 125/65     Weight: 52.2 kg (115 lb)  Body mass index is 20.37 kg/m².    Physical Exam   Constitutional: She is oriented to person, place, and time. She appears well-developed and well-nourished. No distress.   HENT:   Head: Normocephalic and atraumatic.   Mouth/Throat: No oropharyngeal exudate.   Eyes: Pupils are equal, round, and reactive to light. Conjunctivae and EOM are normal. No scleral icterus.   Neck: Normal range of motion. Neck supple. No JVD present. No tracheal deviation present. No thyromegaly present.   Cardiovascular: Normal rate, regular rhythm and normal heart sounds. Exam reveals no gallop and no friction rub.   No murmur heard.  Pulmonary/Chest: Effort normal and breath sounds normal. No stridor. No respiratory distress. She has no wheezes. She has no rales. She exhibits no tenderness.   Abdominal: Soft. Bowel sounds are normal. She exhibits no distension and no mass. There is tenderness. There is rebound and guarding.   Peritoneal signs all along the left side of the abdomen she is tender on the right side of the abdomen as well   Musculoskeletal: Normal range of motion. She exhibits edema. She exhibits no tenderness.   Lymphadenopathy:     She has no cervical adenopathy.   Neurological: She is alert and  oriented to person, place, and time. No cranial nerve deficit.   Skin: Skin is warm and dry. No rash noted. She is not diaphoretic. No erythema.   Psychiatric: She has a normal mood and affect. Her behavior is normal.   Nursing note and vitals reviewed.      Significant Labs:  CBC:   Recent Labs   Lab 01/11/20  0330   WBC 13.15*   RBC 5.15   HGB 14.6   HCT 45.9      MCV 89   MCH 28.3   MCHC 31.8*     BMP:   Recent Labs   Lab 01/11/20  0330   *      K 3.4*      CO2 22*   BUN 15   CREATININE 0.4*   CALCIUM 8.6*       Significant Diagnostics:  CT: I have reviewed all pertinent results/findings within the past 24 hours and my personal findings are:  Mesenteric swirling seen, signs of obstruction in her intestines    Assessment/Plan:     * Volvulus  Appears to have an acute abdomen as well as a volvulus.  She needs an urgent exploratory laparotomy and possible resection of intestine.  I have discussed this with the family and the patient - gone over the risks and benefits of the surgery. She is aware that she may come out of the surgery on a ventilator in the ICU with a colostomy.  She is aware that this may result in a prolonged hospitalization.  We did discuss that should we do nothing that she will be very high risk of death      VTE Risk Mitigation (From admission, onward)    None          Thank you for your consult. I will follow-up with patient. Please contact us if you have any additional questions.    Sandra Mcarthur MD  General Surgery  [facility]

## 2020-01-11 NOTE — ASSESSMENT & PLAN NOTE
Appears to have an acute abdomen as well as a volvulus.  She needs an urgent exploratory laparotomy and possible resection of intestine.  I have discussed this with the family and the patient - gone over the risks and benefits of the surgery. She is aware that she may come out of the surgery on a ventilator in the ICU with a colostomy.  She is aware that this may result in a prolonged hospitalization.  We did discuss that should we do nothing that she will be very high risk of death

## 2020-01-11 NOTE — ANESTHESIA POSTPROCEDURE EVALUATION
Anesthesia Post Evaluation    Patient: Ariana Tiwari    Procedure(s) Performed: Procedure(s) (LRB):  LAPAROTOMY, EXPLORATORY (N/A)    Final Anesthesia Type: general    Patient location during evaluation: PACU  Patient participation: Yes- Able to Participate  Level of consciousness: awake and alert  Post-procedure vital signs: reviewed and stable  Pain management: adequate  Airway patency: patent    PONV status at discharge: No PONV  Anesthetic complications: no      Cardiovascular status: blood pressure returned to baseline and stable  Respiratory status: unassisted and room air  Hydration status: euvolemic  Follow-up not needed.          Vitals Value Taken Time   /59 1/11/2020  4:45 PM   Temp 36.4 °C (97.6 °F) 1/11/2020  4:45 PM   Pulse 69 1/11/2020  4:49 PM   Resp 16 1/11/2020  4:49 PM   SpO2 94 % 1/11/2020  4:49 PM   Vitals shown include unvalidated device data.      No case tracking events are documented in the log.      Pain/Wade Score: Pain Rating Prior to Med Admin: 6 (1/11/2020  4:27 PM)  Pain Rating Post Med Admin: 4 (1/11/2020  4:27 PM)  Wade Score: 8 (1/11/2020  4:45 PM)

## 2020-01-11 NOTE — TRANSFER OF CARE
"Anesthesia Transfer of Care Note    Patient: Ariana Tiwari    Procedure(s) Performed: Procedure(s) (LRB):  LAPAROTOMY, EXPLORATORY (N/A)    Patient location: PACU    Anesthesia Type: general    Transport from OR: Transported from OR on room air with adequate spontaneous ventilation    Post pain: adequate analgesia    Post assessment: no apparent anesthetic complications and tolerated procedure well    Post vital signs: stable    Level of consciousness: awake and alert    Nausea/Vomiting: no nausea/vomiting    Complications: none    Transfer of care protocol was followed      Last vitals:   Visit Vitals  BP (!) 152/66 (BP Location: Right arm, Patient Position: Lying)   Pulse 75   Temp 37 °C (98.6 °F) (Axillary)   Resp 18   Ht 5' 3" (1.6 m)   Wt 52.2 kg (115 lb)   LMP  (LMP Unknown)   SpO2 96%   BMI 20.37 kg/m²     "

## 2020-01-11 NOTE — OP NOTE
Exploratory laparotomy small bowel resection Procedure Note    Date of procedure:   01/11/2020    Indications:  Acute abdomen    Pre-operative Diagnosis:  Acute abdomen; small bowel volvulus    Post-operative Diagnosis:  Acute abdomen; perforated small bowel mass; peritonitis    Surgeon: Sandra Mcarthur MD    Assistants:  None    Anesthesia: General endotracheal anesthesia    ASA Class:   3     Procedure Details   The patient was seen in the Holding Room. The risks, benefits, complications, treatment options, and expected outcomes were discussed with the patient. The possibilities of reaction to medication, pulmonary aspiration, perforation of viscus, bleeding, recurrent infection, the need for additional procedures, failure to diagnose a condition, and creating a complication requiring transfusion or operation were discussed with the patient. The patient concurred with the proposed plan, giving informed consent. The site of surgery properly noted/marked. The patient was taken to Operating Room  identified as Ariana Tiwari and the procedure verified as exploratory laparotomy. A Time Out was held and the above information confirmed.    Full general anesthesia was induced with orotracheal intubation. The patient was prepped and draped in a supine position. Appropriate antibiotics were given intravenously. Arms were out.    A 10.  Blade was used to make an incision within the midline starting at the umbilicus down to the pubic symphysis.  The cautery was used to expose the linea alba which was then sharply divided to provide exposure into the peritoneal cavity. Immediately noticed was pus throughout the entire abdominal cavity. I began the procedure after extending the linea alba incision and then running the small bowel.  In the mid small bowel there was a perforated mass which appeared to be a perforated diverticula.  Proximal to this small bowel, all of the small bowel was dilated with multiple large mouth  diverticula.  Distal to this perforation was normal caliber if not small in size small intestine.  I suspect that this was the point of chronic obstruction. I elected to resect this perforated small bowel mass between 2 blue load LEATHA staples.  The mesentery was taken using silk sutures to ligate the vessels.  The mesenteric defect was closed with Vicryl sutures. A side-to-side stapled anastomosis was then created between both portions of the small bowel. The enterotomies were closed with a TA blue load stapler.    The rest of the small bowel was run and appeared normal. No obvious volvulus was seen.  The colon was inspected without any obvious abnormalities present within.  The NG tube was confirmed to be in the appropriate position using palpation.  The liver was palpated and appeared normal as well. The abdomen was then irrigated with 2 L of normal saline this was suctioned.  The pelvis was drained with a Sridhar-Oviedo drain left within the pelvis and sutured through the abdominal wall using silk suture.    The linea alba was then approximated with PDS sutures in the skin approximated with staples dressing was placed    The patient tolerated the procedure well    Instrument, sponge, and needle counts were correct prior to wound closure and at the conclusion of the case.     Findings:  Multiple small bowel diverticula to the point of an obstruction or mass which was perforated, normal small bowel past this point    Estimated Blood Loss:   100 cc     Drains: pelvic    Total IV Fluids: see anesthesia    Specimens: cultures of peritoneal fluid, small bowel mass with perforation    Implants: none    Complications:  None; patient tolerated the procedure well.    Disposition: PACU - hemodynamically stable.    Condition: stable    Attending Attestation: I was present and scrubbed for the entire procedure.

## 2020-01-11 NOTE — SUBJECTIVE & OBJECTIVE
Past Medical History:   Diagnosis Date    Allergy     Diverticulosis     Gluten enteropathy     Gluten intolerance     Hernia     Hypothyroidism     PD (Parkinson's disease) 9/16/2015    Right tremor type    Peptic ulcer disease     Right shoulder pain     Cirtisone injection 3/26/15 - Dr. Tolbert    Ulcer        Past Surgical History:   Procedure Laterality Date    ADENOIDECTOMY      COLONOSCOPY  03/01/2012    Dr. Teresa, repeat in 10 years for screening    COLONOSCOPY N/A 2/21/2018    Procedure: COLONOSCOPY;  Surgeon: Radha Deng MD;  Location: [facility] [place] ENDO;  Service: Endoscopy;  Laterality: N/A;    COSMETIC SURGERY      Broken nose    FRACTURE SURGERY  left wrist    With pin    HEMORRHOID SURGERY      HERNIA REPAIR Left 04/09/2015    Dr Lo; left inguinal    INTRALUMINAL GASTROINTESTINAL TRACT IMAGING VIA CAPSULE N/A 7/10/2018    Procedure: IMAGING, GI TRACT, INTRALUMINAL, VIA CAPSULE;  Surgeon: Radha Deng MD;  Location: [facility] [place] ENDO;  Service: Endoscopy;  Laterality: N/A;    RHINOPLASTY TIP      TONSILLECTOMY      UPPER GASTROINTESTINAL ENDOSCOPY  05/21/2015    Dr. Gomez       Review of patient's allergies indicates:   Allergen Reactions    Gluten Diarrhea       No current facility-administered medications on file prior to encounter.      Current Outpatient Medications on File Prior to Encounter   Medication Sig    acetaminophen (TYLENOL) 500 MG tablet Take 500 mg by mouth every 6 (six) hours as needed for Pain.    cholecalciferol, vitamin D3, (VITAMIN D3) 2,000 unit Cap Take 1 capsule by mouth once daily.     cholestyramine (QUESTRAN) 4 gram packet Take 4 g by mouth 4 (four) times daily.    CINNAMON BARK ORAL Take 1,000 mg by mouth once daily.    GLUCOSAMINE HCL ORAL Take 1,500 mg by mouth once daily.    ibandronate (BONIVA) 150 mg tablet Take 150 mg by mouth every 30 days.    ipratropium (ATROVENT) 42 mcg (0.06 %) nasal spray 2 sprays by Nasal route 4 (four) times  daily.    L-THREONINE MISC Take 100 mg by mouth daily as needed.    levothyroxine (SYNTHROID) 125 MCG tablet Take 62.5 mcg by mouth once daily.    ibuprofen (ADVIL,MOTRIN) 200 MG tablet Take 400 mg by mouth daily as needed for Pain.     levoFLOXacin (LEVAQUIN) 500 MG tablet Take 1 tablet (500 mg total) by mouth once daily. (Patient not taking: Reported on 10/2/2019)    psyllium 0.52 gram capsule Take 0.52 g by mouth 2 (two) times daily.    [DISCONTINUED] ibandronate (BONIVA) 150 mg tablet  TAKE 1 TABLET BY MOUTH EVERY MONTH    [DISCONTINUED] levothyroxine (SYNTHROID) 125 MCG tablet TAKE 1/2 (ONE-HALF) TABLET BY MOUTH ONCE DAILY     Family History     Problem Relation (Age of Onset)    Alcohol abuse Brother    Diabetes Mother, Son    Early death Brother    Heart disease Brother    No Known Problems Father    Obesity Sister        Tobacco Use    Smoking status: Never Smoker    Smokeless tobacco: Never Used   Substance and Sexual Activity    Alcohol use: Yes     Alcohol/week: 11.7 standard drinks     Types: 14 Standard drinks or equivalent per week     Comment: 2 glasses wine per dinner    Drug use: No    Sexual activity: Never     Review of Systems   Constitutional: Negative.    HENT: Negative.    Eyes: Negative.    Respiratory: Negative.    Cardiovascular: Negative.    Gastrointestinal: Positive for abdominal distention, abdominal pain and nausea.   Endocrine: Negative.    Genitourinary: Negative.    Musculoskeletal: Negative.    Skin: Negative.    Allergic/Immunologic: Negative.    Neurological: Negative.    Hematological: Negative.    All other systems reviewed and are negative.    Objective:     Vital Signs (Most Recent):  Temp: (!) 102.2 °F (39 °C) (01/11/20 0814)  Pulse: 96 (01/11/20 0930)  Resp: (!) 27 (01/11/20 0930)  BP: 123/60 (01/11/20 0930)  SpO2: (!) 91 % (01/11/20 0930) Vital Signs (24h Range):  Temp:  [97.8 °F (36.6 °C)-102.2 °F (39 °C)] 102.2 °F (39 °C)  Pulse:  [77-96] 96  Resp:  [20-33]  27  SpO2:  [88 %-98 %] 91 %  BP: (118-189)/(58-87) 123/60     Weight: 52.2 kg (115 lb)  Body mass index is 20.37 kg/m².    Physical Exam   Constitutional: She is oriented to person, place, and time. She appears well-developed and well-nourished.   HENT:   Head: Normocephalic and atraumatic.   Eyes: Pupils are equal, round, and reactive to light. Conjunctivae and EOM are normal.   Neck: Normal range of motion. Neck supple.   Cardiovascular: Normal rate, regular rhythm, normal heart sounds and intact distal pulses.   Pulmonary/Chest: Effort normal and breath sounds normal.   Decreased entry bases without adventitious sounds   Abdominal: Bowel sounds are normal. She exhibits distension. There is tenderness.   Tender LLQ to minimal palpation   Musculoskeletal: Normal range of motion.   Neurological: She is alert and oriented to person, place, and time.   Skin: Skin is warm and dry. Capillary refill takes less than 2 seconds.   Psychiatric: She has a normal mood and affect. Her behavior is normal. Judgment and thought content normal.   Nursing note and vitals reviewed.        CRANIAL NERVES     CN III, IV, VI   Pupils are equal, round, and reactive to light.  Extraocular motions are normal.        Significant Labs:   CBC:   Recent Labs   Lab 01/11/20  0330   WBC 13.15*   HGB 14.6   HCT 45.9        CMP:   Recent Labs   Lab 01/11/20  0330      K 3.4*      CO2 22*   *   BUN 15   CREATININE 0.4*   CALCIUM 8.6*   PROT 8.0   ALBUMIN 4.4   BILITOT 1.2*   ALKPHOS 133   AST 21   ALT 23   ANIONGAP 13   EGFRNONAA >60.0       Significant Imaging: I have reviewed and interpreted all pertinent imaging results/findings within the past 24 hours.

## 2020-01-11 NOTE — ANESTHESIA PREPROCEDURE EVALUATION
01/11/2020  Ariana Tiwari is a 74 y.o., female.  Patient Active Problem List   Diagnosis    Gluten enteropathy    Hypothyroidism    Breast lump on right side at 1 o'clock position    Lump of skin of back    Incarcerated inguinal hernia, unilateral    PD (Parkinson's disease)    Chronic diarrhea    Diverticulosis    Hemorrhoids    Pancreas cyst    Chronic left shoulder pain    Abnormal CT scan    Diarrhea    Volvulus       Past Surgical History:   Procedure Laterality Date    ADENOIDECTOMY      COLONOSCOPY  03/01/2012    Dr. Teresa, repeat in 10 years for screening    COLONOSCOPY N/A 2/21/2018    Procedure: COLONOSCOPY;  Surgeon: Radha Deng MD;  Location: [facility] END[facility];  Service: Endoscopy;  Laterality: N/A;    COSMETIC SURGERY      Broken nose    FRACTURE SURGERY  left wrist    With pin    HEMORRHOID SURGERY      HERNIA REPAIR Left 04/09/2015    Dr Lo; left inguinal    INTRALUMINAL GASTROINTESTINAL TRACT IMAGING VIA CAPSULE N/A 7/10/2018    Procedure: IMAGING, GI TRACT, INTRALUMINAL, VIA CAPSULE;  Surgeon: Radha Deng MD;  Location: [facility] ENDO;  Service: Endoscopy;  Laterality: N/A;    RHINOPLASTY TIP      TONSILLECTOMY      UPPER GASTROINTESTINAL ENDOSCOPY  05/21/2015    Dr. Gomez        Tobacco Use:  The patient  reports that she has never smoked. She has never used smokeless tobacco.     Results for orders placed or performed during the hospital encounter of 01/11/20   EKG 12-lead    Collection Time: 01/11/20  9:56 AM    Narrative    Test Reason : A41.9,    Vent. Rate : 091 BPM     Atrial Rate : 091 BPM     P-R Int : 150 ms          QRS Dur : 086 ms      QT Int : 356 ms       P-R-T Axes : 050 -30 129 degrees     QTc Int : 437 ms    Normal sinus rhythm  Left axis deviation  Septal infarct ,age undetermined  T wave abnormality, consider lateral ischemia  Abnormal  ECG  When compared with ECG of 11-JAN-2020 09:56,  No significant change was found    Referred By: [email or web address]   SELF           Confirmed By:         Imaging Results          X-Ray Chest AP Portable (Final result)  Result time 01/11/20 10:06:21    Final result by Terry Dailey MD (01/11/20 10:06:21)                 Impression:      Right infrahilar airspace opacity could reflect atelectasis; however pneumonia is also possible in the appropriate clinical setting.  Consider follow-up PA and lateral chest radiography with improved inspiratory effort.      Electronically signed by: Terry Dailey MD  Date:    01/11/2020  Time:    10:06             Narrative:    EXAMINATION:  XR CHEST AP PORTABLE    CLINICAL HISTORY:  Volvulus;    COMPARISON:  09/17/2019    FINDINGS:  Patchy airspace disease has developed within the medial right lung base/right infrahilar region.  Left lung is clear.  No large pleural effusion or pneumothorax.  Cardiac silhouette size is stable from prior.  Atherosclerotic calcification of the aorta.                               CT Abdomen Pelvis With Contrast (Final result)  Result time 01/11/20 06:27:58    Final result by Terry Dailey MD (01/11/20 06:27:58)                 Impression:      Gaseous distension and dilation of proximal small bowel loops consistent with partial mechanical small bowel obstruction.  Swirling of mesenteric vasculature raises the possibility of internal hernia, and appears similar to the reference exam.    Small volume free fluid within the abdomen and pelvis.    Ground-glass/reticular densities involving dependent lower lobes bilaterally likely atelectatic in the absence of signs and symptoms of pulmonary infection.  Please correlate.    Atherosclerosis and other incidental findings as above.      Electronically signed by: Terry Dailey MD  Date:    01/11/2020  Time:    06:27             Narrative:    EXAMINATION:  CT ABDOMEN PELVIS WITH CONTRAST    CLINICAL  HISTORY:  LLQ pain, hx of hernia and obstruction;    TECHNIQUE:  100 cc Omnipaque 350 was administered.    CMS Mandated Quality Data-CT Radiation Dose-436    All CT scans at this facility dose modulation, iterative reconstruction, and or weight-based dosing when appropriate to reduce radiation dose to as low as reasonably achievable.    COMPARISON:  CT abdomen and pelvis 11/22/2019    FINDINGS:  Images through the lower thorax demonstrate bilateral dependent lower lobe reticular and ground-glass opacities, right greater than left.  Bone window images demonstrate no acute or aggressive osseous abnormality.    No focal hepatic lesion.  Gallbladder is distended.  No intra or extrahepatic bile duct dilation.  Spleen is unremarkable.  Pancreas is unremarkable.  Duodenal diverticulum.  Right adrenal gland is unremarkable.  Nodular thickening of left adrenal gland, stable.  No renal calculi or hydronephrosis.  Ureters are nondilated.  Urinary bladder is unremarkable.    Stomach is unremarkable.  Oral contrast is present within jejunal loops, and has not yet reached ileal loops or colon.  Gaseous distension and dilation of small bowel suggesting partial mechanical obstruction.  There is substantial mesenteric swirling within the mid abdomen raising the possibility of internal hernia.  This is similar to the reference exam.  Small bowel feces are present distally.  Moderate gas and fecal material within the proximal colon.  No evidence of acute colitis.  Small volume free fluid within the abdomen and pelvis.    Atherosclerotic calcification of the aorta and its branch vessels.  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                                 Lab Results   Component Value Date    WBC 13.15 (H) 01/11/2020    HGB 14.6 01/11/2020    HCT 45.9 01/11/2020    MCV 89 01/11/2020     01/11/2020     BMP  Lab Results   Component Value Date     01/11/2020    K 3.4 (L) 01/11/2020      01/11/2020    CO2 22 (L) 01/11/2020    BUN 15 01/11/2020    CREATININE 0.4 (L) 01/11/2020    CALCIUM 8.6 (L) 01/11/2020    ANIONGAP 13 01/11/2020    ESTGFRAFRICA >60.0 01/11/2020    EGFRNONAA >60.0 01/11/2020           Pre-op Assessment         Review of Systems  Hepatic/GI:   PUD, (remote) Diverticulosis, chronic abdominal distention. Left inguinal hernia   Musculoskeletal:  Musculoskeletal General/Symptoms: (Parkinson's)   Endocrine:   Hypothyroidism        Physical Exam  General:  Well nourished    Airway/Jaw/Neck:  Airway Findings: Mouth Opening: Normal Tongue: Normal  General Airway Assessment: Adult  Mallampati: II  TM Distance: Normal, at least 6 cm  Jaw/Neck Findings:  Neck ROM: Normal ROM     Eyes/Ears/Nose:  Eyes/Ears/Nose Findings:    Dental:  Dental Findings: (bilateral incisors chipped)   Chest/Lungs:  Chest/Lungs Findings: Clear to auscultation, Normal Respiratory Rate     Heart/Vascular:  Heart Findings: Rate: Normal  Rhythm: Regular Rhythm  Sounds: Normal     Abdomen:  Abdomen Findings:     Musculoskeletal:  Musculoskeletal Findings:    Skin:  Skin Findings:     Mental Status:  Mental Status Findings:  Cooperative, Alert and Oriented         Anesthesia Plan  Type of Anesthesia, risks & benefits discussed:  Anesthesia Type:  general  Patient's Preference:   Intra-op Monitoring Plan: standard ASA monitors  Intra-op Monitoring Plan Comments:   Post Op Pain Control Plan: multimodal analgesia  Post Op Pain Control Plan Comments:   Induction:   IV  Beta Blocker:         Informed Consent: Patient understands risks and agrees with Anesthesia plan.  Questions answered. Anesthesia consent signed with patient.  ASA Score: 3  emergent   Day of Surgery Review of History & Physical:    H&P update referred to the surgeon.     Anesthesia Plan Notes: Multimodal with ketamine 25mg, decadron 8mg, acetaminophen (given in ER).

## 2020-01-12 PROBLEM — K57.00 PERFORATED DIVERTICULUM OF SMALL INTESTINE: Status: ACTIVE | Noted: 2020-01-12

## 2020-01-12 LAB
ALBUMIN SERPL BCP-MCNC: 2.8 G/DL (ref 3.5–5.2)
ALP SERPL-CCNC: 68 U/L (ref 55–135)
ALT SERPL W/O P-5'-P-CCNC: 17 U/L (ref 10–44)
ANION GAP SERPL CALC-SCNC: 10 MMOL/L (ref 8–16)
AST SERPL-CCNC: 17 U/L (ref 10–40)
BASOPHILS # BLD AUTO: 0.02 K/UL (ref 0–0.2)
BASOPHILS NFR BLD: 0.3 % (ref 0–1.9)
BILIRUB SERPL-MCNC: 0.9 MG/DL (ref 0.1–1)
BUN SERPL-MCNC: 7 MG/DL (ref 8–23)
CALCIUM SERPL-MCNC: 7.3 MG/DL (ref 8.7–10.5)
CHLORIDE SERPL-SCNC: 100 MMOL/L (ref 95–110)
CO2 SERPL-SCNC: 26 MMOL/L (ref 23–29)
CREAT SERPL-MCNC: 0.3 MG/DL (ref 0.5–1.4)
DIFFERENTIAL METHOD: ABNORMAL
EOSINOPHIL # BLD AUTO: 0 K/UL (ref 0–0.5)
EOSINOPHIL NFR BLD: 0 % (ref 0–8)
ERYTHROCYTE [DISTWIDTH] IN BLOOD BY AUTOMATED COUNT: 15.8 % (ref 11.5–14.5)
EST. GFR  (AFRICAN AMERICAN): >60 ML/MIN/1.73 M^2
EST. GFR  (NON AFRICAN AMERICAN): >60 ML/MIN/1.73 M^2
GLUCOSE SERPL-MCNC: 146 MG/DL (ref 70–110)
GRAM STN SPEC: NORMAL
GRAM STN SPEC: NORMAL
HCT VFR BLD AUTO: 38.4 % (ref 37–48.5)
HGB BLD-MCNC: 13.1 G/DL (ref 12–16)
IMM GRANULOCYTES # BLD AUTO: 0.01 K/UL (ref 0–0.04)
IMM GRANULOCYTES NFR BLD AUTO: 0.1 % (ref 0–0.5)
LYMPHOCYTES # BLD AUTO: 0.5 K/UL (ref 1–4.8)
LYMPHOCYTES NFR BLD: 7.9 % (ref 18–48)
MCH RBC QN AUTO: 29.1 PG (ref 27–31)
MCHC RBC AUTO-ENTMCNC: 34.1 G/DL (ref 32–36)
MCV RBC AUTO: 85 FL (ref 82–98)
MONOCYTES # BLD AUTO: 0.4 K/UL (ref 0.3–1)
MONOCYTES NFR BLD: 5.7 % (ref 4–15)
NEUTROPHILS # BLD AUTO: 5.9 K/UL (ref 1.8–7.7)
NEUTROPHILS NFR BLD: 86 % (ref 38–73)
NRBC BLD-RTO: 0 /100 WBC
PLATELET # BLD AUTO: 163 K/UL (ref 150–350)
PMV BLD AUTO: 10.7 FL (ref 9.2–12.9)
POTASSIUM SERPL-SCNC: 2.5 MMOL/L (ref 3.5–5.1)
PROT SERPL-MCNC: 5.9 G/DL (ref 6–8.4)
RBC # BLD AUTO: 4.5 M/UL (ref 4–5.4)
SODIUM SERPL-SCNC: 136 MMOL/L (ref 136–145)
WBC # BLD AUTO: 6.82 K/UL (ref 3.9–12.7)

## 2020-01-12 PROCEDURE — 25000003 PHARM REV CODE 250: Performed by: INTERNAL MEDICINE

## 2020-01-12 PROCEDURE — 25000003 PHARM REV CODE 250: Performed by: SURGERY

## 2020-01-12 PROCEDURE — 21400001 HC TELEMETRY ROOM

## 2020-01-12 PROCEDURE — 80053 COMPREHEN METABOLIC PANEL: CPT

## 2020-01-12 PROCEDURE — 63600175 PHARM REV CODE 636 W HCPCS: Performed by: INTERNAL MEDICINE

## 2020-01-12 PROCEDURE — 99900035 HC TECH TIME PER 15 MIN (STAT)

## 2020-01-12 PROCEDURE — 27000221 HC OXYGEN, UP TO 24 HOURS

## 2020-01-12 PROCEDURE — 63600175 PHARM REV CODE 636 W HCPCS: Performed by: SURGERY

## 2020-01-12 PROCEDURE — 94761 N-INVAS EAR/PLS OXIMETRY MLT: CPT

## 2020-01-12 PROCEDURE — 36415 COLL VENOUS BLD VENIPUNCTURE: CPT

## 2020-01-12 PROCEDURE — 85025 COMPLETE CBC W/AUTO DIFF WBC: CPT

## 2020-01-12 RX ORDER — SODIUM CHLORIDE AND POTASSIUM CHLORIDE 150; 900 MG/100ML; MG/100ML
INJECTION, SOLUTION INTRAVENOUS CONTINUOUS
Status: CANCELLED | OUTPATIENT
Start: 2020-01-12

## 2020-01-12 RX ORDER — POTASSIUM CHLORIDE 20 MEQ/1
40 TABLET, EXTENDED RELEASE ORAL
Status: COMPLETED | OUTPATIENT
Start: 2020-01-12 | End: 2020-01-12

## 2020-01-12 RX ADMIN — HYDROCODONE BITARTRATE AND ACETAMINOPHEN 1 TABLET: 5; 325 TABLET ORAL at 02:01

## 2020-01-12 RX ADMIN — POTASSIUM CHLORIDE 40 MEQ: 20 TABLET, EXTENDED RELEASE ORAL at 05:01

## 2020-01-12 RX ADMIN — SODIUM CHLORIDE, SODIUM LACTATE, POTASSIUM CHLORIDE, AND CALCIUM CHLORIDE: .6; .31; .03; .02 INJECTION, SOLUTION INTRAVENOUS at 08:01

## 2020-01-12 RX ADMIN — PIPERACILLIN SODIUM AND TAZOBACTAM SODIUM 3.38 G: 3; .375 INJECTION, POWDER, LYOPHILIZED, FOR SOLUTION INTRAVENOUS at 04:01

## 2020-01-12 RX ADMIN — VANCOMYCIN HYDROCHLORIDE 1000 MG: 1 INJECTION, POWDER, LYOPHILIZED, FOR SOLUTION INTRAVENOUS at 02:01

## 2020-01-12 RX ADMIN — POTASSIUM CHLORIDE 40 MEQ: 20 TABLET, EXTENDED RELEASE ORAL at 08:01

## 2020-01-12 RX ADMIN — PIPERACILLIN SODIUM AND TAZOBACTAM SODIUM 3.38 G: 3; .375 INJECTION, POWDER, LYOPHILIZED, FOR SOLUTION INTRAVENOUS at 08:01

## 2020-01-12 RX ADMIN — HYDROMORPHONE HYDROCHLORIDE 0.5 MG: 1 INJECTION, SOLUTION INTRAMUSCULAR; INTRAVENOUS; SUBCUTANEOUS at 10:01

## 2020-01-12 RX ADMIN — LEVOTHYROXINE SODIUM 62.5 MCG: 25 TABLET ORAL at 08:01

## 2020-01-12 RX ADMIN — PIPERACILLIN SODIUM AND TAZOBACTAM SODIUM 3.38 G: 3; .375 INJECTION, POWDER, LYOPHILIZED, FOR SOLUTION INTRAVENOUS at 03:01

## 2020-01-12 RX ADMIN — PANTOPRAZOLE SODIUM 40 MG: 40 TABLET, DELAYED RELEASE ORAL at 08:01

## 2020-01-12 RX ADMIN — VANCOMYCIN HYDROCHLORIDE 1000 MG: 1 INJECTION, POWDER, LYOPHILIZED, FOR SOLUTION INTRAVENOUS at 01:01

## 2020-01-12 RX ADMIN — POTASSIUM CHLORIDE 40 MEQ: 20 TABLET, EXTENDED RELEASE ORAL at 06:01

## 2020-01-12 RX ADMIN — HYDROCODONE BITARTRATE AND ACETAMINOPHEN 1 TABLET: 5; 325 TABLET ORAL at 10:01

## 2020-01-12 RX ADMIN — HYDROCODONE BITARTRATE AND ACETAMINOPHEN 1 TABLET: 5; 325 TABLET ORAL at 08:01

## 2020-01-12 RX ADMIN — ENOXAPARIN SODIUM 40 MG: 100 INJECTION SUBCUTANEOUS at 04:01

## 2020-01-12 NOTE — PROGRESS NOTES
Critical access hospital Medicine  Progress Note    Patient Name: Ariana Tiwari  MRN: [id number]  Patient Class: IP- Inpatient   Admission Date: 1/11/2020  Length of Stay: 1 days  Attending Physician: Je Thomas MD  Primary Care Provider: Nba Petty MD        Subjective:     Principal Problem:Perforated diverticulum of small intestine        HPI:  74 year old female presented to ED complaining of 1 day history of LLQ pain: aching to sharp, waxing and waning of own accord 7-8/10. She has had nausea, but no vomiting until she drank contrast in ED. Then the vomitus was contrast material only. She was given antiemetic and was able to finish drinking contrast. No nausea currently. Pain is persisting. CT Abdo/Pelvis = volvulus. Spoke personally with ED physician, who related the patient had spiked temp 102F. She was cultured and started on vancomycin and piperacillin. General Surgery was notified, and related to place NGT and patient would possibly require surgery. The patient denies CP/SOB. Labs personally reviewed: there is leukocytosis, no anemia; mild hypokalemia with normal renal function. Telemetry personally reviewed = NSR      Overview/Hospital Course:  01/12 Op report noted/appreciated. Labs personally reviewed: leukocytosis has resolved; has hypokalemia (on sliding scale)--otherwise chemistry and renal function normal. EKG personally reviewed = NSR with poor R wave progression; no acute segments. CXR personally reviewed = RLL opacity (infiltrate vs atelectasis)--CXR was taken just before surgery. No cough/sputum. No CP/SOB. Prior to surgery and now post-operatively has discomfort taking deep breaths. Discussed patient personally with Dr. Mcarthur: start incentive spirometry      Interval History: post-op with atelectasis    Review of Systems   Constitutional: Negative.    HENT: Negative.    Eyes: Negative.    Respiratory: Negative.    Cardiovascular: Negative.    Gastrointestinal: Positive  for abdominal distention and abdominal pain.   Endocrine: Negative.    Genitourinary: Negative.    Musculoskeletal: Negative.    Skin: Negative.    Allergic/Immunologic: Negative.    Neurological: Negative.    Hematological: Negative.    All other systems reviewed and are negative.    Objective:     Vital Signs (Most Recent):  Temp: 98.3 °F (36.8 °C) (01/12/20 0821)  Pulse: 83 (01/12/20 0821)  Resp: 20 (01/12/20 0821)  BP: 122/71 (01/12/20 0821)  SpO2: (!) 93 % (01/12/20 0821) Vital Signs (24h Range):  Temp:  [97.6 °F (36.4 °C)-100 °F (37.8 °C)] 98.3 °F (36.8 °C)  Pulse:  [63-90] 83  Resp:  [16-35] 20  SpO2:  [89 %-96 %] 93 %  BP: (120-181)/(56-71) 122/71     Weight: 58.7 kg (129 lb 6.6 oz)  Body mass index is 22.21 kg/m².    Intake/Output Summary (Last 24 hours) at 1/12/2020 1107  Last data filed at 1/12/2020 0400  Gross per 24 hour   Intake 2610 ml   Output 2150 ml   Net 460 ml      Physical Exam   Constitutional: She is oriented to person, place, and time. She appears well-developed and well-nourished.   HENT:   Head: Normocephalic and atraumatic.   Eyes: Pupils are equal, round, and reactive to light. Conjunctivae and EOM are normal.   Neck: Normal range of motion. Neck supple.   Cardiovascular: Normal rate, regular rhythm, normal heart sounds and intact distal pulses.   Pulmonary/Chest: Effort normal and breath sounds normal.   Abdominal:   Wound dry, drain in place, absent BS   Musculoskeletal: Normal range of motion.   Neurological: She is alert and oriented to person, place, and time.   Skin: Skin is warm and dry. Capillary refill takes less than 2 seconds.   Psychiatric: She has a normal mood and affect. Her behavior is normal. Judgment and thought content normal.   Nursing note and vitals reviewed.      Significant Labs:   BMP:   Recent Labs   Lab 01/12/20  0450   *      K 2.5*      CO2 26   BUN 7*   CREATININE 0.3*   CALCIUM 7.3*     CBC:   Recent Labs   Lab 01/11/20  4766  01/12/20  0450   WBC 13.15* 6.82   HGB 14.6 13.1   HCT 45.9 38.4    163       Significant Imaging: I have reviewed and interpreted all pertinent imaging results/findings within the past 24 hours.      Assessment/Plan:      * Perforated diverticulum of small intestine  Continue post-op care  Incentive spirometry      Abdominal pain  S/p laporotomy        VTE Risk Mitigation (From admission, onward)         Ordered     enoxaparin injection 40 mg  Daily      01/11/20 1735     IP VTE HIGH RISK PATIENT  Once      01/11/20 1735     Place CAPRICE hose  Until discontinued      01/11/20 1735     Place CAPRICE hose  Until discontinued      01/11/20 1547     Place sequential compression device  Until discontinued      01/11/20 1547                      Je Thomas MD  Department of Hospital Medicine   [facility]

## 2020-01-12 NOTE — SUBJECTIVE & OBJECTIVE
Interval History: post-op with atelectasis    Review of Systems   Constitutional: Negative.    HENT: Negative.    Eyes: Negative.    Respiratory: Negative.    Cardiovascular: Negative.    Gastrointestinal: Positive for abdominal distention and abdominal pain.   Endocrine: Negative.    Genitourinary: Negative.    Musculoskeletal: Negative.    Skin: Negative.    Allergic/Immunologic: Negative.    Neurological: Negative.    Hematological: Negative.    All other systems reviewed and are negative.    Objective:     Vital Signs (Most Recent):  Temp: 98.3 °F (36.8 °C) (01/12/20 0821)  Pulse: 83 (01/12/20 0821)  Resp: 20 (01/12/20 0821)  BP: 122/71 (01/12/20 0821)  SpO2: (!) 93 % (01/12/20 0821) Vital Signs (24h Range):  Temp:  [97.6 °F (36.4 °C)-100 °F (37.8 °C)] 98.3 °F (36.8 °C)  Pulse:  [63-90] 83  Resp:  [16-35] 20  SpO2:  [89 %-96 %] 93 %  BP: (120-181)/(56-71) 122/71     Weight: 58.7 kg (129 lb 6.6 oz)  Body mass index is 22.21 kg/m².    Intake/Output Summary (Last 24 hours) at 1/12/2020 1107  Last data filed at 1/12/2020 0400  Gross per 24 hour   Intake 2610 ml   Output 2150 ml   Net 460 ml      Physical Exam   Constitutional: She is oriented to person, place, and time. She appears well-developed and well-nourished.   HENT:   Head: Normocephalic and atraumatic.   Eyes: Pupils are equal, round, and reactive to light. Conjunctivae and EOM are normal.   Neck: Normal range of motion. Neck supple.   Cardiovascular: Normal rate, regular rhythm, normal heart sounds and intact distal pulses.   Pulmonary/Chest: Effort normal and breath sounds normal.   Abdominal:   Wound dry, drain in place, absent BS   Musculoskeletal: Normal range of motion.   Neurological: She is alert and oriented to person, place, and time.   Skin: Skin is warm and dry. Capillary refill takes less than 2 seconds.   Psychiatric: She has a normal mood and affect. Her behavior is normal. Judgment and thought content normal.   Nursing note and vitals  reviewed.      Significant Labs:   BMP:   Recent Labs   Lab 01/12/20  0450   *      K 2.5*      CO2 26   BUN 7*   CREATININE 0.3*   CALCIUM 7.3*     CBC:   Recent Labs   Lab 01/11/20  0330 01/12/20  0450   WBC 13.15* 6.82   HGB 14.6 13.1   HCT 45.9 38.4    163       Significant Imaging: I have reviewed and interpreted all pertinent imaging results/findings within the past 24 hours.

## 2020-01-12 NOTE — HOSPITAL COURSE
"01/12 Op report noted/appreciated. Labs personally reviewed: leukocytosis has resolved; has hypokalemia (on sliding scale)--otherwise chemistry and renal function normal. EKG personally reviewed = NSR with poor R wave progression; no acute segments. CXR personally reviewed = RLL opacity (infiltrate vs atelectasis)--CXR was taken just before surgery. No cough/sputum. No CP/SOB. Prior to surgery and now post-operatively has discomfort taking deep breaths. Discussed patient personally with Dr. Mcarthur: start incentive spirometry  01/13 Labs personally reviewed: normocytic post-op anemia, no further leukocytosis, hypokalemia (on sliding scale), renal function is good,  Telemetry personally reviewed = sinus. Intraoperative culture growing lactose fermenting gram neg rods, on Zosyn. Feels "Better". Using incentive spirometer. No nausea. No flatus. No CP/SOB  "

## 2020-01-12 NOTE — PLAN OF CARE
Pt's pain controlled with prn meds. Pt aox3. Pt remains free from injury. NG tube on low intermittent suction. Pt educated on plan of care and safety.

## 2020-01-12 NOTE — PROGRESS NOTES
Progress Note  Gen Surg    Admit Date: 1/11/2020  Attending: Blue  S/P: Procedure(s) (LRB):  LAPAROTOMY, EXPLORATORY (N/A)    Post-operative Day: 1 Day Post-Op    Hospital Day: 2    SUBJECTIVE:     Feels better more alert     OBJECTIVE:     Vital Signs (Most Recent)  Temp: 98 °F (36.7 °C) (01/12/20 1228)  Pulse: 80 (01/12/20 1228)  Resp: 20 (01/12/20 1228)  BP: 126/61 (01/12/20 1228)  SpO2: (!) 94 % (01/12/20 1228)    Vital Signs Range (Last 24H):  Temp:  [97.6 °F (36.4 °C)-99.9 °F (37.7 °C)]   Pulse:  [63-87]   Resp:  [16-26]   BP: (122-181)/(58-71)   SpO2:  [91 %-96 %]     I & O (Last 24H):    Intake/Output Summary (Last 24 hours) at 1/12/2020 1258  Last data filed at 1/12/2020 0400  Gross per 24 hour   Intake 2610 ml   Output 2150 ml   Net 460 ml       Scheduled medications:   enoxaparin  40 mg Subcutaneous Daily    levothyroxine  62.5 mcg Oral Daily    pantoprazole  40 mg Oral Before breakfast    piperacillin-tazobactam (ZOSYN) IVPB  3.375 g Intravenous Q8H    vancomycin (VANCOCIN) IVPB  1,000 mg Intravenous Q12H       Physical Exam:  General: no distress  Lungs:  clear to auscultation bilaterally and normal respiratory effort  Heart: regular rate and rhythm, S1, S2 normal, no murmur, rub or gallop  Abdomen: distended, appropriately tender, drain ss    Wound/Incision:  clean, dry, intact    Laboratory:  Labs within the past 24 hours have been reviewed.    ASSESSMENT/PLAN:     He is postop day 1 from small-bowel resection for perforated small bowel diverticula from chronic obstruction    Continue NG tube for postoperative ileus    Continue antibiotics for perforated intestine    Await return of GI function heralded by flatus    Okay for sips of clear liquids with NG tube in place    Pain control, DVT protocol prophylaxis, gi ulcer prophylaxis    Sandra Mcarthur MD

## 2020-01-13 PROBLEM — K56.2 VOLVULUS: Status: RESOLVED | Noted: 2020-01-11 | Resolved: 2020-01-13

## 2020-01-13 PROBLEM — K63.1 PERFORATED BOWEL: Status: ACTIVE | Noted: 2020-01-13

## 2020-01-13 LAB
ANION GAP SERPL CALC-SCNC: 10 MMOL/L (ref 8–16)
BASOPHILS # BLD AUTO: 0.02 K/UL (ref 0–0.2)
BASOPHILS # BLD AUTO: 0.02 K/UL (ref 0–0.2)
BASOPHILS NFR BLD: 0.3 % (ref 0–1.9)
BASOPHILS NFR BLD: 0.3 % (ref 0–1.9)
BUN SERPL-MCNC: 9 MG/DL (ref 8–23)
CALCIUM SERPL-MCNC: 7.6 MG/DL (ref 8.7–10.5)
CHLORIDE SERPL-SCNC: 104 MMOL/L (ref 95–110)
CO2 SERPL-SCNC: 28 MMOL/L (ref 23–29)
CREAT SERPL-MCNC: 0.4 MG/DL (ref 0.5–1.4)
DIFFERENTIAL METHOD: ABNORMAL
DIFFERENTIAL METHOD: ABNORMAL
EOSINOPHIL # BLD AUTO: 0 K/UL (ref 0–0.5)
EOSINOPHIL # BLD AUTO: 0 K/UL (ref 0–0.5)
EOSINOPHIL NFR BLD: 0.6 % (ref 0–8)
EOSINOPHIL NFR BLD: 0.6 % (ref 0–8)
ERYTHROCYTE [DISTWIDTH] IN BLOOD BY AUTOMATED COUNT: 15.8 % (ref 11.5–14.5)
ERYTHROCYTE [DISTWIDTH] IN BLOOD BY AUTOMATED COUNT: 15.8 % (ref 11.5–14.5)
EST. GFR  (AFRICAN AMERICAN): >60 ML/MIN/1.73 M^2
EST. GFR  (NON AFRICAN AMERICAN): >60 ML/MIN/1.73 M^2
GLUCOSE SERPL-MCNC: 91 MG/DL (ref 70–110)
HCT VFR BLD AUTO: 35.5 % (ref 37–48.5)
HCT VFR BLD AUTO: 35.5 % (ref 37–48.5)
HGB BLD-MCNC: 11.6 G/DL (ref 12–16)
HGB BLD-MCNC: 11.6 G/DL (ref 12–16)
IMM GRANULOCYTES # BLD AUTO: 0.02 K/UL (ref 0–0.04)
IMM GRANULOCYTES # BLD AUTO: 0.02 K/UL (ref 0–0.04)
IMM GRANULOCYTES NFR BLD AUTO: 0.3 % (ref 0–0.5)
IMM GRANULOCYTES NFR BLD AUTO: 0.3 % (ref 0–0.5)
LYMPHOCYTES # BLD AUTO: 0.7 K/UL (ref 1–4.8)
LYMPHOCYTES # BLD AUTO: 0.7 K/UL (ref 1–4.8)
LYMPHOCYTES NFR BLD: 9.4 % (ref 18–48)
LYMPHOCYTES NFR BLD: 9.4 % (ref 18–48)
MCH RBC QN AUTO: 28.7 PG (ref 27–31)
MCH RBC QN AUTO: 28.7 PG (ref 27–31)
MCHC RBC AUTO-ENTMCNC: 32.7 G/DL (ref 32–36)
MCHC RBC AUTO-ENTMCNC: 32.7 G/DL (ref 32–36)
MCV RBC AUTO: 88 FL (ref 82–98)
MCV RBC AUTO: 88 FL (ref 82–98)
MONOCYTES # BLD AUTO: 0.4 K/UL (ref 0.3–1)
MONOCYTES # BLD AUTO: 0.4 K/UL (ref 0.3–1)
MONOCYTES NFR BLD: 5.6 % (ref 4–15)
MONOCYTES NFR BLD: 5.6 % (ref 4–15)
NEUTROPHILS # BLD AUTO: 5.9 K/UL (ref 1.8–7.7)
NEUTROPHILS # BLD AUTO: 5.9 K/UL (ref 1.8–7.7)
NEUTROPHILS NFR BLD: 83.8 % (ref 38–73)
NEUTROPHILS NFR BLD: 83.8 % (ref 38–73)
NRBC BLD-RTO: 0 /100 WBC
NRBC BLD-RTO: 0 /100 WBC
PLATELET # BLD AUTO: 140 K/UL (ref 150–350)
PLATELET # BLD AUTO: 140 K/UL (ref 150–350)
PMV BLD AUTO: 10.3 FL (ref 9.2–12.9)
PMV BLD AUTO: 10.3 FL (ref 9.2–12.9)
POTASSIUM SERPL-SCNC: 3 MMOL/L (ref 3.5–5.1)
RBC # BLD AUTO: 4.04 M/UL (ref 4–5.4)
RBC # BLD AUTO: 4.04 M/UL (ref 4–5.4)
SODIUM SERPL-SCNC: 142 MMOL/L (ref 136–145)
VANCOMYCIN TROUGH SERPL-MCNC: 5 UG/ML (ref 10–22)
WBC # BLD AUTO: 7.01 K/UL (ref 3.9–12.7)
WBC # BLD AUTO: 7.01 K/UL (ref 3.9–12.7)

## 2020-01-13 PROCEDURE — 85025 COMPLETE CBC W/AUTO DIFF WBC: CPT

## 2020-01-13 PROCEDURE — 94761 N-INVAS EAR/PLS OXIMETRY MLT: CPT

## 2020-01-13 PROCEDURE — 63600175 PHARM REV CODE 636 W HCPCS

## 2020-01-13 PROCEDURE — 80202 ASSAY OF VANCOMYCIN: CPT

## 2020-01-13 PROCEDURE — 25000003 PHARM REV CODE 250: Performed by: INTERNAL MEDICINE

## 2020-01-13 PROCEDURE — 80048 BASIC METABOLIC PNL TOTAL CA: CPT

## 2020-01-13 PROCEDURE — 12000002 HC ACUTE/MED SURGE SEMI-PRIVATE ROOM

## 2020-01-13 PROCEDURE — 36415 COLL VENOUS BLD VENIPUNCTURE: CPT

## 2020-01-13 PROCEDURE — 63600175 PHARM REV CODE 636 W HCPCS: Performed by: INTERNAL MEDICINE

## 2020-01-13 PROCEDURE — 63600175 PHARM REV CODE 636 W HCPCS: Performed by: SURGERY

## 2020-01-13 PROCEDURE — 25000003 PHARM REV CODE 250: Performed by: SURGERY

## 2020-01-13 RX ORDER — VANCOMYCIN HCL IN 5 % DEXTROSE 1G/250ML
1000 PLASTIC BAG, INJECTION (ML) INTRAVENOUS
Status: DISCONTINUED | OUTPATIENT
Start: 2020-01-13 | End: 2020-01-16

## 2020-01-13 RX ADMIN — PANTOPRAZOLE SODIUM 40 MG: 40 TABLET, DELAYED RELEASE ORAL at 06:01

## 2020-01-13 RX ADMIN — PIPERACILLIN SODIUM AND TAZOBACTAM SODIUM 3.38 G: 3; .375 INJECTION, POWDER, LYOPHILIZED, FOR SOLUTION INTRAVENOUS at 06:01

## 2020-01-13 RX ADMIN — HYDROCODONE BITARTRATE AND ACETAMINOPHEN 1 TABLET: 5; 325 TABLET ORAL at 08:01

## 2020-01-13 RX ADMIN — PIPERACILLIN SODIUM AND TAZOBACTAM SODIUM 3.38 G: 3; .375 INJECTION, POWDER, LYOPHILIZED, FOR SOLUTION INTRAVENOUS at 12:01

## 2020-01-13 RX ADMIN — VANCOMYCIN HYDROCHLORIDE 1000 MG: 1 INJECTION, POWDER, LYOPHILIZED, FOR SOLUTION INTRAVENOUS at 03:01

## 2020-01-13 RX ADMIN — POTASSIUM CHLORIDE 40 MEQ: 7.46 INJECTION, SOLUTION INTRAVENOUS at 08:01

## 2020-01-13 RX ADMIN — ENOXAPARIN SODIUM 40 MG: 100 INJECTION SUBCUTANEOUS at 06:01

## 2020-01-13 RX ADMIN — VANCOMYCIN HYDROCHLORIDE 1000 MG: 1 INJECTION, POWDER, LYOPHILIZED, FOR SOLUTION INTRAVENOUS at 12:01

## 2020-01-13 RX ADMIN — PROMETHAZINE HYDROCHLORIDE 6.25 MG: 25 INJECTION INTRAMUSCULAR; INTRAVENOUS at 01:01

## 2020-01-13 RX ADMIN — LEVOTHYROXINE SODIUM 62.5 MCG: 25 TABLET ORAL at 06:01

## 2020-01-13 RX ADMIN — VANCOMYCIN HYDROCHLORIDE 1000 MG: 1 INJECTION, POWDER, LYOPHILIZED, FOR SOLUTION INTRAVENOUS at 07:01

## 2020-01-13 RX ADMIN — PIPERACILLIN SODIUM AND TAZOBACTAM SODIUM 3.38 G: 3; .375 INJECTION, POWDER, LYOPHILIZED, FOR SOLUTION INTRAVENOUS at 08:01

## 2020-01-13 NOTE — NURSING
PT IN BED WITH LOW INTERMITTENT SUCTION TO LEFT NARE NG TUBE. GREEN LIQUID NOTED SMALL AMOUNT  . ABDOMEN DISTENDED .LESLI BULB COMPRESSED TO ABDOMINAL SITE WITH NO NEW DRAINAGE NOTED . MIDLINE FOAM ABDOMINAL DRESSING CDI

## 2020-01-13 NOTE — PROGRESS NOTES
[facility] Medicine  Progress Note    Patient Name: Ariana Tiwari  MRN: [id number]  Patient Class: IP- Inpatient   Admission Date: 1/11/2020  Length of Stay: 2 days  Attending Physician: Je Thomas MD  Primary Care Provider: Nba Petty MD        Subjective:     Principal Problem:Perforated diverticulum of small intestine        HPI:  74 year old female presented to ED complaining of 1 day history of LLQ pain: aching to sharp, waxing and waning of own accord 7-8/10. She has had nausea, but no vomiting until she drank contrast in ED. Then the vomitus was contrast material only. She was given antiemetic and was able to finish drinking contrast. No nausea currently. Pain is persisting. CT Abdo/Pelvis = volvulus. Spoke personally with ED physician, who related the patient had spiked temp 102F. She was cultured and started on vancomycin and piperacillin. General Surgery was notified, and related to place NGT and patient would possibly require surgery. The patient denies CP/SOB. Labs personally reviewed: there is leukocytosis, no anemia; mild hypokalemia with normal renal function. Telemetry personally reviewed = NSR      Overview/Hospital Course:  01/12 Op report noted/appreciated. Labs personally reviewed: leukocytosis has resolved; has hypokalemia (on sliding scale)--otherwise chemistry and renal function normal. EKG personally reviewed = NSR with poor R wave progression; no acute segments. CXR personally reviewed = RLL opacity (infiltrate vs atelectasis)--CXR was taken just before surgery. No cough/sputum. No CP/SOB. Prior to surgery and now post-operatively has discomfort taking deep breaths. Discussed patient personally with Dr. Mcarthur: start incentive spirometry  01/13 Labs personally reviewed: normocytic post-op anemia, no further leukocytosis, hypokalemia (on sliding scale), renal function is good,  Telemetry personally reviewed = sinus. Intraoperative culture growing lactose  "fermenting gram neg rods, on Zosyn. Feels "Better". Using incentive spirometer. No nausea. No flatus. No CP/SOB    Interval History: stable p-op    Review of Systems   Constitutional: Negative.    HENT: Negative.    Eyes: Negative.    Respiratory: Negative.    Cardiovascular: Negative.    Gastrointestinal: Positive for abdominal distention.   Endocrine: Negative.    Genitourinary: Negative.    Musculoskeletal: Negative.    Skin: Negative.    Allergic/Immunologic: Negative.    Neurological: Negative.    Hematological: Negative.    All other systems reviewed and are negative.    Objective:     Vital Signs (Most Recent):  Temp: 98.1 °F (36.7 °C) (01/13/20 0746)  Pulse: 67 (01/13/20 0746)  Resp: 16 (01/13/20 0746)  BP: 130/71 (01/13/20 0746)  SpO2: (!) 94 % (01/13/20 0746) Vital Signs (24h Range):  Temp:  [98 °F (36.7 °C)-99.1 °F (37.3 °C)] 98.1 °F (36.7 °C)  Pulse:  [67-97] 67  Resp:  [16-20] 16  SpO2:  [93 %-96 %] 94 %  BP: (124-148)/(60-73) 130/71     Weight: 56.9 kg (125 lb 7.1 oz)  Body mass index is 21.53 kg/m².    Intake/Output Summary (Last 24 hours) at 1/13/2020 1134  Last data filed at 1/13/2020 0600  Gross per 24 hour   Intake 350 ml   Output 2250 ml   Net -1900 ml      Physical Exam   Constitutional: She is oriented to person, place, and time. She appears well-developed and well-nourished.   HENT:   Head: Normocephalic and atraumatic.   Eyes: Pupils are equal, round, and reactive to light. Conjunctivae and EOM are normal.   Neck: Normal range of motion. Neck supple.   Cardiovascular: Normal rate, regular rhythm, normal heart sounds and intact distal pulses.   Pulmonary/Chest: Effort normal and breath sounds normal.   Decreased entry with no adventitious sounds   Abdominal: Soft.   NGT, no BS   Musculoskeletal: Normal range of motion.   Neurological: She is alert and oriented to person, place, and time.   Skin: Skin is warm and dry. Capillary refill takes less than 2 seconds.   Psychiatric: She has a normal " mood and affect. Her behavior is normal. Judgment and thought content normal.   Nursing note and vitals reviewed.      Significant Labs:   BMP:   Recent Labs   Lab 01/13/20  0144   GLU 91      K 3.0*      CO2 28   BUN 9   CREATININE 0.4*   CALCIUM 7.6*     CBC:   Recent Labs   Lab 01/12/20  0450 01/13/20  0144   WBC 6.82 7.01  7.01   HGB 13.1 11.6*  11.6*   HCT 38.4 35.5*  35.5*    140*  140*       Significant Imaging: I have reviewed and interpreted all pertinent imaging results/findings within the past 24 hours.      Assessment/Plan:      * Perforated diverticulum of small intestine  Continue post-op care  Incentive spirometry      Abdominal pain  S/p laporotomy        VTE Risk Mitigation (From admission, onward)         Ordered     enoxaparin injection 40 mg  Daily      01/11/20 1735     IP VTE HIGH RISK PATIENT  Once      01/11/20 1735     Place CAPRICE hose  Until discontinued      01/11/20 1735     Place CAPRICE hose  Until discontinued      01/11/20 1547     Place sequential compression device  Until discontinued      01/11/20 1547                      Je Thomas MD  Department of Hospital Medicine   [facility]

## 2020-01-13 NOTE — PHYSICIAN QUERY
PT Name: Ariana Tiwari  MR #: [id number]     Physician Query Form - Diagnosis Clarification      CDS/: Katie Negron RN, CCDS               Contact information:  [phone]    This form is a permanent document in the medical record.     Query Date: January 13, 2020    By submitting this query, we are merely seeking further clarification of documentation.  Please utilize your independent clinical judgment when addressing the question(s) below.     The medical record contains the following:      Findings Supporting Clinical Information Location in Medical Record   volvulus.     Volvulus  volvulus.          Pre-operative Diagnosis:  Acute abdomen; small bowel volvulus          No obvious volvulus was seen.   CT Abdo/Pelvis = volvulus.     Volvulus  Appears to have an acute abdomen as well as a volvulus.  She needs an urgent exploratory laparotomy and possible resection of intestine.    Exploratory laparotomy small bowel resection Procedure Note  Pre-operative Diagnosis:  Acute abdomen; small bowel volvulus  Post-operative Diagnosis:  Acute abdomen; perforated small bowel mass; peritonitis    The rest of the small bowel was run and appeared normal. No obvious volvulus was seen.   01/11/20 H & P    01/11/20 General Surgery consult          01/11/20 Op note             01/11/20 Op note      Please clarify if the volvulus diagnosis has been:    [  ] Ruled In   [  ] Ruled In, Now Resolved   [ x will ] Ruled Out   [  ] Other/Clarification of findings (please specify):   [  ] Clinically insignificant     [  ] Clinically undetermined     Please document in your progress notes daily for the duration of treatment, until resolved, and include in your discharge summary.

## 2020-01-13 NOTE — PROGRESS NOTES
Progress Note  Gen Surg    Admit Date: 1/11/2020  Attending: Blue  S/P: Procedure(s) (LRB):  LAPAROTOMY, EXPLORATORY (N/A)    Post-operative Day: 2 Days Post-Op    Hospital Day: 3    SUBJECTIVE:     Doing well  No flatus     OBJECTIVE:     Vital Signs (Most Recent)  Temp: 98.1 °F (36.7 °C) (01/13/20 0746)  Pulse: 67 (01/13/20 0746)  Resp: 16 (01/13/20 0746)  BP: 130/71 (01/13/20 0746)  SpO2: (!) 94 % (01/13/20 0746)    Vital Signs Range (Last 24H):  Temp:  [98 °F (36.7 °C)-99.1 °F (37.3 °C)]   Pulse:  [67-97]   Resp:  [16-20]   BP: (124-148)/(60-73)   SpO2:  [93 %-96 %]     I & O (Last 24H):    Intake/Output Summary (Last 24 hours) at 1/13/2020 1058  Last data filed at 1/13/2020 0600  Gross per 24 hour   Intake 350 ml   Output 2250 ml   Net -1900 ml       Scheduled medications:   enoxaparin  40 mg Subcutaneous Daily    levothyroxine  62.5 mcg Oral Daily    pantoprazole  40 mg Oral Before breakfast    piperacillin-tazobactam (ZOSYN) IVPB  3.375 g Intravenous Q8H    vancomycin (VANCOCIN) IVPB  1,000 mg Intravenous Q8H       Physical Exam:  General: no distress  Lungs:  clear to auscultation bilaterally and normal respiratory effort  Heart: regular rate and rhythm, S1, S2 normal, no murmur, rub or gallop  Abdomen: distended, soft    Wound/Incision:  clean, dry, intact    Laboratory:  Labs within the past 24 hours have been reviewed.    ASSESSMENT/PLAN:     Pod 2    Doing well  Continue ngt  Continue antibiotics  Gi/dvt prophylaxis      Sandra Mcarthur MD

## 2020-01-13 NOTE — PHYSICIAN QUERY
PT Name: Ariana Tiwari  MR #: [id number]    Physician Query Form - Postoperative Relationship Clarification     CDS/: Katie Negron RN, CCDS               Contact information:  [phone]    This form is a permanent document in the medical record.     Query Date: January 13, 2020    Dear Provider,    By submitting this query, we are merely seeking further clarification of documentation. Please utilize your independent clinical judgment when addressing the question(s) below.    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                 [ x  ]   Condition occurred in the postoperative period (not a complication of surgery)               [   ]   Condition is a complication of surgery               [   ]   Condition is a complication of anesthesia               [   ]   Other intended meaning (please specify) ____________________________

## 2020-01-13 NOTE — PROGRESS NOTES
"VANCOMYCIN PHARMACOKINETIC NOTE:  Vancomycin Day #3    Objective:    74 y.o., female, Actual Body Weight = 58.7 kg (129 lb 6.6 oz)    Diagnosis/Indication for Vancomycin:  Intra-abdominal infection   Desired Vancomycin Peak:  30-35 mcg/ml; Desired Trough: 10-15 mcg/ml    Current Vancomycin Regimen:  1000 mg IV every 12 hours    Receiving other antibiotics:    Antibiotics (From admission, onward)      Start     Stop Route Frequency Ordered    01/13/20 0300  vancomycin in dextrose 5 % 1 gram/250 mL IVPB 1,000 mg     Note to Pharmacy:  Ht: 5' 3" (1.6 m)  Wt: 52.2 kg (115 lb)  Estimated Creatinine Clearance: 100.1 mL/min (A) (based on SCr of 0.4 mg/dL (L)).  Body mass index is 20.37 kg/m².    -- IV Every 8 hours (non-standard times) 01/13/20 0232    01/11/20 1700  piperacillin-tazobactam 3.375 g in dextrose 5 % 50 mL IVPB (ready to mix system)     Note to Pharmacy:  Ht: 5' 3" (1.6 m)  Wt: 52.2 kg (115 lb)  Estimated Creatinine Clearance: 100.1 mL/min (A) (based on SCr of 0.4 mg/dL (L)).  Body mass index is 20.37 kg/m².    -- IV Every 8 hours (non-standard times) 01/11/20 1009    01/11/20 1107  vancomycin - pharmacy to dose  (vancomycin IVPB)      -- IV pharmacy to manage frequency 01/11/20 1009             The patient has the following labs:     1/13/2020 Estimated Creatinine Clearance: 104.9 mL/min (A) (based on SCr of 0.4 mg/dL (L)). Lab Results   Component Value Date    BUN 9 01/13/2020       Lab Results   Component Value Date    WBC 7.01 01/13/2020    WBC 7.01 01/13/2020          Vancomycin Trough:  5.0 mcg/mL collected 11.7 hours after Dose #3 (drawn late).    Assessment:  Renal function is: stable  Vancomycin trough is below goal range.    Vancomycin Pharmacokinetic Parameters  Elimination rate constant (ke) - 0.151 hr-1  Elimination half-life (t½) = 4.59 hours    Plan:  Will change vancomycin to 1000 mg IV every 8 hours.    Will obtain vancomycin trough after 3 more dose(s), prior to dose due 1/14/20 at " 2:00    Pharmacy will continue to manage vancomycin therapy and adjust regimen as necessary.    Thank you for allowing us to participate in this patient's care.     Neno Hagen 1/13/2020 2:33 AM  Department of Pharmacy  Ext [phone]

## 2020-01-13 NOTE — SUBJECTIVE & OBJECTIVE
Interval History: stable p-op    Review of Systems   Constitutional: Negative.    HENT: Negative.    Eyes: Negative.    Respiratory: Negative.    Cardiovascular: Negative.    Gastrointestinal: Positive for abdominal distention.   Endocrine: Negative.    Genitourinary: Negative.    Musculoskeletal: Negative.    Skin: Negative.    Allergic/Immunologic: Negative.    Neurological: Negative.    Hematological: Negative.    All other systems reviewed and are negative.    Objective:     Vital Signs (Most Recent):  Temp: 98.1 °F (36.7 °C) (01/13/20 0746)  Pulse: 67 (01/13/20 0746)  Resp: 16 (01/13/20 0746)  BP: 130/71 (01/13/20 0746)  SpO2: (!) 94 % (01/13/20 0746) Vital Signs (24h Range):  Temp:  [98 °F (36.7 °C)-99.1 °F (37.3 °C)] 98.1 °F (36.7 °C)  Pulse:  [67-97] 67  Resp:  [16-20] 16  SpO2:  [93 %-96 %] 94 %  BP: (124-148)/(60-73) 130/71     Weight: 56.9 kg (125 lb 7.1 oz)  Body mass index is 21.53 kg/m².    Intake/Output Summary (Last 24 hours) at 1/13/2020 1134  Last data filed at 1/13/2020 0600  Gross per 24 hour   Intake 350 ml   Output 2250 ml   Net -1900 ml      Physical Exam   Constitutional: She is oriented to person, place, and time. She appears well-developed and well-nourished.   HENT:   Head: Normocephalic and atraumatic.   Eyes: Pupils are equal, round, and reactive to light. Conjunctivae and EOM are normal.   Neck: Normal range of motion. Neck supple.   Cardiovascular: Normal rate, regular rhythm, normal heart sounds and intact distal pulses.   Pulmonary/Chest: Effort normal and breath sounds normal.   Decreased entry with no adventitious sounds   Abdominal: Soft.   NGT, no BS   Musculoskeletal: Normal range of motion.   Neurological: She is alert and oriented to person, place, and time.   Skin: Skin is warm and dry. Capillary refill takes less than 2 seconds.   Psychiatric: She has a normal mood and affect. Her behavior is normal. Judgment and thought content normal.   Nursing note and vitals  reviewed.      Significant Labs:   BMP:   Recent Labs   Lab 01/13/20 0144   GLU 91      K 3.0*      CO2 28   BUN 9   CREATININE 0.4*   CALCIUM 7.6*     CBC:   Recent Labs   Lab 01/12/20  0450 01/13/20 0144   WBC 6.82 7.01  7.01   HGB 13.1 11.6*  11.6*   HCT 38.4 35.5*  35.5*    140*  140*       Significant Imaging: I have reviewed and interpreted all pertinent imaging results/findings within the past 24 hours.

## 2020-01-14 LAB
ANION GAP SERPL CALC-SCNC: 11 MMOL/L (ref 8–16)
BACTERIA SPEC AEROBE CULT: ABNORMAL
BASOPHILS # BLD AUTO: 0.02 K/UL (ref 0–0.2)
BASOPHILS # BLD AUTO: 0.02 K/UL (ref 0–0.2)
BASOPHILS NFR BLD: 0.3 % (ref 0–1.9)
BASOPHILS NFR BLD: 0.3 % (ref 0–1.9)
BUN SERPL-MCNC: 7 MG/DL (ref 8–23)
CALCIUM SERPL-MCNC: 7.6 MG/DL (ref 8.7–10.5)
CHLORIDE SERPL-SCNC: 101 MMOL/L (ref 95–110)
CO2 SERPL-SCNC: 28 MMOL/L (ref 23–29)
CREAT SERPL-MCNC: <0.3 MG/DL (ref 0.5–1.4)
DIFFERENTIAL METHOD: ABNORMAL
DIFFERENTIAL METHOD: ABNORMAL
EOSINOPHIL # BLD AUTO: 0.2 K/UL (ref 0–0.5)
EOSINOPHIL # BLD AUTO: 0.2 K/UL (ref 0–0.5)
EOSINOPHIL NFR BLD: 2.6 % (ref 0–8)
EOSINOPHIL NFR BLD: 2.6 % (ref 0–8)
ERYTHROCYTE [DISTWIDTH] IN BLOOD BY AUTOMATED COUNT: 15.4 % (ref 11.5–14.5)
ERYTHROCYTE [DISTWIDTH] IN BLOOD BY AUTOMATED COUNT: 15.4 % (ref 11.5–14.5)
EST. GFR  (AFRICAN AMERICAN): >60 ML/MIN/1.73 M^2
EST. GFR  (NON AFRICAN AMERICAN): >60 ML/MIN/1.73 M^2
GLUCOSE SERPL-MCNC: 86 MG/DL (ref 70–110)
HCT VFR BLD AUTO: 36.5 % (ref 37–48.5)
HCT VFR BLD AUTO: 36.5 % (ref 37–48.5)
HGB BLD-MCNC: 11.7 G/DL (ref 12–16)
HGB BLD-MCNC: 11.7 G/DL (ref 12–16)
IMM GRANULOCYTES # BLD AUTO: 0.03 K/UL (ref 0–0.04)
IMM GRANULOCYTES # BLD AUTO: 0.03 K/UL (ref 0–0.04)
IMM GRANULOCYTES NFR BLD AUTO: 0.4 % (ref 0–0.5)
IMM GRANULOCYTES NFR BLD AUTO: 0.4 % (ref 0–0.5)
LYMPHOCYTES # BLD AUTO: 0.6 K/UL (ref 1–4.8)
LYMPHOCYTES # BLD AUTO: 0.6 K/UL (ref 1–4.8)
LYMPHOCYTES NFR BLD: 8.6 % (ref 18–48)
LYMPHOCYTES NFR BLD: 8.6 % (ref 18–48)
MCH RBC QN AUTO: 28.4 PG (ref 27–31)
MCH RBC QN AUTO: 28.4 PG (ref 27–31)
MCHC RBC AUTO-ENTMCNC: 32.1 G/DL (ref 32–36)
MCHC RBC AUTO-ENTMCNC: 32.1 G/DL (ref 32–36)
MCV RBC AUTO: 89 FL (ref 82–98)
MCV RBC AUTO: 89 FL (ref 82–98)
MONOCYTES # BLD AUTO: 0.5 K/UL (ref 0.3–1)
MONOCYTES # BLD AUTO: 0.5 K/UL (ref 0.3–1)
MONOCYTES NFR BLD: 6.5 % (ref 4–15)
MONOCYTES NFR BLD: 6.5 % (ref 4–15)
NEUTROPHILS # BLD AUTO: 5.9 K/UL (ref 1.8–7.7)
NEUTROPHILS # BLD AUTO: 5.9 K/UL (ref 1.8–7.7)
NEUTROPHILS NFR BLD: 81.6 % (ref 38–73)
NEUTROPHILS NFR BLD: 81.6 % (ref 38–73)
NRBC BLD-RTO: 0 /100 WBC
NRBC BLD-RTO: 0 /100 WBC
PLATELET # BLD AUTO: 165 K/UL (ref 150–350)
PLATELET # BLD AUTO: 165 K/UL (ref 150–350)
PMV BLD AUTO: 10.4 FL (ref 9.2–12.9)
PMV BLD AUTO: 10.4 FL (ref 9.2–12.9)
POTASSIUM SERPL-SCNC: 2.6 MMOL/L (ref 3.5–5.1)
RBC # BLD AUTO: 4.12 M/UL (ref 4–5.4)
RBC # BLD AUTO: 4.12 M/UL (ref 4–5.4)
SODIUM SERPL-SCNC: 140 MMOL/L (ref 136–145)
VANCOMYCIN TROUGH SERPL-MCNC: 11 UG/ML (ref 10–22)
WBC # BLD AUTO: 7.2 K/UL (ref 3.9–12.7)
WBC # BLD AUTO: 7.2 K/UL (ref 3.9–12.7)

## 2020-01-14 PROCEDURE — 97162 PT EVAL MOD COMPLEX 30 MIN: CPT

## 2020-01-14 PROCEDURE — 63600175 PHARM REV CODE 636 W HCPCS: Performed by: INTERNAL MEDICINE

## 2020-01-14 PROCEDURE — 25000003 PHARM REV CODE 250: Performed by: INTERNAL MEDICINE

## 2020-01-14 PROCEDURE — 80048 BASIC METABOLIC PNL TOTAL CA: CPT

## 2020-01-14 PROCEDURE — 97166 OT EVAL MOD COMPLEX 45 MIN: CPT

## 2020-01-14 PROCEDURE — 97530 THERAPEUTIC ACTIVITIES: CPT

## 2020-01-14 PROCEDURE — 27000221 HC OXYGEN, UP TO 24 HOURS

## 2020-01-14 PROCEDURE — 12000002 HC ACUTE/MED SURGE SEMI-PRIVATE ROOM

## 2020-01-14 PROCEDURE — 36415 COLL VENOUS BLD VENIPUNCTURE: CPT

## 2020-01-14 PROCEDURE — 80202 ASSAY OF VANCOMYCIN: CPT

## 2020-01-14 PROCEDURE — 85025 COMPLETE CBC W/AUTO DIFF WBC: CPT

## 2020-01-14 PROCEDURE — 94761 N-INVAS EAR/PLS OXIMETRY MLT: CPT

## 2020-01-14 RX ADMIN — HYDROCODONE BITARTRATE AND ACETAMINOPHEN 1 TABLET: 5; 325 TABLET ORAL at 01:01

## 2020-01-14 RX ADMIN — VANCOMYCIN HYDROCHLORIDE 1000 MG: 1 INJECTION, POWDER, LYOPHILIZED, FOR SOLUTION INTRAVENOUS at 04:01

## 2020-01-14 RX ADMIN — LEVOTHYROXINE SODIUM 62.5 MCG: 25 TABLET ORAL at 05:01

## 2020-01-14 RX ADMIN — SODIUM CHLORIDE, SODIUM LACTATE, POTASSIUM CHLORIDE, AND CALCIUM CHLORIDE: .6; .31; .03; .02 INJECTION, SOLUTION INTRAVENOUS at 03:01

## 2020-01-14 RX ADMIN — ENOXAPARIN SODIUM 40 MG: 100 INJECTION SUBCUTANEOUS at 04:01

## 2020-01-14 RX ADMIN — PIPERACILLIN SODIUM AND TAZOBACTAM SODIUM 3.38 G: 3; .375 INJECTION, POWDER, LYOPHILIZED, FOR SOLUTION INTRAVENOUS at 12:01

## 2020-01-14 RX ADMIN — HYDROCODONE BITARTRATE AND ACETAMINOPHEN 1 TABLET: 5; 325 TABLET ORAL at 04:01

## 2020-01-14 RX ADMIN — HYDROCODONE BITARTRATE AND ACETAMINOPHEN 1 TABLET: 5; 325 TABLET ORAL at 10:01

## 2020-01-14 RX ADMIN — PIPERACILLIN SODIUM AND TAZOBACTAM SODIUM 3.38 G: 3; .375 INJECTION, POWDER, LYOPHILIZED, FOR SOLUTION INTRAVENOUS at 04:01

## 2020-01-14 RX ADMIN — VANCOMYCIN HYDROCHLORIDE 1000 MG: 1 INJECTION, POWDER, LYOPHILIZED, FOR SOLUTION INTRAVENOUS at 12:01

## 2020-01-14 RX ADMIN — POTASSIUM CHLORIDE 40 MEQ: 7.46 INJECTION, SOLUTION INTRAVENOUS at 04:01

## 2020-01-14 RX ADMIN — VANCOMYCIN HYDROCHLORIDE 1000 MG: 1 INJECTION, POWDER, LYOPHILIZED, FOR SOLUTION INTRAVENOUS at 08:01

## 2020-01-14 RX ADMIN — PIPERACILLIN SODIUM AND TAZOBACTAM SODIUM 3.38 G: 3; .375 INJECTION, POWDER, LYOPHILIZED, FOR SOLUTION INTRAVENOUS at 09:01

## 2020-01-14 NOTE — SUBJECTIVE & OBJECTIVE
Interval History:   AF since 1/13/20 @1537 (100.4) VSS, good uop (3.25L), -bm/-flatus, NGT in place with about 250 drainage.  Pt reports low back discomfort and states that she was restless overnight.  Denies any CP, SOB, N/V/D, headaches, fever or chills.       Scheduled Meds:   enoxaparin  40 mg Subcutaneous Daily    levothyroxine  62.5 mcg Oral Daily    pantoprazole  40 mg Oral Before breakfast    piperacillin-tazobactam (ZOSYN) IVPB  3.375 g Intravenous Q8H    vancomycin (VANCOCIN) IVPB  1,000 mg Intravenous Q8H     Continuous Infusions:   lactated ringers 75 mL/hr at 01/14/20 0344     PRN Meds:calcium chloride IVPB, calcium chloride IVPB, calcium chloride IVPB, HYDROcodone-acetaminophen, HYDROmorphone, magnesium oxide, magnesium sulfate IVPB, magnesium sulfate IVPB, magnesium sulfate IVPB, magnesium sulfate IVPB, morphine, morphine, ondansetron, potassium chloride in water, potassium chloride in water, potassium chloride in water, potassium chloride in water, potassium chloride, potassium chloride, potassium chloride, potassium chloride, promethazine (PHENERGAN) IVPB, sodium phosphate IVPB, sodium phosphate IVPB, sodium phosphate IVPB, sodium phosphate IVPB, sodium phosphate IVPB, Pharmacy to dose Vancomycin consult **AND** vancomycin - pharmacy to dose    Review of patient's allergies indicates:   Allergen Reactions    Gluten Diarrhea         Review of Systems   Per interval history, all other systems reviewed and negative.     Objective:     Vital Signs (Most Recent):  Temp: 98.8 °F (37.1 °C) (01/14/20 0745)  Pulse: 66 (01/14/20 0829)  Resp: 18 (01/14/20 0829)  BP: (!) 182/82 (01/14/20 0745)  SpO2: 96 % (01/14/20 0829) Vital Signs (24h Range):  Temp:  [98.4 °F (36.9 °C)-100.4 °F (38 °C)] 98.8 °F (37.1 °C)  Pulse:  [66-75] 66  Resp:  [16-18] 18  SpO2:  [95 %-96 %] 96 %  BP: (132-182)/(63-82) 182/82     Weight: 56.9 kg (125 lb 7.1 oz)  Body mass index is 21.53 kg/m².    Intake/Output Summary (Last 24  hours) at 1/14/2020 0918  Last data filed at 1/14/2020 0703  Gross per 24 hour   Intake 950 ml   Output 3500 ml   Net -2550 ml      Physical Exam   Constitutional: She is oriented to person, place, and time. She appears well-developed and well-nourished.   HENT:   Head: Normocephalic and atraumatic.   Eyes: Pupils are equal, round, and reactive to light. Conjunctivae and EOM are normal.   Neck: Normal range of motion. Neck supple.   Cardiovascular: Normal rate, regular rhythm, normal heart sounds and intact distal pulses.   Pulmonary/Chest: Effort normal and breath sounds normal. She has no wheezes. She has no rales.   Abdominal: Soft.   NGT in place  Surgical dressing, c/d/i   Musculoskeletal: Normal range of motion.   Neurological: She is alert and oriented to person, place, and time.   Skin: Skin is warm and dry. Capillary refill takes less than 2 seconds.   Psychiatric: She has a normal mood and affect. Her behavior is normal. Judgment and thought content normal.   Nursing note and vitals reviewed.      Significant Labs:   CBC:   Recent Labs   Lab 01/13/20  0144 01/14/20  0317   WBC 7.01  7.01 7.20  7.20   HGB 11.6*  11.6* 11.7*  11.7*   HCT 35.5*  35.5* 36.5*  36.5*   *  140* 165  165     CMP:   Recent Labs   Lab 01/13/20  0144 01/14/20 0317    140   K 3.0* 2.6*    101   CO2 28 28   GLU 91 86   BUN 9 7*   CREATININE 0.4* <0.3*   CALCIUM 7.6* 7.6*   ANIONGAP 10 11   EGFRNONAA >60.0 >60.0     Aerobic cx: abscess from abdomen:   Klebsiella and e coli       Klebsiella pneumoniae    CULTURE, AEROBIC  (SPECIFY SOURCE)    Amox/K Clav'ate <=8/4 mcg/mL Sensitive    Amp/Sulbactam <=8/4 mcg/mL Sensitive    Cefazolin <=8 mcg/mL Sensitive    Cefepime <=4 mcg/mL Sensitive    Ceftriaxone <=8 mcg/mL Sensitive    Ciprofloxacin <=1 mcg/mL Sensitive    Ertapenem <=2 mcg/mL Sensitive    Gentamicin <=4 mcg/mL Sensitive    Levofloxacin <=2 mcg/mL Sensitive    Meropenem <=1 mcg/mL Sensitive     Piperacillin/Tazo <=16 mcg/mL Sensitive    Tetracycline <=4 mcg/mL Sensitive    Tobramycin <=4 mcg/mL Sensitive    Trimeth/Sulfa <=2/38 mcg/mL Sensitive         -           Escherichia coli    CULTURE, AEROBIC  (SPECIFY SOURCE)    Amox/K Clav'ate <=8/4 mcg/mL Sensitive    Amp/Sulbactam <=8/4 mcg/mL Sensitive    Ampicillin <=8 mcg/mL Sensitive    Cefazolin <=8 mcg/mL Sensitive    Cefepime <=4 mcg/mL Sensitive    Ceftriaxone <=8 mcg/mL Sensitive    Ciprofloxacin <=1 mcg/mL Sensitive    Ertapenem <=2 mcg/mL Sensitive    Gentamicin <=4 mcg/mL Sensitive    Levofloxacin <=2 mcg/mL Sensitive    Meropenem <=1 mcg/mL Sensitive    Piperacillin/Tazo <=16 mcg/mL Sensitive    Tetracycline <=4 mcg/mL Sensitive    Tobramycin <=4 mcg/mL Sensitive    Trimeth/Sulfa <=2/38 mcg/mL Sensitive        All pertinent labs within the past 24 hours have been reviewed.    Significant Imaging:  No new images last 24 hours

## 2020-01-14 NOTE — PLAN OF CARE
Problem: Skin Injury Risk Increased  Goal: Skin Health and Integrity  Outcome: Ongoing, Progressing     Problem: Oral Intake Inadequate  Goal: Improved Oral Intake  Outcome: Ongoing, Progressing       Recommendation/Intervention: 1.) Recommend advancing PO diet as medically appropriate, nutrition support recommended if unable to progress diet w/in 48 hrs.  Goals: 1.) PO diet initiated or nutrition support started w/in 48 hrs.

## 2020-01-14 NOTE — PROGRESS NOTES
"Replaced by [facility]  Adult Nutrition   Progress Note (Initial Assessment)     SUMMARY     Recommendations  Recommendation/Intervention: 1.) Recommend advancing PO diet as medically appropriate, nutrition support recommended if unable to progress diet w/in 48 hrs.  Goals: 1.) PO diet initiated or nutrition support started w/in 48 hrs.  Nutrition Goal Status: new    Reason for Assessment    Reason For Assessment: other (see comments)(NPO x 3 days)  Diagnosis: surgery/postoperative complications(perforated diverticulum)  Relevant Medical History: diverticulosis, gluten intolerance, hypothyroidism    Nutrition Risk Screen    Nutrition Risk Screen: no indicators present     MST Score: 0  Have you recently lost weight without trying?: No  Weight loss score: 0  Have you been eating poorly because of a decreased appetite?: No  Appetite score: 0       Nutrition/Diet History    Patient Reported Diet/Restrictions/Preferences: general, gluten-free  Spiritual, Cultural Beliefs, Anglican Practices, Values that Affect Care: no  Food Allergies: other (see comments)(gluten)  Factors Affecting Nutritional Intake: NPO    Anthropometrics    Temp: 98.4 °F (36.9 °C)  Height Method: Stated(by family member)  Height: 5' 4" (162.6 cm)  Height (inches): 64 in  Weight Method: Bed Scale  Weight: 56.9 kg (125 lb 7.1 oz)  Weight (lb): 125.44 lb  Ideal Body Weight (IBW), Female: 120 lb  % Ideal Body Weight, Female (lb): 104.53 %  BMI (Calculated): 21.5  BMI Grade: 18.5-24.9 - normal       Weight History:  Wt Readings from Last 10 Encounters:   01/14/20 56.9 kg (125 lb 7.1 oz)   10/02/19 53.5 kg (117 lb 15.1 oz)   09/21/19 41.5 kg (91 lb 7.9 oz)   09/17/19 51.3 kg (113 lb 1.5 oz)   05/16/19 56.9 kg (125 lb 7.1 oz)   11/27/18 56.8 kg (125 lb 3.5 oz)   11/08/18 55.8 kg (123 lb)   08/09/18 55.3 kg (122 lb)   05/02/18 59 kg (130 lb)   04/26/18 59.4 kg (130 lb 15.3 oz)       Lab/Procedures/Meds: Pertinent Labs Reviewed  Clinical " Chemistry:  Recent Labs   Lab 01/11/20  0330 01/12/20  0450  01/14/20 0317    136   < > 140   K 3.4* 2.5*   < > 2.6*    100   < > 101   CO2 22* 26   < > 28   * 146*   < > 86   BUN 15 7*   < > 7*   CREATININE 0.4* 0.3*   < > <0.3*   CALCIUM 8.6* 7.3*   < > 7.6*   PROT 8.0 5.9*  --   --    ALBUMIN 4.4 2.8*  --   --    BILITOT 1.2* 0.9  --   --    ALKPHOS 133 68  --   --    AST 21 17  --   --    ALT 23 17  --   --    ANIONGAP 13 10   < > 11   ESTGFRAFRICA >60.0 >60.0   < > >60.0   EGFRNONAA >60.0 >60.0   < > >60.0   LIPASE 26  --   --   --     < > = values in this interval not displayed.     CBC:   Recent Labs   Lab 01/14/20 0317   WBC 7.20  7.20   RBC 4.12  4.12   HGB 11.7*  11.7*   HCT 36.5*  36.5*     165   MCV 89  89   MCH 28.4  28.4   MCHC 32.1  32.1       Medications: Pertinent Medications reviewed  Scheduled Meds:   enoxaparin  40 mg Subcutaneous Daily    levothyroxine  62.5 mcg Oral Daily    pantoprazole  40 mg Oral Before breakfast    piperacillin-tazobactam (ZOSYN) IVPB  3.375 g Intravenous Q8H    vancomycin (VANCOCIN) IVPB  1,000 mg Intravenous Q8H     Continuous Infusions:   lactated ringers 75 mL/hr at 01/14/20 0344     PRN Meds:.calcium chloride IVPB, calcium chloride IVPB, calcium chloride IVPB, HYDROcodone-acetaminophen, HYDROmorphone, magnesium oxide, magnesium sulfate IVPB, magnesium sulfate IVPB, magnesium sulfate IVPB, magnesium sulfate IVPB, morphine, morphine, ondansetron, potassium chloride in water, potassium chloride in water, potassium chloride in water, potassium chloride in water, potassium chloride, potassium chloride, potassium chloride, potassium chloride, promethazine (PHENERGAN) IVPB, sodium phosphate IVPB, sodium phosphate IVPB, sodium phosphate IVPB, sodium phosphate IVPB, sodium phosphate IVPB, Pharmacy to dose Vancomycin consult **AND** vancomycin - pharmacy to dose    Estimated/Assessed Needs    Weight Used For Calorie Calculations: 56.9 kg  (125 lb 7.1 oz)  Energy Calorie Requirements (kcal): 7284-3617 (25-30 kcal/kg)  Energy Need Method: Kcal/kg  Protein Requirements: 68-85 g (1.2-1.5 g/kg)  Weight Used For Protein Calculations: 56.9 kg (125 lb 7.1 oz)     Estimated Fluid Requirement Method: RDA Method  RDA Method (mL): 1422       Nutrition Prescription Ordered    Current Diet Order: NPO     Evaluation of Received Nutrient/Fluid Intake    IV Fluid (mL): 1800  Energy Calories Required: not meeting needs  Protein Required: not meeting needs  Fluid Required: meeting needs  Tolerance: other (see comments)(NPO)     Intake/Output Summary (Last 24 hours) at 1/14/2020 0759  Last data filed at 1/14/2020 0600  Gross per 24 hour   Intake 950 ml   Output 3500 ml   Net -2550 ml        % Meal Intake: NPO    Dietitian Rounds Brief    Pt assessed 2' NPO x 3 days. Noted s/p exp lap (1/11). LESLI drain in place--+ output. NGT to suction. RD to follow & monitor diet progression/tolerance.     Nutrition Risk    Level of Risk/Frequency of Follow-up: high     Monitor and Evaluation    Food and Nutrient Intake: energy intake  Food and Nutrient Adminstration: diet order  Physical Activity and Function: nutrition-related ADLs and IADLs  Anthropometric Measurements: weight change, weight  Biochemical Data, Medical Tests and Procedures: gastrointestinal profile, electrolyte and renal panel, glucose/endocrine profile  Nutrition-Focused Physical Findings: overall appearance     Nutrition Follow-Up    RD Follow-up?: Yes    Seema Fulton RD 01/14/2020 7:59 AM

## 2020-01-14 NOTE — ASSESSMENT & PLAN NOTE
S/p ex lap POD 3  Continue routine post-op care  Wound cx: + klebseilla and ecoli  IVabx: vanc and zosyn  IVF  NGT  IS

## 2020-01-14 NOTE — PROGRESS NOTES
Progress Note  Gen Surg    Admit Date: 1/11/2020  Attending: Blue  S/P: Procedure(s) (LRB):  LAPAROTOMY, EXPLORATORY (N/A)    Post-operative Day: 3 Days Post-Op    Hospital Day: 4    SUBJECTIVE:     Doing well  No flatus yet  No n/v  Pain controlled     OBJECTIVE:     Vital Signs (Most Recent)  Temp: 97.3 °F (36.3 °C) (01/14/20 1157)  Pulse: 73 (01/14/20 1157)  Resp: 18 (01/14/20 1157)  BP: (!) 170/83 (01/14/20 1157)  SpO2: 95 % (01/14/20 1157)    Vital Signs Range (Last 24H):  Temp:  [97.3 °F (36.3 °C)-100.4 °F (38 °C)]   Pulse:  [66-75]   Resp:  [16-18]   BP: (132-182)/(63-83)   SpO2:  [95 %-96 %]     I & O (Last 24H):    Intake/Output Summary (Last 24 hours) at 1/14/2020 1434  Last data filed at 1/14/2020 0703  Gross per 24 hour   Intake 950 ml   Output 3500 ml   Net -2550 ml       Scheduled medications:   enoxaparin  40 mg Subcutaneous Daily    levothyroxine  62.5 mcg Oral Daily    pantoprazole  40 mg Oral Before breakfast    piperacillin-tazobactam (ZOSYN) IVPB  3.375 g Intravenous Q8H    vancomycin (VANCOCIN) IVPB  1,000 mg Intravenous Q8H       Physical Exam:  General: no distress  Lungs:  clear to auscultation bilaterally and normal respiratory effort  Heart: regular rate and rhythm, S1, S2 normal, no murmur, rub or gallop  Abdomen: soft, non-tender non-distented; bowel sounds normal; no masses,  no organomegaly    Wound/Incision:  clean, dry, intact- drain ss    Laboratory:  Labs within the past 24 hours have been reviewed.    ASSESSMENT/PLAN:     Postoperative day 3    Exploratory laparotomy with small-bowel resection    Doing well will plan remove the NG tube.  She will need to continue on her antibiotics for now as she did have an enteric perforation at the time surgery. Continue current medical management.  Will give a clear liquid diet.    Sandra Mcarthur MD

## 2020-01-14 NOTE — PT/OT/SLP EVAL
Occupational Therapy   Evaluation    Name: Ariana Tiwari  MRN: [id number]  Admitting Diagnosis:  Perforated diverticulum of small intestine 3 Days Post-Op    Recommendations:     Discharge Recommendations: home health OT, home health PT  Discharge Equipment Recommendations:  none  Barriers to discharge:  None    Assessment:     Ariana Tiwari is a 74 y.o. female with a medical diagnosis of Perforated diverticulum of small intestine.  Pt agreeable to OT therapy eval this AM. Performance deficits affecting function: weakness, impaired endurance, impaired self care skills, impaired functional mobilty, impaired balance, decreased upper extremity function, decreased safety awareness, pain.  Patient oriented X 4, family at bedside. Pt with max A bed mobility, sat EOB ~15 mins, then requested to lay back down. While seated EOB, patient's NG tub tape started to come off and slip down, nursing notified and nursing came reapply tape. Rec HHOT/PT at d/c.     Rehab Prognosis: Fair; patient would benefit from acute skilled OT services to address these deficits and reach maximum level of function.       Plan:     Patient to be seen 5 x/week to address the above listed problems via self-care/home management, therapeutic exercises, therapeutic activities  · Plan of Care Expires: 02/14/20  · Plan of Care Reviewed with: patient, family    Subjective     Chief Complaint: Pain in abdomen with movement  Patient/Family Comments/goals: to get better    Occupational Profile:  Living Environment: Pt lives with son and DIL in 1 story home with threshold step. Pt has a walk-in shower with a shower chair, raised toilet, and wallbars present  Previous level of function: Independent. Pt's DIL and son take care of IADLs; Pt does not drive; Pt reports she sleeps in a recliner at home  Roles and Routines: likes to go shopping with DIL/sisters; retired marathon runner  Equipment Used at Home:  shower chair, grab bar  Assistance upon Discharge:  yes, from family    Pain/Comfort:  · Pain Rating 1: (not rated, reported only pain when moving)  · Location 1: abdomen  · Pain Addressed 1: Reposition, Cessation of Activity    Patients cultural, spiritual, Mosque conflicts given the current situation:      Objective:     Communicated with: nursing prior to session.  Patient found HOB elevated with padilla catheter, oxygen, NG tube, LESLI drain, peripheral IV, telemetry upon OT entry to room.    General Precautions: Standard, fall   Orthopedic Precautions:N/A   Braces: N/A     Occupational Performance:    Bed Mobility:    · Patient completed Rolling/Turning to Left with  moderate assistance  · Patient completed Scooting/Bridging with minimum assistance  · Patient completed Supine to Sit with maximal assistance  · Patient completed Sit to Supine with moderate assistance   · Patient sat EOB ~15 mins with CGA for balance    Activities of Daily Living:  · Grooming: contact guard assistance seated EOB to wash face    Cognitive/Visual Perceptual:  Cognitive/Psychosocial Skills:     -       Oriented to: Person, Place, Time and Situation   -       Follows Commands/attention:Follows two-step commands  -       Communication: clear/fluent  -       Safety awareness/insight to disability: impaired   -       Mood/Affect/Coping skills/emotional control: Appropriate to situation, Cooperative and Pleasant    Physical Exam:  Balance:    -       CGA seated balance  Upper Extremity Range of Motion:     -       Right Upper Extremity: ~90 degrees shoulder flex, WFL distally  -       Left Upper Extremity: ~90 degrees shoulder flex, WFL distally  Upper Extremity Strength:    -       Right Upper Extremity: 3+/5  -       Left Upper Extremity: 3+/5   Strength:    -       Right Upper Extremity: fair  -       Left Upper Extremity: fair  Gross motor coordination:   pt with tremors, affecting R side more    AMPAC 6 Click ADL:  AMPAC Total Score: 15    Treatment & Education:  Pt educated on  role of OT/POC and safety during bed mobility and ADLs. Pt/family educated on importance of OOB activity to negate the effects of prolonged bed rest.   Education:    Patient left HOB elevated with all lines intact, call button in reach, bed alarm on, nursing notified and family present    GOALS:   Multidisciplinary Problems     Occupational Therapy Goals        Problem: Occupational Therapy Goal    Goal Priority Disciplines Outcome Interventions   Occupational Therapy Goal     OT, PT/OT     Description:  Goals to be met by: discharge    Patient will increase functional independence with ADLs by performing:    LE Dressing with Supervision.  Grooming while standing at sink with Supervision.  Toileting from bedside commode with Supervision for hygiene and clothing management.   Toilet transfer to bedside commode with Supervision.  Increased functional strength to WFL to maximize patient's ability to perform ADLs safely and independently.                       History:     Past Medical History:   Diagnosis Date    Allergy     Diverticulosis     Gluten enteropathy     Gluten intolerance     Hernia     Hypothyroidism     PD (Parkinson's disease) 9/16/2015    Right tremor type    Peptic ulcer disease     Right shoulder pain     Cirtisone injection 3/26/15 - Dr. Tolbert    Ulcer        Past Surgical History:   Procedure Laterality Date    ADENOIDECTOMY      COLONOSCOPY  03/01/2012    Dr. Teresa, repeat in 10 years for screening    COLONOSCOPY N/A 2/21/2018    Procedure: COLONOSCOPY;  Surgeon: Radha Deng MD;  [facility];  Service: Endoscopy;  Laterality: N/A;    COSMETIC SURGERY      Broken nose    FRACTURE SURGERY  left wrist    With pin    HEMORRHOID SURGERY      HERNIA REPAIR Left 04/09/2015    Dr Lo; left inguinal    INTRALUMINAL GASTROINTESTINAL TRACT IMAGING VIA CAPSULE N/A 7/10/2018    Procedure: IMAGING, GI TRACT, INTRALUMINAL, VIA CAPSULE;  Surgeon: Radha Deng MD;   [facility];  Service: Endoscopy;  Laterality: N/A;    RHINOPLASTY TIP      TONSILLECTOMY      UPPER GASTROINTESTINAL ENDOSCOPY  05/21/2015    Dr. Gomez       Time Tracking:     OT Date of Treatment: 01/14/20  OT Start Time: 0925  OT Stop Time: 0953  OT Total Time (min): 28 min    Billable Minutes:Evaluation 10  Therapeutic Activity 18    Carolina Oviedo OT  1/14/2020

## 2020-01-14 NOTE — PT/OT/SLP EVAL
Physical Therapy Evaluation    Patient Name:  Ariana Tiwari   MRN:  [id number]    Recommendations:     Discharge Recommendations:  home health PT   Discharge Equipment Recommendations: other (see comments)(TBD RW?)   Barriers to discharge: None    Assessment:     Ariana Tiwari is a 74 y.o. female admitted with a medical diagnosis of Perforated diverticulum of small intestine.  She presents with the following impairments/functional limitations:  weakness, impaired functional mobilty, decreased safety awareness, impaired endurance, gait instability, impaired balance, impaired self care skills. Pt supine in NAD; visiting with family; states worked with OT earlier and sat EOB; states has not been OOB post op. Agreeable to PT eval and treatment.     Rehab Prognosis: Good; patient would benefit from acute skilled PT services to address these deficits and reach maximum level of function.    Recent Surgery: Procedure(s) (LRB):  LAPAROTOMY, EXPLORATORY (N/A) 3 Days Post-Op    Plan:     During this hospitalization, patient to be seen 6 x/week to address the identified rehab impairments via gait training, therapeutic activities, therapeutic exercises and progress toward the following goals:    · Plan of Care Expires:  02/14/20    Subjective     Chief Complaint: abdominal pain at incision sites  Patient/Family Comments/goals: home with family  Pain/Comfort:  · Pain Rating 1: 4/10  · Location 1: abdomen  · Pain Addressed 1: Reposition, Cessation of Activity  · Pain Rating Post-Intervention 1: 4/10    Patients cultural, spiritual, Jain conflicts given the current situation:      Living Environment:  Pt states she lives at home with son and daughter in law. Home is one story with threshold step to enter.  Prior to admission, patients level of function was Independent no assistive device; denies falls.  Equipment used at home: none.  DME owned (not currently used): none.  Upon discharge, patient will have assistance from  family.    Objective:     Communicated with RN prior to session.  Patient found supine with oxygen, padilla catheter, telemetry, peripheral IV, NG tube  upon PT entry to room.    General Precautions: Standard, fall   Orthopedic Precautions:    Braces:       Exams:  · Cognitive Exam:  Patient is oriented to Person, Place, Time and Situation  · RLE ROM: WFL  · RLE Strength: WFL  · LLE ROM: WFL  · LLE Strength: WFL    Functional Mobility:  · Bed Mobility:     · Supine to Sit: minimum assistance  · Transfers:     · Sit to Stand:  minimum assistance with hand-held assist  · Bed to Chair: minimum assistance with  hand-held assist  using  Stand Pivot  · Balance: good in sitting; poor in standing      Therapeutic Activities and Exercises:   bed mobility; transfer training; PT educated pt/ family on importance of out of bed to chair and functional mobility to negate negative effects of prolonged bed rest. Will progress activity as tolerated by patient; At this time patient only able to tolerate positional changes and transfers due to overall deconditioning.       AM-PAC 6 CLICK MOBILITY  Total Score:16     Patient left up in chair with all lines intact, call button in reach, RN notified and  family present.    GOALS:   Multidisciplinary Problems     Physical Therapy Goals        Problem: Physical Therapy Goal    Goal Priority Disciplines Outcome Goal Variances Interventions   Physical Therapy Goal     PT, PT/OT      Description:  Goals to be met by: discharge     Patient will increase functional independence with mobility by performin. Supine to sit with Supervision  2. Sit to stand transfer with Supervision  3. Bed to chair transfer with Supervision using Rolling Walker  4. Gait  x 150 feet with Supervision using Rolling Walker.                       History:     Past Medical History:   Diagnosis Date    Allergy     Diverticulosis     Gluten enteropathy     Gluten intolerance     Hernia     Hypothyroidism     PD  (Parkinson's disease) 9/16/2015    Right tremor type    Peptic ulcer disease     Right shoulder pain     Cirtisone injection 3/26/15 - Dr. Tolbert    Ulcer        Past Surgical History:   Procedure Laterality Date    ADENOIDECTOMY      COLONOSCOPY  03/01/2012    Dr. Teresa, repeat in 10 years for screening    COLONOSCOPY N/A 2/21/2018    Procedure: COLONOSCOPY;  Surgeon: aRdha Deng MD;  Location: [facility] ENDO;  Service: Endoscopy;  Laterality: N/A;    COSMETIC SURGERY      Broken nose    FRACTURE SURGERY  left wrist    With pin    HEMORRHOID SURGERY      HERNIA REPAIR Left 04/09/2015    Dr Lo; left inguinal    INTRALUMINAL GASTROINTESTINAL TRACT IMAGING VIA CAPSULE N/A 7/10/2018    Procedure: IMAGING, GI TRACT, INTRALUMINAL, VIA CAPSULE;  Surgeon: Radha Deng MD;  Location: [facility] ENDO;  Service: Endoscopy;  Laterality: N/A;    RHINOPLASTY TIP      TONSILLECTOMY      UPPER GASTROINTESTINAL ENDOSCOPY  05/21/2015    Dr. Gomez       Time Tracking:     PT Received On: 01/14/20  PT Start Time: 1059     PT Stop Time: 1111  PT Total Time (min): 12 min     Billable Minutes: Evaluation 4 and Therapeutic Activity 8      Marisa Amin, PT  01/14/2020

## 2020-01-14 NOTE — PROGRESS NOTES
[facility] Medicine  Progress Note    Patient Name: Ariana Tiwari  MRN: [id number]  Patient Class: IP- Inpatient   Admission Date: 1/11/2020  Length of Stay: 3 days  Attending Physician: Desirae Bhat DO  Primary Care Provider: Nba Petty MD        Subjective:     Principal Problem:Perforated diverticulum of small intestine    Interval History:   AF since 1/13/20 @1537 (100.4) VSS, good uop (3.25L), -bm/-flatus, NGT in place with about 250 drainage.  Pt reports low back discomfort and states that she was restless overnight.  Denies any CP, SOB, N/V/D, headaches, fever or chills.       Scheduled Meds:   enoxaparin  40 mg Subcutaneous Daily    levothyroxine  62.5 mcg Oral Daily    pantoprazole  40 mg Oral Before breakfast    piperacillin-tazobactam (ZOSYN) IVPB  3.375 g Intravenous Q8H    vancomycin (VANCOCIN) IVPB  1,000 mg Intravenous Q8H     Continuous Infusions:   lactated ringers 75 mL/hr at 01/14/20 0344     PRN Meds:calcium chloride IVPB, calcium chloride IVPB, calcium chloride IVPB, HYDROcodone-acetaminophen, HYDROmorphone, magnesium oxide, magnesium sulfate IVPB, magnesium sulfate IVPB, magnesium sulfate IVPB, magnesium sulfate IVPB, morphine, morphine, ondansetron, potassium chloride in water, potassium chloride in water, potassium chloride in water, potassium chloride in water, potassium chloride, potassium chloride, potassium chloride, potassium chloride, promethazine (PHENERGAN) IVPB, sodium phosphate IVPB, sodium phosphate IVPB, sodium phosphate IVPB, sodium phosphate IVPB, sodium phosphate IVPB, Pharmacy to dose Vancomycin consult **AND** vancomycin - pharmacy to dose    Review of patient's allergies indicates:   Allergen Reactions    Gluten Diarrhea         Review of Systems   Per interval history, all other systems reviewed and negative.     Objective:     Vital Signs (Most Recent):  Temp: 98.8 °F (37.1 °C) (01/14/20 0745)  Pulse: 66 (01/14/20 0829)  Resp: 18  (01/14/20 0829)  BP: (!) 182/82 (01/14/20 0745)  SpO2: 96 % (01/14/20 0829) Vital Signs (24h Range):  Temp:  [98.4 °F (36.9 °C)-100.4 °F (38 °C)] 98.8 °F (37.1 °C)  Pulse:  [66-75] 66  Resp:  [16-18] 18  SpO2:  [95 %-96 %] 96 %  BP: (132-182)/(63-82) 182/82     Weight: 56.9 kg (125 lb 7.1 oz)  Body mass index is 21.53 kg/m².    Intake/Output Summary (Last 24 hours) at 1/14/2020 0918  Last data filed at 1/14/2020 0703  Gross per 24 hour   Intake 950 ml   Output 3500 ml   Net -2550 ml      Physical Exam   Constitutional: She is oriented to person, place, and time. She appears well-developed and well-nourished.   HENT:   Head: Normocephalic and atraumatic.   Eyes: Pupils are equal, round, and reactive to light. Conjunctivae and EOM are normal.   Neck: Normal range of motion. Neck supple.   Cardiovascular: Normal rate, regular rhythm, normal heart sounds and intact distal pulses.   Pulmonary/Chest: Effort normal and breath sounds normal. She has no wheezes. She has no rales.   Abdominal: Soft.   NGT in place  Surgical dressing, c/d/i   Musculoskeletal: Normal range of motion.   Neurological: She is alert and oriented to person, place, and time.   Skin: Skin is warm and dry. Capillary refill takes less than 2 seconds.   Psychiatric: She has a normal mood and affect. Her behavior is normal. Judgment and thought content normal.   Nursing note and vitals reviewed.      Significant Labs:   CBC:   Recent Labs   Lab 01/13/20  0144 01/14/20 0317   WBC 7.01  7.01 7.20  7.20   HGB 11.6*  11.6* 11.7*  11.7*   HCT 35.5*  35.5* 36.5*  36.5*   *  140* 165  165     CMP:   Recent Labs   Lab 01/13/20  0144 01/14/20 0317    140   K 3.0* 2.6*    101   CO2 28 28   GLU 91 86   BUN 9 7*   CREATININE 0.4* <0.3*   CALCIUM 7.6* 7.6*   ANIONGAP 10 11   EGFRNONAA >60.0 >60.0     Aerobic cx: abscess from abdomen:   Klebsiella and e coli       Klebsiella pneumoniae    CULTURE, AEROBIC  (SPECIFY SOURCE)    Amox/K  Clav'ate <=8/4 mcg/mL Sensitive    Amp/Sulbactam <=8/4 mcg/mL Sensitive    Cefazolin <=8 mcg/mL Sensitive    Cefepime <=4 mcg/mL Sensitive    Ceftriaxone <=8 mcg/mL Sensitive    Ciprofloxacin <=1 mcg/mL Sensitive    Ertapenem <=2 mcg/mL Sensitive    Gentamicin <=4 mcg/mL Sensitive    Levofloxacin <=2 mcg/mL Sensitive    Meropenem <=1 mcg/mL Sensitive    Piperacillin/Tazo <=16 mcg/mL Sensitive    Tetracycline <=4 mcg/mL Sensitive    Tobramycin <=4 mcg/mL Sensitive    Trimeth/Sulfa <=2/38 mcg/mL Sensitive         -           Escherichia coli    CULTURE, AEROBIC  (SPECIFY SOURCE)    Amox/K Clav'ate <=8/4 mcg/mL Sensitive    Amp/Sulbactam <=8/4 mcg/mL Sensitive    Ampicillin <=8 mcg/mL Sensitive    Cefazolin <=8 mcg/mL Sensitive    Cefepime <=4 mcg/mL Sensitive    Ceftriaxone <=8 mcg/mL Sensitive    Ciprofloxacin <=1 mcg/mL Sensitive    Ertapenem <=2 mcg/mL Sensitive    Gentamicin <=4 mcg/mL Sensitive    Levofloxacin <=2 mcg/mL Sensitive    Meropenem <=1 mcg/mL Sensitive    Piperacillin/Tazo <=16 mcg/mL Sensitive    Tetracycline <=4 mcg/mL Sensitive    Tobramycin <=4 mcg/mL Sensitive    Trimeth/Sulfa <=2/38 mcg/mL Sensitive        All pertinent labs within the past 24 hours have been reviewed.    Significant Imaging:  No new images last 24 hours        Assessment/Plan:      * Perforated diverticulum of small intestine  S/p ex lap POD 3  Continue routine post-op care per GS  Wound cx: + klebseilla and ecoli  IVabx: vanc and zosyn  IVF  NGT  IS         GI ppx: protonix  DVT ppx: lovenox  Diet: NPO  Code: full    VTE Risk Mitigation (From admission, onward)         Ordered     enoxaparin injection 40 mg  Daily      01/11/20 1735     IP VTE HIGH RISK PATIENT  Once      01/11/20 1735     Place CAPRICE hose  Until discontinued      01/11/20 1735     Place CAPRICE hose  Until discontinued      01/11/20 1547     Place sequential compression device  Until discontinued      01/11/20 1547                  Desirae Bhat DO  Department of  [facility] Medicine   [facility]

## 2020-01-14 NOTE — PLAN OF CARE
Problem: Adult Inpatient Plan of Care  Goal: Plan of Care Review  Outcome: Ongoing, Progressing  Flowsheets (Taken 1/13/2020 1858)  Plan of Care Reviewed With: patient; daughter; son     Problem: Fall Injury Risk  Goal: Absence of Fall and Fall-Related Injury  Outcome: Ongoing, Progressing  Intervention: Promote Injury-Free Environment  Flowsheets (Taken 1/13/2020 1858)  Safety Promotion/Fall Prevention: assistive device/personal item within reach; bed alarm set; Fall Risk reviewed with patient/family; diversional activities provided; Fall Risk signage in place; high risk medications identified; medications reviewed; nonskid shoes/socks when out of bed; muscle strengthening facilitated; side rails raised x 3; instructed to call staff for mobility  Environmental Safety Modification: assistive device/personal items within reach; clutter free environment maintained     Problem: Infection  Goal: Infection Symptom Resolution  Outcome: Ongoing, Progressing     Problem: Adult Inpatient Plan of Care  Goal: Patient-Specific Goal (Individualization)  Flowsheets (Taken 1/13/2020 1858)  Individualized Care Needs: NONE  Anxieties, Fears or Concerns: NONE  Patient-Specific Goals (Include Timeframe): PAIN CONTROL

## 2020-01-15 LAB
ANION GAP SERPL CALC-SCNC: 11 MMOL/L (ref 8–16)
BASOPHILS # BLD AUTO: 0.02 K/UL (ref 0–0.2)
BASOPHILS NFR BLD: 0.2 % (ref 0–1.9)
BUN SERPL-MCNC: 6 MG/DL (ref 8–23)
CALCIUM SERPL-MCNC: 7.9 MG/DL (ref 8.7–10.5)
CHLORIDE SERPL-SCNC: 97 MMOL/L (ref 95–110)
CO2 SERPL-SCNC: 30 MMOL/L (ref 23–29)
CREAT SERPL-MCNC: <0.3 MG/DL (ref 0.5–1.4)
DIFFERENTIAL METHOD: ABNORMAL
EOSINOPHIL # BLD AUTO: 0.2 K/UL (ref 0–0.5)
EOSINOPHIL NFR BLD: 2.2 % (ref 0–8)
ERYTHROCYTE [DISTWIDTH] IN BLOOD BY AUTOMATED COUNT: 14.8 % (ref 11.5–14.5)
EST. GFR  (AFRICAN AMERICAN): >60 ML/MIN/1.73 M^2
EST. GFR  (NON AFRICAN AMERICAN): >60 ML/MIN/1.73 M^2
GLUCOSE SERPL-MCNC: 124 MG/DL (ref 70–110)
HCT VFR BLD AUTO: 37.9 % (ref 37–48.5)
HGB BLD-MCNC: 12.3 G/DL (ref 12–16)
IMM GRANULOCYTES # BLD AUTO: 0.03 K/UL (ref 0–0.04)
IMM GRANULOCYTES NFR BLD AUTO: 0.4 % (ref 0–0.5)
LYMPHOCYTES # BLD AUTO: 0.6 K/UL (ref 1–4.8)
LYMPHOCYTES NFR BLD: 7.9 % (ref 18–48)
MAGNESIUM SERPL-MCNC: 1.7 MG/DL (ref 1.6–2.6)
MCH RBC QN AUTO: 28.5 PG (ref 27–31)
MCHC RBC AUTO-ENTMCNC: 32.5 G/DL (ref 32–36)
MCV RBC AUTO: 88 FL (ref 82–98)
MONOCYTES # BLD AUTO: 0.6 K/UL (ref 0.3–1)
MONOCYTES NFR BLD: 7.1 % (ref 4–15)
NEUTROPHILS # BLD AUTO: 6.7 K/UL (ref 1.8–7.7)
NEUTROPHILS NFR BLD: 82.2 % (ref 38–73)
NRBC BLD-RTO: 0 /100 WBC
PHOSPHATE SERPL-MCNC: 2.2 MG/DL (ref 2.7–4.5)
PLATELET # BLD AUTO: 195 K/UL (ref 150–350)
PMV BLD AUTO: 10.4 FL (ref 9.2–12.9)
POTASSIUM SERPL-SCNC: 2.7 MMOL/L (ref 3.5–5.1)
RBC # BLD AUTO: 4.31 M/UL (ref 4–5.4)
SODIUM SERPL-SCNC: 138 MMOL/L (ref 136–145)
WBC # BLD AUTO: 8.12 K/UL (ref 3.9–12.7)

## 2020-01-15 PROCEDURE — 63600175 PHARM REV CODE 636 W HCPCS: Performed by: STUDENT IN AN ORGANIZED HEALTH CARE EDUCATION/TRAINING PROGRAM

## 2020-01-15 PROCEDURE — 84100 ASSAY OF PHOSPHORUS: CPT

## 2020-01-15 PROCEDURE — 97530 THERAPEUTIC ACTIVITIES: CPT | Mod: CQ

## 2020-01-15 PROCEDURE — 25000003 PHARM REV CODE 250: Performed by: INTERNAL MEDICINE

## 2020-01-15 PROCEDURE — 80048 BASIC METABOLIC PNL TOTAL CA: CPT

## 2020-01-15 PROCEDURE — 85025 COMPLETE CBC W/AUTO DIFF WBC: CPT

## 2020-01-15 PROCEDURE — 97535 SELF CARE MNGMENT TRAINING: CPT

## 2020-01-15 PROCEDURE — 12000002 HC ACUTE/MED SURGE SEMI-PRIVATE ROOM

## 2020-01-15 PROCEDURE — 83735 ASSAY OF MAGNESIUM: CPT

## 2020-01-15 PROCEDURE — 63600175 PHARM REV CODE 636 W HCPCS: Performed by: INTERNAL MEDICINE

## 2020-01-15 PROCEDURE — 36415 COLL VENOUS BLD VENIPUNCTURE: CPT

## 2020-01-15 PROCEDURE — 97116 GAIT TRAINING THERAPY: CPT | Mod: CQ

## 2020-01-15 PROCEDURE — 27000221 HC OXYGEN, UP TO 24 HOURS

## 2020-01-15 PROCEDURE — 25000003 PHARM REV CODE 250: Performed by: STUDENT IN AN ORGANIZED HEALTH CARE EDUCATION/TRAINING PROGRAM

## 2020-01-15 PROCEDURE — 94761 N-INVAS EAR/PLS OXIMETRY MLT: CPT

## 2020-01-15 RX ORDER — POTASSIUM CHLORIDE 7.45 MG/ML
40 INJECTION INTRAVENOUS ONCE
Status: COMPLETED | OUTPATIENT
Start: 2020-01-15 | End: 2020-01-15

## 2020-01-15 RX ORDER — POTASSIUM CHLORIDE 20 MEQ/1
40 TABLET, EXTENDED RELEASE ORAL ONCE
Status: COMPLETED | OUTPATIENT
Start: 2020-01-15 | End: 2020-01-15

## 2020-01-15 RX ADMIN — POTASSIUM CHLORIDE 40 MEQ: 7.46 INJECTION, SOLUTION INTRAVENOUS at 09:01

## 2020-01-15 RX ADMIN — PANTOPRAZOLE SODIUM 40 MG: 40 TABLET, DELAYED RELEASE ORAL at 05:01

## 2020-01-15 RX ADMIN — PIPERACILLIN SODIUM AND TAZOBACTAM SODIUM 3.38 G: 3; .375 INJECTION, POWDER, LYOPHILIZED, FOR SOLUTION INTRAVENOUS at 12:01

## 2020-01-15 RX ADMIN — PIPERACILLIN SODIUM AND TAZOBACTAM SODIUM 3.38 G: 3; .375 INJECTION, POWDER, LYOPHILIZED, FOR SOLUTION INTRAVENOUS at 06:01

## 2020-01-15 RX ADMIN — VANCOMYCIN HYDROCHLORIDE 1000 MG: 1 INJECTION, POWDER, LYOPHILIZED, FOR SOLUTION INTRAVENOUS at 03:01

## 2020-01-15 RX ADMIN — POTASSIUM CHLORIDE 40 MEQ: 20 TABLET, EXTENDED RELEASE ORAL at 10:01

## 2020-01-15 RX ADMIN — HYDROCODONE BITARTRATE AND ACETAMINOPHEN 1 TABLET: 5; 325 TABLET ORAL at 05:01

## 2020-01-15 RX ADMIN — LEVOTHYROXINE SODIUM 62.5 MCG: 25 TABLET ORAL at 05:01

## 2020-01-15 RX ADMIN — PIPERACILLIN SODIUM AND TAZOBACTAM SODIUM 3.38 G: 3; .375 INJECTION, POWDER, LYOPHILIZED, FOR SOLUTION INTRAVENOUS at 10:01

## 2020-01-15 RX ADMIN — VANCOMYCIN HYDROCHLORIDE 1000 MG: 1 INJECTION, POWDER, LYOPHILIZED, FOR SOLUTION INTRAVENOUS at 08:01

## 2020-01-15 RX ADMIN — VANCOMYCIN HYDROCHLORIDE 1000 MG: 1 INJECTION, POWDER, LYOPHILIZED, FOR SOLUTION INTRAVENOUS at 01:01

## 2020-01-15 RX ADMIN — HYDROCODONE BITARTRATE AND ACETAMINOPHEN 1 TABLET: 5; 325 TABLET ORAL at 01:01

## 2020-01-15 RX ADMIN — ENOXAPARIN SODIUM 40 MG: 100 INJECTION SUBCUTANEOUS at 05:01

## 2020-01-15 NOTE — SUBJECTIVE & OBJECTIVE
Interval History: pod4  AF VSS, good uop, -bm/-flatus, tolerating clear diet without n/v, sitting up in chair. Pain controlled.   Denies any CP, SOB, N/V/D, headaches, fever or chills.       Scheduled Meds:   enoxaparin  40 mg Subcutaneous Daily    levothyroxine  62.5 mcg Oral Daily    pantoprazole  40 mg Oral Before breakfast    piperacillin-tazobactam (ZOSYN) IVPB  3.375 g Intravenous Q8H    vancomycin (VANCOCIN) IVPB  1,000 mg Intravenous Q8H     Continuous Infusions:   lactated ringers 75 mL/hr at 01/14/20 0344     PRN Meds:calcium chloride IVPB, calcium chloride IVPB, calcium chloride IVPB, HYDROcodone-acetaminophen, HYDROmorphone, magnesium oxide, magnesium sulfate IVPB, magnesium sulfate IVPB, magnesium sulfate IVPB, magnesium sulfate IVPB, morphine, morphine, ondansetron, potassium chloride in water, potassium chloride in water, potassium chloride in water, potassium chloride in water, potassium chloride, potassium chloride, potassium chloride, potassium chloride, promethazine (PHENERGAN) IVPB, sodium phosphate IVPB, sodium phosphate IVPB, sodium phosphate IVPB, sodium phosphate IVPB, sodium phosphate IVPB, Pharmacy to dose Vancomycin consult **AND** vancomycin - pharmacy to dose    Review of patient's allergies indicates:   Allergen Reactions    Gluten Diarrhea       Review of Systems   Per interval history, all other systems reviewed and negative.     Objective:     Vital Signs (Most Recent):  Temp: 98.2 °F (36.8 °C) (01/15/20 0737)  Pulse: 73 (01/15/20 0737)  Resp: 17 (01/15/20 0737)  BP: 135/71 (01/15/20 0737)  SpO2: 95 % (01/15/20 0737) Vital Signs (24h Range):  Temp:  [97.3 °F (36.3 °C)-99.2 °F (37.3 °C)] 98.2 °F (36.8 °C)  Pulse:  [] 73  Resp:  [15-19] 17  SpO2:  [82 %-95 %] 95 %  BP: (122-170)/(66-96) 135/71     Weight: 56.9 kg (125 lb 7.1 oz)  Body mass index is 21.53 kg/m².  No intake or output data in the 24 hours ending 01/15/20 0844   Physical Exam   Constitutional: She is oriented  to person, place, and time. She appears well-developed. No distress.   Sitting up in bedside chair   HENT:   Head: Normocephalic and atraumatic.   Eyes: Pupils are equal, round, and reactive to light. Conjunctivae and EOM are normal.   Neck: Normal range of motion. Neck supple.   Cardiovascular: Normal rate, regular rhythm, normal heart sounds and intact distal pulses.   Pulmonary/Chest: Effort normal and breath sounds normal. She has no wheezes. She has no rales.   2L NC   Abdominal: Soft.   Surgical dressing, c/d/i   Musculoskeletal: Normal range of motion.   Neurological: She is alert and oriented to person, place, and time.   Skin: Skin is warm and dry. Capillary refill takes less than 2 seconds.   Psychiatric: She has a normal mood and affect. Her behavior is normal.   Nursing note and vitals reviewed.      Significant Labs:   CBC:   Recent Labs   Lab 01/14/20  0317 01/15/20  0502   WBC 7.20  7.20 8.12   HGB 11.7*  11.7* 12.3   HCT 36.5*  36.5* 37.9     165 195     Bmp, mg, phos pending.    All pertinent labs within the past 24 hours have been reviewed.    Significant Imaging: no new images last 24 hours

## 2020-01-15 NOTE — PLAN OF CARE
Problem: Occupational Therapy Goal  Goal: Occupational Therapy Goal  Description  Goals to be met by: discharge    Patient will increase functional independence with ADLs by performing:    LE Dressing with Supervision.  Grooming while standing at sink with Supervision.  Toileting from bedside commode with Supervision for hygiene and clothing management.   Toilet transfer to bedside commode with Supervision.  Increased functional strength to WFL to maximize patient's ability to perform ADLs safely and independently.      Outcome: Ongoing, Progressing

## 2020-01-15 NOTE — PT/OT/SLP PROGRESS
Physical Therapy Treatment    Patient Name:  Ariana Tiwari   MRN:  [id number]    Recommendations:     Discharge Recommendations:  home with home health   Discharge Equipment Recommendations: other (see comments)(TBD RW?)   Barriers to discharge: None    Assessment:     Ariana Tiwari is a 74 y.o. female admitted with a medical diagnosis of Perforated diverticulum of small intestine.  She presents with the following impairments/functional limitations:  weakness, impaired endurance .    Rehab Prognosis: Fair; patient would benefit from acute skilled PT services to address these deficits and reach maximum level of function.    Recent Surgery: Procedure(s) (LRB):  LAPAROTOMY, EXPLORATORY (N/A) 4 Days Post-Op    Plan:     During this hospitalization, patient to be seen 6 x/week to address the identified rehab impairments via gait training, therapeutic activities, therapeutic exercises and progress toward the following goals:    · Plan of Care Expires:  02/14/20    Subjective     Chief Complaint: none  Patient/Family Comments/goals: home  Pain/Comfort:  ·        Objective:     Communicated with NSG prior to session.  Patient found supine with   upon PT entry to room.     General Precautions: Standard, fall   Orthopedic Precautions:    Braces:       Functional Mobility:  · Gait: Pt amb 50 ft r/w min assist for safety. Pt a little unsteady but no major lob. sats 86% RA.      AM-PAC 6 CLICK MOBILITY          Therapeutic Activities and Exercises:   Pt performed bed mobility supine to sit min assist . Pt sit eob for midline orientation with supervision.    Patient left up in chair with call button in reach..    GOALS:   Multidisciplinary Problems     Physical Therapy Goals        Problem: Physical Therapy Goal    Goal Priority Disciplines Outcome Goal Variances Interventions   Physical Therapy Goal     PT, PT/OT Ongoing, Progressing     Description:  Goals to be met by: discharge     Patient will increase functional  independence with mobility by performin. Supine to sit with Supervision  2. Sit to stand transfer with Supervision  3. Bed to chair transfer with Supervision using Rolling Walker  4. Gait  x 150 feet with Supervision using Rolling Walker.                       Time Tracking:     PT Received On: 01/15/20  PT Start Time: 1120     PT Stop Time: 1143  PT Total Time (min): 23 min     Billable Minutes: Gait Training 13min and Therapeutic Activity 10min    Treatment Type: Treatment  PT/PTA: PTA     PTA Visit Number: 1     Kvng Waller PTA  01/15/2020

## 2020-01-15 NOTE — PROGRESS NOTES
[facility] Medicine  Progress Note    Patient Name: Ariana Tiwari  MRN: [id number]  Patient Class: IP- Inpatient   Admission Date: 1/11/2020  Length of Stay: 4 days  Attending Physician: Desirae Bhat DO  Primary Care Provider: Nba Petty MD        Subjective:     Principal Problem:Perforated diverticulum of small intestine  Chief Complaint   Patient presents with    Abdominal Pain     c/o marked abd. distention and abd. pain.      Has had sharp pain since Friday morning, but has had chronic abd. pain and distention for long period     Interval History: pod4  AF VSS, good uop, -bm/-flatus, tolerating clear diet without n/v, sitting up in chair. Pain controlled.   Denies any CP, SOB, N/V/D, headaches, fever or chills.       Scheduled Meds:   enoxaparin  40 mg Subcutaneous Daily    levothyroxine  62.5 mcg Oral Daily    pantoprazole  40 mg Oral Before breakfast    piperacillin-tazobactam (ZOSYN) IVPB  3.375 g Intravenous Q8H    vancomycin (VANCOCIN) IVPB  1,000 mg Intravenous Q8H     Continuous Infusions:   lactated ringers 75 mL/hr at 01/14/20 0344     PRN Meds:calcium chloride IVPB, calcium chloride IVPB, calcium chloride IVPB, HYDROcodone-acetaminophen, HYDROmorphone, magnesium oxide, magnesium sulfate IVPB, magnesium sulfate IVPB, magnesium sulfate IVPB, magnesium sulfate IVPB, morphine, morphine, ondansetron, potassium chloride in water, potassium chloride in water, potassium chloride in water, potassium chloride in water, potassium chloride, potassium chloride, potassium chloride, potassium chloride, promethazine (PHENERGAN) IVPB, sodium phosphate IVPB, sodium phosphate IVPB, sodium phosphate IVPB, sodium phosphate IVPB, sodium phosphate IVPB, Pharmacy to dose Vancomycin consult **AND** vancomycin - pharmacy to dose    Review of patient's allergies indicates:   Allergen Reactions    Gluten Diarrhea       Review of Systems   Per interval history, all other systems reviewed  and negative.     Objective:     Vital Signs (Most Recent):  Temp: 98.2 °F (36.8 °C) (01/15/20 0737)  Pulse: 73 (01/15/20 0737)  Resp: 17 (01/15/20 0737)  BP: 135/71 (01/15/20 0737)  SpO2: 95 % (01/15/20 0737) Vital Signs (24h Range):  Temp:  [97.3 °F (36.3 °C)-99.2 °F (37.3 °C)] 98.2 °F (36.8 °C)  Pulse:  [] 73  Resp:  [15-19] 17  SpO2:  [82 %-95 %] 95 %  BP: (122-170)/(66-96) 135/71     Weight: 56.9 kg (125 lb 7.1 oz)  Body mass index is 21.53 kg/m².  No intake or output data in the 24 hours ending 01/15/20 0844   Physical Exam   Constitutional: She is oriented to person, place, and time. She appears well-developed. No distress.   Sitting up in bedside chair   HENT:   Head: Normocephalic and atraumatic.   Eyes: Pupils are equal, round, and reactive to light. Conjunctivae and EOM are normal.   Neck: Normal range of motion. Neck supple.   Cardiovascular: Normal rate, regular rhythm, normal heart sounds and intact distal pulses.   Pulmonary/Chest: Effort normal and breath sounds normal. She has no wheezes. She has no rales.   2L NC   Abdominal: Soft.   Surgical dressing, c/d/i   Musculoskeletal: Normal range of motion.   Neurological: She is alert and oriented to person, place, and time.   Skin: Skin is warm and dry. Capillary refill takes less than 2 seconds.   Psychiatric: She has a normal mood and affect. Her behavior is normal.   Nursing note and vitals reviewed.      Significant Labs:   CBC:   Recent Labs   Lab 01/14/20  0317 01/15/20  0502   WBC 7.20  7.20 8.12   HGB 11.7*  11.7* 12.3   HCT 36.5*  36.5* 37.9     165 195     Bmp, mg, phos pending.    Aerobic cx: abscess from abdomen:   Klebsiella and e coli                Klebsiella pneumoniae     CULTURE, AEROBIC  (SPECIFY SOURCE)     Amox/K Clav'ate <=8/4 mcg/mL Sensitive     Amp/Sulbactam <=8/4 mcg/mL Sensitive     Cefazolin <=8 mcg/mL Sensitive     Cefepime <=4 mcg/mL Sensitive     Ceftriaxone <=8 mcg/mL Sensitive     Ciprofloxacin <=1  mcg/mL Sensitive     Ertapenem <=2 mcg/mL Sensitive     Gentamicin <=4 mcg/mL Sensitive     Levofloxacin <=2 mcg/mL Sensitive     Meropenem <=1 mcg/mL Sensitive     Piperacillin/Tazo <=16 mcg/mL Sensitive     Tetracycline <=4 mcg/mL Sensitive     Tobramycin <=4 mcg/mL Sensitive     Trimeth/Sulfa <=2/38 mcg/mL Sensitive             -                       Escherichia coli     CULTURE, AEROBIC  (SPECIFY SOURCE)     Amox/K Clav'ate <=8/4 mcg/mL Sensitive     Amp/Sulbactam <=8/4 mcg/mL Sensitive     Ampicillin <=8 mcg/mL Sensitive     Cefazolin <=8 mcg/mL Sensitive     Cefepime <=4 mcg/mL Sensitive     Ceftriaxone <=8 mcg/mL Sensitive     Ciprofloxacin <=1 mcg/mL Sensitive     Ertapenem <=2 mcg/mL Sensitive     Gentamicin <=4 mcg/mL Sensitive     Levofloxacin <=2 mcg/mL Sensitive     Meropenem <=1 mcg/mL Sensitive     Piperacillin/Tazo <=16 mcg/mL Sensitive     Tetracycline <=4 mcg/mL Sensitive     Tobramycin <=4 mcg/mL Sensitive     Trimeth/Sulfa <=2/38 mcg/mL Sensitive         All pertinent labs within the past 24 hours have been reviewed.    Significant Imaging: no new images last 24 hours      Assessment/Plan:      * Perforated diverticulum of small intestine  S/p ex lap POD 4  S/p NGT  Continue routine post-op care per GS  Wound cx: + klebseilla and ecoli  IVabx: vanc and zosyn  IVF  IS   OOB        GI ppx: protonix  DVT ppx: lovenox  Diet: clears  Code: full    VTE Risk Mitigation (From admission, onward)         Ordered     enoxaparin injection 40 mg  Daily      01/11/20 1735     IP VTE HIGH RISK PATIENT  Once      01/11/20 1735     Place CAPRICE hose  Until discontinued      01/11/20 1735     Place CAPRICE hose  Until discontinued      01/11/20 1547     Place sequential compression device  Until discontinued      01/11/20 1547                    Desirae Bhat DO  Department of Hospital Medicine   [facility]

## 2020-01-15 NOTE — CARE UPDATE
01/15/20 0930   Patient Assessment/Suction   Level of Consciousness (AVPU) alert   All Lung Fields Breath Sounds clear   Cough Frequency infrequent   PRE-TX-O2   O2 Device (Oxygen Therapy) nasal cannula  (pt axel wearing)   $ Is the patient on Low Flow Oxygen? Yes   Flow (L/min) 2   SpO2 96 %   Pulse Oximetry Type Intermittent   $ Pulse Oximetry - Multiple Charge Pulse Oximetry - Multiple   Pulse 77   Resp 16

## 2020-01-15 NOTE — PT/OT/SLP PROGRESS
Occupational Therapy   Treatment    Name: Ariana Tiwari  MRN: [id number]  Admitting Diagnosis:  Perforated diverticulum of small intestine  4 Days Post-Op    Recommendations:     Discharge Recommendations: home with home health, home health PT, home health OT  Discharge Equipment Recommendations:  none  Barriers to discharge:  None    Assessment:     Ariana Tiwari is a 74 y.o. female with a medical diagnosis of Perforated diverticulum of small intestine.  Pt agreeable to OT therapy session. Performance deficits affecting function are weakness, impaired endurance, impaired self care skills, impaired functional mobilty, impaired balance, gait instability, pain. Pt found in chair ready to get back into bed.    Rehab Prognosis:  Good; patient would benefit from acute skilled OT services to address these deficits and reach maximum level of function.       Plan:     Patient to be seen 5 x/week to address the above listed problems via self-care/home management, therapeutic activities, therapeutic exercises  · Plan of Care Expires: 02/14/20  · Plan of Care Reviewed with: patient, sibling    Subjective     Pain/Comfort:  · Pain Rating 1: (not rated)  · Location 1: abdomen  · Pain Addressed 1: Reposition, Nurse notified    Objective:     Communicated with: nursing prior to session.  Patient found up in chair with LESLI drain, oxygen, peripheral IV, telemetry upon OT entry to room.    General Precautions: Standard, fall   Orthopedic Precautions:N/A   Braces: N/A     Occupational Performance:     Bed Mobility:    · Patient completed Sit to Supine with minimum assistance     Functional Mobility/Transfers:  · Patient completed Sit <> Stand Transfer with minimum assistance  with  hand-held assist   · Patient completed Bed <> Chair Transfer using Stand Pivot technique with minimum assistance with hand-held assist    Activities of Daily Living:  · Declined 2/2 increased fatigue     Treatment & Education:  Pt educated on safety  during transfers and safety during bed mobility.     Patient left HOB elevated with all lines intact, call button in reach, bed alarm on and sibling and nursing presentEducation:      GOALS:   Multidisciplinary Problems     Occupational Therapy Goals        Problem: Occupational Therapy Goal    Goal Priority Disciplines Outcome Interventions   Occupational Therapy Goal     OT, PT/OT Ongoing, Progressing    Description:  Goals to be met by: discharge    Patient will increase functional independence with ADLs by performing:    LE Dressing with Supervision.  Grooming while standing at sink with Supervision.  Toileting from bedside commode with Supervision for hygiene and clothing management.   Toilet transfer to bedside commode with Supervision.  Increased functional strength to WFL to maximize patient's ability to perform ADLs safely and independently.                       Time Tracking:     OT Date of Treatment: 01/15/20  OT Start Time: 1330  OT Stop Time: 1346  OT Total Time (min): 16 min    Billable Minutes:Self Care/Home Management 16    Carolina Oviedo OT  1/15/2020

## 2020-01-16 LAB
BACTERIA BLD CULT: NORMAL
BACTERIA BLD CULT: NORMAL
BASOPHILS # BLD AUTO: 0.02 K/UL (ref 0–0.2)
BASOPHILS NFR BLD: 0.2 % (ref 0–1.9)
DIFFERENTIAL METHOD: ABNORMAL
EOSINOPHIL # BLD AUTO: 0.2 K/UL (ref 0–0.5)
EOSINOPHIL NFR BLD: 2.5 % (ref 0–8)
ERYTHROCYTE [DISTWIDTH] IN BLOOD BY AUTOMATED COUNT: 15.2 % (ref 11.5–14.5)
HCT VFR BLD AUTO: 34.3 % (ref 37–48.5)
HGB BLD-MCNC: 11.2 G/DL (ref 12–16)
IMM GRANULOCYTES # BLD AUTO: 0.06 K/UL (ref 0–0.04)
IMM GRANULOCYTES NFR BLD AUTO: 0.7 % (ref 0–0.5)
LYMPHOCYTES # BLD AUTO: 0.6 K/UL (ref 1–4.8)
LYMPHOCYTES NFR BLD: 6.5 % (ref 18–48)
MAGNESIUM SERPL-MCNC: 1.6 MG/DL (ref 1.6–2.6)
MCH RBC QN AUTO: 29.1 PG (ref 27–31)
MCHC RBC AUTO-ENTMCNC: 32.7 G/DL (ref 32–36)
MCV RBC AUTO: 89 FL (ref 82–98)
MONOCYTES # BLD AUTO: 0.7 K/UL (ref 0.3–1)
MONOCYTES NFR BLD: 7.7 % (ref 4–15)
NEUTROPHILS # BLD AUTO: 7.5 K/UL (ref 1.8–7.7)
NEUTROPHILS NFR BLD: 82.4 % (ref 38–73)
NRBC BLD-RTO: 0 /100 WBC
PHOSPHATE SERPL-MCNC: 2.5 MG/DL (ref 2.7–4.5)
PLATELET # BLD AUTO: 149 K/UL (ref 150–350)
PMV BLD AUTO: 11.4 FL (ref 9.2–12.9)
RBC # BLD AUTO: 3.85 M/UL (ref 4–5.4)
VANCOMYCIN TROUGH SERPL-MCNC: 21.8 UG/ML (ref 10–22)
VANCOMYCIN TROUGH SERPL-MCNC: 38.4 UG/ML (ref 10–22)
WBC # BLD AUTO: 9.05 K/UL (ref 3.9–12.7)

## 2020-01-16 PROCEDURE — 36415 COLL VENOUS BLD VENIPUNCTURE: CPT

## 2020-01-16 PROCEDURE — 80202 ASSAY OF VANCOMYCIN: CPT | Mod: 91

## 2020-01-16 PROCEDURE — 63600175 PHARM REV CODE 636 W HCPCS: Performed by: INTERNAL MEDICINE

## 2020-01-16 PROCEDURE — 94799 UNLISTED PULMONARY SVC/PX: CPT

## 2020-01-16 PROCEDURE — 94761 N-INVAS EAR/PLS OXIMETRY MLT: CPT

## 2020-01-16 PROCEDURE — 80202 ASSAY OF VANCOMYCIN: CPT

## 2020-01-16 PROCEDURE — 84100 ASSAY OF PHOSPHORUS: CPT

## 2020-01-16 PROCEDURE — 97116 GAIT TRAINING THERAPY: CPT | Mod: CQ

## 2020-01-16 PROCEDURE — 25000003 PHARM REV CODE 250: Performed by: INTERNAL MEDICINE

## 2020-01-16 PROCEDURE — 85025 COMPLETE CBC W/AUTO DIFF WBC: CPT

## 2020-01-16 PROCEDURE — 83735 ASSAY OF MAGNESIUM: CPT

## 2020-01-16 PROCEDURE — 12000002 HC ACUTE/MED SURGE SEMI-PRIVATE ROOM

## 2020-01-16 PROCEDURE — 97110 THERAPEUTIC EXERCISES: CPT | Mod: CQ

## 2020-01-16 PROCEDURE — 99900035 HC TECH TIME PER 15 MIN (STAT)

## 2020-01-16 RX ADMIN — ENOXAPARIN SODIUM 40 MG: 100 INJECTION SUBCUTANEOUS at 04:01

## 2020-01-16 RX ADMIN — PIPERACILLIN SODIUM AND TAZOBACTAM SODIUM 3.38 G: 3; .375 INJECTION, POWDER, LYOPHILIZED, FOR SOLUTION INTRAVENOUS at 01:01

## 2020-01-16 RX ADMIN — LEVOTHYROXINE SODIUM 62.5 MCG: 25 TABLET ORAL at 05:01

## 2020-01-16 RX ADMIN — PANTOPRAZOLE SODIUM 40 MG: 40 TABLET, DELAYED RELEASE ORAL at 05:01

## 2020-01-16 RX ADMIN — SODIUM CHLORIDE, SODIUM LACTATE, POTASSIUM CHLORIDE, AND CALCIUM CHLORIDE: .6; .31; .03; .02 INJECTION, SOLUTION INTRAVENOUS at 06:01

## 2020-01-16 RX ADMIN — HYDROCODONE BITARTRATE AND ACETAMINOPHEN 1 TABLET: 5; 325 TABLET ORAL at 04:01

## 2020-01-16 RX ADMIN — HYDROCODONE BITARTRATE AND ACETAMINOPHEN 1 TABLET: 5; 325 TABLET ORAL at 01:01

## 2020-01-16 RX ADMIN — PIPERACILLIN SODIUM AND TAZOBACTAM SODIUM 3.38 G: 3; .375 INJECTION, POWDER, LYOPHILIZED, FOR SOLUTION INTRAVENOUS at 04:01

## 2020-01-16 RX ADMIN — HYDROCODONE BITARTRATE AND ACETAMINOPHEN 1 TABLET: 5; 325 TABLET ORAL at 11:01

## 2020-01-16 RX ADMIN — PIPERACILLIN SODIUM AND TAZOBACTAM SODIUM 3.38 G: 3; .375 INJECTION, POWDER, LYOPHILIZED, FOR SOLUTION INTRAVENOUS at 08:01

## 2020-01-16 NOTE — PROGRESS NOTES
[facility] Medicine  Progress Note    Patient Name: Ariana Tiwari  MRN: [id number]  Patient Class: IP- Inpatient   Admission Date: 1/11/2020  Length of Stay: 5 days  Attending Physician: Desirae Bhat DO  Primary Care Provider: Nba Petty MD        Subjective:     Principal Problem:Perforated diverticulum of small intestine  Chief Complaint   Patient presents with    Abdominal Pain     c/o marked abd. distention and abd. pain.      Has had sharp pain since Friday morning, but has had chronic abd. pain and distention for long period     Interval History:   Pod 5: AF VSS, good uop, +bm/+flatus, tolerating diet without n/v, ambulating around floor with PT. LESLI drain in place about 15 cc recorded today.   Pt reports feeling stronger and having more energy.   Denies any CP, SOB, N/V/D, abdominal pain, headaches, fever or chills.       Scheduled Meds:   enoxaparin  40 mg Subcutaneous Daily    levothyroxine  62.5 mcg Oral Daily    pantoprazole  40 mg Oral Before breakfast    piperacillin-tazobactam (ZOSYN) IVPB  3.375 g Intravenous Q8H     Continuous Infusions:   lactated ringers Stopped (01/15/20 0600)     PRN Meds:calcium chloride IVPB, calcium chloride IVPB, calcium chloride IVPB, HYDROcodone-acetaminophen, HYDROmorphone, magnesium oxide, magnesium sulfate IVPB, magnesium sulfate IVPB, magnesium sulfate IVPB, magnesium sulfate IVPB, morphine, morphine, ondansetron, potassium chloride in water, potassium chloride in water, potassium chloride in water, potassium chloride in water, potassium chloride, potassium chloride, potassium chloride, potassium chloride, promethazine (PHENERGAN) IVPB, sodium phosphate IVPB, sodium phosphate IVPB, sodium phosphate IVPB, sodium phosphate IVPB, sodium phosphate IVPB, Pharmacy to dose Vancomycin consult **AND** vancomycin - pharmacy to dose    Review of patient's allergies indicates:   Allergen Reactions    Gluten Diarrhea         Review of Systems    Per interval history, all other systems reviewed and negative.     Objective:     Vital Signs (Most Recent):  Temp: 97.8 °F (36.6 °C) (01/16/20 1207)  Pulse: 76 (01/16/20 1207)  Resp: 16 (01/16/20 1207)  BP: 110/67 (01/16/20 1207)  SpO2: 96 % (01/16/20 1207) Vital Signs (24h Range):  Temp:  [97.7 °F (36.5 °C)-98.9 °F (37.2 °C)] 97.8 °F (36.6 °C)  Pulse:  [] 76  Resp:  [14-18] 16  SpO2:  [94 %-97 %] 96 %  BP: (110-135)/(67-80) 110/67     Weight: 56.9 kg (125 lb 7.1 oz)  Body mass index is 21.53 kg/m².    Intake/Output Summary (Last 24 hours) at 1/16/2020 1336  Last data filed at 1/16/2020 0528  Gross per 24 hour   Intake --   Output 15 ml   Net -15 ml      Physical Exam   Constitutional: She is oriented to person, place, and time. She appears well-developed. No distress.   Sitting up in bedside chair   HENT:   Head: Normocephalic and atraumatic.   Eyes: Pupils are equal, round, and reactive to light. Conjunctivae and EOM are normal.   Neck: Normal range of motion. Neck supple.   Cardiovascular: Normal rate, regular rhythm, normal heart sounds and intact distal pulses.   Pulmonary/Chest: Effort normal and breath sounds normal. She has no wheezes. She has no rales.   2L NC   Abdominal: Soft.   Surgical dressing, c/d/i   Musculoskeletal: Normal range of motion.   Neurological: She is alert and oriented to person, place, and time.   Skin: Skin is warm and dry. Capillary refill takes less than 2 seconds.   Psychiatric: She has a normal mood and affect. Her behavior is normal.   Nursing note and vitals reviewed.      Significant Labs:   CBC:   Recent Labs   Lab 01/15/20  0502 01/16/20  0145   WBC 8.12 9.05   HGB 12.3 11.2*   HCT 37.9 34.3*    149*     CMP:   Recent Labs   Lab 01/15/20  0503      K 2.7*   CL 97   CO2 30*   *   BUN 6*   CREATININE <0.3*   CALCIUM 7.9*   ANIONGAP 11   EGFRNONAA >60.0     All pertinent labs within the past 24 hours have been reviewed.    Significant Imaging: no new  images today    Anaerobic Culture BACTEROIDES THETAIOTAOMICRON   Moderate   Beta Lactamase positive   Klebsiella pneumoniae     Antibiotic Interpretation Value   Amp/Sulbactam Sensitive <=8/4 mcg/mL   Amox/K Clav'ate Sensitive <=8/4 mcg/mL   Ceftriaxone Sensitive <=8 mcg/mL   Cefazolin Sensitive <=8 mcg/mL   Ciprofloxacin Sensitive <=1 mcg/mL   Cefepime Sensitive <=4 mcg/mL   Ertapenem Sensitive <=2 mcg/mL   Gentamicin Sensitive <=4 mcg/mL   Levofloxacin Sensitive <=2 mcg/mL   Meropenem Sensitive <=1 mcg/mL   Piperacillin/Tazo Sensitive <=16 mcg/mL   Trimeth/Sulfa Sensitive <=2/38 mcg/mL   Tetracycline Sensitive <=4 mcg/mL   Tobramycin Sensitive <=4 mcg/mL   Escherichia coli     Antibiotic Interpretation Value   Amp/Sulbactam Sensitive <=8/4 mcg/mL   Ampicillin Sensitive <=8 mcg/mL   Amox/K Clav'ate Sensitive <=8/4 mcg/mL   Ceftriaxone Sensitive <=8 mcg/mL   Cefazolin Sensitive <=8 mcg/mL   Ciprofloxacin Sensitive <=1 mcg/mL   Cefepime Sensitive <=4 mcg/mL   Ertapenem Sensitive <=2 mcg/mL   Gentamicin Sensitive <=4 mcg/mL   Levofloxacin Sensitive <=2 mcg/mL   Meropenem Sensitive <=1 mcg/mL   Piperacillin/Tazo Sensitive <=16 mcg/mL   Trimeth/Sulfa Sensitive <=2/38 mcg/mL   Tetracycline Sensitive <=4 mcg/mL   Tobramycin Sensitive <=4 mcg/mL           Assessment/Plan:      * Perforated diverticulum of small intestine  S/p ex lap POD 5  S/p NGT and now tolerating diet  Continue routine post-op care per GS  Per GS note, plans to remove LESLI drain today if stable and likely dc soon  Wound cx: + klebseilla and ecoli and BACTEROIDES THETAIOTAOMICRON   IVabx: vanc and zosyn (started 1/11)   Will need to discuss home abx regimen with GS  IVF  IS   OOB          GI ppx: protonix  DVT ppx: lovenox  Diet: gluten free diet  VTE Risk Mitigation (From admission, onward)         Ordered     enoxaparin injection 40 mg  Daily      01/11/20 1735     IP VTE HIGH RISK PATIENT  Once      01/11/20 1735     Place CAPRICE hose  Until discontinued       01/11/20 1735     Place CAPRICE hose  Until discontinued      01/11/20 1547     Place sequential compression device  Until discontinued      01/11/20 1547                      Desirae Bhat DO  Department of Hospital Medicine   [facility]

## 2020-01-16 NOTE — PLAN OF CARE
Problem: Physical Therapy Goal  Goal: Physical Therapy Goal  Description  Goals to be met by: discharge     Patient will increase functional independence with mobility by performin. Supine to sit with Supervision  2. Sit to stand transfer with Supervision  3. Bed to chair transfer with Supervision using Rolling Walker  4. Gait  x 150 feet with Supervision using Rolling Walker.      Outcome: Ongoing, Progressing   Pt progressing to reach goals.

## 2020-01-16 NOTE — PROGRESS NOTES
"[facility]  Adult Nutrition   Progress Note (Follow-Up)    SUMMARY     Recommendations  Recommendation/Intervention: 1.) Continue current diet as tolerated by pt 2.) Menu  to assist with meal choices daily 3.)  Recommend replacing Phos  Goals: 1.) Pt to meet at least 75% estimated needs via PO diet.  Nutrition Goal Status: progressing towards goal    Reason for Assessment    Reason For Assessment: RD follow-up  Diagnosis: surgery/postoperative complications(perforated diverticulum)  Relevant Medical History: diverticulosis, gluten intolerance, hypothyroidism    Nutrition Risk Screen    Nutrition Risk Screen: no indicators present     MST Score: 0  Have you recently lost weight without trying?: No  Weight loss score: 0  Have you been eating poorly because of a decreased appetite?: No  Appetite score: 0       Nutrition/Diet History    Patient Reported Diet/Restrictions/Preferences: general, gluten-free  Spiritual, Cultural Beliefs, Mosque Practices, Values that Affect Care: no  Food Allergies: other (see comments)(gluten)  Factors Affecting Nutritional Intake: None identified at this time    Anthropometrics    Temp: 98.3 °F (36.8 °C)  Height Method: Stated(by family member)  Height: 5' 4" (162.6 cm)  Height (inches): 64 in  Weight Method: Bed Scale  Weight: 56.9 kg (125 lb 7.1 oz)  Weight (lb): 125.44 lb  Ideal Body Weight (IBW), Female: 120 lb  % Ideal Body Weight, Female (lb): 104.53 %  BMI (Calculated): 21.5  BMI Grade: 18.5-24.9 - normal       Weight History:  Wt Readings from Last 10 Encounters:   01/14/20 56.9 kg (125 lb 7.1 oz)   10/02/19 53.5 kg (117 lb 15.1 oz)   09/21/19 41.5 kg (91 lb 7.9 oz)   09/17/19 51.3 kg (113 lb 1.5 oz)   05/16/19 56.9 kg (125 lb 7.1 oz)   11/27/18 56.8 kg (125 lb 3.5 oz)   11/08/18 55.8 kg (123 lb)   08/09/18 55.3 kg (122 lb)   05/02/18 59 kg (130 lb)   04/26/18 59.4 kg (130 lb 15.3 oz)       Lab/Procedures/Meds: Pertinent Labs Reviewed  Clinical " Chemistry:  Recent Labs   Lab 01/11/20  0330 01/12/20  0450  01/15/20  0503 01/16/20  0145    136   < > 138  --    K 3.4* 2.5*   < > 2.7*  --     100   < > 97  --    CO2 22* 26   < > 30*  --    * 146*   < > 124*  --    BUN 15 7*   < > 6*  --    CREATININE 0.4* 0.3*   < > <0.3*  --    CALCIUM 8.6* 7.3*   < > 7.9*  --    PROT 8.0 5.9*  --   --   --    ALBUMIN 4.4 2.8*  --   --   --    BILITOT 1.2* 0.9  --   --   --    ALKPHOS 133 68  --   --   --    AST 21 17  --   --   --    ALT 23 17  --   --   --    ANIONGAP 13 10   < > 11  --    ESTGFRAFRICA >60.0 >60.0   < > >60.0  --    EGFRNONAA >60.0 >60.0   < > >60.0  --    MG  --   --    < > 1.7 1.6   PHOS  --   --   --  2.2* 2.5*   LIPASE 26  --   --   --   --     < > = values in this interval not displayed.     CBC:   Recent Labs   Lab 01/16/20 0145   WBC 9.05   RBC 3.85*   HGB 11.2*   HCT 34.3*   *   MCV 89   MCH 29.1   MCHC 32.7       Medications: Pertinent Medications reviewed  Scheduled Meds:   enoxaparin  40 mg Subcutaneous Daily    levothyroxine  62.5 mcg Oral Daily    pantoprazole  40 mg Oral Before breakfast    piperacillin-tazobactam (ZOSYN) IVPB  3.375 g Intravenous Q8H     Continuous Infusions:   lactated ringers Stopped (01/15/20 0600)     PRN Meds:.calcium chloride IVPB, calcium chloride IVPB, calcium chloride IVPB, HYDROcodone-acetaminophen, HYDROmorphone, magnesium oxide, magnesium sulfate IVPB, magnesium sulfate IVPB, magnesium sulfate IVPB, magnesium sulfate IVPB, morphine, morphine, ondansetron, potassium chloride in water, potassium chloride in water, potassium chloride in water, potassium chloride in water, potassium chloride, potassium chloride, potassium chloride, potassium chloride, promethazine (PHENERGAN) IVPB, sodium phosphate IVPB, sodium phosphate IVPB, sodium phosphate IVPB, sodium phosphate IVPB, sodium phosphate IVPB, Pharmacy to dose Vancomycin consult **AND** vancomycin - pharmacy to  dose    Estimated/Assessed Needs    Weight Used For Calorie Calculations: 56.9 kg (125 lb 7.1 oz)  Energy Calorie Requirements (kcal): 9807-3879 (25-30 kcal/kg)  Energy Need Method: Kcal/kg  Protein Requirements: 68-85 g (1.2-1.5 g/kg)  Weight Used For Protein Calculations: 56.9 kg (125 lb 7.1 oz)     Estimated Fluid Requirement Method: RDA Method  RDA Method (mL): 1422       Nutrition Prescription Ordered    Current Diet Order: gluten free     Evaluation of Received Nutrient/Fluid Intake    IV Fluid (mL): 1800  Energy Calories Required: meeting needs  Protein Required: meeting needs  Fluid Required: meeting needs  Tolerance: tolerating     Intake/Output Summary (Last 24 hours) at 1/16/2020 0939  Last data filed at 1/16/2020 0528  Gross per 24 hour   Intake --   Output 265 ml   Net -265 ml        % Meal Intake: 75 - 100 %    Dietitian Rounds Brief    Pt seen for f/u. Tolerating diet; intake good. Observed bkfst tray; 100% consumed per RD visual. Denies any N/V. LBM 1/16.  NGT out. No needs voiced at this time.     Nutrition Risk    Level of Risk/Frequency of Follow-up: moderate     Monitor and Evaluation    Food and Nutrient Intake: energy intake, food and beverage intake  Food and Nutrient Adminstration: diet order  Physical Activity and Function: nutrition-related ADLs and IADLs  Anthropometric Measurements: weight change, weight  Biochemical Data, Medical Tests and Procedures: gastrointestinal profile, electrolyte and renal panel, glucose/endocrine profile  Nutrition-Focused Physical Findings: overall appearance     Nutrition Follow-Up    RD Follow-up?: Yes    Seema Fulton RD 01/16/2020 9:39 AM

## 2020-01-16 NOTE — SUBJECTIVE & OBJECTIVE
Interval History:   Pod 5: AF VSS, good uop, +bm/+flatus, tolerating diet without n/v, ambulating around floor with PT. LESLI drain in place about 15 cc recorded today.   Pt reports feeling stronger and having more energy.   Denies any CP, SOB, N/V/D, abdominal pain, headaches, fever or chills.       Scheduled Meds:   enoxaparin  40 mg Subcutaneous Daily    levothyroxine  62.5 mcg Oral Daily    pantoprazole  40 mg Oral Before breakfast    piperacillin-tazobactam (ZOSYN) IVPB  3.375 g Intravenous Q8H     Continuous Infusions:   lactated ringers Stopped (01/15/20 0600)     PRN Meds:calcium chloride IVPB, calcium chloride IVPB, calcium chloride IVPB, HYDROcodone-acetaminophen, HYDROmorphone, magnesium oxide, magnesium sulfate IVPB, magnesium sulfate IVPB, magnesium sulfate IVPB, magnesium sulfate IVPB, morphine, morphine, ondansetron, potassium chloride in water, potassium chloride in water, potassium chloride in water, potassium chloride in water, potassium chloride, potassium chloride, potassium chloride, potassium chloride, promethazine (PHENERGAN) IVPB, sodium phosphate IVPB, sodium phosphate IVPB, sodium phosphate IVPB, sodium phosphate IVPB, sodium phosphate IVPB, Pharmacy to dose Vancomycin consult **AND** vancomycin - pharmacy to dose    Review of patient's allergies indicates:   Allergen Reactions    Gluten Diarrhea         Review of Systems   Per interval history, all other systems reviewed and negative.     Objective:     Vital Signs (Most Recent):  Temp: 97.8 °F (36.6 °C) (01/16/20 1207)  Pulse: 76 (01/16/20 1207)  Resp: 16 (01/16/20 1207)  BP: 110/67 (01/16/20 1207)  SpO2: 96 % (01/16/20 1207) Vital Signs (24h Range):  Temp:  [97.7 °F (36.5 °C)-98.9 °F (37.2 °C)] 97.8 °F (36.6 °C)  Pulse:  [] 76  Resp:  [14-18] 16  SpO2:  [94 %-97 %] 96 %  BP: (110-135)/(67-80) 110/67     Weight: 56.9 kg (125 lb 7.1 oz)  Body mass index is 21.53 kg/m².    Intake/Output Summary (Last 24 hours) at 1/16/2020  1336  Last data filed at 1/16/2020 0528  Gross per 24 hour   Intake --   Output 15 ml   Net -15 ml      Physical Exam   Constitutional: She is oriented to person, place, and time. She appears well-developed. No distress.   Sitting up in bedside chair   HENT:   Head: Normocephalic and atraumatic.   Eyes: Pupils are equal, round, and reactive to light. Conjunctivae and EOM are normal.   Neck: Normal range of motion. Neck supple.   Cardiovascular: Normal rate, regular rhythm, normal heart sounds and intact distal pulses.   Pulmonary/Chest: Effort normal and breath sounds normal. She has no wheezes. She has no rales.   2L NC   Abdominal: Soft.   Surgical dressing, c/d/i   Musculoskeletal: Normal range of motion.   Neurological: She is alert and oriented to person, place, and time.   Skin: Skin is warm and dry. Capillary refill takes less than 2 seconds.   Psychiatric: She has a normal mood and affect. Her behavior is normal.   Nursing note and vitals reviewed.      Significant Labs:   CBC:   Recent Labs   Lab 01/15/20  0502 01/16/20  0145   WBC 8.12 9.05   HGB 12.3 11.2*   HCT 37.9 34.3*    149*     CMP:   Recent Labs   Lab 01/15/20  0503      K 2.7*   CL 97   CO2 30*   *   BUN 6*   CREATININE <0.3*   CALCIUM 7.9*   ANIONGAP 11   EGFRNONAA >60.0     All pertinent labs within the past 24 hours have been reviewed.    Significant Imaging: no new images today

## 2020-01-16 NOTE — PLAN OF CARE
01/16/20 0859   Patient Assessment/Suction   Level of Consciousness (AVPU) alert   Respiratory Effort Normal;Unlabored   Expansion/Accessory Muscles/Retractions no use of accessory muscles   PRE-TX-O2   O2 Device (Oxygen Therapy) room air   SpO2 (!) 94 %   Pulse Oximetry Type Intermittent   $ Pulse Oximetry - Multiple Charge Pulse Oximetry - Multiple   Pulse 84   Resp 14   Incentive Spirometer   $ Incentive Spirometer Charges done with encouragement   Incentive Spirometer Predicted Level (mL) 1500   Administration (IS) instruction provided, follow-up   Number of Repetitions (IS) 10   Level Incentive Spirometer (mL) 750   Patient Tolerance (IS) good

## 2020-01-16 NOTE — PROGRESS NOTES
Progress Note  Gen Surg    Admit Date: 1/11/2020  Attending: Blue  S/P: Procedure(s) (LRB):  LAPAROTOMY, EXPLORATORY (N/A)    Post-operative Day: 5 Days Post-Op    Hospital Day: 6    SUBJECTIVE:     Doing well tolerating regular diet     OBJECTIVE:     Vital Signs (Most Recent)  Temp: 98.4 °F (36.9 °C) (01/16/20 1637)  Pulse: 74 (01/16/20 1637)  Resp: 16 (01/16/20 1637)  BP: 117/71 (01/16/20 1637)  SpO2: 98 % (01/16/20 1637)    Vital Signs Range (Last 24H):  Temp:  [97.7 °F (36.5 °C)-98.4 °F (36.9 °C)]   Pulse:  []   Resp:  [14-18]   BP: (110-135)/(67-80)   SpO2:  [94 %-98 %]     I & O (Last 24H):    Intake/Output Summary (Last 24 hours) at 1/16/2020 1759  Last data filed at 1/16/2020 1624  Gross per 24 hour   Intake --   Output 425 ml   Net -425 ml       Scheduled medications:   enoxaparin  40 mg Subcutaneous Daily    levothyroxine  62.5 mcg Oral Daily    pantoprazole  40 mg Oral Before breakfast    piperacillin-tazobactam (ZOSYN) IVPB  3.375 g Intravenous Q8H       Physical Exam:  General: no distress  Lungs:  clear to auscultation bilaterally and normal respiratory effort  Heart: regular rate and rhythm, S1, S2 normal, no murmur, rub or gallop  Abdomen: soft, non-tender non-distented; bowel sounds normal; no masses,  no organomegaly    Wound/Incision:  clean, dry, intact    Laboratory:  Labs within the past 24 hours have been reviewed.    ASSESSMENT/PLAN:     Remove drain today  Regular diet  Home tomorrow   Ambulate  IS  Antibiotics- will discuss with medical team    Follow up with me in 2 weeks  Shower over incision daily with soap and water  No lifting over 30 pounds for 6 weeks.    Sandra Mcarthur MD

## 2020-01-16 NOTE — PT/OT/SLP PROGRESS
Occupational Therapy      Patient Name:  Ariana Kramer Te   MRN:  [id number]    Patient not seen today X 2 attempts secondary to Patient unwilling to participate Will follow-up next service date.    Carolina Oviedo OT  1/16/2020

## 2020-01-16 NOTE — PLAN OF CARE
Problem: Adult Inpatient Plan of Care  Goal: Plan of Care Review  Outcome: Ongoing, Progressing  Goal: Patient-Specific Goal (Individualization)  Outcome: Ongoing, Progressing  Goal: Absence of Hospital-Acquired Illness or Injury  Outcome: Ongoing, Progressing  Goal: Optimal Comfort and Wellbeing  Outcome: Ongoing, Progressing  Goal: Readiness for Transition of Care  Outcome: Ongoing, Progressing  Goal: Rounds/Family Conference  Outcome: Ongoing, Progressing     Problem: Skin Injury Risk Increased  Goal: Skin Health and Integrity  Outcome: Ongoing, Progressing     Problem: Fall Injury Risk  Goal: Absence of Fall and Fall-Related Injury  Outcome: Ongoing, Progressing     Problem: Infection  Goal: Infection Symptom Resolution  Outcome: Ongoing, Progressing     Problem: Oral Intake Inadequate  Goal: Improved Oral Intake  Outcome: Ongoing, Progressing

## 2020-01-16 NOTE — PT/OT/SLP PROGRESS
Physical Therapy Treatment    Patient Name:  Ariana Tiwari   MRN:  [id number]    Recommendations:     Discharge Recommendations:  home with home health   Discharge Equipment Recommendations: other (see comments)(TBD RW?)   Barriers to discharge: None    Assessment:     Ariana Tiwari is a 74 y.o. female admitted with a medical diagnosis of Perforated diverticulum of small intestine.  She presents with the following impairments/functional limitations:  weakness, impaired endurance.    Rehab Prognosis: Good; patient would benefit from acute skilled PT services to address these deficits and reach maximum level of function.    Recent Surgery: Procedure(s) (LRB):  LAPAROTOMY, EXPLORATORY (N/A) 5 Days Post-Op    Plan:     During this hospitalization, patient to be seen 6 x/week to address the identified rehab impairments via gait training, therapeutic activities, therapeutic exercises and progress toward the following goals:    · Plan of Care Expires:  20    Subjective     Chief Complaint: none  Patient/Family Comments/goals: home  Pain/Comfort:  ·        Objective:     Communicated with NSG prior to session.  Patient found supine with   upon PT entry to room.     General Precautions: Standard, fall   Orthopedic Precautions:    Braces:       Functional Mobility:  · Gait: Pt amb 175 ft r/w cga for safety. Pt amb without lob cga for safety.      AM-PAC 6 CLICK MOBILITY          Therapeutic Activities and Exercises:   Pt performed bed mobility supine to sit min assist . Sit to stand cga.    Patient left up in chair with call button in reach..    GOALS:   Multidisciplinary Problems     Physical Therapy Goals        Problem: Physical Therapy Goal    Goal Priority Disciplines Outcome Goal Variances Interventions   Physical Therapy Goal     PT, PT/OT Ongoing, Progressing     Description:  Goals to be met by: discharge     Patient will increase functional independence with mobility by performin. Supine to sit with  Supervision  2. Sit to stand transfer with Supervision  3. Bed to chair transfer with Supervision using Rolling Walker  4. Gait  x 150 feet with Supervision using Rolling Walker.                       Time Tracking:     PT Received On: 01/16/20  PT Start Time: 0935     PT Stop Time: 0958  PT Total Time (min): 23 min     Billable Minutes: Gait Training 13min and Therapeutic Activity 10min    Treatment Type: Treatment  PT/PTA: PTA     PTA Visit Number: 2     Kvng Waller, SALAZAR  01/16/2020

## 2020-01-16 NOTE — PROGRESS NOTES
"VANCOMYCIN PHARMACOKINETIC NOTE:  Vancomycin Day #6    Objective:    74 y.o., female, Actual Body Weight = 56.9 kg (125 lb 7.1 oz)    Diagnosis/Indication for Vancomycin:  Intra-abdominal infection   Desired Vancomycin Peak:  30-35 mcg/ml; Desired Trough: 10-15 mcg/ml    Current Vancomycin Regimen:  1000 mg IV every 8 hours    Receiving other antibiotics:    Antibiotics (From admission, onward)      Start     Stop Route Frequency Ordered    01/11/20 1700  piperacillin-tazobactam 3.375 g in dextrose 5 % 50 mL IVPB (ready to mix system)     Note to Pharmacy:  Ht: 5' 3" (1.6 m)  Wt: 52.2 kg (115 lb)  Estimated Creatinine Clearance: 100.1 mL/min (A) (based on SCr of 0.4 mg/dL (L)).  Body mass index is 20.37 kg/m².    -- IV Every 8 hours (non-standard times) 01/11/20 1009    01/11/20 1107  vancomycin - pharmacy to dose  (vancomycin IVPB)      -- IV pharmacy to manage frequency 01/11/20 1009             The patient has the following labs:     1/16/2020 CrCl cannot be calculated (This lab value cannot be used to calculate CrCl because it is not a number: <0.3). Lab Results   Component Value Date    BUN 6 (L) 01/15/2020       Lab Results   Component Value Date    WBC 9.05 01/16/2020            Vancomycin Trough:  38.4 mcg/mL collected 5.1 hours after Dose #12 (drawn correctly).      Assessment:  Renal function is: stable/  Vancomycin trough is above goal range.    Vancomycin Pharmacokinetic Parameters  Elimination rate constant (ke) - 0.065 hr-1  Elimination half-life (t½) = 10.6 hours    Plan:  Vancomycin held.   Will obtain Trough vancomycin level 1/16/20 @ 21:00    Pharmacy will continue to manage vancomycin therapy and adjust regimen as necessary.    Thank you for allowing us to participate in this patient's care.     Neno Hagen 1/16/2020 2:26 AM  Department of Pharmacy  Ext [phone]   "

## 2020-01-16 NOTE — PROGRESS NOTES
Progress Note  Gen Surg    Admit Date: 1/11/2020  Attending: Blue  S/P: Procedure(s) (LRB):  LAPAROTOMY, EXPLORATORY (N/A)    Post-operative Day: 4 Days Post-Op    Hospital Day: 5    SUBJECTIVE:     Doing well  Having flatus  Tolerating liquids     OBJECTIVE:     Vital Signs (Most Recent)  Temp: 98.9 °F (37.2 °C) (01/15/20 1600)  Pulse: 74 (01/15/20 1600)  Resp: 16 (01/15/20 1600)  BP: 119/73 (01/15/20 1600)  SpO2: 96 % (01/15/20 1600)    Vital Signs Range (Last 24H):  Temp:  [97.6 °F (36.4 °C)-99.2 °F (37.3 °C)]   Pulse:  [69-81]   Resp:  [15-19]   BP: (119-144)/(66-79)   SpO2:  [93 %-96 %]     I & O (Last 24H):    Intake/Output Summary (Last 24 hours) at 1/15/2020 1840  Last data filed at 1/15/2020 1200  Gross per 24 hour   Intake --   Output 250 ml   Net -250 ml       Scheduled medications:   enoxaparin  40 mg Subcutaneous Daily    levothyroxine  62.5 mcg Oral Daily    pantoprazole  40 mg Oral Before breakfast    piperacillin-tazobactam (ZOSYN) IVPB  3.375 g Intravenous Q8H    vancomycin (VANCOCIN) IVPB  1,000 mg Intravenous Q8H       Physical Exam:  General: no distress  Lungs:  clear to auscultation bilaterally and normal respiratory effort   Heart: regular rate and rhythm, S1, S2 normal, no murmur, rub or gallop  Abdomen: soft, non-tender non-distented; bowel sounds normal; no masses,  no organomegaly    Wound/Incision:  clean, dry, intact    Laboratory:  Labs within the past 24 hours have been reviewed.    ASSESSMENT/PLAN:       Postoperative day 4    Status post exploratory laparotomy with small-bowel resection for perforation of small bowel diverticulum    Doing well with NG tube out.  Will advance diet to regular diet as she is having GI functioning.  If continues to do well will plan to remove LESLI drain tomorrow and discharge planning soon there after.        Sandra Mcarthur MD

## 2020-01-17 VITALS
RESPIRATION RATE: 18 BRPM | BODY MASS INDEX: 21.42 KG/M2 | OXYGEN SATURATION: 95 % | WEIGHT: 125.44 LBS | HEART RATE: 76 BPM | SYSTOLIC BLOOD PRESSURE: 121 MMHG | HEIGHT: 64 IN | DIASTOLIC BLOOD PRESSURE: 70 MMHG | TEMPERATURE: 98 F

## 2020-01-17 LAB
ALBUMIN SERPL BCP-MCNC: 2.7 G/DL (ref 3.5–5.2)
ALP SERPL-CCNC: 65 U/L (ref 55–135)
ALT SERPL W/O P-5'-P-CCNC: 14 U/L (ref 10–44)
ANION GAP SERPL CALC-SCNC: 12 MMOL/L (ref 8–16)
AST SERPL-CCNC: 22 U/L (ref 10–40)
BASOPHILS # BLD AUTO: 0.02 K/UL (ref 0–0.2)
BASOPHILS NFR BLD: 0.2 % (ref 0–1.9)
BILIRUB SERPL-MCNC: 0.6 MG/DL (ref 0.1–1)
BUN SERPL-MCNC: 21 MG/DL (ref 8–23)
CALCIUM SERPL-MCNC: 8.1 MG/DL (ref 8.7–10.5)
CHLORIDE SERPL-SCNC: 101 MMOL/L (ref 95–110)
CO2 SERPL-SCNC: 26 MMOL/L (ref 23–29)
CREAT SERPL-MCNC: 1.6 MG/DL (ref 0.5–1.4)
DIFFERENTIAL METHOD: ABNORMAL
EOSINOPHIL # BLD AUTO: 0.3 K/UL (ref 0–0.5)
EOSINOPHIL NFR BLD: 2.6 % (ref 0–8)
ERYTHROCYTE [DISTWIDTH] IN BLOOD BY AUTOMATED COUNT: 15.3 % (ref 11.5–14.5)
EST. GFR  (AFRICAN AMERICAN): 36.1 ML/MIN/1.73 M^2
EST. GFR  (NON AFRICAN AMERICAN): 31.3 ML/MIN/1.73 M^2
GLUCOSE SERPL-MCNC: 113 MG/DL (ref 70–110)
HCT VFR BLD AUTO: 34.2 % (ref 37–48.5)
HGB BLD-MCNC: 10.6 G/DL (ref 12–16)
IMM GRANULOCYTES # BLD AUTO: 0.07 K/UL (ref 0–0.04)
IMM GRANULOCYTES NFR BLD AUTO: 0.7 % (ref 0–0.5)
LYMPHOCYTES # BLD AUTO: 0.8 K/UL (ref 1–4.8)
LYMPHOCYTES NFR BLD: 8.8 % (ref 18–48)
MAGNESIUM SERPL-MCNC: 1.6 MG/DL (ref 1.6–2.6)
MCH RBC QN AUTO: 28.2 PG (ref 27–31)
MCHC RBC AUTO-ENTMCNC: 31 G/DL (ref 32–36)
MCV RBC AUTO: 91 FL (ref 82–98)
MONOCYTES # BLD AUTO: 0.9 K/UL (ref 0.3–1)
MONOCYTES NFR BLD: 8.9 % (ref 4–15)
NEUTROPHILS # BLD AUTO: 7.6 K/UL (ref 1.8–7.7)
NEUTROPHILS NFR BLD: 78.8 % (ref 38–73)
NRBC BLD-RTO: 0 /100 WBC
PHOSPHATE SERPL-MCNC: 3.4 MG/DL (ref 2.7–4.5)
PLATELET # BLD AUTO: 208 K/UL (ref 150–350)
PLATELET BLD QL SMEAR: ABNORMAL
PMV BLD AUTO: 10.4 FL (ref 9.2–12.9)
POTASSIUM SERPL-SCNC: 3.2 MMOL/L (ref 3.5–5.1)
PROT SERPL-MCNC: 5.8 G/DL (ref 6–8.4)
RBC # BLD AUTO: 3.76 M/UL (ref 4–5.4)
SODIUM SERPL-SCNC: 139 MMOL/L (ref 136–145)
VANCOMYCIN SERPL-MCNC: 19.2 UG/ML
WBC # BLD AUTO: 9.6 K/UL (ref 3.9–12.7)

## 2020-01-17 PROCEDURE — 63600175 PHARM REV CODE 636 W HCPCS: Performed by: INTERNAL MEDICINE

## 2020-01-17 PROCEDURE — 83735 ASSAY OF MAGNESIUM: CPT

## 2020-01-17 PROCEDURE — 85025 COMPLETE CBC W/AUTO DIFF WBC: CPT

## 2020-01-17 PROCEDURE — 80202 ASSAY OF VANCOMYCIN: CPT

## 2020-01-17 PROCEDURE — 25000003 PHARM REV CODE 250: Performed by: STUDENT IN AN ORGANIZED HEALTH CARE EDUCATION/TRAINING PROGRAM

## 2020-01-17 PROCEDURE — 80053 COMPREHEN METABOLIC PANEL: CPT

## 2020-01-17 PROCEDURE — 36415 COLL VENOUS BLD VENIPUNCTURE: CPT

## 2020-01-17 PROCEDURE — 94761 N-INVAS EAR/PLS OXIMETRY MLT: CPT

## 2020-01-17 PROCEDURE — 25000003 PHARM REV CODE 250: Performed by: INTERNAL MEDICINE

## 2020-01-17 PROCEDURE — 84100 ASSAY OF PHOSPHORUS: CPT

## 2020-01-17 PROCEDURE — 63600175 PHARM REV CODE 636 W HCPCS: Performed by: STUDENT IN AN ORGANIZED HEALTH CARE EDUCATION/TRAINING PROGRAM

## 2020-01-17 RX ORDER — ONDANSETRON 4 MG/1
4 TABLET, ORALLY DISINTEGRATING ORAL EVERY 12 HOURS PRN
Qty: 20 TABLET | Refills: 0 | Status: SHIPPED | OUTPATIENT
Start: 2020-01-17 | End: 2020-08-19

## 2020-01-17 RX ORDER — POTASSIUM CHLORIDE 20 MEQ/1
40 TABLET, EXTENDED RELEASE ORAL ONCE
Status: COMPLETED | OUTPATIENT
Start: 2020-01-17 | End: 2020-01-17

## 2020-01-17 RX ORDER — METRONIDAZOLE 375 MG/1
375 CAPSULE ORAL 2 TIMES DAILY
Qty: 20 CAPSULE | Refills: 0 | Status: SHIPPED | OUTPATIENT
Start: 2020-01-17 | End: 2020-01-27

## 2020-01-17 RX ORDER — CIPROFLOXACIN 500 MG/1
500 TABLET ORAL 2 TIMES DAILY
Qty: 20 TABLET | Refills: 0 | Status: SHIPPED | OUTPATIENT
Start: 2020-01-17 | End: 2020-01-27

## 2020-01-17 RX ORDER — LEVOTHYROXINE SODIUM 125 UG/1
62.5 TABLET ORAL DAILY
Qty: 15 TABLET | Refills: 11 | Status: SHIPPED | OUTPATIENT
Start: 2020-01-18 | End: 2021-03-24 | Stop reason: DRUGHIGH

## 2020-01-17 RX ORDER — MAGNESIUM SULFATE HEPTAHYDRATE 40 MG/ML
2 INJECTION, SOLUTION INTRAVENOUS ONCE
Status: COMPLETED | OUTPATIENT
Start: 2020-01-17 | End: 2020-01-17

## 2020-01-17 RX ORDER — HYDROCODONE BITARTRATE AND ACETAMINOPHEN 5; 325 MG/1; MG/1
1 TABLET ORAL EVERY 6 HOURS PRN
Qty: 28 TABLET | Refills: 0 | Status: SHIPPED | OUTPATIENT
Start: 2020-01-17 | End: 2020-01-24

## 2020-01-17 RX ORDER — PANTOPRAZOLE SODIUM 40 MG/1
40 TABLET, DELAYED RELEASE ORAL
Qty: 30 TABLET | Refills: 0 | Status: SHIPPED | OUTPATIENT
Start: 2020-01-18 | End: 2020-08-19

## 2020-01-17 RX ADMIN — MAGNESIUM SULFATE 2 G: 2 INJECTION INTRAVENOUS at 08:01

## 2020-01-17 RX ADMIN — PIPERACILLIN SODIUM AND TAZOBACTAM SODIUM 3.38 G: 3; .375 INJECTION, POWDER, LYOPHILIZED, FOR SOLUTION INTRAVENOUS at 10:01

## 2020-01-17 RX ADMIN — PIPERACILLIN SODIUM AND TAZOBACTAM SODIUM 3.38 G: 3; .375 INJECTION, POWDER, LYOPHILIZED, FOR SOLUTION INTRAVENOUS at 12:01

## 2020-01-17 RX ADMIN — LEVOTHYROXINE SODIUM 62.5 MCG: 25 TABLET ORAL at 05:01

## 2020-01-17 RX ADMIN — ONDANSETRON HYDROCHLORIDE 4 MG: 2 INJECTION, SOLUTION INTRAMUSCULAR; INTRAVENOUS at 05:01

## 2020-01-17 RX ADMIN — POTASSIUM CHLORIDE 40 MEQ: 20 TABLET, EXTENDED RELEASE ORAL at 10:01

## 2020-01-17 RX ADMIN — PANTOPRAZOLE SODIUM 40 MG: 40 TABLET, DELAYED RELEASE ORAL at 05:01

## 2020-01-17 NOTE — PLAN OF CARE
01/16/20 2053   Patient Assessment/Suction   Level of Consciousness (AVPU) alert   Respiratory Effort Normal;Unlabored   Expansion/Accessory Muscles/Retractions no use of accessory muscles   All Lung Fields Breath Sounds   (slightly coarse)   PRE-TX-O2   O2 Device (Oxygen Therapy) room air   SpO2 98 %   Pulse Oximetry Type Intermittent   $ Pulse Oximetry - Multiple Charge Pulse Oximetry - Multiple   Pulse 75   Resp 14   Incentive Spirometer   $ Incentive Spirometer Charges done with encouragement   Incentive Spirometer Predicted Level (mL) 1500   Administration (IS) mouthpiece   Number of Repetitions (IS) 10   Level Incentive Spirometer (mL) 750   Patient Tolerance (IS) fair  (c/o pain)   continue to encourage incentive/pt. C/o pain with deep breaths

## 2020-01-17 NOTE — PLAN OF CARE
01/17/20 1026   Discharge Assessment   Assessment Type Discharge Planning Assessment   Confirmed/corrected address and phone number on facesheet? Yes   Assessment information obtained from? Patient;Medical Record;Caregiver   Communicated expected length of stay with patient/caregiver yes   Prior to hospitilization cognitive status: Alert/Oriented   Prior to hospitalization functional status: Independent   Current cognitive status: Alert/Oriented   Current Functional Status: Independent   Facility Arrived From: Home   Lives With child(malka), adult   Able to Return to Prior Arrangements yes   Is patient able to care for self after discharge? No   Who are your caregiver(s) and their phone number(s)? Pt has not addressed advance directives, is , and has 3 adult children.  She lives with her son and daughter-in-law, Je Tiwari ([phone]) and Micaela Cedillon ([phone]).   Readmission Within the Last 30 Days no previous admission in last 30 days   Patient currently being followed by outpatient case management? No   Patient currently receives any other outside agency services? No   Equipment Currently Used at Home none   Do you have any problems affording any of your prescribed medications? No   Is the patient taking medications as prescribed? yes   Does the patient have transportation home? Yes   Transportation Anticipated family or friend will provide   Dialysis Name and Scheduled days N/A   Does the patient receive services at the Coumadin Clinic? No   Discharge Plan A Home Health   DME Needed Upon Discharge  none   Patient/Family in Agreement with Plan yes

## 2020-01-17 NOTE — DISCHARGE SUMMARY
[facility] Hospital Medicine  Discharge Summary      Patient Name: Ariana Tiwari  MRN: [id number]  Admission Date: 1/11/2020  Hospital Length of Stay: 6 days  Discharge Date and Time: 1/17/2020  2:04 PM  Attending Physician: Desirae Bhat DO   Discharging Provider: Desirae Bhat DO  Primary Care Provider: Nba Petty MD      HPI per Dr. Thomas on 1/11  74 year old female presented to ED complaining of 1 day history of LLQ pain: aching to sharp, waxing and waning of own accord 7-8/10. She has had nausea, but no vomiting until she drank contrast in ED. Then the vomitus was contrast material only. She was given antiemetic and was able to finish drinking contrast. No nausea currently. Pain is persisting. CT Abdo/Pelvis = volvulus. Spoke personally with ED physician, who related the patient had spiked temp 102F. She was cultured and started on vancomycin and piperacillin. General Surgery was notified, and related to place NGT and patient would possibly require surgery. The patient denies CP/SOB. Labs personally reviewed: there is leukocytosis, no anemia; mild hypokalemia with normal renal function. Telemetry personally reviewed = NSR      Procedure(s) (LRB):  LAPAROTOMY, EXPLORATORY (N/A)      Hospital Course:   Pt was admitted on 1/11 for acute abdomen.  IV fluids, vancomycin and NG tube was placed.  General surgery was consulted and patient was taken to OR for exploratory laparotomy.  In OR patient was noted to have perforated diverticulum of small bowel and peritonitis.  Patient did well and was returned to floor with continuation of IV antibiotics, NG tube and LESLI drain.  On postop day 3 NG tube was removed.  Postop day 5 LESLI drain was removed by General surgery.  Wound cultures obtained from or rib positive for Klebsiella, E coli and Bacteroides THETAIOTAOMICRON.  Physical and occupational therapy evaluated patient and recommended home with home health PT OT.  On postop day 6 patient was  "deemed hemodynamically stable and appropriate for discharge home.  She was tolerating diet, ambulating around room and is stable on room air.  She will be discharged home with Cipro and Flagyl for antibiotic coverage with recommendations to follow up with General surgery in 1-2 weeks.    Physical exam on day of discharge.  /70 (BP Location: Left arm, Patient Position: Sitting)   Pulse 76   Temp 97.9 °F (36.6 °C)   Resp 18   Ht 5' 4" (1.626 m)   Wt 56.9 kg (125 lb 7.1 oz)   LMP  (LMP Unknown)   SpO2 95%   Breastfeeding? No   BMI 21.53 kg/m²   Gen: nad, laying comfortably in bed  CV: rrr no mrg  Resp: CTA B/l  AB: +bs soft mild ttp  Skin: dry, warm      Consults:   Consults (From admission, onward)        Status Ordering Provider     Inpatient consult to General surgery  Once     Provider:  Sandra Mcarthur MD    Completed KB FRIAS JR     Inpatient consult to General Surgery  Once     Provider:  Sandra Mcarthur MD    Acknowledged MADELIN THOMAS     Inpatient consult to Hospitalist  Once     Provider:  Madelin Thomas MD    Acknowledged MADELIN THOMAS     Pharmacy to dose Vancomycin consult  Once     Provider:  (Not yet assigned)    Acknowledged MADELIN THOMAS          No new Assessment & Plan notes have been filed under this hospital service since the last note was generated.  Service: Hospital Medicine    Final Active Diagnoses:    Diagnosis Date Noted POA    PRINCIPAL PROBLEM:  Perforated diverticulum of small intestine [K57.00] 01/12/2020 Yes    Perforated bowel [K63.1] 01/13/2020 Yes    Abdominal pain [R10.9] 01/11/2020 Yes      Problems Resolved During this Admission:    Diagnosis Date Noted Date Resolved POA    Volvulus [K56.2] 01/11/2020 01/13/2020 Yes       Discharged Condition: stable    Disposition: Home with home health    Follow Up:  Follow-up Information     Schedule an appointment as soon as possible for a visit with Nba Petty MD.    Specialty:  Family " Medicine  Why:  hospital follow up  Contact information:  [address]  [place] [address]  [phone]             Sandra Mcarthur MD. Schedule an appointment as soon as possible for a visit in 1 week.    Specialties:  General Surgery, Bariatrics, Surgery  Why:  hospital follow up  Contact information:  [address]  [address]  [place] [address]  [phone]                 Patient Instructions:      Diet Cardiac     Notify your health care provider if you experience any of the following:  temperature >100.4     Notify your health care provider if you experience any of the following:  persistent nausea and vomiting or diarrhea     Notify your health care provider if you experience any of the following:  severe uncontrolled pain     Notify your health care provider if you experience any of the following:  redness, tenderness, or signs of infection (pain, swelling, redness, odor or green/yellow discharge around incision site)     Notify your health care provider if you experience any of the following:  severe persistent headache     Notify your health care provider if you experience any of the following:  persistent dizziness, light-headedness, or visual disturbances     Leave dressing on - Keep it clean, dry, and intact until clinic visit     Activity as tolerated       Significant Diagnostic Studies: Labs: All labs within the past 24 hours have been reviewed    Pending Diagnostic Studies:     None         Medications:  Reconciled Home Medications:      Medication List      START taking these medications    ciprofloxacin HCl 500 MG tablet  Commonly known as:  CIPRO  Take 1 tablet (500 mg total) by mouth 2 (two) times daily. for 10 days     HYDROcodone-acetaminophen 5-325 mg per tablet  Commonly known as:  NORCO  Take 1 tablet by mouth every 6 (six) hours as needed.     metroNIDAZOLE 375 mg capsule  Commonly known as:  FLAGYL  Take 1 capsule (375 mg total) by mouth 2 (two) times daily. for 10 days      ondansetron 4 MG Tbdl  Commonly known as:  ZOFRAN-ODT  Take 1 tablet (4 mg total) by mouth every 12 (twelve) hours as needed.     pantoprazole 40 MG tablet  Commonly known as:  PROTONIX  Take 1 tablet (40 mg total) by mouth before breakfast.  Start taking on:  January 18, 2020        CONTINUE taking these medications    ipratropium 42 mcg (0.06 %) nasal spray  Commonly known as:  ATROVENT  2 sprays by Nasal route 4 (four) times daily.     levothyroxine 125 MCG tablet  Commonly known as:  SYNTHROID  Take 0.5 tablets (62.5 mcg total) by mouth once daily.  Start taking on:  January 18, 2020     Vitamin D3 50 mcg (2,000 unit) Cap  Generic drug:  cholecalciferol (vitamin D3)  Take 1 capsule by mouth once daily.        STOP taking these medications    acetaminophen 500 MG tablet  Commonly known as:  TYLENOL     cholestyramine 4 gram packet  Commonly known as:  QUESTRAN     CINNAMON BARK ORAL     GLUCOSAMINE HCL ORAL     ibandronate 150 mg tablet  Commonly known as:  BONIVA     ibuprofen 200 MG tablet  Commonly known as:  ADVIL,MOTRIN     L-THREONINE MISC     levoFLOXacin 500 MG tablet  Commonly known as:  LEVAQUIN     psyllium 0.52 gram capsule            Indwelling Lines/Drains at time of discharge:   Lines/Drains/Airways     None                 Time spent on the discharge of patient: 34 minutes  Patient was seen and examined on the date of discharge and determined to be suitable for discharge.         Desirae Bhat DO  Department of Hospital Medicine  formerly [facility]

## 2020-01-17 NOTE — NURSING
Discharged to home as per MD orders. Prescriptions were faxed to [facility]. Patient notified of same. Pain med paper pres. handed to patient & copy left in chart. Discharge orders explained and given to patient. Brought out to family vehicle in  and released to care of sister without incident.

## 2020-01-17 NOTE — PT/OT/SLP PROGRESS
Occupational Therapy      Patient Name:  Ariana Kramer Te   MRN:  [id number]    Patient not seen today secondary to Other (Comment)(Pt declined 2/2 being d/c'd from the hospital today). Will follow-up if d/c falls through.    Carolina Oviedo OT  1/17/2020

## 2020-01-17 NOTE — PROGRESS NOTES
Pharmacokinetic Assessment Follow Up: IV Vancomycin    Vancomycin serum concentration assessment(s):    The random level was drawn correctly and can be used to guide therapy at this time. The measurement is above the desired definitive target range of 10 to 15 mcg/mL.    Vancomycin Regimen Plan:    Re-dose when the random level is less than 15 mcg/mL, next level to be drawn at AM labs on 1/18     Drug levels (last 3 results):  Recent Labs   Lab Result Units 01/16/20  0145 01/16/20 2019 01/17/20  0513   Vancomycin, Random ug/mL  --   --  19.2   Vancomycin-Trough ug/mL 38.4* 21.8  --        Pharmacy will continue to follow and monitor vancomycin.    Please contact pharmacy at extension [phone] for questions regarding this assessment.    Thank you for the consult,   Sammie Osuna       Patient brief summary:  Ariana Tiwari is a 74 y.o. female initiated on antimicrobial therapy with IV Vancomycin for treatment of intra-abdominal infection    The patient's current regimen is Intermittent Dosing following random levels    Drug Allergies:   Review of patient's allergies indicates:   Allergen Reactions    Gluten Diarrhea       Actual Body Weight:   56.9 kg    Renal Function:   Estimated Creatinine Clearance: 26.2 mL/min (A) (based on SCr of 1.6 mg/dL (H)).,     Dialysis Method (if applicable):  N/A    CBC (last 72 hours):  Recent Labs   Lab Result Units 01/15/20  0502 01/16/20 0145 01/17/20  0513   WBC K/uL 8.12 9.05 9.60   Hemoglobin g/dL 12.3 11.2* 10.6*   Hematocrit % 37.9 34.3* 34.2*   Platelets K/uL 195 149* 208   Gran% % 82.2* 82.4* 78.8*   Lymph% % 7.9* 6.5* 8.8*   Mono% % 7.1 7.7 8.9   Eosinophil% % 2.2 2.5 2.6   Basophil% % 0.2 0.2 0.2   Differential Method  Automated Automated Automated       Metabolic Panel (last 72 hours):  Recent Labs   Lab Result Units 01/15/20  0503 01/16/20 0145 01/17/20  0513   Sodium mmol/L 138  --  139   Potassium mmol/L 2.7*  --  3.2*   Chloride mmol/L 97  --  101   CO2 mmol/L 30*  --  26    Glucose mg/dL 124*  --  113*   BUN, Bld mg/dL 6*  --  21   Creatinine mg/dL <0.3*  --  1.6*   Albumin g/dL  --   --  2.7*   Total Bilirubin mg/dL  --   --  0.6   Alkaline Phosphatase U/L  --   --  65   AST U/L  --   --  22   ALT U/L  --   --  14   Magnesium mg/dL 1.7 1.6 1.6   Phosphorus mg/dL 2.2* 2.5* 3.4       Vancomycin Administrations:  vancomycin given in the last 96 hours                     vancomycin in dextrose 5 % 1 gram/250 mL IVPB 1,000 mg (mg) 1,000 mg New Bag 01/15/20 2038     1,000 mg New Bag  1341     1,000 mg New Bag  0347     1,000 mg New Bag 01/14/20 2010     1,000 mg New Bag  1224     1,000 mg New Bag  0402     1,000 mg New Bag 01/13/20 1931     1,000 mg New Bag  1242                      Microbiologic Results:  Microbiology Results (last 7 days)       Procedure Component Value Units Date/Time    Blood Culture #1 **CANNOT BE ORDERED STAT** [[id number]] Collected:  01/11/20 0832    Order Status:  Completed Specimen:  Blood Updated:  01/16/20 1032     Blood Culture, Routine No growth after 5 days.    Blood Culture #1 **CANNOT BE ORDERED STAT** [[id number]] Collected:  01/11/20 0840    Order Status:  Completed Specimen:  Blood Updated:  01/16/20 1032     Blood Culture, Routine No growth after 5 days.    Culture, Anaerobic [[id number]]  (Abnormal) Collected:  01/11/20 1452    Order Status:  Completed Specimen:  Abscess from Abdomen Updated:  01/15/20 1330     Anaerobic Culture BACTEROIDES THETAIOTAOMICRON  Moderate  Beta Lactamase positive      AFB Culture & Smear [[id number]] Collected:  01/11/20 1452    Order Status:  Completed Specimen:  Abscess from Abdomen Updated:  01/15/20 1206     AFB CULTURE STAIN No acid fast bacilli seen.     AFB CULTURE STAIN Testing performed by:     AFB CULTURE STAIN [facility]     AFB CULTURE STAIN [address] [facility] [place]     [place] [address]     AFB CULTURE STAIN Dr.Brian Karlo MD    Aerobic culture [[id number]]  (Abnormal)   (Susceptibility) Collected:  01/11/20 1452    Order Status:  Completed Specimen:  Abscess from Abdomen Updated:  01/14/20 0637     Aerobic Bacterial Culture Skin inocencio also isolated      KLEBSIELLA PNEUMONIAE  Few        ESCHERICHIA COLI  Few      Gram stain [[id number]] Collected:  01/11/20 1452    Order Status:  Completed Specimen:  Abscess from Abdomen Updated:  01/12/20 1348     Gram Stain Result Few WBC's      Few Gram negative rods    Fungus culture [[id number]] Collected:  01/11/20 1452    Order Status:  Sent Specimen:  Abscess from Abdomen Updated:  01/11/20 1650    Aerobic culture [[id number]]     Order Status:  Canceled Specimen:  Abscess from Abdomen     Culture, Anaerobic [[id number]]     Order Status:  Canceled Specimen:  Abscess from Abdomen     AFB Culture & Smear [[id number]]     Order Status:  Canceled Specimen:  Abscess from Abdomen     Fungus culture [[id number]]     Order Status:  Canceled Specimen:  Abscess from Abdomen     Gram stain [[id number]]     Order Status:  Canceled Specimen:  Abscess from Abdomen

## 2020-01-17 NOTE — PLAN OF CARE
01/17/20 1030   Final Note   Assessment Type Final Discharge Note   Anticipated Discharge Disposition Home-Health     NATHALY consulted for HH at discharge, and met with Pt and sister at bedside to discuss.  Pt was educated on Medicare freedom of choice and provided with [facility] HH list.  Pt has no preference in HH agency at this time, and requested that SW contact her daughter-in-law this afternoon after she gets off of work re: options.  Pt choice form signed by Pt at 1025 and will be scanned to chart.  SW to follow up with daughter-in-law this afternoon.    1/20/2020  0905  SW able to reach Pt's daughter-in-law Micaela Te at [phone] this morning re: preference in HH.  SW discussed options, and after reviewing [facility] HH list brought home by Pt, her preference is [facility].  SW explained process for HH and that someone will be contacting her today to set up home visit.  She verbalized agreement with this plan and having no further questions at this time.  SW sending referral via fax.  No further needs addressed at this time.

## 2020-01-17 NOTE — PT/OT/SLP PROGRESS
Physical Therapy      Patient Name:  Ariana Kramer Te   MRN:  [id number]    Patient not seen today secondary to Patient unwilling to participate. States gassy and wants to rest prior to possible discharge later. Will follow-up 01/18/20 if patient still in house.    Marisa Amin, PT

## 2020-01-20 LAB — BACTERIA SPEC ANAEROBE CULT: ABNORMAL

## 2020-01-21 ENCOUNTER — DOCUMENTATION ONLY (OUTPATIENT)
Dept: FAMILY MEDICINE | Facility: CLINIC | Age: 75
End: 2020-01-21

## 2020-01-21 PROCEDURE — G0180 PR HOME HEALTH MD CERTIFICATION: ICD-10-PCS | Mod: ,,, | Performed by: FAMILY MEDICINE

## 2020-01-21 PROCEDURE — G0180 MD CERTIFICATION HHA PATIENT: HCPCS | Mod: ,,, | Performed by: FAMILY MEDICINE

## 2020-01-21 NOTE — PROGRESS NOTES
Pre-Visit Chart Review  For Appointment Scheduled on 1-22-20    Health Maintenance Due   Topic Date Due    TETANUS VACCINE  03/28/1963    Pneumococcal Vaccine (65+ Low/Medium Risk) (1 of 2 - PCV13) 03/28/2009

## 2020-01-22 ENCOUNTER — OFFICE VISIT (OUTPATIENT)
Dept: FAMILY MEDICINE | Facility: CLINIC | Age: 75
End: 2020-01-22
Attending: FAMILY MEDICINE
Payer: MEDICARE

## 2020-01-22 ENCOUNTER — LAB VISIT (OUTPATIENT)
Dept: LAB | Facility: HOSPITAL | Age: 75
End: 2020-01-22
Attending: FAMILY MEDICINE
Payer: MEDICARE

## 2020-01-22 VITALS
TEMPERATURE: 98 F | SYSTOLIC BLOOD PRESSURE: 134 MMHG | BODY MASS INDEX: 19.91 KG/M2 | OXYGEN SATURATION: 96 % | RESPIRATION RATE: 16 BRPM | HEIGHT: 64 IN | HEART RATE: 73 BPM | WEIGHT: 116.63 LBS | DIASTOLIC BLOOD PRESSURE: 72 MMHG

## 2020-01-22 DIAGNOSIS — K57.80 PERFORATED DIVERTICULUM: ICD-10-CM

## 2020-01-22 DIAGNOSIS — D64.9 ANEMIA, UNSPECIFIED TYPE: ICD-10-CM

## 2020-01-22 DIAGNOSIS — G20.A1 PD (PARKINSON'S DISEASE): ICD-10-CM

## 2020-01-22 DIAGNOSIS — E87.6 HYPOKALEMIA: ICD-10-CM

## 2020-01-22 DIAGNOSIS — K57.80 PERFORATED DIVERTICULUM: Primary | ICD-10-CM

## 2020-01-22 LAB
ANION GAP SERPL CALC-SCNC: 14 MMOL/L (ref 8–16)
BASOPHILS # BLD AUTO: 0.05 K/UL (ref 0–0.2)
BASOPHILS NFR BLD: 0.3 % (ref 0–1.9)
BUN SERPL-MCNC: 15 MG/DL (ref 8–23)
CALCIUM SERPL-MCNC: 8.2 MG/DL (ref 8.7–10.5)
CHLORIDE SERPL-SCNC: 102 MMOL/L (ref 95–110)
CO2 SERPL-SCNC: 24 MMOL/L (ref 23–29)
CREAT SERPL-MCNC: 1.4 MG/DL (ref 0.5–1.4)
DIFFERENTIAL METHOD: ABNORMAL
EOSINOPHIL # BLD AUTO: 0.1 K/UL (ref 0–0.5)
EOSINOPHIL NFR BLD: 0.9 % (ref 0–8)
ERYTHROCYTE [DISTWIDTH] IN BLOOD BY AUTOMATED COUNT: 14.9 % (ref 11.5–14.5)
EST. GFR  (AFRICAN AMERICAN): 42.4 ML/MIN/1.73 M^2
EST. GFR  (NON AFRICAN AMERICAN): 36.8 ML/MIN/1.73 M^2
GLUCOSE SERPL-MCNC: 97 MG/DL (ref 70–110)
HCT VFR BLD AUTO: 31.9 % (ref 37–48.5)
HGB BLD-MCNC: 10.2 G/DL (ref 12–16)
IMM GRANULOCYTES # BLD AUTO: 0.13 K/UL (ref 0–0.04)
IMM GRANULOCYTES NFR BLD AUTO: 0.9 % (ref 0–0.5)
LYMPHOCYTES # BLD AUTO: 1.2 K/UL (ref 1–4.8)
LYMPHOCYTES NFR BLD: 7.9 % (ref 18–48)
MCH RBC QN AUTO: 29.1 PG (ref 27–31)
MCHC RBC AUTO-ENTMCNC: 32 G/DL (ref 32–36)
MCV RBC AUTO: 91 FL (ref 82–98)
MONOCYTES # BLD AUTO: 1.3 K/UL (ref 0.3–1)
MONOCYTES NFR BLD: 8.4 % (ref 4–15)
NEUTROPHILS # BLD AUTO: 12.3 K/UL (ref 1.8–7.7)
NEUTROPHILS NFR BLD: 81.6 % (ref 38–73)
NRBC BLD-RTO: 0 /100 WBC
PLATELET # BLD AUTO: 463 K/UL (ref 150–350)
PMV BLD AUTO: 10.6 FL (ref 9.2–12.9)
POTASSIUM SERPL-SCNC: 3.2 MMOL/L (ref 3.5–5.1)
RBC # BLD AUTO: 3.51 M/UL (ref 4–5.4)
SODIUM SERPL-SCNC: 140 MMOL/L (ref 136–145)
WBC # BLD AUTO: 15.08 K/UL (ref 3.9–12.7)

## 2020-01-22 PROCEDURE — 99999 PR PBB SHADOW E&M-EST. PATIENT-LVL III: CPT | Mod: PBBFAC,,, | Performed by: FAMILY MEDICINE

## 2020-01-22 PROCEDURE — 1125F PR PAIN SEVERITY QUANTIFIED, PAIN PRESENT: ICD-10-PCS | Mod: ,,, | Performed by: FAMILY MEDICINE

## 2020-01-22 PROCEDURE — 1159F MED LIST DOCD IN RCRD: CPT | Mod: ,,, | Performed by: FAMILY MEDICINE

## 2020-01-22 PROCEDURE — 85025 COMPLETE CBC W/AUTO DIFF WBC: CPT

## 2020-01-22 PROCEDURE — 99214 OFFICE O/P EST MOD 30 MIN: CPT | Mod: S$PBB,,, | Performed by: FAMILY MEDICINE

## 2020-01-22 PROCEDURE — 1125F AMNT PAIN NOTED PAIN PRSNT: CPT | Mod: ,,, | Performed by: FAMILY MEDICINE

## 2020-01-22 PROCEDURE — 80048 BASIC METABOLIC PNL TOTAL CA: CPT

## 2020-01-22 PROCEDURE — 36415 COLL VENOUS BLD VENIPUNCTURE: CPT | Mod: PO

## 2020-01-22 PROCEDURE — 99999 PR PBB SHADOW E&M-EST. PATIENT-LVL III: ICD-10-PCS | Mod: PBBFAC,,, | Performed by: FAMILY MEDICINE

## 2020-01-22 PROCEDURE — 99213 OFFICE O/P EST LOW 20 MIN: CPT | Mod: PBBFAC,PO | Performed by: FAMILY MEDICINE

## 2020-01-22 PROCEDURE — 1159F PR MEDICATION LIST DOCUMENTED IN MEDICAL RECORD: ICD-10-PCS | Mod: ,,, | Performed by: FAMILY MEDICINE

## 2020-01-22 PROCEDURE — 99214 PR OFFICE/OUTPT VISIT, EST, LEVL IV, 30-39 MIN: ICD-10-PCS | Mod: S$PBB,,, | Performed by: FAMILY MEDICINE

## 2020-01-22 RX ORDER — IBANDRONATE SODIUM 150 MG/1
150 TABLET, FILM COATED ORAL
Status: ON HOLD | COMMUNITY
End: 2020-09-16 | Stop reason: HOSPADM

## 2020-01-22 NOTE — PT/OT/SLP DISCHARGE
Occupational Therapy Discharge Summary    Ariana Tiwari  MRN: [id number]   Principal Problem: Perforated diverticulum of small intestine      Patient Discharged from acute Occupational Therapy on 1/17/2020.  Please refer to prior OT note dated 1/15/2020 for functional status.    Assessment:      Patient has not met goals.    Objective:     GOALS:   Multidisciplinary Problems     Occupational Therapy Goals     Not on file          Multidisciplinary Problems (Resolved)        Problem: Occupational Therapy Goal    Goal Priority Disciplines Outcome Interventions   Occupational Therapy Goal   (Resolved)     OT, PT/OT Met    Description:  Goals to be met by: discharge    Patient will increase functional independence with ADLs by performing:    LE Dressing with Supervision.  Grooming while standing at sink with Supervision.  Toileting from bedside commode with Supervision for hygiene and clothing management.   Toilet transfer to bedside commode with Supervision.  Increased functional strength to WFL to maximize patient's ability to perform ADLs safely and independently.                       Reasons for Discontinuation of Therapy Services  Patient d/c home.      Plan:     Patient Discharged to: Home with Home Health Service    Ventura Lambert, OT  1/22/2020

## 2020-01-22 NOTE — PROGRESS NOTES
Subjective:       Patient ID: Ariana Tiwari is a 74 y.o. female.    Chief Complaint: Hospital Follow Up    74-year-old female coming in for a follow-up from a one week stay in [facility] secondary to perforated diverticulum and peritonitis.  She presented to the emergency room on January 11th complaining of left lower quadrant pain of one day duration with nausea but no vomiting.  Her white blood cell count was increased over 13,000 and she had what appeared to be a volvulus on imaging.  She was taken to surgery for an exploratory laparotomy finding a perforated diverticulum and peritonitis with cultures growing Klebsiella, E coli, and Bacteroides.  Blood cultures were negative.  She was on IV antibiotics through the hospital stay and discharged on the 17th on Flagyl and Cipro.  She has a follow-up on February 3rd with surgery.  She has some abdominal distention and bloating but no nausea or vomiting and bowel movements are fairly regular.  She has no fever or chills.  History includes gluten intolerance, diverticulosis, hypothyroidism, peptic ulcer disease, and parkinsonism.    Past Medical History:  No date: Allergy  No date: Diverticulosis  No date: Gluten enteropathy  No date: Gluten intolerance  No date: Hernia  No date: Hypothyroidism  9/16/2015: PD (Parkinson's disease)      Comment:  Right tremor type  No date: Peptic ulcer disease  No date: Right shoulder pain      Comment:  Cirtisone injection 3/26/15 - Dr. Tolbert  No date: Ulcer    Past Surgical History:  No date: ADENOIDECTOMY  03/01/2012: COLONOSCOPY      Comment:  Dr. Teresa, repeat in 10 years for screening  2/21/2018: COLONOSCOPY; N/A      Comment:  Procedure: COLONOSCOPY;  Surgeon: Radha Deng MD;               Location: [facility];  Service: Endoscopy;  Laterality:                N/A;  No date: COSMETIC SURGERY      Comment:  Broken nose  left wrist: FRACTURE SURGERY      Comment:  With pin  No date: HEMORRHOID SURGERY  04/09/2015: HERNIA  REPAIR; Left      Comment:  Dr Lo; left inguinal  7/10/2018: INTRALUMINAL GASTROINTESTINAL TRACT IMAGING VIA CAPSULE; N/  A      Comment:  Procedure: IMAGING, GI TRACT, INTRALUMINAL, VIA CAPSULE;               Surgeon: Radha Deng MD;  Location: [facility];                 Service: Endoscopy;  Laterality: N/A;  No date: RHINOPLASTY TIP  No date: TONSILLECTOMY  05/21/2015: UPPER GASTROINTESTINAL ENDOSCOPY      Comment:  Dr. Gomez    Current Outpatient Medications on File Prior to Visit:  cholecalciferol, vitamin D3, (VITAMIN D3) 2,000 unit Cap, Take 1 capsule by mouth once daily. , Disp: , Rfl:   ciprofloxacin HCl (CIPRO) 500 MG tablet, Take 1 tablet (500 mg total) by mouth 2 (two) times daily. for 10 days, Disp: 20 tablet, Rfl: 0  HYDROcodone-acetaminophen (NORCO) 5-325 mg per tablet, Take 1 tablet by mouth every 6 (six) hours as needed. (Patient taking differently: Take 0.5 tablets by mouth every 6 (six) hours as needed. ), Disp: 28 tablet, Rfl: 0  ibandronate (BONIVA) 150 mg tablet, TAKE 1 TABLET BY MOUTH ONCE EVERY MONTH, Disp: , Rfl:   ipratropium (ATROVENT) 42 mcg (0.06 %) nasal spray, 2 sprays by Nasal route 4 (four) times daily., Disp: 15 mL, Rfl: 11  levothyroxine (SYNTHROID) 125 MCG tablet, Take 0.5 tablets (62.5 mcg total) by mouth once daily., Disp: 15 tablet, Rfl: 11  metroNIDAZOLE (FLAGYL) 375 mg capsule, Take 1 capsule (375 mg total) by mouth 2 (two) times daily. for 10 days, Disp: 20 capsule, Rfl: 0  ondansetron (ZOFRAN-ODT) 4 MG TbDL, Take 1 tablet (4 mg total) by mouth every 12 (twelve) hours as needed., Disp: 20 tablet, Rfl: 0  pantoprazole (PROTONIX) 40 MG tablet, Take 1 tablet (40 mg total) by mouth before breakfast., Disp: 30 tablet, Rfl: 0    No current facility-administered medications on file prior to visit.         Review of Systems   Constitutional: Negative for chills and fever.   Respiratory: Negative for chest tightness and shortness of breath.    Cardiovascular: Negative for  chest pain and palpitations.   Gastrointestinal: Positive for abdominal pain (She had a single episode of reflux last night but otherwise has been controlled with her Protonix). Negative for blood in stool, constipation, diarrhea, nausea and vomiting.   Skin: Positive for color change and wound.       Objective:      Physical Exam   Constitutional: She is oriented to person, place, and time. She appears well-developed. No distress.   Good blood pressure control  Afebrile  Normal respiration and oxygenation  Normal weight with a BMI of 20.0 she is down 1.2 lb from her last visit with [facility] October 2, 2019   HENT:   Head: Normocephalic and atraumatic.   Cardiovascular: Normal rate, regular rhythm and normal heart sounds. Exam reveals no gallop and no friction rub.   No murmur heard.  Pulmonary/Chest: Effort normal and breath sounds normal. No stridor. No respiratory distress. She has no wheezes. She has no rales. She exhibits no tenderness.   Abdominal: Soft. She exhibits no mass. Bowel sounds are increased. There is no hepatosplenomegaly. There is no tenderness. There is no rebound, no guarding, no tenderness at McBurney's point and negative Mitchell's sign.       Neurological: She is alert and oriented to person, place, and time.   Mild parkinsonian tremor of hands, walking with a [employer] type walker   Skin: She is not diaphoretic.   Psychiatric: She has a normal mood and affect. Her behavior is normal. Judgment and thought content normal.   Nursing note and vitals reviewed.      Assessment:       1. Perforated diverticulum    2. Anemia, unspecified type    3. Hypokalemia    4. PD (Parkinson's disease)        Plan:       1. Perforated diverticulum  Status post resection and treatment for peritonitis still on Flagyl and Cipro  - CBC auto differential; Future  - Basic metabolic panel; Future    2. Anemia, unspecified type  Needs follow-up lab  - CBC auto differential; Future    3. Hypokalemia  Needs follow-up lab,  discharge potassium was 3.0, lowest point was 2.6  - Basic metabolic panel; Future    4. PD (Parkinson's disease)

## 2020-01-23 ENCOUNTER — PATIENT MESSAGE (OUTPATIENT)
Dept: FAMILY MEDICINE | Facility: CLINIC | Age: 75
End: 2020-01-23

## 2020-01-23 DIAGNOSIS — D72.829 LEUKOCYTOSIS, UNSPECIFIED TYPE: Primary | ICD-10-CM

## 2020-01-23 DIAGNOSIS — N39.0 URINARY TRACT INFECTION ASSOCIATED WITH CATHETERIZATION OF URINARY TRACT, UNSPECIFIED INDWELLING URINARY CATHETER TYPE, INITIAL ENCOUNTER: ICD-10-CM

## 2020-01-23 DIAGNOSIS — T83.511A URINARY TRACT INFECTION ASSOCIATED WITH CATHETERIZATION OF URINARY TRACT, UNSPECIFIED INDWELLING URINARY CATHETER TYPE, INITIAL ENCOUNTER: ICD-10-CM

## 2020-01-24 RX ORDER — OSELTAMIVIR PHOSPHATE 75 MG/1
75 CAPSULE ORAL DAILY
Qty: 10 CAPSULE | Refills: 0 | Status: ON HOLD | OUTPATIENT
Start: 2020-01-24 | End: 2020-02-04 | Stop reason: HOSPADM

## 2020-01-24 NOTE — TELEPHONE ENCOUNTER
Called patient- spoke to dil Cristi- order cancelled at clinic-orders faxed to [facility] at [phone].    Cristi and patients sister have the flu. Patient did not get a flu shot. Per  will send Tamiflu to pharmacy.

## 2020-01-27 ENCOUNTER — TELEPHONE (OUTPATIENT)
Dept: FAMILY MEDICINE | Facility: CLINIC | Age: 75
End: 2020-01-27

## 2020-01-27 DIAGNOSIS — N30.01 ACUTE CYSTITIS WITH HEMATURIA: Primary | ICD-10-CM

## 2020-01-27 RX ORDER — AMOXICILLIN AND CLAVULANATE POTASSIUM 500; 125 MG/1; MG/1
1 TABLET, FILM COATED ORAL 2 TIMES DAILY
Qty: 20 TABLET | Refills: 0 | Status: SHIPPED | OUTPATIENT
Start: 2020-01-27 | End: 2020-02-06

## 2020-01-27 NOTE — TELEPHONE ENCOUNTER
Urinalysis done at [facility] in [place] is weakly positive for urinary tract infection.  We had an order for a culture here, I do not know if [facility] is doing one or not.    She recently use Flagyl and Cipro and kidney function was down to the point I would not feel safe using Bactrim.  Recommend we put her on Augmentin.  I have not had any reply from Dr. Mcarthur or his staff.  How is she feeling?

## 2020-01-27 NOTE — TELEPHONE ENCOUNTER
Augmentin 500 mg twice daily for 10 days sent to [facility] at [place].  Urine culture is preliminary, waiting for final

## 2020-01-27 NOTE — TELEPHONE ENCOUNTER
Clemente escobedo [facility]  phone [phone] will be faxing culture report.   Sister and patient informed. [facility]  Patient requested we notifie Micaela [phone]  Patient stated she is ok but no better.

## 2020-01-29 ENCOUNTER — EXTERNAL HOME HEALTH (OUTPATIENT)
Dept: HOME HEALTH SERVICES | Facility: HOSPITAL | Age: 75
End: 2020-01-29
Payer: MEDICARE

## 2020-01-30 ENCOUNTER — EXTERNAL HOME HEALTH (OUTPATIENT)
Dept: HOME HEALTH SERVICES | Facility: HOSPITAL | Age: 75
End: 2020-01-30
Payer: MEDICARE

## 2020-01-31 RX ORDER — TRAZODONE HYDROCHLORIDE 50 MG/1
50 TABLET ORAL NIGHTLY
Qty: 30 TABLET | Refills: 5 | Status: SHIPPED | OUTPATIENT
Start: 2020-01-31 | End: 2020-08-19

## 2020-01-31 NOTE — TELEPHONE ENCOUNTER
----- Message from Dunia Eugene sent at 1/30/2020  4:29 PM CST -----  Contact: Diaughter in law Micaela Te [phone]  Patient is having trouble sleeping at night.  Also she is complaining of irritation in her vaginal area.  Please call her.  Thank you!

## 2020-01-31 NOTE — TELEPHONE ENCOUNTER
We can try some trazodone.  I'd suggest starting with one half of a 50mg (25mg) to make sure she does not stay drowsy all day.

## 2020-01-31 NOTE — TELEPHONE ENCOUNTER
Called patient- she is not able to fall asleep at night or even to nap very well. Asking for medication to help with this.       Patient has some vaginal itch-no discharge- advised to try Monistat.

## 2020-02-03 ENCOUNTER — HOSPITAL ENCOUNTER (INPATIENT)
Facility: HOSPITAL | Age: 75
LOS: 1 days | Discharge: HOME | DRG: 390 | End: 2020-02-04
Attending: EMERGENCY MEDICINE | Admitting: FAMILY MEDICINE
Payer: MEDICARE

## 2020-02-03 ENCOUNTER — TELEPHONE (OUTPATIENT)
Dept: BARIATRICS | Facility: CLINIC | Age: 75
End: 2020-02-03

## 2020-02-03 DIAGNOSIS — K56.609 SMALL BOWEL OBSTRUCTION: Primary | ICD-10-CM

## 2020-02-03 PROBLEM — D64.9 ANEMIA: Status: ACTIVE | Noted: 2020-02-03

## 2020-02-03 PROBLEM — D36.9 ADENOMA: Status: ACTIVE | Noted: 2020-02-03

## 2020-02-03 LAB
ALBUMIN SERPL BCP-MCNC: 3.4 G/DL (ref 3.5–5.2)
ALP SERPL-CCNC: 56 U/L (ref 55–135)
ALT SERPL W/O P-5'-P-CCNC: 19 U/L (ref 10–44)
ANION GAP SERPL CALC-SCNC: 11 MMOL/L (ref 8–16)
AST SERPL-CCNC: 21 U/L (ref 10–40)
BACTERIA #/AREA URNS HPF: NEGATIVE /HPF
BASOPHILS # BLD AUTO: 0.05 K/UL (ref 0–0.2)
BASOPHILS NFR BLD: 0.8 % (ref 0–1.9)
BILIRUB SERPL-MCNC: 0.3 MG/DL (ref 0.1–1)
BILIRUB UR QL STRIP: NEGATIVE
BUN SERPL-MCNC: 14 MG/DL (ref 8–23)
CALCIUM SERPL-MCNC: 8.5 MG/DL (ref 8.7–10.5)
CHLORIDE SERPL-SCNC: 101 MMOL/L (ref 95–110)
CLARITY UR: CLEAR
CO2 SERPL-SCNC: 30 MMOL/L (ref 23–29)
COLOR UR: YELLOW
CREAT SERPL-MCNC: 0.7 MG/DL (ref 0.5–1.4)
DIFFERENTIAL METHOD: ABNORMAL
EOSINOPHIL # BLD AUTO: 0.2 K/UL (ref 0–0.5)
EOSINOPHIL NFR BLD: 3.1 % (ref 0–8)
ERYTHROCYTE [DISTWIDTH] IN BLOOD BY AUTOMATED COUNT: 14.3 % (ref 11.5–14.5)
EST. GFR  (AFRICAN AMERICAN): >60 ML/MIN/1.73 M^2
EST. GFR  (NON AFRICAN AMERICAN): >60 ML/MIN/1.73 M^2
GLUCOSE SERPL-MCNC: 85 MG/DL (ref 70–110)
GLUCOSE UR QL STRIP: NEGATIVE
HCT VFR BLD AUTO: 30 % (ref 37–48.5)
HGB BLD-MCNC: 9.3 G/DL (ref 12–16)
HGB UR QL STRIP: NEGATIVE
HYALINE CASTS #/AREA URNS LPF: 4 /LPF
IMM GRANULOCYTES # BLD AUTO: 0.04 K/UL (ref 0–0.04)
IMM GRANULOCYTES NFR BLD AUTO: 0.6 % (ref 0–0.5)
KETONES UR QL STRIP: NEGATIVE
LACTATE SERPL-SCNC: 1.1 MMOL/L (ref 0.5–1.9)
LEUKOCYTE ESTERASE UR QL STRIP: ABNORMAL
LIPASE SERPL-CCNC: 33 U/L (ref 4–60)
LYMPHOCYTES # BLD AUTO: 1.4 K/UL (ref 1–4.8)
LYMPHOCYTES NFR BLD: 22.1 % (ref 18–48)
MCH RBC QN AUTO: 28.1 PG (ref 27–31)
MCHC RBC AUTO-ENTMCNC: 31 G/DL (ref 32–36)
MCV RBC AUTO: 91 FL (ref 82–98)
MICROSCOPIC COMMENT: ABNORMAL
MONOCYTES # BLD AUTO: 0.5 K/UL (ref 0.3–1)
MONOCYTES NFR BLD: 8.2 % (ref 4–15)
NEUTROPHILS # BLD AUTO: 4.2 K/UL (ref 1.8–7.7)
NEUTROPHILS NFR BLD: 65.2 % (ref 38–73)
NITRITE UR QL STRIP: NEGATIVE
NRBC BLD-RTO: 0 /100 WBC
PH UR STRIP: 7 [PH] (ref 5–8)
PLATELET # BLD AUTO: 290 K/UL (ref 150–350)
PMV BLD AUTO: 10.5 FL (ref 9.2–12.9)
POTASSIUM SERPL-SCNC: 3.7 MMOL/L (ref 3.5–5.1)
PROT SERPL-MCNC: 6.8 G/DL (ref 6–8.4)
PROT UR QL STRIP: NEGATIVE
RBC # BLD AUTO: 3.31 M/UL (ref 4–5.4)
RBC #/AREA URNS HPF: 3 /HPF (ref 0–4)
SODIUM SERPL-SCNC: 142 MMOL/L (ref 136–145)
SP GR UR STRIP: 1 (ref 1–1.03)
SQUAMOUS #/AREA URNS HPF: 2 /HPF
TSH SERPL DL<=0.005 MIU/L-ACNC: 3.56 UIU/ML (ref 0.34–5.6)
URN SPEC COLLECT METH UR: ABNORMAL
UROBILINOGEN UR STRIP-ACNC: NEGATIVE EU/DL
WBC # BLD AUTO: 6.47 K/UL (ref 3.9–12.7)
WBC #/AREA URNS HPF: 8 /HPF (ref 0–5)

## 2020-02-03 PROCEDURE — 99285 EMERGENCY DEPT VISIT HI MDM: CPT | Mod: 25

## 2020-02-03 PROCEDURE — 12000002 HC ACUTE/MED SURGE SEMI-PRIVATE ROOM

## 2020-02-03 PROCEDURE — 83690 ASSAY OF LIPASE: CPT

## 2020-02-03 PROCEDURE — 84443 ASSAY THYROID STIM HORMONE: CPT

## 2020-02-03 PROCEDURE — 25500020 PHARM REV CODE 255: Performed by: EMERGENCY MEDICINE

## 2020-02-03 PROCEDURE — 85025 COMPLETE CBC W/AUTO DIFF WBC: CPT

## 2020-02-03 PROCEDURE — 83605 ASSAY OF LACTIC ACID: CPT

## 2020-02-03 PROCEDURE — 36415 COLL VENOUS BLD VENIPUNCTURE: CPT

## 2020-02-03 PROCEDURE — 80053 COMPREHEN METABOLIC PANEL: CPT

## 2020-02-03 PROCEDURE — 81001 URINALYSIS AUTO W/SCOPE: CPT

## 2020-02-03 RX ORDER — ENOXAPARIN SODIUM 100 MG/ML
40 INJECTION SUBCUTANEOUS EVERY 24 HOURS
Status: DISCONTINUED | OUTPATIENT
Start: 2020-02-04 | End: 2020-02-04 | Stop reason: HOSPADM

## 2020-02-03 RX ORDER — CARBIDOPA AND LEVODOPA 25; 100 MG/1; MG/1
1 TABLET, ORALLY DISINTEGRATING ORAL 3 TIMES DAILY
COMMUNITY
End: 2020-08-19

## 2020-02-03 RX ORDER — ONDANSETRON 2 MG/ML
4 INJECTION INTRAMUSCULAR; INTRAVENOUS EVERY 8 HOURS PRN
Status: DISCONTINUED | OUTPATIENT
Start: 2020-02-03 | End: 2020-02-04 | Stop reason: HOSPADM

## 2020-02-03 RX ORDER — MORPHINE SULFATE 4 MG/ML
4 INJECTION, SOLUTION INTRAMUSCULAR; INTRAVENOUS EVERY 4 HOURS PRN
Status: DISCONTINUED | OUTPATIENT
Start: 2020-02-03 | End: 2020-02-04 | Stop reason: HOSPADM

## 2020-02-03 RX ORDER — SODIUM CHLORIDE 9 MG/ML
INJECTION, SOLUTION INTRAVENOUS CONTINUOUS
Status: DISCONTINUED | OUTPATIENT
Start: 2020-02-03 | End: 2020-02-04 | Stop reason: HOSPADM

## 2020-02-03 RX ORDER — SODIUM CHLORIDE 0.9 % (FLUSH) 0.9 %
10 SYRINGE (ML) INJECTION
Status: DISCONTINUED | OUTPATIENT
Start: 2020-02-03 | End: 2020-02-04 | Stop reason: HOSPADM

## 2020-02-03 RX ORDER — ACETAMINOPHEN 325 MG/1
650 TABLET ORAL EVERY 8 HOURS PRN
Status: DISCONTINUED | OUTPATIENT
Start: 2020-02-03 | End: 2020-02-04 | Stop reason: HOSPADM

## 2020-02-03 RX ADMIN — IOHEXOL 100 ML: 350 INJECTION, SOLUTION INTRAVENOUS at 08:02

## 2020-02-03 NOTE — TELEPHONE ENCOUNTER
Called pt's daughter in law, adivsed if pt is in too much pain to go to ed, she stated that they are on their way there now to [facility]. no further action needed.

## 2020-02-03 NOTE — TELEPHONE ENCOUNTER
----- Message from Shital Li sent at 2/3/2020  4:17 PM CST -----  Contact: daughter  Type: Needs Medical Advice    Who Called:  Micaela Akins Call Back Number: [phone]  Additional Information: the patient said her stomach is hurting wants to know ifs he can come in today if the Dr is working later please call to advise

## 2020-02-04 VITALS
SYSTOLIC BLOOD PRESSURE: 146 MMHG | BODY MASS INDEX: 19.8 KG/M2 | DIASTOLIC BLOOD PRESSURE: 73 MMHG | WEIGHT: 111.75 LBS | OXYGEN SATURATION: 98 % | RESPIRATION RATE: 16 BRPM | HEART RATE: 93 BPM | TEMPERATURE: 98 F | HEIGHT: 63 IN

## 2020-02-04 PROBLEM — K56.609 SMALL BOWEL OBSTRUCTION: Status: RESOLVED | Noted: 2020-02-03 | Resolved: 2020-02-04

## 2020-02-04 LAB
ANION GAP SERPL CALC-SCNC: 9 MMOL/L (ref 8–16)
BASOPHILS # BLD AUTO: 0.03 K/UL (ref 0–0.2)
BASOPHILS NFR BLD: 0.6 % (ref 0–1.9)
BUN SERPL-MCNC: 11 MG/DL (ref 8–23)
CALCIUM SERPL-MCNC: 8.5 MG/DL (ref 8.7–10.5)
CHLORIDE SERPL-SCNC: 106 MMOL/L (ref 95–110)
CO2 SERPL-SCNC: 31 MMOL/L (ref 23–29)
CREAT SERPL-MCNC: 0.6 MG/DL (ref 0.5–1.4)
DIFFERENTIAL METHOD: ABNORMAL
EOSINOPHIL # BLD AUTO: 0.3 K/UL (ref 0–0.5)
EOSINOPHIL NFR BLD: 4.9 % (ref 0–8)
ERYTHROCYTE [DISTWIDTH] IN BLOOD BY AUTOMATED COUNT: 14.3 % (ref 11.5–14.5)
EST. GFR  (AFRICAN AMERICAN): >60 ML/MIN/1.73 M^2
EST. GFR  (NON AFRICAN AMERICAN): >60 ML/MIN/1.73 M^2
GLUCOSE SERPL-MCNC: 91 MG/DL (ref 70–110)
HCT VFR BLD AUTO: 30.6 % (ref 37–48.5)
HGB BLD-MCNC: 9.6 G/DL (ref 12–16)
IMM GRANULOCYTES # BLD AUTO: 0.03 K/UL (ref 0–0.04)
IMM GRANULOCYTES NFR BLD AUTO: 0.6 % (ref 0–0.5)
LYMPHOCYTES # BLD AUTO: 1.2 K/UL (ref 1–4.8)
LYMPHOCYTES NFR BLD: 23.8 % (ref 18–48)
MAGNESIUM SERPL-MCNC: 1.4 MG/DL (ref 1.6–2.6)
MCH RBC QN AUTO: 28.1 PG (ref 27–31)
MCHC RBC AUTO-ENTMCNC: 31.4 G/DL (ref 32–36)
MCV RBC AUTO: 90 FL (ref 82–98)
MONOCYTES # BLD AUTO: 0.5 K/UL (ref 0.3–1)
MONOCYTES NFR BLD: 9 % (ref 4–15)
NEUTROPHILS # BLD AUTO: 3.1 K/UL (ref 1.8–7.7)
NEUTROPHILS NFR BLD: 61.1 % (ref 38–73)
NRBC BLD-RTO: 0 /100 WBC
PLATELET # BLD AUTO: 266 K/UL (ref 150–350)
PMV BLD AUTO: 10.2 FL (ref 9.2–12.9)
POTASSIUM SERPL-SCNC: 3.2 MMOL/L (ref 3.5–5.1)
RBC # BLD AUTO: 3.42 M/UL (ref 4–5.4)
SODIUM SERPL-SCNC: 146 MMOL/L (ref 136–145)
WBC # BLD AUTO: 5.13 K/UL (ref 3.9–12.7)

## 2020-02-04 PROCEDURE — 63600175 PHARM REV CODE 636 W HCPCS: Performed by: FAMILY MEDICINE

## 2020-02-04 PROCEDURE — 83735 ASSAY OF MAGNESIUM: CPT

## 2020-02-04 PROCEDURE — 63600175 PHARM REV CODE 636 W HCPCS: Performed by: NURSE PRACTITIONER

## 2020-02-04 PROCEDURE — 25000003 PHARM REV CODE 250: Performed by: INTERNAL MEDICINE

## 2020-02-04 PROCEDURE — 85025 COMPLETE CBC W/AUTO DIFF WBC: CPT

## 2020-02-04 PROCEDURE — 25000003 PHARM REV CODE 250: Performed by: SURGERY

## 2020-02-04 PROCEDURE — 25000003 PHARM REV CODE 250: Performed by: FAMILY MEDICINE

## 2020-02-04 PROCEDURE — 63600175 PHARM REV CODE 636 W HCPCS: Performed by: INTERNAL MEDICINE

## 2020-02-04 PROCEDURE — 36415 COLL VENOUS BLD VENIPUNCTURE: CPT

## 2020-02-04 PROCEDURE — 25000003 PHARM REV CODE 250: Performed by: NURSE PRACTITIONER

## 2020-02-04 PROCEDURE — 80048 BASIC METABOLIC PNL TOTAL CA: CPT

## 2020-02-04 RX ORDER — POTASSIUM CHLORIDE 20 MEQ/15ML
60 SOLUTION ORAL ONCE
Status: COMPLETED | OUTPATIENT
Start: 2020-02-04 | End: 2020-02-04

## 2020-02-04 RX ORDER — IPRATROPIUM BROMIDE 42 UG/1
2 SPRAY, METERED NASAL 4 TIMES DAILY
Status: DISCONTINUED | OUTPATIENT
Start: 2020-02-04 | End: 2020-02-04 | Stop reason: HOSPADM

## 2020-02-04 RX ORDER — BISACODYL 10 MG
10 SUPPOSITORY, RECTAL RECTAL ONCE
Status: COMPLETED | OUTPATIENT
Start: 2020-02-04 | End: 2020-02-04

## 2020-02-04 RX ORDER — CARBIDOPA AND LEVODOPA 25; 100 MG/1; MG/1
1 TABLET ORAL 3 TIMES DAILY
Status: DISCONTINUED | OUTPATIENT
Start: 2020-02-04 | End: 2020-02-04

## 2020-02-04 RX ORDER — CARBIDOPA AND LEVODOPA 25; 100 MG/1; MG/1
1 TABLET ORAL 3 TIMES DAILY
Status: DISCONTINUED | OUTPATIENT
Start: 2020-02-04 | End: 2020-02-04 | Stop reason: HOSPADM

## 2020-02-04 RX ORDER — MAGNESIUM SULFATE HEPTAHYDRATE 40 MG/ML
2 INJECTION, SOLUTION INTRAVENOUS ONCE
Status: COMPLETED | OUTPATIENT
Start: 2020-02-04 | End: 2020-02-04

## 2020-02-04 RX ADMIN — BISACODYL 10 MG: 10 SUPPOSITORY RECTAL at 09:02

## 2020-02-04 RX ADMIN — SODIUM CHLORIDE: 0.9 INJECTION, SOLUTION INTRAVENOUS at 12:02

## 2020-02-04 RX ADMIN — POTASSIUM CHLORIDE 60 MEQ: 20 SOLUTION ORAL at 07:02

## 2020-02-04 RX ADMIN — LEVOTHYROXINE SODIUM 62.5 MCG: 25 TABLET ORAL at 05:02

## 2020-02-04 RX ADMIN — IPRATROPIUM BROMIDE 2 SPRAY: 42 SPRAY, METERED NASAL at 12:02

## 2020-02-04 RX ADMIN — CARBIDOPA AND LEVODOPA 1 TABLET: 25; 100 TABLET ORAL at 04:02

## 2020-02-04 RX ADMIN — CARBIDOPA AND LEVODOPA 1 TABLET: 25; 100 TABLET ORAL at 09:02

## 2020-02-04 RX ADMIN — MAGNESIUM SULFATE 2 G: 2 INJECTION INTRAVENOUS at 07:02

## 2020-02-04 RX ADMIN — CARBIDOPA AND LEVODOPA 1 TABLET: 25; 100 TABLET ORAL at 08:02

## 2020-02-04 RX ADMIN — ENOXAPARIN SODIUM 40 MG: 100 INJECTION SUBCUTANEOUS at 04:02

## 2020-02-04 RX ADMIN — IPRATROPIUM BROMIDE 2 SPRAY: 42 SPRAY, METERED NASAL at 04:02

## 2020-02-04 NOTE — NURSING
Admitted from the ED.  Awake, alert, orientated.  Abd. Rounded, distended.  Bowel sounds hypoactive.  Midline lower abd. Incision with staples, wee proximated with redness of the edges of the incision.

## 2020-02-04 NOTE — HPI
The patient is a 74-year-old female with a history hypothyroidism and Parkinson's. She  presented to the ER with abdominal pain. She was admitted here on 01/17/2020.  She was found to have perforated diverticulum of small bowel and peritonitis.  She underwent exploratory lap and small bowel resection.  She was discharged home on 1/11/2020.  She states that she has been having some abdominal pain since discharge. Today, she reports more frequent diffuse sharp moderate to severe abdominal pain, associated with some nausea without vomiting or fever, with no aggravating/alleviating factors. She reports passing flatus and had a normal bowel movement today.    CT abdomen pelvis showed 1. multiple dilated air and fluid-filled small bowel loops with multiplem air-fluid levels, likely acute small bowel obstruction, 2.  Bulky left adrenal gland, likely adenoma.

## 2020-02-04 NOTE — ED PROVIDER NOTES
Encounter Date: 2/3/2020       History     Chief Complaint   Patient presents with    Abdominal Pain     74-year-old female with a history hypothyroidism, Parkinson's presents to the ER with abdominal pain. Patient recently had a bowel resection secondary to volvulus and perforated diverticulum on January 11th.  Since then, she has had some pain across her abdomen.  This became worse yesterday.  She cannot describe the pain.  Also noticed that she was constipated when she has been dealing with chronic diarrhea for many years.  She had black stool shortly after she was discharged, but is currently dark brown.  Denies fever, nausea or vomiting, chest pain, shortness of breath, or changes in bladder function. Endorses passing flatus, which improved her pain.        Review of patient's allergies indicates:   Allergen Reactions    Gluten Diarrhea     Past Medical History:   Diagnosis Date    Allergy     Diverticulosis     Gluten enteropathy     Gluten intolerance     Hernia     Hypothyroidism     PD (Parkinson's disease) 9/16/2015    Right tremor type    Peptic ulcer disease     Right shoulder pain     Cirtisone injection 3/26/15 - Dr. Tolbert    Ulcer      Past Surgical History:   Procedure Laterality Date    ADENOIDECTOMY      COLONOSCOPY  03/01/2012    Dr. Teresa, repeat in 10 years for screening    COLONOSCOPY N/A 2/21/2018    Procedure: COLONOSCOPY;  Surgeon: Radha Deng MD;  Location: [facility] ENDO;  Service: Endoscopy;  Laterality: N/A;    COSMETIC SURGERY      Broken nose    FRACTURE SURGERY  left wrist    With pin    HEMORRHOID SURGERY      HERNIA REPAIR Left 04/09/2015    Dr Lo; left inguinal    INTRALUMINAL GASTROINTESTINAL TRACT IMAGING VIA CAPSULE N/A 7/10/2018    Procedure: IMAGING, GI TRACT, INTRALUMINAL, VIA CAPSULE;  Surgeon: Radha Deng MD;  Location: [facility] ENDO;  Service: Endoscopy;  Laterality: N/A;    RHINOPLASTY TIP      TONSILLECTOMY      UPPER GASTROINTESTINAL  ENDOSCOPY  05/21/2015    Dr. Gomez     Family History   Problem Relation Age of Onset    Diabetes Mother     Diabetes Son     Obesity Sister     Alcohol abuse Brother     Heart disease Brother     No Known Problems Father     Early death Brother         self    Breast cancer Neg Hx     Colon cancer Neg Hx     Ovarian cancer Neg Hx     Colon polyps Neg Hx     Crohn's disease Neg Hx     Ulcerative colitis Neg Hx     Celiac disease Neg Hx      Social History     Tobacco Use    Smoking status: Never Smoker    Smokeless tobacco: Never Used   Substance Use Topics    Alcohol use: Yes     Alcohol/week: 11.7 standard drinks     Types: 14 Standard drinks or equivalent per week     Comment: 2 glasses wine per dinner    Drug use: No     Review of Systems   Constitutional: Negative for fever.   HENT: Negative for sore throat.    Respiratory: Negative for shortness of breath.    Cardiovascular: Negative for chest pain.   Gastrointestinal: Positive for abdominal distention, abdominal pain and constipation. Negative for nausea and vomiting.   Genitourinary: Negative for dysuria.   Musculoskeletal: Negative for back pain.   Skin: Negative for rash.   Neurological: Negative for weakness.   Hematological: Does not bruise/bleed easily.   All other systems reviewed and are negative.      Physical Exam     Initial Vitals   BP Pulse Resp Temp SpO2   02/03/20 1713 02/03/20 1717 02/03/20 1717 02/03/20 1713 02/03/20 1717   (!) 153/67 69 20 97.7 °F (36.5 °C) 100 %      MAP       --                Physical Exam    Constitutional: She appears well-developed and well-nourished. No distress.   HENT:   Head: Normocephalic and atraumatic.   Mouth/Throat: Oropharynx is clear and moist.   Eyes: Conjunctivae and EOM are normal. Pupils are equal, round, and reactive to light.   Neck: Normal range of motion.   Cardiovascular: Normal rate, regular rhythm, normal heart sounds and intact distal pulses.   No murmur  heard.  Pulmonary/Chest: Breath sounds normal. No respiratory distress. She has no wheezes.   Abdominal: Soft. Bowel sounds are normal. She exhibits distension. There is tenderness in the suprapubic area and left lower quadrant. There is no rigidity, no rebound and no guarding.       Abdomen is mildly distended, but soft.  Very mild tenderness about the lower abdomen.  No rebound or guarding.   Musculoskeletal: Normal range of motion. She exhibits no edema or tenderness.   Neurological: She is alert.   Skin: Skin is warm and dry.   Psychiatric: She has a normal mood and affect. Thought content normal.         ED Course   Procedures  Labs Reviewed   CBC W/ AUTO DIFFERENTIAL - Abnormal; Notable for the following components:       Result Value    RBC 3.31 (*)     Hemoglobin 9.3 (*)     Hematocrit 30.0 (*)     Mean Corpuscular Hemoglobin Conc 31.0 (*)     Immature Granulocytes 0.6 (*)     All other components within normal limits    Narrative:     For upper or mid abdominal pain.   COMPREHENSIVE METABOLIC PANEL - Abnormal; Notable for the following components:    CO2 30 (*)     Calcium 8.5 (*)     Albumin 3.4 (*)     All other components within normal limits    Narrative:     For upper or mid abdominal pain.   URINALYSIS, REFLEX TO URINE CULTURE - Abnormal; Notable for the following components:    Leukocytes, UA 1+ (*)     All other components within normal limits    Narrative:     In and Out Cath as needed it patient unable to void  Preferred Collection Type->Urine, Clean Catch  Specimen Source->Urine   URINALYSIS MICROSCOPIC - Abnormal; Notable for the following components:    WBC, UA 8 (*)     Hyaline Casts, UA 4 (*)     All other components within normal limits    Narrative:     In and Out Cath as needed it patient unable to void  Preferred Collection Type->Urine, Clean Catch  Specimen Source->Urine   LIPASE    Narrative:     For upper or mid abdominal pain.   LACTIC ACID, PLASMA   TSH   TSH          Imaging Results           CT Abdomen Pelvis With Contrast (Final result)  Result time 02/03/20 20:10:27    Final result by Varun uDran MD (02/03/20 20:10:27)                 Narrative:        Exam: CT OF THE ABDOMEN/PELVIS WITH IV CONTRAST    Clinical data: Abdominal distension.    Technique: Direct contiguous axial CT images were acquired through the abdomen  and pelvis with intravenous contrast using soft tissue and bone algorithms. Oral  contrast was not administered. Reformatted/MPR images were performed. Contrast:  Applied. Radiation dose:  CTDIvol = 6.20 mGy, DLP = 317.40 mGy x cm.    Limitations: Lack of oral contrast limits evaluation of the bowel loops.    Prior Studies: CT of the abdomen and pelvis dated 01/11/2020.    Findings: Lung bases: Fibrotic/atelectatic changes are noted along bilateral  lower lobes.    Liver:   Unremarkable size and contour. Normal density. No evidence of mass. No  evidence of dilated ducts.    Gallbladder:  Unremarkable    Spleen:  Grossly unremarkable.    Pancreas:   Grossly unremarkable size, contour and density.    Left adrenal gland is bulky.    Kidneys:   In anatomic position. Grossly unremarkable renal size, contour and  density. No renal or ureteral calculi. No evidence of a renal mass or cyst.  Perinephric space is unremarkable.    Retroperitoneum: No enlarged retroperitoneal lymphadenopathy. The aorta and IVC  appear unremarkable.  Atherosclerotic calcifications are noted along the aorta.    Peritoneal cavity:  No evidence of free air or ascites.    Gastrointestinal tract: Multiple dilated air and fluid-filled small bowel loops  with multiple air-fluid levels are noted in the peritoneal cavity.    Appendix: Not visualized.    Pelvis:  Solid and hollow viscera grossly unremarkable.    Osseous structures:  No acute or destructive bony process identified.    IMPRESSION:  1.  Multiple dilated air and fluid-filled small bowel loops with multiple  air-fluid levels, likely acute small bowel  obstruction.  2.  Bulky left adrenal gland, likely adenoma.    Recommendation: Follow up as clinically indicated.    All CT scans at this facility utilize dose modulation, iterative reconstruction,  and/or weight based dosing when appropriate to reduce radiation dose to as low  as reasonably achievable.      Electronically Signed by BROOKE HENSON MD at 2/3/2020 10:30:57 PM EST                               Medical Decision Making:   Initial Assessment:   74-year-old female with a history hypothyroidism, Parkinson's presents to the ER with abdominal pain.  ED Management:  Plan:  Afebrile, vital signs stable. Labs.  CT AP.  Her abdomen is soft and not peritoneal.  She is very high risk for obstruction.    Reassessed. Pt lying in bed in NAD. No significant pain or emesis. Labs show WBC 6.47. BUN 14, Cr 0.7. Lipase 33. Lactate 1.1. UA with RBC 3, WBC 8, Bacteria negative. CT suggests acute SBO. Possible adrenal adenoma. Discussed case with Dr. Mcarthur. He does not suspect SBO as she is not having emesis and endorses flatus. Recommends admission for further evaluation. Does not recommend NGT at this time. Admit to the hospitalist.                                 Clinical Impression:       ICD-10-CM ICD-9-CM   1. Small bowel obstruction K56.609 560.9                             Eagle Rosenbaum MD  02/12/20 7800

## 2020-02-04 NOTE — SUBJECTIVE & OBJECTIVE
Past Medical History:   Diagnosis Date    Allergy     Diverticulosis     Gluten enteropathy     Gluten intolerance     Hernia     Hypothyroidism     PD (Parkinson's disease) 9/16/2015    Right tremor type    Peptic ulcer disease     Right shoulder pain     Cirtisone injection 3/26/15 - Dr. Tolbert    Ulcer        Past Surgical History:   Procedure Laterality Date    ADENOIDECTOMY      COLONOSCOPY  03/01/2012    Dr. Teresa, repeat in 10 years for screening    COLONOSCOPY N/A 2/21/2018    Procedure: COLONOSCOPY;  Surgeon: Radha Deng MD;  Location: [facility] ENDO;  Service: Endoscopy;  Laterality: N/A;    COSMETIC SURGERY      Broken nose    FRACTURE SURGERY  left wrist    With pin    HEMORRHOID SURGERY      HERNIA REPAIR Left 04/09/2015    Dr Lo; left inguinal    INTRALUMINAL GASTROINTESTINAL TRACT IMAGING VIA CAPSULE N/A 7/10/2018    Procedure: IMAGING, GI TRACT, INTRALUMINAL, VIA CAPSULE;  Surgeon: Radha Deng MD;  Location: [facility] ENDO;  Service: Endoscopy;  Laterality: N/A;    RHINOPLASTY TIP      TONSILLECTOMY      UPPER GASTROINTESTINAL ENDOSCOPY  05/21/2015    Dr. Gomez       Review of patient's allergies indicates:   Allergen Reactions    Gluten Diarrhea       No current facility-administered medications on file prior to encounter.      Current Outpatient Medications on File Prior to Encounter   Medication Sig    carbidopa-levodopa (PARCOPA)  mg per disintegrating tablet Take 1 tablet by mouth 3 (three) times daily.    cholecalciferol, vitamin D3, (VITAMIN D3) 2,000 unit Cap Take 1 capsule by mouth once daily.     ibandronate (BONIVA) 150 mg tablet Take 150 mg by mouth every 30 days.     ipratropium (ATROVENT) 42 mcg (0.06 %) nasal spray 2 sprays by Nasal route 4 (four) times daily.    levothyroxine (SYNTHROID) 125 MCG tablet Take 0.5 tablets (62.5 mcg total) by mouth once daily.    pantoprazole (PROTONIX) 40 MG tablet Take 1 tablet (40 mg total) by mouth before  breakfast.    amoxicillin-clavulanate 500-125mg (AUGMENTIN) 500-125 mg Tab Take 1 tablet (500 mg total) by mouth 2 (two) times daily. for 10 days    ondansetron (ZOFRAN-ODT) 4 MG TbDL Take 1 tablet (4 mg total) by mouth every 12 (twelve) hours as needed.    oseltamivir (TAMIFLU) 75 MG capsule Take 1 capsule (75 mg total) by mouth once daily. for 10 days    traZODone (DESYREL) 50 MG tablet Take 1 tablet (50 mg total) by mouth every evening.     Family History     Problem Relation (Age of Onset)    Alcohol abuse Brother    Diabetes Mother, Son    Early death Brother    Heart disease Brother    No Known Problems Father    Obesity Sister        Tobacco Use    Smoking status: Never Smoker    Smokeless tobacco: Never Used   Substance and Sexual Activity    Alcohol use: Yes     Alcohol/week: 11.7 standard drinks     Types: 14 Standard drinks or equivalent per week     Comment: 2 glasses wine per dinner    Drug use: No    Sexual activity: Never     Review of Systems   All other systems reviewed and are negative.    Objective:     Vital Signs (Most Recent):  Temp: 97.7 °F (36.5 °C) (02/03/20 1713)  Pulse: 62 (02/03/20 2244)  Resp: 16 (02/03/20 2244)  BP: (!) 144/67 (02/03/20 2244)  SpO2: 100 % (02/03/20 2244) Vital Signs (24h Range):  Temp:  [97.7 °F (36.5 °C)] 97.7 °F (36.5 °C)  Pulse:  [62-69] 62  Resp:  [16-20] 16  SpO2:  [100 %] 100 %  BP: (144-153)/(67) 144/67     Weight: 52.2 kg (115 lb)  Body mass index is 20.37 kg/m².    Physical Exam   Constitutional: She appears well-developed. No distress.   Thin elderly female   HENT:   Head: Normocephalic and atraumatic.   Eyes: Right eye exhibits no discharge. Left eye exhibits no discharge.   Neck: Normal range of motion.   Cardiovascular: Normal rate and regular rhythm.   No murmur heard.  Pulmonary/Chest: Effort normal and breath sounds normal.   Abdominal: Soft.   Hyperactive/high pitch bowel sounds  Midline abdominal incision intact with staples, mild redness at  distal edge, no discharge noted   Genitourinary: Vagina normal.   Genitourinary Comments: No CVA tenderness   Musculoskeletal: Normal range of motion. She exhibits no edema or deformity.   Neurological: She is alert.   Mild tremors arms/hands   Skin: Skin is warm and dry. Capillary refill takes less than 2 seconds. She is not diaphoretic.   Psychiatric: She has a normal mood and affect.   Vitals reviewed.          Significant Labs:   CBC:   Recent Labs   Lab 02/03/20  1756   WBC 6.47   HGB 9.3*   HCT 30.0*        CMP:   Recent Labs   Lab 02/03/20  1756      K 3.7      CO2 30*   GLU 85   BUN 14   CREATININE 0.7   CALCIUM 8.5*   PROT 6.8   ALBUMIN 3.4*   BILITOT 0.3   ALKPHOS 56   AST 21   ALT 19   ANIONGAP 11   EGFRNONAA >60.0     Urine Studies:   Recent Labs   Lab 02/03/20  2000   COLORU Yellow   APPEARANCEUA Clear   PHUR 7.0   SPECGRAV 1.005   PROTEINUA Negative   GLUCUA Negative   KETONESU Negative   BILIRUBINUA Negative   OCCULTUA Negative   NITRITE Negative   UROBILINOGEN Negative   LEUKOCYTESUR 1+*   RBCUA 3   WBCUA 8*   BACTERIA Negative   SQUAMEPITHEL 2   HYALINECASTS 4*       Significant Imaging: I have reviewed all pertinent imaging results/findings within the past 24 hours.   Ct Abdomen Pelvis With Contrast    Result Date: 2/3/2020  Exam: CT OF THE ABDOMEN/PELVIS WITH IV CONTRAST Clinical data: Abdominal distension. Technique: Direct contiguous axial CT images were acquired through the abdomen and pelvis with intravenous contrast using soft tissue and bone algorithms. Oral contrast was not administered. Reformatted/MPR images were performed. Contrast: Applied. Radiation dose:  CTDIvol = 6.20 mGy, DLP = 317.40 mGy x cm. Limitations: Lack of oral contrast limits evaluation of the bowel loops. Prior Studies: CT of the abdomen and pelvis dated 01/11/2020. Findings: Lung bases: Fibrotic/atelectatic changes are noted along bilateral lower lobes. Liver:   Unremarkable size and contour. Normal  density. No evidence of mass. No evidence of dilated ducts. Gallbladder:  Unremarkable Spleen:  Grossly unremarkable. Pancreas:   Grossly unremarkable size, contour and density. Left adrenal gland is bulky. Kidneys:   In anatomic position. Grossly unremarkable renal size, contour and density. No renal or ureteral calculi. No evidence of a renal mass or cyst. Perinephric space is unremarkable. Retroperitoneum: No enlarged retroperitoneal lymphadenopathy. The aorta and IVC appear unremarkable.  Atherosclerotic calcifications are noted along the aorta. Peritoneal cavity:  No evidence of free air or ascites. Gastrointestinal tract: Multiple dilated air and fluid-filled small bowel loops with multiple air-fluid levels are noted in the peritoneal cavity. Appendix: Not visualized. Pelvis:  Solid and hollow viscera grossly unremarkable. Osseous structures:  No acute or destructive bony process identified. IMPRESSION: 1.  Multiple dilated air and fluid-filled small bowel loops with multiple air-fluid levels, likely acute small bowel obstruction. 2.  Bulky left adrenal gland, likely adenoma. Recommendation: Follow up as clinically indicated. All CT scans at this facility utilize dose modulation, iterative reconstruction, and/or weight based dosing when appropriate to reduce radiation dose to as low as reasonably achievable. Electronically Signed by BROOKE HENSON MD at 2/3/2020 10:30:57 PM EST

## 2020-02-04 NOTE — ASSESSMENT & PLAN NOTE
Hemoglobin hematocrit trending down slightly  Results for KEVIN SAMS (MRN [id number]) as of 2/3/2020 22:21   Ref. Range 1/15/2020 05:02 1/16/2020 01:45 1/17/2020 05:13 1/22/2020 11:30 2/3/2020 17:56   Hemoglobin Latest Ref Range: 12.0 - 16.0 g/dL 12.3 11.2 (L) 10.6 (L) 10.2 (L) 9.3 (L)   Hematocrit Latest Ref Range: 37.0 - 48.5 % 37.9 34.3 (L) 34.2 (L) 31.9 (L) 30.0 (L)   Monitor

## 2020-02-04 NOTE — ASSESSMENT & PLAN NOTE
Incidental findings on CT abdomen pelvis:  Bulky left adrenal gland, likely adenoma  The patient and family were informed.  Need outpatient follow-up

## 2020-02-04 NOTE — PROGRESS NOTES
Patient is Known to me for a recent laparotomy with small bowel resection for obstruction. I saw her this morning after she presented to the emergency department with abdominal discomfort. She reports that she has been having some gas with very minimal stools and felt uncomfortable from the bloating of her stomach.    Upon my interview she was already feeling better.    Her vital signs are reviewed  On exam her abdomen is soft  and mildly distended without any tenderness. Her incision is very well healed with staples still present.    CT of abdomen is reviewed: dilated small intestine and large intestine with some air fluid levels within the small intestine    Assessment and plan  Abdominal discomfort after surgery from a suspected ileus. She has signs of constipation on her CT scan and likely has an ileus not a small bowel obstruction. I will give  her a Dulcolax suppository and allow her to try some liquids. If she tolerates this and does well she may be discharged to home.

## 2020-02-04 NOTE — ASSESSMENT & PLAN NOTE
Status post Ex lap with small-bowel resection for perforated diverticulum of small bowel and peritonitis  Had mild nausea earlier, no vomiting  Reports a normal BM and passing flatus  Consult surgery  NPO  KUB in a.m.  No NG tube per surgery recommendations  Monitor

## 2020-02-04 NOTE — PLAN OF CARE
Problem: Adult Inpatient Plan of Care  Goal: Plan of Care Review  Outcome: Ongoing, Progressing  Goal: Patient-Specific Goal (Individualization)  Outcome: Ongoing, Progressing  Goal: Absence of Hospital-Acquired Illness or Injury  Outcome: Ongoing, Progressing  Goal: Optimal Comfort and Wellbeing  Outcome: Ongoing, Progressing  Goal: Readiness for Transition of Care  Outcome: Ongoing, Progressing  Goal: Rounds/Family Conference  Outcome: Ongoing, Progressing     Problem: Fall Injury Risk  Goal: Absence of Fall and Fall-Related Injury  Outcome: Ongoing, Progressing     Problem: Skin Injury Risk Increased  Goal: Skin Health and Integrity  Outcome: Ongoing, Progressing

## 2020-02-05 ENCOUNTER — OFFICE VISIT (OUTPATIENT)
Dept: SURGERY | Facility: CLINIC | Age: 75
End: 2020-02-05
Payer: MEDICARE

## 2020-02-05 VITALS
HEART RATE: 79 BPM | SYSTOLIC BLOOD PRESSURE: 125 MMHG | BODY MASS INDEX: 19.75 KG/M2 | TEMPERATURE: 98 F | RESPIRATION RATE: 16 BRPM | HEIGHT: 63 IN | DIASTOLIC BLOOD PRESSURE: 69 MMHG | WEIGHT: 111.44 LBS

## 2020-02-05 DIAGNOSIS — K57.00 PERFORATED DIVERTICULUM OF SMALL INTESTINE: Primary | ICD-10-CM

## 2020-02-05 PROCEDURE — 99999 PR PBB SHADOW E&M-EST. PATIENT-LVL III: CPT | Mod: PBBFAC,,, | Performed by: SURGERY

## 2020-02-05 PROCEDURE — 99213 OFFICE O/P EST LOW 20 MIN: CPT | Mod: PBBFAC,PO | Performed by: SURGERY

## 2020-02-05 PROCEDURE — 99999 PR PBB SHADOW E&M-EST. PATIENT-LVL III: ICD-10-PCS | Mod: PBBFAC,,, | Performed by: SURGERY

## 2020-02-05 PROCEDURE — 99024 POSTOP FOLLOW-UP VISIT: CPT | Mod: POP,,, | Performed by: SURGERY

## 2020-02-05 PROCEDURE — 99024 PR POST-OP FOLLOW-UP VISIT: ICD-10-PCS | Mod: POP,,, | Performed by: SURGERY

## 2020-02-05 NOTE — PLAN OF CARE
02/05/20 0932   Final Note   Assessment Type Final Discharge Note   Anticipated Discharge Disposition Home

## 2020-02-05 NOTE — PROGRESS NOTES
Recently admitted to the hospital for ileus.  Continues to have a bloated abdomen however the discomfort has resolved and she is having gas and stools.    Vitals are reviewed  Abdomen is distended but soft      Pathology was reviewed perforated diverticulitis with a small bowel    Assessment plan  Perforated small bowel diverticulitis from chronic small bowel obstruction, now with signs a resolving ileus    She is tolerating a diet and should continue this as needed.  She return in a week to evaluate her symptoms.  She reports that she is chronically bloated and that this is her normal abdomen.  She appears bloated to me however not obstructed she is having gas and stools.  We will remove her staples in clinic today and see her in a week.  She should come to the emergency department if she has any abdominal pain nausea vomiting.

## 2020-02-05 NOTE — NURSING
Reviewed discharge instructions with pt and family members. Discontinued the IV; catheter intact and pressure applied to site. Vital signs as per flowsheet, stable condition. Pt belongings gathered and pt wheeled to private vehicle per wheelchair with family.

## 2020-02-06 NOTE — DISCHARGE SUMMARY
[facility] Medicine Discharge Summary    Date of Admit: 2/3/2020  Date of Discharge: 2/5/2020    Discharge to: Home or Self Care  Condition: good    Discharge Diagnoses     Problem Noted   Anemia 2/3/2020   Adenoma 2/3/2020   Pd (Parkinson's Disease) 9/16/2015    Right tremor type     Hypothyroidism    Small Bowel Obstruction (Resolved) 2/3/2020        Patient Active Problem List   Diagnosis    Gluten enteropathy    Hypothyroidism    Breast lump on right side at 1 o'clock position    Lump of skin of back    Incarcerated inguinal hernia, unilateral    PD (Parkinson's disease)    Chronic diarrhea    Diverticulosis    Hemorrhoids    Pancreas cyst    Chronic left shoulder pain    Abnormal CT scan    Diarrhea    Abdominal pain    Perforated diverticulum of small intestine    Perforated bowel    Anemia    Adenoma        Brief History of Present Illness      Ariana Tiwari is a 74 y.o. female who  has a past medical history of Allergy, Diverticulosis, Gluten enteropathy, Gluten intolerance, Hernia, Hypothyroidism, PD (Parkinson's disease) (9/16/2015), Peptic ulcer disease, Right shoulder pain, and Ulcer.  The patient presented to [facility] on 2/3/2020 with a primary complaint of Abdominal Pain  .       For the full HPI please refer to the History & Physical from this admission.    Hospital Course     Pt had a recent laparotomy for SBO w/ small bowel resection. She was readmitted on this admission with concerns for possible recurrent SBO. Seen in conjunction with general surgery and felt to be ileus. She is tolerating PO and denies pain at the time of discharge and is having flatus/BMs.   She will f/u closely with surgery and her PCP. Advised her also to return to the ED with any new, worsening, or recurrent symptoms.     Physical exam     Physical Exam   Constitutional: She is oriented to person, place, and time. She appears well-developed and well-nourished. No distress.   HENT:   Head: Normocephalic and  atraumatic.   Eyes: Pupils are equal, round, and reactive to light. EOM are normal.   Neck: Normal range of motion. Neck supple.   Cardiovascular: Normal rate, regular rhythm and intact distal pulses.   Pulmonary/Chest: Effort normal. No respiratory distress.   Abdominal: Soft. Bowel sounds are normal. She exhibits distension. There is no tenderness. There is no rebound and no guarding.   Neurological: She is alert and oriented to person, place, and time.   Skin: Skin is warm and dry. Capillary refill takes less than 2 seconds.   Psychiatric: She has a normal mood and affect. Her behavior is normal.   Vitals reviewed.        Discharge Medications        Medication List      CONTINUE taking these medications    amoxicillin-clavulanate 500-125mg 500-125 mg Tab  Commonly known as:  AUGMENTIN  Take 1 tablet (500 mg total) by mouth 2 (two) times daily. for 10 days     carbidopa-levodopa  mg per disintegrating tablet  Commonly known as:  PARCOPA     ibandronate 150 mg tablet  Commonly known as:  BONIVA     ipratropium 42 mcg (0.06 %) nasal spray  Commonly known as:  ATROVENT  2 sprays by Nasal route 4 (four) times daily.     levothyroxine 125 MCG tablet  Commonly known as:  SYNTHROID  Take 0.5 tablets (62.5 mcg total) by mouth once daily.     ondansetron 4 MG Tbdl  Commonly known as:  ZOFRAN-ODT  Take 1 tablet (4 mg total) by mouth every 12 (twelve) hours as needed.     pantoprazole 40 MG tablet  Commonly known as:  PROTONIX  Take 1 tablet (40 mg total) by mouth before breakfast.     traZODone 50 MG tablet  Commonly known as:  DESYREL  Take 1 tablet (50 mg total) by mouth every evening.     Vitamin D3 50 mcg (2,000 unit) Cap  Generic drug:  cholecalciferol (vitamin D3)        STOP taking these medications    oseltamivir 75 MG capsule  Commonly known as:  TAMIFLU            Instructions:  1. Take all medications as prescribed  2. Keep all follow-up appointments  3. Return to the hospital or call your primary care  physicians for any new, worsening, or recurrent symptoms.    Follow-Up:  Follow-up Information     Nba Petty MD In 2 weeks.    Specialty:  Family Medicine  Contact information:  [address]  [place] [address] [phone]             Sandra Mcarthur MD.    Specialties:  General Surgery, Bariatrics, Surgery  Why:  1-2 weeks  Contact information:  [address]  [place] [id number] [phone]                   Total time spent on discharge was 34 minutes.     Nba Flanagan MD  7:35 PM  02/05/2020

## 2020-02-10 LAB — FUNGUS SPEC CULT: NORMAL

## 2020-02-12 ENCOUNTER — OFFICE VISIT (OUTPATIENT)
Dept: SURGERY | Facility: CLINIC | Age: 75
End: 2020-02-12
Payer: MEDICARE

## 2020-02-12 VITALS
BODY MASS INDEX: 19.59 KG/M2 | TEMPERATURE: 98 F | HEART RATE: 80 BPM | RESPIRATION RATE: 16 BRPM | WEIGHT: 110.56 LBS | DIASTOLIC BLOOD PRESSURE: 66 MMHG | SYSTOLIC BLOOD PRESSURE: 128 MMHG | HEIGHT: 63 IN

## 2020-02-12 DIAGNOSIS — K56.609 SBO (SMALL BOWEL OBSTRUCTION): Primary | ICD-10-CM

## 2020-02-12 PROCEDURE — 99213 OFFICE O/P EST LOW 20 MIN: CPT | Mod: PBBFAC,PO | Performed by: SURGERY

## 2020-02-12 PROCEDURE — 99999 PR PBB SHADOW E&M-EST. PATIENT-LVL III: CPT | Mod: PBBFAC,,, | Performed by: SURGERY

## 2020-02-12 PROCEDURE — 99024 POSTOP FOLLOW-UP VISIT: CPT | Mod: POP,,, | Performed by: SURGERY

## 2020-02-12 PROCEDURE — 99999 PR PBB SHADOW E&M-EST. PATIENT-LVL III: ICD-10-PCS | Mod: PBBFAC,,, | Performed by: SURGERY

## 2020-02-12 PROCEDURE — 99024 PR POST-OP FOLLOW-UP VISIT: ICD-10-PCS | Mod: POP,,, | Performed by: SURGERY

## 2020-02-13 NOTE — PROGRESS NOTES
Abdomen is still distended however her pain has resolved.  She has been tolerating regular diet without any nausea or vomiting    Vitals are reviewed    Abdomen is distended but soft    Assessment plan  Perforated small bowel diverticulitis with chronic small bowel obstruction     I suspect she is having a chronic dysfunction of her intestine given the years that has been chronically obstructed.  At the surgery her small bowel had diverticulum and was thin walled and distended from the obstruction site proximally. I suspect this is worsening and will take time for her intestines to resolve and may not resolve ever.  As long as she is able to tolerate a diet and still passing gas I think she is doing well.  Should she develop fever severe abdominal pain or vomiting she should return to the emergency department.

## 2020-02-27 LAB
ACID FAST MOD KINY STN SPEC: NORMAL
MYCOBACTERIUM SPEC QL CULT: NORMAL

## 2020-03-09 ENCOUNTER — TELEPHONE (OUTPATIENT)
Dept: HOME HEALTH SERVICES | Facility: HOSPITAL | Age: 75
End: 2020-03-09

## 2020-04-13 ENCOUNTER — TELEPHONE (OUTPATIENT)
Dept: BARIATRICS | Facility: CLINIC | Age: 75
End: 2020-04-13

## 2020-04-14 ENCOUNTER — TELEPHONE (OUTPATIENT)
Dept: BARIATRICS | Facility: CLINIC | Age: 75
End: 2020-04-14

## 2020-04-14 NOTE — TELEPHONE ENCOUNTER
"    RD first spoke to patient who reported her swelling was "nearly as much as it was at surgery."  She reported the swelling will subside some days.  Pt stated she wanted any changes to appt to be made through her emergency contact Micaela MAC.        Dr. Mcarthur directed Micaela to go to ER if concerned for any reason or if condition worsens. Pt will keep in office appt on May 13.  Confirmed with Micaela.  Micaela reports Pt is not in any pain like she was at surgery.  She reports swelling has remained consistent.    "

## 2020-04-21 ENCOUNTER — PATIENT OUTREACH (OUTPATIENT)
Dept: ADMINISTRATIVE | Facility: OTHER | Age: 75
End: 2020-04-21

## 2020-04-22 ENCOUNTER — OFFICE VISIT (OUTPATIENT)
Dept: SURGERY | Facility: CLINIC | Age: 75
End: 2020-04-22
Payer: MEDICARE

## 2020-04-22 VITALS
RESPIRATION RATE: 16 BRPM | HEART RATE: 67 BPM | BODY MASS INDEX: 20.49 KG/M2 | TEMPERATURE: 98 F | WEIGHT: 115.63 LBS | HEIGHT: 63 IN | SYSTOLIC BLOOD PRESSURE: 111 MMHG | DIASTOLIC BLOOD PRESSURE: 80 MMHG

## 2020-04-22 DIAGNOSIS — K56.609 SBO (SMALL BOWEL OBSTRUCTION): Primary | ICD-10-CM

## 2020-04-22 DIAGNOSIS — K57.00 PERFORATED DIVERTICULUM OF SMALL INTESTINE: ICD-10-CM

## 2020-04-22 PROCEDURE — 99213 OFFICE O/P EST LOW 20 MIN: CPT | Mod: S$PBB,,, | Performed by: SURGERY

## 2020-04-22 PROCEDURE — 99999 PR PBB SHADOW E&M-EST. PATIENT-LVL IV: ICD-10-PCS | Mod: PBBFAC,,, | Performed by: SURGERY

## 2020-04-22 PROCEDURE — 99214 OFFICE O/P EST MOD 30 MIN: CPT | Mod: PBBFAC,PO | Performed by: SURGERY

## 2020-04-22 PROCEDURE — 99999 PR PBB SHADOW E&M-EST. PATIENT-LVL IV: CPT | Mod: PBBFAC,,, | Performed by: SURGERY

## 2020-04-22 PROCEDURE — 99213 PR OFFICE/OUTPT VISIT, EST, LEVL III, 20-29 MIN: ICD-10-PCS | Mod: S$PBB,,, | Performed by: SURGERY

## 2020-04-22 NOTE — PROGRESS NOTES
Abdomen still distended  No pain associated with it  Having regular bowel movements  Able to eat - gained 5 pounds    Vitals:    04/22/20 0911   BP: 111/80   Pulse: 67   Resp: 16   Temp: 97.5 °F (36.4 °C)     Abdomen distended  Better than before    A/P    Follow up with primary care  Follow up with GI  Try fodmap diet

## 2020-05-18 DIAGNOSIS — J30.0 CHRONIC VASOMOTOR RHINITIS: ICD-10-CM

## 2020-05-18 RX ORDER — IPRATROPIUM BROMIDE 42 UG/1
SPRAY, METERED NASAL
Qty: 15 ML | Refills: 5 | Status: SHIPPED | OUTPATIENT
Start: 2020-05-18 | End: 2020-11-17

## 2020-06-11 DIAGNOSIS — R14.0 ABDOMINAL DISTENSION: Primary | ICD-10-CM

## 2020-07-19 ENCOUNTER — PATIENT OUTREACH (OUTPATIENT)
Dept: ADMINISTRATIVE | Facility: OTHER | Age: 75
End: 2020-07-19

## 2020-07-19 NOTE — PROGRESS NOTES
Requested updates within Care Everywhere.  Patient's chart was reviewed for overdue AHMET topics.  Immunizations reconciled.

## 2020-07-21 ENCOUNTER — OFFICE VISIT (OUTPATIENT)
Dept: SURGERY | Facility: CLINIC | Age: 75
End: 2020-07-21
Payer: MEDICARE

## 2020-07-21 VITALS
HEART RATE: 70 BPM | SYSTOLIC BLOOD PRESSURE: 150 MMHG | TEMPERATURE: 98 F | BODY MASS INDEX: 21.76 KG/M2 | HEIGHT: 63 IN | WEIGHT: 122.81 LBS | RESPIRATION RATE: 17 BRPM | DIASTOLIC BLOOD PRESSURE: 78 MMHG

## 2020-07-21 DIAGNOSIS — K56.609 SBO (SMALL BOWEL OBSTRUCTION): Primary | ICD-10-CM

## 2020-07-21 PROCEDURE — 99999 PR PBB SHADOW E&M-EST. PATIENT-LVL III: CPT | Mod: PBBFAC,,, | Performed by: SURGERY

## 2020-07-21 PROCEDURE — 99213 OFFICE O/P EST LOW 20 MIN: CPT | Mod: PBBFAC,PO | Performed by: SURGERY

## 2020-07-21 PROCEDURE — 99024 PR POST-OP FOLLOW-UP VISIT: ICD-10-PCS | Mod: POP,,, | Performed by: SURGERY

## 2020-07-21 PROCEDURE — 99999 PR PBB SHADOW E&M-EST. PATIENT-LVL III: ICD-10-PCS | Mod: PBBFAC,,, | Performed by: SURGERY

## 2020-07-21 PROCEDURE — 99024 POSTOP FOLLOW-UP VISIT: CPT | Mod: POP,,, | Performed by: SURGERY

## 2020-07-21 RX ORDER — IBUPROFEN 200 MG
TABLET ORAL
Status: ON HOLD | COMMUNITY
End: 2020-09-16 | Stop reason: HOSPADM

## 2020-07-21 NOTE — PROGRESS NOTES
Still having bowel movements and tolerating a diet  Abdomen remains bloated    Vitals:    07/21/20 0912   BP: (!) 150/78   Pulse: 70   Resp: 17   Temp: 98 °F (36.7 °C)     Abdomen is soft and distended but benign.      A/P    Chronic small bowel dilation   Suspect this is related to her previous obstruction being present for some time.  She has signs of chronic obstruction with the small bowel diverticula.  Suspect she has intestinal dysmotility.  This is not bothering her at this time and she is not obstructed.  Will continue to observe.    Gi ordered:  sbft  Cbc, cmp, mg  Xray abdomen     Get labs and x ray follow for results in 1 month    - call daughter Micaela Te: [phone]

## 2020-07-28 ENCOUNTER — HOSPITAL ENCOUNTER (OUTPATIENT)
Dept: RADIOLOGY | Facility: HOSPITAL | Age: 75
Discharge: HOME | End: 2020-07-28
Attending: SURGERY
Payer: MEDICARE

## 2020-07-28 DIAGNOSIS — K56.609 SBO (SMALL BOWEL OBSTRUCTION): ICD-10-CM

## 2020-07-28 PROCEDURE — A9698 NON-RAD CONTRAST MATERIALNOC: HCPCS | Performed by: SURGERY

## 2020-07-28 PROCEDURE — 25500020 PHARM REV CODE 255: Performed by: SURGERY

## 2020-07-28 PROCEDURE — 74250 X-RAY XM SM INT 1CNTRST STD: CPT | Mod: 26,,, | Performed by: RADIOLOGY

## 2020-07-28 PROCEDURE — 74250 FL SMALL BOWEL FOLLOW THROUGH: ICD-10-PCS | Mod: 26,,, | Performed by: RADIOLOGY

## 2020-07-28 PROCEDURE — 74250 X-RAY XM SM INT 1CNTRST STD: CPT | Mod: TC,FY

## 2020-07-28 RX ADMIN — BARIUM SULFATE 710 ML: 0.6 SUSPENSION ORAL at 10:07

## 2020-07-30 ENCOUNTER — TELEPHONE (OUTPATIENT)
Dept: SURGERY | Facility: HOSPITAL | Age: 75
End: 2020-07-30

## 2020-07-30 ENCOUNTER — TELEPHONE (OUTPATIENT)
Dept: BARIATRICS | Facility: CLINIC | Age: 75
End: 2020-07-30

## 2020-07-30 NOTE — TELEPHONE ENCOUNTER
called pt and left message that labs and xray were normal per Dr. Mcarthur and to keep follow up appt

## 2020-08-19 ENCOUNTER — PATIENT OUTREACH (OUTPATIENT)
Dept: ADMINISTRATIVE | Facility: OTHER | Age: 75
End: 2020-08-19

## 2020-08-19 ENCOUNTER — OFFICE VISIT (OUTPATIENT)
Dept: FAMILY MEDICINE | Facility: CLINIC | Age: 75
End: 2020-08-19
Payer: MEDICARE

## 2020-08-19 VITALS
BODY MASS INDEX: 22.54 KG/M2 | SYSTOLIC BLOOD PRESSURE: 128 MMHG | WEIGHT: 127.19 LBS | HEIGHT: 63 IN | OXYGEN SATURATION: 99 % | DIASTOLIC BLOOD PRESSURE: 70 MMHG | HEART RATE: 62 BPM | TEMPERATURE: 97 F

## 2020-08-19 DIAGNOSIS — M79.675 TOE PAIN, LEFT: Primary | ICD-10-CM

## 2020-08-19 DIAGNOSIS — M20.42 HAMMER TOE OF LEFT FOOT: ICD-10-CM

## 2020-08-19 PROCEDURE — 99214 OFFICE O/P EST MOD 30 MIN: CPT | Mod: S$PBB,,, | Performed by: NURSE PRACTITIONER

## 2020-08-19 PROCEDURE — 99999 PR PBB SHADOW E&M-EST. PATIENT-LVL IV: ICD-10-PCS | Mod: PBBFAC,,, | Performed by: NURSE PRACTITIONER

## 2020-08-19 PROCEDURE — 99214 PR OFFICE/OUTPT VISIT, EST, LEVL IV, 30-39 MIN: ICD-10-PCS | Mod: S$PBB,,, | Performed by: NURSE PRACTITIONER

## 2020-08-19 PROCEDURE — 99999 PR PBB SHADOW E&M-EST. PATIENT-LVL IV: CPT | Mod: PBBFAC,,, | Performed by: NURSE PRACTITIONER

## 2020-08-19 PROCEDURE — 99214 OFFICE O/P EST MOD 30 MIN: CPT | Mod: PBBFAC,PO | Performed by: NURSE PRACTITIONER

## 2020-08-19 NOTE — PROGRESS NOTES
Health Maintenance Due   Topic Date Due    TETANUS VACCINE  03/28/1963    Shingles Vaccine (1 of 2) 03/28/1994    Pneumococcal Vaccine (65+ Low/Medium Risk) (1 of 2 - PCV13) 03/28/2009     Updates were requested from care everywhere.  Chart was reviewed for overdue Proactive [facility] Encounters (AHMET) topics (CRS, Breast Cancer Screening, Eye exam)  Health Maintenance has been updated.  LINKS immunization registry triggered.  Immunizations were reconciled.

## 2020-08-20 ENCOUNTER — HOSPITAL ENCOUNTER (OUTPATIENT)
Dept: RADIOLOGY | Facility: CLINIC | Age: 75
Discharge: HOME | End: 2020-08-20
Attending: NURSE PRACTITIONER
Payer: MEDICARE

## 2020-08-20 ENCOUNTER — OFFICE VISIT (OUTPATIENT)
Dept: SURGERY | Facility: CLINIC | Age: 75
End: 2020-08-20
Payer: MEDICARE

## 2020-08-20 VITALS
BODY MASS INDEX: 22.73 KG/M2 | TEMPERATURE: 97 F | DIASTOLIC BLOOD PRESSURE: 68 MMHG | RESPIRATION RATE: 16 BRPM | HEIGHT: 63 IN | WEIGHT: 128.31 LBS | HEART RATE: 62 BPM | SYSTOLIC BLOOD PRESSURE: 137 MMHG

## 2020-08-20 DIAGNOSIS — M20.42 HAMMER TOE OF LEFT FOOT: Primary | ICD-10-CM

## 2020-08-20 DIAGNOSIS — M79.675 TOE PAIN, LEFT: ICD-10-CM

## 2020-08-20 DIAGNOSIS — R93.3 ABNORMAL X-RAY OF SMALL BOWEL: Primary | ICD-10-CM

## 2020-08-20 PROCEDURE — 99213 OFFICE O/P EST LOW 20 MIN: CPT | Mod: PBBFAC,25,PO | Performed by: SURGERY

## 2020-08-20 PROCEDURE — 99999 PR PBB SHADOW E&M-EST. PATIENT-LVL III: ICD-10-PCS | Mod: PBBFAC,,, | Performed by: SURGERY

## 2020-08-20 PROCEDURE — 73630 X-RAY EXAM OF FOOT: CPT | Mod: 26,LT,S$GLB, | Performed by: RADIOLOGY

## 2020-08-20 PROCEDURE — 73630 X-RAY EXAM OF FOOT: CPT | Mod: TC,FY,PO,LT

## 2020-08-20 PROCEDURE — 99213 PR OFFICE/OUTPT VISIT, EST, LEVL III, 20-29 MIN: ICD-10-PCS | Mod: S$PBB,,, | Performed by: SURGERY

## 2020-08-20 PROCEDURE — 99999 PR PBB SHADOW E&M-EST. PATIENT-LVL III: CPT | Mod: PBBFAC,,, | Performed by: SURGERY

## 2020-08-20 PROCEDURE — 73630 XR FOOT COMPLETE 3 VIEW LEFT: ICD-10-PCS | Mod: 26,LT,S$GLB, | Performed by: RADIOLOGY

## 2020-08-20 PROCEDURE — 99213 OFFICE O/P EST LOW 20 MIN: CPT | Mod: S$PBB,,, | Performed by: SURGERY

## 2020-08-20 NOTE — PROGRESS NOTES
Patient ID: Ariana Tiwari is a 75 y.o. female.    Chief Complaint:   Toe Pain    Toe Pain   The incident occurred more than 1 week ago (Incident occured 3-6 months ago). The incident occurred at home. The injury mechanism was a fall (Patient slipped in the tub causing her to hit her toe on the drain). The pain is present in the left foot. The quality of the pain is described as aching. The pain is mild. The pain has been fluctuating since onset. Pertinent negatives include no inability to bear weight, loss of motion, loss of sensation, muscle weakness, numbness or tingling. She reports no foreign bodies present. The symptoms are aggravated by palpation. She has tried NSAIDs for the symptoms. The treatment provided mild relief.      Patient is new to me. Reviewed past medical and social history.    Past Medical History:   Diagnosis Date    Allergy     Diverticulosis     Gluten enteropathy     Gluten intolerance     Hernia     Hypothyroidism     PD (Parkinson's disease) 9/16/2015    Right tremor type    Peptic ulcer disease     Right shoulder pain     Cirtisone injection 3/26/15 - Dr. Tolbert    Ulcer      Past Surgical History:   Procedure Laterality Date    ADENOIDECTOMY      COLONOSCOPY  03/01/2012    Dr. Teresa, repeat in 10 years for screening    COLONOSCOPY N/A 2/21/2018    Procedure: COLONOSCOPY;  Surgeon: Radha Deng MD;  Location: [facility] ENDO;  Service: Endoscopy;  Laterality: N/A;    COSMETIC SURGERY      Broken nose    FRACTURE SURGERY  left wrist    With pin    HEMORRHOID SURGERY      HERNIA REPAIR Left 04/09/2015    Dr Lo; left inguinal    INTRALUMINAL GASTROINTESTINAL TRACT IMAGING VIA CAPSULE N/A 7/10/2018    Procedure: IMAGING, GI TRACT, INTRALUMINAL, VIA CAPSULE;  Surgeon: Radha Deng MD;  Location: [facility] ENDO;  Service: Endoscopy;  Laterality: N/A;    RHINOPLASTY TIP      TONSILLECTOMY      UPPER GASTROINTESTINAL ENDOSCOPY  05/21/2015    Dr. Gomez      Current Outpatient Medications on File Prior to Visit   Medication Sig Dispense Refill    ibandronate (BONIVA) 150 mg tablet Take 150 mg by mouth every 30 days.       ibuprofen (ADVIL,MOTRIN) 200 MG tablet ibuprofen      ipratropium (ATROVENT) 42 mcg (0.06 %) nasal spray USE 2 SPRAY(S) IN EACH NOSTRIL 4 TIMES DAILY 15 mL 5    levothyroxine (SYNTHROID) 125 MCG tablet Take 0.5 tablets (62.5 mcg total) by mouth once daily. 15 tablet 11    simethicone (GAS-X ORAL) Gas-X       No current facility-administered medications on file prior to visit.        Review of Systems   Constitutional: Negative for appetite change, chills, fatigue and fever.   HENT: Negative for congestion, ear pain, sinus pain, sore throat and tinnitus.    Eyes: Negative for pain and visual disturbance.   Respiratory: Negative for cough, shortness of breath and wheezing.    Cardiovascular: Negative for chest pain and palpitations.   Gastrointestinal: Negative for abdominal distention, blood in stool, diarrhea, nausea and vomiting.   Endocrine: Negative for polydipsia, polyphagia and polyuria.   Genitourinary: Negative for difficulty urinating, dysuria, hematuria, menstrual problem, pelvic pain, urgency, vaginal bleeding, vaginal discharge and vaginal pain.   Musculoskeletal: Positive for arthralgias and joint swelling. Negative for back pain, gait problem and neck pain.   Skin: Negative for rash and wound.   Neurological: Negative for dizziness, tingling, seizures, syncope, weakness, numbness and headaches.   Hematological: Negative for adenopathy.   Psychiatric/Behavioral: Negative for confusion, sleep disturbance and suicidal ideas.   All other systems reviewed and are negative.      Objective:      Physical Exam  Vitals signs reviewed.   Constitutional:       Appearance: Normal appearance. She is well-developed and normal weight.   HENT:      Head: Normocephalic and atraumatic.   Eyes:      Conjunctiva/sclera: Conjunctivae normal.       Pupils: Pupils are equal, round, and reactive to light.   Neck:      Musculoskeletal: Normal range of motion and neck supple.   Cardiovascular:      Rate and Rhythm: Normal rate and regular rhythm.      Heart sounds: Normal heart sounds.   Pulmonary:      Effort: Pulmonary effort is normal.      Breath sounds: Normal breath sounds.   Abdominal:      General: Bowel sounds are normal.      Palpations: Abdomen is soft.   Musculoskeletal: Normal range of motion.      Left foot: Bony tenderness, swelling and deformity present. No laceration.        Feet:    Skin:     General: Skin is warm and dry.   Neurological:      Mental Status: She is alert and oriented to person, place, and time.   Psychiatric:         Mood and Affect: Mood normal.         Behavior: Behavior normal.         Assessment:       1. Toe pain, left    2. Hammer toe of left foot        Plan:       Ariana was seen today for toe pain.    Diagnoses and all orders for this visit:    Toe pain, left  -     X-Ray Foot Complete Left; Future    Hammer toe of left foot    Ice and NSAIDs for pain to left second toe  Ortho referral placed  Patient education provided.  All questions and concerns addressed  RTC PRN and if symptoms worsen or fail to improve  Patient verbalizes understanding        Patient Instructions     Treating Mallet, Hammer, and Claw Toes  Definitions  A hammer toe has an abnormal bend in the middle joint of your toe (toe is bent upward at joint).  Mallet toe affects the joint nearest your toenail (toe is bent downward at joint).  Claw toe affects the joint at the ball of your foot (toe is bent upward at joint), as well as both toe joints (toe is bent downward at both joints).  Hammer toe and mallet toe are most likely to occur in the toe next to your big toe.  Causes  The most common cause for all 3 deformities is poorly fitting shoes and tight shoes, especially high heels for women. Trauma and nerve damage from various diseases like diabetes may  also cause these deformities.  Treatment  Buying shoes with more room in the toes, filing down corns and calluses, and padding, taping, or strapping the toe most often relieve the pain. Toe stretching and exercises may also be helpful. If these steps dont work, you may need surgery to straighten your toes.  Shoes  Buy low-heeled shoes with plenty of room in the front. This keeps your toes from being jammed against the end of your shoe. It also keeps your shoe from rubbing the tops of your toes.  Corns and calluses    To file down a corn or callus, soak your foot in warm water. This softens the hard skin. Dry your foot. Then gently rub the corn or callus with a pumice stone or nail file.  Pads and splints  If you still have pain, you may need to put a pad or splint on your toe. This helps take pressure off the painful corn or callus.  · For a mallet toe, you can put a gel pad on the toe. This keeps the tip of the toe from rubbing against the bottom of the shoe.  · For a hammer or claw toe, you can put a felt or foam pad over the bent joint. This keeps the toe from rubbing on the top of the shoe.  · For a hammer or claw toe that is still flexible, you can put a splint on the toe. This keeps it straight so it doesn't rub on the top of the shoe.    Date Last Reviewed: 9/29/2015  © 1575-9880 [employer]. [address] [place] [address], [place] [address]. All rights reserved. This information is not intended as a substitute for professional medical care. Always follow your healthcare professional's instructions.

## 2020-08-20 NOTE — PATIENT INSTRUCTIONS
Treating Mallet, Hammer, and Claw Toes  Definitions  A hammer toe has an abnormal bend in the middle joint of your toe (toe is bent upward at joint).  Mallet toe affects the joint nearest your toenail (toe is bent downward at joint).  Claw toe affects the joint at the ball of your foot (toe is bent upward at joint), as well as both toe joints (toe is bent downward at both joints).  Hammer toe and mallet toe are most likely to occur in the toe next to your big toe.  Causes  The most common cause for all 3 deformities is poorly fitting shoes and tight shoes, especially high heels for women. Trauma and nerve damage from various diseases like diabetes may also cause these deformities.  Treatment  Buying shoes with more room in the toes, filing down corns and calluses, and padding, taping, or strapping the toe most often relieve the pain. Toe stretching and exercises may also be helpful. If these steps dont work, you may need surgery to straighten your toes.  Shoes  Buy low-heeled shoes with plenty of room in the front. This keeps your toes from being jammed against the end of your shoe. It also keeps your shoe from rubbing the tops of your toes.  Corns and calluses    To file down a corn or callus, soak your foot in warm water. This softens the hard skin. Dry your foot. Then gently rub the corn or callus with a pumice stone or nail file.  Pads and splints  If you still have pain, you may need to put a pad or splint on your toe. This helps take pressure off the painful corn or callus.  · For a mallet toe, you can put a gel pad on the toe. This keeps the tip of the toe from rubbing against the bottom of the shoe.  · For a hammer or claw toe, you can put a felt or foam pad over the bent joint. This keeps the toe from rubbing on the top of the shoe.  · For a hammer or claw toe that is still flexible, you can put a splint on the toe. This keeps it straight so it doesn't rub on the top of the shoe.    Date Last Reviewed:  9/29/2015  © 9182-3059 [employer]. [address] [place] [address], [place] [address]. All rights reserved. This information is not intended as a substitute for professional medical care. Always follow your healthcare professional's instructions.

## 2020-08-24 ENCOUNTER — TELEPHONE (OUTPATIENT)
Dept: FAMILY MEDICINE | Facility: CLINIC | Age: 75
End: 2020-08-24

## 2020-08-24 ENCOUNTER — OFFICE VISIT (OUTPATIENT)
Dept: ORTHOPEDICS | Facility: CLINIC | Age: 75
End: 2020-08-24
Payer: MEDICARE

## 2020-08-24 VITALS
DIASTOLIC BLOOD PRESSURE: 81 MMHG | BODY MASS INDEX: 22.73 KG/M2 | HEART RATE: 69 BPM | HEIGHT: 63 IN | WEIGHT: 128.31 LBS | SYSTOLIC BLOOD PRESSURE: 127 MMHG

## 2020-08-24 DIAGNOSIS — Z01.818 PREOP TESTING: ICD-10-CM

## 2020-08-24 DIAGNOSIS — M20.42 HAMMER TOE OF LEFT FOOT: ICD-10-CM

## 2020-08-24 DIAGNOSIS — E55.9 VITAMIN D DEFICIENCY: ICD-10-CM

## 2020-08-24 PROCEDURE — 99213 OFFICE O/P EST LOW 20 MIN: CPT | Mod: PBBFAC,PN | Performed by: ORTHOPAEDIC SURGERY

## 2020-08-24 PROCEDURE — 99204 OFFICE O/P NEW MOD 45 MIN: CPT | Mod: S$PBB,,, | Performed by: ORTHOPAEDIC SURGERY

## 2020-08-24 PROCEDURE — 99204 PR OFFICE/OUTPT VISIT, NEW, LEVL IV, 45-59 MIN: ICD-10-PCS | Mod: S$PBB,,, | Performed by: ORTHOPAEDIC SURGERY

## 2020-08-24 PROCEDURE — 99999 PR PBB SHADOW E&M-EST. PATIENT-LVL III: CPT | Mod: PBBFAC,,, | Performed by: ORTHOPAEDIC SURGERY

## 2020-08-24 PROCEDURE — 99999 PR PBB SHADOW E&M-EST. PATIENT-LVL III: ICD-10-PCS | Mod: PBBFAC,,, | Performed by: ORTHOPAEDIC SURGERY

## 2020-08-24 NOTE — TELEPHONE ENCOUNTER
Left a voicemail for patient to return call regarding Xray results. Informed patient that she can also check her patient portal for her results of her Xray.

## 2020-08-24 NOTE — NURSING
Instructed patient on preop instructions. Patient to stop taking any blood thinning medications at least seven days prior to scheduled surgery. Patient to not take any NSAIDS at least 7 days prior to surgery and will resume when Dr. Sprague instructs them to as it can delay bone healing. Nothing to eat/drink after midnight the night prior to surgery.   Patient instructed on postop care. Instructions included to remain non weight bearing until instructed otherwise by Dr. Sprague. Post op dressing can not get wet. If it gets wet patient to call office immediately or go to [facility] ER to have it replaced. Patient to keep affected limb elevated higher than the heart at all times after surgery to decrease swelling. Further instructions given to patient on preop/postop instruction handout. No NSAID form given to patient. Patient has walker at home for use of surgery. Covid test scheduled for 9/13/2020. Surgery scheduled for 9/16/2020.  Patient and daughter verbalized understanding.

## 2020-08-25 PROBLEM — M20.42 HAMMER TOE OF LEFT FOOT: Status: ACTIVE | Noted: 2020-08-25

## 2020-08-25 NOTE — PROGRESS NOTES
She continues do well as tolerating diet.  At times she over eats she feels a little nauseated but no vomiting.  She is passing flatus and having stools.    Vitals are reviewed    Abdomen is benign and distended which is stable no tenderness    Assessment plan  Chronic bowel dilation  Labs and upper GI reviewed  No obvious abnormalities associated with labs or upper GI with small-bowel follow-through.  The bowel does appear dilated on the small-bowel follow-through but the contrast passes easily through the small bowel.  I suspect she has chronic dilation of her small bowel from a history chronic obstruction.  She is doing well and will follow up with me as needed.

## 2020-08-25 NOTE — PROGRESS NOTES
"HPI: Ariana Tiwari is a  75 y.o. female who was referred to me by Mariella and was seen in consultation today for left foot pain. She rates her pain as 4/10 today. She says she may have injured it when she kicked the drain in a hot tub 4-5 months ago. She has Parkinson' disease and says she doesn't do a lot of walking anymore.   She says the pain is mainly with shoe wear as the 2nd toe is pushing on the big toe. She is here with her daughter in law today.     PAST MEDICAL/SURGICAL/FAMILY/SOCIAL/ HISTORY: REVIEWED    ALLERGIES/MEDICATIONS: REVIEWED       Review of Systems:     Constitution: Negative.   HEENT: Negative.   Eyes: Negative.   Cardiovascular: Negative.   Respiratory: Negative.   Endocrine: Negative.   Hematologic/Lymphatic: Negative.   Skin: Negative.   Musculoskeletal: Positive for left foot pain   Gastrointestinal: Negative.   Genitourinary: Negative.   Neurological: Negative.   Psychiatric/Behavioral: Negative.   Allergic/Immunologic: Negative.       PHYSICAL EXAM:  Vitals:    08/24/20 1550   BP: 127/81   Pulse: 69     Ht Readings from Last 1 Encounters:   08/24/20 5' 3" (1.6 m)     Wt Readings from Last 1 Encounters:   08/24/20 58.2 kg (128 lb 4.9 oz)       GENERAL: Well developed, well nourished, no acute distress.  SKIN: Skin is intact. No atrophy, abrasions or lesions are noted.   Neurological: Normal mental status. Appropriate and conversant. Alert and oriented x 3.    Left lower extremity compared with RLE:  2+ dorsalis pedis pulse.  Capillary refill < 3 seconds.  Normal range of motion tibiotalar and subtalar joints. Normal alignment of the forefoot and the hindfoot.  5/5 strength EHL, FHL, tibialis anterior, gastrocsoleus, tibialis posterior and peroneals. Sensation to light touch intact sural, saphenous, superficial peroneal and deep peroneal nerves. + swelling, and  tenderness to palpation at the PIPJ of the 2nd toe with medial deviation.   No lymphadenopathy, no masses or tumors palpated. "       XRAYS:   3 views of left foot obtained and reviewed today reveal osteoarthritis of the lesser toes. 2nd toe hammertoe.       ASSESSMENT:        Encounter Diagnosis   Name Primary?    Hammer toe of left foot         PLAN:   I spent 35 minutes in consulation with the patient today. More than half the time was spent counseling the patient on her condition and the options for operative versus non-operative care.  She would like to proceed with surgical treatment.   We discussed surgical tx for the patient's condition at length and in detail including post-operative course, complications, outcomes and prognosis using diagrams and models where appropriate. I recommend Left 2nd toe PIPJ resection and EDB tendon transfer with k-wire fixation of the 2nd  metatarsalphalangeal joint.  Informed consent was obtained, patient understands risks and benefits of procedure.  To OR 9/16/20.

## 2020-08-26 RX ORDER — SODIUM CHLORIDE 9 MG/ML
INJECTION, SOLUTION INTRAVENOUS CONTINUOUS
Status: CANCELLED | OUTPATIENT
Start: 2020-08-26

## 2020-09-13 ENCOUNTER — LAB VISIT (OUTPATIENT)
Dept: FAMILY MEDICINE | Facility: CLINIC | Age: 75
End: 2020-09-13
Payer: MEDICARE

## 2020-09-13 DIAGNOSIS — Z01.818 PREOP TESTING: ICD-10-CM

## 2020-09-13 PROCEDURE — U0003 INFECTIOUS AGENT DETECTION BY NUCLEIC ACID (DNA OR RNA); SEVERE ACUTE RESPIRATORY SYNDROME CORONAVIRUS 2 (SARS-COV-2) (CORONAVIRUS DISEASE [COVID-19]), AMPLIFIED PROBE TECHNIQUE, MAKING USE OF HIGH THROUGHPUT TECHNOLOGIES AS DESCRIBED BY CMS-2020-01-R: HCPCS

## 2020-09-14 LAB — SARS-COV-2 RNA RESP QL NAA+PROBE: NOT DETECTED

## 2020-09-15 ENCOUNTER — ANESTHESIA EVENT (OUTPATIENT)
Dept: SURGERY | Facility: HOSPITAL | Age: 75
End: 2020-09-15
Payer: MEDICARE

## 2020-09-15 RX ORDER — CHOLECALCIFEROL (VITAMIN D3) 25 MCG
1000 TABLET ORAL DAILY
COMMUNITY
End: 2020-11-17

## 2020-09-16 ENCOUNTER — ANESTHESIA (OUTPATIENT)
Dept: SURGERY | Facility: HOSPITAL | Age: 75
End: 2020-09-16
Payer: MEDICARE

## 2020-09-16 ENCOUNTER — HOSPITAL ENCOUNTER (OUTPATIENT)
Facility: HOSPITAL | Age: 75
Discharge: HOME | End: 2020-09-16
Attending: ORTHOPAEDIC SURGERY | Admitting: ORTHOPAEDIC SURGERY
Payer: MEDICARE

## 2020-09-16 ENCOUNTER — HOSPITAL ENCOUNTER (OUTPATIENT)
Dept: RADIOLOGY | Facility: HOSPITAL | Age: 75
Discharge: HOME | End: 2020-09-16
Attending: ORTHOPAEDIC SURGERY | Admitting: ORTHOPAEDIC SURGERY
Payer: MEDICARE

## 2020-09-16 DIAGNOSIS — M20.42 HAMMER TOE OF LEFT FOOT: Primary | ICD-10-CM

## 2020-09-16 DIAGNOSIS — M20.42 HAMMER TOE OF LEFT FOOT: ICD-10-CM

## 2020-09-16 DIAGNOSIS — E55.9 VITAMIN D DEFICIENCY: ICD-10-CM

## 2020-09-16 LAB — 25(OH)D3+25(OH)D2 SERPL-MCNC: 30 NG/ML (ref 30–96)

## 2020-09-16 PROCEDURE — 27200651 HC AIRWAY, LMA: Mod: PO | Performed by: ANESTHESIOLOGY

## 2020-09-16 PROCEDURE — D9220A PRA ANESTHESIA: ICD-10-PCS | Mod: ANES,,, | Performed by: ANESTHESIOLOGY

## 2020-09-16 PROCEDURE — D9220A PRA ANESTHESIA: ICD-10-PCS | Mod: CRNA,,, | Performed by: NURSE ANESTHETIST, CERTIFIED REGISTERED

## 2020-09-16 PROCEDURE — 63600175 PHARM REV CODE 636 W HCPCS: Mod: PO | Performed by: NURSE ANESTHETIST, CERTIFIED REGISTERED

## 2020-09-16 PROCEDURE — 36000706: Mod: PO | Performed by: ORTHOPAEDIC SURGERY

## 2020-09-16 PROCEDURE — S0020 INJECTION, BUPIVICAINE HYDRO: HCPCS | Mod: PO | Performed by: ORTHOPAEDIC SURGERY

## 2020-09-16 PROCEDURE — 36415 COLL VENOUS BLD VENIPUNCTURE: CPT | Mod: PO

## 2020-09-16 PROCEDURE — 63600175 PHARM REV CODE 636 W HCPCS: Mod: PO | Performed by: ANESTHESIOLOGY

## 2020-09-16 PROCEDURE — C1776 JOINT DEVICE (IMPLANTABLE): HCPCS | Mod: PO | Performed by: ORTHOPAEDIC SURGERY

## 2020-09-16 PROCEDURE — 71000033 HC RECOVERY, INTIAL HOUR: Mod: PO | Performed by: ORTHOPAEDIC SURGERY

## 2020-09-16 PROCEDURE — 28645 PR OPEN TX METATARSOPHALANGEAL JOINT DISLOCATION: ICD-10-PCS | Mod: T1,,, | Performed by: ORTHOPAEDIC SURGERY

## 2020-09-16 PROCEDURE — 71000015 HC POSTOP RECOV 1ST HR: Mod: PO | Performed by: ORTHOPAEDIC SURGERY

## 2020-09-16 PROCEDURE — 36000707: Mod: PO | Performed by: ORTHOPAEDIC SURGERY

## 2020-09-16 PROCEDURE — 28645 REPAIR TOE DISLOCATION: CPT | Mod: T1,,, | Performed by: ORTHOPAEDIC SURGERY

## 2020-09-16 PROCEDURE — D9220A PRA ANESTHESIA: Mod: ANES,,, | Performed by: ANESTHESIOLOGY

## 2020-09-16 PROCEDURE — 37000008 HC ANESTHESIA 1ST 15 MINUTES: Mod: PO | Performed by: ORTHOPAEDIC SURGERY

## 2020-09-16 PROCEDURE — 63600175 PHARM REV CODE 636 W HCPCS: Mod: PO | Performed by: ORTHOPAEDIC SURGERY

## 2020-09-16 PROCEDURE — D9220A PRA ANESTHESIA: Mod: CRNA,,, | Performed by: NURSE ANESTHETIST, CERTIFIED REGISTERED

## 2020-09-16 PROCEDURE — 25000003 PHARM REV CODE 250: Mod: PO | Performed by: ORTHOPAEDIC SURGERY

## 2020-09-16 PROCEDURE — 28285 REPAIR OF HAMMERTOE: CPT | Mod: 59,51,T1, | Performed by: ORTHOPAEDIC SURGERY

## 2020-09-16 PROCEDURE — 28285 PR REPAIR OF HAMMERTOE,ONE: ICD-10-PCS | Mod: 59,51,T1, | Performed by: ORTHOPAEDIC SURGERY

## 2020-09-16 PROCEDURE — 82306 VITAMIN D 25 HYDROXY: CPT

## 2020-09-16 PROCEDURE — 28313 REPAIR DEFORMITY OF TOE: CPT | Mod: 51,T1,, | Performed by: ORTHOPAEDIC SURGERY

## 2020-09-16 PROCEDURE — 76000 FLUOROSCOPY <1 HR PHYS/QHP: CPT | Mod: TC,PO

## 2020-09-16 PROCEDURE — 28313 PR RECONSTRUC TOE DEFORM,SOFT TISSUE: ICD-10-PCS | Mod: 51,T1,, | Performed by: ORTHOPAEDIC SURGERY

## 2020-09-16 PROCEDURE — 37000009 HC ANESTHESIA EA ADD 15 MINS: Mod: PO | Performed by: ORTHOPAEDIC SURGERY

## 2020-09-16 DEVICE — TOE HAMMERTOE SMALL: Type: IMPLANTABLE DEVICE | Site: FOOT | Status: FUNCTIONAL

## 2020-09-16 RX ORDER — LIDOCAINE HYDROCHLORIDE 10 MG/ML
1 INJECTION, SOLUTION EPIDURAL; INFILTRATION; INTRACAUDAL; PERINEURAL ONCE
Status: DISCONTINUED | OUTPATIENT
Start: 2020-09-16 | End: 2020-09-16 | Stop reason: HOSPADM

## 2020-09-16 RX ORDER — ACETAMINOPHEN 10 MG/ML
INJECTION, SOLUTION INTRAVENOUS
Status: DISCONTINUED | OUTPATIENT
Start: 2020-09-16 | End: 2020-09-16

## 2020-09-16 RX ORDER — SODIUM CHLORIDE 9 MG/ML
INJECTION, SOLUTION INTRAVENOUS CONTINUOUS
Status: DISCONTINUED | OUTPATIENT
Start: 2020-09-16 | End: 2020-09-16 | Stop reason: HOSPADM

## 2020-09-16 RX ORDER — PROPOFOL 10 MG/ML
VIAL (ML) INTRAVENOUS
Status: DISCONTINUED | OUTPATIENT
Start: 2020-09-16 | End: 2020-09-16

## 2020-09-16 RX ORDER — OXYCODONE HYDROCHLORIDE 5 MG/1
5 TABLET ORAL ONCE AS NEEDED
Status: DISCONTINUED | OUTPATIENT
Start: 2020-09-16 | End: 2020-09-16 | Stop reason: HOSPADM

## 2020-09-16 RX ORDER — ONDANSETRON 2 MG/ML
4 INJECTION INTRAMUSCULAR; INTRAVENOUS ONCE AS NEEDED
Status: DISCONTINUED | OUTPATIENT
Start: 2020-09-16 | End: 2020-09-16 | Stop reason: HOSPADM

## 2020-09-16 RX ORDER — FENTANYL CITRATE 50 UG/ML
25 INJECTION, SOLUTION INTRAMUSCULAR; INTRAVENOUS EVERY 5 MIN PRN
Status: DISCONTINUED | OUTPATIENT
Start: 2020-09-16 | End: 2020-09-16 | Stop reason: HOSPADM

## 2020-09-16 RX ORDER — LIDOCAINE HYDROCHLORIDE 20 MG/ML
INJECTION, SOLUTION EPIDURAL; INFILTRATION; INTRACAUDAL; PERINEURAL
Status: DISCONTINUED | OUTPATIENT
Start: 2020-09-16 | End: 2020-09-16 | Stop reason: HOSPADM

## 2020-09-16 RX ORDER — MUPIROCIN 20 MG/G
OINTMENT TOPICAL
Status: DISCONTINUED | OUTPATIENT
Start: 2020-09-16 | End: 2020-09-16 | Stop reason: HOSPADM

## 2020-09-16 RX ORDER — ONDANSETRON 4 MG/1
8 TABLET, ORALLY DISINTEGRATING ORAL EVERY 8 HOURS PRN
Qty: 6 TABLET | Refills: 1 | Status: SHIPPED | OUTPATIENT
Start: 2020-09-16 | End: 2020-11-17

## 2020-09-16 RX ORDER — FENTANYL CITRATE 50 UG/ML
INJECTION, SOLUTION INTRAMUSCULAR; INTRAVENOUS
Status: DISCONTINUED | OUTPATIENT
Start: 2020-09-16 | End: 2020-09-16

## 2020-09-16 RX ORDER — LIDOCAINE HYDROCHLORIDE 20 MG/ML
INJECTION INTRAVENOUS
Status: DISCONTINUED | OUTPATIENT
Start: 2020-09-16 | End: 2020-09-16

## 2020-09-16 RX ORDER — ONDANSETRON 2 MG/ML
INJECTION INTRAMUSCULAR; INTRAVENOUS
Status: DISCONTINUED | OUTPATIENT
Start: 2020-09-16 | End: 2020-09-16

## 2020-09-16 RX ORDER — BUPIVACAINE HYDROCHLORIDE 5 MG/ML
INJECTION, SOLUTION EPIDURAL; INTRACAUDAL
Status: DISCONTINUED | OUTPATIENT
Start: 2020-09-16 | End: 2020-09-16 | Stop reason: HOSPADM

## 2020-09-16 RX ORDER — HYDROCODONE BITARTRATE AND ACETAMINOPHEN 5; 325 MG/1; MG/1
1 TABLET ORAL EVERY 4 HOURS PRN
Qty: 24 TABLET | Refills: 0 | Status: SHIPPED | OUTPATIENT
Start: 2020-09-16 | End: 2020-11-17

## 2020-09-16 RX ORDER — CEFAZOLIN SODIUM 2 G/50ML
2 SOLUTION INTRAVENOUS
Status: COMPLETED | OUTPATIENT
Start: 2020-09-16 | End: 2020-09-16

## 2020-09-16 RX ORDER — SODIUM CHLORIDE, SODIUM LACTATE, POTASSIUM CHLORIDE, CALCIUM CHLORIDE 600; 310; 30; 20 MG/100ML; MG/100ML; MG/100ML; MG/100ML
INJECTION, SOLUTION INTRAVENOUS CONTINUOUS
Status: DISCONTINUED | OUTPATIENT
Start: 2020-09-16 | End: 2020-09-16 | Stop reason: HOSPADM

## 2020-09-16 RX ADMIN — ACETAMINOPHEN 1000 MG: 10 INJECTION, SOLUTION INTRAVENOUS at 07:09

## 2020-09-16 RX ADMIN — CEFAZOLIN SODIUM 2 G: 2 SOLUTION INTRAVENOUS at 07:09

## 2020-09-16 RX ADMIN — FENTANYL CITRATE 25 MCG: 50 INJECTION, SOLUTION INTRAMUSCULAR; INTRAVENOUS at 06:09

## 2020-09-16 RX ADMIN — PROPOFOL 100 MG: 10 INJECTION, EMULSION INTRAVENOUS at 07:09

## 2020-09-16 RX ADMIN — LIDOCAINE HYDROCHLORIDE 100 MG: 20 INJECTION, SOLUTION INTRAVENOUS at 07:09

## 2020-09-16 RX ADMIN — SODIUM CHLORIDE, SODIUM LACTATE, POTASSIUM CHLORIDE, AND CALCIUM CHLORIDE: .6; .31; .03; .02 INJECTION, SOLUTION INTRAVENOUS at 06:09

## 2020-09-16 RX ADMIN — ONDANSETRON 4 MG: 2 INJECTION, SOLUTION INTRAMUSCULAR; INTRAVENOUS at 07:09

## 2020-09-16 NOTE — ANESTHESIA PREPROCEDURE EVALUATION
09/16/2020  Ariana Tiwari is a 75 y.o., female.    Pre-op Assessment    I have reviewed the Patient Summary Reports.     I have reviewed the Nursing Notes. I have reviewed the NPO Status.   I have reviewed the Medications.     Review of Systems  Anesthesia Hx:  No problems with previous Anesthesia    Social:  Non-Smoker    Cardiovascular:  Cardiovascular Normal     Pulmonary:  Pulmonary Normal    Renal/:  Renal/ Normal     Hepatic/GI:   PUD,    Musculoskeletal:   Hammer toe of left foot    Neurological:  Neurology Normal Parkinson's Dz   Endocrine:   Hypothyroidism        Physical Exam  General:  Well nourished    Airway/Jaw/Neck:  Airway Findings: Mouth Opening: Normal Tongue: Normal  General Airway Assessment: Adult  Oropharynx Findings:  Mallampati: II  Jaw/Neck Findings:  Neck ROM: Normal ROM     Eyes/Ears/Nose:  Eyes/Ears/Nose Findings:    Dental:  Dental Findings:   Chest/Lungs:  Chest/Lungs Findings: Normal Respiratory Rate     Heart/Vascular:  Heart Findings: Rate: Normal  Rhythm: Regular Rhythm        Mental Status:  Mental Status Findings:  Cooperative, Alert and Oriented         Anesthesia Plan  Type of Anesthesia, risks & benefits discussed:  Anesthesia Type:  general  Patient's Preference:   Intra-op Monitoring Plan: standard ASA monitors  Intra-op Monitoring Plan Comments:   Post Op Pain Control Plan: multimodal analgesia  Post Op Pain Control Plan Comments:   Induction:   IV  Beta Blocker:  Patient is not currently on a Beta-Blocker (No further documentation required).       Informed Consent: Patient understands risks and agrees with Anesthesia plan.  Questions answered. Anesthesia consent signed with patient.  ASA Score: 3     Day of Surgery Review of History & Physical: I have interviewed and examined the patient. I have reviewed the patient's H&P dated:    H&P update referred to the  surgeon.         Ready For Surgery From Anesthesia Perspective.

## 2020-09-16 NOTE — ANESTHESIA POSTPROCEDURE EVALUATION
Anesthesia Post Evaluation    Patient: Ariana Tiwari    Procedure(s) Performed: Procedure(s) (LRB):  CORRECTION, HAMMER TOE (Left)    Final Anesthesia Type: general    Patient location during evaluation: PACU  Patient participation: Yes- Able to Participate  Level of consciousness: awake and alert  Post-procedure vital signs: reviewed and stable  Pain management: adequate  Airway patency: patent    PONV status at discharge: No PONV  Anesthetic complications: no      Cardiovascular status: hemodynamically stable  Respiratory status: unassisted and room air  Hydration status: euvolemic  Follow-up not needed.          Vitals Value Taken Time   /74 09/16/20 0822   Temp 36.6 °C (97.8 °F) 09/16/20 0757   Pulse 68 09/16/20 0822   Resp 16 09/16/20 0822   SpO2 98 % 09/16/20 0822         Event Time   Out of Recovery 08:00:00         Pain/Wade Score: Waed Score: 10 (9/16/2020  8:22 AM)

## 2020-09-16 NOTE — H&P
"HPI: Ariana Tiwari is a  75 y.o. female who was referred to me by Mariella and was seen in consultation today for left foot pain. She rates her pain as 4/10 today. She says she may have injured it when she kicked the drain in a hot tub 4-5 months ago. She has Parkinson' disease and says she doesn't do a lot of walking anymore.   She says the pain is mainly with shoe wear as the 2nd toe is pushing on the big toe. She is here with her daughter in law today.      PAST MEDICAL/SURGICAL/FAMILY/SOCIAL/ HISTORY: REVIEWED    ALLERGIES/MEDICATIONS: REVIEWED         Review of Systems:     Constitution: Negative.   HEENT: Negative.   Eyes: Negative.   Cardiovascular: Negative.   Respiratory: Negative.   Endocrine: Negative.   Hematologic/Lymphatic: Negative.   Skin: Negative.   Musculoskeletal: Positive for left foot pain   Gastrointestinal: Negative.   Genitourinary: Negative.   Neurological: Negative.   Psychiatric/Behavioral: Negative.   Allergic/Immunologic: Negative.         PHYSICAL EXAM:      Vitals:     08/24/20 1550   BP: 127/81   Pulse: 69          Ht Readings from Last 1 Encounters:   08/24/20 5' 3" (1.6 m)          Wt Readings from Last 1 Encounters:   08/24/20 58.2 kg (128 lb 4.9 oz)        GENERAL: Well developed, well nourished, no acute distress.  SKIN: Skin is intact. No atrophy, abrasions or lesions are noted.   Neurological: Normal mental status. Appropriate and conversant. Alert and oriented x 3.     Left lower extremity compared with RLE:  2+ dorsalis pedis pulse.  Capillary refill < 3 seconds.  Normal range of motion tibiotalar and subtalar joints. Normal alignment of the forefoot and the hindfoot.  5/5 strength EHL, FHL, tibialis anterior, gastrocsoleus, tibialis posterior and peroneals. Sensation to light touch intact sural, saphenous, superficial peroneal and deep peroneal nerves. + swelling, and  tenderness to palpation at the PIPJ of the 2nd toe with medial deviation.   No lymphadenopathy, no " masses or tumors palpated.       XRAYS:   3 views of left foot obtained and reviewed today reveal osteoarthritis of the lesser toes. 2nd toe hammertoe.         ASSESSMENT:           Encounter Diagnosis   Name Primary?    Hammer toe of left foot          PLAN:   I spent 35 minutes in consulation with the patient today. More than half the time was spent counseling the patient on her condition and the options for operative versus non-operative care.  She would like to proceed with surgical treatment.   We discussed surgical tx for the patient's condition at length and in detail including post-operative course, complications, outcomes and prognosis using diagrams and models where appropriate. I recommend Left 2nd toe PIPJ resection and EDB tendon transfer with k-wire fixation of the 2nd  metatarsalphalangeal joint.  Informed consent was obtained, patient understands risks and benefits of procedure.  To OR 9/16/20.

## 2020-09-16 NOTE — BRIEF OP NOTE
DATE:9/16/2020 TIME: 7:50 AM     PATIENT NAME: Ariana Tiwari     PRE-OPERATIVE DIAGNOSIS: 1) Left 2nd toe hammertoe 2)  Left 2nd  metatarsalphalangeal joint dislocation 3) Left 2nd toe valgus deformity      POST-OPERATIVE DIAGNOSIS: 1) Left 2nd toe hammertoe 2)  Left 2nd  metatarsalphalangeal joint dislocation 3) Left 2nd toe valgus deformity      PROCEDURE:  1) Left 2nd toe hammertoe correction with PIPJ resection arthroplasty 2) Open reduction internal fixation Left 2nd metatarsalphalangeal joint dislocation 3) Correction Left 2nd toe valgus deformity with Extensor Digitorum Brevis Tendon transfer    SURGEON: Wililam Sprague MD    ANESTHESIA TYPE: LMA    SPECIMENS SENT: NONE     COMPLICATIONS: NONE     BLOOD LOSS: < 5 cc    ASSISTANT: ANALISA Hurtado

## 2020-09-16 NOTE — DISCHARGE SUMMARY
[facility]  Discharge Note  Short Stay    Procedure(s) (LRB):  CORRECTION, HAMMER TOE (Left)    OUTCOME: Patient tolerated treatment/procedure well without complication and is now ready for discharge.    DISPOSITION: Home or Self Care    FINAL DIAGNOSIS:  Hammer toe of left foot    FOLLOWUP: In clinic 2 weeks    DISCHARGE INSTRUCTIONS:    Discharge Procedure Orders   Diet general     Call MD for:  temperature >100.4     Call MD for:  persistent nausea and vomiting     Call MD for:  severe uncontrolled pain     Call MD for:  difficulty breathing, headache or visual disturbances     Call MD for:  redness, tenderness, or signs of infection (pain, swelling, redness, odor or green/yellow discharge around incision site)     Call MD for:  hives     Call MD for:  persistent dizziness or light-headedness     Call MD for:  extreme fatigue     Keep surgical extremity elevated     No driving, operating heavy equipment or signing legal documents while taking pain medication     Leave dressing on - Keep it clean, dry, and intact until clinic visit     Weight bearing restrictions (specify)   Order Comments: Weight bearing as tolerated on heel only in darco shoe

## 2020-09-16 NOTE — DISCHARGE INSTRUCTIONS
Post Operative Instructions    Dressing:    You will have a soft dressing on the foot following surgery    The dressing will remain in place for  2 weeks    You will be Weight bearing as tolerated on the heel in a darco shoe for the first 2 weeks.      Wound:    The surgical incision has been closed with sutures    Do not get the dressing/splint wet and do not remove the dressing/splint for 2 weeks. When showering place a bag over the dressing and secure with tape or rubberband to your leg to avoid the cast and wound getting wet and still put the leg out of the shower      Do not remove the dressing until your 2 week appointment - the first dressing change will occur at your 2 week appointment    Stitches will be removed at your 2 week appointment if the incision is healed    Once the dressing is removed and sutures are removed you can shower and wash the foot     Do not immerse the foot in water (bath, hot tub, pool, lake, pond, river, ocean) for 4 weeks    You will apply scar cream and pain cream together to the incisions once in the boot    Weight Bearing:    You will be weight bearing on the heel for the first two weeks in the darco shoe.  You will be given crutches or a walker    After 2-4 weeks you may begin fully walking on the foot    Medications:    You will be given a prescription for pain medication     Pain medication should be used regularly for the first 24-48 hours, when required for the first 1 to 2 weeks, followed by Regular Tylenol     Driving:     For right foot surgery you are not permitted to drive for 6 weeks    For left foot surgery, please contact your insurance company to see if you are permitted to drive    Driving is not permitted while on narcotics    Work:    Two weeks off work is recommended for initial recovery    If you are able to get to work safely, and will be seated with the foot elevated for the majority of the day, you may return to work a couple days after surgery. This is  assuming you are not taking narcotic pain mediation.    From 2-6 weeks sedentary duties is recommended    By 6 weeks you can slowly return to your normal duties    If your job is physically demanding, return to full duties is usually possible around 12 weeks post operatively    Follow Up:    You will have your first appointment 2 weeks after surgery in the Clinic      Recovery:    It is normal to experience mild to moderate pain, numbness, or tingling for the first 2 weeks following surgery    Please come to the emergency department if you are suffering from severe pain    You will get back to most of your activities by 3 months    Swelling often remains for 6-12 months    You are expected to experience a maximal improvement from surgery in 9-12 months    Physiotherapy:    Formal physiotherapy is usually not necessary     Do not remove your dressing or let anyone else including a physician remove your dressing unless otherwise instructed by Dr. Sprague. You may over wrap the dressing if there is any blood or fluid oozing through the ace bandage.

## 2020-09-16 NOTE — OP NOTE
DATE:9/16/2020 TIME: 7:50 AM     PATIENT NAME: Ariana Tiwari     PRE-OPERATIVE DIAGNOSIS: 1) Left 2nd toe hammertoe 2)  Left 2nd  metatarsalphalangeal joint dislocation 3) Left 2nd toe valgus deformity      POST-OPERATIVE DIAGNOSIS: 1) Left 2nd toe hammertoe 2)  Left 2nd  metatarsalphalangeal joint dislocation 3) Left 2nd toe valgus deformity      PROCEDURE:  1) Left 2nd toe hammertoe correction with PIPJ resection arthroplasty 2) Open reduction internal fixation Left 2nd metatarsalphalangeal joint dislocation 3) Correction Left 2nd toe valgus deformity with Extensor Digitorum Brevis Tendon transfer    SURGEON: William Sprague MD    ANESTHESIA TYPE: LMA    SPECIMENS SENT: NONE     COMPLICATIONS: NONE     BLOOD LOSS: < 5 cc    ASSISTANT: ANALISA Hurtado      Procedure in detail: After appropriate informed consent was obtained the patient was taken to the OR and placed in the supine position on the operating table. The Left lower  extremity was prepped and draped in the usual sterile fashion. Calf tourniquet was raised to 250 mmHg. An approximately 7-cm incision was made longitudinally using a #15 blade overlying the dorsum of the 2nd toe and 2nd metatarsal shaft.     The extensor digitorum longus tendon was lengthening in a Z-type fashion.  Medial capsulotomy was performed at the 2nd  metatarsalphalangeal joint which was subluxated dorsally.     The Proximal interphalangeal joint was exposed. Proximal interphalangeal joint resection arthroplasty was performed using a sagittal saw. A [place] small toe tac was used to fixate the PIPJ.   Next the extensor digitorum brevis tendon was dissected out as far proximally as possible. A 2.0 mm drill bit was used to drill a bone tunnel across the 2nd metatarsal neck. The extensor digitorum brevis tendon was then passed through the bone tunnel to the medial aspect of the 2nd  metatarsalphalangeal joint. The tendon transfer was used to correct the valgus deformity of the  2nd toe and prevent recurrence of the valgus toe deformity. The tendon end was sutured into the medial capsule of the 2nd metatarsalphalangeal joint using 2.0 ethibond interrupted sutures.     Next the 2nd  metatarsalphalangeal joint was reduced and then the proximal interphalangeal joint was pinned across the 2nd metatarsalphalangeal joint using a 0.045 K-wire in a retrograde fashion in order to reduce the dislocated 2nd metatarso-phalangeal joint. There was very good alignment of the joints and good position of wire on AP/LAT/Oblique views of the foot under fluoroscopy.  The K-wire was cut and jurgan's ball was placed.      The ends of the extensor digitorum longus tendon were re-approximated using 3.0 moncryl in an interrupted fashion.    The incision was irrigated with normal saline. The tourniquet was let down, adequate hemostasis was achieved using bovie cautery. The deep layer was re-approximated using 3.0-monocyrl in an interrupted fashion. The subcutaneous layer was re-approximated using 3.0-monocyrl in an interrupted fashion. The skin incisions were re-approximated using 3.0 nylon in a running fashion. Sterile dressing using xeroform, bacitracin, 4x8s, cast padding,ace wrap were placed. Patient tolerated the procedure well without complications.  I was present and scrubbed for the entire case.

## 2020-09-16 NOTE — TRANSFER OF CARE
"Anesthesia Transfer of Care Note    Patient: Ariana Tiwari    Procedure(s) Performed: Procedure(s) (LRB):  CORRECTION, HAMMER TOE (Left)    Patient location: PACU    Anesthesia Type: general    Transport from OR: Transported from OR on room air with adequate spontaneous ventilation    Post pain: adequate analgesia    Post assessment: no apparent anesthetic complications    Post vital signs: stable    Level of consciousness: sedated    Nausea/Vomiting: no nausea/vomiting    Complications: none    Transfer of care protocol was followed      Last vitals:   Visit Vitals  BP (!) 117/57 (BP Location: Left arm, Patient Position: Lying)   Pulse 60   Temp 36.6 °C (97.8 °F) (Skin)   Resp 18   Ht 5' 3" (1.6 m)   Wt 58.1 kg (128 lb)   LMP  (LMP Unknown)   SpO2 96%   Breastfeeding No   BMI 22.67 kg/m²     "

## 2020-09-17 VITALS
SYSTOLIC BLOOD PRESSURE: 120 MMHG | DIASTOLIC BLOOD PRESSURE: 74 MMHG | OXYGEN SATURATION: 98 % | RESPIRATION RATE: 16 BRPM | HEART RATE: 68 BPM | TEMPERATURE: 98 F | WEIGHT: 128 LBS | BODY MASS INDEX: 22.68 KG/M2 | HEIGHT: 63 IN

## 2020-09-18 ENCOUNTER — TELEPHONE (OUTPATIENT)
Dept: ORTHOPEDICS | Facility: CLINIC | Age: 75
End: 2020-09-18

## 2020-09-18 DIAGNOSIS — M20.42 HAMMER TOE OF LEFT FOOT: Primary | ICD-10-CM

## 2020-09-18 NOTE — TELEPHONE ENCOUNTER
Called pt to scheduled her post op appt with Dr. Sprague. Pt had me speak to her daughter. Appt scheduled. Thanks, Maryse

## 2020-09-29 ENCOUNTER — ANESTHESIA EVENT (OUTPATIENT)
Dept: SURGERY | Facility: HOSPITAL | Age: 75
DRG: 336 | End: 2020-09-29
Payer: MEDICARE

## 2020-09-29 ENCOUNTER — ANESTHESIA (OUTPATIENT)
Dept: SURGERY | Facility: HOSPITAL | Age: 75
DRG: 336 | End: 2020-09-29
Payer: MEDICARE

## 2020-09-29 ENCOUNTER — HOSPITAL ENCOUNTER (INPATIENT)
Facility: HOSPITAL | Age: 75
LOS: 5 days | Discharge: HOME | DRG: 336 | End: 2020-10-04
Attending: EMERGENCY MEDICINE | Admitting: INTERNAL MEDICINE
Payer: MEDICARE

## 2020-09-29 DIAGNOSIS — I21.4 NSTEMI (NON-ST ELEVATED MYOCARDIAL INFARCTION): ICD-10-CM

## 2020-09-29 DIAGNOSIS — R07.9 CHEST PAIN: ICD-10-CM

## 2020-09-29 DIAGNOSIS — R41.82 AMS (ALTERED MENTAL STATUS): ICD-10-CM

## 2020-09-29 DIAGNOSIS — R19.8 PERFORATED ABDOMINAL VISCUS: Primary | ICD-10-CM

## 2020-09-29 DIAGNOSIS — K57.00 PERFORATED DIVERTICULUM OF SMALL INTESTINE: ICD-10-CM

## 2020-09-29 LAB
ALBUMIN SERPL BCP-MCNC: 4.4 G/DL (ref 3.5–5.2)
ALP SERPL-CCNC: 84 U/L (ref 55–135)
ALT SERPL W/O P-5'-P-CCNC: 19 U/L (ref 10–44)
ANION GAP SERPL CALC-SCNC: 14 MMOL/L (ref 8–16)
APTT PPP: 30.4 SEC (ref 23.6–33.3)
AST SERPL-CCNC: 22 U/L (ref 10–40)
BACTERIA #/AREA URNS HPF: NEGATIVE /HPF
BASOPHILS # BLD AUTO: 0.03 K/UL (ref 0–0.2)
BASOPHILS NFR BLD: 0.3 % (ref 0–1.9)
BILIRUB SERPL-MCNC: 0.6 MG/DL (ref 0.1–1)
BILIRUB UR QL STRIP: NEGATIVE
BNP SERPL-MCNC: 172 PG/ML (ref 0–99)
BUN SERPL-MCNC: 17 MG/DL (ref 8–23)
CALCIUM SERPL-MCNC: 9.3 MG/DL (ref 8.7–10.5)
CHLORIDE SERPL-SCNC: 100 MMOL/L (ref 95–110)
CLARITY UR: CLEAR
CO2 SERPL-SCNC: 24 MMOL/L (ref 23–29)
COLOR UR: YELLOW
CREAT SERPL-MCNC: 0.4 MG/DL (ref 0.5–1.4)
CREAT SERPL-MCNC: 0.4 MG/DL (ref 0.5–1.4)
DIFFERENTIAL METHOD: ABNORMAL
EOSINOPHIL # BLD AUTO: 0 K/UL (ref 0–0.5)
EOSINOPHIL NFR BLD: 0.1 % (ref 0–8)
ERYTHROCYTE [DISTWIDTH] IN BLOOD BY AUTOMATED COUNT: 12 % (ref 11.5–14.5)
EST. GFR  (AFRICAN AMERICAN): >60 ML/MIN/1.73 M^2
EST. GFR  (NON AFRICAN AMERICAN): >60 ML/MIN/1.73 M^2
GLUCOSE SERPL-MCNC: 102 MG/DL (ref 70–110)
GLUCOSE UR QL STRIP: NEGATIVE
HCT VFR BLD AUTO: 42.4 % (ref 37–48.5)
HGB BLD-MCNC: 14.4 G/DL (ref 12–16)
HGB UR QL STRIP: ABNORMAL
HYALINE CASTS #/AREA URNS LPF: 3 /LPF
IMM GRANULOCYTES # BLD AUTO: 0.04 K/UL (ref 0–0.04)
IMM GRANULOCYTES NFR BLD AUTO: 0.4 % (ref 0–0.5)
INR PPP: 1.2
KETONES UR QL STRIP: NEGATIVE
LACTATE SERPL-SCNC: 0.9 MMOL/L (ref 0.5–1.9)
LEUKOCYTE ESTERASE UR QL STRIP: NEGATIVE
LYMPHOCYTES # BLD AUTO: 0.7 K/UL (ref 1–4.8)
LYMPHOCYTES NFR BLD: 7.8 % (ref 18–48)
MAGNESIUM SERPL-MCNC: 1.8 MG/DL (ref 1.6–2.6)
MCH RBC QN AUTO: 31.2 PG (ref 27–31)
MCHC RBC AUTO-ENTMCNC: 34 G/DL (ref 32–36)
MCV RBC AUTO: 92 FL (ref 82–98)
MICROSCOPIC COMMENT: ABNORMAL
MONOCYTES # BLD AUTO: 0.6 K/UL (ref 0.3–1)
MONOCYTES NFR BLD: 6.2 % (ref 4–15)
NEUTROPHILS # BLD AUTO: 8.1 K/UL (ref 1.8–7.7)
NEUTROPHILS NFR BLD: 85.2 % (ref 38–73)
NITRITE UR QL STRIP: NEGATIVE
NRBC BLD-RTO: 0 /100 WBC
PH UR STRIP: 6 [PH] (ref 5–8)
PLATELET # BLD AUTO: 187 K/UL (ref 150–350)
PMV BLD AUTO: 10.4 FL (ref 9.2–12.9)
POTASSIUM SERPL-SCNC: 3.3 MMOL/L (ref 3.5–5.1)
PROT SERPL-MCNC: 7.3 G/DL (ref 6–8.4)
PROT UR QL STRIP: ABNORMAL
PROTHROMBIN TIME: 14.4 SEC (ref 10.6–14.8)
RBC # BLD AUTO: 4.62 M/UL (ref 4–5.4)
RBC #/AREA URNS HPF: 5 /HPF (ref 0–4)
SAMPLE: ABNORMAL
SARS-COV-2 RDRP RESP QL NAA+PROBE: NEGATIVE
SODIUM SERPL-SCNC: 138 MMOL/L (ref 136–145)
SP GR UR STRIP: 1.02 (ref 1–1.03)
SQUAMOUS #/AREA URNS HPF: 1 /HPF
T4 FREE SERPL-MCNC: 1 NG/DL (ref 0.71–1.51)
TROPONIN I SERPL DL<=0.01 NG/ML-MCNC: 0.21 NG/ML
TROPONIN I SERPL DL<=0.01 NG/ML-MCNC: 0.28 NG/ML
TSH SERPL DL<=0.005 MIU/L-ACNC: 11.26 UIU/ML (ref 0.34–5.6)
URN SPEC COLLECT METH UR: ABNORMAL
UROBILINOGEN UR STRIP-ACNC: NEGATIVE EU/DL
WBC # BLD AUTO: 9.52 K/UL (ref 3.9–12.7)
WBC #/AREA URNS HPF: 1 /HPF (ref 0–5)

## 2020-09-29 PROCEDURE — 80053 COMPREHEN METABOLIC PANEL: CPT

## 2020-09-29 PROCEDURE — 93010 ELECTROCARDIOGRAM REPORT: CPT | Mod: ,,, | Performed by: SPECIALIST

## 2020-09-29 PROCEDURE — 44005 PR FREEING BOWEL ADHESION,ENTEROLYSIS: ICD-10-PCS | Mod: ,,, | Performed by: SURGERY

## 2020-09-29 PROCEDURE — 85610 PROTHROMBIN TIME: CPT

## 2020-09-29 PROCEDURE — 27000671 HC TUBING MICROBORE EXT: Performed by: ANESTHESIOLOGY

## 2020-09-29 PROCEDURE — 27202107 HC XP QUATRO SENSOR: Performed by: ANESTHESIOLOGY

## 2020-09-29 PROCEDURE — 27000284 HC CANNULA NASAL: Performed by: ANESTHESIOLOGY

## 2020-09-29 PROCEDURE — C1765 ADHESION BARRIER: HCPCS | Performed by: SURGERY

## 2020-09-29 PROCEDURE — 85730 THROMBOPLASTIN TIME PARTIAL: CPT

## 2020-09-29 PROCEDURE — 63600175 PHARM REV CODE 636 W HCPCS: Performed by: EMERGENCY MEDICINE

## 2020-09-29 PROCEDURE — C9290 INJ, BUPIVACAINE LIPOSOME: HCPCS | Performed by: SURGERY

## 2020-09-29 PROCEDURE — 85025 COMPLETE CBC W/AUTO DIFF WBC: CPT

## 2020-09-29 PROCEDURE — 25000003 PHARM REV CODE 250: Performed by: INTERNAL MEDICINE

## 2020-09-29 PROCEDURE — 84443 ASSAY THYROID STIM HORMONE: CPT

## 2020-09-29 PROCEDURE — 93010 EKG 12-LEAD: ICD-10-PCS | Mod: ,,, | Performed by: SPECIALIST

## 2020-09-29 PROCEDURE — 25000003 PHARM REV CODE 250: Performed by: NURSE ANESTHETIST, CERTIFIED REGISTERED

## 2020-09-29 PROCEDURE — 99223 1ST HOSP IP/OBS HIGH 75: CPT | Mod: ,,, | Performed by: SURGERY

## 2020-09-29 PROCEDURE — 63600175 PHARM REV CODE 636 W HCPCS: Performed by: SURGERY

## 2020-09-29 PROCEDURE — U0002 COVID-19 LAB TEST NON-CDC: HCPCS

## 2020-09-29 PROCEDURE — 99223 PR INITIAL HOSPITAL CARE,LEVL III: ICD-10-PCS | Mod: ,,, | Performed by: SURGERY

## 2020-09-29 PROCEDURE — 36000706: Performed by: SURGERY

## 2020-09-29 PROCEDURE — 25500020 PHARM REV CODE 255: Performed by: EMERGENCY MEDICINE

## 2020-09-29 PROCEDURE — 94761 N-INVAS EAR/PLS OXIMETRY MLT: CPT

## 2020-09-29 PROCEDURE — 84484 ASSAY OF TROPONIN QUANT: CPT

## 2020-09-29 PROCEDURE — 81001 URINALYSIS AUTO W/SCOPE: CPT

## 2020-09-29 PROCEDURE — 51702 INSERT TEMP BLADDER CATH: CPT

## 2020-09-29 PROCEDURE — 37000009 HC ANESTHESIA EA ADD 15 MINS: Performed by: SURGERY

## 2020-09-29 PROCEDURE — 27201107 HC STYLET, STANDARD: Performed by: ANESTHESIOLOGY

## 2020-09-29 PROCEDURE — 93005 ELECTROCARDIOGRAM TRACING: CPT | Performed by: SPECIALIST

## 2020-09-29 PROCEDURE — 27000654 HC CATH IV JELCO: Performed by: ANESTHESIOLOGY

## 2020-09-29 PROCEDURE — 20000000 HC ICU ROOM

## 2020-09-29 PROCEDURE — 37000008 HC ANESTHESIA 1ST 15 MINUTES: Performed by: SURGERY

## 2020-09-29 PROCEDURE — 36415 COLL VENOUS BLD VENIPUNCTURE: CPT

## 2020-09-29 PROCEDURE — 87040 BLOOD CULTURE FOR BACTERIA: CPT | Mod: 59

## 2020-09-29 PROCEDURE — 83605 ASSAY OF LACTIC ACID: CPT

## 2020-09-29 PROCEDURE — 63600175 PHARM REV CODE 636 W HCPCS: Performed by: INTERNAL MEDICINE

## 2020-09-29 PROCEDURE — 44005 FREEING OF BOWEL ADHESION: CPT | Mod: ,,, | Performed by: SURGERY

## 2020-09-29 PROCEDURE — 84484 ASSAY OF TROPONIN QUANT: CPT | Mod: 91

## 2020-09-29 PROCEDURE — 83735 ASSAY OF MAGNESIUM: CPT

## 2020-09-29 PROCEDURE — 96365 THER/PROPH/DIAG IV INF INIT: CPT

## 2020-09-29 PROCEDURE — 84439 ASSAY OF FREE THYROXINE: CPT

## 2020-09-29 PROCEDURE — 83880 ASSAY OF NATRIURETIC PEPTIDE: CPT

## 2020-09-29 PROCEDURE — 27201423 OPTIME MED/SURG SUP & DEVICES STERILE SUPPLY: Performed by: SURGERY

## 2020-09-29 PROCEDURE — 25000003 PHARM REV CODE 250: Performed by: EMERGENCY MEDICINE

## 2020-09-29 PROCEDURE — 99285 EMERGENCY DEPT VISIT HI MDM: CPT | Mod: 25

## 2020-09-29 PROCEDURE — 63600175 PHARM REV CODE 636 W HCPCS: Performed by: NURSE ANESTHETIST, CERTIFIED REGISTERED

## 2020-09-29 PROCEDURE — 36000707: Performed by: SURGERY

## 2020-09-29 PROCEDURE — 25000003 PHARM REV CODE 250: Performed by: SURGERY

## 2020-09-29 PROCEDURE — S0028 INJECTION, FAMOTIDINE, 20 MG: HCPCS | Performed by: NURSE ANESTHETIST, CERTIFIED REGISTERED

## 2020-09-29 PROCEDURE — 27000673 HC TUBING BLOOD Y: Performed by: ANESTHESIOLOGY

## 2020-09-29 DEVICE — BARRIER ADHESIVE SEPRAFILM 5X6: Type: IMPLANTABLE DEVICE | Site: ABDOMEN | Status: FUNCTIONAL

## 2020-09-29 RX ORDER — ACETAMINOPHEN 500 MG
1000 TABLET ORAL EVERY 6 HOURS PRN
Status: ON HOLD | COMMUNITY
End: 2021-12-04

## 2020-09-29 RX ORDER — FENTANYL CITRATE 50 UG/ML
25 INJECTION, SOLUTION INTRAMUSCULAR; INTRAVENOUS EVERY 4 HOURS PRN
Status: DISCONTINUED | OUTPATIENT
Start: 2020-09-29 | End: 2020-09-30

## 2020-09-29 RX ORDER — ROCURONIUM BROMIDE 10 MG/ML
INJECTION, SOLUTION INTRAVENOUS
Status: DISCONTINUED | OUTPATIENT
Start: 2020-09-29 | End: 2020-09-29

## 2020-09-29 RX ORDER — MAGNESIUM SULFATE HEPTAHYDRATE 40 MG/ML
2 INJECTION, SOLUTION INTRAVENOUS
Status: DISCONTINUED | OUTPATIENT
Start: 2020-09-29 | End: 2020-10-04 | Stop reason: HOSPADM

## 2020-09-29 RX ORDER — EPHEDRINE SULFATE 50 MG/ML
INJECTION, SOLUTION INTRAVENOUS
Status: DISCONTINUED | OUTPATIENT
Start: 2020-09-29 | End: 2020-09-29

## 2020-09-29 RX ORDER — FENTANYL CITRATE 50 UG/ML
INJECTION, SOLUTION INTRAMUSCULAR; INTRAVENOUS
Status: DISCONTINUED | OUTPATIENT
Start: 2020-09-29 | End: 2020-09-29

## 2020-09-29 RX ORDER — POTASSIUM CHLORIDE 1.5 G/1.58G
40 POWDER, FOR SOLUTION ORAL
Status: DISCONTINUED | OUTPATIENT
Start: 2020-09-29 | End: 2020-10-04 | Stop reason: HOSPADM

## 2020-09-29 RX ORDER — SODIUM CHLORIDE, SODIUM LACTATE, POTASSIUM CHLORIDE, CALCIUM CHLORIDE 600; 310; 30; 20 MG/100ML; MG/100ML; MG/100ML; MG/100ML
INJECTION, SOLUTION INTRAVENOUS CONTINUOUS
Status: DISCONTINUED | OUTPATIENT
Start: 2020-09-29 | End: 2020-10-04 | Stop reason: HOSPADM

## 2020-09-29 RX ORDER — SODIUM,POTASSIUM PHOSPHATES 280-250MG
2 POWDER IN PACKET (EA) ORAL
Status: DISCONTINUED | OUTPATIENT
Start: 2020-09-29 | End: 2020-10-04 | Stop reason: HOSPADM

## 2020-09-29 RX ORDER — PROPOFOL 10 MG/ML
VIAL (ML) INTRAVENOUS
Status: DISCONTINUED | OUTPATIENT
Start: 2020-09-29 | End: 2020-09-29

## 2020-09-29 RX ORDER — IBUPROFEN 200 MG
24 TABLET ORAL
Status: DISCONTINUED | OUTPATIENT
Start: 2020-09-29 | End: 2020-10-04 | Stop reason: HOSPADM

## 2020-09-29 RX ORDER — ACETAMINOPHEN 10 MG/ML
INJECTION, SOLUTION INTRAVENOUS
Status: DISCONTINUED | OUTPATIENT
Start: 2020-09-29 | End: 2020-09-29

## 2020-09-29 RX ORDER — SODIUM CHLORIDE 0.9 % (FLUSH) 0.9 %
10 SYRINGE (ML) INJECTION
Status: DISCONTINUED | OUTPATIENT
Start: 2020-09-29 | End: 2020-10-04 | Stop reason: HOSPADM

## 2020-09-29 RX ORDER — POTASSIUM CHLORIDE 20 MEQ/1
40 TABLET, EXTENDED RELEASE ORAL
Status: DISCONTINUED | OUTPATIENT
Start: 2020-09-29 | End: 2020-10-04 | Stop reason: HOSPADM

## 2020-09-29 RX ORDER — LANOLIN ALCOHOL/MO/W.PET/CERES
800 CREAM (GRAM) TOPICAL
Status: DISCONTINUED | OUTPATIENT
Start: 2020-09-29 | End: 2020-10-04 | Stop reason: HOSPADM

## 2020-09-29 RX ORDER — CEFAZOLIN SODIUM 1 G/3ML
INJECTION, POWDER, FOR SOLUTION INTRAMUSCULAR; INTRAVENOUS
Status: DISCONTINUED | OUTPATIENT
Start: 2020-09-29 | End: 2020-09-29

## 2020-09-29 RX ORDER — POTASSIUM CHLORIDE 20 MEQ/1
20 TABLET, EXTENDED RELEASE ORAL
Status: DISCONTINUED | OUTPATIENT
Start: 2020-09-29 | End: 2020-10-04 | Stop reason: HOSPADM

## 2020-09-29 RX ORDER — MAGNESIUM SULFATE 1 G/100ML
1 INJECTION INTRAVENOUS
Status: DISCONTINUED | OUTPATIENT
Start: 2020-09-29 | End: 2020-10-04 | Stop reason: HOSPADM

## 2020-09-29 RX ORDER — ONDANSETRON 4 MG/1
8 TABLET, ORALLY DISINTEGRATING ORAL EVERY 8 HOURS PRN
Status: DISCONTINUED | OUTPATIENT
Start: 2020-09-29 | End: 2020-10-04 | Stop reason: HOSPADM

## 2020-09-29 RX ORDER — POTASSIUM CHLORIDE 7.45 MG/ML
20 INJECTION INTRAVENOUS
Status: DISCONTINUED | OUTPATIENT
Start: 2020-09-29 | End: 2020-10-04 | Stop reason: HOSPADM

## 2020-09-29 RX ORDER — SODIUM CHLORIDE, SODIUM LACTATE, POTASSIUM CHLORIDE, CALCIUM CHLORIDE 600; 310; 30; 20 MG/100ML; MG/100ML; MG/100ML; MG/100ML
INJECTION, SOLUTION INTRAVENOUS CONTINUOUS PRN
Status: DISCONTINUED | OUTPATIENT
Start: 2020-09-29 | End: 2020-09-29

## 2020-09-29 RX ORDER — POTASSIUM CHLORIDE 7.45 MG/ML
40 INJECTION INTRAVENOUS
Status: DISCONTINUED | OUTPATIENT
Start: 2020-09-29 | End: 2020-10-04 | Stop reason: HOSPADM

## 2020-09-29 RX ORDER — ONDANSETRON 2 MG/ML
INJECTION INTRAMUSCULAR; INTRAVENOUS
Status: DISCONTINUED | OUTPATIENT
Start: 2020-09-29 | End: 2020-09-29

## 2020-09-29 RX ORDER — FAMOTIDINE 10 MG/ML
INJECTION INTRAVENOUS
Status: DISCONTINUED | OUTPATIENT
Start: 2020-09-29 | End: 2020-09-29

## 2020-09-29 RX ORDER — IBANDRONATE SODIUM 150 MG/1
150 TABLET, FILM COATED ORAL
COMMUNITY
End: 2020-11-23

## 2020-09-29 RX ORDER — LIDOCAINE HYDROCHLORIDE 20 MG/ML
JELLY TOPICAL
Status: DISCONTINUED | OUTPATIENT
Start: 2020-09-29 | End: 2020-09-29

## 2020-09-29 RX ORDER — LIDOCAINE HCL/PF 100 MG/5ML
SYRINGE (ML) INTRAVENOUS
Status: DISCONTINUED | OUTPATIENT
Start: 2020-09-29 | End: 2020-09-29

## 2020-09-29 RX ORDER — MAGNESIUM SULFATE HEPTAHYDRATE 40 MG/ML
4 INJECTION, SOLUTION INTRAVENOUS
Status: DISCONTINUED | OUTPATIENT
Start: 2020-09-29 | End: 2020-10-04 | Stop reason: HOSPADM

## 2020-09-29 RX ORDER — IBUPROFEN 200 MG
16 TABLET ORAL
Status: DISCONTINUED | OUTPATIENT
Start: 2020-09-29 | End: 2020-10-04 | Stop reason: HOSPADM

## 2020-09-29 RX ORDER — MORPHINE SULFATE 2 MG/ML
2 INJECTION, SOLUTION INTRAMUSCULAR; INTRAVENOUS EVERY 4 HOURS PRN
Status: CANCELLED | OUTPATIENT
Start: 2020-09-29

## 2020-09-29 RX ORDER — BUPIVACAINE HYDROCHLORIDE AND EPINEPHRINE 2.5; 5 MG/ML; UG/ML
INJECTION, SOLUTION EPIDURAL; INFILTRATION; INTRACAUDAL; PERINEURAL
Status: DISCONTINUED | OUTPATIENT
Start: 2020-09-29 | End: 2020-09-29 | Stop reason: HOSPADM

## 2020-09-29 RX ORDER — SODIUM CHLORIDE 9 MG/ML
1000 INJECTION, SOLUTION INTRAVENOUS
Status: COMPLETED | OUTPATIENT
Start: 2020-09-29 | End: 2020-09-29

## 2020-09-29 RX ORDER — POTASSIUM CHLORIDE 7.45 MG/ML
10 INJECTION INTRAVENOUS ONCE
Status: COMPLETED | OUTPATIENT
Start: 2020-09-29 | End: 2020-09-29

## 2020-09-29 RX ORDER — IPRATROPIUM BROMIDE 42 UG/1
2 SPRAY, METERED NASAL 4 TIMES DAILY
Status: DISCONTINUED | OUTPATIENT
Start: 2020-09-29 | End: 2020-10-04 | Stop reason: HOSPADM

## 2020-09-29 RX ORDER — KETAMINE HYDROCHLORIDE 50 MG/ML
INJECTION, SOLUTION INTRAMUSCULAR; INTRAVENOUS
Status: DISCONTINUED | OUTPATIENT
Start: 2020-09-29 | End: 2020-09-29

## 2020-09-29 RX ORDER — GLUCAGON 1 MG
1 KIT INJECTION
Status: DISCONTINUED | OUTPATIENT
Start: 2020-09-29 | End: 2020-10-04 | Stop reason: HOSPADM

## 2020-09-29 RX ADMIN — FAMOTIDINE 20 MG: 10 INJECTION, SOLUTION INTRAVENOUS at 05:09

## 2020-09-29 RX ADMIN — ACETAMINOPHEN 1000 MG: 10 INJECTION, SOLUTION INTRAVENOUS at 05:09

## 2020-09-29 RX ADMIN — CEFAZOLIN 2 G: 330 INJECTION, POWDER, FOR SOLUTION INTRAMUSCULAR; INTRAVENOUS at 05:09

## 2020-09-29 RX ADMIN — FENTANYL CITRATE 25 MCG: 50 INJECTION, SOLUTION INTRAMUSCULAR; INTRAVENOUS at 11:09

## 2020-09-29 RX ADMIN — LIDOCAINE HYDROCHLORIDE 60 MG: 20 INJECTION, SOLUTION INTRAVENOUS at 04:09

## 2020-09-29 RX ADMIN — KETAMINE HYDROCHLORIDE 25 MG: 50 INJECTION INTRAMUSCULAR; INTRAVENOUS at 05:09

## 2020-09-29 RX ADMIN — ONDANSETRON 4 MG: 2 INJECTION INTRAMUSCULAR; INTRAVENOUS at 04:09

## 2020-09-29 RX ADMIN — FENTANYL CITRATE 50 MCG: 50 INJECTION INTRAMUSCULAR; INTRAVENOUS at 04:09

## 2020-09-29 RX ADMIN — FENTANYL CITRATE 50 MCG: 50 INJECTION INTRAMUSCULAR; INTRAVENOUS at 05:09

## 2020-09-29 RX ADMIN — SUGAMMADEX 200 MG: 100 INJECTION, SOLUTION INTRAVENOUS at 05:09

## 2020-09-29 RX ADMIN — ROCURONIUM BROMIDE 45 MG: 10 INJECTION, SOLUTION INTRAVENOUS at 04:09

## 2020-09-29 RX ADMIN — PROPOFOL 70 MG: 10 INJECTION, EMULSION INTRAVENOUS at 04:09

## 2020-09-29 RX ADMIN — SODIUM CHLORIDE, SODIUM LACTATE, POTASSIUM CHLORIDE, AND CALCIUM CHLORIDE: .6; .31; .03; .02 INJECTION, SOLUTION INTRAVENOUS at 04:09

## 2020-09-29 RX ADMIN — SODIUM CHLORIDE, SODIUM LACTATE, POTASSIUM CHLORIDE, AND CALCIUM CHLORIDE: .6; .31; .03; .02 INJECTION, SOLUTION INTRAVENOUS at 10:09

## 2020-09-29 RX ADMIN — PIPERACILLIN SODIUM AND TAZOBACTAM SODIUM 4.5 G: 4; .5 INJECTION, POWDER, LYOPHILIZED, FOR SOLUTION INTRAVENOUS at 10:09

## 2020-09-29 RX ADMIN — SODIUM CHLORIDE, SODIUM LACTATE, POTASSIUM CHLORIDE, AND CALCIUM CHLORIDE: .6; .31; .03; .02 INJECTION, SOLUTION INTRAVENOUS at 05:09

## 2020-09-29 RX ADMIN — POTASSIUM CHLORIDE 10 MEQ: 7.46 INJECTION, SOLUTION INTRAVENOUS at 01:09

## 2020-09-29 RX ADMIN — SODIUM CHLORIDE 1000 ML: 0.9 INJECTION, SOLUTION INTRAVENOUS at 01:09

## 2020-09-29 RX ADMIN — IOHEXOL 100 ML: 350 INJECTION, SOLUTION INTRAVENOUS at 11:09

## 2020-09-29 RX ADMIN — PIPERACILLIN SODIUM AND TAZOBACTAM SODIUM 3.38 G: 3; .375 INJECTION, POWDER, LYOPHILIZED, FOR SOLUTION INTRAVENOUS at 01:09

## 2020-09-29 RX ADMIN — LIDOCAINE HYDROCHLORIDE 3 ML: 20 JELLY TOPICAL at 04:09

## 2020-09-29 RX ADMIN — EPHEDRINE SULFATE 2.5 MG: 50 INJECTION, SOLUTION INTRAVENOUS at 05:09

## 2020-09-29 RX ADMIN — IPRATROPIUM BROMIDE 2 SPRAY: 42 SPRAY, METERED NASAL at 10:09

## 2020-09-29 NOTE — HPI
This is a pleasant 75-year-old female who presented to the ER this morning secondary to weakness and back pain.  Patient was also having some confusion and mental status changes.  On workup she had a chest x-ray demonstrating pneumoperitoneum.  This led to a CT scan demonstrating significant pneumoperitoneum.  Patient's past history is significant for exploratory laparotomy in January of this year by Dr. Mcarthur.  This was done secondary to perforated small bowel diverticular disease.  Since then patient has struggled with bloating and abdominal distention.  She has been noted to have dilated loops of bowel felt to be secondary to dysmotility.  I was consulted for evaluation of the pneumoperitoneum.  Comparison of previous CT scans does demonstrate the bowel dilatation that is still present today.  However there is significant pneumoperitoneum on today's exam compared to last.Her PMHx is significant for Parkinsons, and PUD

## 2020-09-29 NOTE — ED PROVIDER NOTES
Encounter Date: 9/29/2020       History     Chief Complaint   Patient presents with    GEN WEAKNESS    Altered Mental Status     WOKE UP 2 AM ACTING CONFUSED PER SON     Patient here with reported generalized weakness, altered mental status onset this morning per her son patient was in her normal state of health yesterday recovering from left foot surgery she had hammertoe repair approximately 2 weeks ago she has been ambulating with a walker since surgery but this morning was too weak to get out of bed patient denies any pain no abdominal pain no chest pain or shortness of breath no nausea vomiting diarrhea no cough no fever chills no difficulty urinating he does state that she was having some diarrhea but then has been unable have a bowel movement recently he does not recall the last states she had a bowel movement his no noted weakness or numbness patient is able to answer most questions without assistance son does staff to help her with certain details like when her surgery was performed        Review of patient's allergies indicates:   Allergen Reactions    Gluten Diarrhea     Past Medical History:   Diagnosis Date    Allergy     Diverticulosis     Gluten enteropathy     Gluten intolerance     Hernia     Hypothyroidism     PD (Parkinson's disease) 9/16/2015    Right tremor type    Peptic ulcer disease     Right shoulder pain     Cirtisone injection 3/26/15 - Dr. Tolbert    Ulcer      Past Surgical History:   Procedure Laterality Date    ADENOIDECTOMY      COLONOSCOPY  03/01/2012    Dr. Teresa, repeat in 10 years for screening    COLONOSCOPY N/A 2/21/2018    Procedure: COLONOSCOPY;  Surgeon: Radha Deng MD;  Location: [facility];  Service: Endoscopy;  Laterality: N/A;    CORRECTION OF HAMMER TOE Left 9/16/2020    Procedure: CORRECTION, HAMMER TOE;  Surgeon: William Sprague MD;  Location[facility];  Service: Orthopedics;  Laterality: Left;    COSMETIC SURGERY      Broken nose     FRACTURE SURGERY  left wrist    With pin    HEMORRHOID SURGERY      HERNIA REPAIR Left 04/09/2015    Dr Lo; left inguinal    INTRALUMINAL GASTROINTESTINAL TRACT IMAGING VIA CAPSULE N/A 7/10/2018    Procedure: IMAGING, GI TRACT, INTRALUMINAL, VIA CAPSULE;  Surgeon: Radha Deng MD;  Location: [facility];  Service: Endoscopy;  Laterality: N/A;    RHINOPLASTY TIP      TONSILLECTOMY      UPPER GASTROINTESTINAL ENDOSCOPY  05/21/2015    Dr. Gomez     Family History   Problem Relation Age of Onset    Diabetes Mother     Diabetes Son     Obesity Sister     Alcohol abuse Brother     Heart disease Brother     No Known Problems Father     Early death Brother         self    Breast cancer Neg Hx     Colon cancer Neg Hx     Ovarian cancer Neg Hx     Colon polyps Neg Hx     Crohn's disease Neg Hx     Ulcerative colitis Neg Hx     Celiac disease Neg Hx      Social History     Tobacco Use    Smoking status: Never Smoker    Smokeless tobacco: Never Used   Substance Use Topics    Alcohol use: Yes     Alcohol/week: 11.7 standard drinks     Types: 14 Standard drinks or equivalent per week     Comment: 2 glasses wine per dinner    Drug use: No     Review of Systems   Unable to perform ROS: Mental status change       Physical Exam     Initial Vitals   BP Pulse Resp Temp SpO2   09/29/20 0953 09/29/20 0953 09/29/20 0953 09/29/20 1107 09/29/20 0953   130/60 72 20 99.1 °F (37.3 °C) 98 %      MAP       --                Physical Exam    Constitutional: She appears well-developed and well-nourished. No distress.   HENT:   Head: Normocephalic and atraumatic.   Right Ear: External ear normal.   Left Ear: External ear normal.   Mouth/Throat: Oropharynx is clear and moist.   Eyes: Conjunctivae and EOM are normal. Pupils are equal, round, and reactive to light.   Neck: Normal range of motion. Neck supple.   Cardiovascular: Normal rate, regular rhythm, normal heart sounds and intact distal pulses.   Pulmonary/Chest:  Breath sounds normal. No respiratory distress.   Abdominal: Soft. She exhibits distension. She exhibits no mass. There is no abdominal tenderness. There is no rebound.   Hyperactive   Musculoskeletal: Normal range of motion. No edema.      Comments: Left foot Cast shoe with dressing in place cap refill less than 2 seconds   Neurological: She is alert. She has normal strength. No cranial nerve deficit. GCS score is 15. GCS eye subscore is 4. GCS verbal subscore is 5. GCS motor subscore is 6.   Skin: Skin is warm and dry. Capillary refill takes less than 2 seconds. No rash noted.   Psychiatric: She has a normal mood and affect. Her behavior is normal.         ED Course   Procedures  Labs Reviewed   CBC W/ AUTO DIFFERENTIAL - Abnormal; Notable for the following components:       Result Value    Mean Corpuscular Hemoglobin 31.2 (*)     Gran # (ANC) 8.1 (*)     Lymph # 0.7 (*)     Gran% 85.2 (*)     Lymph% 7.8 (*)     All other components within normal limits   COMPREHENSIVE METABOLIC PANEL - Abnormal; Notable for the following components:    Potassium 3.3 (*)     Creatinine 0.4 (*)     All other components within normal limits   TROPONIN I - Abnormal; Notable for the following components:    Troponin I 0.277 (*)     All other components within normal limits    Narrative:     Trop critical result(s) repeated. Called and verbal readback obtained   from Shelly Last RN/ED by BRYAN 09/29/2020 11:34   URINALYSIS, REFLEX TO URINE CULTURE - Abnormal; Notable for the following components:    Protein, UA Trace (*)     Occult Blood UA 2+ (*)     All other components within normal limits    Narrative:     Specimen Source->Urine   B-TYPE NATRIURETIC PEPTIDE - Abnormal; Notable for the following components:     (*)     All other components within normal limits   URINALYSIS MICROSCOPIC - Abnormal; Notable for the following components:    RBC, UA 5 (*)     Hyaline Casts, UA 3 (*)     All other components within normal limits     Narrative:     Specimen Source->Urine   ISTAT CREATININE - Abnormal; Notable for the following components:    POC Creatinine 0.4 (*)     All other components within normal limits   CULTURE, BLOOD   CULTURE, BLOOD   APTT   MAGNESIUM   PROTIME-INR   LACTIC ACID, PLASMA   SARS-COV-2 RNA AMPLIFICATION, QUAL   POCT CREATININE        ECG Results          EKG 12-lead (In process)  Result time 09/29/20 11:25:40    In process by Interface, Lab In [facility] (09/29/20 11:25:40)                 Narrative:    Test Reason : R41.82,    Vent. Rate : 070 BPM     Atrial Rate : 070 BPM     P-R Int : 136 ms          QRS Dur : 084 ms      QT Int : 404 ms       P-R-T Axes : 025 -27 077 degrees     QTc Int : 436 ms    Normal sinus rhythm  Normal ECG  When compared with ECG of 11-JAN-2020 09:56,  Criteria for Septal infarct are no longer Present  T wave inversion no longer evident in Lateral leads    Referred By: [email or web address]   SELF           Confirmed By:                                  Medical Decision Making:   ED Management:  Patient's chest x-ray revealed evidence of air below the diaphragm Patient's CT scan reveals evidence of perforated viscus likely from small bowel obstruction with perforation of the bowel secondary to obstruction I have discussed these findings with Dr. Gonzalez I have consulted Dr. Walls for admission I have also consulted Dr. Finch secondary to elevated troponin of 0.277 have begun fluid resuscitation emergency department have given patient a dose of Zosyn patient will be admitted to the operating room              Attending Attestation:         Attending Critical Care:   Critical Care Times:   Direct Patient Care (initial evaluation, reassessments, and time considering the case)................................................................15 minutes.   Ordering, Reviewing, and Interpreting Diagnostic  Studies...............................................................................................................10 minutes.   Documentation..................................................................................................................................................................................5 minutes.   Consultation with other Physicians. .................................................................................................................................................10 minutes.   ==============================================================  · Total Critical Care Time - exclusive of procedural time: 40 minutes.  ==============================================================                        Clinical Impression:       ICD-10-CM ICD-9-CM   1. Perforated abdominal viscus  R19.8 799.89   2. AMS (altered mental status)  R41.82 780.97                          ED Disposition Condition    Admit                             William Schaefer MD  09/29/20 1421       William Schaefer MD  11/26/20 1014

## 2020-09-29 NOTE — H&P
[facility]  History & Physical    Patient Name: Ariana Tiwari  MRN: [id number]  Admission Date: 9/29/2020  Attending Physician: Je Walls MD  Primary Care Provider: Nba Petty MD         Patient information was obtained from patient, relative(s), past medical records and ER records.     Subjective:     Principal Problem:<principal problem not specified>    Chief Complaint:   Chief Complaint   Patient presents with    GEN WEAKNESS    Altered Mental Status     WOKE UP 2 AM ACTING CONFUSED PER SON        HPI: Ms Tiwari awakened this AM , nauseated and confused. Pt denies fever chills, vomiting, hematemesis, melena, hematochezia, chest pain, new sensory or motor deficit. Pt has a Hx of being chronically treated with antibiotics for diverticulitis  1/10/2020 Perforated small bowel diverticulum  2/2020 Small bowel obstruction  The above is complicated by advanced Parkinson's disease with cognitive impairment  Pt's son is POA and both Pt and her son wish her to be a full code    Past Medical History:   Diagnosis Date    Allergy     Diverticulosis     Gluten enteropathy     Gluten intolerance     Hernia     Hypothyroidism     PD (Parkinson's disease) 9/16/2015    Right tremor type    Peptic ulcer disease     Right shoulder pain     Cirtisone injection 3/26/15 - Dr. Tolbert    Ulcer        Past Surgical History:   Procedure Laterality Date    ADENOIDECTOMY      COLONOSCOPY  03/01/2012    Dr. Teresa, repeat in 10 years for screening    COLONOSCOPY N/A 2/21/2018    Procedure: COLONOSCOPY;  Surgeon: Radha Deng MD;  Location: [facility];  Service: Endoscopy;  Laterality: N/A;    CORRECTION OF HAMMER TOE Left 9/16/2020    Procedure: CORRECTION, HAMMER TOE;  Surgeon: William Sprague MD;  Location: [facility] OR;  Service: Orthopedics;  Laterality: Left;    COSMETIC SURGERY      Broken nose    FRACTURE SURGERY  left wrist    With pin    HEMORRHOID SURGERY       HERNIA REPAIR Left 04/09/2015    Dr Lo; left inguinal    INTRALUMINAL GASTROINTESTINAL TRACT IMAGING VIA CAPSULE N/A 7/10/2018    Procedure: IMAGING, GI TRACT, INTRALUMINAL, VIA CAPSULE;  Surgeon: Radha Deng MD;  Location: [facility];  Service: Endoscopy;  Laterality: N/A;    RHINOPLASTY TIP      TONSILLECTOMY      UPPER GASTROINTESTINAL ENDOSCOPY  05/21/2015    Dr. Gomez       Review of patient's allergies indicates:   Allergen Reactions    Gluten Diarrhea       No current facility-administered medications on file prior to encounter.      Current Outpatient Medications on File Prior to Encounter   Medication Sig    acetaminophen (TYLENOL) 500 MG tablet Take 1,000 mg by mouth every 6 (six) hours as needed for Pain.    ibandronate (BONIVA) 150 mg tablet Take 150 mg by mouth every 30 days.    ipratropium (ATROVENT) 42 mcg (0.06 %) nasal spray USE 2 SPRAY(S) IN EACH NOSTRIL 4 TIMES DAILY (Patient taking differently: 2 sprays by Nasal route 4 (four) times daily as needed. )    levothyroxine (SYNTHROID) 125 MCG tablet Take 0.5 tablets (62.5 mcg total) by mouth once daily.    vitamin D (VITAMIN D3) 1000 units Tab Take 1,000 Units by mouth once daily.    HYDROcodone-acetaminophen (NORCO) 5-325 mg per tablet Take 1 tablet by mouth every 4 (four) hours as needed for Pain.    ondansetron (ZOFRAN-ODT) 4 MG TbDL Take 2 tablets (8 mg total) by mouth every 8 (eight) hours as needed.    simethicone (GAS-X ORAL) Take 1 tablet by mouth as needed.      Family History     Problem Relation (Age of Onset)    Alcohol abuse Brother    Diabetes Mother, Son    Early death Brother    Heart disease Brother    No Known Problems Father    Obesity Sister        Tobacco Use    Smoking status: Never Smoker    Smokeless tobacco: Never Used   Substance and Sexual Activity    Alcohol use: Yes     Alcohol/week: 11.7 standard drinks     Types: 14 Standard drinks or equivalent per week     Comment: 2 glasses wine per dinner     Drug use: No    Sexual activity: Never     Review of Systems   Constitutional: Positive for activity change, diaphoresis and fatigue. Negative for chills and fever.   HENT: Negative.    Eyes: Negative.    Respiratory: Negative.    Cardiovascular: Negative.    Gastrointestinal: Positive for abdominal distention, abdominal pain, constipation and nausea. Negative for anal bleeding, blood in stool, diarrhea, rectal pain and vomiting.   Endocrine: Positive for cold intolerance and heat intolerance.   Genitourinary: Negative.    Musculoskeletal: Positive for arthralgias, joint swelling and myalgias.        Left shoulder   Allergic/Immunologic: Positive for food allergies.        Gluten enteropathy   Neurological: Positive for dizziness, tremors, weakness and light-headedness.   Hematological: Bruises/bleeds easily.   Psychiatric/Behavioral: Positive for confusion and decreased concentration. The patient is nervous/anxious.      Objective:     Vital Signs (Most Recent):  Temp: 99.1 °F (37.3 °C) (09/29/20 1107)  Pulse: 67 (09/29/20 1300)  Resp: 18 (09/29/20 1300)  BP: (!) 122/55 (09/29/20 1300)  SpO2: (!) 94 % (09/29/20 1300) Vital Signs (24h Range):  Temp:  [99.1 °F (37.3 °C)] 99.1 °F (37.3 °C)  Pulse:  [65-72] 67  Resp:  [18-22] 18  SpO2:  [94 %-98 %] 94 %  BP: (122-130)/(55-60) 122/55     Weight: 54.4 kg (120 lb)  Body mass index is 19.97 kg/m².    Physical Exam  Vitals signs and nursing note reviewed.   Constitutional:       General: She is not in acute distress.     Appearance: Normal appearance. She is not ill-appearing.   HENT:      Head: Normocephalic and atraumatic.      Nose: Nose normal.      Mouth/Throat:      Mouth: Mucous membranes are moist.   Eyes:      Extraocular Movements: Extraocular movements intact.      Pupils: Pupils are equal, round, and reactive to light.   Neck:      Musculoskeletal: Neck supple.   Cardiovascular:      Rate and Rhythm: Normal rate. Rhythm irregular.      Heart sounds: Gallop  present.    Pulmonary:      Effort: Pulmonary effort is normal.      Breath sounds: Rales present.      Comments: Diminished bibasilar breath sounds with occasional crackles above  Abdominal:      General: There is distension.      Tenderness: There is no abdominal tenderness. There is no guarding.      Comments: Decreased bowel sounds   Musculoskeletal:      Comments: Cog wheeling with limited ROM of all extremities   Skin:     General: Skin is warm.   Neurological:      Mental Status: She is alert.      Comments: Exam limited by condition   Psychiatric:      Comments: Exam limited by condition             CRANIAL NERVES     CN III, IV, VI   Pupils are equal, round, and reactive to light.       Significant Labs:   Recent Lab Results       09/29/20  1245   09/29/20  1106   09/29/20  1049   09/29/20  1045        Albumin     4.4       Alkaline Phosphatase     84       ALT     19       Anion Gap     14       Appearance, UA   Clear         aPTT     30.4  Comment:  Therapeutic Range of 67.5-105.1 secs.  Equivalent to Heparin Concentration of anti-Xa 0.3-0.7 IU/ml.         AST     22       Bacteria, UA   Negative         Baso #     0.03       Basophil%     0.3       Bilirubin (UA)   Negative         BILIRUBIN TOTAL     0.6  Comment:  For infants and newborns, interpretation of results should be based  on gestational age, weight and in agreement with clinical  observations.  Premature Infant recommended reference ranges:  Up to 24 hours.............<8.0 mg/dL  Up to 48 hours............<12.0 mg/dL  3-5 days..................<15.0 mg/dL  6-29 days.................<15.0 mg/dL         BNP     172  Comment:  Values of less than 100 pg/ml are consistent with non-CHF populations.       BUN, Bld     17       Calcium     9.3       Chloride     100       CO2     24       Color, UA   Yellow         Creatinine     0.4       Differential Method     Automated       eGFR if      >60.0       eGFR if non African American      >60.0  Comment:  Calculation used to obtain the estimated glomerular filtration  rate (eGFR) is the CKD-EPI equation.          Eos #     0.0       Eosinophil%     0.1       Glucose     102       Glucose, UA   Negative         Gran # (ANC)     8.1       Gran%     85.2       Hematocrit     42.4       Hemoglobin     14.4       Hyaline Casts, UA   3         Immature Grans (Abs)     0.04  Comment:  Mild elevation in immature granulocytes is non specific and   can be seen in a variety of conditions including stress response,   acute inflammation, trauma and pregnancy. Correlation with other   laboratory and clinical findings is essential.         Immature Granulocytes     0.4       INR     1.2  Comment:  Coumadin Therapy:  INR: 2.0-3.0 conventional anticoagulation  INR: 2.5-3.5 intensive anticoagulation         Ketones, UA   Negative         Lactate, Pranay     0.9  Comment:  Falsely low lactic acid results can be found in samples   containing >=13.0 mg/dL total bilirubin and/or >=3.5 mg/dL   direct bilirubin.         Leukocytes, UA   Negative         Lymph #     0.7       Lymph%     7.8       Magnesium     1.8       MCH     31.2       MCHC     34.0       MCV     92       Microscopic Comment   SEE COMMENT  Comment:  Other formed elements not mentioned in the report are not   present in the microscopic examination.            Mono #     0.6       Mono%     6.2       MPV     10.4       NITRITE UA   Negative         nRBC     0       Occult Blood UA   2+         pH, UA   6.0         Platelets     187       POC Creatinine       0.4     Potassium     3.3       PROTEIN TOTAL     7.3       Protein, UA   Trace  Comment:  Recommend a 24 hour urine protein or a urine   protein/creatinine ratio if globulin induced proteinuria is  clinically suspected.           PT     14.4       RBC     4.62       RBC, UA   5         RDW     12.0       Sample       VENOUS     SARS-CoV-2 RNA, Amplification, Qual Negative  Comment:  This test utilizes  isothermal nucleic acid amplification   technology to detect the SARS-CoV-2 RdRp nucleic acid segment.   The analytical sensitivity (limit of detection) is 125 genome   equivalents/mL.   A POSITIVE result implies infection with the SARS-CoV-2 virus;  the patient is presumed to be contagious.    A NEGATIVE result means that SARS-CoV-2 nucleic acids are not  present above the limit of detection. A NEGATIVE result should be   treated as presumptive. It does not rule out the possibility of   COVID-19 and should not be the sole basis for treatment decisions.   If COVID-19 is strongly suspected based on clinical and exposure   history, re-testing using an alternate molecular assay should be   considered.   This test is only for use under the Food and Drug   Administration s Emergency Use Authorization (EUA).   Commercial kits are provided by [employer].   Performance characteristics of the EUA have been independently  verified by [facility] Department of  Pathology and Laboratory Medicine.   _________________________________________________________________  The ID NOW COVID-19 Letter of Authorization, along with the   authorized Fact Sheet for Healthcare Providers, the authorized Fact  Sheet for Patients, and authorized labeling are available on the FDA   website:  www.fda.gov/MedicalDevices/Safety/EmergencySituations/obe962260.htm             Sodium     138       Specific [place], UA   1.025         Specimen UA   Urine, Clean Catch         Squam Epithel, UA   1         Troponin I     0.277  Comment:  Trop critical result(s) repeated. Called and verbal readback obtained   from Shelly Last RN/ED by BRYAN 09/29/2020 11:34         UROBILINOGEN UA   Negative         WBC, UA   1         WBC     9.52             Significant Imaging: I have reviewed all pertinent imaging results/findings within the past 24 hours.    Assessment/Plan:   9/29/2020  A)  1)Perforate viscus with small bowel obstruction. Pt has a Hx  of perforated small bowel diverticulum and a subsequent SBO  2)NSTEMI with no Hx of CAD per patient  3)Parkinson's disease-advanced with severe movement impairment and cognitive decline  4)Hypothyroidism  5) Gluten enteropathy  P)  Admit to ICU  Dr Gonzalez general surgery consulted  Cardiology consulted  Serial cardiac enzymes  Echocardiogram  NPO  IV fluids  AM labs  Neuro checks Q 4  Aspiration precautions  Zosyn as antibiotic    I have discussed the case with Dr JUANITO Finch and he feels that the troponin elevation is most likely secondary to demand ischemia. Pt should go to surgery and cardiac workup should follow surgery.    Critical care time 57 minutes      No notes have been filed under this hospital service.  Service: Hospital Medicine    VTE Risk Mitigation (From admission, onward)    None             Je Walls MD  Department of Hospital Medicine   [facility]

## 2020-09-29 NOTE — CONSULTS
[facility]  General Surgery  Consult Note    Patient Name: Ariana Tiwari  MRN: [id number]  Code Status: Full Code  Admission Date: 9/29/2020  Hospital Length of Stay: 0 days  Attending Physician: Je Walls MD  Primary Care Provider: Nba Petty MD    Patient information was obtained from patient and ER records.     Inpatient consult to General Surgery  Consult performed by: Eagle Gonzalez MD  Consult ordered by: Je Walls MD        Subjective:     Principal Problem: Perforated abdominal viscus    History of Present Illness: This is a pleasant 75-year-old female who presented to the ER this morning secondary to weakness and back pain.  Patient was also having some confusion and mental status changes.  On workup she had a chest x-ray demonstrating pneumoperitoneum.  This led to a CT scan demonstrating significant pneumoperitoneum.  Patient's past history is significant for exploratory laparotomy in January of this year by Dr. Mcarthur.  This was done secondary to perforated small bowel diverticular disease.  Since then patient has struggled with bloating and abdominal distention.  She has been noted to have dilated loops of bowel felt to be secondary to dysmotility.  I was consulted for evaluation of the pneumoperitoneum.  Comparison of previous CT scans does demonstrate the bowel dilatation that is still present today.  However there is significant pneumoperitoneum on today's exam compared to last.Her PMHx is significant for Parkinsons, and PUD    No current facility-administered medications on file prior to encounter.      Current Outpatient Medications on File Prior to Encounter   Medication Sig    acetaminophen (TYLENOL) 500 MG tablet Take 1,000 mg by mouth every 6 (six) hours as needed for Pain.    ibandronate (BONIVA) 150 mg tablet Take 150 mg by mouth every 30 days.    ipratropium (ATROVENT) 42 mcg (0.06 %) nasal spray USE 2 SPRAY(S) IN EACH NOSTRIL 4 TIMES DAILY (Patient  taking differently: 2 sprays by Nasal route 4 (four) times daily as needed. )    levothyroxine (SYNTHROID) 125 MCG tablet Take 0.5 tablets (62.5 mcg total) by mouth once daily.    vitamin D (VITAMIN D3) 1000 units Tab Take 1,000 Units by mouth once daily.    HYDROcodone-acetaminophen (NORCO) 5-325 mg per tablet Take 1 tablet by mouth every 4 (four) hours as needed for Pain.    ondansetron (ZOFRAN-ODT) 4 MG TbDL Take 2 tablets (8 mg total) by mouth every 8 (eight) hours as needed.    simethicone (GAS-X ORAL) Take 1 tablet by mouth as needed.        Review of patient's allergies indicates:   Allergen Reactions    Gluten Diarrhea       Past Medical History:   Diagnosis Date    Allergy     Diverticulosis     Gluten enteropathy     Gluten intolerance     Hernia     Hypothyroidism     PD (Parkinson's disease) 9/16/2015    Right tremor type    Peptic ulcer disease     Right shoulder pain     Cirtisone injection 3/26/15 - Dr. Tolbert    Ulcer      Past Surgical History:   Procedure Laterality Date    ADENOIDECTOMY      COLONOSCOPY  03/01/2012    Dr. Teresa, repeat in 10 years for screening    COLONOSCOPY N/A 2/21/2018    Procedure: COLONOSCOPY;  Surgeon: Radha Deng MD;  Location: [facility];  Service: Endoscopy;  Laterality: N/A;    CORRECTION OF HAMMER TOE Left 9/16/2020    Procedure: CORRECTION, HAMMER TOE;  Surgeon: William Sprague MD;  Location: [facility] OR;  Service: Orthopedics;  Laterality: Left;    COSMETIC SURGERY      Broken nose    FRACTURE SURGERY  left wrist    With pin    HEMORRHOID SURGERY      HERNIA REPAIR Left 04/09/2015    Dr Lo; left inguinal    INTRALUMINAL GASTROINTESTINAL TRACT IMAGING VIA CAPSULE N/A 7/10/2018    Procedure: IMAGING, GI TRACT, INTRALUMINAL, VIA CAPSULE;  Surgeon: Radha Deng MD;  Location: [facility];  Service: Endoscopy;  Laterality: N/A;    RHINOPLASTY TIP      TONSILLECTOMY      UPPER GASTROINTESTINAL ENDOSCOPY  05/21/2015    Dr. Gomez      Family History     Problem Relation (Age of Onset)    Alcohol abuse Brother    Diabetes Mother, Son    Early death Brother    Heart disease Brother    No Known Problems Father    Obesity Sister        Tobacco Use    Smoking status: Never Smoker    Smokeless tobacco: Never Used   Substance and Sexual Activity    Alcohol use: Yes     Alcohol/week: 11.7 standard drinks     Types: 14 Standard drinks or equivalent per week     Comment: 2 glasses wine per dinner    Drug use: No    Sexual activity: Never     Review of Systems   Unable to perform ROS: Acuity of condition     Objective:     Vital Signs (Most Recent):  Temp: 98.8 °F (37.1 °C) (09/29/20 1539)  Pulse: 68 (09/29/20 1539)  Resp: (!) 22 (09/29/20 1539)  BP: (!) 145/63 (09/29/20 1539)  SpO2: (!) 93 % (09/29/20 1539) Vital Signs (24h Range):  Temp:  [98.8 °F (37.1 °C)-99.1 °F (37.3 °C)] 98.8 °F (37.1 °C)  Pulse:  [65-72] 68  Resp:  [18-26] 22  SpO2:  [93 %-98 %] 93 %  BP: (113-145)/(54-74) 145/63     Weight: 57.8 kg (127 lb 6.8 oz)  Body mass index is 21.87 kg/m².    Physical Exam  Vitals signs reviewed.   Constitutional:       General: She is in acute distress.      Appearance: She is ill-appearing. She is not toxic-appearing or diaphoretic.      Comments: Some discomfort noted   HENT:      Head: Normocephalic and atraumatic.      Nose: Nose normal. No congestion or rhinorrhea.      Mouth/Throat:      Mouth: Mucous membranes are moist.      Pharynx: No oropharyngeal exudate or posterior oropharyngeal erythema.   Eyes:      Extraocular Movements: Extraocular movements intact.      Pupils: Pupils are equal, round, and reactive to light.   Neck:      Musculoskeletal: Normal range of motion and neck supple.   Cardiovascular:      Rate and Rhythm: Normal rate and regular rhythm.      Pulses: Normal pulses.      Heart sounds: Normal heart sounds.   Pulmonary:      Effort: Pulmonary effort is normal. No respiratory distress.   Abdominal:      General: Abdomen is  flat. There is distension.      Tenderness: There is abdominal tenderness. There is no guarding or rebound.      Comments: Slight tenderness in upper abdomen.  No rebound no guarding   Skin:     General: Skin is warm and dry.   Neurological:      General: No focal deficit present.         Significant Labs:  CBC:   Recent Labs   Lab 09/29/20  1049   WBC 9.52   RBC 4.62   HGB 14.4   HCT 42.4      MCV 92   MCH 31.2*   MCHC 34.0     BMP:   Recent Labs   Lab 09/29/20  1049         K 3.3*      CO2 24   BUN 17   CREATININE 0.4*   CALCIUM 9.3   MG 1.8     Cardiac markers:   Recent Labs   Lab 09/29/20  1049   TROPONINI 0.277*       Significant Diagnostics:  Ct scan reviewed and compared to ct from 2/20.  Pneumoperitoneum noted to moderate extent.  Small bowel diverticula noted throughout.  Collapsed small bowel distally noted.  No fluid noted within abdominal cavity    Assessment/Plan:     Perforated bowel  Pt clinically has a relatively benign abdominal exam, however, her radiographic findings are concerning for bowel perforation.  Particularly worrisome given her histoyr of bowel perforation.  D/w pt and her son.  I have recommended exploratory laparotomy evaluate and ensure no obstruction, ischemia or obvious bowel perforation.  Recommend proceeding with surgery this evening.        VTE Risk Mitigation (From admission, onward)         Ordered     IP VTE HIGH RISK PATIENT  Once      09/29/20 1538     Place sequential compression device  Until discontinued      09/29/20 1538                Thank you for your consult. I will follow-up with patient. Please contact us if you have any additional questions.    Eagle Gonzalez MD  General Surgery  [facility]

## 2020-09-29 NOTE — TRANSFER OF CARE
"Anesthesia Transfer of Care Note    Patient: Ariana Tiwari    Procedure(s) Performed: Procedure(s) (LRB):  LAPAROTOMY, EXPLORATORY (N/A)  LYSIS, ADHESIONS    Patient location: PACU    Anesthesia Type: general    Transport from OR: Transported from OR on room air with adequate spontaneous ventilation    Post pain: adequate analgesia    Post assessment: no apparent anesthetic complications    Post vital signs: stable    Level of consciousness: awake and alert    Nausea/Vomiting: no nausea/vomiting    Complications: none    Transfer of care protocol was followed      Last vitals:   Visit Vitals  BP (!) 145/63 (BP Location: Right arm, Patient Position: Lying)   Pulse 68   Temp 37.1 °C (98.8 °F) (Axillary)   Resp (!) 22   Ht 5' 4" (1.626 m)   Wt 57.8 kg (127 lb 6.8 oz)   LMP  (LMP Unknown)   SpO2 (!) 93%   BMI 21.87 kg/m²     "

## 2020-09-29 NOTE — ED NOTES
Jluis from lab called with critical troponin level of 0.277. Read back and verified. Dr. Schaefer notified. No new orders received at this time.

## 2020-09-29 NOTE — HPI
Ms Te awakened this AM , nauseated and confused. Pt denies fever chills, vomiting, hematemesis, melena, hematochezia, chest pain, new sensory or motor deficit. Pt has a Hx of being chronically treated with antibiotics for diverticulitis  1/10/2020 Perforated small bowel diverticulum  2/2020 Small bowel obstruction  The above is complicated by advanced Parkinson's disease with cognitive impairment  Pt's son is POA and both Pt and her son wish her to be a full code

## 2020-09-29 NOTE — OP NOTE
Date of surgery September 29, 2020    Staff surgeon:  Dr. Eagle Gonzalez    Preoperative diagnosis:  Pneumoperitoneum    Postoperative diagnosis:  The same    Procedure:  Exploratory laparotomy with lysis of adhesions    Anesthesia:  General endotracheal anesthesia    Indication for procedure:  This is a pleasant 75-year-old female who presented to the hospital initially with mental status changes.  She noticed some abdominal pain.  Patient noted to have pneumoperitoneum on chest x-ray.  This led the CT scan demonstrating pneumoperitoneum.  Patient with moderate tenderness on exam and therefore scheduled for exploratory laparotomy.    Description of procedure:  Following signing informed consent received from her son via discussion with him in person and also with the patient herself patient was taken the operating room placed supine position.  General endotracheal anesthesia was administered.  The abdomen was prepped and draped in standard fashion Mccabe catheter was inserted.  An appropriate time-out procedure was next performed.  I make a midline incision through her old scar and carried superiorly for a few cm.  Fascia is identified and sharply divided in the midline.  Abdominal cavity is entered.  Upon dividing the fashion there was release of small amount of air in the upper abdomen.  The fascia was injected with Exparel mixed with Marcaine.  An Dickson wound retractor was placed.  Palpation within the abdomen demonstrates no inflammatory findings on palpation.  There is no fluid at all present within the abdominal cavity.  This remains true throughout the case.  Ligament of Treitz is identified and small bowel was run from proximal to distal.  As mentioned there are no inflammatory findings and no succus or fecal material is noted within the abdominal cavity.  What is most significant is significant large diverticula present throughout the small bowel interspersed in particular throughout the duodenum and jejunum  and proximal ileum.  There is evidence of bowel dilatation that extends from the duodenum down to the area of previous small bowel resection and anastomosis.  Several cm distal to this the bowel was decompressed.  There are a few adhesions and slight twisting of the mesentery associated to his point of transition.  These adhesions were released and the bowel mesentery is return to his normal state.  The TI is then run to the cecum and ileocecal valve.  Patient has a significantly redundant cecum and ascending colon which flops up out of the abdominal cavity with ease.  There are no significant retroperitoneal attachments.  The bowel was reintroduced into the abdominal cavity the large bowel was run and there is no evidence of any obvious perforation noted.  I run the small bowel again to evaluate for possible injury for small-bowel perforation and again no obvious perforation is noted.  Abdominal cavity is irrigated copious amounts of fluid and bubbles or look for there is no evidence of a leak appreciated.  Decision was made to leave drains 1 coming from the right upper quadrant within the left upper quadrant which extend down the pericolic gutters and into the pelvis and 1 up over the upper abdomen.  The fascia was then closed with looped PDS.  Skin incision closed for Monocryl.  Patient was extubated taken to ICU stable condition.  She did have an NG remain in place.  Blood loss was 25 cc

## 2020-09-29 NOTE — ASSESSMENT & PLAN NOTE
Pt clinically has a relatively benign abdominal exam, however, her radiographic findings are concerning for bowel perforation.  Particularly worrisome given her histoyr of bowel perforation.  D/w pt and her son.  I have recommended exploratory laparotomy evaluate and ensure no obstruction, ischemia or obvious bowel perforation.  Recommend proceeding with surgery this evening.

## 2020-09-29 NOTE — BRIEF OP NOTE
[facility]  Brief Operative Note    SUMMARY     Surgery Date: 9/29/2020     Surgeon(s) and Role:     * Eagle Gonzalez MD - Primary    Assisting Surgeon: None    Pre-op Diagnosis: Pneumoperitoneum    Post-op Diagnosis:  Post-Op Diagnosis Codes:     Pneumoperitoneum    Procedure(s) (LRB):  LAPAROTOMY, EXPLORATORY (N/A)  LYSIS, ADHESIONS       Anesthesia: General    Description of Procedure: Exploratory laparotomy.  RElease of mild to moderate amount of air on entering abd cavity.  Exploration pursued.  NO evidence of fecal contamination.  NO succus or other fluid noted at all within the abdominal cavity.  NO inflammatory changes noted.  DIlated bowel proximal to anastomosis.  Soon after small bowel anastomosis bowel transition to normal caliber and was decompressed distally in small bowel.  Adhesions were release and small bowel untwisted in the small bowel down into the pelvis. Drains placed and abdomen closed.     Description of the findings of the procedure: see above.     Estimated Blood Loss: 25 mL    Estimated Blood Loss has been documented.         Specimens:   Specimen (12h ago, onward)    None          [id number]

## 2020-09-29 NOTE — CONSULTS
[facility]  Department of Cardiology  Consult Note      PATIENT NAME: Ariana Tiwari  MRN: [id number]  TODAY'S DATE: 09/29/2020  ADMIT DATE: 9/29/2020                          CONSULT REQUESTED BY: Je Walls MD    SUBJECTIVE     PRINCIPAL PROBLEM: Perforated abdominal viscus  Ms Te awakened this AM , nauseated and confused. Pt denies fever chills, vomiting, hematemesis, melena, hematochezia, chest pain, new sensory or motor deficit. Pt has a Hx of being chronically treated with antibiotics for diverticulitis  1/10/2020 Perforated small bowel diverticulum  2/2020 Small bowel obstruction  The above is complicated by advanced Parkinson's disease with cognitive impairment  Pt's son is POA and both Pt and her son wish her to be a full code       REASON FOR CONSULT:  Troponin elevation.      HPI:  Patient is a 75-year-old lady presented with nausea and confusion was thought to have viscus perforation and was emergently taken to the operating room.  Patient is just returned from the operating room.  Hemodynamically stable at the present time and patient is significantly sedated.  Patient does not appear to have any known heart disease.      Review of patient's allergies indicates:   Allergen Reactions    Gluten Diarrhea       Past Medical History:   Diagnosis Date    Allergy     Diverticulosis     Gluten enteropathy     Gluten intolerance     Hernia     Hypothyroidism     PD (Parkinson's disease) 9/16/2015    Right tremor type    Peptic ulcer disease     Right shoulder pain     Cirtisone injection 3/26/15 - Dr. oTlbert    Ulcer      Past Surgical History:   Procedure Laterality Date    ADENOIDECTOMY      COLONOSCOPY  03/01/2012    Dr. Teresa, repeat in 10 years for screening    COLONOSCOPY N/A 2/21/2018    Procedure: COLONOSCOPY;  Surgeon: Radha Deng MD;  Location[facility];  Service: Endoscopy;  Laterality: N/A;    CORRECTION OF HAMMER TOE Left 9/16/2020    Procedure:  CORRECTION, HAMMER TOE;  Surgeon: William Sprague MD;  Location: [facility] OR;  Service: Orthopedics;  Laterality: Left;    COSMETIC SURGERY      Broken nose    FRACTURE SURGERY  left wrist    With pin    HEMORRHOID SURGERY      HERNIA REPAIR Left 04/09/2015    Dr Lo; left inguinal    INTRALUMINAL GASTROINTESTINAL TRACT IMAGING VIA CAPSULE N/A 7/10/2018    Procedure: IMAGING, GI TRACT, INTRALUMINAL, VIA CAPSULE;  Surgeon: Radha Deng MD;  Location: [facility] ENDO;  Service: Endoscopy;  Laterality: N/A;    RHINOPLASTY TIP      TONSILLECTOMY      UPPER GASTROINTESTINAL ENDOSCOPY  05/21/2015    Dr. Gomez     Social History     Tobacco Use    Smoking status: Never Smoker    Smokeless tobacco: Never Used   Substance Use Topics    Alcohol use: Yes     Alcohol/week: 11.7 standard drinks     Types: 14 Standard drinks or equivalent per week     Comment: 2 glasses wine per dinner    Drug use: No        REVIEW OF SYSTEMS  Patient is sedated.    OBJECTIVE     VITAL SIGNS (Most Recent)  Temp: 98.8 °F (37.1 °C) (09/29/20 1539)  Pulse: 68 (09/29/20 1539)  Resp: (!) 22 (09/29/20 1539)  BP: (!) 145/63 (09/29/20 1539)  SpO2: (!) 93 % (09/29/20 1539)    VENTILATION STATUS  Resp: (!) 22 (09/29/20 1539)  SpO2: (!) 93 % (09/29/20 1539)       I & O (Last 24H):    Intake/Output Summary (Last 24 hours) at 9/29/2020 1849  Last data filed at 9/29/2020 1753  Gross per 24 hour   Intake 1300 ml   Output 175 ml   Net 1125 ml       WEIGHTS  Wt Readings from Last 1 Encounters:   09/29/20 1539 57.8 kg (127 lb 6.8 oz)   09/29/20 0953 54.4 kg (120 lb)       PHYSICAL EXAM  GENERAL:  Hemodynamically stable  HEENT:  Pupils reacting to light  NECK: No JVD.   THYROID: Thyroid not enlarged. No nodules present..   CARDIAC: Regular rate and rhythm. S1 is normal.S2 audible  CHEST ANATOMY: normal.   LUNGS: Clear to auscultation. No wheezing or rhonchi..   ABDOMEN: Soft   URINARY:padilla catheter   EXTREMITIES:  Trace edema  PERIPHERAL VASCULAR  SYSTEM:  Decreased palpable distal pulses.   CENTRAL NERVOUS SYSTEM:  Not evaluated.   SKIN: Skin warm  MUSCLE STRENGTH & TONE:  Not evaluated      HOME MEDICATIONS:  No current facility-administered medications on file prior to encounter.      Current Outpatient Medications on File Prior to Encounter   Medication Sig Dispense Refill    acetaminophen (TYLENOL) 500 MG tablet Take 1,000 mg by mouth every 6 (six) hours as needed for Pain.      ibandronate (BONIVA) 150 mg tablet Take 150 mg by mouth every 30 days.      ipratropium (ATROVENT) 42 mcg (0.06 %) nasal spray USE 2 SPRAY(S) IN EACH NOSTRIL 4 TIMES DAILY (Patient taking differently: 2 sprays by Nasal route 4 (four) times daily as needed. ) 15 mL 5    levothyroxine (SYNTHROID) 125 MCG tablet Take 0.5 tablets (62.5 mcg total) by mouth once daily. 15 tablet 11    vitamin D (VITAMIN D3) 1000 units Tab Take 1,000 Units by mouth once daily.      HYDROcodone-acetaminophen (NORCO) 5-325 mg per tablet Take 1 tablet by mouth every 4 (four) hours as needed for Pain. 24 tablet 0    ondansetron (ZOFRAN-ODT) 4 MG TbDL Take 2 tablets (8 mg total) by mouth every 8 (eight) hours as needed. 6 tablet 1    simethicone (GAS-X ORAL) Take 1 tablet by mouth as needed.          SCHEDULED MEDS:   ipratropium  2 spray Each Nostril QID    piperacillin-tazobactam (ZOSYN) IVPB  4.5 g Intravenous Q8H       CONTINUOUS INFUSIONS:   lactated ringers         PRN MEDS:calcium chloride IVPB, calcium chloride IVPB, calcium chloride IVPB, dextrose 50%, dextrose 50%, fentaNYL, glucagon (human recombinant), glucose, glucose, magnesium oxide, magnesium oxide, magnesium oxide, magnesium sulfate IVPB, magnesium sulfate IVPB, magnesium sulfate IVPB, magnesium sulfate IVPB, ondansetron, potassium chloride in water, potassium chloride in water, potassium chloride in water, potassium chloride in water, potassium chloride, potassium chloride, potassium chloride, potassium chloride, potassium  chloride, potassium chloride, potassium, sodium phosphates, potassium, sodium phosphates, potassium, sodium phosphates, sodium chloride 0.9%, sodium phosphate IVPB, sodium phosphate IVPB, sodium phosphate IVPB, sodium phosphate IVPB, sodium phosphate IVPB    LABS AND DIAGNOSTICS     CBC LAST 3 DAYS  Recent Labs   Lab 09/29/20  1049   WBC 9.52   RBC 4.62   HGB 14.4   HCT 42.4   MCV 92   MCH 31.2*   MCHC 34.0   RDW 12.0      MPV 10.4   GRAN 85.2*  8.1*   LYMPH 7.8*  0.7*   MONO 6.2  0.6   BASO 0.03   NRBC 0       COAGULATION LAST 3 DAYS  Recent Labs   Lab 09/29/20  1049   LABPT 14.4   INR 1.2   APTT 30.4       CHEMISTRY LAST 3 DAYS  Recent Labs   Lab 09/29/20  1049      K 3.3*      CO2 24   ANIONGAP 14   BUN 17   CREATININE 0.4*      CALCIUM 9.3   MG 1.8   ALBUMIN 4.4   PROT 7.3   ALKPHOS 84   ALT 19   AST 22   BILITOT 0.6       CARDIAC PROFILE LAST 3 DAYS  Recent Labs   Lab 09/29/20  1049   *   TROPONINI 0.277*       ENDOCRINE LAST 3 DAYS  No results for input(s): TSH, PROCAL in the last 168 hours.    LAST ARTERIAL BLOOD GAS  ABG  No results for input(s): PH, PO2, PCO2, HCO3, BE in the last 168 hours.    LAST 7 DAYS MICROBIOLOGY   Microbiology Results (last 7 days)     Procedure Component Value Units Date/Time    Blood culture #1 **CANNOT BE ORDERED STAT** [[id number]] Collected: 09/29/20 1033    Order Status: Completed Specimen: Blood from Peripheral, Antecubital, Right Updated: 09/29/20 1758     Blood Culture, Routine No Growth to date    Blood culture #2 **CANNOT BE ORDERED STAT** [[id number]] Collected: 09/29/20 1049    Order Status: Completed Specimen: Blood from Peripheral, Forearm, Right Updated: 09/29/20 1758     Blood Culture, Routine No Growth to date          MOST RECENT IMAGING      ECHOCARDIOGRAM RESULTS (last 5)  No results found for this or any previous visit.    CURRENT/PREVIOUS VISIT EKG  Results for orders placed or performed during the hospital encounter of  09/29/20   EKG 12-lead    Collection Time: 09/29/20  9:59 AM    Narrative    Test Reason : R41.82,    Vent. Rate : 070 BPM     Atrial Rate : 070 BPM     P-R Int : 136 ms          QRS Dur : 084 ms      QT Int : 404 ms       P-R-T Axes : 025 -27 077 degrees     QTc Int : 436 ms    Normal sinus rhythm  Normal ECG  When compared with ECG of 11-JAN-2020 09:56,  Criteria for Septal infarct are no longer Present  T wave inversion no longer evident in Lateral leads    Referred By: [email or web address]   SELF           Confirmed By:            ASSESSMENT/PLAN:     Active Hospital Problems    Diagnosis    *Perforated abdominal viscus    Perforated bowel    Abdominal pain    PD (Parkinson's disease)     Right tremor type      Hypothyroidism    Gluten enteropathy       ASSESSMENT & PLAN:   1.  Acute abdomen  2.  Possible perforated viscus likely from the small bowel obstruction  3.  Multiple abdominal surgeries  4.  Hypothyroidism  5.  Elevated troponin    RECOMMENDATIONS:  1.  Patient has acute abdomen possible perforated viscus had been taken to the OR emergently.  Patient is sedated and comfortable at the present time.  2.  Patient was at moderate risk for cardiac events.  3.  The troponin elevation could be secondary due to demand ischemia or something similar to Takotsubo's syndrome.  4.  At this point in time will continue conservative medical management.  5.  Will do EKG .  And further cardiac testing at a later date.  6.  Thank you for the consultation.        Juan Finch MD  [facility]  Department of Cardiology  Date of Service: 09/29/2020  6:49 PM

## 2020-09-29 NOTE — ANESTHESIA PROCEDURE NOTES
Intubation  Performed by: Molina Lawrence CRNA  Authorized by: Pierre Lynn Jr., MD     Intubation:     Induction:  Rapid sequence induction    Intubated:  Postinduction    Mask Ventilation:  N/a    Attempts:  1    Attempted By:  CRNA    Method of Intubation:  Direct    Blade:  Odell 2    Laryngeal View Grade: Grade IIA - cords partially seen      Difficult Airway Encountered?: No      Complications:  None    Airway Device:  Oral endotracheal tube    Airway Device Size:  7.5    Style/Cuff Inflation:  Cuffed (inflated to minimal occlusive pressure)    Tube secured:  21    Secured at:  The lips    Placement Verified By:  Capnometry    Complicating Factors:  None    Findings Post-Intubation:  BS equal bilateral and atraumatic/condition of teeth unchanged

## 2020-09-29 NOTE — SUBJECTIVE & OBJECTIVE
Past Medical History:   Diagnosis Date    Allergy     Diverticulosis     Gluten enteropathy     Gluten intolerance     Hernia     Hypothyroidism     PD (Parkinson's disease) 9/16/2015    Right tremor type    Peptic ulcer disease     Right shoulder pain     Cirtisone injection 3/26/15 - Dr. Tolbert    Ulcer        Past Surgical History:   Procedure Laterality Date    ADENOIDECTOMY      COLONOSCOPY  03/01/2012    Dr. Teresa, repeat in 10 years for screening    COLONOSCOPY N/A 2/21/2018    Procedure: COLONOSCOPY;  Surgeon: Radha Deng MD;  Location: [facility];  Service: Endoscopy;  Laterality: N/A;    CORRECTION OF HAMMER TOE Left 9/16/2020    Procedure: CORRECTION, HAMMER TOE;  Surgeon: William Sprague MD;  Location: [facility] OR;  Service: Orthopedics;  Laterality: Left;    COSMETIC SURGERY      Broken nose    FRACTURE SURGERY  left wrist    With pin    HEMORRHOID SURGERY      HERNIA REPAIR Left 04/09/2015    Dr Lo; left inguinal    INTRALUMINAL GASTROINTESTINAL TRACT IMAGING VIA CAPSULE N/A 7/10/2018    Procedure: IMAGING, GI TRACT, INTRALUMINAL, VIA CAPSULE;  Surgeon: Radha Deng MD;  Location: [facility];  Service: Endoscopy;  Laterality: N/A;    RHINOPLASTY TIP      TONSILLECTOMY      UPPER GASTROINTESTINAL ENDOSCOPY  05/21/2015    Dr. Gomez       Review of patient's allergies indicates:   Allergen Reactions    Gluten Diarrhea       No current facility-administered medications on file prior to encounter.      Current Outpatient Medications on File Prior to Encounter   Medication Sig    acetaminophen (TYLENOL) 500 MG tablet Take 1,000 mg by mouth every 6 (six) hours as needed for Pain.    ibandronate (BONIVA) 150 mg tablet Take 150 mg by mouth every 30 days.    ipratropium (ATROVENT) 42 mcg (0.06 %) nasal spray USE 2 SPRAY(S) IN EACH NOSTRIL 4 TIMES DAILY (Patient taking differently: 2 sprays by Nasal route 4 (four) times daily as needed. )    levothyroxine (SYNTHROID) 125  MCG tablet Take 0.5 tablets (62.5 mcg total) by mouth once daily.    vitamin D (VITAMIN D3) 1000 units Tab Take 1,000 Units by mouth once daily.    HYDROcodone-acetaminophen (NORCO) 5-325 mg per tablet Take 1 tablet by mouth every 4 (four) hours as needed for Pain.    ondansetron (ZOFRAN-ODT) 4 MG TbDL Take 2 tablets (8 mg total) by mouth every 8 (eight) hours as needed.    simethicone (GAS-X ORAL) Take 1 tablet by mouth as needed.      Family History     Problem Relation (Age of Onset)    Alcohol abuse Brother    Diabetes Mother, Son    Early death Brother    Heart disease Brother    No Known Problems Father    Obesity Sister        Tobacco Use    Smoking status: Never Smoker    Smokeless tobacco: Never Used   Substance and Sexual Activity    Alcohol use: Yes     Alcohol/week: 11.7 standard drinks     Types: 14 Standard drinks or equivalent per week     Comment: 2 glasses wine per dinner    Drug use: No    Sexual activity: Never     Review of Systems   Constitutional: Positive for activity change, diaphoresis and fatigue. Negative for chills and fever.   HENT: Negative.    Eyes: Negative.    Respiratory: Negative.    Cardiovascular: Negative.    Gastrointestinal: Positive for abdominal distention, abdominal pain, constipation and nausea. Negative for anal bleeding, blood in stool, diarrhea, rectal pain and vomiting.   Endocrine: Positive for cold intolerance and heat intolerance.   Genitourinary: Negative.    Musculoskeletal: Positive for arthralgias, joint swelling and myalgias.        Left shoulder   Allergic/Immunologic: Positive for food allergies.        Gluten enteropathy   Neurological: Positive for dizziness, tremors, weakness and light-headedness.   Hematological: Bruises/bleeds easily.   Psychiatric/Behavioral: Positive for confusion and decreased concentration. The patient is nervous/anxious.      Objective:     Vital Signs (Most Recent):  Temp: 99.1 °F (37.3 °C) (09/29/20 1107)  Pulse: 67  (09/29/20 1300)  Resp: 18 (09/29/20 1300)  BP: (!) 122/55 (09/29/20 1300)  SpO2: (!) 94 % (09/29/20 1300) Vital Signs (24h Range):  Temp:  [99.1 °F (37.3 °C)] 99.1 °F (37.3 °C)  Pulse:  [65-72] 67  Resp:  [18-22] 18  SpO2:  [94 %-98 %] 94 %  BP: (122-130)/(55-60) 122/55     Weight: 54.4 kg (120 lb)  Body mass index is 19.97 kg/m².    Physical Exam  Vitals signs and nursing note reviewed.   Constitutional:       General: She is not in acute distress.     Appearance: Normal appearance. She is not ill-appearing.   HENT:      Head: Normocephalic and atraumatic.      Nose: Nose normal.      Mouth/Throat:      Mouth: Mucous membranes are moist.   Eyes:      Extraocular Movements: Extraocular movements intact.      Pupils: Pupils are equal, round, and reactive to light.   Neck:      Musculoskeletal: Neck supple.   Cardiovascular:      Rate and Rhythm: Normal rate. Rhythm irregular.      Heart sounds: Gallop present.    Pulmonary:      Effort: Pulmonary effort is normal.      Breath sounds: Rales present.      Comments: Diminished bibasilar breath sounds with occasional crackles above  Abdominal:      General: There is distension.      Tenderness: There is no abdominal tenderness. There is no guarding.      Comments: Decreased bowel sounds   Musculoskeletal:      Comments: Cog wheeling with limited ROM of all extremities   Skin:     General: Skin is warm.   Neurological:      Mental Status: She is alert.      Comments: Exam limited by condition   Psychiatric:      Comments: Exam limited by condition             CRANIAL NERVES     CN III, IV, VI   Pupils are equal, round, and reactive to light.       Significant Labs:   Recent Lab Results       09/29/20  1245   09/29/20  1106   09/29/20  1049   09/29/20  1045        Albumin     4.4       Alkaline Phosphatase     84       ALT     19       Anion Gap     14       Appearance, UA   Clear         aPTT     30.4  Comment:  Therapeutic Range of 67.5-105.1 secs.  Equivalent to Heparin  Concentration of anti-Xa 0.3-0.7 IU/ml.         AST     22       Bacteria, UA   Negative         Baso #     0.03       Basophil%     0.3       Bilirubin (UA)   Negative         BILIRUBIN TOTAL     0.6  Comment:  For infants and newborns, interpretation of results should be based  on gestational age, weight and in agreement with clinical  observations.  Premature Infant recommended reference ranges:  Up to 24 hours.............<8.0 mg/dL  Up to 48 hours............<12.0 mg/dL  3-5 days..................<15.0 mg/dL  6-29 days.................<15.0 mg/dL         BNP     172  Comment:  Values of less than 100 pg/ml are consistent with non-CHF populations.       BUN, Bld     17       Calcium     9.3       Chloride     100       CO2     24       Color, UA   Yellow         Creatinine     0.4       Differential Method     Automated       eGFR if      >60.0       eGFR if non      >60.0  Comment:  Calculation used to obtain the estimated glomerular filtration  rate (eGFR) is the CKD-EPI equation.          Eos #     0.0       Eosinophil%     0.1       Glucose     102       Glucose, UA   Negative         Gran # (ANC)     8.1       Gran%     85.2       Hematocrit     42.4       Hemoglobin     14.4       Hyaline Casts, UA   3         Immature Grans (Abs)     0.04  Comment:  Mild elevation in immature granulocytes is non specific and   can be seen in a variety of conditions including stress response,   acute inflammation, trauma and pregnancy. Correlation with other   laboratory and clinical findings is essential.         Immature Granulocytes     0.4       INR     1.2  Comment:  Coumadin Therapy:  INR: 2.0-3.0 conventional anticoagulation  INR: 2.5-3.5 intensive anticoagulation         Ketones, UA   Negative         Lactate, Pranay     0.9  Comment:  Falsely low lactic acid results can be found in samples   containing >=13.0 mg/dL total bilirubin and/or >=3.5 mg/dL   direct bilirubin.          Leukocytes, UA   Negative         Lymph #     0.7       Lymph%     7.8       Magnesium     1.8       MCH     31.2       MCHC     34.0       MCV     92       Microscopic Comment   SEE COMMENT  Comment:  Other formed elements not mentioned in the report are not   present in the microscopic examination.            Mono #     0.6       Mono%     6.2       MPV     10.4       NITRITE UA   Negative         nRBC     0       Occult Blood UA   2+         pH, UA   6.0         Platelets     187       POC Creatinine       0.4     Potassium     3.3       PROTEIN TOTAL     7.3       Protein, UA   Trace  Comment:  Recommend a 24 hour urine protein or a urine   protein/creatinine ratio if globulin induced proteinuria is  clinically suspected.           PT     14.4       RBC     4.62       RBC, UA   5         RDW     12.0       Sample       VENOUS     SARS-CoV-2 RNA, Amplification, Qual Negative  Comment:  This test utilizes isothermal nucleic acid amplification   technology to detect the SARS-CoV-2 RdRp nucleic acid segment.   The analytical sensitivity (limit of detection) is 125 genome   equivalents/mL.   A POSITIVE result implies infection with the SARS-CoV-2 virus;  the patient is presumed to be contagious.    A NEGATIVE result means that SARS-CoV-2 nucleic acids are not  present above the limit of detection. A NEGATIVE result should be   treated as presumptive. It does not rule out the possibility of   COVID-19 and should not be the sole basis for treatment decisions.   If COVID-19 is strongly suspected based on clinical and exposure   history, re-testing using an alternate molecular assay should be   considered.   This test is only for use under the Food and Drug   Administration s Emergency Use Authorization (EUA).   Commercial kits are provided by [employer].   Performance characteristics of the EUA have been independently  verified by [facility] Department of  Pathology and Laboratory Medicine.    _________________________________________________________________  The ID NOW COVID-19 Letter of Authorization, along with the   authorized Fact Sheet for Healthcare Providers, the authorized Fact  Sheet for Patients, and authorized labeling are available on the FDA   website:  www.fda.gov/MedicalDevices/Safety/EmergencySituations/sih812046.htm             Sodium     138       Specific [place], UA   1.025         Specimen UA   Urine, Clean Catch         Squam Epithel, UA   1         Troponin I     0.277  Comment:  Trop critical result(s) repeated. Called and verbal readback obtained   from Shelly Last RN/ED by BRYAN 09/29/2020 11:34         UROBILINOGEN UA   Negative         WBC, UA   1         WBC     9.52             Significant Imaging: I have reviewed all pertinent imaging results/findings within the past 24 hours.

## 2020-09-29 NOTE — SUBJECTIVE & OBJECTIVE
No current facility-administered medications on file prior to encounter.      Current Outpatient Medications on File Prior to Encounter   Medication Sig    acetaminophen (TYLENOL) 500 MG tablet Take 1,000 mg by mouth every 6 (six) hours as needed for Pain.    ibandronate (BONIVA) 150 mg tablet Take 150 mg by mouth every 30 days.    ipratropium (ATROVENT) 42 mcg (0.06 %) nasal spray USE 2 SPRAY(S) IN EACH NOSTRIL 4 TIMES DAILY (Patient taking differently: 2 sprays by Nasal route 4 (four) times daily as needed. )    levothyroxine (SYNTHROID) 125 MCG tablet Take 0.5 tablets (62.5 mcg total) by mouth once daily.    vitamin D (VITAMIN D3) 1000 units Tab Take 1,000 Units by mouth once daily.    HYDROcodone-acetaminophen (NORCO) 5-325 mg per tablet Take 1 tablet by mouth every 4 (four) hours as needed for Pain.    ondansetron (ZOFRAN-ODT) 4 MG TbDL Take 2 tablets (8 mg total) by mouth every 8 (eight) hours as needed.    simethicone (GAS-X ORAL) Take 1 tablet by mouth as needed.        Review of patient's allergies indicates:   Allergen Reactions    Gluten Diarrhea       Past Medical History:   Diagnosis Date    Allergy     Diverticulosis     Gluten enteropathy     Gluten intolerance     Hernia     Hypothyroidism     PD (Parkinson's disease) 9/16/2015    Right tremor type    Peptic ulcer disease     Right shoulder pain     Cirtisone injection 3/26/15 - Dr. Tolbert    Ulcer      Past Surgical History:   Procedure Laterality Date    ADENOIDECTOMY      COLONOSCOPY  03/01/2012    Dr. Teresa, repeat in 10 years for screening    COLONOSCOPY N/A 2/21/2018    Procedure: COLONOSCOPY;  Surgeon: Radha Deng MD;  Location: [facility];  Service: Endoscopy;  Laterality: N/A;    CORRECTION OF HAMMER TOE Left 9/16/2020    Procedure: CORRECTION, HAMMER TOE;  Surgeon: William Sprague MD;  Location: [facility] OR;  Service: Orthopedics;  Laterality: Left;    COSMETIC SURGERY      Broken nose    FRACTURE SURGERY   left wrist    With pin    HEMORRHOID SURGERY      HERNIA REPAIR Left 04/09/2015    Dr Lo; left inguinal    INTRALUMINAL GASTROINTESTINAL TRACT IMAGING VIA CAPSULE N/A 7/10/2018    Procedure: IMAGING, GI TRACT, INTRALUMINAL, VIA CAPSULE;  Surgeon: Radha Deng MD;  Location: [facility];  Service: Endoscopy;  Laterality: N/A;    RHINOPLASTY TIP      TONSILLECTOMY      UPPER GASTROINTESTINAL ENDOSCOPY  05/21/2015    Dr. Gomez     Family History     Problem Relation (Age of Onset)    Alcohol abuse Brother    Diabetes Mother, Son    Early death Brother    Heart disease Brother    No Known Problems Father    Obesity Sister        Tobacco Use    Smoking status: Never Smoker    Smokeless tobacco: Never Used   Substance and Sexual Activity    Alcohol use: Yes     Alcohol/week: 11.7 standard drinks     Types: 14 Standard drinks or equivalent per week     Comment: 2 glasses wine per dinner    Drug use: No    Sexual activity: Never     Review of Systems   Unable to perform ROS: Acuity of condition     Objective:     Vital Signs (Most Recent):  Temp: 98.8 °F (37.1 °C) (09/29/20 1539)  Pulse: 68 (09/29/20 1539)  Resp: (!) 22 (09/29/20 1539)  BP: (!) 145/63 (09/29/20 1539)  SpO2: (!) 93 % (09/29/20 1539) Vital Signs (24h Range):  Temp:  [98.8 °F (37.1 °C)-99.1 °F (37.3 °C)] 98.8 °F (37.1 °C)  Pulse:  [65-72] 68  Resp:  [18-26] 22  SpO2:  [93 %-98 %] 93 %  BP: (113-145)/(54-74) 145/63     Weight: 57.8 kg (127 lb 6.8 oz)  Body mass index is 21.87 kg/m².    Physical Exam  Vitals signs reviewed.   Constitutional:       General: She is in acute distress.      Appearance: She is ill-appearing. She is not toxic-appearing or diaphoretic.      Comments: Some discomfort noted   HENT:      Head: Normocephalic and atraumatic.      Nose: Nose normal. No congestion or rhinorrhea.      Mouth/Throat:      Mouth: Mucous membranes are moist.      Pharynx: No oropharyngeal exudate or posterior oropharyngeal erythema.   Eyes:       Extraocular Movements: Extraocular movements intact.      Pupils: Pupils are equal, round, and reactive to light.   Neck:      Musculoskeletal: Normal range of motion and neck supple.   Cardiovascular:      Rate and Rhythm: Normal rate and regular rhythm.      Pulses: Normal pulses.      Heart sounds: Normal heart sounds.   Pulmonary:      Effort: Pulmonary effort is normal. No respiratory distress.   Abdominal:      General: Abdomen is flat. There is distension.      Tenderness: There is abdominal tenderness. There is no guarding or rebound.      Comments: Slight tenderness in upper abdomen.  No rebound no guarding   Skin:     General: Skin is warm and dry.   Neurological:      General: No focal deficit present.         Significant Labs:  CBC:   Recent Labs   Lab 09/29/20  1049   WBC 9.52   RBC 4.62   HGB 14.4   HCT 42.4      MCV 92   MCH 31.2*   MCHC 34.0     BMP:   Recent Labs   Lab 09/29/20  1049         K 3.3*      CO2 24   BUN 17   CREATININE 0.4*   CALCIUM 9.3   MG 1.8     Cardiac markers:   Recent Labs   Lab 09/29/20  1049   TROPONINI 0.277*       Significant Diagnostics:  Ct scan reviewed and compared to ct from 2/20.  Pneumoperitoneum noted to moderate extent.  Small bowel diverticula noted throughout.  Collapsed small bowel distally noted.  No fluid noted within abdominal cavity

## 2020-09-30 ENCOUNTER — CLINICAL SUPPORT (OUTPATIENT)
Dept: CARDIOLOGY | Facility: HOSPITAL | Age: 75
DRG: 336 | End: 2020-09-30
Attending: SURGERY
Payer: MEDICARE

## 2020-09-30 VITALS — BODY MASS INDEX: 21.68 KG/M2 | WEIGHT: 127 LBS | HEIGHT: 64 IN

## 2020-09-30 LAB
ALBUMIN SERPL BCP-MCNC: 3.5 G/DL (ref 3.5–5.2)
ALP SERPL-CCNC: 65 U/L (ref 55–135)
ALT SERPL W/O P-5'-P-CCNC: 15 U/L (ref 10–44)
ANION GAP SERPL CALC-SCNC: 11 MMOL/L (ref 8–16)
ANION GAP SERPL CALC-SCNC: 11 MMOL/L (ref 8–16)
AORTIC ROOT ANNULUS: 2.45 CM
AORTIC VALVE CUSP SEPERATION: 1.49 CM
AST SERPL-CCNC: 20 U/L (ref 10–40)
AV INDEX (PROSTH): 0.77
AV MEAN GRADIENT: 7 MMHG
AV PEAK GRADIENT: 13 MMHG
AV VALVE AREA: 2.46 CM2
AV VELOCITY RATIO: 63.22
BASOPHILS # BLD AUTO: 0.04 K/UL (ref 0–0.2)
BASOPHILS # BLD AUTO: 0.04 K/UL (ref 0–0.2)
BASOPHILS NFR BLD: 0.5 % (ref 0–1.9)
BASOPHILS NFR BLD: 0.5 % (ref 0–1.9)
BILIRUB SERPL-MCNC: 0.8 MG/DL (ref 0.1–1)
BSA FOR ECHO PROCEDURE: 1.61 M2
BUN SERPL-MCNC: 11 MG/DL (ref 8–23)
BUN SERPL-MCNC: 11 MG/DL (ref 8–23)
CALCIUM SERPL-MCNC: 8.3 MG/DL (ref 8.7–10.5)
CALCIUM SERPL-MCNC: 8.3 MG/DL (ref 8.7–10.5)
CHLORIDE SERPL-SCNC: 107 MMOL/L (ref 95–110)
CHLORIDE SERPL-SCNC: 107 MMOL/L (ref 95–110)
CO2 SERPL-SCNC: 22 MMOL/L (ref 23–29)
CO2 SERPL-SCNC: 22 MMOL/L (ref 23–29)
CREAT SERPL-MCNC: 0.4 MG/DL (ref 0.5–1.4)
CREAT SERPL-MCNC: 0.4 MG/DL (ref 0.5–1.4)
CV ECHO LV RWT: 0.54 CM
DIFFERENTIAL METHOD: ABNORMAL
DIFFERENTIAL METHOD: ABNORMAL
DOP CALC AO PEAK VEL: 1.78 M/S
DOP CALC AO VTI: 38.75 CM
DOP CALC LVOT AREA: 3.2 CM2
DOP CALC LVOT DIAMETER: 2.02 CM
DOP CALC LVOT PEAK VEL: 112.53 M/S
DOP CALC LVOT STROKE VOLUME: 95.16 CM3
DOP CALCLVOT PEAK VEL VTI: 29.71 CM
E WAVE DECELERATION TIME: 240.35 MSEC
E/A RATIO: 1.17
E/E' RATIO: 7.26 M/S
ECHO LV POSTERIOR WALL: 1.05 CM (ref 0.6–1.1)
EOSINOPHIL # BLD AUTO: 0 K/UL (ref 0–0.5)
EOSINOPHIL # BLD AUTO: 0 K/UL (ref 0–0.5)
EOSINOPHIL NFR BLD: 0 % (ref 0–8)
EOSINOPHIL NFR BLD: 0 % (ref 0–8)
ERYTHROCYTE [DISTWIDTH] IN BLOOD BY AUTOMATED COUNT: 12 % (ref 11.5–14.5)
ERYTHROCYTE [DISTWIDTH] IN BLOOD BY AUTOMATED COUNT: 12 % (ref 11.5–14.5)
EST. GFR  (AFRICAN AMERICAN): >60 ML/MIN/1.73 M^2
EST. GFR  (AFRICAN AMERICAN): >60 ML/MIN/1.73 M^2
EST. GFR  (NON AFRICAN AMERICAN): >60 ML/MIN/1.73 M^2
EST. GFR  (NON AFRICAN AMERICAN): >60 ML/MIN/1.73 M^2
FRACTIONAL SHORTENING: 33 % (ref 28–44)
GLUCOSE SERPL-MCNC: 96 MG/DL (ref 70–110)
GLUCOSE SERPL-MCNC: 96 MG/DL (ref 70–110)
HCT VFR BLD AUTO: 39.5 % (ref 37–48.5)
HCT VFR BLD AUTO: 39.5 % (ref 37–48.5)
HGB BLD-MCNC: 13.3 G/DL (ref 12–16)
HGB BLD-MCNC: 13.3 G/DL (ref 12–16)
IMM GRANULOCYTES # BLD AUTO: 0.05 K/UL (ref 0–0.04)
IMM GRANULOCYTES # BLD AUTO: 0.05 K/UL (ref 0–0.04)
IMM GRANULOCYTES NFR BLD AUTO: 0.6 % (ref 0–0.5)
IMM GRANULOCYTES NFR BLD AUTO: 0.6 % (ref 0–0.5)
INTERVENTRICULAR SEPTUM: 1.03 CM (ref 0.6–1.1)
IVRT: 87.02 MSEC
LEFT ATRIUM SIZE: 3.49 CM
LEFT INTERNAL DIMENSION IN SYSTOLE: 2.63 CM (ref 2.1–4)
LEFT VENTRICLE DIASTOLIC VOLUME INDEX: 59.18 ML/M2
LEFT VENTRICLE DIASTOLIC VOLUME: 95.44 ML
LEFT VENTRICLE MASS INDEX: 80 G/M2
LEFT VENTRICLE SYSTOLIC VOLUME INDEX: 18.8 ML/M2
LEFT VENTRICLE SYSTOLIC VOLUME: 30.3 ML
LEFT VENTRICULAR INTERNAL DIMENSION IN DIASTOLE: 3.91 CM (ref 3.5–6)
LEFT VENTRICULAR MASS: 129.68 G
LV LATERAL E/E' RATIO: 6.27 M/S
LV SEPTAL E/E' RATIO: 8.63 M/S
LYMPHOCYTES # BLD AUTO: 0.8 K/UL (ref 1–4.8)
LYMPHOCYTES # BLD AUTO: 0.8 K/UL (ref 1–4.8)
LYMPHOCYTES NFR BLD: 10.2 % (ref 18–48)
LYMPHOCYTES NFR BLD: 10.2 % (ref 18–48)
MAGNESIUM SERPL-MCNC: 1.6 MG/DL (ref 1.6–2.6)
MCH RBC QN AUTO: 30.4 PG (ref 27–31)
MCH RBC QN AUTO: 30.4 PG (ref 27–31)
MCHC RBC AUTO-ENTMCNC: 33.7 G/DL (ref 32–36)
MCHC RBC AUTO-ENTMCNC: 33.7 G/DL (ref 32–36)
MCV RBC AUTO: 90 FL (ref 82–98)
MCV RBC AUTO: 90 FL (ref 82–98)
MONOCYTES # BLD AUTO: 0.5 K/UL (ref 0.3–1)
MONOCYTES # BLD AUTO: 0.5 K/UL (ref 0.3–1)
MONOCYTES NFR BLD: 6.3 % (ref 4–15)
MONOCYTES NFR BLD: 6.3 % (ref 4–15)
MV PEAK A VEL: 0.59 M/S
MV PEAK E VEL: 0.69 M/S
NEUTROPHILS # BLD AUTO: 6.6 K/UL (ref 1.8–7.7)
NEUTROPHILS # BLD AUTO: 6.6 K/UL (ref 1.8–7.7)
NEUTROPHILS NFR BLD: 82.4 % (ref 38–73)
NEUTROPHILS NFR BLD: 82.4 % (ref 38–73)
NRBC BLD-RTO: 0 /100 WBC
NRBC BLD-RTO: 0 /100 WBC
PHOSPHATE SERPL-MCNC: 3.6 MG/DL (ref 2.7–4.5)
PISA TR MAX VEL: 3.11 M/S
PLATELET # BLD AUTO: 167 K/UL (ref 150–350)
PLATELET # BLD AUTO: 167 K/UL (ref 150–350)
PMV BLD AUTO: 10.7 FL (ref 9.2–12.9)
PMV BLD AUTO: 10.7 FL (ref 9.2–12.9)
POTASSIUM SERPL-SCNC: 3 MMOL/L (ref 3.5–5.1)
POTASSIUM SERPL-SCNC: 3 MMOL/L (ref 3.5–5.1)
PROT SERPL-MCNC: 6 G/DL (ref 6–8.4)
PV PEAK VELOCITY: 97.78 CM/S
RA PRESSURE: 3 MMHG
RBC # BLD AUTO: 4.37 M/UL (ref 4–5.4)
RBC # BLD AUTO: 4.37 M/UL (ref 4–5.4)
RIGHT VENTRICULAR END-DIASTOLIC DIMENSION: 273 CM
SODIUM SERPL-SCNC: 140 MMOL/L (ref 136–145)
SODIUM SERPL-SCNC: 140 MMOL/L (ref 136–145)
TDI LATERAL: 0.11 M/S
TDI SEPTAL: 0.08 M/S
TDI: 0.1 M/S
TR MAX PG: 39 MMHG
TV REST PULMONARY ARTERY PRESSURE: 42 MMHG
WBC # BLD AUTO: 7.96 K/UL (ref 3.9–12.7)
WBC # BLD AUTO: 7.96 K/UL (ref 3.9–12.7)

## 2020-09-30 PROCEDURE — 83735 ASSAY OF MAGNESIUM: CPT

## 2020-09-30 PROCEDURE — 25000003 PHARM REV CODE 250: Performed by: INTERNAL MEDICINE

## 2020-09-30 PROCEDURE — 25000003 PHARM REV CODE 250

## 2020-09-30 PROCEDURE — 93010 ELECTROCARDIOGRAM REPORT: CPT | Mod: ,,, | Performed by: INTERNAL MEDICINE

## 2020-09-30 PROCEDURE — 63600175 PHARM REV CODE 636 W HCPCS: Performed by: INTERNAL MEDICINE

## 2020-09-30 PROCEDURE — 36415 COLL VENOUS BLD VENIPUNCTURE: CPT

## 2020-09-30 PROCEDURE — 97530 THERAPEUTIC ACTIVITIES: CPT

## 2020-09-30 PROCEDURE — 63600175 PHARM REV CODE 636 W HCPCS: Performed by: SURGERY

## 2020-09-30 PROCEDURE — 93306 TTE W/DOPPLER COMPLETE: CPT | Mod: 26,,, | Performed by: INTERNAL MEDICINE

## 2020-09-30 PROCEDURE — 93306 TTE W/DOPPLER COMPLETE: CPT

## 2020-09-30 PROCEDURE — 84100 ASSAY OF PHOSPHORUS: CPT

## 2020-09-30 PROCEDURE — 93010 EKG 12-LEAD: ICD-10-PCS | Mod: ,,, | Performed by: INTERNAL MEDICINE

## 2020-09-30 PROCEDURE — 25000003 PHARM REV CODE 250: Performed by: SURGERY

## 2020-09-30 PROCEDURE — 85025 COMPLETE CBC W/AUTO DIFF WBC: CPT

## 2020-09-30 PROCEDURE — 93306 ECHO (CUPID ONLY): ICD-10-PCS | Mod: 26,,, | Performed by: INTERNAL MEDICINE

## 2020-09-30 PROCEDURE — 80053 COMPREHEN METABOLIC PANEL: CPT

## 2020-09-30 PROCEDURE — 97163 PT EVAL HIGH COMPLEX 45 MIN: CPT

## 2020-09-30 PROCEDURE — 99900035 HC TECH TIME PER 15 MIN (STAT)

## 2020-09-30 PROCEDURE — 93005 ELECTROCARDIOGRAM TRACING: CPT | Performed by: INTERNAL MEDICINE

## 2020-09-30 PROCEDURE — 21400001 HC TELEMETRY ROOM

## 2020-09-30 RX ORDER — HYDROMORPHONE HYDROCHLORIDE 1 MG/ML
1 INJECTION, SOLUTION INTRAMUSCULAR; INTRAVENOUS; SUBCUTANEOUS EVERY 4 HOURS PRN
Status: DISCONTINUED | OUTPATIENT
Start: 2020-09-30 | End: 2020-10-04 | Stop reason: HOSPADM

## 2020-09-30 RX ORDER — ACETAMINOPHEN 10 MG/ML
1000 INJECTION, SOLUTION INTRAVENOUS EVERY 6 HOURS
Status: DISPENSED | OUTPATIENT
Start: 2020-09-30 | End: 2020-09-30

## 2020-09-30 RX ORDER — MUPIROCIN 20 MG/G
1 OINTMENT TOPICAL 2 TIMES DAILY
Status: DISCONTINUED | OUTPATIENT
Start: 2020-09-30 | End: 2020-10-04 | Stop reason: HOSPADM

## 2020-09-30 RX ORDER — CHLORHEXIDINE GLUCONATE ORAL RINSE 1.2 MG/ML
15 SOLUTION DENTAL 2 TIMES DAILY
Status: DISCONTINUED | OUTPATIENT
Start: 2020-09-30 | End: 2020-10-04 | Stop reason: HOSPADM

## 2020-09-30 RX ORDER — ONDANSETRON 2 MG/ML
4 INJECTION INTRAMUSCULAR; INTRAVENOUS EVERY 12 HOURS PRN
Status: DISCONTINUED | OUTPATIENT
Start: 2020-09-30 | End: 2020-10-04 | Stop reason: HOSPADM

## 2020-09-30 RX ORDER — LEVOTHYROXINE SODIUM 25 UG/1
75 TABLET ORAL
Status: DISCONTINUED | OUTPATIENT
Start: 2020-10-01 | End: 2020-10-04 | Stop reason: HOSPADM

## 2020-09-30 RX ORDER — TRAMADOL HYDROCHLORIDE 50 MG/1
50 TABLET ORAL EVERY 6 HOURS PRN
Status: DISCONTINUED | OUTPATIENT
Start: 2020-09-30 | End: 2020-10-04 | Stop reason: HOSPADM

## 2020-09-30 RX ORDER — SODIUM CHLORIDE 9 MG/ML
INJECTION, SOLUTION INTRAVENOUS CONTINUOUS
Status: DISCONTINUED | OUTPATIENT
Start: 2020-09-30 | End: 2020-10-01

## 2020-09-30 RX ORDER — ENOXAPARIN SODIUM 100 MG/ML
40 INJECTION SUBCUTANEOUS EVERY 24 HOURS
Status: DISCONTINUED | OUTPATIENT
Start: 2020-09-30 | End: 2020-10-04 | Stop reason: HOSPADM

## 2020-09-30 RX ADMIN — IPRATROPIUM BROMIDE 2 SPRAY: 42 SPRAY, METERED NASAL at 09:09

## 2020-09-30 RX ADMIN — IPRATROPIUM BROMIDE 2 SPRAY: 42 SPRAY, METERED NASAL at 05:09

## 2020-09-30 RX ADMIN — IPRATROPIUM BROMIDE 2 SPRAY: 42 SPRAY, METERED NASAL at 06:09

## 2020-09-30 RX ADMIN — POTASSIUM CHLORIDE 40 MEQ: 7.46 INJECTION, SOLUTION INTRAVENOUS at 06:09

## 2020-09-30 RX ADMIN — MAGNESIUM SULFATE IN WATER 2 G: 40 INJECTION, SOLUTION INTRAVENOUS at 05:09

## 2020-09-30 RX ADMIN — IPRATROPIUM BROMIDE 2 SPRAY: 42 SPRAY, METERED NASAL at 12:09

## 2020-09-30 RX ADMIN — SODIUM CHLORIDE: 0.9 INJECTION, SOLUTION INTRAVENOUS at 01:09

## 2020-09-30 RX ADMIN — PIPERACILLIN SODIUM AND TAZOBACTAM SODIUM 4.5 G: 4; .5 INJECTION, POWDER, LYOPHILIZED, FOR SOLUTION INTRAVENOUS at 04:09

## 2020-09-30 RX ADMIN — MUPIROCIN 1 G: 20 OINTMENT TOPICAL at 01:09

## 2020-09-30 RX ADMIN — MUPIROCIN 1 G: 20 OINTMENT TOPICAL at 09:09

## 2020-09-30 RX ADMIN — SODIUM CHLORIDE: 0.9 INJECTION, SOLUTION INTRAVENOUS at 05:09

## 2020-09-30 RX ADMIN — ENOXAPARIN SODIUM 40 MG: 100 INJECTION SUBCUTANEOUS at 05:09

## 2020-09-30 RX ADMIN — ACETAMINOPHEN 1000 MG: 10 INJECTION, SOLUTION INTRAVENOUS at 06:09

## 2020-09-30 RX ADMIN — ACETAMINOPHEN 1000 MG: 10 INJECTION, SOLUTION INTRAVENOUS at 05:09

## 2020-09-30 RX ADMIN — CHLORHEXIDINE GLUCONATE 15 ML: 1.2 RINSE ORAL at 09:09

## 2020-09-30 RX ADMIN — PIPERACILLIN SODIUM AND TAZOBACTAM SODIUM 4.5 G: 4; .5 INJECTION, POWDER, LYOPHILIZED, FOR SOLUTION INTRAVENOUS at 09:09

## 2020-09-30 RX ADMIN — ACETAMINOPHEN 1000 MG: 10 INJECTION, SOLUTION INTRAVENOUS at 01:09

## 2020-09-30 RX ADMIN — PIPERACILLIN SODIUM AND TAZOBACTAM SODIUM 4.5 G: 4; .5 INJECTION, POWDER, LYOPHILIZED, FOR SOLUTION INTRAVENOUS at 01:09

## 2020-09-30 NOTE — PROGRESS NOTES
Critical access hospital Medicine  Progress Note    Patient Name: Ariana Tiwari  MRN: [id number]  Patient Class: IP- Inpatient   Admission Date: 9/29/2020  Length of Stay: 1 days  Attending Physician: Je Thomas MD  Primary Care Provider: Nba Petty MD        Subjective:     Principal Problem:Perforated abdominal viscus        HPI:  Ms Tiwari awakened this AM , nauseated and confused. Pt denies fever chills, vomiting, hematemesis, melena, hematochezia, chest pain, new sensory or motor deficit. Pt has a Hx of being chronically treated with antibiotics for diverticulitis  1/10/2020 Perforated small bowel diverticulum  2/2020 Small bowel obstruction  The above is complicated by advanced Parkinson's disease with cognitive impairment  Pt's son is POA and both Pt and her son wish her to be a full code    Overview/Hospital Course:  09/30  Assumed care. Chart reviewed. Consultants' attendance noted and appreciated. Labs reviewed: normal CBC; hypokalemia (sliding scale); elevated troponin; hypothyroidism.  AM EKG reviewed: sinus with Q in III and aVF with loss of R wave (septal infarct); no acute ST segments. Patient is confused this AM, requiring re-orientation. Denies any pain    Interval History: stable post-op    Review of Systems   Unable to perform ROS: Mental status change     Objective:     Vital Signs (Most Recent):  Temp: 98.4 °F (36.9 °C) (09/30/20 0701)  Pulse: 64 (09/30/20 0901)  Resp: (!) 36 (09/30/20 0901)  BP: 139/65 (09/30/20 0901)  SpO2: 96 % (09/30/20 0901) Vital Signs (24h Range):  Temp:  [98.4 °F (36.9 °C)-99.5 °F (37.5 °C)] 98.4 °F (36.9 °C)  Pulse:  [58-72] 64  Resp:  [16-36] 36  SpO2:  [93 %-98 %] 96 %  BP: (104-145)/(52-74) 139/65     Weight: 57.8 kg (127 lb 6.8 oz)  Body mass index is 21.87 kg/m².    Intake/Output Summary (Last 24 hours) at 9/30/2020 0937  Last data filed at 9/30/2020 0901  Gross per 24 hour   Intake 2782.91 ml   Output 1135 ml   Net 1647.91 ml      Physical  Exam  Vitals signs and nursing note reviewed.   Constitutional:       Appearance: She is well-developed.      Comments: Confused     HENT:      Head: Normocephalic and atraumatic.      Right Ear: External ear normal.      Left Ear: External ear normal.      Nose: Nose normal.   Eyes:      Conjunctiva/sclera: Conjunctivae normal.      Pupils: Pupils are equal, round, and reactive to light.   Neck:      Musculoskeletal: Normal range of motion and neck supple.   Cardiovascular:      Rate and Rhythm: Normal rate and regular rhythm.      Heart sounds: Normal heart sounds.   Pulmonary:      Effort: Pulmonary effort is normal.      Breath sounds: Normal breath sounds.   Abdominal:      General: Bowel sounds are normal.      Palpations: Abdomen is soft.      Comments: Dressing dry, 2 drains with minimal output   Musculoskeletal: Normal range of motion.   Skin:     General: Skin is warm and dry.      Capillary Refill: Capillary refill takes less than 2 seconds.   Neurological:      Comments: Confused; CN 2-12 grossly intact by confrontation; moves all extremities spontaneously against gravity   Psychiatric:      Comments: Unable to fully assess         Significant Labs:   CBC:   Recent Labs   Lab 09/29/20  1049 09/30/20  0338   WBC 9.52 7.96  7.96   HGB 14.4 13.3  13.3   HCT 42.4 39.5  39.5    167  167     CMP:   Recent Labs   Lab 09/29/20  1049 09/30/20  0338    140  140   K 3.3* 3.0*  3.0*    107  107   CO2 24 22*  22*    96  96   BUN 17 11  11   CREATININE 0.4* 0.4*  0.4*   CALCIUM 9.3 8.3*  8.3*   PROT 7.3 6.0   ALBUMIN 4.4 3.5   BILITOT 0.6 0.8   ALKPHOS 84 65   AST 22 20   ALT 19 15   ANIONGAP 14 11  11   EGFRNONAA >60.0 >60.0  >60.0     Cardiac Markers:   Recent Labs   Lab 09/29/20  1049   *     Troponin:   Recent Labs   Lab 09/29/20 1049 09/29/20  1900   TROPONINI 0.277* 0.210*     TSH:   Recent Labs   Lab 09/29/20  1900   TSH 11.260*       Significant Imaging: I have  reviewed and interpreted all pertinent imaging results/findings within the past 24 hours.      Assessment/Plan:      * Perforated abdominal viscus  Status post laporotomy      Abdominal pain  Status post laporotomy      Hypothyroidism  Start l-thyroxine 0.75 mg q day; will discuss disease with patient and family, explaining need for repeat TSH      Gluten enteropathy  Aware         VTE Risk Mitigation (From admission, onward)         Ordered     enoxaparin injection 40 mg  Every 24 hours      09/30/20 0057     IP VTE HIGH RISK PATIENT  Once      09/30/20 0057     Place sequential compression device  Until discontinued      09/30/20 0057     Place sequential compression device  Until discontinued      09/29/20 1538                Discharge Planning   IAIN:      Code Status: Full Code   Is the patient medically ready for discharge?:     Reason for patient still in hospital (select all that apply): Treatment  Discharge Plan A: Home Health                  Je Thomas MD  Department of Hospital Medicine   [facility]

## 2020-09-30 NOTE — CARE UPDATE
Attempted to contact pt's dtg in law Micaela Tiwari who pt requested that this cm call. Left message for her to contact cm when she is able.

## 2020-09-30 NOTE — PT/OT/SLP EVAL
Physical Therapy Evaluation    Patient Name:  Ariana Tiwari   MRN:  [id number]    Recommendations:     Discharge Recommendations:  (LTAC/SNF/Rehab)   Discharge Equipment Recommendations: (tbd)   Barriers to discharge: pt requires assistance of 2 people at this time.     Assessment:     Ariana Tiwari is a 75 y.o. female admitted with a medical diagnosis of Perforated abdominal viscus.  She presents with the following impairments/functional limitations:  weakness, impaired self care skills, impaired functional mobilty, gait instability, impaired balance, pain .    Pt agrees to PT. Pt is oriented but relays stories that not sure if true. Pain 3/10, LESLI drains intact.     Rehab Prognosis: Fair; patient would benefit from acute skilled PT services to address these deficits and reach maximum level of function.    Recent Surgery: Procedure(s) (LRB):  LAPAROTOMY, EXPLORATORY (N/A)  LYSIS, ADHESIONS 1 Day Post-Op    Plan:     During this hospitalization, patient to be seen daily to address the identified rehab impairments via gait training, therapeutic activities, therapeutic exercises and progress toward the following goals:    · Plan of Care Expires:  10/30/20    Subjective     Chief Complaint: abdominal pain  Patient/Family Comments/goals: to get better  Pain/Comfort:  Pain Rating 1: 3/10  Location 1: abdomen  Pain Addressed 1: Nurse notified  Pain Rating Post-Intervention 1: 3/10    Patients cultural, spiritual, Syn[employer] conflicts given the current situation:      Living Environment:  Pt lives with son and daughter in law. Pt reports she was able to ambulate to mailbox and around house with rollator but only able to sit in one chair because she could not get out of other chairs. RN reports that family was able to leave pt alone during the day at times. Pt reports no stairs at home.     Prior to admission, patients level of function was modified independent with min A with some ADL's.  Equipment used at home:  rollator.  DME owned (not currently used): unknown .  Upon discharge, patient will have assistance from family.    Objective:     Communicated with rn prior to session.  Patient found supine with bed alarm, blood pressure cuff, padilla catheter, LESLI drain, NG tube, peripheral IV, pulse ox (continuous), telemetry  upon PT entry to room.    General Precautions: Standard, fall, respiratory   Orthopedic Precautions:(Pt had surgery on left toe and is in surgical shoe. RN reports pt was able to ambulate in house with shoe on and rollator.)   Braces: N/A     Exams:  · Cognitive Exam:  Patient is oriented to Person, Place, Time, Situation and pt reports she did have surgery.   · Gross Motor Coordination:  unable to accurately assess  · RUE ROM: WFL  · LUE ROM: WFL  · RLE ROM: WFL  · RLE Strength: WFL  · LLE ROM: WFL  · LLE Strength: WFL    Functional Mobility:  · Bed Mobility:     · Rolling Left:  maximal assistance  · Scooting: maximal assistance and of 2 persons  · Supine to Sit: maximal assistance and of 2 persons  · Transfers:     · Sit to Stand:  moderate assistance and of 2 persons with rolling walker  · Gait: Pt able to take few steps to chair RW modAx2 with cues for walker. Shoe intact Left foot, no pain reported. No dizziness, no O2 needed. Sitting balance fair.     Therapeutic Activities and Exercises:   education, poc, dc planning, fall prevention.     AM-PAC 6 CLICK MOBILITY  Total Score:11     Patient left up in chair with all lines intact, call button in reach and rn present.    GOALS:   Multidisciplinary Problems     Physical Therapy Goals        Problem: Physical Therapy Goal    Goal Priority Disciplines Outcome Goal Variances Interventions   Physical Therapy Goal     PT, PT/OT      Description: Goals to be met by: discharge     Patient will increase functional independence with mobility by performin. Supine to sit with Contact Guard Assistance  2. Sit to stand transfer with Contact Guard Assistance  3.  Bed to chair transfer with Contact Guard Assistance using Rolling Walker  4. Gait  x 75 feet with Contact Guard Assistance using Rolling Walker.                      History:     Past Medical History:   Diagnosis Date    Allergy     Diverticulosis     Gluten enteropathy     Gluten intolerance     Hernia     Hypothyroidism     PD (Parkinson's disease) 9/16/2015    Right tremor type    Peptic ulcer disease     Right shoulder pain     Cirtisone injection 3/26/15 - Dr. Tolbert    Ulcer        Past Surgical History:   Procedure Laterality Date    ADENOIDECTOMY      COLONOSCOPY  03/01/2012    Dr. Teresa, repeat in 10 years for screening    COLONOSCOPY N/A 2/21/2018    Procedure: COLONOSCOPY;  Surgeon: Radha Deng MD;  Location: [facility] ENDO;  Service: Endoscopy;  Laterality: N/A;    CORRECTION OF HAMMER TOE Left 9/16/2020    Procedure: CORRECTION, HAMMER TOE;  Surgeon: William Sprague MD;  Location: [facility];  Service: Orthopedics;  Laterality: Left;    COSMETIC SURGERY      Broken nose    FRACTURE SURGERY  left wrist    With pin    HEMORRHOID SURGERY      HERNIA REPAIR Left 04/09/2015    Dr Lo; left inguinal    INTRALUMINAL GASTROINTESTINAL TRACT IMAGING VIA CAPSULE N/A 7/10/2018    Procedure: IMAGING, GI TRACT, INTRALUMINAL, VIA CAPSULE;  Surgeon: Radha Deng MD;  Location: [facility];  Service: Endoscopy;  Laterality: N/A;    LYSIS OF ADHESIONS  9/29/2020    Procedure: LYSIS, ADHESIONS;  Surgeon: Eagle Gonzalez MD;  Location: [facility] OR;  Service: General;;    RHINOPLASTY TIP      TONSILLECTOMY      UPPER GASTROINTESTINAL ENDOSCOPY  05/21/2015    Dr. Gomez       Time Tracking:     PT Received On: 09/30/20  PT Start Time: 1105     PT Stop Time: 1126  PT Total Time (min): 21 min     Billable Minutes: Evaluation 6 and Therapeutic Activity 21      Juana Lucero, PT  09/30/2020

## 2020-09-30 NOTE — ANESTHESIA POSTPROCEDURE EVALUATION
Anesthesia Post Evaluation    Patient: Ariana Tiwari    Procedure(s) Performed: Procedure(s) (LRB):  LAPAROTOMY, EXPLORATORY (N/A)  LYSIS, ADHESIONS    Final Anesthesia Type: general    Patient location during evaluation: PACU  Patient participation: Yes- Able to Participate  Level of consciousness: awake  Post-procedure vital signs: reviewed and stable  Pain management: adequate  Airway patency: patent    PONV status at discharge: No PONV  Anesthetic complications: no      Cardiovascular status: blood pressure returned to baseline, hemodynamically stable and stable  Respiratory status: unassisted, spontaneous ventilation and nasal cannula  Hydration status: euvolemic  Follow-up not needed.          Vitals Value Taken Time   /64 09/29/20 1900   Temp 36 09/29/20 1913   Pulse 64 09/29/20 1911   Resp 18 09/29/20 1911   SpO2 97 % 09/29/20 1911   Vitals shown include unvalidated device data.      No case tracking events are documented in the log.      Pain/Wade Score: No data recorded

## 2020-09-30 NOTE — SUBJECTIVE & OBJECTIVE
Interval History: stable post-op    Review of Systems   Unable to perform ROS: Mental status change     Objective:     Vital Signs (Most Recent):  Temp: 98.4 °F (36.9 °C) (09/30/20 0701)  Pulse: 64 (09/30/20 0901)  Resp: (!) 36 (09/30/20 0901)  BP: 139/65 (09/30/20 0901)  SpO2: 96 % (09/30/20 0901) Vital Signs (24h Range):  Temp:  [98.4 °F (36.9 °C)-99.5 °F (37.5 °C)] 98.4 °F (36.9 °C)  Pulse:  [58-72] 64  Resp:  [16-36] 36  SpO2:  [93 %-98 %] 96 %  BP: (104-145)/(52-74) 139/65     Weight: 57.8 kg (127 lb 6.8 oz)  Body mass index is 21.87 kg/m².    Intake/Output Summary (Last 24 hours) at 9/30/2020 0937  Last data filed at 9/30/2020 0901  Gross per 24 hour   Intake 2782.91 ml   Output 1135 ml   Net 1647.91 ml      Physical Exam  Vitals signs and nursing note reviewed.   Constitutional:       Appearance: She is well-developed.      Comments: Confused     HENT:      Head: Normocephalic and atraumatic.      Right Ear: External ear normal.      Left Ear: External ear normal.      Nose: Nose normal.   Eyes:      Conjunctiva/sclera: Conjunctivae normal.      Pupils: Pupils are equal, round, and reactive to light.   Neck:      Musculoskeletal: Normal range of motion and neck supple.   Cardiovascular:      Rate and Rhythm: Normal rate and regular rhythm.      Heart sounds: Normal heart sounds.   Pulmonary:      Effort: Pulmonary effort is normal.      Breath sounds: Normal breath sounds.   Abdominal:      General: Bowel sounds are normal.      Palpations: Abdomen is soft.      Comments: Dressing dry, 2 drains with minimal output   Musculoskeletal: Normal range of motion.   Skin:     General: Skin is warm and dry.      Capillary Refill: Capillary refill takes less than 2 seconds.   Neurological:      Comments: Confused; CN 2-12 grossly intact by confrontation; moves all extremities spontaneously against gravity   Psychiatric:      Comments: Unable to fully assess         Significant Labs:   CBC:   Recent Labs   Lab  09/29/20  1049 09/30/20  0338   WBC 9.52 7.96  7.96   HGB 14.4 13.3  13.3   HCT 42.4 39.5  39.5    167  167     CMP:   Recent Labs   Lab 09/29/20  1049 09/30/20  0338    140  140   K 3.3* 3.0*  3.0*    107  107   CO2 24 22*  22*    96  96   BUN 17 11  11   CREATININE 0.4* 0.4*  0.4*   CALCIUM 9.3 8.3*  8.3*   PROT 7.3 6.0   ALBUMIN 4.4 3.5   BILITOT 0.6 0.8   ALKPHOS 84 65   AST 22 20   ALT 19 15   ANIONGAP 14 11  11   EGFRNONAA >60.0 >60.0  >60.0     Cardiac Markers:   Recent Labs   Lab 09/29/20  1049   *     Troponin:   Recent Labs   Lab 09/29/20  1049 09/29/20  1900   TROPONINI 0.277* 0.210*     TSH:   Recent Labs   Lab 09/29/20  1900   TSH 11.260*       Significant Imaging: I have reviewed and interpreted all pertinent imaging results/findings within the past 24 hours.

## 2020-09-30 NOTE — PROGRESS NOTES
POD 1 s/p ex lap for pneumoperitoneum.  NO obvious source of perforation noted  Resting comfortably  Pain controlled. Denies n/v.  Transferred to floor    Wt Readings from Last 3 Encounters:   09/29/20 57.8 kg (127 lb 6.8 oz)   09/30/20 57.6 kg (127 lb)   09/15/20 58.1 kg (128 lb)     Temp Readings from Last 3 Encounters:   09/30/20 98.4 °F (36.9 °C) (Oral)   09/16/20 97.8 °F (36.6 °C) (Skin)   08/20/20 97.4 °F (36.3 °C) (Oral)     BP Readings from Last 3 Encounters:   09/30/20 137/61   09/16/20 120/74   08/24/20 127/81     Pulse Readings from Last 3 Encounters:   09/30/20 63   09/16/20 68   08/24/20 69     AAox3  Sinus  Soft/nd/appt ttp.  Minimal LESLI output  2+ pulses B    Lab Results   Component Value Date    WBC 7.96 09/30/2020    WBC 7.96 09/30/2020    HGB 13.3 09/30/2020    HGB 13.3 09/30/2020    HCT 39.5 09/30/2020    HCT 39.5 09/30/2020    MCV 90 09/30/2020    MCV 90 09/30/2020     09/30/2020     09/30/2020       BMP  Lab Results   Component Value Date     09/30/2020     09/30/2020    K 3.0 (L) 09/30/2020    K 3.0 (L) 09/30/2020     09/30/2020     09/30/2020    CO2 22 (L) 09/30/2020    CO2 22 (L) 09/30/2020    BUN 11 09/30/2020    BUN 11 09/30/2020    CREATININE 0.4 (L) 09/30/2020    CREATININE 0.4 (L) 09/30/2020    CALCIUM 8.3 (L) 09/30/2020    CALCIUM 8.3 (L) 09/30/2020    ANIONGAP 11 09/30/2020    ANIONGAP 11 09/30/2020    ESTGFRAFRICA >60.0 09/30/2020    ESTGFRAFRICA >60.0 09/30/2020    EGFRNONAA >60.0 09/30/2020    EGFRNONAA >60.0 09/30/2020     A/: s/p ex lap  Ok ot remove padilla from surgical stdpt  Keep npo.  Will check contrast sbft Friday  Hypokalemia: replace

## 2020-09-30 NOTE — ASSESSMENT & PLAN NOTE
Start l-thyroxine 0.75 mg q day; will discuss disease with patient and family, explaining need for repeat TSH

## 2020-09-30 NOTE — PLAN OF CARE
Pt lives with son Je and dtg in law Micaela Tiwari. They work and have been able to leave pt alone and functional before this surgery. DC plans could be that pt needs home health if she becomes functional and able to ambulate. Will watch to see how she responds to surgery. Cm to follow until discharge from hospital.     09/30/20 0901   Discharge Assessment   Assessment Type Discharge Planning Assessment   Confirmed/corrected address and phone number on facesheet? Yes   Assessment information obtained from? Other  (Micaela Cedillon  milenag in law  [phone])   Communicated expected length of stay with patient/caregiver no   Prior to hospitilization cognitive status: Coma/Sedated/Intubated   Prior to hospitalization functional status: Independent;Assistive Equipment  (has a walker if she needs)   Current cognitive status: Not Oriented to Time;Not Oriented to Place   Current Functional Status: Partially Dependent   Lives With child(malka), adult  (Lives with mitzi Wooten and Micaela Tiwari)   Able to Return to Prior Arrangements yes   Is patient able to care for self after discharge? Unable to determine at this time (comments)   Who are your caregiver(s) and their phone number(s)? Micaela soliz in law  [phone]   Je Tiwari son [phone]   Patient's perception of discharge disposition home or selfcare   Readmission Within the Last 30 Days no previous admission in last 30 days   Patient currently being followed by outpatient case management? No   Patient currently receives any other outside agency services? No   Equipment Currently Used at Home walker, rolling  (Walk in tub   Lift chair)   Part D Coverage    Do you have any problems affording any of your prescribed medications? No   Is the patient taking medications as prescribed? yes   Does the patient have transportation home? Yes   Transportation Anticipated family or friend will provide   Dialysis Name and Scheduled days N/a   Does the patient receive services at  the [facility]? No   Discharge Plan A Home Health   Discharge Plan B Home with family   DME Needed Upon Discharge  other (see comments)  (PT/OT to assess)   Patient/Family in Agreement with Plan yes

## 2020-09-30 NOTE — HOSPITAL COURSE
"09/30  Assumed care. Chart reviewed. Consultants' attendance noted and appreciated. Labs reviewed: normal CBC; hypokalemia (sliding scale); elevated troponin; hypothyroidism.  AM EKG reviewed: sinus with Q in III and aVF with loss of R wave (septal infarct); no acute ST segments. Patient is confused this AM, requiring re-orientation. Denies any pain    10/01  Consultant's attendance noted and appreciated. Somewhat confused this AM, requiring redirection. Pulling at lines. Has pulled out NGT twice this AM already. States she has "Muscle pain" in her abdomen from "Working out so much yesterday". No CP/SOB    10/02  Consultants' attendance noted and appreciated. Labs reviewed: CBC normal; hypokalemia with normal renal function. Telemetry reviewed: sinus. Has expected wound discomfort. SBFT underway.     10/03  Consultant's attendance noted and appreciated. SBFT significantly delayed (20 hours), has ileus. Labs reviewed: Normal CBC; hypokalemia with normal renal function. Telemetry reviewed: sinus. Patient has had 2 NGT, and has pulled them out. Prior to replacing will check KUB    10/04  Has been cleared for discharge by General Surgery. Patient tolerating her diet. Abdomen feels "Good". Patient to be discharged with drains (to be removed by surgeon next week)  VSS  Lungs: good entry no adventitious  Heart S1S2  Abdo: soft  "

## 2020-10-01 LAB
ALBUMIN SERPL BCP-MCNC: 3.6 G/DL (ref 3.5–5.2)
ALP SERPL-CCNC: 70 U/L (ref 55–135)
ALT SERPL W/O P-5'-P-CCNC: 15 U/L (ref 10–44)
ANION GAP SERPL CALC-SCNC: 13 MMOL/L (ref 8–16)
AST SERPL-CCNC: 19 U/L (ref 10–40)
BASOPHILS # BLD AUTO: 0.02 K/UL (ref 0–0.2)
BASOPHILS NFR BLD: 0.3 % (ref 0–1.9)
BILIRUB SERPL-MCNC: 1.2 MG/DL (ref 0.1–1)
BUN SERPL-MCNC: 12 MG/DL (ref 8–23)
CALCIUM SERPL-MCNC: 8.3 MG/DL (ref 8.7–10.5)
CHLORIDE SERPL-SCNC: 102 MMOL/L (ref 95–110)
CO2 SERPL-SCNC: 24 MMOL/L (ref 23–29)
CREAT SERPL-MCNC: 0.4 MG/DL (ref 0.5–1.4)
DIFFERENTIAL METHOD: ABNORMAL
EOSINOPHIL # BLD AUTO: 0 K/UL (ref 0–0.5)
EOSINOPHIL NFR BLD: 0.3 % (ref 0–8)
ERYTHROCYTE [DISTWIDTH] IN BLOOD BY AUTOMATED COUNT: 12.2 % (ref 11.5–14.5)
EST. GFR  (AFRICAN AMERICAN): >60 ML/MIN/1.73 M^2
EST. GFR  (NON AFRICAN AMERICAN): >60 ML/MIN/1.73 M^2
GLUCOSE SERPL-MCNC: 84 MG/DL (ref 70–110)
HCT VFR BLD AUTO: 42.9 % (ref 37–48.5)
HGB BLD-MCNC: 14.3 G/DL (ref 12–16)
IMM GRANULOCYTES # BLD AUTO: 0.03 K/UL (ref 0–0.04)
IMM GRANULOCYTES NFR BLD AUTO: 0.4 % (ref 0–0.5)
LYMPHOCYTES # BLD AUTO: 1 K/UL (ref 1–4.8)
LYMPHOCYTES NFR BLD: 13 % (ref 18–48)
MAGNESIUM SERPL-MCNC: 2 MG/DL (ref 1.6–2.6)
MCH RBC QN AUTO: 30.8 PG (ref 27–31)
MCHC RBC AUTO-ENTMCNC: 33.3 G/DL (ref 32–36)
MCV RBC AUTO: 92 FL (ref 82–98)
MONOCYTES # BLD AUTO: 0.6 K/UL (ref 0.3–1)
MONOCYTES NFR BLD: 7.1 % (ref 4–15)
NEUTROPHILS # BLD AUTO: 6.2 K/UL (ref 1.8–7.7)
NEUTROPHILS NFR BLD: 78.9 % (ref 38–73)
NRBC BLD-RTO: 0 /100 WBC
PHOSPHATE SERPL-MCNC: 2 MG/DL (ref 2.7–4.5)
PLATELET # BLD AUTO: 189 K/UL (ref 150–350)
PMV BLD AUTO: 10.6 FL (ref 9.2–12.9)
POTASSIUM SERPL-SCNC: 2.9 MMOL/L (ref 3.5–5.1)
PROT SERPL-MCNC: 6.2 G/DL (ref 6–8.4)
RBC # BLD AUTO: 4.65 M/UL (ref 4–5.4)
SODIUM SERPL-SCNC: 139 MMOL/L (ref 136–145)
WBC # BLD AUTO: 7.84 K/UL (ref 3.9–12.7)

## 2020-10-01 PROCEDURE — 83735 ASSAY OF MAGNESIUM: CPT

## 2020-10-01 PROCEDURE — 63600175 PHARM REV CODE 636 W HCPCS: Performed by: INTERNAL MEDICINE

## 2020-10-01 PROCEDURE — 99900035 HC TECH TIME PER 15 MIN (STAT)

## 2020-10-01 PROCEDURE — 36569 INSJ PICC 5 YR+ W/O IMAGING: CPT

## 2020-10-01 PROCEDURE — 93010 EKG 12-LEAD: ICD-10-PCS | Mod: ,,, | Performed by: INTERNAL MEDICINE

## 2020-10-01 PROCEDURE — 85025 COMPLETE CBC W/AUTO DIFF WBC: CPT

## 2020-10-01 PROCEDURE — 93010 ELECTROCARDIOGRAM REPORT: CPT | Mod: ,,, | Performed by: INTERNAL MEDICINE

## 2020-10-01 PROCEDURE — 97530 THERAPEUTIC ACTIVITIES: CPT | Mod: CQ

## 2020-10-01 PROCEDURE — 25000003 PHARM REV CODE 250: Performed by: INTERNAL MEDICINE

## 2020-10-01 PROCEDURE — 84100 ASSAY OF PHOSPHORUS: CPT

## 2020-10-01 PROCEDURE — 21400001 HC TELEMETRY ROOM

## 2020-10-01 PROCEDURE — 94761 N-INVAS EAR/PLS OXIMETRY MLT: CPT

## 2020-10-01 PROCEDURE — 36415 COLL VENOUS BLD VENIPUNCTURE: CPT

## 2020-10-01 PROCEDURE — 80053 COMPREHEN METABOLIC PANEL: CPT

## 2020-10-01 PROCEDURE — 93005 ELECTROCARDIOGRAM TRACING: CPT | Performed by: INTERNAL MEDICINE

## 2020-10-01 RX ORDER — SODIUM CHLORIDE AND POTASSIUM CHLORIDE 150; 900 MG/100ML; MG/100ML
INJECTION, SOLUTION INTRAVENOUS CONTINUOUS
Status: DISCONTINUED | OUTPATIENT
Start: 2020-10-01 | End: 2020-10-04 | Stop reason: HOSPADM

## 2020-10-01 RX ORDER — POTASSIUM CHLORIDE 7.45 MG/ML
10 INJECTION INTRAVENOUS ONCE
Status: DISCONTINUED | OUTPATIENT
Start: 2020-10-01 | End: 2020-10-04 | Stop reason: HOSPADM

## 2020-10-01 RX ORDER — LORAZEPAM 2 MG/ML
0.5 INJECTION INTRAMUSCULAR EVERY 4 HOURS PRN
Status: DISCONTINUED | OUTPATIENT
Start: 2020-10-01 | End: 2020-10-04 | Stop reason: HOSPADM

## 2020-10-01 RX ADMIN — IPRATROPIUM BROMIDE 2 SPRAY: 42 SPRAY, METERED NASAL at 05:10

## 2020-10-01 RX ADMIN — PIPERACILLIN SODIUM AND TAZOBACTAM SODIUM 4.5 G: 4; .5 INJECTION, POWDER, LYOPHILIZED, FOR SOLUTION INTRAVENOUS at 09:10

## 2020-10-01 RX ADMIN — SODIUM CHLORIDE AND POTASSIUM CHLORIDE: .9; .15 SOLUTION INTRAVENOUS at 09:10

## 2020-10-01 RX ADMIN — CHLORHEXIDINE GLUCONATE 15 ML: 1.2 RINSE ORAL at 09:10

## 2020-10-01 RX ADMIN — MUPIROCIN 1 G: 20 OINTMENT TOPICAL at 09:10

## 2020-10-01 RX ADMIN — IPRATROPIUM BROMIDE 2 SPRAY: 42 SPRAY, METERED NASAL at 09:10

## 2020-10-01 RX ADMIN — PIPERACILLIN SODIUM AND TAZOBACTAM SODIUM 4.5 G: 4; .5 INJECTION, POWDER, LYOPHILIZED, FOR SOLUTION INTRAVENOUS at 05:10

## 2020-10-01 RX ADMIN — ENOXAPARIN SODIUM 40 MG: 100 INJECTION SUBCUTANEOUS at 05:10

## 2020-10-01 RX ADMIN — SODIUM PHOSPHATE, MONOBASIC, MONOHYDRATE 15 MMOL: 276; 142 INJECTION, SOLUTION INTRAVENOUS at 11:10

## 2020-10-01 RX ADMIN — PIPERACILLIN SODIUM AND TAZOBACTAM SODIUM 4.5 G: 4; .5 INJECTION, POWDER, LYOPHILIZED, FOR SOLUTION INTRAVENOUS at 02:10

## 2020-10-01 NOTE — PROGRESS NOTES
On license of [facility]  Department of Cardiology  Progress Note    PATIENT NAME: Ariana Tiwari  MRN: [id number]  TODAY'S DATE: 10/01/2020  ADMIT DATE: 9/29/2020    SUBJECTIVE     PRINCIPLE PROBLEM: Perforated abdominal viscus    INTERVAL HISTORY:    10/1/2020  Patient is awake alert and confused.  Currently getting an PICC line placed  Patient appears to be significantly confused.    Review of patient's allergies indicates:   Allergen Reactions    Gluten Diarrhea       REVIEW OF SYSTEMS  Confused with inappropriate answers.    OBJECTIVE     VITAL SIGNS (Most Recent)  Temp: 98.2 °F (36.8 °C) (10/01/20 0741)  Pulse: 67 (10/01/20 0741)  Resp: 19 (10/01/20 0741)  BP: (!) 153/67 (10/01/20 0741)  SpO2: (!) 92 % (10/01/20 0741)    VENTILATION STATUS  Resp: 19 (10/01/20 0741)  SpO2: (!) 92 % (10/01/20 0741)       I & O (Last 24H):    Intake/Output Summary (Last 24 hours) at 10/1/2020 1114  Last data filed at 9/30/2020 2300  Gross per 24 hour   Intake 1035 ml   Output 1330 ml   Net -295 ml       WEIGHTS  Wt Readings from Last 1 Encounters:   10/01/20 0500 55.2 kg (121 lb 11.1 oz)   09/29/20 1539 57.8 kg (127 lb 6.8 oz)   09/29/20 0953 54.4 kg (120 lb)       PHYSICAL EXAM  CONSTITUTIONAL: Well built, well nourished in no apparent distress  NECK: no carotid bruit, no JVD  LUNGS: CTA  CHEST WALL: no tenderness  HEART: regular rate and rhythm, S1, S2 normal, no murmur,  ABDOMEN: soft, non-tender; bowel sounds audible  EXTREMITIES: Extremities normal, no edema  NEURO: AAO X 3    SCHEDULED MEDS:   chlorhexidine  15 mL Mouth/Throat BID    enoxaparin  40 mg Subcutaneous Q24H    ibuprofen  600 mg Oral QID    ipratropium  2 spray Each Nostril QID    levothyroxine  75 mcg Oral Before breakfast    mupirocin  1 g Nasal BID    piperacillin-tazobactam (ZOSYN) IVPB  4.5 g Intravenous Q8H    potassium chloride in water  10 mEq Intravenous Once    sodium phosphate IVPB  15 mmol Intravenous Once       CONTINUOUS INFUSIONS:   0/9%  NACL & POTASSIUM CHLORIDE 20 MEQ/L 125 mL/hr at 10/01/20 0912    lactated ringers Stopped (09/30/20 0117)       PRN MEDS:calcium chloride IVPB, calcium chloride IVPB, calcium chloride IVPB, dextrose 50%, dextrose 50%, glucagon (human recombinant), glucose, glucose, HYDROmorphone, lorazepam, magnesium oxide, magnesium oxide, magnesium oxide, magnesium sulfate IVPB, magnesium sulfate IVPB, magnesium sulfate IVPB, magnesium sulfate IVPB, ondansetron, ondansetron, potassium chloride in water, potassium chloride in water, potassium chloride in water, potassium chloride in water, potassium chloride, potassium chloride, potassium chloride, potassium chloride, potassium chloride, potassium chloride, potassium, sodium phosphates, potassium, sodium phosphates, potassium, sodium phosphates, sodium chloride 0.9%, sodium phosphate IVPB, sodium phosphate IVPB, sodium phosphate IVPB, sodium phosphate IVPB, sodium phosphate IVPB, traMADoL    LABS AND DIAGNOSTICS     CBC LAST 3 DAYS  Recent Labs   Lab 09/29/20  1049 09/30/20  0338 10/01/20  0455   WBC 9.52 7.96  7.96 7.84   RBC 4.62 4.37  4.37 4.65   HGB 14.4 13.3  13.3 14.3   HCT 42.4 39.5  39.5 42.9   MCV 92 90  90 92   MCH 31.2* 30.4  30.4 30.8   MCHC 34.0 33.7  33.7 33.3   RDW 12.0 12.0  12.0 12.2    167  167 189   MPV 10.4 10.7  10.7 10.6   GRAN 85.2*  8.1* 82.4*  82.4*  6.6  6.6 78.9*  6.2   LYMPH 7.8*  0.7* 10.2*  10.2*  0.8*  0.8* 13.0*  1.0   MONO 6.2  0.6 6.3  6.3  0.5  0.5 7.1  0.6   BASO 0.03 0.04  0.04 0.02   NRBC 0 0  0 0       COAGULATION LAST 3 DAYS  Recent Labs   Lab 09/29/20  1049   LABPT 14.4   INR 1.2   APTT 30.4       CHEMISTRY LAST 3 DAYS  Recent Labs   Lab 09/29/20  1049 09/30/20  0338 10/01/20  0455    140  140 139   K 3.3* 3.0*  3.0* 2.9*    107  107 102   CO2 24 22*  22* 24   ANIONGAP 14 11  11 13   BUN 17 11  11 12   CREATININE 0.4* 0.4*  0.4* 0.4*    96  96 84   CALCIUM 9.3 8.3*  8.3* 8.3*    MG 1.8 1.6 2.0   ALBUMIN 4.4 3.5 3.6   PROT 7.3 6.0 6.2   ALKPHOS 84 65 70   ALT 19 15 15   AST 22 20 19   BILITOT 0.6 0.8 1.2*       CARDIAC PROFILE LAST 3 DAYS  Recent Labs   Lab 09/29/20  1049 09/29/20  1900   *  --    TROPONINI 0.277* 0.210*       ENDOCRINE LAST 3 DAYS  Recent Labs   Lab 09/29/20  1900   TSH 11.260*       LAST ARTERIAL BLOOD GAS  ABG  No results for input(s): PH, PO2, PCO2, HCO3, BE in the last 168 hours.    LAST 7 DAYS MICROBIOLOGY   Microbiology Results (last 7 days)     Procedure Component Value Units Date/Time    Blood culture #2 **CANNOT BE ORDERED STAT** [[id number]] Collected: 09/29/20 1049    Order Status: Completed Specimen: Blood from Peripheral, Forearm, Right Updated: 09/30/20 1232     Blood Culture, Routine No Growth to date      No Growth to date    Blood culture #1 **CANNOT BE ORDERED STAT** [[id number]] Collected: 09/29/20 1033    Order Status: Completed Specimen: Blood from Peripheral, Antecubital, Right Updated: 09/30/20 1232     Blood Culture, Routine No Growth to date      No Growth to date          MOST RECENT IMAGING  X-Ray Abdomen AP 1 View  ABDOMINAL RADIOGRAPH:  10/1/2020 2:48 AM CDT    COMPARISON: CT of abdomen and pelvis from 9/29/2020    TECHNIQUE: A single radiograph of the abdomen was obtained.    HISTORY: 75-year old with nasogastric tube placement.    FINDINGS: Only the upper abdomen was included on this examination.  Mild gaseous distention of the small bowel and colon is seen which could reflect an ileus. No abnormal intra-abdominal calcifications are seen. There are no findings to suggest organomegaly. There are drains at the tip seen within the lower pelvis. A nasogastric tube tip is seen overlying the left upper quadrant of the abdomen, projecting at the stomach.    IMPRESSION: Mild gaseous distention of the small bowel and colon is seen which could reflect an ileus.    Electronically signed by:  Jojo Menendez DO  10/1/2020 2:48 AM CDT Workstation:  [id number]      [facility]  Results for orders placed during the hospital encounter of 09/29/20   Echo Color Flow Doppler? Yes; Bubble Contrast? No    Narrative · The estimated PA systolic pressure is 42 mmHg.  · There is left ventricular concentric remodeling.  · The left ventricle is normal in size with normal systolic function. The   estimated ejection fraction is 68%.  · Normal left ventricular diastolic function.  · Mild left atrial enlargement.  · Normal right ventricular systolic function.  · Normal central venous pressure (3 mmHg).          CURRENT/PREVIOUS VISIT EKG  Results for orders placed or performed during the hospital encounter of 09/29/20   EKG 12-lead    Collection Time: 10/01/20  6:26 AM    Narrative    Test Reason : I21.4,    Vent. Rate : 070 BPM     Atrial Rate : 070 BPM     P-R Int : 156 ms          QRS Dur : 084 ms      QT Int : 396 ms       P-R-T Axes : 044 -27 147 degrees     QTc Int : 427 ms    Normal sinus rhythm  Septal infarct (cited on or before 11-JAN-2020)  T wave abnormality, consider lateral ischemia  Abnormal ECG  When compared with ECG of 30-SEP-2020 06:16,  Inverted T waves have replaced nonspecific T wave abnormality in Lateral  leads    Referred By: [email or web address]   SELF           Confirmed By:        ASSESSMENT/PLAN:     Active Hospital Problems    Diagnosis    *Perforated abdominal viscus    Perforated bowel    Abdominal pain    PD (Parkinson's disease)     Right tremor type      Hypothyroidism    Gluten enteropathy       ASSESSMENT & PLAN:   1.  Confused  2.  Severe hypokalemia  3.  Hypothyroidism  4.  Hyperphosphatemia  5.  Stress ischemia  6.  Perforated bowel viscus status post open lap      RECOMMENDATIONS:  1.  Patient is confused and goes back in time.  Would essentially rule out urinary tract infection.  Currently on IV antibiotics  2.  Severe hypokalemia aim to keep the potassium at 4.0 and will also check the magnesium.  Likely to set of atrial and ventricular  arrhythmias.  3.  Significantly hypothyroid with TSH around 11 and currently on levothyroxine.  4.  Currently phosphate is being replaced.  5.  Continue deep vein thrombosis prophylaxis.  6.  At the present time patient is stable from a cardiac perspective.  Not much to add at this point.  Will follow with you distantly as needed.        Juan Finch MD  [facility]  Department of Cardiology  Date of Service: 10/01/2020  11:14 AM

## 2020-10-01 NOTE — NURSING
"Pt has pulled out two NG tubes and states " I will just pull the next one out immediately". Messaged Dr. Neves and notified her of pt refusing and pulling out NG tube and she said to leave NG tube out for now unless pt gets syptomatic with nausea, vomitting, etc.   "

## 2020-10-01 NOTE — PROGRESS NOTES
"[facility]  Progress Note    Patient Name: Ariana Tiwari  MRN: [id number]  Patient Class: IP- Inpatient   Admission Date: 9/29/2020  Length of Stay: 2 days  Attending Physician: Je Thomas MD  Primary Care Provider: Nba Petty MD        Subjective:     Principal Problem:Perforated abdominal viscus        HPI:  Ms Tiwari awakened this AM , nauseated and confused. Pt denies fever chills, vomiting, hematemesis, melena, hematochezia, chest pain, new sensory or motor deficit. Pt has a Hx of being chronically treated with antibiotics for diverticulitis  1/10/2020 Perforated small bowel diverticulum  2/2020 Small bowel obstruction  The above is complicated by advanced Parkinson's disease with cognitive impairment  Pt's son is POA and both Pt and her son wish her to be a full code    Overview/Hospital Course:  09/30  Assumed care. Chart reviewed. Consultants' attendance noted and appreciated. Labs reviewed: normal CBC; hypokalemia (sliding scale); elevated troponin; hypothyroidism.  AM EKG reviewed: sinus with Q in III and aVF with loss of R wave (septal infarct); no acute ST segments. Patient is confused this AM, requiring re-orientation. Denies any pain    10/01  Consultant's attendance noted and appreciated. Somewhat confused this AM, requiring redirection. Pulling at lines. Has pulled out NGT twice this AM already. States she has "Muscle pain" in her abdomen from "Working out so much yesterday". No CP/SOB    Interval History: stable post op    Review of Systems   Gastrointestinal: Positive for abdominal pain.     Objective:     Vital Signs (Most Recent):  Temp: 98.2 °F (36.8 °C) (10/01/20 0741)  Pulse: 67 (10/01/20 0741)  Resp: 19 (10/01/20 0741)  BP: (!) 153/67 (10/01/20 0741)  SpO2: (!) 92 % (10/01/20 0741) Vital Signs (24h Range):  Temp:  [97.2 °F (36.2 °C)-99.2 °F (37.3 °C)] 98.2 °F (36.8 °C)  Pulse:  [63-77] 67  Resp:  [18-24] 19  SpO2:  [92 %-95 %] 92 %  BP: " (134-168)/(61-73) 153/67     Weight: 55.2 kg (121 lb 11.1 oz)  Body mass index is 20.89 kg/m².    Intake/Output Summary (Last 24 hours) at 10/1/2020 0927  Last data filed at 9/30/2020 2300  Gross per 24 hour   Intake 1035 ml   Output 1455 ml   Net -420 ml      Physical Exam  Vitals signs and nursing note reviewed.   Constitutional:       Appearance: She is well-developed.   HENT:      Head: Normocephalic and atraumatic.      Right Ear: External ear normal.      Left Ear: External ear normal.      Nose: Nose normal.   Eyes:      Conjunctiva/sclera: Conjunctivae normal.      Pupils: Pupils are equal, round, and reactive to light.   Neck:      Musculoskeletal: Normal range of motion and neck supple.   Cardiovascular:      Rate and Rhythm: Normal rate and regular rhythm.      Heart sounds: Normal heart sounds.   Pulmonary:      Effort: Pulmonary effort is normal.      Breath sounds: Normal breath sounds.   Abdominal:      Palpations: Abdomen is soft.      Comments: Dressing dry, drain   Musculoskeletal: Normal range of motion.   Skin:     General: Skin is warm and dry.      Capillary Refill: Capillary refill takes less than 2 seconds.   Neurological:      Mental Status: She is alert.      Comments: Oriented to person only. Moves all extremities against gravity, CN 2-12 grossly intact         Significant Labs:   BMP:   Recent Labs   Lab 10/01/20  0455   GLU 84      K 2.9*      CO2 24   BUN 12   CREATININE 0.4*   CALCIUM 8.3*   MG 2.0     CBC:   Recent Labs   Lab 09/29/20  1049 09/30/20  0338 10/01/20  0455   WBC 9.52 7.96  7.96 7.84   HGB 14.4 13.3  13.3 14.3   HCT 42.4 39.5  39.5 42.9    167  167 189       Significant Imaging: I have reviewed and interpreted all pertinent imaging results/findings within the past 24 hours.      Assessment/Plan:      * Perforated abdominal viscus  Status post laporotomy      Abdominal pain  Status post laparotomy; local wound care; d/c padilla        Hypothyroidism  Start l-thyroxine 0.75 mg q day; will discuss disease with patient and family, explaining need for repeat TSH      Gluten enteropathy  Aware         VTE Risk Mitigation (From admission, onward)         Ordered     enoxaparin injection 40 mg  Every 24 hours      09/30/20 0057     IP VTE HIGH RISK PATIENT  Once      09/30/20 0057     Place sequential compression device  Until discontinued      09/30/20 0057     Place sequential compression device  Until discontinued      09/29/20 1538                Discharge Planning   IAIN:      Code Status: Full Code   Is the patient medically ready for discharge?:     Reason for patient still in hospital (select all that apply): Treatment  Discharge Plan A: Home Health                  Je Thomas MD  Department of Hospital Medicine   [facility]

## 2020-10-01 NOTE — NURSING
PT REFUSES TO WEAR TELEMETRY. CONTINUES TO PULL IT OFF. ALSO PULLING AT LESLI DRAIN AND FORRESTER. DR. WHITE MADE AWARE.

## 2020-10-01 NOTE — SUBJECTIVE & OBJECTIVE
Interval History: stable post op    Review of Systems   Gastrointestinal: Positive for abdominal pain.     Objective:     Vital Signs (Most Recent):  Temp: 98.2 °F (36.8 °C) (10/01/20 0741)  Pulse: 67 (10/01/20 0741)  Resp: 19 (10/01/20 0741)  BP: (!) 153/67 (10/01/20 0741)  SpO2: (!) 92 % (10/01/20 0741) Vital Signs (24h Range):  Temp:  [97.2 °F (36.2 °C)-99.2 °F (37.3 °C)] 98.2 °F (36.8 °C)  Pulse:  [63-77] 67  Resp:  [18-24] 19  SpO2:  [92 %-95 %] 92 %  BP: (134-168)/(61-73) 153/67     Weight: 55.2 kg (121 lb 11.1 oz)  Body mass index is 20.89 kg/m².    Intake/Output Summary (Last 24 hours) at 10/1/2020 0927  Last data filed at 9/30/2020 2300  Gross per 24 hour   Intake 1035 ml   Output 1455 ml   Net -420 ml      Physical Exam  Vitals signs and nursing note reviewed.   Constitutional:       Appearance: She is well-developed.   HENT:      Head: Normocephalic and atraumatic.      Right Ear: External ear normal.      Left Ear: External ear normal.      Nose: Nose normal.   Eyes:      Conjunctiva/sclera: Conjunctivae normal.      Pupils: Pupils are equal, round, and reactive to light.   Neck:      Musculoskeletal: Normal range of motion and neck supple.   Cardiovascular:      Rate and Rhythm: Normal rate and regular rhythm.      Heart sounds: Normal heart sounds.   Pulmonary:      Effort: Pulmonary effort is normal.      Breath sounds: Normal breath sounds.   Abdominal:      Palpations: Abdomen is soft.      Comments: Dressing dry, drain   Musculoskeletal: Normal range of motion.   Skin:     General: Skin is warm and dry.      Capillary Refill: Capillary refill takes less than 2 seconds.   Neurological:      Mental Status: She is alert.      Comments: Oriented to person only. Moves all extremities against gravity, CN 2-12 grossly intact         Significant Labs:   BMP:   Recent Labs   Lab 10/01/20  0455   GLU 84      K 2.9*      CO2 24   BUN 12   CREATININE 0.4*   CALCIUM 8.3*   MG 2.0     CBC:   Recent  Labs   Lab 09/29/20  1049 09/30/20  0338 10/01/20  0455   WBC 9.52 7.96  7.96 7.84   HGB 14.4 13.3  13.3 14.3   HCT 42.4 39.5  39.5 42.9    167  167 189       Significant Imaging: I have reviewed and interpreted all pertinent imaging results/findings within the past 24 hours.

## 2020-10-01 NOTE — PROGRESS NOTES
POD 2 s/p ex lap with no acute findings  Pt resting comfortably.  No pain.  Denies n/v  Her ng tube was pulled by pt on accident  Denies flatus    Wt Readings from Last 3 Encounters:   10/01/20 55.2 kg (121 lb 11.1 oz)   09/30/20 57.6 kg (127 lb)   09/15/20 58.1 kg (128 lb)     Temp Readings from Last 3 Encounters:   10/01/20 98.4 °F (36.9 °C) (Oral)   09/16/20 97.8 °F (36.6 °C) (Skin)   08/20/20 97.4 °F (36.3 °C) (Oral)     BP Readings from Last 3 Encounters:   10/01/20 (!) 155/68   09/16/20 120/74   08/24/20 127/81     Pulse Readings from Last 3 Encounters:   10/01/20 74   09/16/20 68   08/24/20 69     AAAox3  Sinus  Soft/dist/appt ttp BS present  2+ pulses B    Lab Results   Component Value Date    WBC 7.84 10/01/2020    HGB 14.3 10/01/2020    HCT 42.9 10/01/2020    MCV 92 10/01/2020     10/01/2020       BMP  Lab Results   Component Value Date     10/01/2020    K 2.9 (LL) 10/01/2020     10/01/2020    CO2 24 10/01/2020    BUN 12 10/01/2020    CREATININE 0.4 (L) 10/01/2020    CALCIUM 8.3 (L) 10/01/2020    ANIONGAP 13 10/01/2020    ESTGFRAFRICA >60.0 10/01/2020    EGFRNONAA >60.0 10/01/2020     A?P: s/p ex lap   Hypokalemia: needs aggressive replaceemtn  Pt with no source of etiology of pneumoperitoneum.  Suspect secondary to small bowel diverticula    Recommend sbft to be performed today and if no leak noted ok to start diet

## 2020-10-01 NOTE — PT/OT/SLP PROGRESS
"Physical Therapy Treatment    Patient Name:  Ariana Tiwari   MRN:  [id number]    Recommendations:     Discharge Recommendations:  (LTAC/SNF/Rehab)   Discharge Equipment Recommendations: (TBD at next level of care)   Barriers to discharge: None    Assessment:     Ariana Tiwari is a 75 y.o. female admitted with a medical diagnosis of Perforated abdominal viscus.  She presents with the following impairments/functional limitations:  weakness, impaired self care skills, impaired functional mobilty, gait instability, impaired balance.    Patient presents resting in bed with HOB elevated; agreeable to PT treatment. Patient appears confused through entirety of treatment as patient repeatedly asks, "Where's my invoice?" Patient requires significant assistance for bed mobility but required less assistance today for sit  <> stand and transfer training. Patient able to take small steps over to chair with RW and Min Assist x 2. Patient gets distracted but is redirectable. Continue with PT and POC.    Rehab Prognosis: Fair; patient would benefit from acute skilled PT services to address these deficits and reach maximum level of function.    Recent Surgery: Procedure(s) (LRB):  LAPAROTOMY, EXPLORATORY (N/A)  LYSIS, ADHESIONS 2 Days Post-Op    Plan:     During this hospitalization, patient to be seen daily to address the identified rehab impairments via gait training, therapeutic activities, therapeutic exercises and progress toward the following goals:    · Plan of Care Expires:  10/30/20    Subjective     Chief Complaint: No complaints  Patient/Family Comments/goals:   Pain/Comfort:  · Pain Rating 1: 0/10      Objective:     Communicated with RN prior to session.  Patient found HOB elevated with bed alarm, padilla catheter, LESLI drain, telemetry, peripheral IV upon PT entry to room.     General Precautions: Standard, fall   Orthopedic Precautions:(Pt had surgery on left toe and is in surgical shoe. RN reports pt was able to " ambulate in house with shoe on and rollator.)   Braces:       Functional Mobility:  · Bed Mobility:     · Supine to Sit: moderate assistance and of 2 persons  · Transfers:     · Sit to Stand:  moderate assistance with rolling walker  · Bed to Chair: minimum assistance and of 2 persons with  rolling walker  using  Step Transfer  · Gait: few steps over to chair with RW and Min Assist x w; slow, short steps      AM-PAC 6 CLICK MOBILITY          Therapeutic Activities and Exercises:   bed mobility; sitting EOB for trunk control and midline orientation; sit <> stands; transfer training    Patient left up in chair with all lines intact, call button in reach, chair alarm on and RN notified..    GOALS:   Multidisciplinary Problems     Physical Therapy Goals        Problem: Physical Therapy Goal    Goal Priority Disciplines Outcome Goal Variances Interventions   Physical Therapy Goal     PT, PT/OT Ongoing, Progressing     Description: Goals to be met by: discharge     Patient will increase functional independence with mobility by performin. Supine to sit with Contact Guard Assistance  2. Sit to stand transfer with Contact Guard Assistance  3. Bed to chair transfer with Contact Guard Assistance using Rolling Walker  4. Gait  x 75 feet with Contact Guard Assistance using Rolling Walker.                      Time Tracking:     PT Received On: 10/01/20  PT Start Time: 09     PT Stop Time: 923  PT Total Time (min): 23 min     Billable Minutes: Therapeutic Activity 23    Treatment Type: Treatment  PT/PTA: PTA     PTA Visit Number: Brooklynn     Suzie Espinoza PTA  10/01/2020

## 2020-10-01 NOTE — PLAN OF CARE
Problem: Physical Therapy Goal  Goal: Physical Therapy Goal  Description: Goals to be met by: discharge     Patient will increase functional independence with mobility by performin. Supine to sit with Contact Guard Assistance  2. Sit to stand transfer with Contact Guard Assistance  3. Bed to chair transfer with Contact Guard Assistance using Rolling Walker  4. Gait  x 75 feet with Contact Guard Assistance using Rolling Walker.     Outcome: Ongoing, Progressing   Pt continues to progress towards goals.

## 2020-10-02 ENCOUNTER — TELEPHONE (OUTPATIENT)
Dept: ORTHOPEDICS | Facility: CLINIC | Age: 75
End: 2020-10-02

## 2020-10-02 LAB
ALBUMIN SERPL BCP-MCNC: 3.4 G/DL (ref 3.5–5.2)
ALP SERPL-CCNC: 60 U/L (ref 55–135)
ALT SERPL W/O P-5'-P-CCNC: 14 U/L (ref 10–44)
ANION GAP SERPL CALC-SCNC: 10 MMOL/L (ref 8–16)
AST SERPL-CCNC: 20 U/L (ref 10–40)
BASOPHILS # BLD AUTO: 0.02 K/UL (ref 0–0.2)
BASOPHILS NFR BLD: 0.3 % (ref 0–1.9)
BILIRUB SERPL-MCNC: 1 MG/DL (ref 0.1–1)
BUN SERPL-MCNC: 13 MG/DL (ref 8–23)
CALCIUM SERPL-MCNC: 8.1 MG/DL (ref 8.7–10.5)
CHLORIDE SERPL-SCNC: 109 MMOL/L (ref 95–110)
CO2 SERPL-SCNC: 23 MMOL/L (ref 23–29)
CREAT SERPL-MCNC: 0.3 MG/DL (ref 0.5–1.4)
DIFFERENTIAL METHOD: ABNORMAL
EOSINOPHIL # BLD AUTO: 0.2 K/UL (ref 0–0.5)
EOSINOPHIL NFR BLD: 2.6 % (ref 0–8)
ERYTHROCYTE [DISTWIDTH] IN BLOOD BY AUTOMATED COUNT: 12.2 % (ref 11.5–14.5)
EST. GFR  (AFRICAN AMERICAN): >60 ML/MIN/1.73 M^2
EST. GFR  (NON AFRICAN AMERICAN): >60 ML/MIN/1.73 M^2
GLUCOSE SERPL-MCNC: 74 MG/DL (ref 70–110)
HCT VFR BLD AUTO: 38.5 % (ref 37–48.5)
HGB BLD-MCNC: 12.8 G/DL (ref 12–16)
IMM GRANULOCYTES # BLD AUTO: 0.01 K/UL (ref 0–0.04)
IMM GRANULOCYTES NFR BLD AUTO: 0.2 % (ref 0–0.5)
LYMPHOCYTES # BLD AUTO: 1 K/UL (ref 1–4.8)
LYMPHOCYTES NFR BLD: 16.6 % (ref 18–48)
MAGNESIUM SERPL-MCNC: 1.8 MG/DL (ref 1.6–2.6)
MCH RBC QN AUTO: 30.5 PG (ref 27–31)
MCHC RBC AUTO-ENTMCNC: 33.2 G/DL (ref 32–36)
MCV RBC AUTO: 92 FL (ref 82–98)
MONOCYTES # BLD AUTO: 0.5 K/UL (ref 0.3–1)
MONOCYTES NFR BLD: 8 % (ref 4–15)
NEUTROPHILS # BLD AUTO: 4.4 K/UL (ref 1.8–7.7)
NEUTROPHILS NFR BLD: 72.3 % (ref 38–73)
NRBC BLD-RTO: 0 /100 WBC
PHOSPHATE SERPL-MCNC: 1.8 MG/DL (ref 2.7–4.5)
PLATELET # BLD AUTO: 157 K/UL (ref 150–350)
PMV BLD AUTO: 11.1 FL (ref 9.2–12.9)
POTASSIUM SERPL-SCNC: 2.5 MMOL/L (ref 3.5–5.1)
POTASSIUM SERPL-SCNC: 2.8 MMOL/L (ref 3.5–5.1)
PROT SERPL-MCNC: 6.1 G/DL (ref 6–8.4)
RBC # BLD AUTO: 4.19 M/UL (ref 4–5.4)
SODIUM SERPL-SCNC: 142 MMOL/L (ref 136–145)
WBC # BLD AUTO: 6.14 K/UL (ref 3.9–12.7)

## 2020-10-02 PROCEDURE — 63600175 PHARM REV CODE 636 W HCPCS: Performed by: INTERNAL MEDICINE

## 2020-10-02 PROCEDURE — 85025 COMPLETE CBC W/AUTO DIFF WBC: CPT

## 2020-10-02 PROCEDURE — 94761 N-INVAS EAR/PLS OXIMETRY MLT: CPT

## 2020-10-02 PROCEDURE — 84100 ASSAY OF PHOSPHORUS: CPT

## 2020-10-02 PROCEDURE — 97530 THERAPEUTIC ACTIVITIES: CPT

## 2020-10-02 PROCEDURE — 21400001 HC TELEMETRY ROOM

## 2020-10-02 PROCEDURE — 36415 COLL VENOUS BLD VENIPUNCTURE: CPT

## 2020-10-02 PROCEDURE — 25000003 PHARM REV CODE 250: Performed by: INTERNAL MEDICINE

## 2020-10-02 PROCEDURE — 97535 SELF CARE MNGMENT TRAINING: CPT

## 2020-10-02 PROCEDURE — 83735 ASSAY OF MAGNESIUM: CPT

## 2020-10-02 PROCEDURE — 80053 COMPREHEN METABOLIC PANEL: CPT

## 2020-10-02 PROCEDURE — 84132 ASSAY OF SERUM POTASSIUM: CPT

## 2020-10-02 PROCEDURE — 97116 GAIT TRAINING THERAPY: CPT | Mod: CQ

## 2020-10-02 PROCEDURE — 97166 OT EVAL MOD COMPLEX 45 MIN: CPT

## 2020-10-02 RX ORDER — BISACODYL 10 MG
10 SUPPOSITORY, RECTAL RECTAL DAILY PRN
Status: DISCONTINUED | OUTPATIENT
Start: 2020-10-02 | End: 2020-10-04 | Stop reason: HOSPADM

## 2020-10-02 RX ADMIN — PIPERACILLIN SODIUM AND TAZOBACTAM SODIUM 4.5 G: 4; .5 INJECTION, POWDER, LYOPHILIZED, FOR SOLUTION INTRAVENOUS at 08:10

## 2020-10-02 RX ADMIN — POTASSIUM CHLORIDE 40 MEQ: 7.46 INJECTION, SOLUTION INTRAVENOUS at 04:10

## 2020-10-02 RX ADMIN — IPRATROPIUM BROMIDE 2 SPRAY: 42 SPRAY, METERED NASAL at 06:10

## 2020-10-02 RX ADMIN — POTASSIUM CHLORIDE 40 MEQ: 7.46 INJECTION, SOLUTION INTRAVENOUS at 07:10

## 2020-10-02 RX ADMIN — ENOXAPARIN SODIUM 40 MG: 100 INJECTION SUBCUTANEOUS at 04:10

## 2020-10-02 RX ADMIN — SODIUM CHLORIDE AND POTASSIUM CHLORIDE: .9; .15 SOLUTION INTRAVENOUS at 03:10

## 2020-10-02 RX ADMIN — CHLORHEXIDINE GLUCONATE 15 ML: 1.2 RINSE ORAL at 08:10

## 2020-10-02 RX ADMIN — PIPERACILLIN SODIUM AND TAZOBACTAM SODIUM 4.5 G: 4; .5 INJECTION, POWDER, LYOPHILIZED, FOR SOLUTION INTRAVENOUS at 05:10

## 2020-10-02 RX ADMIN — BISACODYL 10 MG: 10 SUPPOSITORY RECTAL at 04:10

## 2020-10-02 RX ADMIN — MAGNESIUM SULFATE IN WATER 2 G: 40 INJECTION, SOLUTION INTRAVENOUS at 06:10

## 2020-10-02 RX ADMIN — IPRATROPIUM BROMIDE 2 SPRAY: 42 SPRAY, METERED NASAL at 09:10

## 2020-10-02 RX ADMIN — MUPIROCIN 1 G: 20 OINTMENT TOPICAL at 08:10

## 2020-10-02 RX ADMIN — IPRATROPIUM BROMIDE 2 SPRAY: 42 SPRAY, METERED NASAL at 05:10

## 2020-10-02 RX ADMIN — SODIUM PHOSPHATE, MONOBASIC, MONOHYDRATE AND SODIUM PHOSPHATE, DIBASIC, ANHYDROUS 30 MMOL: 276; 142 INJECTION, SOLUTION INTRAVENOUS at 09:10

## 2020-10-02 RX ADMIN — PIPERACILLIN SODIUM AND TAZOBACTAM SODIUM 4.5 G: 4; .5 INJECTION, POWDER, LYOPHILIZED, FOR SOLUTION INTRAVENOUS at 01:10

## 2020-10-02 NOTE — TELEPHONE ENCOUNTER
----- Message from Florinda Durham MA sent at 10/2/2020  3:23 PM CDT -----  Contact: daughter,  erin  Post op on Monday,, pt is inpatient right now  If DC, does she come in Monday ??  [phone]

## 2020-10-02 NOTE — CARE UPDATE
10/01/20 2055   Patient Assessment/Suction   Level of Consciousness (AVPU) alert   Respiratory Effort Unlabored   Expansion/Accessory Muscles/Retractions no retractions   All Lung Fields Breath Sounds clear;diminished   Rhythm/Pattern, Respiratory no shortness of breath reported   Cough Frequency no cough   PRE-TX-O2   O2 Device (Oxygen Therapy) room air   SpO2 95 %   Pulse Oximetry Type Intermittent   $ Pulse Oximetry - Multiple Charge Pulse Oximetry - Multiple   Pulse 77   Resp 20   Positioning   Head of Bed (HOB) HOB elevated   Respiratory Evaluation   $ Care Plan Tech Time 15 min

## 2020-10-02 NOTE — SUBJECTIVE & OBJECTIVE
Interval History: ileus    Review of Systems   Constitutional: Positive for fatigue.   HENT: Negative.    Eyes: Negative.    Respiratory: Negative.    Cardiovascular: Negative.    Gastrointestinal: Negative.    Endocrine: Negative.    Genitourinary: Negative.    Musculoskeletal: Negative.    Skin: Negative.    Allergic/Immunologic: Negative.    Neurological: Negative.    Hematological: Negative.    All other systems reviewed and are negative.    Objective:     Vital Signs (Most Recent):  Temp: 98.2 °F (36.8 °C) (10/02/20 1119)  Pulse: 78 (10/02/20 1119)  Resp: 19 (10/02/20 1119)  BP: (!) 144/70 (10/02/20 1119)  SpO2: 99 % (10/02/20 1119) Vital Signs (24h Range):  Temp:  [98.2 °F (36.8 °C)-99.4 °F (37.4 °C)] 98.2 °F (36.8 °C)  Pulse:  [69-78] 78  Resp:  [16-20] 19  SpO2:  [94 %-99 %] 99 %  BP: (135-159)/(62-72) 144/70     Weight: 56.5 kg (124 lb 9 oz)  Body mass index is 21.38 kg/m².    Intake/Output Summary (Last 24 hours) at 10/2/2020 1522  Last data filed at 10/2/2020 1319  Gross per 24 hour   Intake --   Output 120 ml   Net -120 ml      Physical Exam  Vitals signs and nursing note reviewed.   Constitutional:       Appearance: She is well-developed.   HENT:      Head: Normocephalic and atraumatic.      Right Ear: External ear normal.      Left Ear: External ear normal.      Nose: Nose normal.   Eyes:      Conjunctiva/sclera: Conjunctivae normal.      Pupils: Pupils are equal, round, and reactive to light.   Neck:      Musculoskeletal: Normal range of motion and neck supple.   Cardiovascular:      Rate and Rhythm: Normal rate and regular rhythm.      Heart sounds: Normal heart sounds.   Pulmonary:      Effort: Pulmonary effort is normal.      Breath sounds: Normal breath sounds.   Abdominal:      General: Bowel sounds are normal. There is distension.      Palpations: Abdomen is soft.      Comments: Wound/steristrips    Musculoskeletal: Normal range of motion.   Skin:     General: Skin is warm and dry.       Capillary Refill: Capillary refill takes less than 2 seconds.   Neurological:      Mental Status: She is alert and oriented to person, place, and time.   Psychiatric:         Behavior: Behavior normal.         Thought Content: Thought content normal.         Judgment: Judgment normal.         Significant Labs:   BMP:   Recent Labs   Lab 10/03/20  0333   GLU 74      K 3.0*      CO2 26   BUN 6*   CREATININE 0.3*   CALCIUM 8.4*   MG 1.9     CBC:   Recent Labs   Lab 10/02/20  0441 10/03/20  0333   WBC 6.14 5.49   HGB 12.8 12.2   HCT 38.5 36.1*    161       Significant Imaging: I have reviewed and interpreted all pertinent imaging results/findings within the past 24 hours.

## 2020-10-02 NOTE — PROGRESS NOTES
Pt seen and examined.   POD 3 s/p ex lap with no significant findings  Pt resting comfortably.  Denies n/v      Wt Readings from Last 3 Encounters:   10/02/20 56.5 kg (124 lb 9 oz)   09/30/20 57.6 kg (127 lb)   09/15/20 58.1 kg (128 lb)     Temp Readings from Last 3 Encounters:   10/02/20 98.2 °F (36.8 °C) (Oral)   09/16/20 97.8 °F (36.6 °C) (Skin)   08/20/20 97.4 °F (36.3 °C) (Oral)     BP Readings from Last 3 Encounters:   10/02/20 (!) 144/70   09/16/20 120/74   08/24/20 127/81     Pulse Readings from Last 3 Encounters:   10/02/20 78   09/16/20 68   08/24/20 69     AAOx3  CTA B  Sinus  Soft/nd/nt   2+ pulses B    Lab Results   Component Value Date    WBC 6.14 10/02/2020    HGB 12.8 10/02/2020    HCT 38.5 10/02/2020    MCV 92 10/02/2020     10/02/2020       BMP  Lab Results   Component Value Date     10/02/2020    K 2.5 (LL) 10/02/2020     10/02/2020    CO2 23 10/02/2020    BUN 13 10/02/2020    CREATININE 0.3 (L) 10/02/2020    CALCIUM 8.1 (L) 10/02/2020    ANIONGAP 10 10/02/2020    ESTGFRAFRICA >60.0 10/02/2020    EGFRNONAA >60.0 10/02/2020     A/P: POD 3 s/p ex lap  Hypokalemia: needs aggressive replacement  If sbft negative for any extravasation ok to start clears and adv as tolerated.

## 2020-10-02 NOTE — TELEPHONE ENCOUNTER
Spoke with patient's daughter in regards to patient. Daughter states patient is admitted in the hospital and wasn't sure if they would be able to make it to the 2 week follow up appointment scheduled with Dr. Sprague on Monday 10/5 at 3:45. Explained that Dr. Sprague and her staff are out of the office until Monday. Offered to reschedule appointment, but daughter declined. Stated I would forward message to them and they would be in touch to follow up.

## 2020-10-02 NOTE — PT/OT/SLP PROGRESS
Physical Therapy Treatment    Patient Name:  Ariana Tiwari   MRN:  [id number]    Recommendations:     Discharge Recommendations:  nursing facility, skilled   Discharge Equipment Recommendations: (TBD at next level of care)   Barriers to discharge: None    Assessment:     Ariana Tiwari is a 75 y.o. female admitted with a medical diagnosis of Perforated abdominal viscus.  She presents with the following impairments/functional limitations:  weakness, impaired self care skills, impaired functional mobilty, gait instability, impaired balance.    Patient presents sitting up in chair upon PTA arrival; agreeable to PT treatment. Pt's son present and supportive. Patient continues to appear confused at times during treatment however patient able to initiate gait training. Patient ambulated ~25ft in room with RW and Mod Assist x 2 as patient exhibits poor standing balance. Patient is also easily distracted however is redirectable. Continue with PT and POC.    Rehab Prognosis: Good; patient would benefit from acute skilled PT services to address these deficits and reach maximum level of function.    Recent Surgery: Procedure(s) (LRB):  LAPAROTOMY, EXPLORATORY (N/A)  LYSIS, ADHESIONS 3 Days Post-Op    Plan:     During this hospitalization, patient to be seen daily to address the identified rehab impairments via gait training, therapeutic activities, therapeutic exercises and progress toward the following goals:    · Plan of Care Expires:  10/30/20    Subjective     Chief Complaint: Abdominal pain  Patient/Family Comments/goals:   Pain/Comfort:  · Pain Rating 1: (Pt did not quantify)  · Location 1: abdomen  · Pain Addressed 1: Reposition, Distraction, Cessation of Activity      Objective:     Communicated with RN prior to session.  Patient found up in chair with LESLI drain, telemetry, peripheral IV upon PT entry to room.     General Precautions: Standard, fall   Orthopedic Precautions:(Pt had surgery on left toe and is in  surgical shoe. RN reports pt was able to ambulate in house with shoe on and rollator.)   Braces: (surgical shoe L foot)     Functional Mobility:  · Transfers:     · Sit to Stand:  moderate assistance with rolling walker  · Gait: 25ft with RW and Mod Assist x 2 for balance and support      AM-PAC 6 CLICK MOBILITY          Therapeutic Activities and Exercises:  sit <> stands; transfer training    Patient left up in chair with all lines intact, call button in reach, chair alarm on and pt's son present..    GOALS:   Multidisciplinary Problems     Physical Therapy Goals        Problem: Physical Therapy Goal    Goal Priority Disciplines Outcome Goal Variances Interventions   Physical Therapy Goal     PT, PT/OT Ongoing, Progressing     Description: Goals to be met by: discharge     Patient will increase functional independence with mobility by performin. Supine to sit with Contact Guard Assistance  2. Sit to stand transfer with Contact Guard Assistance  3. Bed to chair transfer with Contact Guard Assistance using Rolling Walker  4. Gait  x 75 feet with Contact Guard Assistance using Rolling Walker.                      Time Tracking:     PT Received On: 10/02/20  PT Start Time: 1146     PT Stop Time: 1157  PT Total Time (min): 11 min     Billable Minutes: Gait Training 11    Treatment Type: Treatment  PT/PTA: PTA     PTA Visit Number: 2     Suzie Espinoza PTA  10/02/2020

## 2020-10-03 LAB
ALBUMIN SERPL BCP-MCNC: 3.3 G/DL (ref 3.5–5.2)
ALP SERPL-CCNC: 55 U/L (ref 55–135)
ALT SERPL W/O P-5'-P-CCNC: 17 U/L (ref 10–44)
ANION GAP SERPL CALC-SCNC: 12 MMOL/L (ref 8–16)
AST SERPL-CCNC: 26 U/L (ref 10–40)
BASOPHILS # BLD AUTO: 0.03 K/UL (ref 0–0.2)
BASOPHILS NFR BLD: 0.5 % (ref 0–1.9)
BILIRUB SERPL-MCNC: 1.1 MG/DL (ref 0.1–1)
BUN SERPL-MCNC: 6 MG/DL (ref 8–23)
CALCIUM SERPL-MCNC: 8.4 MG/DL (ref 8.7–10.5)
CHLORIDE SERPL-SCNC: 106 MMOL/L (ref 95–110)
CO2 SERPL-SCNC: 26 MMOL/L (ref 23–29)
CREAT SERPL-MCNC: 0.3 MG/DL (ref 0.5–1.4)
DIFFERENTIAL METHOD: ABNORMAL
EOSINOPHIL # BLD AUTO: 0.2 K/UL (ref 0–0.5)
EOSINOPHIL NFR BLD: 4.2 % (ref 0–8)
ERYTHROCYTE [DISTWIDTH] IN BLOOD BY AUTOMATED COUNT: 11.9 % (ref 11.5–14.5)
EST. GFR  (AFRICAN AMERICAN): >60 ML/MIN/1.73 M^2
EST. GFR  (NON AFRICAN AMERICAN): >60 ML/MIN/1.73 M^2
GLUCOSE SERPL-MCNC: 74 MG/DL (ref 70–110)
HCT VFR BLD AUTO: 36.1 % (ref 37–48.5)
HGB BLD-MCNC: 12.2 G/DL (ref 12–16)
IMM GRANULOCYTES # BLD AUTO: 0.02 K/UL (ref 0–0.04)
IMM GRANULOCYTES NFR BLD AUTO: 0.4 % (ref 0–0.5)
LYMPHOCYTES # BLD AUTO: 1 K/UL (ref 1–4.8)
LYMPHOCYTES NFR BLD: 18.8 % (ref 18–48)
MAGNESIUM SERPL-MCNC: 1.9 MG/DL (ref 1.6–2.6)
MCH RBC QN AUTO: 31 PG (ref 27–31)
MCHC RBC AUTO-ENTMCNC: 33.8 G/DL (ref 32–36)
MCV RBC AUTO: 92 FL (ref 82–98)
MONOCYTES # BLD AUTO: 0.4 K/UL (ref 0.3–1)
MONOCYTES NFR BLD: 7.3 % (ref 4–15)
NEUTROPHILS # BLD AUTO: 3.8 K/UL (ref 1.8–7.7)
NEUTROPHILS NFR BLD: 68.8 % (ref 38–73)
NRBC BLD-RTO: 0 /100 WBC
PHOSPHATE SERPL-MCNC: 2.6 MG/DL (ref 2.7–4.5)
PLATELET # BLD AUTO: 161 K/UL (ref 150–350)
PMV BLD AUTO: 10.6 FL (ref 9.2–12.9)
POTASSIUM SERPL-SCNC: 3 MMOL/L (ref 3.5–5.1)
POTASSIUM SERPL-SCNC: 3.4 MMOL/L (ref 3.5–5.1)
PROT SERPL-MCNC: 6 G/DL (ref 6–8.4)
RBC # BLD AUTO: 3.94 M/UL (ref 4–5.4)
SODIUM SERPL-SCNC: 144 MMOL/L (ref 136–145)
WBC # BLD AUTO: 5.49 K/UL (ref 3.9–12.7)

## 2020-10-03 PROCEDURE — 63600175 PHARM REV CODE 636 W HCPCS: Performed by: INTERNAL MEDICINE

## 2020-10-03 PROCEDURE — 83735 ASSAY OF MAGNESIUM: CPT

## 2020-10-03 PROCEDURE — 80053 COMPREHEN METABOLIC PANEL: CPT

## 2020-10-03 PROCEDURE — 21400001 HC TELEMETRY ROOM

## 2020-10-03 PROCEDURE — 25000003 PHARM REV CODE 250: Performed by: INTERNAL MEDICINE

## 2020-10-03 PROCEDURE — 84100 ASSAY OF PHOSPHORUS: CPT

## 2020-10-03 PROCEDURE — 85025 COMPLETE CBC W/AUTO DIFF WBC: CPT

## 2020-10-03 PROCEDURE — 84132 ASSAY OF SERUM POTASSIUM: CPT

## 2020-10-03 RX ADMIN — IPRATROPIUM BROMIDE 2 SPRAY: 42 SPRAY, METERED NASAL at 11:10

## 2020-10-03 RX ADMIN — PIPERACILLIN SODIUM AND TAZOBACTAM SODIUM 4.5 G: 4; .5 INJECTION, POWDER, LYOPHILIZED, FOR SOLUTION INTRAVENOUS at 01:10

## 2020-10-03 RX ADMIN — POTASSIUM CHLORIDE 40 MEQ: 7.46 INJECTION, SOLUTION INTRAVENOUS at 04:10

## 2020-10-03 RX ADMIN — SODIUM CHLORIDE AND POTASSIUM CHLORIDE: .9; .15 SOLUTION INTRAVENOUS at 11:10

## 2020-10-03 RX ADMIN — IPRATROPIUM BROMIDE 2 SPRAY: 42 SPRAY, METERED NASAL at 09:10

## 2020-10-03 RX ADMIN — SODIUM PHOSPHATE, MONOBASIC, MONOHYDRATE AND SODIUM PHOSPHATE, DIBASIC, ANHYDROUS 15 MMOL: 276; 142 INJECTION, SOLUTION INTRAVENOUS at 04:10

## 2020-10-03 RX ADMIN — PIPERACILLIN SODIUM AND TAZOBACTAM SODIUM 4.5 G: 4; .5 INJECTION, POWDER, LYOPHILIZED, FOR SOLUTION INTRAVENOUS at 04:10

## 2020-10-03 RX ADMIN — IBUPROFEN 600 MG: 400 TABLET, FILM COATED ORAL at 09:10

## 2020-10-03 RX ADMIN — POTASSIUM CHLORIDE 40 MEQ: 7.46 INJECTION, SOLUTION INTRAVENOUS at 01:10

## 2020-10-03 RX ADMIN — IPRATROPIUM BROMIDE 2 SPRAY: 42 SPRAY, METERED NASAL at 06:10

## 2020-10-03 RX ADMIN — IBUPROFEN 600 MG: 400 TABLET, FILM COATED ORAL at 01:10

## 2020-10-03 RX ADMIN — PIPERACILLIN SODIUM AND TAZOBACTAM SODIUM 4.5 G: 4; .5 INJECTION, POWDER, LYOPHILIZED, FOR SOLUTION INTRAVENOUS at 09:10

## 2020-10-03 RX ADMIN — CHLORHEXIDINE GLUCONATE 15 ML: 1.2 RINSE ORAL at 09:10

## 2020-10-03 RX ADMIN — ENOXAPARIN SODIUM 40 MG: 100 INJECTION SUBCUTANEOUS at 07:10

## 2020-10-03 RX ADMIN — MUPIROCIN 1 G: 20 OINTMENT TOPICAL at 09:10

## 2020-10-03 NOTE — PROGRESS NOTES
Progress Note  Gen Surg    Admit Date: 9/29/2020  Attending: Blue  S/P: Procedure(s) (LRB):  LAPAROTOMY, EXPLORATORY (N/A)  LYSIS, ADHESIONS    Post-operative Day: 4 Days Post-Op    Hospital Day: 5    SUBJECTIVE:     Having flatus     OBJECTIVE:     Vital Signs (Most Recent)  Temp: 97.1 °F (36.2 °C) (10/03/20 0717)  Pulse: 75 (10/03/20 0717)  Resp: 17 (10/03/20 0717)  BP: 137/63 (10/03/20 0717)  SpO2: 95 % (10/03/20 0717)    Vital Signs Range (Last 24H):  Temp:  [97.1 °F (36.2 °C)-97.8 °F (36.6 °C)]   Pulse:  [71-86]   Resp:  [17-20]   BP: (137-150)/(63-69)   SpO2:  [93 %-95 %]     I & O (Last 24H):    Intake/Output Summary (Last 24 hours) at 10/3/2020 1119  Last data filed at 10/3/2020 0939  Gross per 24 hour   Intake 1550 ml   Output 575 ml   Net 975 ml       Scheduled medications:   chlorhexidine  15 mL Mouth/Throat BID    enoxaparin  40 mg Subcutaneous Q24H    ibuprofen  600 mg Oral QID    ipratropium  2 spray Each Nostril QID    levothyroxine  75 mcg Oral Before breakfast    mupirocin  1 g Nasal BID    piperacillin-tazobactam (ZOSYN) IVPB  4.5 g Intravenous Q8H    potassium chloride in water  10 mEq Intravenous Once       Physical Exam:  General: no distress  Lungs:  clear to auscultation bilaterally and normal respiratory effort  Heart: regular rate and rhythm, S1, S2 normal, no murmur, rub or gallop  Abdomen: soft, non-tender non-distented; bowel sounds normal; no masses,  no organomegaly    Wound/Incision:  clean, dry, intact    Laboratory:  Labs within the past 24 hours have been reviewed.    ASSESSMENT/PLAN:     Abdomen benign, contrast makes to colon on sbft (algthough slow)  Having gi function  Diet as tolerated    Sandra Mcarthur MD

## 2020-10-03 NOTE — PT/OT/SLP EVAL
"Occupational Therapy   Evaluation    Name: Ariana Tiwari  MRN: [id number]  Admitting Diagnosis:  Perforated abdominal viscus 3 Days Post-Op    Recommendations:     Discharge Recommendations: nursing facility, skilled  Discharge Equipment Recommendations:  (TBD next level of care)  Barriers to discharge:  None    Assessment:     Ariana Tiwari is a 75 y.o. female with a medical diagnosis of Perforated abdominal viscus.  Pt agreeable to OT evaluation this AM. Performance deficits affecting function: weakness, impaired endurance, impaired self care skills, impaired functional mobilty, gait instability, impaired balance, decreased coordination, decreased safety awareness.  Pt oriented x 4, no family at bedside. Pt reported PTA, pt was able to ambulate around home with rollator and also  required assistance for ADLs. Pt required cues during session for safety and for RW management. Rec SNF at d/c.     Rehab Prognosis: Fair; patient would benefit from acute skilled OT services to address these deficits and reach maximum level of function.       Plan:     Patient to be seen 5 x/week to address the above listed problems via self-care/home management, therapeutic activities, therapeutic exercises  · Plan of Care Expires: 11/02/20  · Plan of Care Reviewed with: patient    Subjective     Chief Complaint: "I need to use the bathroom" "I feel like my words are disappearing"  Patient/Family Comments/goals: to get better    Occupational Profile:  Living Environment: Pt lives with son and DIL in a 1 story home with no steps to enter. Pt has a walk-in tub with a built-in seat, a standard height toilet, and grab bars present.   Previous level of function: Mod I - Min A with ADLs/functional mob. Pt requires assistance for bathing and dressing. Pt's son and DIL cook, clean, and provide transportation.  Roles and Routines: limited homemaker  Equipment Used at Home:  rollator, grab bar(walk-in bath with built-in seat; handheld shower " head; bidet)  Assistance upon Discharge: yes, from family     Pain/Comfort:  ·      Patients cultural, spiritual, L[employer] day conflicts given the current situation:      Objective:     Communicated with: nursing prior to session.  Patient found HOB elevated with LESLI drain, telemetry, peripheral IV, bed alarm upon OT entry to room.    General Precautions: Standard, fall   Orthopedic Precautions:N/A   Braces:       Occupational Performance:    Bed Mobility:    · Patient completed Supine to Sit with moderate assistance and 2 persons with cues needed for limb placement and body mechanics     Functional Mobility/Transfers:  · Patient completed Sit <> Stand Transfer with moderate assistance and of 2 persons  with  rolling walker with cues needed for hand placement   · Patient completed Toilet Transfer Step Transfer technique with moderate assistance with  rolling walker to BSC with cues needed for hand placement   · Functional Mobility: pt amb a few steps towards BSC then t/f to bedside chair with Mod A with RW    Activities of Daily Living:  · Grooming: setup assistance to wash hands seated in chair     · Lower Body Dressing: total assistance to don/doff socks while seated EOB  · Toileting: stand by assistance while patient performed hygiene while seated on BSC; Min A for clothing management     Cognitive/Visual Perceptual:  Cognitive/Psychosocial Skills:     -       Oriented to: Person, Place, Time and Situation   -       Follows Commands/attention:Follows one-step commands  -       Communication: clear/fluent  -       Memory: defiicts   -       Safety awareness/insight to disability: impaired   -       Mood/Affect/Coping skills/emotional control: Cooperative and Pleasant    Physical Exam:  Balance:    -       SBA seated balance; Min A/Mod A standing balance   Upper Extremity Range of Motion:     -       Right Upper Extremity: WFL  -       Left Upper Extremity: WFL  Upper Extremity Strength:    -       Right Upper  Extremity: WFL  -       Left Upper Extremity: WFL   Strength:    -       Right Upper Extremity: WFL  -       Left Upper Extremity: WFL  Gross motor coordination: pt with noted tremors to bilateral UEs affecting coordination     AMPAC 6 Click ADL:  AMPAC Total Score:  13    Treatment & Education:  Pt educated on role of OT/POC and safety during ADLs, transfers, and functional mobility.  Education:    Patient left up in chair with all lines intact, call button in reach, chair alarm on and x-ray tech/transporter  present    GOALS:   Multidisciplinary Problems     Occupational Therapy Goals        Problem: Occupational Therapy Goal    Goal Priority Disciplines Outcome Interventions   Occupational Therapy Goal     OT, PT/OT     Description: Goals to be met by: discharge    Patient will increase functional independence with ADLs by performing:    LE Dressing with Minimal Assistance and Assistive Devices as needed.  Grooming while standing at sink with Stand-by Assistance.  Toileting from toilet with Stand-by Assistance for hygiene and clothing management.   Toilet transfer to toilet with Stand-by Assistance.                     History:     Past Medical History:   Diagnosis Date    Allergy     Diverticulosis     Gluten enteropathy     Gluten intolerance     Hernia     Hypothyroidism     PD (Parkinson's disease) 9/16/2015    Right tremor type    Peptic ulcer disease     Right shoulder pain     Cirtisone injection 3/26/15 - Dr. Tolbert    Ulcer        Past Surgical History:   Procedure Laterality Date    ADENOIDECTOMY      COLONOSCOPY  03/01/2012    Dr. Teresa, repeat in 10 years for screening    COLONOSCOPY N/A 2/21/2018    Procedure: COLONOSCOPY;  Surgeon: Radha Deng MD;  Location: [facility];  Service: Endoscopy;  Laterality: N/A;    CORRECTION OF HAMMER TOE Left 9/16/2020    Procedure: CORRECTION, HAMMER TOE;  Surgeon: William Sprague MD;  Location: [facility];  Service: Orthopedics;   Laterality: Left;    COSMETIC SURGERY      Broken nose    FRACTURE SURGERY  left wrist    With pin    HEMORRHOID SURGERY      HERNIA REPAIR Left 04/09/2015    Dr Lo; left inguinal    INTRALUMINAL GASTROINTESTINAL TRACT IMAGING VIA CAPSULE N/A 7/10/2018    Procedure: IMAGING, GI TRACT, INTRALUMINAL, VIA CAPSULE;  Surgeon: Radha Deng MD;  Location: [facility];  Service: Endoscopy;  Laterality: N/A;    LYSIS OF ADHESIONS  9/29/2020    Procedure: LYSIS, ADHESIONS;  Surgeon: Eagle Gonzalez MD;  Location: [facility];  Service: General;;    RHINOPLASTY TIP      TONSILLECTOMY      UPPER GASTROINTESTINAL ENDOSCOPY  05/21/2015    Dr. Gomez       Time Tracking:     OT Date of Treatment: 10/02/20  OT Start Time: 0841  OT Stop Time: 0940  OT Total Time (min): 59 min    Billable Minutes:Evaluation 10  Self Care/Home Management 29  Therapeutic Activity 10    Carolina Oviedo OT  10/2/2020

## 2020-10-03 NOTE — PT/OT/SLP PROGRESS
Physical Therapy      Patient Name:  Ariana Kramer Te   MRN:  [id number]    Patient not seen today secondary to Patient fatigue, Unavailable (Comment). PT came to see pt in am and pm, was sound asleep, daughter-in-law requested to defer PT and let pt rest; Will follow-up 10/04/20.    Anjelica Gallego, PT

## 2020-10-03 NOTE — PROGRESS NOTES
"[facility] Medicine  Progress Note    Patient Name: Ariana Tiwari  MRN: [id number]  Patient Class: IP- Inpatient   Admission Date: 9/29/2020  Length of Stay: 4 days  Attending Physician: Je Thomas MD  Primary Care Provider: Nba Petty MD        Subjective:     Principal Problem:Perforated abdominal viscus        HPI:  Ms Tiwari awakened this AM , nauseated and confused. Pt denies fever chills, vomiting, hematemesis, melena, hematochezia, chest pain, new sensory or motor deficit. Pt has a Hx of being chronically treated with antibiotics for diverticulitis  1/10/2020 Perforated small bowel diverticulum  2/2020 Small bowel obstruction  The above is complicated by advanced Parkinson's disease with cognitive impairment  Pt's son is POA and both Pt and her son wish her to be a full code    Overview/Hospital Course:  09/30  Assumed care. Chart reviewed. Consultants' attendance noted and appreciated. Labs reviewed: normal CBC; hypokalemia (sliding scale); elevated troponin; hypothyroidism.  AM EKG reviewed: sinus with Q in III and aVF with loss of R wave (septal infarct); no acute ST segments. Patient is confused this AM, requiring re-orientation. Denies any pain    10/01  Consultant's attendance noted and appreciated. Somewhat confused this AM, requiring redirection. Pulling at lines. Has pulled out NGT twice this AM already. States she has "Muscle pain" in her abdomen from "Working out so much yesterday". No CP/SOB    10/02  Consultants' attendance noted and appreciated. Labs reviewed: CBC normal; hypokalemia with normal renal function. Telemetry reviewed: sinus. Has expected wound discomfort. SBFT underway.     10/03  Consultant's attendance noted and appreciated. SBFT significantly delayed (20 hours), has ileus. Labs reviewed: Normal CBC; hypokalemia with normal renal function. Telemetry reviewed: sinus. Patient has had 2 NGT, and has pulled them out. Prior to replacing will check " KUB    Interval History: ileus    Review of Systems   Constitutional: Positive for fatigue.   HENT: Negative.    Eyes: Negative.    Respiratory: Negative.    Cardiovascular: Negative.    Gastrointestinal: Negative.    Endocrine: Negative.    Genitourinary: Negative.    Musculoskeletal: Negative.    Skin: Negative.    Allergic/Immunologic: Negative.    Neurological: Negative.    Hematological: Negative.    All other systems reviewed and are negative.    Objective:     Vital Signs (Most Recent):  Temp: 98.2 °F (36.8 °C) (10/02/20 1119)  Pulse: 78 (10/02/20 1119)  Resp: 19 (10/02/20 1119)  BP: (!) 144/70 (10/02/20 1119)  SpO2: 99 % (10/02/20 1119) Vital Signs (24h Range):  Temp:  [98.2 °F (36.8 °C)-99.4 °F (37.4 °C)] 98.2 °F (36.8 °C)  Pulse:  [69-78] 78  Resp:  [16-20] 19  SpO2:  [94 %-99 %] 99 %  BP: (135-159)/(62-72) 144/70     Weight: 56.5 kg (124 lb 9 oz)  Body mass index is 21.38 kg/m².    Intake/Output Summary (Last 24 hours) at 10/2/2020 1522  Last data filed at 10/2/2020 1319  Gross per 24 hour   Intake --   Output 120 ml   Net -120 ml      Physical Exam  Vitals signs and nursing note reviewed.   Constitutional:       Appearance: She is well-developed.   HENT:      Head: Normocephalic and atraumatic.      Right Ear: External ear normal.      Left Ear: External ear normal.      Nose: Nose normal.   Eyes:      Conjunctiva/sclera: Conjunctivae normal.      Pupils: Pupils are equal, round, and reactive to light.   Neck:      Musculoskeletal: Normal range of motion and neck supple.   Cardiovascular:      Rate and Rhythm: Normal rate and regular rhythm.      Heart sounds: Normal heart sounds.   Pulmonary:      Effort: Pulmonary effort is normal.      Breath sounds: Normal breath sounds.   Abdominal:      General: Bowel sounds are normal. There is distension.      Palpations: Abdomen is soft.      Comments: Wound/steristrips    Musculoskeletal: Normal range of motion.   Skin:     General: Skin is warm and dry.       Capillary Refill: Capillary refill takes less than 2 seconds.   Neurological:      Mental Status: She is alert and oriented to person, place, and time.   Psychiatric:         Behavior: Behavior normal.         Thought Content: Thought content normal.         Judgment: Judgment normal.         Significant Labs:   BMP:   Recent Labs   Lab 10/03/20  0333   GLU 74      K 3.0*      CO2 26   BUN 6*   CREATININE 0.3*   CALCIUM 8.4*   MG 1.9     CBC:   Recent Labs   Lab 10/02/20  0441 10/03/20  0333   WBC 6.14 5.49   HGB 12.8 12.2   HCT 38.5 36.1*    161       Significant Imaging: I have reviewed and interpreted all pertinent imaging results/findings within the past 24 hours.      Assessment/Plan:      * Perforated abdominal viscus  Status post laporotomy      Abdominal pain  KUB      PD (Parkinson's disease)  Continue current regimen      Hypothyroidism  Start l-thyroxine 0.75 mg q day; will discuss disease with patient and family, explaining need for repeat TSH      Gluten enteropathy  Aware         VTE Risk Mitigation (From admission, onward)         Ordered     enoxaparin injection 40 mg  Every 24 hours      09/30/20 0057     IP VTE HIGH RISK PATIENT  Once      09/30/20 0057     Place sequential compression device  Until discontinued      09/30/20 0057     Place sequential compression device  Until discontinued      09/29/20 1538                Discharge Planning   IAIN:      Code Status: Full Code   Is the patient medically ready for discharge?: No    Reason for patient still in hospital (select all that apply): Treatment  Discharge Plan A: Home Health                  Je Thomas MD  Department of Hospital Medicine   [facility]

## 2020-10-03 NOTE — PLAN OF CARE
Problem: Fall Injury Risk  Goal: Absence of Fall and Fall-Related Injury  Outcome: Ongoing, Progressing     Problem: Infection  Goal: Infection Symptom Resolution  Outcome: Ongoing, Progressing     Problem: Adult Inpatient Plan of Care  Goal: Plan of Care Review  Outcome: Ongoing, Progressing     Problem: Adult Inpatient Plan of Care  Goal: Patient-Specific Goal (Individualization)  Outcome: Ongoing, Progressing     Problem: Adult Inpatient Plan of Care  Goal: Absence of Hospital-Acquired Illness or Injury  Outcome: Ongoing, Progressing     Problem: Adult Inpatient Plan of Care  Goal: Optimal Comfort and Wellbeing  Outcome: Ongoing, Progressing     Problem: Skin Injury Risk Increased  Goal: Skin Health and Integrity  Outcome: Ongoing, Progressing

## 2020-10-04 VITALS
BODY MASS INDEX: 21.56 KG/M2 | TEMPERATURE: 98 F | HEIGHT: 64 IN | WEIGHT: 126.31 LBS | OXYGEN SATURATION: 98 % | HEART RATE: 65 BPM | RESPIRATION RATE: 16 BRPM | SYSTOLIC BLOOD PRESSURE: 133 MMHG | DIASTOLIC BLOOD PRESSURE: 61 MMHG

## 2020-10-04 PROBLEM — R19.8 PERFORATED ABDOMINAL VISCUS: Status: RESOLVED | Noted: 2020-09-29 | Resolved: 2020-10-04

## 2020-10-04 PROBLEM — R10.9 ABDOMINAL PAIN: Status: RESOLVED | Noted: 2020-01-11 | Resolved: 2020-10-04

## 2020-10-04 LAB
ALBUMIN SERPL BCP-MCNC: 3.2 G/DL (ref 3.5–5.2)
ALP SERPL-CCNC: 50 U/L (ref 55–135)
ALT SERPL W/O P-5'-P-CCNC: 15 U/L (ref 10–44)
ANION GAP SERPL CALC-SCNC: 6 MMOL/L (ref 8–16)
AST SERPL-CCNC: 22 U/L (ref 10–40)
BACTERIA BLD CULT: NORMAL
BACTERIA BLD CULT: NORMAL
BASOPHILS # BLD AUTO: 0.02 K/UL (ref 0–0.2)
BASOPHILS NFR BLD: 0.5 % (ref 0–1.9)
BILIRUB SERPL-MCNC: 0.7 MG/DL (ref 0.1–1)
BUN SERPL-MCNC: <5 MG/DL (ref 8–23)
CALCIUM SERPL-MCNC: 8.5 MG/DL (ref 8.7–10.5)
CHLORIDE SERPL-SCNC: 110 MMOL/L (ref 95–110)
CO2 SERPL-SCNC: 25 MMOL/L (ref 23–29)
CREAT SERPL-MCNC: 0.4 MG/DL (ref 0.5–1.4)
DIFFERENTIAL METHOD: ABNORMAL
EOSINOPHIL # BLD AUTO: 0.2 K/UL (ref 0–0.5)
EOSINOPHIL NFR BLD: 5.5 % (ref 0–8)
ERYTHROCYTE [DISTWIDTH] IN BLOOD BY AUTOMATED COUNT: 12.1 % (ref 11.5–14.5)
EST. GFR  (AFRICAN AMERICAN): >60 ML/MIN/1.73 M^2
EST. GFR  (NON AFRICAN AMERICAN): >60 ML/MIN/1.73 M^2
GLUCOSE SERPL-MCNC: 88 MG/DL (ref 70–110)
HCT VFR BLD AUTO: 34.5 % (ref 37–48.5)
HGB BLD-MCNC: 11.6 G/DL (ref 12–16)
IMM GRANULOCYTES # BLD AUTO: 0.01 K/UL (ref 0–0.04)
IMM GRANULOCYTES NFR BLD AUTO: 0.2 % (ref 0–0.5)
LYMPHOCYTES # BLD AUTO: 1 K/UL (ref 1–4.8)
LYMPHOCYTES NFR BLD: 23.5 % (ref 18–48)
MAGNESIUM SERPL-MCNC: 1.7 MG/DL (ref 1.6–2.6)
MCH RBC QN AUTO: 31.2 PG (ref 27–31)
MCHC RBC AUTO-ENTMCNC: 33.6 G/DL (ref 32–36)
MCV RBC AUTO: 93 FL (ref 82–98)
MONOCYTES # BLD AUTO: 0.3 K/UL (ref 0.3–1)
MONOCYTES NFR BLD: 7.6 % (ref 4–15)
NEUTROPHILS # BLD AUTO: 2.7 K/UL (ref 1.8–7.7)
NEUTROPHILS NFR BLD: 62.7 % (ref 38–73)
NRBC BLD-RTO: 0 /100 WBC
PHOSPHATE SERPL-MCNC: 2.9 MG/DL (ref 2.7–4.5)
PLATELET # BLD AUTO: 167 K/UL (ref 150–350)
PMV BLD AUTO: 10.4 FL (ref 9.2–12.9)
POTASSIUM SERPL-SCNC: 3.6 MMOL/L (ref 3.5–5.1)
PROT SERPL-MCNC: 5.7 G/DL (ref 6–8.4)
RBC # BLD AUTO: 3.72 M/UL (ref 4–5.4)
SODIUM SERPL-SCNC: 141 MMOL/L (ref 136–145)
WBC # BLD AUTO: 4.22 K/UL (ref 3.9–12.7)

## 2020-10-04 PROCEDURE — 85025 COMPLETE CBC W/AUTO DIFF WBC: CPT

## 2020-10-04 PROCEDURE — 63600175 PHARM REV CODE 636 W HCPCS: Performed by: INTERNAL MEDICINE

## 2020-10-04 PROCEDURE — 25000003 PHARM REV CODE 250: Performed by: INTERNAL MEDICINE

## 2020-10-04 PROCEDURE — 83735 ASSAY OF MAGNESIUM: CPT

## 2020-10-04 PROCEDURE — 97116 GAIT TRAINING THERAPY: CPT

## 2020-10-04 PROCEDURE — 80053 COMPREHEN METABOLIC PANEL: CPT

## 2020-10-04 PROCEDURE — 84100 ASSAY OF PHOSPHORUS: CPT

## 2020-10-04 RX ORDER — TRAMADOL HYDROCHLORIDE 50 MG/1
50 TABLET ORAL EVERY 6 HOURS PRN
Qty: 30 TABLET | Refills: 0 | Status: SHIPPED | OUTPATIENT
Start: 2020-10-04 | End: 2020-11-17

## 2020-10-04 RX ADMIN — PIPERACILLIN SODIUM AND TAZOBACTAM SODIUM 4.5 G: 4; .5 INJECTION, POWDER, LYOPHILIZED, FOR SOLUTION INTRAVENOUS at 12:10

## 2020-10-04 RX ADMIN — IBUPROFEN 600 MG: 400 TABLET, FILM COATED ORAL at 12:10

## 2020-10-04 RX ADMIN — SODIUM CHLORIDE AND POTASSIUM CHLORIDE: .9; .15 SOLUTION INTRAVENOUS at 09:10

## 2020-10-04 RX ADMIN — LEVOTHYROXINE SODIUM 75 MCG: 25 TABLET ORAL at 05:10

## 2020-10-04 RX ADMIN — IBUPROFEN 600 MG: 400 TABLET, FILM COATED ORAL at 09:10

## 2020-10-04 RX ADMIN — MUPIROCIN 1 G: 20 OINTMENT TOPICAL at 09:10

## 2020-10-04 RX ADMIN — SODIUM CHLORIDE AND POTASSIUM CHLORIDE: .9; .15 SOLUTION INTRAVENOUS at 12:10

## 2020-10-04 RX ADMIN — PIPERACILLIN SODIUM AND TAZOBACTAM SODIUM 4.5 G: 4; .5 INJECTION, POWDER, LYOPHILIZED, FOR SOLUTION INTRAVENOUS at 04:10

## 2020-10-04 NOTE — PLAN OF CARE
Problem: Physical Therapy Goal  Goal: Physical Therapy Goal  Description: Goals to be met by: discharge     Patient will increase functional independence with mobility by performin. Supine to sit with Contact Guard Assistance  2. Sit to stand transfer with Contact Guard Assistance  3. Bed to chair transfer with Contact Guard Assistance using Rolling Walker  4. Gait  x 75 feet with Contact Guard Assistance using Rolling Walker.     Outcome: Ongoing, Progressing   Pt ambulated 80 ft with RW min assist. 2 LESLI drains. Bathroom use to void. OOB to chair

## 2020-10-04 NOTE — PLAN OF CARE
10/04/20 135   Medicare Message   Important Message from Medicare regarding Discharge Appeal Rights Given to patient/caregiver;Explained to patient/caregiver;Signed/date by patient/caregiver   Date IMM was signed 10/04/20   Time IMM was signed 0515

## 2020-10-04 NOTE — PT/OT/SLP PROGRESS
Physical Therapy Treatment    Patient Name:  Ariana Tiwari   MRN:  [id number]    Recommendations:     Discharge Recommendations:  nursing facility, skilled /HHPT  Discharge Equipment Recommendations: none   Barriers to discharge: Decreased caregiver support    Assessment:     Ariana Tiwari is a 75 y.o. female admitted with a medical diagnosis of Perforated abdominal viscus.  She presents with the following impairments/functional limitations:  weakness, impaired endurance, impaired functional mobilty, gait instability, impaired balance . Pt mobilizing better today with increased gait distance 80 ft min assist. OOB to chair. 2 LESLI drains intact .    Rehab Prognosis: Good; patient would benefit from acute skilled PT services to address these deficits and reach maximum level of function.    Recent Surgery: Procedure(s) (LRB):  LAPAROTOMY, EXPLORATORY (N/A)  LYSIS, ADHESIONS 5 Days Post-Op    Plan:     During this hospitalization, patient to be seen daily to address the identified rehab impairments via gait training, therapeutic activities, therapeutic exercises and progress toward the following goals:    · Plan of Care Expires:  10/30/20    Subjective   Stated needing to get up to pee  Chief Complaint: memory is bad, don't remember a lot of things anymore  Patient/Family Comments/goals: get well  Pain/Comfort:  · Pain Rating 1: 0/10      Objective:     Communicated with nurse Andrea prior to session.  Patient found HOB elevated with telemetry, LESLI drain upon PT entry to room.     General Precautions: Standard, fall   Orthopedic Precautions:N/A   Braces: N/A     Functional Mobility:  · Bed Mobility:     · Rolling Left:  minimum assistance  · Scooting: minimum assistance  · Supine to Sit: minimum assistance  · Transfers:     · Sit to Stand:  minimum assistance with rolling walker  · Bed to Chair: minimum assistance with  rolling walker  using  Stand Pivot  · Gait: 80ft with Rw min assist      AM-PAC 6 CLICK MOBILITY           Therapeutic Activities and Exercises:   bathroom use to void with set up assist for hygiene care  Handwashing at sink  OOB to chair post gait- nurse Zully at bedside    Patient left up in chair with all lines intact, call button in reach and nurse zully present..    GOALS:   Multidisciplinary Problems     Physical Therapy Goals        Problem: Physical Therapy Goal    Goal Priority Disciplines Outcome Goal Variances Interventions   Physical Therapy Goal     PT, PT/OT Ongoing, Progressing     Description: Goals to be met by: discharge     Patient will increase functional independence with mobility by performin. Supine to sit with Contact Guard Assistance  2. Sit to stand transfer with Contact Guard Assistance  3. Bed to chair transfer with Contact Guard Assistance using Rolling Walker  4. Gait  x 75 feet with Contact Guard Assistance using Rolling Walker.                      Time Tracking:     PT Received On: 10/04/20  PT Start Time: 1137     PT Stop Time: 1152  PT Total Time (min): 15 min     Billable Minutes: Gait Training 15    Treatment Type: Treatment  PT/PTA: PT     PTA Visit Number: 0     Sanaz Munoz, PT  10/04/2020

## 2020-10-04 NOTE — NURSING
Discharge instructions given to patient. Patient verbalized understanding of follow up appointments. Instructed patient on how to empty LESLI drains. PIV removed without difficulty, catheter tip intact. PICC line removed per miguelina Ventura RN. Patient tolerated well. Patient discharged home via private vehicle.

## 2020-10-04 NOTE — DISCHARGE SUMMARY
"[facility]  Discharge Summary      Patient Name: Ariana Tiwari  MRN: [id number]  Admission Date: 9/29/2020  Hospital Length of Stay: 5 days  Discharge Date and Time:  10/04/2020 12:59 PM  Attending Physician: Je Thomas MD   Discharging Provider: Je Thomas MD  Primary Care Provider: Nba Petty MD      HPI:   Ms Tiwari awakened this AM , nauseated and confused. Pt denies fever chills, vomiting, hematemesis, melena, hematochezia, chest pain, new sensory or motor deficit. Pt has a Hx of being chronically treated with antibiotics for diverticulitis  1/10/2020 Perforated small bowel diverticulum  2/2020 Small bowel obstruction  The above is complicated by advanced Parkinson's disease with cognitive impairment  Pt's son is POA and both Pt and her son wish her to be a full code    Procedure(s) (LRB):  LAPAROTOMY, EXPLORATORY (N/A)  LYSIS, ADHESIONS      Hospital Course:   09/30  Assumed care. Chart reviewed. Consultants' attendance noted and appreciated. Labs reviewed: normal CBC; hypokalemia (sliding scale); elevated troponin; hypothyroidism.  AM EKG reviewed: sinus with Q in III and aVF with loss of R wave (septal infarct); no acute ST segments. Patient is confused this AM, requiring re-orientation. Denies any pain    10/01  Consultant's attendance noted and appreciated. Somewhat confused this AM, requiring redirection. Pulling at lines. Has pulled out NGT twice this AM already. States she has "Muscle pain" in her abdomen from "Working out so much yesterday". No CP/SOB    10/02  Consultants' attendance noted and appreciated. Labs reviewed: CBC normal; hypokalemia with normal renal function. Telemetry reviewed: sinus. Has expected wound discomfort. SBFT underway.     10/03  Consultant's attendance noted and appreciated. SBFT significantly delayed (20 hours), has ileus. Labs reviewed: Normal CBC; hypokalemia with normal renal function. Telemetry reviewed: sinus. Patient " "has had 2 NGT, and has pulled them out. Prior to replacing will check KUB    10/04  Has been cleared for discharge by General Surgery. Patient tolerating her diet. Abdomen feels "Good". Patient to be discharged with drains (to be removed by surgeon next week)  VSS  Lungs: good entry no adventitious  Heart S1S2  Abdo: soft     Consults:   Consults (From admission, onward)        Status Ordering Provider     Inpatient consult to Cardiology  Once     Provider:  Juan Finch MD    Acknowledged MADELIN WHITE     Inpatient consult to Cardiology  Once     Provider:  Juan Finch MD    Acknowledged MADELIN WHITE     Inpatient consult to General surgery  Once     Provider:  Eagle Gonzalez MD    Completed MADELIN BATRES     Inpatient consult to General Surgery  Once     Provider:  Eagle Gonzalez MD    Completed MADELIN BATRES     Inpatient consult to Hospitalist  Once     Provider:  (Not yet assigned)    Acknowledged MADELIN WHITE     Inpatient consult to PICC Line Nurse  Once     Provider:  (Not yet assigned)    Acknowledged MADELIN WHITE          No new Assessment & Plan notes have been filed under this hospital service since the last note was generated.  Service: Hospital Medicine    Final Active Diagnoses:    Diagnosis Date Noted POA    PD (Parkinson's disease) [G20] 09/16/2015 Yes    Hypothyroidism [E03.9]  Yes    Gluten enteropathy [K90.41] 04/11/2014 Yes      Problems Resolved During this Admission:    Diagnosis Date Noted Date Resolved POA    PRINCIPAL PROBLEM:  Perforated abdominal viscus [R19.8] 09/29/2020 10/04/2020 Yes    Abdominal pain [R10.9] 01/11/2020 10/04/2020 Yes       Discharged Condition: good    Disposition: Home or Self Care    Follow Up:  Follow-up Information     Eagle Gonzalez MD In 1 week.    Specialties: General Surgery, Colon and Rectal Surgery, Surgery  Why: Post discharge follow-up  Contact information:  [address]  [address]  [place] " [id number]  [phone]                 Patient Instructions:      Diet Adult Regular   Order Comments: Soft advancing to regular     Activity as tolerated       Significant Diagnostic Studies: Labs:   BMP:   Recent Labs   Lab 10/03/20  0333 10/03/20  1110 10/04/20  0403   GLU 74  --  88     --  141   K 3.0* 3.4* 3.6     --  110   CO2 26  --  25   BUN 6*  --  <5*   CREATININE 0.3*  --  0.4*   CALCIUM 8.4*  --  8.5*   MG 1.9  --  1.7    and CBC   Recent Labs   Lab 10/03/20  0333 10/04/20  0403   WBC 5.49 4.22   HGB 12.2 11.6*   HCT 36.1* 34.5*    167       Pending Diagnostic Studies:     None         Medications:  Reconciled Home Medications:      Medication List      START taking these medications    traMADoL 50 mg tablet  Commonly known as: ULTRAM  Take 1 tablet (50 mg total) by mouth every 6 (six) hours as needed for Pain.        CHANGE how you take these medications    ipratropium 42 mcg (0.06 %) nasal spray  Commonly known as: ATROVENT  USE 2 SPRAY(S) IN EACH NOSTRIL 4 TIMES DAILY  What changed: See the new instructions.        CONTINUE taking these medications    acetaminophen 500 MG tablet  Commonly known as: TYLENOL  Take 1,000 mg by mouth every 6 (six) hours as needed for Pain.     GAS-X ORAL  Take 1 tablet by mouth as needed.     HYDROcodone-acetaminophen 5-325 mg per tablet  Commonly known as: NORCO  Take 1 tablet by mouth every 4 (four) hours as needed for Pain.     ibandronate 150 mg tablet  Commonly known as: BONIVA  Take 150 mg by mouth every 30 days.     levothyroxine 125 MCG tablet  Commonly known as: SYNTHROID  Take 0.5 tablets (62.5 mcg total) by mouth once daily.     ondansetron 4 MG Tbdl  Commonly known as: ZOFRAN-ODT  Take 2 tablets (8 mg total) by mouth every 8 (eight) hours as needed.     vitamin D 1000 units Tab  Commonly known as: VITAMIN D3  Take 1,000 Units by mouth once daily.            Indwelling Lines/Drains at time of discharge:   Lines/Drains/Airways     Peripherally  Inserted Central Catheter Line            PICC Double Lumen 10/01/20 1020 left brachial;left basilic 3 days          Drain                 Closed/Suction Drain 09/29/20 1738 Left Abdomen Bulb 15 Fr. 4 days         Closed/Suction Drain 09/29/20 1741 Right Abdomen Bulb 15 Fr. 4 days                Time spent on the discharge of patient: 32  minutes  Patient was seen and examined on the date of discharge and determined to be suitable for discharge.         Je Thomas MD  Department of Hospital Medicine  [facility]

## 2020-10-04 NOTE — PROGRESS NOTES
Progress Note  Gen Surg    Admit Date: 9/29/2020  Attending: Blue  S/P: Procedure(s) (LRB):  LAPAROTOMY, EXPLORATORY (N/A)  LYSIS, ADHESIONS    Post-operative Day: 5 Days Post-Op    Hospital Day: 6    SUBJECTIVE:     Tolerating diet  Having flatus     OBJECTIVE:     Vital Signs (Most Recent)  Temp: 97.8 °F (36.6 °C) (10/04/20 0732)  Pulse: 65 (10/04/20 0732)  Resp: 16 (10/04/20 0732)  BP: 112/61 (10/04/20 0732)  SpO2: 95 % (10/04/20 0732)    Vital Signs Range (Last 24H):  Temp:  [97.4 °F (36.3 °C)-98.3 °F (36.8 °C)]   Pulse:  [61-74]   Resp:  [16-18]   BP: (112-154)/(61-68)   SpO2:  [95 %-97 %]     I & O (Last 24H):    Intake/Output Summary (Last 24 hours) at 10/4/2020 0953  Last data filed at 10/4/2020 0100  Gross per 24 hour   Intake 840 ml   Output 1700 ml   Net -860 ml       Scheduled medications:   chlorhexidine  15 mL Mouth/Throat BID    enoxaparin  40 mg Subcutaneous Q24H    ibuprofen  600 mg Oral QID    ipratropium  2 spray Each Nostril QID    levothyroxine  75 mcg Oral Before breakfast    mupirocin  1 g Nasal BID    piperacillin-tazobactam (ZOSYN) IVPB  4.5 g Intravenous Q8H    potassium chloride in water  10 mEq Intravenous Once       Physical Exam:  General: no distress  Lungs:  clear to auscultation bilaterally and normal respiratory effort  Heart: regular rate and rhythm, S1, S2 normal, no murmur, rub or gallop  Abdomen: soft but distended    Wound/Incision:  clean, dry, intact    Laboratory:  Labs within the past 24 hours have been reviewed.    Both drains min output ss    ASSESSMENT/PLAN:     Sp laparotomy for free air without identifiable source    Doing well  Tolerating diet and having gi function  She has chronic distension of small intestine and her x rays will always appear to have ileus- clinically she does not, and is tolerating diet  She is clear surgically for discharge with her drains  Should follow up with dr ochoa next week for removal      Sandra Mcarthur MD

## 2020-10-04 NOTE — PLAN OF CARE
10/04/20 1311   Final Note   Assessment Type Final Discharge Note   Anticipated Discharge Disposition Home

## 2020-10-05 ENCOUNTER — TELEPHONE (OUTPATIENT)
Dept: ORTHOPEDICS | Facility: CLINIC | Age: 75
End: 2020-10-05

## 2020-10-05 ENCOUNTER — OFFICE VISIT (OUTPATIENT)
Dept: ORTHOPEDICS | Facility: CLINIC | Age: 75
End: 2020-10-05
Payer: MEDICARE

## 2020-10-05 ENCOUNTER — HOSPITAL ENCOUNTER (OUTPATIENT)
Dept: RADIOLOGY | Facility: HOSPITAL | Age: 75
Discharge: HOME | End: 2020-10-05
Attending: ORTHOPAEDIC SURGERY
Payer: MEDICARE

## 2020-10-05 VITALS
HEART RATE: 89 BPM | DIASTOLIC BLOOD PRESSURE: 63 MMHG | SYSTOLIC BLOOD PRESSURE: 135 MMHG | WEIGHT: 119.94 LBS | BODY MASS INDEX: 19.98 KG/M2 | HEIGHT: 65 IN

## 2020-10-05 DIAGNOSIS — M20.42 HAMMER TOE OF LEFT FOOT: ICD-10-CM

## 2020-10-05 DIAGNOSIS — M20.42 HAMMER TOE OF LEFT FOOT: Primary | ICD-10-CM

## 2020-10-05 PROCEDURE — 99999 PR PBB SHADOW E&M-EST. PATIENT-LVL III: ICD-10-PCS | Mod: PBBFAC,,, | Performed by: ORTHOPAEDIC SURGERY

## 2020-10-05 PROCEDURE — 73630 X-RAY EXAM OF FOOT: CPT | Mod: 26,LT,, | Performed by: RADIOLOGY

## 2020-10-05 PROCEDURE — 73630 X-RAY EXAM OF FOOT: CPT | Mod: TC,PO,LT

## 2020-10-05 PROCEDURE — 99024 POSTOP FOLLOW-UP VISIT: CPT | Mod: POP,,, | Performed by: ORTHOPAEDIC SURGERY

## 2020-10-05 PROCEDURE — 73630 XR FOOT COMPLETE 3 VIEW LEFT: ICD-10-PCS | Mod: 26,LT,, | Performed by: RADIOLOGY

## 2020-10-05 PROCEDURE — 99024 PR POST-OP FOLLOW-UP VISIT: ICD-10-PCS | Mod: POP,,, | Performed by: ORTHOPAEDIC SURGERY

## 2020-10-05 PROCEDURE — 99999 PR PBB SHADOW E&M-EST. PATIENT-LVL III: CPT | Mod: PBBFAC,,, | Performed by: ORTHOPAEDIC SURGERY

## 2020-10-05 PROCEDURE — 99213 OFFICE O/P EST LOW 20 MIN: CPT | Mod: PBBFAC,25,PN | Performed by: ORTHOPAEDIC SURGERY

## 2020-10-05 NOTE — PROGRESS NOTES
Subjective:      Patient ID: Ariana Tiwari is a 75 y.o. female.    Chief Complaint: Post-op Evaluation of the Left Foot (DOS 9/16/2020) and Post-op Evaluation ( 1) Left 2nd toe hammertoe correction with PIPJ resection arthroplasty 2) Open reduction internal fixation Left 2nd metatarsalphalangeal joint dislocation 3) Correction Left 2nd toe valgus deformity with Extensor Digitorum Brevis Tendon transfer)    Doing well today. She rates her pain as 2/10 today. She was recently hospitalized and had abdominal surgery.   Social History     Occupational History    Not on file   Tobacco Use    Smoking status: Never Smoker    Smokeless tobacco: Never Used   Substance and Sexual Activity    Alcohol use: Yes     Alcohol/week: 11.7 standard drinks     Types: 14 Standard drinks or equivalent per week     Comment: 2 glasses wine per dinner    Drug use: No    Sexual activity: Never            Objective:    Ortho Exam     LLE: Neurovascularly intact, incision healing well, moderate swelling, sutures are intact.  No signs of infection.      XRAYS: 3 views of left foot obtained and reviewed today reveal good correction and alignment. Hardware is intact  .     Assessment:         s/p Left 2nd hammertoe correction.   Plan:       Weight bearing as tolerated on heel in darco shoe. Sutures were removed today and steri-strips were placed.   F/u 2 weeks with xray of the left foot.

## 2020-10-05 NOTE — TELEPHONE ENCOUNTER
Called daughter back. She states that pt was discharged from the hospital yesterday, they are able to make the appt this afternoon. ThanksMaryse

## 2020-10-05 NOTE — TELEPHONE ENCOUNTER
----- Message from Katie Singh LPN sent at 10/2/2020  4:12 PM CDT -----  Contact: daughter,  erin    ----- Message -----  From: Florinda Durham MA  Sent: 10/2/2020   3:23 PM CDT  To: Celestine Thomas Staff    Post op on Monday,, pt is inpatient right now  If DC, does she come in Monday ??  [phone]

## 2020-10-07 ENCOUNTER — TELEPHONE (OUTPATIENT)
Dept: SURGERY | Facility: CLINIC | Age: 75
End: 2020-10-07

## 2020-10-07 NOTE — TELEPHONE ENCOUNTER
----- Message from Quinten Tay sent at 10/7/2020  1:34 PM CDT -----  Regarding: pt daughter Micaela  Type:  Sooner Apoointment Request    Caller is requesting a sooner appointment.  Caller accepted first available appointment listed below.  Caller accepted being placed on the waitlist and is requesting a message be sent to doctor.    Name of Caller:  daughter  When is the first available appointment?  Pts appt  Symptoms:  1 wk HOSP Follow up (discharge 10/4/2020)  Best Call Back Number:  [phone]  Additional Information:  Daughter has questions about pt condition to discuss at callback please call quickly. Some concerns: diarrhea since arrived home, lack of appetite, lethargic and more

## 2020-10-07 NOTE — TELEPHONE ENCOUNTER
Instructed to go to ER prn, will discuss with her Mom, I have rescheduled her appointment for tomorrow PM.

## 2020-10-08 ENCOUNTER — OFFICE VISIT (OUTPATIENT)
Dept: SURGERY | Facility: CLINIC | Age: 75
End: 2020-10-08
Payer: MEDICARE

## 2020-10-08 VITALS
DIASTOLIC BLOOD PRESSURE: 72 MMHG | SYSTOLIC BLOOD PRESSURE: 130 MMHG | HEIGHT: 65 IN | WEIGHT: 122 LBS | TEMPERATURE: 97 F | HEART RATE: 84 BPM | BODY MASS INDEX: 20.33 KG/M2

## 2020-10-08 DIAGNOSIS — M54.2 NECK PAIN: ICD-10-CM

## 2020-10-08 DIAGNOSIS — Z09 POSTOP CHECK: Primary | ICD-10-CM

## 2020-10-08 PROCEDURE — 99024 POSTOP FOLLOW-UP VISIT: CPT | Mod: POP,,, | Performed by: SURGERY

## 2020-10-08 PROCEDURE — 99213 OFFICE O/P EST LOW 20 MIN: CPT | Mod: PBBFAC,PO | Performed by: SURGERY

## 2020-10-08 PROCEDURE — 99999 PR PBB SHADOW E&M-EST. PATIENT-LVL III: ICD-10-PCS | Mod: PBBFAC,,, | Performed by: SURGERY

## 2020-10-08 PROCEDURE — 99999 PR PBB SHADOW E&M-EST. PATIENT-LVL III: CPT | Mod: PBBFAC,,, | Performed by: SURGERY

## 2020-10-08 PROCEDURE — 99024 PR POST-OP FOLLOW-UP VISIT: ICD-10-PCS | Mod: POP,,, | Performed by: SURGERY

## 2020-10-08 NOTE — PROGRESS NOTES
Cc: post op    HPI: 75 y.o.  female  1.5   weeks s/p ex lap with drain placement.  For pneumoperitoneum.  No significant findings were found at the time of surgery..   Pt notes she has been feeling well.  She was having diarrhea the last few days but reports none today.  No significant abdominal pain some soreness.  Denies any nausea vomiting.  She does report having good appetite    PE: AFVSS    AAOx3  CTA  Soft/NT/nd  Inc: c/d/i      Drains removed serosanguineous output      A/P:   Pt doing well post surgery from GI standpoint  Patient's son concerned about lack of mobility of the neck.  Requesting PT  Will arrange for PT evaluation  F/U with me p.r.n.

## 2020-10-09 ENCOUNTER — TELEPHONE (OUTPATIENT)
Dept: SURGERY | Facility: CLINIC | Age: 75
End: 2020-10-09

## 2020-10-09 NOTE — TELEPHONE ENCOUNTER
LMOR at 10:55am to inform patient that [facility] will call her to set up the Physical Therapy. Tonio

## 2020-10-09 NOTE — TELEPHONE ENCOUNTER
I called and spoke to patients daughter Micaela to inform her that [facility] will call to schedule her Physical Therapy.  Tonio

## 2020-10-12 NOTE — PT/OT/SLP DISCHARGE
Occupational Therapy Discharge Summary    Ariana Tiwari  MRN: [id number]   Principal Problem: Perforated abdominal viscus      Patient Discharged from acute Occupational Therapy on 10/04/2020.  Please refer to prior OT note dated 10/04/2020 for functional status.    Assessment:      Patient was discharged unexpectedly.  Information required to complete an accurate discharge summary is unknown.  Refer to therapy initial evaluation and last progress note for initial and most recent functional status and goal achievement.  Recommendations made may be found in medical record.    Objective:     GOALS:   Multidisciplinary Problems     Occupational Therapy Goals        Problem: Occupational Therapy Goal    Goal Priority Disciplines Outcome Interventions   Occupational Therapy Goal     OT, PT/OT     Description: Goals to be met by: discharge    Patient will increase functional independence with ADLs by performing:    LE Dressing with Minimal Assistance and Assistive Devices as needed.  Grooming while standing at sink with Stand-by Assistance.  Toileting from toilet with Stand-by Assistance for hygiene and clothing management.   Toilet transfer to toilet with Stand-by Assistance.                     Reasons for Discontinuation of Therapy Services  Transfer to alternate level of care.      Plan:     Patient Discharged to: Home no OT ordered.    Amanda Ricci OT  10/12/2020

## 2020-10-14 ENCOUNTER — DOCUMENTATION ONLY (OUTPATIENT)
Dept: SURGERY | Facility: CLINIC | Age: 75
End: 2020-10-14

## 2020-10-14 NOTE — PROGRESS NOTES
Ok to remove dressing and shower daily.  Do not soak in a tub or pool for another week or until 10/21/20.  Follow up with Dr Gonzalez as needed.

## 2020-10-19 DIAGNOSIS — M20.42 HAMMER TOE OF LEFT FOOT: Primary | ICD-10-CM

## 2020-10-20 ENCOUNTER — OFFICE VISIT (OUTPATIENT)
Dept: ORTHOPEDICS | Facility: CLINIC | Age: 75
End: 2020-10-20
Payer: MEDICARE

## 2020-10-20 ENCOUNTER — HOSPITAL ENCOUNTER (OUTPATIENT)
Dept: RADIOLOGY | Facility: HOSPITAL | Age: 75
Discharge: HOME | End: 2020-10-20
Attending: ORTHOPAEDIC SURGERY
Payer: MEDICARE

## 2020-10-20 VITALS — BODY MASS INDEX: 20.32 KG/M2 | HEIGHT: 65 IN | WEIGHT: 121.94 LBS

## 2020-10-20 DIAGNOSIS — M20.42 HAMMER TOE OF LEFT FOOT: Primary | ICD-10-CM

## 2020-10-20 DIAGNOSIS — M20.42 HAMMER TOE OF LEFT FOOT: ICD-10-CM

## 2020-10-20 PROCEDURE — 73630 X-RAY EXAM OF FOOT: CPT | Mod: TC,PO,LT

## 2020-10-20 PROCEDURE — 73630 XR FOOT COMPLETE 3 VIEW LEFT: ICD-10-PCS | Mod: 26,LT,, | Performed by: RADIOLOGY

## 2020-10-20 PROCEDURE — 99024 PR POST-OP FOLLOW-UP VISIT: ICD-10-PCS | Mod: POP,,, | Performed by: ORTHOPAEDIC SURGERY

## 2020-10-20 PROCEDURE — 99024 POSTOP FOLLOW-UP VISIT: CPT | Mod: POP,,, | Performed by: ORTHOPAEDIC SURGERY

## 2020-10-20 PROCEDURE — 99999 PR PBB SHADOW E&M-EST. PATIENT-LVL III: CPT | Mod: PBBFAC,,, | Performed by: ORTHOPAEDIC SURGERY

## 2020-10-20 PROCEDURE — 99999 PR PBB SHADOW E&M-EST. PATIENT-LVL III: ICD-10-PCS | Mod: PBBFAC,,, | Performed by: ORTHOPAEDIC SURGERY

## 2020-10-20 PROCEDURE — 73630 X-RAY EXAM OF FOOT: CPT | Mod: 26,LT,, | Performed by: RADIOLOGY

## 2020-10-20 PROCEDURE — 99213 OFFICE O/P EST LOW 20 MIN: CPT | Mod: PBBFAC,25,PN | Performed by: ORTHOPAEDIC SURGERY

## 2020-10-20 NOTE — PROGRESS NOTES
Subjective:      Patient ID: Ariana Tiwari is a 75 y.o. female.    Chief Complaint: Pain of the Left Foot and Post-op Evaluation (1) Left 2nd toe hammertoe correction with PIPJ resection arthroplasty 2) Open reduction internal fixation Left 2nd metatarsalphalangeal joint dislocation 3) Correction Left 2nd toe valgus deformity with Extensor Digitorum Brevis Tendon transfer)    Doing well today. She rates her pain as 2/10 today.   Social History     Occupational History    Not on file   Tobacco Use    Smoking status: Never Smoker    Smokeless tobacco: Never Used   Substance and Sexual Activity    Alcohol use: Yes     Alcohol/week: 11.7 standard drinks     Types: 14 Standard drinks or equivalent per week     Comment: 2 glasses wine per dinner    Drug use: No    Sexual activity: Never            Objective:    Ortho Exam     LLE: Neurovascularly intact, incisions healing well, moderate swelling of the toe, No signs of infection.  Pin site is Clean, dry, intact.     XRAYS: 3 views of left foot obtained and reviewed today reveal good correction and alignment. Hardware is intact . There is interval progression of healing.    Assessment:         s/p Left 2nd hammertoe correction.   Plan:     I removed her pin today.   Weight bearing as tolerated in regular shoe. F/u 24 weeks with xray of the left foot.

## 2020-11-05 ENCOUNTER — HOSPITAL ENCOUNTER (OUTPATIENT)
Dept: RADIOLOGY | Facility: HOSPITAL | Age: 75
Discharge: HOME | End: 2020-11-05
Attending: ORTHOPAEDIC SURGERY
Payer: MEDICARE

## 2020-11-05 ENCOUNTER — OFFICE VISIT (OUTPATIENT)
Dept: ORTHOPEDICS | Facility: CLINIC | Age: 75
End: 2020-11-05
Payer: MEDICARE

## 2020-11-05 ENCOUNTER — TELEPHONE (OUTPATIENT)
Dept: ORTHOPEDICS | Facility: CLINIC | Age: 75
End: 2020-11-05

## 2020-11-05 VITALS
WEIGHT: 121.94 LBS | HEART RATE: 68 BPM | HEIGHT: 65 IN | SYSTOLIC BLOOD PRESSURE: 121 MMHG | BODY MASS INDEX: 20.32 KG/M2 | DIASTOLIC BLOOD PRESSURE: 71 MMHG

## 2020-11-05 DIAGNOSIS — M20.42 HAMMER TOE OF LEFT FOOT: Primary | ICD-10-CM

## 2020-11-05 DIAGNOSIS — M20.42 HAMMER TOE OF LEFT FOOT: ICD-10-CM

## 2020-11-05 PROCEDURE — 99213 OFFICE O/P EST LOW 20 MIN: CPT | Mod: PBBFAC,25,PN | Performed by: ORTHOPAEDIC SURGERY

## 2020-11-05 PROCEDURE — 73630 XR FOOT COMPLETE 3 VIEW LEFT: ICD-10-PCS | Mod: 26,LT,, | Performed by: RADIOLOGY

## 2020-11-05 PROCEDURE — 73630 X-RAY EXAM OF FOOT: CPT | Mod: TC,PO,LT

## 2020-11-05 PROCEDURE — 99999 PR PBB SHADOW E&M-EST. PATIENT-LVL III: CPT | Mod: PBBFAC,,, | Performed by: ORTHOPAEDIC SURGERY

## 2020-11-05 PROCEDURE — 99024 POSTOP FOLLOW-UP VISIT: CPT | Mod: POP,,, | Performed by: ORTHOPAEDIC SURGERY

## 2020-11-05 PROCEDURE — 99999 PR PBB SHADOW E&M-EST. PATIENT-LVL III: ICD-10-PCS | Mod: PBBFAC,,, | Performed by: ORTHOPAEDIC SURGERY

## 2020-11-05 PROCEDURE — 99024 PR POST-OP FOLLOW-UP VISIT: ICD-10-PCS | Mod: POP,,, | Performed by: ORTHOPAEDIC SURGERY

## 2020-11-05 PROCEDURE — 73630 X-RAY EXAM OF FOOT: CPT | Mod: 26,LT,, | Performed by: RADIOLOGY

## 2020-11-05 NOTE — PROGRESS NOTES
Subjective:      Patient ID: Ariana Tiwari is a 75 y.o. female.    Chief Complaint: Post-op Evaluation (s/p 2nd HT correction with PIP recection arthroplasty, ORIF 2nd MTP joint dislocation 09/16/2020)    Pt is here with her daughter for f/u 1 week early because her daughter notices the toe is turning.  She rates her pain as 2/10 today.   Social History     Occupational History    Not on file   Tobacco Use    Smoking status: Never Smoker    Smokeless tobacco: Never Used   Substance and Sexual Activity    Alcohol use: Yes     Alcohol/week: 11.7 standard drinks     Types: 14 Standard drinks or equivalent per week     Comment: 2 glasses wine per dinner    Drug use: No    Sexual activity: Never            Objective:    Ortho Exam     LLE: Neurovascularly intact, incision well, moderate swelling of the toe, No signs of infection.  There is some valgus deformity and elevation of the toe since last visit.     XRAYS: 3 views of left foot obtained and reviewed today reveal good correction and alignment. There is some valgus deformity and elevation of the toe since last visit. The alignment is comparable to her other lesser toes.     Assessment:         s/p Left 2nd hammertoe correction.   Plan:     Weight bearing as tolerated in regular shoe. I instructed her daughter on taping the toe and gave her the tape for it. I also discussed with them that due to her age and osteoporotic bone she may be having some collapse of the bone at the PIPJ. If it becomes painful or she has any wounds or difficulty with shoe wear then we can discuss amputation if needed but this would of course be a last resort.     F/u 4 weeks with xray of the left foot.

## 2020-11-05 NOTE — TELEPHONE ENCOUNTER
"----- Message from Florinda Durham MA sent at 11/5/2020 10:20 AM CST -----  Contact: daughter, rebecca  Post surgery   Swelling and "curving over to the side"  Call back      "

## 2020-11-05 NOTE — TELEPHONE ENCOUNTER
Called daughter back. She states that pt needs to be seen today. Her toe is turing. States has been for about 1 week now. Pt had pin pulled at appt on 10/20/2020. Appt scheduled for this afternoon. Xray ordered. Thanks, Maryse

## 2020-11-17 ENCOUNTER — HOSPITAL ENCOUNTER (OUTPATIENT)
Dept: RADIOLOGY | Facility: HOSPITAL | Age: 75
Discharge: HOME | End: 2020-11-17
Attending: FAMILY MEDICINE
Payer: MEDICARE

## 2020-11-17 ENCOUNTER — OFFICE VISIT (OUTPATIENT)
Dept: FAMILY MEDICINE | Facility: CLINIC | Age: 75
End: 2020-11-17
Payer: MEDICARE

## 2020-11-17 VITALS
DIASTOLIC BLOOD PRESSURE: 76 MMHG | BODY MASS INDEX: 21.02 KG/M2 | WEIGHT: 118.63 LBS | SYSTOLIC BLOOD PRESSURE: 124 MMHG | OXYGEN SATURATION: 96 % | TEMPERATURE: 97 F | RESPIRATION RATE: 16 BRPM | HEIGHT: 63 IN | HEART RATE: 72 BPM

## 2020-11-17 DIAGNOSIS — M54.9 BACK PAIN, UNSPECIFIED BACK LOCATION, UNSPECIFIED BACK PAIN LATERALITY, UNSPECIFIED CHRONICITY: ICD-10-CM

## 2020-11-17 DIAGNOSIS — M54.9 BACK PAIN, UNSPECIFIED BACK LOCATION, UNSPECIFIED BACK PAIN LATERALITY, UNSPECIFIED CHRONICITY: Primary | ICD-10-CM

## 2020-11-17 DIAGNOSIS — G20.A1 PD (PARKINSON'S DISEASE): ICD-10-CM

## 2020-11-17 PROCEDURE — 72070 XR THORACIC SPINE AP LATERAL: ICD-10-PCS | Mod: 26,,, | Performed by: RADIOLOGY

## 2020-11-17 PROCEDURE — 99214 OFFICE O/P EST MOD 30 MIN: CPT | Mod: PBBFAC,25,PO | Performed by: FAMILY MEDICINE

## 2020-11-17 PROCEDURE — 72070 X-RAY EXAM THORAC SPINE 2VWS: CPT | Mod: TC,FY

## 2020-11-17 PROCEDURE — 99213 OFFICE O/P EST LOW 20 MIN: CPT | Mod: S$PBB,,, | Performed by: FAMILY MEDICINE

## 2020-11-17 PROCEDURE — 99999 PR PBB SHADOW E&M-EST. PATIENT-LVL IV: CPT | Mod: PBBFAC,,, | Performed by: FAMILY MEDICINE

## 2020-11-17 PROCEDURE — 99213 PR OFFICE/OUTPT VISIT, EST, LEVL III, 20-29 MIN: ICD-10-PCS | Mod: S$PBB,,, | Performed by: FAMILY MEDICINE

## 2020-11-17 PROCEDURE — 72070 X-RAY EXAM THORAC SPINE 2VWS: CPT | Mod: 26,,, | Performed by: RADIOLOGY

## 2020-11-17 PROCEDURE — 99999 PR PBB SHADOW E&M-EST. PATIENT-LVL IV: ICD-10-PCS | Mod: PBBFAC,,, | Performed by: FAMILY MEDICINE

## 2020-11-17 NOTE — PROGRESS NOTES
Subjective:       Patient ID: Ariana Tiwari is a 75 y.o. female.    Chief Complaint: Back Pain (2 months)    This patient is new to me.  Mrs Lane presents to the clinic today with complaints of back pain. Patient presents with her daughter today. Patient and daughter states she started complaining of this back pain around the end of September. Patient states she rates this pain a 3 on the pain scale. Patient has not injured the area and the pain just started one day.   Patient and daughter educated on plan of care, verbalized understanding.     Review of Systems   Constitutional: Negative for activity change, appetite change, chills, diaphoresis and fever.   HENT: Negative for congestion, ear pain, postnasal drip, sinus pressure, sneezing and sore throat.    Eyes: Negative for pain, discharge, redness and itching.   Respiratory: Negative for apnea, cough, chest tightness, shortness of breath and wheezing.    Cardiovascular: Negative for chest pain and leg swelling.   Gastrointestinal: Negative for abdominal distention, abdominal pain, constipation, diarrhea, nausea and vomiting.   Genitourinary: Negative for difficulty urinating, dysuria, flank pain and frequency.   Musculoskeletal: Positive for back pain.   Skin: Negative for color change, rash and wound.   Neurological: Negative for dizziness.       Patient Active Problem List   Diagnosis    Gluten enteropathy    Hypothyroidism    Breast lump on right side at 1 o'clock position    Lump of skin of back    Incarcerated inguinal hernia, unilateral    PD (Parkinson's disease)    Chronic diarrhea    Diverticulosis    Hemorrhoids    Pancreas cyst    Chronic left shoulder pain    Abnormal CT scan    Diarrhea    Perforated diverticulum of small intestine    Anemia    Adenoma    Hammer toe of left foot    Perforated viscus       Objective:      Physical Exam  Vitals signs reviewed.   Constitutional:       General: She is not in acute distress.      Appearance: Normal appearance. She is well-developed.   HENT:      Head: Normocephalic.      Nose: Nose normal.   Eyes:      Conjunctiva/sclera: Conjunctivae normal.      Pupils: Pupils are equal, round, and reactive to light.   Neck:      Musculoskeletal: Normal range of motion and neck supple.   Cardiovascular:      Rate and Rhythm: Normal rate and regular rhythm.      Heart sounds: Normal heart sounds.   Pulmonary:      Effort: Pulmonary effort is normal. No respiratory distress.      Breath sounds: Normal breath sounds.   Abdominal:      General: There is no distension.      Palpations: Abdomen is soft.      Tenderness: There is no abdominal tenderness.   Musculoskeletal:      Thoracic back: She exhibits pain. She exhibits normal range of motion, no tenderness, no bony tenderness, no swelling, no edema, no deformity and no laceration.        Back:    Skin:     General: Skin is warm and dry.      Findings: No rash.   Neurological:      Mental Status: She is alert and oriented to person, place, and time.   Psychiatric:         Behavior: Behavior normal.         Lab Results   Component Value Date    WBC 4.22 10/04/2020    HGB 11.6 (L) 10/04/2020    HCT 34.5 (L) 10/04/2020     10/04/2020    CHOL 109 (L) 09/17/2019    TRIG 31 09/17/2019    HDL 47 09/17/2019    ALT 15 10/04/2020    AST 22 10/04/2020     10/04/2020    K 3.6 10/04/2020     10/04/2020    CREATININE 0.4 (L) 10/04/2020    BUN <5 (L) 10/04/2020    CO2 25 10/04/2020    TSH 11.260 (H) 09/29/2020    INR 1.2 09/29/2020    HGBA1C 4.7 09/18/2019     The ASCVD Risk score ([place] CHRIS Jr., et al., 2013) failed to calculate for the following reasons:    The patient has a prior MI or stroke diagnosis    Assessment:       1. Back pain, unspecified back location, unspecified back pain laterality, unspecified chronicity    2. BMI 21.0-21.9, adult    3. PD (Parkinson's disease)        Plan:       Ariana was seen today for back pain.    Diagnoses and all  orders for this visit:    Back pain, unspecified back location, unspecified back pain laterality, unspecified chronicity  -     X-Ray Thoracic Spine AP Lateral; Future  Discussed possible physical medicine referral pending xray restults.    BMI 21.0-21.9, adult  Comments:  today 21.01  Continue healthy diet and regular exercise as tolerated.    PD (Parkinson's disease)  Stable  Continue medications as prescribed.  Follow up with PCP      Follow up if symptoms worsen or fail to improve.

## 2020-11-18 DIAGNOSIS — M54.9 BACK PAIN, UNSPECIFIED BACK LOCATION, UNSPECIFIED BACK PAIN LATERALITY, UNSPECIFIED CHRONICITY: Primary | ICD-10-CM

## 2020-11-19 RX ORDER — IPRATROPIUM BROMIDE 42 UG/1
2 SPRAY, METERED NASAL 4 TIMES DAILY
Qty: 15 ML | Refills: 0 | Status: SHIPPED | OUTPATIENT
Start: 2020-11-19 | End: 2020-12-10

## 2020-11-22 DIAGNOSIS — Z78.0 MENOPAUSE: Primary | ICD-10-CM

## 2020-11-23 RX ORDER — IBANDRONATE SODIUM 150 MG/1
TABLET, FILM COATED ORAL
Qty: 1 TABLET | Refills: 0 | Status: SHIPPED | OUTPATIENT
Start: 2020-11-23 | End: 2021-03-05 | Stop reason: ALTCHOICE

## 2020-11-23 NOTE — PROGRESS NOTES
Refill Routing Note   Medication(s) are not appropriate for processing by [facility] for the following reason(s):     - Outside of protocol    [facility] actions taken in this encounter: Route          Medication reconciliation completed: No   Automatic Epic Generated Protocol Data:        Requested Prescriptions   Pending Prescriptions Disp Refills    ibandronate (BONIVA) 150 mg tablet [Pharmacy Med Name: Ibandronate Sodium 150 MG Oral Tablet] 1 tablet 0     Sig: TAKE 1 TABLET BY MOUTH ONCE EVERY MONTH       There is no refill protocol information for this order           Appointments  past 12m or future 3m with PCP    Date Provider   Last Visit   1/22/2020 Nba Petty MD   Next Visit   Visit date not found Nba Petty MD   ED visits in past 90 days: 0        Note composed:7:13 PM 11/22/2020

## 2020-12-07 DIAGNOSIS — M20.42 HAMMER TOE OF LEFT FOOT: Primary | ICD-10-CM

## 2020-12-08 ENCOUNTER — TELEPHONE (OUTPATIENT)
Dept: ORTHOPEDICS | Facility: CLINIC | Age: 75
End: 2020-12-08

## 2020-12-08 NOTE — TELEPHONE ENCOUNTER
Called daughter. Dr. Sprague has emergency surgery this afternoon. Need to reschedule pts appt. Rescheduled to 12/17. Thanks, Maryse

## 2020-12-10 RX ORDER — IPRATROPIUM BROMIDE 42 UG/1
SPRAY, METERED NASAL
Qty: 15 ML | Refills: 0 | Status: SHIPPED | OUTPATIENT
Start: 2020-12-10 | End: 2021-01-26

## 2020-12-10 NOTE — PROGRESS NOTES
Refill Routing Note   Medication(s) are not appropriate for processing by [facility] for the following reason(s):     - Outside of protocol  [facility] action(s):  Route        Medication reconciliation completed: No   Automatic Epic Generated Protocol Data:        Requested Prescriptions   Pending Prescriptions Disp Refills    ipratropium (ATROVENT) 42 mcg (0.06 %) nasal spray [Pharmacy Med Name: Ipratropium Bromide 0.06 % Nasal Solution] 15 mL 0     Sig: USE 2 SPRAY(S) IN EACH NOSTRIL 4 TIMES DAILY       There is no refill protocol information for this order           Appointments  past 12m or future 3m with PCP    Date Provider   Last Visit   1/22/2020 Nba Petty MD   Next Visit   Visit date not found Nba Petty MD   ED visits in past 90 days: 0        Note composed:2:16 PM 12/10/2020

## 2020-12-22 ENCOUNTER — HOSPITAL ENCOUNTER (OUTPATIENT)
Dept: RADIOLOGY | Facility: CLINIC | Age: 75
Discharge: HOME | End: 2020-12-22
Attending: FAMILY MEDICINE
Payer: MEDICARE

## 2020-12-22 DIAGNOSIS — Z78.0 MENOPAUSE: ICD-10-CM

## 2020-12-22 PROCEDURE — 77080 DXA BONE DENSITY AXIAL: CPT | Mod: TC,PO

## 2020-12-22 PROCEDURE — 77080 DEXA BONE DENSITY SPINE HIP: ICD-10-PCS | Mod: 26,,, | Performed by: RADIOLOGY

## 2020-12-22 PROCEDURE — 77080 DXA BONE DENSITY AXIAL: CPT | Mod: 26,,, | Performed by: RADIOLOGY

## 2021-01-06 ENCOUNTER — PATIENT MESSAGE (OUTPATIENT)
Dept: FAMILY MEDICINE | Facility: CLINIC | Age: 76
End: 2021-01-06

## 2021-01-07 ENCOUNTER — IMMUNIZATION (OUTPATIENT)
Dept: FAMILY MEDICINE | Facility: CLINIC | Age: 76
End: 2021-01-07
Payer: MEDICARE

## 2021-01-07 DIAGNOSIS — Z23 NEED FOR VACCINATION: ICD-10-CM

## 2021-01-07 PROCEDURE — 91300 COVID-19, MRNA, LNP-S, PF, 30 MCG/0.3 ML DOSE VACCINE: CPT | Mod: PBBFAC,PO

## 2021-01-28 ENCOUNTER — IMMUNIZATION (OUTPATIENT)
Dept: FAMILY MEDICINE | Facility: CLINIC | Age: 76
End: 2021-01-28
Payer: MEDICARE

## 2021-01-28 DIAGNOSIS — Z23 NEED FOR VACCINATION: Primary | ICD-10-CM

## 2021-01-28 PROCEDURE — 91300 COVID-19, MRNA, LNP-S, PF, 30 MCG/0.3 ML DOSE VACCINE: CPT | Mod: PBBFAC,PO

## 2021-01-28 PROCEDURE — 0002A COVID-19, MRNA, LNP-S, PF, 30 MCG/0.3 ML DOSE VACCINE: CPT | Mod: PBBFAC,PO

## 2021-03-05 ENCOUNTER — OFFICE VISIT (OUTPATIENT)
Dept: FAMILY MEDICINE | Facility: CLINIC | Age: 76
End: 2021-03-05
Attending: FAMILY MEDICINE
Payer: MEDICARE

## 2021-03-05 VITALS
SYSTOLIC BLOOD PRESSURE: 96 MMHG | OXYGEN SATURATION: 97 % | HEIGHT: 63 IN | DIASTOLIC BLOOD PRESSURE: 62 MMHG | BODY MASS INDEX: 21.84 KG/M2 | TEMPERATURE: 98 F | WEIGHT: 123.25 LBS | HEART RATE: 65 BPM

## 2021-03-05 DIAGNOSIS — K52.9 CHRONIC DIARRHEA: ICD-10-CM

## 2021-03-05 DIAGNOSIS — J31.0 CHRONIC RHINITIS: ICD-10-CM

## 2021-03-05 DIAGNOSIS — M81.0 OSTEOPOROSIS, UNSPECIFIED OSTEOPOROSIS TYPE, UNSPECIFIED PATHOLOGICAL FRACTURE PRESENCE: Primary | ICD-10-CM

## 2021-03-05 DIAGNOSIS — Z28.21 INFLUENZA VACCINATION DECLINED: ICD-10-CM

## 2021-03-05 DIAGNOSIS — E03.9 ACQUIRED HYPOTHYROIDISM: ICD-10-CM

## 2021-03-05 DIAGNOSIS — Z13.9 ENCOUNTER FOR HEALTH-RELATED SCREENING: ICD-10-CM

## 2021-03-05 DIAGNOSIS — Z11.59 NEED FOR HEPATITIS C SCREENING TEST: ICD-10-CM

## 2021-03-05 DIAGNOSIS — Z28.21 TETANUS, DIPHTHERIA, AND ACELLULAR PERTUSSIS (TDAP) VACCINATION DECLINED: ICD-10-CM

## 2021-03-05 DIAGNOSIS — Z28.21 PNEUMOCOCCAL VACCINATION DECLINED: ICD-10-CM

## 2021-03-05 DIAGNOSIS — G20.A1 PD (PARKINSON'S DISEASE): Primary | ICD-10-CM

## 2021-03-05 DIAGNOSIS — K90.41 GLUTEN ENTEROPATHY: ICD-10-CM

## 2021-03-05 DIAGNOSIS — K86.2 PANCREAS CYST: ICD-10-CM

## 2021-03-05 DIAGNOSIS — D64.9 ANEMIA, UNSPECIFIED TYPE: ICD-10-CM

## 2021-03-05 DIAGNOSIS — Z13.220 ENCOUNTER FOR LIPID SCREENING FOR CARDIOVASCULAR DISEASE: ICD-10-CM

## 2021-03-05 DIAGNOSIS — Z13.6 ENCOUNTER FOR LIPID SCREENING FOR CARDIOVASCULAR DISEASE: ICD-10-CM

## 2021-03-05 DIAGNOSIS — Z28.21 VARICELLA ZOSTER VIRUS (VZV) VACCINATION DECLINED: ICD-10-CM

## 2021-03-05 PROBLEM — R19.8 PERFORATED VISCUS: Status: RESOLVED | Noted: 2020-09-29 | Resolved: 2021-03-05

## 2021-03-05 PROBLEM — K57.00 PERFORATED DIVERTICULUM OF SMALL INTESTINE: Status: RESOLVED | Noted: 2020-01-12 | Resolved: 2021-03-05

## 2021-03-05 PROCEDURE — 99214 OFFICE O/P EST MOD 30 MIN: CPT | Mod: S$PBB,,, | Performed by: FAMILY MEDICINE

## 2021-03-05 PROCEDURE — 99999 PR PBB SHADOW E&M-EST. PATIENT-LVL IV: ICD-10-PCS | Mod: PBBFAC,,, | Performed by: FAMILY MEDICINE

## 2021-03-05 PROCEDURE — 99214 OFFICE O/P EST MOD 30 MIN: CPT | Mod: PBBFAC,PO | Performed by: FAMILY MEDICINE

## 2021-03-05 PROCEDURE — 99999 PR PBB SHADOW E&M-EST. PATIENT-LVL IV: CPT | Mod: PBBFAC,,, | Performed by: FAMILY MEDICINE

## 2021-03-05 PROCEDURE — 99214 PR OFFICE/OUTPT VISIT, EST, LEVL IV, 30-39 MIN: ICD-10-PCS | Mod: S$PBB,,, | Performed by: FAMILY MEDICINE

## 2021-03-05 RX ORDER — ALENDRONATE SODIUM 70 MG/1
70 TABLET ORAL
Qty: 4 TABLET | Refills: 11 | Status: ON HOLD | OUTPATIENT
Start: 2021-03-05 | End: 2021-12-15 | Stop reason: HOSPADM

## 2021-03-05 RX ORDER — IPRATROPIUM BROMIDE 42 UG/1
SPRAY, METERED NASAL
Qty: 45 ML | Refills: 3 | Status: SHIPPED | OUTPATIENT
Start: 2021-03-05 | End: 2021-10-13

## 2021-03-23 ENCOUNTER — LAB VISIT (OUTPATIENT)
Dept: LAB | Facility: HOSPITAL | Age: 76
End: 2021-03-23
Attending: FAMILY MEDICINE
Payer: MEDICARE

## 2021-03-23 DIAGNOSIS — Z13.220 ENCOUNTER FOR LIPID SCREENING FOR CARDIOVASCULAR DISEASE: ICD-10-CM

## 2021-03-23 DIAGNOSIS — Z13.6 ENCOUNTER FOR LIPID SCREENING FOR CARDIOVASCULAR DISEASE: ICD-10-CM

## 2021-03-23 DIAGNOSIS — K90.41 GLUTEN ENTEROPATHY: ICD-10-CM

## 2021-03-23 DIAGNOSIS — D64.9 ANEMIA, UNSPECIFIED TYPE: ICD-10-CM

## 2021-03-23 DIAGNOSIS — Z11.59 NEED FOR HEPATITIS C SCREENING TEST: ICD-10-CM

## 2021-03-23 DIAGNOSIS — E03.9 ACQUIRED HYPOTHYROIDISM: ICD-10-CM

## 2021-03-23 DIAGNOSIS — Z13.9 ENCOUNTER FOR HEALTH-RELATED SCREENING: ICD-10-CM

## 2021-03-23 DIAGNOSIS — K52.9 CHRONIC DIARRHEA: ICD-10-CM

## 2021-03-23 LAB
ALBUMIN SERPL BCP-MCNC: 4.3 G/DL (ref 3.5–5.2)
ALP SERPL-CCNC: 83 U/L (ref 55–135)
ALT SERPL W/O P-5'-P-CCNC: 17 U/L (ref 10–44)
ANION GAP SERPL CALC-SCNC: 11 MMOL/L (ref 8–16)
AST SERPL-CCNC: 17 U/L (ref 10–40)
BASOPHILS # BLD AUTO: 0.04 K/UL (ref 0–0.2)
BASOPHILS NFR BLD: 0.6 % (ref 0–1.9)
BILIRUB SERPL-MCNC: 0.5 MG/DL (ref 0.1–1)
BUN SERPL-MCNC: 17 MG/DL (ref 8–23)
CALCIUM SERPL-MCNC: 9 MG/DL (ref 8.7–10.5)
CHLORIDE SERPL-SCNC: 105 MMOL/L (ref 95–110)
CHOLEST SERPL-MCNC: 144 MG/DL (ref 120–199)
CHOLEST/HDLC SERPL: 3.3 {RATIO} (ref 2–5)
CO2 SERPL-SCNC: 23 MMOL/L (ref 23–29)
CREAT SERPL-MCNC: 0.6 MG/DL (ref 0.5–1.4)
DIFFERENTIAL METHOD: NORMAL
EOSINOPHIL # BLD AUTO: 0.2 K/UL (ref 0–0.5)
EOSINOPHIL NFR BLD: 2.2 % (ref 0–8)
ERYTHROCYTE [DISTWIDTH] IN BLOOD BY AUTOMATED COUNT: 13.1 % (ref 11.5–14.5)
EST. GFR  (AFRICAN AMERICAN): >60 ML/MIN/1.73 M^2
EST. GFR  (NON AFRICAN AMERICAN): >60 ML/MIN/1.73 M^2
GLUCOSE SERPL-MCNC: 77 MG/DL (ref 70–110)
HCT VFR BLD AUTO: 45.9 % (ref 37–48.5)
HCV AB SERPL QL IA: NEGATIVE
HDLC SERPL-MCNC: 43 MG/DL (ref 40–75)
HDLC SERPL: 29.9 % (ref 20–50)
HGB BLD-MCNC: 14.9 G/DL (ref 12–16)
IMM GRANULOCYTES # BLD AUTO: 0.02 K/UL (ref 0–0.04)
IMM GRANULOCYTES NFR BLD AUTO: 0.3 % (ref 0–0.5)
LDLC SERPL CALC-MCNC: 90 MG/DL (ref 63–159)
LYMPHOCYTES # BLD AUTO: 1.8 K/UL (ref 1–4.8)
LYMPHOCYTES NFR BLD: 27 % (ref 18–48)
MCH RBC QN AUTO: 30 PG (ref 27–31)
MCHC RBC AUTO-ENTMCNC: 32.5 G/DL (ref 32–36)
MCV RBC AUTO: 92 FL (ref 82–98)
MONOCYTES # BLD AUTO: 0.5 K/UL (ref 0.3–1)
MONOCYTES NFR BLD: 7.9 % (ref 4–15)
NEUTROPHILS # BLD AUTO: 4.2 K/UL (ref 1.8–7.7)
NEUTROPHILS NFR BLD: 62 % (ref 38–73)
NONHDLC SERPL-MCNC: 101 MG/DL
NRBC BLD-RTO: 0 /100 WBC
PLATELET # BLD AUTO: 175 K/UL (ref 150–350)
PMV BLD AUTO: 10.2 FL (ref 9.2–12.9)
POTASSIUM SERPL-SCNC: 3.6 MMOL/L (ref 3.5–5.1)
PROT SERPL-MCNC: 7.5 G/DL (ref 6–8.4)
RBC # BLD AUTO: 4.97 M/UL (ref 4–5.4)
SODIUM SERPL-SCNC: 139 MMOL/L (ref 136–145)
T4 FREE SERPL-MCNC: 1.01 NG/DL (ref 0.71–1.51)
TRIGL SERPL-MCNC: 55 MG/DL (ref 30–150)
TSH SERPL DL<=0.005 MIU/L-ACNC: 5.11 UIU/ML (ref 0.4–4)
WBC # BLD AUTO: 6.7 K/UL (ref 3.9–12.7)

## 2021-03-23 PROCEDURE — 80061 LIPID PANEL: CPT | Performed by: FAMILY MEDICINE

## 2021-03-23 PROCEDURE — 80053 COMPREHEN METABOLIC PANEL: CPT | Performed by: FAMILY MEDICINE

## 2021-03-23 PROCEDURE — 84439 ASSAY OF FREE THYROXINE: CPT | Performed by: FAMILY MEDICINE

## 2021-03-23 PROCEDURE — 86803 HEPATITIS C AB TEST: CPT | Performed by: FAMILY MEDICINE

## 2021-03-23 PROCEDURE — 85025 COMPLETE CBC W/AUTO DIFF WBC: CPT | Performed by: FAMILY MEDICINE

## 2021-03-23 PROCEDURE — 36415 COLL VENOUS BLD VENIPUNCTURE: CPT | Mod: PO | Performed by: FAMILY MEDICINE

## 2021-03-23 PROCEDURE — 84443 ASSAY THYROID STIM HORMONE: CPT | Performed by: FAMILY MEDICINE

## 2021-03-24 ENCOUNTER — TELEPHONE (OUTPATIENT)
Dept: FAMILY MEDICINE | Facility: CLINIC | Age: 76
End: 2021-03-24

## 2021-03-24 DIAGNOSIS — E03.9 ACQUIRED HYPOTHYROIDISM: Primary | ICD-10-CM

## 2021-03-24 DIAGNOSIS — E03.9 HYPOTHYROIDISM, UNSPECIFIED TYPE: Primary | ICD-10-CM

## 2021-03-24 RX ORDER — LEVOTHYROXINE SODIUM 137 UG/1
TABLET ORAL
Qty: 45 TABLET | Refills: 0 | Status: SHIPPED | OUTPATIENT
Start: 2021-03-24 | End: 2021-06-15

## 2021-05-10 ENCOUNTER — PES CALL (OUTPATIENT)
Dept: ADMINISTRATIVE | Facility: CLINIC | Age: 76
End: 2021-05-10

## 2021-05-19 ENCOUNTER — LAB VISIT (OUTPATIENT)
Dept: LAB | Facility: HOSPITAL | Age: 76
End: 2021-05-19
Attending: FAMILY MEDICINE
Payer: MEDICARE

## 2021-05-19 DIAGNOSIS — E03.9 HYPOTHYROIDISM, UNSPECIFIED TYPE: ICD-10-CM

## 2021-05-19 PROCEDURE — 36415 COLL VENOUS BLD VENIPUNCTURE: CPT | Mod: PO | Performed by: FAMILY MEDICINE

## 2021-05-19 PROCEDURE — 84443 ASSAY THYROID STIM HORMONE: CPT | Performed by: FAMILY MEDICINE

## 2021-05-20 LAB — TSH SERPL DL<=0.005 MIU/L-ACNC: 3.41 UIU/ML (ref 0.4–4)

## 2021-06-13 DIAGNOSIS — E03.9 ACQUIRED HYPOTHYROIDISM: ICD-10-CM

## 2021-06-15 RX ORDER — LEVOTHYROXINE SODIUM 137 UG/1
TABLET ORAL
Qty: 45 TABLET | Refills: 3 | Status: ON HOLD | OUTPATIENT
Start: 2021-06-15 | End: 2021-12-15 | Stop reason: HOSPADM

## 2021-10-06 ENCOUNTER — PES CALL (OUTPATIENT)
Dept: ADMINISTRATIVE | Facility: CLINIC | Age: 76
End: 2021-10-06

## 2021-10-13 DIAGNOSIS — J31.0 CHRONIC RHINITIS: ICD-10-CM

## 2021-10-13 RX ORDER — IPRATROPIUM BROMIDE 42 UG/1
SPRAY, METERED NASAL
Qty: 45 ML | Refills: 1 | Status: SHIPPED | OUTPATIENT
Start: 2021-10-13 | End: 2021-10-18

## 2021-10-15 ENCOUNTER — TELEPHONE (OUTPATIENT)
Dept: FAMILY MEDICINE | Facility: CLINIC | Age: 76
End: 2021-10-15

## 2021-10-18 ENCOUNTER — PATIENT OUTREACH (OUTPATIENT)
Dept: ADMINISTRATIVE | Facility: OTHER | Age: 76
End: 2021-10-18

## 2021-10-18 ENCOUNTER — OFFICE VISIT (OUTPATIENT)
Dept: FAMILY MEDICINE | Facility: CLINIC | Age: 76
End: 2021-10-18
Payer: MEDICARE

## 2021-10-18 ENCOUNTER — HOSPITAL ENCOUNTER (OUTPATIENT)
Dept: RADIOLOGY | Facility: HOSPITAL | Age: 76
Discharge: HOME | End: 2021-10-18
Attending: PHYSICIAN ASSISTANT
Payer: MEDICARE

## 2021-10-18 VITALS
HEIGHT: 63 IN | TEMPERATURE: 98 F | DIASTOLIC BLOOD PRESSURE: 62 MMHG | SYSTOLIC BLOOD PRESSURE: 104 MMHG | BODY MASS INDEX: 21.95 KG/M2 | RESPIRATION RATE: 18 BRPM | WEIGHT: 123.88 LBS | HEART RATE: 70 BPM | OXYGEN SATURATION: 96 %

## 2021-10-18 DIAGNOSIS — M54.2 NECK PAIN: ICD-10-CM

## 2021-10-18 DIAGNOSIS — M40.12 OTHER SECONDARY KYPHOSIS, CERVICAL REGION: Primary | ICD-10-CM

## 2021-10-18 DIAGNOSIS — G20.A1 PD (PARKINSON'S DISEASE): ICD-10-CM

## 2021-10-18 DIAGNOSIS — J30.89 NON-SEASONAL ALLERGIC RHINITIS DUE TO OTHER ALLERGIC TRIGGER: ICD-10-CM

## 2021-10-18 DIAGNOSIS — I95.1 AUTONOMIC ORTHOSTATIC HYPOTENSION: ICD-10-CM

## 2021-10-18 DIAGNOSIS — K64.2 GRADE III HEMORRHOIDS: ICD-10-CM

## 2021-10-18 PROCEDURE — 99999 PR PBB SHADOW E&M-EST. PATIENT-LVL V: ICD-10-PCS | Mod: PBBFAC,,, | Performed by: PHYSICIAN ASSISTANT

## 2021-10-18 PROCEDURE — 99999 PR PBB SHADOW E&M-EST. PATIENT-LVL V: CPT | Mod: PBBFAC,,, | Performed by: PHYSICIAN ASSISTANT

## 2021-10-18 PROCEDURE — 72040 XR CERVICAL SPINE AP LATERAL: ICD-10-PCS | Mod: 26,,, | Performed by: RADIOLOGY

## 2021-10-18 PROCEDURE — 99215 OFFICE O/P EST HI 40 MIN: CPT | Mod: PBBFAC,PO | Performed by: PHYSICIAN ASSISTANT

## 2021-10-18 PROCEDURE — 72070 X-RAY EXAM THORAC SPINE 2VWS: CPT | Mod: TC,FY,PO

## 2021-10-18 PROCEDURE — 72040 X-RAY EXAM NECK SPINE 2-3 VW: CPT | Mod: TC,FY,PO

## 2021-10-18 PROCEDURE — 99215 OFFICE O/P EST HI 40 MIN: CPT | Mod: S$PBB,,, | Performed by: PHYSICIAN ASSISTANT

## 2021-10-18 PROCEDURE — 72070 X-RAY EXAM THORAC SPINE 2VWS: CPT | Mod: 26,,, | Performed by: RADIOLOGY

## 2021-10-18 PROCEDURE — 99215 PR OFFICE/OUTPT VISIT, EST, LEVL V, 40-54 MIN: ICD-10-PCS | Mod: S$PBB,,, | Performed by: PHYSICIAN ASSISTANT

## 2021-10-18 PROCEDURE — 72040 X-RAY EXAM NECK SPINE 2-3 VW: CPT | Mod: 26,,, | Performed by: RADIOLOGY

## 2021-10-18 PROCEDURE — 72070 XR THORACIC SPINE AP LATERAL: ICD-10-PCS | Mod: 26,,, | Performed by: RADIOLOGY

## 2021-10-18 RX ORDER — HYDROCORTISONE ACETATE 25 MG/1
25 SUPPOSITORY RECTAL 2 TIMES DAILY
Qty: 14 SUPPOSITORY | Refills: 0 | Status: SHIPPED | OUTPATIENT
Start: 2021-10-18 | End: 2021-10-25

## 2021-10-18 RX ORDER — PHENYLEPHRINE HCL 10 MG/1
10 TABLET, FILM COATED ORAL EVERY 4 HOURS PRN
Status: ON HOLD | COMMUNITY
End: 2021-12-04

## 2021-10-18 RX ORDER — FLUTICASONE PROPIONATE 50 MCG
1 SPRAY, SUSPENSION (ML) NASAL DAILY
Qty: 18.2 ML | Refills: 2 | Status: SHIPPED | OUTPATIENT
Start: 2021-10-18 | End: 2023-03-09

## 2021-10-18 RX ORDER — DIPHENHYDRAMINE HCL 25 MG
25 CAPSULE ORAL EVERY 6 HOURS PRN
Status: ON HOLD | COMMUNITY
End: 2021-12-04

## 2021-10-19 ENCOUNTER — OFFICE VISIT (OUTPATIENT)
Dept: SPINE | Facility: CLINIC | Age: 76
End: 2021-10-19
Payer: MEDICARE

## 2021-10-19 VITALS
HEART RATE: 67 BPM | WEIGHT: 122 LBS | HEIGHT: 63 IN | DIASTOLIC BLOOD PRESSURE: 72 MMHG | BODY MASS INDEX: 21.62 KG/M2 | SYSTOLIC BLOOD PRESSURE: 120 MMHG

## 2021-10-19 DIAGNOSIS — M40.12 OTHER SECONDARY KYPHOSIS, CERVICAL REGION: ICD-10-CM

## 2021-10-19 DIAGNOSIS — M54.2 CERVICALGIA: Primary | ICD-10-CM

## 2021-10-19 PROCEDURE — 99999 PR PBB SHADOW E&M-EST. PATIENT-LVL III: ICD-10-PCS | Mod: PBBFAC,,, | Performed by: PHYSICIAN ASSISTANT

## 2021-10-19 PROCEDURE — 99213 OFFICE O/P EST LOW 20 MIN: CPT | Mod: S$PBB,,, | Performed by: PHYSICIAN ASSISTANT

## 2021-10-19 PROCEDURE — 99999 PR PBB SHADOW E&M-EST. PATIENT-LVL III: CPT | Mod: PBBFAC,,, | Performed by: PHYSICIAN ASSISTANT

## 2021-10-19 PROCEDURE — 99213 OFFICE O/P EST LOW 20 MIN: CPT | Mod: PBBFAC,PN | Performed by: PHYSICIAN ASSISTANT

## 2021-10-19 PROCEDURE — 99213 PR OFFICE/OUTPT VISIT, EST, LEVL III, 20-29 MIN: ICD-10-PCS | Mod: S$PBB,,, | Performed by: PHYSICIAN ASSISTANT

## 2021-10-20 PROCEDURE — G0180 PR HOME HEALTH MD CERTIFICATION: ICD-10-PCS | Mod: ,,, | Performed by: PHYSICIAN ASSISTANT

## 2021-10-20 PROCEDURE — G0180 MD CERTIFICATION HHA PATIENT: HCPCS | Mod: ,,, | Performed by: PHYSICIAN ASSISTANT

## 2021-11-16 ENCOUNTER — OFFICE VISIT (OUTPATIENT)
Dept: FAMILY MEDICINE | Facility: CLINIC | Age: 76
End: 2021-11-16
Attending: FAMILY MEDICINE
Payer: MEDICARE

## 2021-11-16 ENCOUNTER — TELEPHONE (OUTPATIENT)
Dept: FAMILY MEDICINE | Facility: CLINIC | Age: 76
End: 2021-11-16
Payer: MEDICARE

## 2021-11-16 ENCOUNTER — LAB VISIT (OUTPATIENT)
Dept: LAB | Facility: HOSPITAL | Age: 76
End: 2021-11-16
Attending: FAMILY MEDICINE
Payer: MEDICARE

## 2021-11-16 VITALS
RESPIRATION RATE: 18 BRPM | TEMPERATURE: 98 F | HEART RATE: 63 BPM | WEIGHT: 121.69 LBS | DIASTOLIC BLOOD PRESSURE: 76 MMHG | HEIGHT: 63 IN | BODY MASS INDEX: 21.56 KG/M2 | OXYGEN SATURATION: 96 % | SYSTOLIC BLOOD PRESSURE: 135 MMHG

## 2021-11-16 DIAGNOSIS — E03.9 ACQUIRED HYPOTHYROIDISM: ICD-10-CM

## 2021-11-16 DIAGNOSIS — L65.9 HAIR LOSS: Primary | ICD-10-CM

## 2021-11-16 DIAGNOSIS — M50.30 DDD (DEGENERATIVE DISC DISEASE), CERVICAL: Primary | ICD-10-CM

## 2021-11-16 DIAGNOSIS — L65.9 HAIR LOSS: ICD-10-CM

## 2021-11-16 LAB
ALBUMIN SERPL BCP-MCNC: 4.7 G/DL (ref 3.5–5.2)
ALP SERPL-CCNC: 87 U/L (ref 55–135)
ALT SERPL W/O P-5'-P-CCNC: 24 U/L (ref 10–44)
ANION GAP SERPL CALC-SCNC: 13 MMOL/L (ref 8–16)
AST SERPL-CCNC: 19 U/L (ref 10–40)
BASOPHILS # BLD AUTO: 0.05 K/UL (ref 0–0.2)
BASOPHILS NFR BLD: 0.7 % (ref 0–1.9)
BILIRUB SERPL-MCNC: 0.7 MG/DL (ref 0.1–1)
BUN SERPL-MCNC: 11 MG/DL (ref 8–23)
CALCIUM SERPL-MCNC: 10.2 MG/DL (ref 8.7–10.5)
CHLORIDE SERPL-SCNC: 102 MMOL/L (ref 95–110)
CO2 SERPL-SCNC: 26 MMOL/L (ref 23–29)
CREAT SERPL-MCNC: 0.6 MG/DL (ref 0.5–1.4)
DIFFERENTIAL METHOD: ABNORMAL
EOSINOPHIL # BLD AUTO: 0.1 K/UL (ref 0–0.5)
EOSINOPHIL NFR BLD: 1.5 % (ref 0–8)
ERYTHROCYTE [DISTWIDTH] IN BLOOD BY AUTOMATED COUNT: 12.4 % (ref 11.5–14.5)
EST. GFR  (AFRICAN AMERICAN): >60 ML/MIN/1.73 M^2
EST. GFR  (NON AFRICAN AMERICAN): >60 ML/MIN/1.73 M^2
GLUCOSE SERPL-MCNC: 83 MG/DL (ref 70–110)
HCT VFR BLD AUTO: 46.2 % (ref 37–48.5)
HGB BLD-MCNC: 15.2 G/DL (ref 12–16)
IMM GRANULOCYTES # BLD AUTO: 0.01 K/UL (ref 0–0.04)
IMM GRANULOCYTES NFR BLD AUTO: 0.1 % (ref 0–0.5)
LYMPHOCYTES # BLD AUTO: 1.5 K/UL (ref 1–4.8)
LYMPHOCYTES NFR BLD: 21.5 % (ref 18–48)
MCH RBC QN AUTO: 31.1 PG (ref 27–31)
MCHC RBC AUTO-ENTMCNC: 32.9 G/DL (ref 32–36)
MCV RBC AUTO: 95 FL (ref 82–98)
MONOCYTES # BLD AUTO: 0.5 K/UL (ref 0.3–1)
MONOCYTES NFR BLD: 7.1 % (ref 4–15)
NEUTROPHILS # BLD AUTO: 4.9 K/UL (ref 1.8–7.7)
NEUTROPHILS NFR BLD: 69.1 % (ref 38–73)
NRBC BLD-RTO: 0 /100 WBC
PLATELET # BLD AUTO: 181 K/UL (ref 150–450)
PMV BLD AUTO: 10.6 FL (ref 9.2–12.9)
POTASSIUM SERPL-SCNC: 4.3 MMOL/L (ref 3.5–5.1)
PROT SERPL-MCNC: 8 G/DL (ref 6–8.4)
RBC # BLD AUTO: 4.89 M/UL (ref 4–5.4)
SODIUM SERPL-SCNC: 141 MMOL/L (ref 136–145)
TSH SERPL DL<=0.005 MIU/L-ACNC: 3.87 UIU/ML (ref 0.4–4)
WBC # BLD AUTO: 7.15 K/UL (ref 3.9–12.7)

## 2021-11-16 PROCEDURE — 85025 COMPLETE CBC W/AUTO DIFF WBC: CPT | Performed by: FAMILY MEDICINE

## 2021-11-16 PROCEDURE — 99213 OFFICE O/P EST LOW 20 MIN: CPT | Mod: S$PBB,,, | Performed by: FAMILY MEDICINE

## 2021-11-16 PROCEDURE — 99999 PR PBB SHADOW E&M-EST. PATIENT-LVL IV: CPT | Mod: PBBFAC,,, | Performed by: FAMILY MEDICINE

## 2021-11-16 PROCEDURE — 36415 COLL VENOUS BLD VENIPUNCTURE: CPT | Performed by: FAMILY MEDICINE

## 2021-11-16 PROCEDURE — 84443 ASSAY THYROID STIM HORMONE: CPT | Performed by: FAMILY MEDICINE

## 2021-11-16 PROCEDURE — 99999 PR PBB SHADOW E&M-EST. PATIENT-LVL IV: ICD-10-PCS | Mod: PBBFAC,,, | Performed by: FAMILY MEDICINE

## 2021-11-16 PROCEDURE — 99214 OFFICE O/P EST MOD 30 MIN: CPT | Mod: PBBFAC,PN | Performed by: FAMILY MEDICINE

## 2021-11-16 PROCEDURE — 80053 COMPREHEN METABOLIC PANEL: CPT | Performed by: FAMILY MEDICINE

## 2021-11-16 PROCEDURE — 99213 PR OFFICE/OUTPT VISIT, EST, LEVL III, 20-29 MIN: ICD-10-PCS | Mod: S$PBB,,, | Performed by: FAMILY MEDICINE

## 2021-11-16 RX ORDER — CARBIDOPA AND LEVODOPA 25; 100 MG/1; MG/1
1 TABLET ORAL 3 TIMES DAILY
COMMUNITY
Start: 2021-10-28 | End: 2023-05-10

## 2021-11-24 ENCOUNTER — EXTERNAL HOME HEALTH (OUTPATIENT)
Dept: HOME HEALTH SERVICES | Facility: HOSPITAL | Age: 76
End: 2021-11-24
Payer: MEDICARE

## 2021-12-04 ENCOUNTER — HOSPITAL ENCOUNTER (INPATIENT)
Facility: HOSPITAL | Age: 76
LOS: 11 days | Discharge: NURSING, REHAB OR LONG-TERM CARE | DRG: 388 | End: 2021-12-15
Attending: EMERGENCY MEDICINE | Admitting: INTERNAL MEDICINE
Payer: MEDICARE

## 2021-12-04 DIAGNOSIS — K52.9 CHRONIC DIARRHEA: ICD-10-CM

## 2021-12-04 DIAGNOSIS — K56.609 SBO (SMALL BOWEL OBSTRUCTION): ICD-10-CM

## 2021-12-04 DIAGNOSIS — K56.7 ILEUS: ICD-10-CM

## 2021-12-04 DIAGNOSIS — R10.30 LOWER ABDOMINAL PAIN: Primary | ICD-10-CM

## 2021-12-04 DIAGNOSIS — R74.8 ELEVATED LIVER ENZYMES: ICD-10-CM

## 2021-12-04 DIAGNOSIS — D62 ACUTE BLOOD LOSS ANEMIA: ICD-10-CM

## 2021-12-04 DIAGNOSIS — R52 PAIN: ICD-10-CM

## 2021-12-04 PROBLEM — R79.89 ELEVATED LACTIC ACID LEVEL: Status: ACTIVE | Noted: 2021-12-04

## 2021-12-04 PROBLEM — R73.9 HYPERGLYCEMIA: Status: ACTIVE | Noted: 2021-12-04

## 2021-12-04 LAB
ALBUMIN SERPL BCP-MCNC: 4.5 G/DL (ref 3.5–5.2)
ALP SERPL-CCNC: 72 U/L (ref 55–135)
ALT SERPL W/O P-5'-P-CCNC: 26 U/L (ref 10–44)
ANION GAP SERPL CALC-SCNC: 12 MMOL/L (ref 8–16)
AST SERPL-CCNC: 26 U/L (ref 10–40)
BACTERIA #/AREA URNS HPF: NEGATIVE /HPF
BASOPHILS # BLD AUTO: 0.03 K/UL (ref 0–0.2)
BASOPHILS NFR BLD: 0.3 % (ref 0–1.9)
BILIRUB SERPL-MCNC: 0.8 MG/DL (ref 0.1–1)
BILIRUB UR QL STRIP: ABNORMAL
BILIRUB UR QL STRIP: ABNORMAL
BUN SERPL-MCNC: 20 MG/DL (ref 8–23)
CALCIUM SERPL-MCNC: 8.9 MG/DL (ref 8.7–10.5)
CHLORIDE SERPL-SCNC: 103 MMOL/L (ref 95–110)
CLARITY UR: CLEAR
CLARITY UR: CLEAR
CO2 SERPL-SCNC: 25 MMOL/L (ref 23–29)
COLOR UR: YELLOW
COLOR UR: YELLOW
CREAT SERPL-MCNC: 0.4 MG/DL (ref 0.5–1.4)
CREAT SERPL-MCNC: 0.5 MG/DL (ref 0.5–1.4)
DIFFERENTIAL METHOD: ABNORMAL
EOSINOPHIL # BLD AUTO: 0 K/UL (ref 0–0.5)
EOSINOPHIL NFR BLD: 0.2 % (ref 0–8)
ERYTHROCYTE [DISTWIDTH] IN BLOOD BY AUTOMATED COUNT: 12.7 % (ref 11.5–14.5)
EST. GFR  (AFRICAN AMERICAN): >60 ML/MIN/1.73 M^2
EST. GFR  (NON AFRICAN AMERICAN): >60 ML/MIN/1.73 M^2
GLUCOSE SERPL-MCNC: 220 MG/DL (ref 70–110)
GLUCOSE UR QL STRIP: NEGATIVE
GLUCOSE UR QL STRIP: NEGATIVE
HCT VFR BLD AUTO: 41.8 % (ref 37–48.5)
HGB BLD-MCNC: 14.1 G/DL (ref 12–16)
HGB UR QL STRIP: ABNORMAL
HGB UR QL STRIP: ABNORMAL
HYALINE CASTS #/AREA URNS LPF: 5 /LPF
IMM GRANULOCYTES # BLD AUTO: 0.03 K/UL (ref 0–0.04)
IMM GRANULOCYTES NFR BLD AUTO: 0.3 % (ref 0–0.5)
KETONES UR QL STRIP: NEGATIVE
KETONES UR QL STRIP: NEGATIVE
LACTATE SERPL-SCNC: 2.9 MMOL/L (ref 0.5–1.9)
LEUKOCYTE ESTERASE UR QL STRIP: NEGATIVE
LEUKOCYTE ESTERASE UR QL STRIP: NEGATIVE
LIPASE SERPL-CCNC: 22 U/L (ref 4–60)
LYMPHOCYTES # BLD AUTO: 1 K/UL (ref 1–4.8)
LYMPHOCYTES NFR BLD: 8.9 % (ref 18–48)
MCH RBC QN AUTO: 31.1 PG (ref 27–31)
MCHC RBC AUTO-ENTMCNC: 33.7 G/DL (ref 32–36)
MCV RBC AUTO: 92 FL (ref 82–98)
MICROSCOPIC COMMENT: ABNORMAL
MONOCYTES # BLD AUTO: 0.6 K/UL (ref 0.3–1)
MONOCYTES NFR BLD: 5.2 % (ref 4–15)
NEUTROPHILS # BLD AUTO: 9.5 K/UL (ref 1.8–7.7)
NEUTROPHILS NFR BLD: 85.1 % (ref 38–73)
NITRITE UR QL STRIP: NEGATIVE
NITRITE UR QL STRIP: NEGATIVE
NRBC BLD-RTO: 0 /100 WBC
PH UR STRIP: 6 [PH] (ref 5–8)
PH UR STRIP: 6 [PH] (ref 5–8)
PLATELET # BLD AUTO: 149 K/UL (ref 150–450)
PMV BLD AUTO: 10.1 FL (ref 9.2–12.9)
POTASSIUM SERPL-SCNC: 3.3 MMOL/L (ref 3.5–5.1)
PROT SERPL-MCNC: 7.1 G/DL (ref 6–8.4)
PROT UR QL STRIP: ABNORMAL
PROT UR QL STRIP: ABNORMAL
RBC # BLD AUTO: 4.54 M/UL (ref 4–5.4)
RBC #/AREA URNS HPF: 7 /HPF (ref 0–4)
SAMPLE: ABNORMAL
SARS-COV-2 RDRP RESP QL NAA+PROBE: NEGATIVE
SODIUM SERPL-SCNC: 140 MMOL/L (ref 136–145)
SP GR UR STRIP: 1.03 (ref 1–1.03)
SP GR UR STRIP: 1.03 (ref 1–1.03)
SQUAMOUS #/AREA URNS HPF: 1 /HPF
TROPONIN I SERPL DL<=0.01 NG/ML-MCNC: <0.03 NG/ML
URN SPEC COLLECT METH UR: ABNORMAL
URN SPEC COLLECT METH UR: ABNORMAL
UROBILINOGEN UR STRIP-ACNC: ABNORMAL EU/DL
UROBILINOGEN UR STRIP-ACNC: ABNORMAL EU/DL
WBC # BLD AUTO: 11.11 K/UL (ref 3.9–12.7)
WBC #/AREA URNS HPF: 1 /HPF (ref 0–5)

## 2021-12-04 PROCEDURE — 25500020 PHARM REV CODE 255: Performed by: STUDENT IN AN ORGANIZED HEALTH CARE EDUCATION/TRAINING PROGRAM

## 2021-12-04 PROCEDURE — 12000002 HC ACUTE/MED SURGE SEMI-PRIVATE ROOM

## 2021-12-04 PROCEDURE — 81001 URINALYSIS AUTO W/SCOPE: CPT | Performed by: STUDENT IN AN ORGANIZED HEALTH CARE EDUCATION/TRAINING PROGRAM

## 2021-12-04 PROCEDURE — 87040 BLOOD CULTURE FOR BACTERIA: CPT | Performed by: STUDENT IN AN ORGANIZED HEALTH CARE EDUCATION/TRAINING PROGRAM

## 2021-12-04 PROCEDURE — 84484 ASSAY OF TROPONIN QUANT: CPT | Performed by: STUDENT IN AN ORGANIZED HEALTH CARE EDUCATION/TRAINING PROGRAM

## 2021-12-04 PROCEDURE — 83605 ASSAY OF LACTIC ACID: CPT | Performed by: STUDENT IN AN ORGANIZED HEALTH CARE EDUCATION/TRAINING PROGRAM

## 2021-12-04 PROCEDURE — 83605 ASSAY OF LACTIC ACID: CPT | Mod: 91 | Performed by: NURSE PRACTITIONER

## 2021-12-04 PROCEDURE — 63600175 PHARM REV CODE 636 W HCPCS: Performed by: EMERGENCY MEDICINE

## 2021-12-04 PROCEDURE — 96365 THER/PROPH/DIAG IV INF INIT: CPT

## 2021-12-04 PROCEDURE — 93010 EKG 12-LEAD: ICD-10-PCS | Mod: ,,, | Performed by: INTERNAL MEDICINE

## 2021-12-04 PROCEDURE — 63600175 PHARM REV CODE 636 W HCPCS: Performed by: STUDENT IN AN ORGANIZED HEALTH CARE EDUCATION/TRAINING PROGRAM

## 2021-12-04 PROCEDURE — U0002 COVID-19 LAB TEST NON-CDC: HCPCS | Performed by: STUDENT IN AN ORGANIZED HEALTH CARE EDUCATION/TRAINING PROGRAM

## 2021-12-04 PROCEDURE — 80053 COMPREHEN METABOLIC PANEL: CPT | Performed by: STUDENT IN AN ORGANIZED HEALTH CARE EDUCATION/TRAINING PROGRAM

## 2021-12-04 PROCEDURE — 99285 EMERGENCY DEPT VISIT HI MDM: CPT | Mod: 25

## 2021-12-04 PROCEDURE — 25000003 PHARM REV CODE 250: Performed by: EMERGENCY MEDICINE

## 2021-12-04 PROCEDURE — 93005 ELECTROCARDIOGRAM TRACING: CPT | Performed by: INTERNAL MEDICINE

## 2021-12-04 PROCEDURE — 93010 ELECTROCARDIOGRAM REPORT: CPT | Mod: ,,, | Performed by: INTERNAL MEDICINE

## 2021-12-04 PROCEDURE — 96376 TX/PRO/DX INJ SAME DRUG ADON: CPT

## 2021-12-04 PROCEDURE — 36415 COLL VENOUS BLD VENIPUNCTURE: CPT | Performed by: NURSE PRACTITIONER

## 2021-12-04 PROCEDURE — 96375 TX/PRO/DX INJ NEW DRUG ADDON: CPT

## 2021-12-04 PROCEDURE — 63600175 PHARM REV CODE 636 W HCPCS: Performed by: NURSE PRACTITIONER

## 2021-12-04 PROCEDURE — 85025 COMPLETE CBC W/AUTO DIFF WBC: CPT | Performed by: STUDENT IN AN ORGANIZED HEALTH CARE EDUCATION/TRAINING PROGRAM

## 2021-12-04 PROCEDURE — 36415 COLL VENOUS BLD VENIPUNCTURE: CPT | Performed by: STUDENT IN AN ORGANIZED HEALTH CARE EDUCATION/TRAINING PROGRAM

## 2021-12-04 PROCEDURE — 83690 ASSAY OF LIPASE: CPT | Performed by: STUDENT IN AN ORGANIZED HEALTH CARE EDUCATION/TRAINING PROGRAM

## 2021-12-04 RX ORDER — SODIUM CHLORIDE 0.9 % (FLUSH) 0.9 %
10 SYRINGE (ML) INJECTION EVERY 12 HOURS PRN
Status: DISCONTINUED | OUTPATIENT
Start: 2021-12-04 | End: 2021-12-15 | Stop reason: HOSPADM

## 2021-12-04 RX ORDER — POTASSIUM CHLORIDE 7.45 MG/ML
20 INJECTION INTRAVENOUS
Status: DISCONTINUED | OUTPATIENT
Start: 2021-12-04 | End: 2021-12-15 | Stop reason: HOSPADM

## 2021-12-04 RX ORDER — MAGNESIUM SULFATE 1 G/100ML
1 INJECTION INTRAVENOUS
Status: DISCONTINUED | OUTPATIENT
Start: 2021-12-04 | End: 2021-12-15 | Stop reason: HOSPADM

## 2021-12-04 RX ORDER — ONDANSETRON 2 MG/ML
4 INJECTION INTRAMUSCULAR; INTRAVENOUS EVERY 8 HOURS PRN
Status: DISCONTINUED | OUTPATIENT
Start: 2021-12-04 | End: 2021-12-07 | Stop reason: SDUPTHER

## 2021-12-04 RX ORDER — INSULIN ASPART 100 [IU]/ML
0-5 INJECTION, SOLUTION INTRAVENOUS; SUBCUTANEOUS EVERY 6 HOURS PRN
Status: DISCONTINUED | OUTPATIENT
Start: 2021-12-04 | End: 2021-12-15 | Stop reason: HOSPADM

## 2021-12-04 RX ORDER — LACTOSE-REDUCED FOOD
5 LIQUID (ML) ORAL
Status: ON HOLD | COMMUNITY
End: 2023-02-06 | Stop reason: HOSPADM

## 2021-12-04 RX ORDER — SODIUM CHLORIDE, SODIUM LACTATE, POTASSIUM CHLORIDE, CALCIUM CHLORIDE 600; 310; 30; 20 MG/100ML; MG/100ML; MG/100ML; MG/100ML
INJECTION, SOLUTION INTRAVENOUS CONTINUOUS
Status: DISCONTINUED | OUTPATIENT
Start: 2021-12-04 | End: 2021-12-05

## 2021-12-04 RX ORDER — MAGNESIUM SULFATE HEPTAHYDRATE 40 MG/ML
2 INJECTION, SOLUTION INTRAVENOUS
Status: DISCONTINUED | OUTPATIENT
Start: 2021-12-04 | End: 2021-12-15 | Stop reason: HOSPADM

## 2021-12-04 RX ORDER — MORPHINE SULFATE 4 MG/ML
4 INJECTION, SOLUTION INTRAMUSCULAR; INTRAVENOUS
Status: COMPLETED | OUTPATIENT
Start: 2021-12-04 | End: 2021-12-04

## 2021-12-04 RX ORDER — NALOXONE HCL 0.4 MG/ML
0.02 VIAL (ML) INJECTION
Status: DISCONTINUED | OUTPATIENT
Start: 2021-12-04 | End: 2021-12-15 | Stop reason: HOSPADM

## 2021-12-04 RX ORDER — MORPHINE SULFATE 2 MG/ML
2 INJECTION, SOLUTION INTRAMUSCULAR; INTRAVENOUS
Status: COMPLETED | OUTPATIENT
Start: 2021-12-04 | End: 2021-12-04

## 2021-12-04 RX ORDER — IBUPROFEN 200 MG
200 TABLET ORAL EVERY 6 HOURS PRN
Status: ON HOLD | COMMUNITY
End: 2021-12-15 | Stop reason: HOSPADM

## 2021-12-04 RX ORDER — MORPHINE SULFATE 2 MG/ML
2 INJECTION, SOLUTION INTRAMUSCULAR; INTRAVENOUS EVERY 6 HOURS PRN
Status: DISCONTINUED | OUTPATIENT
Start: 2021-12-04 | End: 2021-12-15 | Stop reason: HOSPADM

## 2021-12-04 RX ORDER — SODIUM CHLORIDE, SODIUM LACTATE, POTASSIUM CHLORIDE, CALCIUM CHLORIDE 600; 310; 30; 20 MG/100ML; MG/100ML; MG/100ML; MG/100ML
1000 INJECTION, SOLUTION INTRAVENOUS
Status: COMPLETED | OUTPATIENT
Start: 2021-12-04 | End: 2021-12-04

## 2021-12-04 RX ORDER — CARBIDOPA AND LEVODOPA 25; 100 MG/1; MG/1
1 TABLET ORAL 3 TIMES DAILY
Status: DISCONTINUED | OUTPATIENT
Start: 2021-12-04 | End: 2021-12-15 | Stop reason: HOSPADM

## 2021-12-04 RX ORDER — MAGNESIUM SULFATE HEPTAHYDRATE 40 MG/ML
4 INJECTION, SOLUTION INTRAVENOUS
Status: DISCONTINUED | OUTPATIENT
Start: 2021-12-04 | End: 2021-12-15 | Stop reason: HOSPADM

## 2021-12-04 RX ORDER — ACETAMINOPHEN 650 MG/1
650 SUPPOSITORY RECTAL EVERY 4 HOURS PRN
Status: DISCONTINUED | OUTPATIENT
Start: 2021-12-04 | End: 2021-12-15 | Stop reason: HOSPADM

## 2021-12-04 RX ORDER — GLUCAGON 1 MG
1 KIT INJECTION
Status: DISCONTINUED | OUTPATIENT
Start: 2021-12-04 | End: 2021-12-15 | Stop reason: HOSPADM

## 2021-12-04 RX ORDER — ONDANSETRON 2 MG/ML
4 INJECTION INTRAMUSCULAR; INTRAVENOUS
Status: COMPLETED | OUTPATIENT
Start: 2021-12-04 | End: 2021-12-04

## 2021-12-04 RX ORDER — POTASSIUM CHLORIDE 7.45 MG/ML
40 INJECTION INTRAVENOUS
Status: DISCONTINUED | OUTPATIENT
Start: 2021-12-04 | End: 2021-12-15 | Stop reason: HOSPADM

## 2021-12-04 RX ORDER — FLUTICASONE PROPIONATE 50 MCG
1 SPRAY, SUSPENSION (ML) NASAL DAILY
Status: DISCONTINUED | OUTPATIENT
Start: 2021-12-05 | End: 2021-12-15 | Stop reason: HOSPADM

## 2021-12-04 RX ADMIN — SODIUM CHLORIDE, SODIUM LACTATE, POTASSIUM CHLORIDE, AND CALCIUM CHLORIDE: .6; .31; .03; .02 INJECTION, SOLUTION INTRAVENOUS at 08:12

## 2021-12-04 RX ADMIN — SODIUM CHLORIDE, SODIUM LACTATE, POTASSIUM CHLORIDE, AND CALCIUM CHLORIDE 1000 ML: .6; .31; .03; .02 INJECTION, SOLUTION INTRAVENOUS at 04:12

## 2021-12-04 RX ADMIN — MORPHINE SULFATE 4 MG: 4 INJECTION, SOLUTION INTRAMUSCULAR; INTRAVENOUS at 04:12

## 2021-12-04 RX ADMIN — MORPHINE SULFATE 2 MG: 2 INJECTION, SOLUTION INTRAMUSCULAR; INTRAVENOUS at 09:12

## 2021-12-04 RX ADMIN — PIPERACILLIN AND TAZOBACTAM 4.5 G: 4; .5 INJECTION, POWDER, LYOPHILIZED, FOR SOLUTION INTRAVENOUS; PARENTERAL at 03:12

## 2021-12-04 RX ADMIN — ONDANSETRON 4 MG: 2 INJECTION INTRAMUSCULAR; INTRAVENOUS at 03:12

## 2021-12-04 RX ADMIN — ONDANSETRON 4 MG: 2 INJECTION INTRAMUSCULAR; INTRAVENOUS at 10:12

## 2021-12-04 RX ADMIN — MORPHINE SULFATE 2 MG: 2 INJECTION, SOLUTION INTRAMUSCULAR; INTRAVENOUS at 03:12

## 2021-12-04 RX ADMIN — IOHEXOL 100 ML: 350 INJECTION, SOLUTION INTRAVENOUS at 03:12

## 2021-12-04 RX ADMIN — POTASSIUM CHLORIDE 40 MEQ: 7.46 INJECTION, SOLUTION INTRAVENOUS at 08:12

## 2021-12-05 LAB
ANION GAP SERPL CALC-SCNC: 13 MMOL/L (ref 8–16)
BASOPHILS # BLD AUTO: 0.02 K/UL (ref 0–0.2)
BASOPHILS # BLD AUTO: 0.04 K/UL (ref 0–0.2)
BASOPHILS NFR BLD: 0.1 % (ref 0–1.9)
BASOPHILS NFR BLD: 0.2 % (ref 0–1.9)
BUN SERPL-MCNC: 24 MG/DL (ref 8–23)
CALCIUM SERPL-MCNC: 7.8 MG/DL (ref 8.7–10.5)
CHLORIDE SERPL-SCNC: 110 MMOL/L (ref 95–110)
CO2 SERPL-SCNC: 16 MMOL/L (ref 23–29)
CREAT SERPL-MCNC: 0.6 MG/DL (ref 0.5–1.4)
DIFFERENTIAL METHOD: ABNORMAL
DIFFERENTIAL METHOD: ABNORMAL
EOSINOPHIL # BLD AUTO: 0 K/UL (ref 0–0.5)
EOSINOPHIL # BLD AUTO: 0 K/UL (ref 0–0.5)
EOSINOPHIL NFR BLD: 0 % (ref 0–8)
EOSINOPHIL NFR BLD: 0 % (ref 0–8)
ERYTHROCYTE [DISTWIDTH] IN BLOOD BY AUTOMATED COUNT: 13 % (ref 11.5–14.5)
ERYTHROCYTE [DISTWIDTH] IN BLOOD BY AUTOMATED COUNT: 13.2 % (ref 11.5–14.5)
EST. GFR  (AFRICAN AMERICAN): >60 ML/MIN/1.73 M^2
EST. GFR  (NON AFRICAN AMERICAN): >60 ML/MIN/1.73 M^2
ESTIMATED AVG GLUCOSE: 91 MG/DL (ref 68–131)
GLUCOSE SERPL-MCNC: 131 MG/DL (ref 70–110)
GLUCOSE SERPL-MCNC: 142 MG/DL (ref 70–110)
GLUCOSE SERPL-MCNC: 160 MG/DL (ref 70–110)
GLUCOSE SERPL-MCNC: 202 MG/DL (ref 70–110)
HBA1C MFR BLD: 4.8 % (ref 4.5–6.2)
HCT VFR BLD AUTO: 50 % (ref 37–48.5)
HCT VFR BLD AUTO: 52.2 % (ref 37–48.5)
HGB BLD-MCNC: 16.8 G/DL (ref 12–16)
HGB BLD-MCNC: 17.4 G/DL (ref 12–16)
IMM GRANULOCYTES # BLD AUTO: 0.08 K/UL (ref 0–0.04)
IMM GRANULOCYTES # BLD AUTO: 0.13 K/UL (ref 0–0.04)
IMM GRANULOCYTES NFR BLD AUTO: 0.4 % (ref 0–0.5)
IMM GRANULOCYTES NFR BLD AUTO: 0.6 % (ref 0–0.5)
LACTATE SERPL-SCNC: 2.3 MMOL/L (ref 0.5–1.9)
LACTATE SERPL-SCNC: 3.1 MMOL/L (ref 0.5–1.9)
LACTATE SERPL-SCNC: 4 MMOL/L (ref 0.5–1.9)
LACTATE SERPL-SCNC: 4.9 MMOL/L (ref 0.5–1.9)
LACTATE SERPL-SCNC: 5.7 MMOL/L (ref 0.5–1.9)
LYMPHOCYTES # BLD AUTO: 1 K/UL (ref 1–4.8)
LYMPHOCYTES # BLD AUTO: 1.2 K/UL (ref 1–4.8)
LYMPHOCYTES NFR BLD: 5.4 % (ref 18–48)
LYMPHOCYTES NFR BLD: 6.1 % (ref 18–48)
MAGNESIUM SERPL-MCNC: 1.6 MG/DL (ref 1.6–2.6)
MCH RBC QN AUTO: 31.1 PG (ref 27–31)
MCH RBC QN AUTO: 31.4 PG (ref 27–31)
MCHC RBC AUTO-ENTMCNC: 33.3 G/DL (ref 32–36)
MCHC RBC AUTO-ENTMCNC: 33.6 G/DL (ref 32–36)
MCV RBC AUTO: 92 FL (ref 82–98)
MCV RBC AUTO: 94 FL (ref 82–98)
MONOCYTES # BLD AUTO: 1.2 K/UL (ref 0.3–1)
MONOCYTES # BLD AUTO: 1.2 K/UL (ref 0.3–1)
MONOCYTES NFR BLD: 6.1 % (ref 4–15)
MONOCYTES NFR BLD: 6.6 % (ref 4–15)
NEUTROPHILS # BLD AUTO: 15.6 K/UL (ref 1.8–7.7)
NEUTROPHILS # BLD AUTO: 17.4 K/UL (ref 1.8–7.7)
NEUTROPHILS NFR BLD: 87 % (ref 38–73)
NEUTROPHILS NFR BLD: 87.5 % (ref 38–73)
NRBC BLD-RTO: 0 /100 WBC
NRBC BLD-RTO: 0 /100 WBC
OB PNL STL: POSITIVE
PLATELET # BLD AUTO: 203 K/UL (ref 150–450)
PLATELET # BLD AUTO: 222 K/UL (ref 150–450)
PMV BLD AUTO: 10.5 FL (ref 9.2–12.9)
PMV BLD AUTO: 11.1 FL (ref 9.2–12.9)
POTASSIUM SERPL-SCNC: 4 MMOL/L (ref 3.5–5.1)
RBC # BLD AUTO: 5.41 M/UL (ref 4–5.4)
RBC # BLD AUTO: 5.54 M/UL (ref 4–5.4)
SODIUM SERPL-SCNC: 139 MMOL/L (ref 136–145)
WBC # BLD AUTO: 17.9 K/UL (ref 3.9–12.7)
WBC # BLD AUTO: 20.06 K/UL (ref 3.9–12.7)

## 2021-12-05 PROCEDURE — 99223 PR INITIAL HOSPITAL CARE,LEVL III: ICD-10-PCS | Mod: ,,, | Performed by: STUDENT IN AN ORGANIZED HEALTH CARE EDUCATION/TRAINING PROGRAM

## 2021-12-05 PROCEDURE — 82272 OCCULT BLD FECES 1-3 TESTS: CPT | Performed by: INTERNAL MEDICINE

## 2021-12-05 PROCEDURE — 63600175 PHARM REV CODE 636 W HCPCS: Performed by: NURSE PRACTITIONER

## 2021-12-05 PROCEDURE — 85025 COMPLETE CBC W/AUTO DIFF WBC: CPT | Mod: 91 | Performed by: FAMILY MEDICINE

## 2021-12-05 PROCEDURE — 83605 ASSAY OF LACTIC ACID: CPT | Mod: 91 | Performed by: FAMILY MEDICINE

## 2021-12-05 PROCEDURE — 25000003 PHARM REV CODE 250: Performed by: INTERNAL MEDICINE

## 2021-12-05 PROCEDURE — 83735 ASSAY OF MAGNESIUM: CPT | Performed by: NURSE PRACTITIONER

## 2021-12-05 PROCEDURE — 63600175 PHARM REV CODE 636 W HCPCS: Performed by: FAMILY MEDICINE

## 2021-12-05 PROCEDURE — 85025 COMPLETE CBC W/AUTO DIFF WBC: CPT | Performed by: NURSE PRACTITIONER

## 2021-12-05 PROCEDURE — 80048 BASIC METABOLIC PNL TOTAL CA: CPT | Performed by: NURSE PRACTITIONER

## 2021-12-05 PROCEDURE — 83036 HEMOGLOBIN GLYCOSYLATED A1C: CPT | Performed by: NURSE PRACTITIONER

## 2021-12-05 PROCEDURE — 83605 ASSAY OF LACTIC ACID: CPT | Performed by: INTERNAL MEDICINE

## 2021-12-05 PROCEDURE — 12000002 HC ACUTE/MED SURGE SEMI-PRIVATE ROOM

## 2021-12-05 PROCEDURE — 25000003 PHARM REV CODE 250

## 2021-12-05 PROCEDURE — 63600175 PHARM REV CODE 636 W HCPCS

## 2021-12-05 PROCEDURE — 99223 1ST HOSP IP/OBS HIGH 75: CPT | Mod: ,,, | Performed by: STUDENT IN AN ORGANIZED HEALTH CARE EDUCATION/TRAINING PROGRAM

## 2021-12-05 PROCEDURE — 36415 COLL VENOUS BLD VENIPUNCTURE: CPT | Performed by: INTERNAL MEDICINE

## 2021-12-05 PROCEDURE — 25000003 PHARM REV CODE 250: Performed by: FAMILY MEDICINE

## 2021-12-05 PROCEDURE — 36415 COLL VENOUS BLD VENIPUNCTURE: CPT | Performed by: FAMILY MEDICINE

## 2021-12-05 RX ORDER — CHLORHEXIDINE GLUCONATE ORAL RINSE 1.2 MG/ML
15 SOLUTION DENTAL 2 TIMES DAILY
Status: DISCONTINUED | OUTPATIENT
Start: 2021-12-05 | End: 2021-12-15 | Stop reason: HOSPADM

## 2021-12-05 RX ORDER — SODIUM CHLORIDE 9 MG/ML
INJECTION, SOLUTION INTRAVENOUS CONTINUOUS
Status: DISCONTINUED | OUTPATIENT
Start: 2021-12-05 | End: 2021-12-07

## 2021-12-05 RX ADMIN — ONDANSETRON 4 MG: 2 INJECTION INTRAMUSCULAR; INTRAVENOUS at 07:12

## 2021-12-05 RX ADMIN — SODIUM CHLORIDE 500 ML: 0.9 INJECTION, SOLUTION INTRAVENOUS at 08:12

## 2021-12-05 RX ADMIN — MORPHINE SULFATE 2 MG: 2 INJECTION, SOLUTION INTRAMUSCULAR; INTRAVENOUS at 02:12

## 2021-12-05 RX ADMIN — PIPERACILLIN AND TAZOBACTAM 3.38 G: 3; .375 INJECTION, POWDER, LYOPHILIZED, FOR SOLUTION INTRAVENOUS; PARENTERAL at 11:12

## 2021-12-05 RX ADMIN — MORPHINE SULFATE 2 MG: 2 INJECTION, SOLUTION INTRAMUSCULAR; INTRAVENOUS at 07:12

## 2021-12-05 RX ADMIN — SODIUM CHLORIDE 1000 ML: 0.9 INJECTION, SOLUTION INTRAVENOUS at 10:12

## 2021-12-05 RX ADMIN — CHLORHEXIDINE GLUCONATE 15 ML: 1.2 RINSE ORAL at 12:12

## 2021-12-05 RX ADMIN — PIPERACILLIN AND TAZOBACTAM 3.38 G: 3; .375 INJECTION, POWDER, LYOPHILIZED, FOR SOLUTION INTRAVENOUS; PARENTERAL at 12:12

## 2021-12-05 RX ADMIN — MORPHINE SULFATE 2 MG: 2 INJECTION, SOLUTION INTRAMUSCULAR; INTRAVENOUS at 08:12

## 2021-12-05 RX ADMIN — SODIUM CHLORIDE: 0.9 INJECTION, SOLUTION INTRAVENOUS at 02:12

## 2021-12-06 LAB
ANION GAP SERPL CALC-SCNC: 12 MMOL/L (ref 8–16)
BACTERIA #/AREA URNS HPF: NEGATIVE /HPF
BASOPHILS # BLD AUTO: 0.02 K/UL (ref 0–0.2)
BASOPHILS NFR BLD: 0.1 % (ref 0–1.9)
BILIRUB UR QL STRIP: ABNORMAL
BUN SERPL-MCNC: 39 MG/DL (ref 8–23)
CALCIUM SERPL-MCNC: 7.5 MG/DL (ref 8.7–10.5)
CHLORIDE SERPL-SCNC: 111 MMOL/L (ref 95–110)
CLARITY UR: CLEAR
CO2 SERPL-SCNC: 20 MMOL/L (ref 23–29)
COLOR UR: YELLOW
CREAT SERPL-MCNC: 0.7 MG/DL (ref 0.5–1.4)
DIFFERENTIAL METHOD: ABNORMAL
EOSINOPHIL # BLD AUTO: 0 K/UL (ref 0–0.5)
EOSINOPHIL NFR BLD: 0.1 % (ref 0–8)
ERYTHROCYTE [DISTWIDTH] IN BLOOD BY AUTOMATED COUNT: 13.2 % (ref 11.5–14.5)
EST. GFR  (AFRICAN AMERICAN): >60 ML/MIN/1.73 M^2
EST. GFR  (NON AFRICAN AMERICAN): >60 ML/MIN/1.73 M^2
GLUCOSE SERPL-MCNC: 116 MG/DL (ref 70–110)
GLUCOSE SERPL-MCNC: 121 MG/DL (ref 70–110)
GLUCOSE SERPL-MCNC: 126 MG/DL (ref 70–110)
GLUCOSE SERPL-MCNC: 135 MG/DL (ref 70–110)
GLUCOSE UR QL STRIP: NEGATIVE
HCT VFR BLD AUTO: 41.9 % (ref 37–48.5)
HGB BLD-MCNC: 14 G/DL (ref 12–16)
HGB UR QL STRIP: NEGATIVE
HYALINE CASTS #/AREA URNS LPF: 22 /LPF
IMM GRANULOCYTES # BLD AUTO: 0.13 K/UL (ref 0–0.04)
IMM GRANULOCYTES NFR BLD AUTO: 0.7 % (ref 0–0.5)
KETONES UR QL STRIP: NEGATIVE
LACTATE SERPL-SCNC: 1.2 MMOL/L (ref 0.5–1.9)
LACTATE SERPL-SCNC: 1.8 MMOL/L (ref 0.5–1.9)
LEUKOCYTE ESTERASE UR QL STRIP: ABNORMAL
LYMPHOCYTES # BLD AUTO: 0.9 K/UL (ref 1–4.8)
LYMPHOCYTES NFR BLD: 5 % (ref 18–48)
MAGNESIUM SERPL-MCNC: 1.6 MG/DL (ref 1.6–2.6)
MCH RBC QN AUTO: 31.2 PG (ref 27–31)
MCHC RBC AUTO-ENTMCNC: 33.4 G/DL (ref 32–36)
MCV RBC AUTO: 93 FL (ref 82–98)
MICROSCOPIC COMMENT: ABNORMAL
MONOCYTES # BLD AUTO: 1.1 K/UL (ref 0.3–1)
MONOCYTES NFR BLD: 6.4 % (ref 4–15)
NEUTROPHILS # BLD AUTO: 15.3 K/UL (ref 1.8–7.7)
NEUTROPHILS NFR BLD: 87.7 % (ref 38–73)
NITRITE UR QL STRIP: NEGATIVE
NRBC BLD-RTO: 0 /100 WBC
PH UR STRIP: 6 [PH] (ref 5–8)
PLATELET # BLD AUTO: 173 K/UL (ref 150–450)
PMV BLD AUTO: 10.9 FL (ref 9.2–12.9)
POTASSIUM SERPL-SCNC: 3.6 MMOL/L (ref 3.5–5.1)
PROCALCITONIN SERPL IA-MCNC: <0.05 NG/ML (ref 0–0.5)
PROT UR QL STRIP: ABNORMAL
RBC # BLD AUTO: 4.49 M/UL (ref 4–5.4)
RBC #/AREA URNS HPF: 12 /HPF (ref 0–4)
SODIUM SERPL-SCNC: 143 MMOL/L (ref 136–145)
SP GR UR STRIP: >1.03 (ref 1–1.03)
SQUAMOUS #/AREA URNS HPF: 13 /HPF
URN SPEC COLLECT METH UR: ABNORMAL
UROBILINOGEN UR STRIP-ACNC: NEGATIVE EU/DL
WBC # BLD AUTO: 17.45 K/UL (ref 3.9–12.7)
WBC #/AREA URNS HPF: 86 /HPF (ref 0–5)

## 2021-12-06 PROCEDURE — 36415 COLL VENOUS BLD VENIPUNCTURE: CPT | Performed by: FAMILY MEDICINE

## 2021-12-06 PROCEDURE — 83605 ASSAY OF LACTIC ACID: CPT | Performed by: STUDENT IN AN ORGANIZED HEALTH CARE EDUCATION/TRAINING PROGRAM

## 2021-12-06 PROCEDURE — 84145 PROCALCITONIN (PCT): CPT | Performed by: FAMILY MEDICINE

## 2021-12-06 PROCEDURE — 87040 BLOOD CULTURE FOR BACTERIA: CPT | Performed by: FAMILY MEDICINE

## 2021-12-06 PROCEDURE — 83605 ASSAY OF LACTIC ACID: CPT | Mod: 91 | Performed by: FAMILY MEDICINE

## 2021-12-06 PROCEDURE — 25500020 PHARM REV CODE 255: Performed by: STUDENT IN AN ORGANIZED HEALTH CARE EDUCATION/TRAINING PROGRAM

## 2021-12-06 PROCEDURE — 81001 URINALYSIS AUTO W/SCOPE: CPT | Performed by: FAMILY MEDICINE

## 2021-12-06 PROCEDURE — 27000221 HC OXYGEN, UP TO 24 HOURS

## 2021-12-06 PROCEDURE — 25000003 PHARM REV CODE 250

## 2021-12-06 PROCEDURE — 87086 URINE CULTURE/COLONY COUNT: CPT | Performed by: FAMILY MEDICINE

## 2021-12-06 PROCEDURE — 36415 COLL VENOUS BLD VENIPUNCTURE: CPT | Performed by: STUDENT IN AN ORGANIZED HEALTH CARE EDUCATION/TRAINING PROGRAM

## 2021-12-06 PROCEDURE — 25000003 PHARM REV CODE 250: Performed by: NURSE PRACTITIONER

## 2021-12-06 PROCEDURE — 63600175 PHARM REV CODE 636 W HCPCS: Performed by: FAMILY MEDICINE

## 2021-12-06 PROCEDURE — 12000002 HC ACUTE/MED SURGE SEMI-PRIVATE ROOM

## 2021-12-06 PROCEDURE — 25000003 PHARM REV CODE 250: Performed by: FAMILY MEDICINE

## 2021-12-06 PROCEDURE — 63600175 PHARM REV CODE 636 W HCPCS

## 2021-12-06 PROCEDURE — 85025 COMPLETE CBC W/AUTO DIFF WBC: CPT | Performed by: NURSE PRACTITIONER

## 2021-12-06 PROCEDURE — 83735 ASSAY OF MAGNESIUM: CPT | Performed by: NURSE PRACTITIONER

## 2021-12-06 PROCEDURE — 94761 N-INVAS EAR/PLS OXIMETRY MLT: CPT

## 2021-12-06 PROCEDURE — 63600175 PHARM REV CODE 636 W HCPCS: Performed by: NURSE PRACTITIONER

## 2021-12-06 PROCEDURE — 80048 BASIC METABOLIC PNL TOTAL CA: CPT | Performed by: NURSE PRACTITIONER

## 2021-12-06 RX ORDER — VANCOMYCIN HCL IN 5 % DEXTROSE 1G/250ML
1000 PLASTIC BAG, INJECTION (ML) INTRAVENOUS
Status: DISCONTINUED | OUTPATIENT
Start: 2021-12-06 | End: 2021-12-08

## 2021-12-06 RX ADMIN — MORPHINE SULFATE 2 MG: 2 INJECTION, SOLUTION INTRAMUSCULAR; INTRAVENOUS at 04:12

## 2021-12-06 RX ADMIN — PIPERACILLIN AND TAZOBACTAM 3.38 G: 3; .375 INJECTION, POWDER, LYOPHILIZED, FOR SOLUTION INTRAVENOUS; PARENTERAL at 10:12

## 2021-12-06 RX ADMIN — VANCOMYCIN HYDROCHLORIDE 1000 MG: 1 INJECTION, POWDER, LYOPHILIZED, FOR SOLUTION INTRAVENOUS at 07:12

## 2021-12-06 RX ADMIN — PIPERACILLIN AND TAZOBACTAM 3.38 G: 3; .375 INJECTION, POWDER, LYOPHILIZED, FOR SOLUTION INTRAVENOUS; PARENTERAL at 06:12

## 2021-12-06 RX ADMIN — ONDANSETRON 4 MG: 2 INJECTION INTRAMUSCULAR; INTRAVENOUS at 01:12

## 2021-12-06 RX ADMIN — DIATRIZOATE MEGLUMINE AND DIATRIZOATE SODIUM 100 ML: 660; 100 LIQUID ORAL; RECTAL at 01:12

## 2021-12-06 RX ADMIN — ACETAMINOPHEN 650 MG: 650 SUPPOSITORY RECTAL at 02:12

## 2021-12-06 RX ADMIN — ACETAMINOPHEN 650 MG: 650 SUPPOSITORY RECTAL at 08:12

## 2021-12-06 RX ADMIN — PIPERACILLIN AND TAZOBACTAM 3.38 G: 3; .375 INJECTION, POWDER, LYOPHILIZED, FOR SOLUTION INTRAVENOUS; PARENTERAL at 01:12

## 2021-12-07 LAB
ANION GAP SERPL CALC-SCNC: 11 MMOL/L (ref 8–16)
ANION GAP SERPL CALC-SCNC: 7 MMOL/L (ref 8–16)
BASOPHILS # BLD AUTO: 0.01 K/UL (ref 0–0.2)
BASOPHILS NFR BLD: 0.1 % (ref 0–1.9)
BUN SERPL-MCNC: 23 MG/DL (ref 8–23)
BUN SERPL-MCNC: 30 MG/DL (ref 8–23)
CALCIUM SERPL-MCNC: 7.4 MG/DL (ref 8.7–10.5)
CALCIUM SERPL-MCNC: 7.5 MG/DL (ref 8.7–10.5)
CHLORIDE SERPL-SCNC: 113 MMOL/L (ref 95–110)
CHLORIDE SERPL-SCNC: 113 MMOL/L (ref 95–110)
CO2 SERPL-SCNC: 25 MMOL/L (ref 23–29)
CO2 SERPL-SCNC: 27 MMOL/L (ref 23–29)
CREAT SERPL-MCNC: 0.4 MG/DL (ref 0.5–1.4)
CREAT SERPL-MCNC: 0.5 MG/DL (ref 0.5–1.4)
DIFFERENTIAL METHOD: ABNORMAL
EOSINOPHIL # BLD AUTO: 0 K/UL (ref 0–0.5)
EOSINOPHIL NFR BLD: 0 % (ref 0–8)
ERYTHROCYTE [DISTWIDTH] IN BLOOD BY AUTOMATED COUNT: 13.4 % (ref 11.5–14.5)
EST. GFR  (AFRICAN AMERICAN): >60 ML/MIN/1.73 M^2
EST. GFR  (AFRICAN AMERICAN): >60 ML/MIN/1.73 M^2
EST. GFR  (NON AFRICAN AMERICAN): >60 ML/MIN/1.73 M^2
EST. GFR  (NON AFRICAN AMERICAN): >60 ML/MIN/1.73 M^2
GLUCOSE SERPL-MCNC: 103 MG/DL (ref 70–110)
GLUCOSE SERPL-MCNC: 109 MG/DL (ref 70–110)
GLUCOSE SERPL-MCNC: 91 MG/DL (ref 70–110)
GLUCOSE SERPL-MCNC: 95 MG/DL (ref 70–110)
GLUCOSE SERPL-MCNC: 96 MG/DL (ref 70–110)
HCT VFR BLD AUTO: 29.3 % (ref 37–48.5)
HCT VFR BLD AUTO: 32.2 % (ref 37–48.5)
HCT VFR BLD AUTO: 32.6 % (ref 37–48.5)
HGB BLD-MCNC: 10.7 G/DL (ref 12–16)
HGB BLD-MCNC: 10.7 G/DL (ref 12–16)
HGB BLD-MCNC: 9.7 G/DL (ref 12–16)
IMM GRANULOCYTES # BLD AUTO: 0.08 K/UL (ref 0–0.04)
IMM GRANULOCYTES NFR BLD AUTO: 0.7 % (ref 0–0.5)
LYMPHOCYTES # BLD AUTO: 0.9 K/UL (ref 1–4.8)
LYMPHOCYTES NFR BLD: 8.2 % (ref 18–48)
MAGNESIUM SERPL-MCNC: 2.1 MG/DL (ref 1.6–2.6)
MCH RBC QN AUTO: 31.2 PG (ref 27–31)
MCHC RBC AUTO-ENTMCNC: 32.8 G/DL (ref 32–36)
MCV RBC AUTO: 95 FL (ref 82–98)
MONOCYTES # BLD AUTO: 0.8 K/UL (ref 0.3–1)
MONOCYTES NFR BLD: 7.5 % (ref 4–15)
NEUTROPHILS # BLD AUTO: 9.2 K/UL (ref 1.8–7.7)
NEUTROPHILS NFR BLD: 83.5 % (ref 38–73)
NRBC BLD-RTO: 0 /100 WBC
PLATELET # BLD AUTO: 123 K/UL (ref 150–450)
PMV BLD AUTO: 11.2 FL (ref 9.2–12.9)
POTASSIUM SERPL-SCNC: 2.7 MMOL/L (ref 3.5–5.1)
POTASSIUM SERPL-SCNC: 2.7 MMOL/L (ref 3.5–5.1)
POTASSIUM SERPL-SCNC: 2.8 MMOL/L (ref 3.5–5.1)
RBC # BLD AUTO: 3.43 M/UL (ref 4–5.4)
SODIUM SERPL-SCNC: 147 MMOL/L (ref 136–145)
SODIUM SERPL-SCNC: 149 MMOL/L (ref 136–145)
WBC # BLD AUTO: 11.01 K/UL (ref 3.9–12.7)

## 2021-12-07 PROCEDURE — 85025 COMPLETE CBC W/AUTO DIFF WBC: CPT | Performed by: NURSE PRACTITIONER

## 2021-12-07 PROCEDURE — 99900035 HC TECH TIME PER 15 MIN (STAT)

## 2021-12-07 PROCEDURE — 80048 BASIC METABOLIC PNL TOTAL CA: CPT | Mod: 91 | Performed by: NURSE PRACTITIONER

## 2021-12-07 PROCEDURE — 63600175 PHARM REV CODE 636 W HCPCS

## 2021-12-07 PROCEDURE — 25000003 PHARM REV CODE 250: Performed by: FAMILY MEDICINE

## 2021-12-07 PROCEDURE — 84132 ASSAY OF SERUM POTASSIUM: CPT | Performed by: FAMILY MEDICINE

## 2021-12-07 PROCEDURE — 63600175 PHARM REV CODE 636 W HCPCS: Performed by: NURSE PRACTITIONER

## 2021-12-07 PROCEDURE — 63600175 PHARM REV CODE 636 W HCPCS: Performed by: FAMILY MEDICINE

## 2021-12-07 PROCEDURE — 80048 BASIC METABOLIC PNL TOTAL CA: CPT | Performed by: FAMILY MEDICINE

## 2021-12-07 PROCEDURE — 99232 PR SUBSEQUENT HOSPITAL CARE,LEVL II: ICD-10-PCS | Mod: ,,, | Performed by: STUDENT IN AN ORGANIZED HEALTH CARE EDUCATION/TRAINING PROGRAM

## 2021-12-07 PROCEDURE — 36415 COLL VENOUS BLD VENIPUNCTURE: CPT | Performed by: FAMILY MEDICINE

## 2021-12-07 PROCEDURE — 83735 ASSAY OF MAGNESIUM: CPT | Performed by: NURSE PRACTITIONER

## 2021-12-07 PROCEDURE — 94761 N-INVAS EAR/PLS OXIMETRY MLT: CPT

## 2021-12-07 PROCEDURE — 25000003 PHARM REV CODE 250: Performed by: NURSE PRACTITIONER

## 2021-12-07 PROCEDURE — 25000003 PHARM REV CODE 250: Performed by: INTERNAL MEDICINE

## 2021-12-07 PROCEDURE — 85018 HEMOGLOBIN: CPT | Mod: 91 | Performed by: FAMILY MEDICINE

## 2021-12-07 PROCEDURE — 85014 HEMATOCRIT: CPT | Performed by: FAMILY MEDICINE

## 2021-12-07 PROCEDURE — 99900031 HC PATIENT EDUCATION (STAT)

## 2021-12-07 PROCEDURE — 27000221 HC OXYGEN, UP TO 24 HOURS

## 2021-12-07 PROCEDURE — 12000002 HC ACUTE/MED SURGE SEMI-PRIVATE ROOM

## 2021-12-07 PROCEDURE — 25000003 PHARM REV CODE 250

## 2021-12-07 PROCEDURE — 36415 COLL VENOUS BLD VENIPUNCTURE: CPT | Performed by: NURSE PRACTITIONER

## 2021-12-07 PROCEDURE — 63600175 PHARM REV CODE 636 W HCPCS: Performed by: INTERNAL MEDICINE

## 2021-12-07 PROCEDURE — 99232 SBSQ HOSP IP/OBS MODERATE 35: CPT | Mod: ,,, | Performed by: STUDENT IN AN ORGANIZED HEALTH CARE EDUCATION/TRAINING PROGRAM

## 2021-12-07 RX ORDER — POTASSIUM CHLORIDE 7.45 MG/ML
10 INJECTION INTRAVENOUS ONCE
Status: DISCONTINUED | OUTPATIENT
Start: 2021-12-07 | End: 2021-12-07

## 2021-12-07 RX ORDER — ACETAMINOPHEN 500 MG
1000 TABLET ORAL EVERY 6 HOURS PRN
Status: DISCONTINUED | OUTPATIENT
Start: 2021-12-07 | End: 2021-12-15 | Stop reason: HOSPADM

## 2021-12-07 RX ORDER — POTASSIUM CHLORIDE 20 MEQ/1
20 TABLET, EXTENDED RELEASE ORAL ONCE
Status: DISCONTINUED | OUTPATIENT
Start: 2021-12-07 | End: 2021-12-10

## 2021-12-07 RX ORDER — POLYETHYLENE GLYCOL 3350 17 G/17G
17 POWDER, FOR SOLUTION ORAL DAILY
Status: DISCONTINUED | OUTPATIENT
Start: 2021-12-08 | End: 2021-12-09

## 2021-12-07 RX ORDER — POTASSIUM CHLORIDE 7.45 MG/ML
20 INJECTION INTRAVENOUS ONCE
Status: DISCONTINUED | OUTPATIENT
Start: 2021-12-07 | End: 2021-12-10

## 2021-12-07 RX ORDER — POLYETHYLENE GLYCOL 3350 17 G/17G
102 POWDER, FOR SOLUTION ORAL ONCE
Status: COMPLETED | OUTPATIENT
Start: 2021-12-07 | End: 2021-12-07

## 2021-12-07 RX ORDER — DEXTROSE MONOHYDRATE, SODIUM CHLORIDE, AND POTASSIUM CHLORIDE 50; 1.49; 4.5 G/1000ML; G/1000ML; G/1000ML
INJECTION, SOLUTION INTRAVENOUS CONTINUOUS
Status: DISCONTINUED | OUTPATIENT
Start: 2021-12-07 | End: 2021-12-09

## 2021-12-07 RX ORDER — ONDANSETRON 2 MG/ML
4 INJECTION INTRAMUSCULAR; INTRAVENOUS EVERY 6 HOURS PRN
Status: DISCONTINUED | OUTPATIENT
Start: 2021-12-07 | End: 2021-12-15 | Stop reason: HOSPADM

## 2021-12-07 RX ADMIN — ACETAMINOPHEN 1000 MG: 500 TABLET, FILM COATED ORAL at 10:12

## 2021-12-07 RX ADMIN — PIPERACILLIN AND TAZOBACTAM 3.38 G: 3; .375 INJECTION, POWDER, LYOPHILIZED, FOR SOLUTION INTRAVENOUS; PARENTERAL at 05:12

## 2021-12-07 RX ADMIN — CARBIDOPA AND LEVODOPA 1 TABLET: 25; 100 TABLET ORAL at 04:12

## 2021-12-07 RX ADMIN — CHLORHEXIDINE GLUCONATE 15 ML: 1.2 RINSE ORAL at 08:12

## 2021-12-07 RX ADMIN — POTASSIUM CHLORIDE, DEXTROSE MONOHYDRATE AND SODIUM CHLORIDE: 150; 5; 450 INJECTION, SOLUTION INTRAVENOUS at 10:12

## 2021-12-07 RX ADMIN — ONDANSETRON 4 MG: 2 INJECTION INTRAMUSCULAR; INTRAVENOUS at 02:12

## 2021-12-07 RX ADMIN — ONDANSETRON 4 MG: 2 INJECTION INTRAMUSCULAR; INTRAVENOUS at 10:12

## 2021-12-07 RX ADMIN — ACETAMINOPHEN 650 MG: 650 SUPPOSITORY RECTAL at 04:12

## 2021-12-07 RX ADMIN — POTASSIUM CHLORIDE 40 MEQ: 7.46 INJECTION, SOLUTION INTRAVENOUS at 09:12

## 2021-12-07 RX ADMIN — CARBIDOPA AND LEVODOPA 1 TABLET: 25; 100 TABLET ORAL at 08:12

## 2021-12-07 RX ADMIN — VANCOMYCIN HYDROCHLORIDE 1000 MG: 1 INJECTION, POWDER, LYOPHILIZED, FOR SOLUTION INTRAVENOUS at 08:12

## 2021-12-07 RX ADMIN — PIPERACILLIN AND TAZOBACTAM 3.38 G: 3; .375 INJECTION, POWDER, LYOPHILIZED, FOR SOLUTION INTRAVENOUS; PARENTERAL at 04:12

## 2021-12-08 ENCOUNTER — ANESTHESIA (OUTPATIENT)
Dept: SURGERY | Facility: HOSPITAL | Age: 76
DRG: 388 | End: 2021-12-08
Payer: MEDICARE

## 2021-12-08 ENCOUNTER — ANESTHESIA EVENT (OUTPATIENT)
Dept: SURGERY | Facility: HOSPITAL | Age: 76
DRG: 388 | End: 2021-12-08
Payer: MEDICARE

## 2021-12-08 LAB
ANION GAP SERPL CALC-SCNC: 7 MMOL/L (ref 8–16)
BASOPHILS # BLD AUTO: 0.01 K/UL (ref 0–0.2)
BASOPHILS NFR BLD: 0.1 % (ref 0–1.9)
BUN SERPL-MCNC: 12 MG/DL (ref 8–23)
CALCIUM SERPL-MCNC: 7.4 MG/DL (ref 8.7–10.5)
CHLORIDE SERPL-SCNC: 107 MMOL/L (ref 95–110)
CO2 SERPL-SCNC: 27 MMOL/L (ref 23–29)
CREAT SERPL-MCNC: <0.3 MG/DL (ref 0.5–1.4)
DIFFERENTIAL METHOD: ABNORMAL
EOSINOPHIL # BLD AUTO: 0.2 K/UL (ref 0–0.5)
EOSINOPHIL NFR BLD: 2 % (ref 0–8)
ERYTHROCYTE [DISTWIDTH] IN BLOOD BY AUTOMATED COUNT: 13.1 % (ref 11.5–14.5)
EST. GFR  (AFRICAN AMERICAN): >60 ML/MIN/1.73 M^2
EST. GFR  (NON AFRICAN AMERICAN): >60 ML/MIN/1.73 M^2
GLUCOSE SERPL-MCNC: 100 MG/DL (ref 70–110)
GLUCOSE SERPL-MCNC: 90 MG/DL (ref 70–110)
GLUCOSE SERPL-MCNC: 96 MG/DL (ref 70–110)
GLUCOSE SERPL-MCNC: 96 MG/DL (ref 70–110)
HCT VFR BLD AUTO: 28 % (ref 37–48.5)
HCT VFR BLD AUTO: 29.3 % (ref 37–48.5)
HGB BLD-MCNC: 9.2 G/DL (ref 12–16)
HGB BLD-MCNC: 9.5 G/DL (ref 12–16)
IMM GRANULOCYTES # BLD AUTO: 0.04 K/UL (ref 0–0.04)
IMM GRANULOCYTES NFR BLD AUTO: 0.5 % (ref 0–0.5)
LYMPHOCYTES # BLD AUTO: 1.1 K/UL (ref 1–4.8)
LYMPHOCYTES NFR BLD: 15.1 % (ref 18–48)
MAGNESIUM SERPL-MCNC: 1.9 MG/DL (ref 1.6–2.6)
MCH RBC QN AUTO: 30.9 PG (ref 27–31)
MCHC RBC AUTO-ENTMCNC: 32.4 G/DL (ref 32–36)
MCV RBC AUTO: 95 FL (ref 82–98)
MONOCYTES # BLD AUTO: 0.7 K/UL (ref 0.3–1)
MONOCYTES NFR BLD: 9 % (ref 4–15)
NEUTROPHILS # BLD AUTO: 5.4 K/UL (ref 1.8–7.7)
NEUTROPHILS NFR BLD: 73.3 % (ref 38–73)
NRBC BLD-RTO: 0 /100 WBC
PLATELET # BLD AUTO: 92 K/UL (ref 150–450)
PMV BLD AUTO: 10.9 FL (ref 9.2–12.9)
POTASSIUM SERPL-SCNC: 2.6 MMOL/L (ref 3.5–5.1)
POTASSIUM SERPL-SCNC: 3.5 MMOL/L (ref 3.5–5.1)
RBC # BLD AUTO: 3.07 M/UL (ref 4–5.4)
SODIUM SERPL-SCNC: 141 MMOL/L (ref 136–145)
WBC # BLD AUTO: 7.36 K/UL (ref 3.9–12.7)

## 2021-12-08 PROCEDURE — 27200043 HC FORCEPS, BIOPSY: Performed by: INTERNAL MEDICINE

## 2021-12-08 PROCEDURE — 80048 BASIC METABOLIC PNL TOTAL CA: CPT | Performed by: NURSE PRACTITIONER

## 2021-12-08 PROCEDURE — 36415 COLL VENOUS BLD VENIPUNCTURE: CPT | Performed by: FAMILY MEDICINE

## 2021-12-08 PROCEDURE — 85018 HEMOGLOBIN: CPT | Performed by: FAMILY MEDICINE

## 2021-12-08 PROCEDURE — 37000009 HC ANESTHESIA EA ADD 15 MINS: Performed by: INTERNAL MEDICINE

## 2021-12-08 PROCEDURE — 94761 N-INVAS EAR/PLS OXIMETRY MLT: CPT

## 2021-12-08 PROCEDURE — 25000003 PHARM REV CODE 250: Performed by: INTERNAL MEDICINE

## 2021-12-08 PROCEDURE — 63600175 PHARM REV CODE 636 W HCPCS: Performed by: FAMILY MEDICINE

## 2021-12-08 PROCEDURE — 36415 COLL VENOUS BLD VENIPUNCTURE: CPT | Performed by: NURSE PRACTITIONER

## 2021-12-08 PROCEDURE — 97162 PT EVAL MOD COMPLEX 30 MIN: CPT

## 2021-12-08 PROCEDURE — 25000003 PHARM REV CODE 250: Performed by: FAMILY MEDICINE

## 2021-12-08 PROCEDURE — 37000008 HC ANESTHESIA 1ST 15 MINUTES: Performed by: INTERNAL MEDICINE

## 2021-12-08 PROCEDURE — 88305 TISSUE EXAM BY PATHOLOGIST: CPT | Mod: TC,59

## 2021-12-08 PROCEDURE — 25000003 PHARM REV CODE 250: Performed by: NURSE ANESTHETIST, CERTIFIED REGISTERED

## 2021-12-08 PROCEDURE — 83735 ASSAY OF MAGNESIUM: CPT | Performed by: NURSE PRACTITIONER

## 2021-12-08 PROCEDURE — 36415 COLL VENOUS BLD VENIPUNCTURE: CPT | Performed by: ANESTHESIOLOGY

## 2021-12-08 PROCEDURE — 85025 COMPLETE CBC W/AUTO DIFF WBC: CPT | Performed by: NURSE PRACTITIONER

## 2021-12-08 PROCEDURE — 43239 EGD BIOPSY SINGLE/MULTIPLE: CPT | Performed by: INTERNAL MEDICINE

## 2021-12-08 PROCEDURE — 12000002 HC ACUTE/MED SURGE SEMI-PRIVATE ROOM

## 2021-12-08 PROCEDURE — 63600175 PHARM REV CODE 636 W HCPCS: Performed by: NURSE ANESTHETIST, CERTIFIED REGISTERED

## 2021-12-08 PROCEDURE — 85014 HEMATOCRIT: CPT | Performed by: FAMILY MEDICINE

## 2021-12-08 PROCEDURE — 25000003 PHARM REV CODE 250: Performed by: NURSE PRACTITIONER

## 2021-12-08 PROCEDURE — 97530 THERAPEUTIC ACTIVITIES: CPT

## 2021-12-08 PROCEDURE — 84132 ASSAY OF SERUM POTASSIUM: CPT | Performed by: ANESTHESIOLOGY

## 2021-12-08 PROCEDURE — 99900035 HC TECH TIME PER 15 MIN (STAT)

## 2021-12-08 PROCEDURE — 25000242 PHARM REV CODE 250 ALT 637 W/ HCPCS: Performed by: NURSE PRACTITIONER

## 2021-12-08 PROCEDURE — 27000221 HC OXYGEN, UP TO 24 HOURS

## 2021-12-08 RX ORDER — PROPOFOL 10 MG/ML
VIAL (ML) INTRAVENOUS
Status: DISCONTINUED | OUTPATIENT
Start: 2021-12-08 | End: 2021-12-08

## 2021-12-08 RX ORDER — DOCUSATE SODIUM 100 MG/1
100 CAPSULE, LIQUID FILLED ORAL DAILY
Status: DISCONTINUED | OUTPATIENT
Start: 2021-12-09 | End: 2021-12-15 | Stop reason: HOSPADM

## 2021-12-08 RX ORDER — POTASSIUM CHLORIDE 20 MEQ/1
80 TABLET, EXTENDED RELEASE ORAL ONCE
Status: COMPLETED | OUTPATIENT
Start: 2021-12-08 | End: 2021-12-08

## 2021-12-08 RX ORDER — POTASSIUM CHLORIDE 20 MEQ/1
40 TABLET, EXTENDED RELEASE ORAL 2 TIMES DAILY
Status: DISCONTINUED | OUTPATIENT
Start: 2021-12-08 | End: 2021-12-08

## 2021-12-08 RX ORDER — LIDOCAINE HYDROCHLORIDE 20 MG/ML
INJECTION, SOLUTION EPIDURAL; INFILTRATION; INTRACAUDAL; PERINEURAL
Status: DISCONTINUED | OUTPATIENT
Start: 2021-12-08 | End: 2021-12-08

## 2021-12-08 RX ORDER — POTASSIUM CHLORIDE 7.45 MG/ML
10 INJECTION INTRAVENOUS ONCE
Status: DISCONTINUED | OUTPATIENT
Start: 2021-12-08 | End: 2021-12-08

## 2021-12-08 RX ORDER — PANTOPRAZOLE SODIUM 40 MG/1
40 TABLET, DELAYED RELEASE ORAL 2 TIMES DAILY
Status: DISCONTINUED | OUTPATIENT
Start: 2021-12-08 | End: 2021-12-15 | Stop reason: HOSPADM

## 2021-12-08 RX ADMIN — CARBIDOPA AND LEVODOPA 1 TABLET: 25; 100 TABLET ORAL at 02:12

## 2021-12-08 RX ADMIN — PROPOFOL 30 MG: 10 INJECTION, EMULSION INTRAVENOUS at 04:12

## 2021-12-08 RX ADMIN — FLUTICASONE PROPIONATE 50 MCG: 50 SPRAY, METERED NASAL at 09:12

## 2021-12-08 RX ADMIN — POLYETHYLENE GLYCOL 3350 17 G: 17 POWDER, FOR SOLUTION ORAL at 08:12

## 2021-12-08 RX ADMIN — PANTOPRAZOLE SODIUM 40 MG: 40 TABLET, DELAYED RELEASE ORAL at 05:12

## 2021-12-08 RX ADMIN — CHLORHEXIDINE GLUCONATE 15 ML: 1.2 RINSE ORAL at 08:12

## 2021-12-08 RX ADMIN — CARBIDOPA AND LEVODOPA 1 TABLET: 25; 100 TABLET ORAL at 08:12

## 2021-12-08 RX ADMIN — PIPERACILLIN AND TAZOBACTAM 3.38 G: 3; .375 INJECTION, POWDER, LYOPHILIZED, FOR SOLUTION INTRAVENOUS; PARENTERAL at 04:12

## 2021-12-08 RX ADMIN — PROPOFOL 20 MG: 10 INJECTION, EMULSION INTRAVENOUS at 04:12

## 2021-12-08 RX ADMIN — LIDOCAINE HYDROCHLORIDE 30 MG: 20 INJECTION, SOLUTION INTRAVENOUS at 04:12

## 2021-12-08 RX ADMIN — PIPERACILLIN AND TAZOBACTAM 3.38 G: 3; .375 INJECTION, POWDER, LYOPHILIZED, FOR SOLUTION INTRAVENOUS; PARENTERAL at 01:12

## 2021-12-08 RX ADMIN — PIPERACILLIN AND TAZOBACTAM 3.38 G: 3; .375 INJECTION, POWDER, LYOPHILIZED, FOR SOLUTION INTRAVENOUS; PARENTERAL at 08:12

## 2021-12-08 RX ADMIN — POTASSIUM CHLORIDE 80 MEQ: 1500 TABLET, EXTENDED RELEASE ORAL at 09:12

## 2021-12-08 RX ADMIN — SODIUM CHLORIDE, SODIUM LACTATE, POTASSIUM CHLORIDE, AND CALCIUM CHLORIDE: .6; .31; .03; .02 INJECTION, SOLUTION INTRAVENOUS at 04:12

## 2021-12-09 LAB
ANION GAP SERPL CALC-SCNC: 7 MMOL/L (ref 8–16)
BACTERIA BLD CULT: NORMAL
BACTERIA BLD CULT: NORMAL
BACTERIA UR CULT: NO GROWTH
BASOPHILS # BLD AUTO: 0.02 K/UL (ref 0–0.2)
BASOPHILS NFR BLD: 0.3 % (ref 0–1.9)
BUN SERPL-MCNC: 7 MG/DL (ref 8–23)
CALCIUM SERPL-MCNC: 8 MG/DL (ref 8.7–10.5)
CHLORIDE SERPL-SCNC: 106 MMOL/L (ref 95–110)
CO2 SERPL-SCNC: 28 MMOL/L (ref 23–29)
CREAT SERPL-MCNC: 0.3 MG/DL (ref 0.5–1.4)
DIFFERENTIAL METHOD: ABNORMAL
EOSINOPHIL # BLD AUTO: 0.2 K/UL (ref 0–0.5)
EOSINOPHIL NFR BLD: 3.6 % (ref 0–8)
ERYTHROCYTE [DISTWIDTH] IN BLOOD BY AUTOMATED COUNT: 12.7 % (ref 11.5–14.5)
EST. GFR  (AFRICAN AMERICAN): >60 ML/MIN/1.73 M^2
EST. GFR  (NON AFRICAN AMERICAN): >60 ML/MIN/1.73 M^2
GLUCOSE SERPL-MCNC: 109 MG/DL (ref 70–110)
GLUCOSE SERPL-MCNC: 83 MG/DL (ref 70–110)
GLUCOSE SERPL-MCNC: 84 MG/DL (ref 70–110)
HCT VFR BLD AUTO: 31.3 % (ref 37–48.5)
HGB BLD-MCNC: 10.3 G/DL (ref 12–16)
IMM GRANULOCYTES # BLD AUTO: 0.04 K/UL (ref 0–0.04)
IMM GRANULOCYTES NFR BLD AUTO: 0.7 % (ref 0–0.5)
LYMPHOCYTES # BLD AUTO: 1.1 K/UL (ref 1–4.8)
LYMPHOCYTES NFR BLD: 18.1 % (ref 18–48)
MAGNESIUM SERPL-MCNC: 1.8 MG/DL (ref 1.6–2.6)
MCH RBC QN AUTO: 31.4 PG (ref 27–31)
MCHC RBC AUTO-ENTMCNC: 32.9 G/DL (ref 32–36)
MCV RBC AUTO: 95 FL (ref 82–98)
MONOCYTES # BLD AUTO: 0.5 K/UL (ref 0.3–1)
MONOCYTES NFR BLD: 7.8 % (ref 4–15)
NEUTROPHILS # BLD AUTO: 4.1 K/UL (ref 1.8–7.7)
NEUTROPHILS NFR BLD: 69.5 % (ref 38–73)
NRBC BLD-RTO: 0 /100 WBC
PLATELET # BLD AUTO: 100 K/UL (ref 150–450)
PMV BLD AUTO: 10.9 FL (ref 9.2–12.9)
POTASSIUM SERPL-SCNC: 3.3 MMOL/L (ref 3.5–5.1)
RBC # BLD AUTO: 3.28 M/UL (ref 4–5.4)
SODIUM SERPL-SCNC: 141 MMOL/L (ref 136–145)
WBC # BLD AUTO: 5.9 K/UL (ref 3.9–12.7)

## 2021-12-09 PROCEDURE — 97530 THERAPEUTIC ACTIVITIES: CPT

## 2021-12-09 PROCEDURE — 80048 BASIC METABOLIC PNL TOTAL CA: CPT | Performed by: NURSE PRACTITIONER

## 2021-12-09 PROCEDURE — 94761 N-INVAS EAR/PLS OXIMETRY MLT: CPT

## 2021-12-09 PROCEDURE — 27000221 HC OXYGEN, UP TO 24 HOURS

## 2021-12-09 PROCEDURE — 99900035 HC TECH TIME PER 15 MIN (STAT)

## 2021-12-09 PROCEDURE — 12000002 HC ACUTE/MED SURGE SEMI-PRIVATE ROOM

## 2021-12-09 PROCEDURE — 99232 SBSQ HOSP IP/OBS MODERATE 35: CPT | Mod: ,,, | Performed by: STUDENT IN AN ORGANIZED HEALTH CARE EDUCATION/TRAINING PROGRAM

## 2021-12-09 PROCEDURE — 25000003 PHARM REV CODE 250: Performed by: INTERNAL MEDICINE

## 2021-12-09 PROCEDURE — 25000003 PHARM REV CODE 250: Performed by: FAMILY MEDICINE

## 2021-12-09 PROCEDURE — 83735 ASSAY OF MAGNESIUM: CPT | Performed by: NURSE PRACTITIONER

## 2021-12-09 PROCEDURE — 97535 SELF CARE MNGMENT TRAINING: CPT

## 2021-12-09 PROCEDURE — 36415 COLL VENOUS BLD VENIPUNCTURE: CPT | Performed by: NURSE PRACTITIONER

## 2021-12-09 PROCEDURE — 25000003 PHARM REV CODE 250: Performed by: NURSE PRACTITIONER

## 2021-12-09 PROCEDURE — 30200315 PPD INTRADERMAL TEST REV CODE 302: Performed by: INTERNAL MEDICINE

## 2021-12-09 PROCEDURE — 63600175 PHARM REV CODE 636 W HCPCS: Performed by: FAMILY MEDICINE

## 2021-12-09 PROCEDURE — 85025 COMPLETE CBC W/AUTO DIFF WBC: CPT | Performed by: NURSE PRACTITIONER

## 2021-12-09 PROCEDURE — 86580 TB INTRADERMAL TEST: CPT | Performed by: INTERNAL MEDICINE

## 2021-12-09 PROCEDURE — 97166 OT EVAL MOD COMPLEX 45 MIN: CPT

## 2021-12-09 PROCEDURE — 99232 PR SUBSEQUENT HOSPITAL CARE,LEVL II: ICD-10-PCS | Mod: ,,, | Performed by: STUDENT IN AN ORGANIZED HEALTH CARE EDUCATION/TRAINING PROGRAM

## 2021-12-09 RX ORDER — AMOXICILLIN AND CLAVULANATE POTASSIUM 875; 125 MG/1; MG/1
1 TABLET, FILM COATED ORAL EVERY 12 HOURS
Status: COMPLETED | OUTPATIENT
Start: 2021-12-09 | End: 2021-12-12

## 2021-12-09 RX ADMIN — DOCUSATE SODIUM 100 MG: 100 CAPSULE, LIQUID FILLED ORAL at 08:12

## 2021-12-09 RX ADMIN — CARBIDOPA AND LEVODOPA 1 TABLET: 25; 100 TABLET ORAL at 08:12

## 2021-12-09 RX ADMIN — PANTOPRAZOLE SODIUM 40 MG: 40 TABLET, DELAYED RELEASE ORAL at 05:12

## 2021-12-09 RX ADMIN — CARBIDOPA AND LEVODOPA 1 TABLET: 25; 100 TABLET ORAL at 03:12

## 2021-12-09 RX ADMIN — AMOXICILLIN AND CLAVULANATE POTASSIUM 1 TABLET: 875; 125 TABLET, FILM COATED ORAL at 08:12

## 2021-12-09 RX ADMIN — FLUTICASONE PROPIONATE 50 MCG: 50 SPRAY, METERED NASAL at 08:12

## 2021-12-09 RX ADMIN — POTASSIUM CHLORIDE, DEXTROSE MONOHYDRATE AND SODIUM CHLORIDE: 150; 5; 450 INJECTION, SOLUTION INTRAVENOUS at 01:12

## 2021-12-09 RX ADMIN — CHLORHEXIDINE GLUCONATE 15 ML: 1.2 RINSE ORAL at 08:12

## 2021-12-09 RX ADMIN — PIPERACILLIN AND TAZOBACTAM 3.38 G: 3; .375 INJECTION, POWDER, LYOPHILIZED, FOR SOLUTION INTRAVENOUS; PARENTERAL at 01:12

## 2021-12-09 RX ADMIN — LEVOTHYROXINE SODIUM 62.5 MCG: 25 TABLET ORAL at 05:12

## 2021-12-09 RX ADMIN — POLYETHYLENE GLYCOL 3350 17 G: 17 POWDER, FOR SOLUTION ORAL at 08:12

## 2021-12-09 RX ADMIN — TUBERCULIN PURIFIED PROTEIN DERIVATIVE 5 UNITS: 5 INJECTION, SOLUTION INTRADERMAL at 05:12

## 2021-12-10 LAB
ANION GAP SERPL CALC-SCNC: 9 MMOL/L (ref 8–16)
BASOPHILS # BLD AUTO: 0.02 K/UL (ref 0–0.2)
BASOPHILS NFR BLD: 0.3 % (ref 0–1.9)
BUN SERPL-MCNC: 7 MG/DL (ref 8–23)
CALCIUM SERPL-MCNC: 8.2 MG/DL (ref 8.7–10.5)
CHLORIDE SERPL-SCNC: 101 MMOL/L (ref 95–110)
CO2 SERPL-SCNC: 29 MMOL/L (ref 23–29)
CREAT SERPL-MCNC: 0.4 MG/DL (ref 0.5–1.4)
DIFFERENTIAL METHOD: ABNORMAL
EOSINOPHIL # BLD AUTO: 0.1 K/UL (ref 0–0.5)
EOSINOPHIL NFR BLD: 2.3 % (ref 0–8)
ERYTHROCYTE [DISTWIDTH] IN BLOOD BY AUTOMATED COUNT: 12.7 % (ref 11.5–14.5)
EST. GFR  (AFRICAN AMERICAN): >60 ML/MIN/1.73 M^2
EST. GFR  (NON AFRICAN AMERICAN): >60 ML/MIN/1.73 M^2
GLUCOSE SERPL-MCNC: 110 MG/DL (ref 70–110)
GLUCOSE SERPL-MCNC: 128 MG/DL (ref 70–110)
GLUCOSE SERPL-MCNC: 134 MG/DL (ref 70–110)
GLUCOSE SERPL-MCNC: 141 MG/DL (ref 70–110)
GLUCOSE SERPL-MCNC: 90 MG/DL (ref 70–110)
GLUCOSE SERPL-MCNC: 91 MG/DL (ref 70–110)
HCT VFR BLD AUTO: 32.5 % (ref 37–48.5)
HGB BLD-MCNC: 10.8 G/DL (ref 12–16)
IMM GRANULOCYTES # BLD AUTO: 0.05 K/UL (ref 0–0.04)
IMM GRANULOCYTES NFR BLD AUTO: 0.9 % (ref 0–0.5)
LYMPHOCYTES # BLD AUTO: 0.9 K/UL (ref 1–4.8)
LYMPHOCYTES NFR BLD: 15.4 % (ref 18–48)
MAGNESIUM SERPL-MCNC: 1.6 MG/DL (ref 1.6–2.6)
MCH RBC QN AUTO: 31.9 PG (ref 27–31)
MCHC RBC AUTO-ENTMCNC: 33.2 G/DL (ref 32–36)
MCV RBC AUTO: 96 FL (ref 82–98)
MONOCYTES # BLD AUTO: 0.5 K/UL (ref 0.3–1)
MONOCYTES NFR BLD: 8.4 % (ref 4–15)
NEUTROPHILS # BLD AUTO: 4.2 K/UL (ref 1.8–7.7)
NEUTROPHILS NFR BLD: 72.7 % (ref 38–73)
NRBC BLD-RTO: 0 /100 WBC
PLATELET # BLD AUTO: 127 K/UL (ref 150–450)
PMV BLD AUTO: 11 FL (ref 9.2–12.9)
POTASSIUM SERPL-SCNC: 3 MMOL/L (ref 3.5–5.1)
RBC # BLD AUTO: 3.39 M/UL (ref 4–5.4)
SODIUM SERPL-SCNC: 139 MMOL/L (ref 136–145)
WBC # BLD AUTO: 5.72 K/UL (ref 3.9–12.7)

## 2021-12-10 PROCEDURE — 25000003 PHARM REV CODE 250: Performed by: FAMILY MEDICINE

## 2021-12-10 PROCEDURE — 99900031 HC PATIENT EDUCATION (STAT)

## 2021-12-10 PROCEDURE — 97530 THERAPEUTIC ACTIVITIES: CPT

## 2021-12-10 PROCEDURE — 25000003 PHARM REV CODE 250: Performed by: INTERNAL MEDICINE

## 2021-12-10 PROCEDURE — 25000003 PHARM REV CODE 250: Performed by: NURSE PRACTITIONER

## 2021-12-10 PROCEDURE — 80048 BASIC METABOLIC PNL TOTAL CA: CPT | Performed by: NURSE PRACTITIONER

## 2021-12-10 PROCEDURE — 85025 COMPLETE CBC W/AUTO DIFF WBC: CPT | Performed by: NURSE PRACTITIONER

## 2021-12-10 PROCEDURE — 12000002 HC ACUTE/MED SURGE SEMI-PRIVATE ROOM

## 2021-12-10 PROCEDURE — 94761 N-INVAS EAR/PLS OXIMETRY MLT: CPT

## 2021-12-10 PROCEDURE — 36415 COLL VENOUS BLD VENIPUNCTURE: CPT | Performed by: NURSE PRACTITIONER

## 2021-12-10 PROCEDURE — 99900035 HC TECH TIME PER 15 MIN (STAT)

## 2021-12-10 PROCEDURE — 83735 ASSAY OF MAGNESIUM: CPT | Performed by: NURSE PRACTITIONER

## 2021-12-10 RX ORDER — POTASSIUM CHLORIDE 1.5 G/1.58G
40 POWDER, FOR SOLUTION ORAL ONCE
Status: COMPLETED | OUTPATIENT
Start: 2021-12-10 | End: 2021-12-10

## 2021-12-10 RX ADMIN — LEVOTHYROXINE SODIUM 62.5 MCG: 25 TABLET ORAL at 05:12

## 2021-12-10 RX ADMIN — POTASSIUM CHLORIDE 40 MEQ: 1.5 POWDER, FOR SOLUTION ORAL at 02:12

## 2021-12-10 RX ADMIN — CARBIDOPA AND LEVODOPA 1 TABLET: 25; 100 TABLET ORAL at 08:12

## 2021-12-10 RX ADMIN — AMOXICILLIN AND CLAVULANATE POTASSIUM 1 TABLET: 875; 125 TABLET, FILM COATED ORAL at 08:12

## 2021-12-10 RX ADMIN — FLUTICASONE PROPIONATE 50 MCG: 50 SPRAY, METERED NASAL at 08:12

## 2021-12-10 RX ADMIN — CARBIDOPA AND LEVODOPA 1 TABLET: 25; 100 TABLET ORAL at 02:12

## 2021-12-10 RX ADMIN — CHLORHEXIDINE GLUCONATE 15 ML: 1.2 RINSE ORAL at 08:12

## 2021-12-10 RX ADMIN — DOCUSATE SODIUM 100 MG: 100 CAPSULE, LIQUID FILLED ORAL at 08:12

## 2021-12-10 RX ADMIN — PANTOPRAZOLE SODIUM 40 MG: 40 TABLET, DELAYED RELEASE ORAL at 05:12

## 2021-12-11 LAB
ANION GAP SERPL CALC-SCNC: 11 MMOL/L (ref 8–16)
BACTERIA BLD CULT: NORMAL
BASOPHILS # BLD AUTO: 0.01 K/UL (ref 0–0.2)
BASOPHILS NFR BLD: 0.2 % (ref 0–1.9)
BUN SERPL-MCNC: 12 MG/DL (ref 8–23)
CALCIUM SERPL-MCNC: 9 MG/DL (ref 8.7–10.5)
CHLORIDE SERPL-SCNC: 99 MMOL/L (ref 95–110)
CO2 SERPL-SCNC: 31 MMOL/L (ref 23–29)
CREAT SERPL-MCNC: 0.3 MG/DL (ref 0.5–1.4)
DIFFERENTIAL METHOD: ABNORMAL
EOSINOPHIL # BLD AUTO: 0.1 K/UL (ref 0–0.5)
EOSINOPHIL NFR BLD: 2.2 % (ref 0–8)
ERYTHROCYTE [DISTWIDTH] IN BLOOD BY AUTOMATED COUNT: 12.9 % (ref 11.5–14.5)
EST. GFR  (AFRICAN AMERICAN): >60 ML/MIN/1.73 M^2
EST. GFR  (NON AFRICAN AMERICAN): >60 ML/MIN/1.73 M^2
GLUCOSE SERPL-MCNC: 105 MG/DL (ref 70–110)
GLUCOSE SERPL-MCNC: 90 MG/DL (ref 70–110)
GLUCOSE SERPL-MCNC: 96 MG/DL (ref 70–110)
HCT VFR BLD AUTO: 35.2 % (ref 37–48.5)
HGB BLD-MCNC: 11.5 G/DL (ref 12–16)
IMM GRANULOCYTES # BLD AUTO: 0.08 K/UL (ref 0–0.04)
IMM GRANULOCYTES NFR BLD AUTO: 1.3 % (ref 0–0.5)
LYMPHOCYTES # BLD AUTO: 0.9 K/UL (ref 1–4.8)
LYMPHOCYTES NFR BLD: 15.6 % (ref 18–48)
MAGNESIUM SERPL-MCNC: 1.9 MG/DL (ref 1.6–2.6)
MCH RBC QN AUTO: 30.7 PG (ref 27–31)
MCHC RBC AUTO-ENTMCNC: 32.7 G/DL (ref 32–36)
MCV RBC AUTO: 94 FL (ref 82–98)
MONOCYTES # BLD AUTO: 0.5 K/UL (ref 0.3–1)
MONOCYTES NFR BLD: 8.9 % (ref 4–15)
NEUTROPHILS # BLD AUTO: 4.3 K/UL (ref 1.8–7.7)
NEUTROPHILS NFR BLD: 71.8 % (ref 38–73)
NRBC BLD-RTO: 0 /100 WBC
PLATELET # BLD AUTO: 147 K/UL (ref 150–450)
PMV BLD AUTO: 10.7 FL (ref 9.2–12.9)
POTASSIUM SERPL-SCNC: 3.5 MMOL/L (ref 3.5–5.1)
RBC # BLD AUTO: 3.75 M/UL (ref 4–5.4)
SODIUM SERPL-SCNC: 141 MMOL/L (ref 136–145)
WBC # BLD AUTO: 5.98 K/UL (ref 3.9–12.7)

## 2021-12-11 PROCEDURE — 83735 ASSAY OF MAGNESIUM: CPT | Performed by: NURSE PRACTITIONER

## 2021-12-11 PROCEDURE — 25000003 PHARM REV CODE 250: Performed by: FAMILY MEDICINE

## 2021-12-11 PROCEDURE — 80048 BASIC METABOLIC PNL TOTAL CA: CPT | Performed by: NURSE PRACTITIONER

## 2021-12-11 PROCEDURE — 25000003 PHARM REV CODE 250: Performed by: INTERNAL MEDICINE

## 2021-12-11 PROCEDURE — 97116 GAIT TRAINING THERAPY: CPT | Mod: CQ

## 2021-12-11 PROCEDURE — 12000002 HC ACUTE/MED SURGE SEMI-PRIVATE ROOM

## 2021-12-11 PROCEDURE — 85025 COMPLETE CBC W/AUTO DIFF WBC: CPT | Performed by: NURSE PRACTITIONER

## 2021-12-11 PROCEDURE — 36415 COLL VENOUS BLD VENIPUNCTURE: CPT | Performed by: NURSE PRACTITIONER

## 2021-12-11 PROCEDURE — 25000003 PHARM REV CODE 250: Performed by: NURSE PRACTITIONER

## 2021-12-11 RX ADMIN — CHLORHEXIDINE GLUCONATE 15 ML: 1.2 RINSE ORAL at 08:12

## 2021-12-11 RX ADMIN — PANTOPRAZOLE SODIUM 40 MG: 40 TABLET, DELAYED RELEASE ORAL at 05:12

## 2021-12-11 RX ADMIN — CARBIDOPA AND LEVODOPA 1 TABLET: 25; 100 TABLET ORAL at 09:12

## 2021-12-11 RX ADMIN — CHLORHEXIDINE GLUCONATE 15 ML: 1.2 RINSE ORAL at 09:12

## 2021-12-11 RX ADMIN — ACETAMINOPHEN 1000 MG: 500 TABLET, FILM COATED ORAL at 02:12

## 2021-12-11 RX ADMIN — LEVOTHYROXINE SODIUM: 25 TABLET ORAL at 06:12

## 2021-12-11 RX ADMIN — CARBIDOPA AND LEVODOPA 1 TABLET: 25; 100 TABLET ORAL at 03:12

## 2021-12-11 RX ADMIN — CARBIDOPA AND LEVODOPA 1 TABLET: 25; 100 TABLET ORAL at 08:12

## 2021-12-11 RX ADMIN — FLUTICASONE PROPIONATE 50 MCG: 50 SPRAY, METERED NASAL at 09:12

## 2021-12-11 RX ADMIN — AMOXICILLIN AND CLAVULANATE POTASSIUM 1 TABLET: 875; 125 TABLET, FILM COATED ORAL at 09:12

## 2021-12-11 RX ADMIN — PANTOPRAZOLE SODIUM 40 MG: 40 TABLET, DELAYED RELEASE ORAL at 06:12

## 2021-12-11 RX ADMIN — AMOXICILLIN AND CLAVULANATE POTASSIUM 1 TABLET: 875; 125 TABLET, FILM COATED ORAL at 08:12

## 2021-12-12 LAB
GLUCOSE SERPL-MCNC: 105 MG/DL (ref 70–110)
GLUCOSE SERPL-MCNC: 117 MG/DL (ref 70–110)
GLUCOSE SERPL-MCNC: 82 MG/DL (ref 70–110)
GLUCOSE SERPL-MCNC: 95 MG/DL (ref 70–110)

## 2021-12-12 PROCEDURE — 12000002 HC ACUTE/MED SURGE SEMI-PRIVATE ROOM

## 2021-12-12 PROCEDURE — 25000003 PHARM REV CODE 250: Performed by: INTERNAL MEDICINE

## 2021-12-12 PROCEDURE — 25000003 PHARM REV CODE 250: Performed by: NURSE PRACTITIONER

## 2021-12-12 PROCEDURE — 25000003 PHARM REV CODE 250: Performed by: FAMILY MEDICINE

## 2021-12-12 RX ORDER — POLYETHYLENE GLYCOL 3350 17 G/17G
17 POWDER, FOR SOLUTION ORAL DAILY
Status: DISCONTINUED | OUTPATIENT
Start: 2021-12-13 | End: 2021-12-15 | Stop reason: HOSPADM

## 2021-12-12 RX ADMIN — ACETAMINOPHEN 1000 MG: 500 TABLET, FILM COATED ORAL at 11:12

## 2021-12-12 RX ADMIN — FLUTICASONE PROPIONATE 50 MCG: 50 SPRAY, METERED NASAL at 09:12

## 2021-12-12 RX ADMIN — LEVOTHYROXINE SODIUM 62.5 MCG: 25 TABLET ORAL at 05:12

## 2021-12-12 RX ADMIN — AMOXICILLIN AND CLAVULANATE POTASSIUM 1 TABLET: 875; 125 TABLET, FILM COATED ORAL at 09:12

## 2021-12-12 RX ADMIN — AMOXICILLIN AND CLAVULANATE POTASSIUM 1 TABLET: 875; 125 TABLET, FILM COATED ORAL at 08:12

## 2021-12-12 RX ADMIN — CHLORHEXIDINE GLUCONATE 15 ML: 1.2 RINSE ORAL at 09:12

## 2021-12-12 RX ADMIN — CARBIDOPA AND LEVODOPA 1 TABLET: 25; 100 TABLET ORAL at 09:12

## 2021-12-12 RX ADMIN — DOCUSATE SODIUM 100 MG: 100 CAPSULE, LIQUID FILLED ORAL at 09:12

## 2021-12-12 RX ADMIN — ACETAMINOPHEN 1000 MG: 500 TABLET, FILM COATED ORAL at 10:12

## 2021-12-12 RX ADMIN — CARBIDOPA AND LEVODOPA 1 TABLET: 25; 100 TABLET ORAL at 02:12

## 2021-12-12 RX ADMIN — CHLORHEXIDINE GLUCONATE 15 ML: 1.2 RINSE ORAL at 08:12

## 2021-12-12 RX ADMIN — CARBIDOPA AND LEVODOPA 1 TABLET: 25; 100 TABLET ORAL at 08:12

## 2021-12-12 RX ADMIN — PANTOPRAZOLE SODIUM 40 MG: 40 TABLET, DELAYED RELEASE ORAL at 05:12

## 2021-12-13 LAB
ANION GAP SERPL CALC-SCNC: 14 MMOL/L (ref 8–16)
BUN SERPL-MCNC: 16 MG/DL (ref 8–23)
CALCIUM SERPL-MCNC: 9.2 MG/DL (ref 8.7–10.5)
CHLORIDE SERPL-SCNC: 96 MMOL/L (ref 95–110)
CO2 SERPL-SCNC: 27 MMOL/L (ref 23–29)
CREAT SERPL-MCNC: 0.5 MG/DL (ref 0.5–1.4)
EST. GFR  (AFRICAN AMERICAN): >60 ML/MIN/1.73 M^2
EST. GFR  (NON AFRICAN AMERICAN): >60 ML/MIN/1.73 M^2
GLUCOSE SERPL-MCNC: 102 MG/DL (ref 70–110)
GLUCOSE SERPL-MCNC: 102 MG/DL (ref 70–110)
GLUCOSE SERPL-MCNC: 120 MG/DL (ref 70–110)
GLUCOSE SERPL-MCNC: 121 MG/DL (ref 70–110)
GLUCOSE SERPL-MCNC: 90 MG/DL (ref 70–110)
MAGNESIUM SERPL-MCNC: 2 MG/DL (ref 1.6–2.6)
POTASSIUM SERPL-SCNC: 3.9 MMOL/L (ref 3.5–5.1)
SODIUM SERPL-SCNC: 137 MMOL/L (ref 136–145)

## 2021-12-13 PROCEDURE — 97116 GAIT TRAINING THERAPY: CPT

## 2021-12-13 PROCEDURE — 80048 BASIC METABOLIC PNL TOTAL CA: CPT | Performed by: INTERNAL MEDICINE

## 2021-12-13 PROCEDURE — 97530 THERAPEUTIC ACTIVITIES: CPT

## 2021-12-13 PROCEDURE — 36415 COLL VENOUS BLD VENIPUNCTURE: CPT | Performed by: INTERNAL MEDICINE

## 2021-12-13 PROCEDURE — 97535 SELF CARE MNGMENT TRAINING: CPT

## 2021-12-13 PROCEDURE — 25000003 PHARM REV CODE 250: Performed by: FAMILY MEDICINE

## 2021-12-13 PROCEDURE — 25000003 PHARM REV CODE 250: Performed by: INTERNAL MEDICINE

## 2021-12-13 PROCEDURE — 83735 ASSAY OF MAGNESIUM: CPT | Performed by: INTERNAL MEDICINE

## 2021-12-13 PROCEDURE — 12000002 HC ACUTE/MED SURGE SEMI-PRIVATE ROOM

## 2021-12-13 PROCEDURE — 25000003 PHARM REV CODE 250: Performed by: NURSE PRACTITIONER

## 2021-12-13 RX ADMIN — ACETAMINOPHEN 1000 MG: 500 TABLET, FILM COATED ORAL at 08:12

## 2021-12-13 RX ADMIN — CARBIDOPA AND LEVODOPA 1 TABLET: 25; 100 TABLET ORAL at 03:12

## 2021-12-13 RX ADMIN — PANTOPRAZOLE SODIUM 40 MG: 40 TABLET, DELAYED RELEASE ORAL at 05:12

## 2021-12-13 RX ADMIN — LEVOTHYROXINE SODIUM 62.5 MCG: 25 TABLET ORAL at 05:12

## 2021-12-13 RX ADMIN — CHLORHEXIDINE GLUCONATE 15 ML: 1.2 RINSE ORAL at 09:12

## 2021-12-13 RX ADMIN — CARBIDOPA AND LEVODOPA 1 TABLET: 25; 100 TABLET ORAL at 09:12

## 2021-12-13 RX ADMIN — POLYETHYLENE GLYCOL 3350 17 G: 17 POWDER, FOR SOLUTION ORAL at 09:12

## 2021-12-13 RX ADMIN — FLUTICASONE PROPIONATE 50 MCG: 50 SPRAY, METERED NASAL at 09:12

## 2021-12-13 RX ADMIN — CARBIDOPA AND LEVODOPA 1 TABLET: 25; 100 TABLET ORAL at 08:12

## 2021-12-13 RX ADMIN — CHLORHEXIDINE GLUCONATE 15 ML: 1.2 RINSE ORAL at 08:12

## 2021-12-13 RX ADMIN — DOCUSATE SODIUM 100 MG: 100 CAPSULE, LIQUID FILLED ORAL at 09:12

## 2021-12-14 LAB
GLUCOSE SERPL-MCNC: 94 MG/DL (ref 70–110)
GLUCOSE SERPL-MCNC: 99 MG/DL (ref 70–110)

## 2021-12-14 PROCEDURE — 97530 THERAPEUTIC ACTIVITIES: CPT

## 2021-12-14 PROCEDURE — 97535 SELF CARE MNGMENT TRAINING: CPT

## 2021-12-14 PROCEDURE — 82962 GLUCOSE BLOOD TEST: CPT

## 2021-12-14 PROCEDURE — 97116 GAIT TRAINING THERAPY: CPT

## 2021-12-14 PROCEDURE — 25000003 PHARM REV CODE 250: Performed by: NURSE PRACTITIONER

## 2021-12-14 PROCEDURE — 12000002 HC ACUTE/MED SURGE SEMI-PRIVATE ROOM

## 2021-12-14 PROCEDURE — 25000003 PHARM REV CODE 250: Performed by: INTERNAL MEDICINE

## 2021-12-14 PROCEDURE — 25000003 PHARM REV CODE 250: Performed by: FAMILY MEDICINE

## 2021-12-14 RX ORDER — TALC
6 POWDER (GRAM) TOPICAL NIGHTLY PRN
Status: DISCONTINUED | OUTPATIENT
Start: 2021-12-14 | End: 2021-12-15 | Stop reason: HOSPADM

## 2021-12-14 RX ADMIN — PANTOPRAZOLE SODIUM 40 MG: 40 TABLET, DELAYED RELEASE ORAL at 06:12

## 2021-12-14 RX ADMIN — CHLORHEXIDINE GLUCONATE 15 ML: 1.2 RINSE ORAL at 09:12

## 2021-12-14 RX ADMIN — CARBIDOPA AND LEVODOPA 1 TABLET: 25; 100 TABLET ORAL at 02:12

## 2021-12-14 RX ADMIN — FLUTICASONE PROPIONATE 50 MCG: 50 SPRAY, METERED NASAL at 09:12

## 2021-12-14 RX ADMIN — PANTOPRAZOLE SODIUM 40 MG: 40 TABLET, DELAYED RELEASE ORAL at 05:12

## 2021-12-14 RX ADMIN — Medication 6 MG: at 09:12

## 2021-12-14 RX ADMIN — LEVOTHYROXINE SODIUM 50 MCG: 25 TABLET ORAL at 06:12

## 2021-12-14 RX ADMIN — CARBIDOPA AND LEVODOPA 1 TABLET: 25; 100 TABLET ORAL at 09:12

## 2021-12-15 VITALS
RESPIRATION RATE: 18 BRPM | BODY MASS INDEX: 21.61 KG/M2 | SYSTOLIC BLOOD PRESSURE: 104 MMHG | TEMPERATURE: 98 F | HEART RATE: 90 BPM | DIASTOLIC BLOOD PRESSURE: 65 MMHG | WEIGHT: 121.94 LBS | HEIGHT: 63 IN | OXYGEN SATURATION: 96 %

## 2021-12-15 PROBLEM — R74.8 ELEVATED LIVER ENZYMES: Status: ACTIVE | Noted: 2021-12-15

## 2021-12-15 PROBLEM — K56.609 SBO (SMALL BOWEL OBSTRUCTION): Status: RESOLVED | Noted: 2019-09-17 | Resolved: 2021-12-15

## 2021-12-15 PROBLEM — R79.89 ELEVATED LACTIC ACID LEVEL: Status: RESOLVED | Noted: 2021-12-04 | Resolved: 2021-12-15

## 2021-12-15 PROBLEM — K52.9 ENTERITIS: Status: RESOLVED | Noted: 2019-09-17 | Resolved: 2021-12-15

## 2021-12-15 LAB
ALBUMIN SERPL BCP-MCNC: 3.2 G/DL (ref 3.5–5.2)
ALP SERPL-CCNC: 80 U/L (ref 55–135)
ALT SERPL W/O P-5'-P-CCNC: 192 U/L (ref 10–44)
ANION GAP SERPL CALC-SCNC: 9 MMOL/L (ref 8–16)
ANION GAP SERPL CALC-SCNC: 9 MMOL/L (ref 8–16)
APTT PPP: 28.2 SEC (ref 23.3–35.1)
AST SERPL-CCNC: 412 U/L (ref 10–40)
BASOPHILS # BLD AUTO: 0.03 K/UL (ref 0–0.2)
BASOPHILS NFR BLD: 0.4 % (ref 0–1.9)
BILIRUB SERPL-MCNC: 0.6 MG/DL (ref 0.1–1)
BUN SERPL-MCNC: 19 MG/DL (ref 8–23)
BUN SERPL-MCNC: 19 MG/DL (ref 8–23)
CALCIUM SERPL-MCNC: 8.6 MG/DL (ref 8.7–10.5)
CALCIUM SERPL-MCNC: 8.6 MG/DL (ref 8.7–10.5)
CHLORIDE SERPL-SCNC: 100 MMOL/L (ref 95–110)
CHLORIDE SERPL-SCNC: 100 MMOL/L (ref 95–110)
CO2 SERPL-SCNC: 30 MMOL/L (ref 23–29)
CO2 SERPL-SCNC: 30 MMOL/L (ref 23–29)
CREAT SERPL-MCNC: 0.3 MG/DL (ref 0.5–1.4)
CREAT SERPL-MCNC: 0.3 MG/DL (ref 0.5–1.4)
DIFFERENTIAL METHOD: ABNORMAL
EOSINOPHIL # BLD AUTO: 0.1 K/UL (ref 0–0.5)
EOSINOPHIL NFR BLD: 1.3 % (ref 0–8)
ERYTHROCYTE [DISTWIDTH] IN BLOOD BY AUTOMATED COUNT: 12.5 % (ref 11.5–14.5)
EST. GFR  (AFRICAN AMERICAN): >60 ML/MIN/1.73 M^2
EST. GFR  (AFRICAN AMERICAN): >60 ML/MIN/1.73 M^2
EST. GFR  (NON AFRICAN AMERICAN): >60 ML/MIN/1.73 M^2
EST. GFR  (NON AFRICAN AMERICAN): >60 ML/MIN/1.73 M^2
GLUCOSE SERPL-MCNC: 105 MG/DL (ref 70–110)
GLUCOSE SERPL-MCNC: 106 MG/DL (ref 70–110)
GLUCOSE SERPL-MCNC: 92 MG/DL (ref 70–110)
GLUCOSE SERPL-MCNC: 92 MG/DL (ref 70–110)
HCT VFR BLD AUTO: 35.4 % (ref 37–48.5)
HGB BLD-MCNC: 11.6 G/DL (ref 12–16)
IMM GRANULOCYTES # BLD AUTO: 0.1 K/UL (ref 0–0.04)
IMM GRANULOCYTES NFR BLD AUTO: 1.3 % (ref 0–0.5)
INR PPP: 1.1
LIPASE SERPL-CCNC: 29 U/L (ref 4–60)
LYMPHOCYTES # BLD AUTO: 1.2 K/UL (ref 1–4.8)
LYMPHOCYTES NFR BLD: 14.9 % (ref 18–48)
MAGNESIUM SERPL-MCNC: 2 MG/DL (ref 1.6–2.6)
MAGNESIUM SERPL-MCNC: 2 MG/DL (ref 1.6–2.6)
MCH RBC QN AUTO: 30.7 PG (ref 27–31)
MCHC RBC AUTO-ENTMCNC: 32.8 G/DL (ref 32–36)
MCV RBC AUTO: 94 FL (ref 82–98)
MONOCYTES # BLD AUTO: 0.5 K/UL (ref 0.3–1)
MONOCYTES NFR BLD: 6.1 % (ref 4–15)
NEUTROPHILS # BLD AUTO: 6 K/UL (ref 1.8–7.7)
NEUTROPHILS NFR BLD: 76 % (ref 38–73)
NRBC BLD-RTO: 0 /100 WBC
PLATELET # BLD AUTO: 244 K/UL (ref 150–450)
PLATELET BLD QL SMEAR: ABNORMAL
PMV BLD AUTO: 10.4 FL (ref 9.2–12.9)
POTASSIUM SERPL-SCNC: 4.2 MMOL/L (ref 3.5–5.1)
POTASSIUM SERPL-SCNC: 4.2 MMOL/L (ref 3.5–5.1)
PROT SERPL-MCNC: 6.6 G/DL (ref 6–8.4)
PROTHROMBIN TIME: 13.3 SEC (ref 11.4–13.7)
RBC # BLD AUTO: 3.78 M/UL (ref 4–5.4)
SARS-COV-2 RDRP RESP QL NAA+PROBE: NEGATIVE
SODIUM SERPL-SCNC: 139 MMOL/L (ref 136–145)
SODIUM SERPL-SCNC: 139 MMOL/L (ref 136–145)
WBC # BLD AUTO: 7.91 K/UL (ref 3.9–12.7)

## 2021-12-15 PROCEDURE — 85610 PROTHROMBIN TIME: CPT | Performed by: FAMILY MEDICINE

## 2021-12-15 PROCEDURE — 83690 ASSAY OF LIPASE: CPT | Performed by: FAMILY MEDICINE

## 2021-12-15 PROCEDURE — 36415 COLL VENOUS BLD VENIPUNCTURE: CPT | Performed by: FAMILY MEDICINE

## 2021-12-15 PROCEDURE — 80053 COMPREHEN METABOLIC PANEL: CPT | Performed by: FAMILY MEDICINE

## 2021-12-15 PROCEDURE — 25000003 PHARM REV CODE 250: Performed by: FAMILY MEDICINE

## 2021-12-15 PROCEDURE — U0002 COVID-19 LAB TEST NON-CDC: HCPCS | Performed by: FAMILY MEDICINE

## 2021-12-15 PROCEDURE — 85730 THROMBOPLASTIN TIME PARTIAL: CPT | Performed by: FAMILY MEDICINE

## 2021-12-15 PROCEDURE — 85025 COMPLETE CBC W/AUTO DIFF WBC: CPT | Performed by: FAMILY MEDICINE

## 2021-12-15 PROCEDURE — 97535 SELF CARE MNGMENT TRAINING: CPT

## 2021-12-15 PROCEDURE — 25000003 PHARM REV CODE 250: Performed by: NURSE PRACTITIONER

## 2021-12-15 PROCEDURE — 83735 ASSAY OF MAGNESIUM: CPT | Performed by: INTERNAL MEDICINE

## 2021-12-15 PROCEDURE — 25000003 PHARM REV CODE 250: Performed by: INTERNAL MEDICINE

## 2021-12-15 RX ORDER — LEVOTHYROXINE SODIUM 137 UG/1
68.5 TABLET ORAL
Qty: 15 TABLET | Refills: 11
Start: 2021-12-16 | End: 2022-01-13

## 2021-12-15 RX ORDER — PANTOPRAZOLE SODIUM 40 MG/1
40 TABLET, DELAYED RELEASE ORAL 2 TIMES DAILY
Qty: 60 TABLET | Refills: 11 | Status: ON HOLD
Start: 2021-12-15 | End: 2023-02-06 | Stop reason: HOSPADM

## 2021-12-15 RX ORDER — DOCUSATE SODIUM 100 MG/1
100 CAPSULE, LIQUID FILLED ORAL DAILY
Refills: 0 | Status: ON HOLD
Start: 2021-12-16 | End: 2023-09-19 | Stop reason: SDUPTHER

## 2021-12-15 RX ORDER — SODIUM CHLORIDE 9 MG/ML
INJECTION, SOLUTION INTRAVENOUS ONCE
Status: DISCONTINUED | OUTPATIENT
Start: 2021-12-15 | End: 2021-12-15 | Stop reason: HOSPADM

## 2021-12-15 RX ORDER — POLYETHYLENE GLYCOL 3350 17 G/17G
17 POWDER, FOR SOLUTION ORAL DAILY
Refills: 0 | Status: ON HOLD
Start: 2021-12-16 | End: 2021-12-27 | Stop reason: HOSPADM

## 2021-12-15 RX ADMIN — POLYETHYLENE GLYCOL 3350 17 G: 17 POWDER, FOR SOLUTION ORAL at 09:12

## 2021-12-15 RX ADMIN — DOCUSATE SODIUM 100 MG: 100 CAPSULE, LIQUID FILLED ORAL at 09:12

## 2021-12-15 RX ADMIN — CARBIDOPA AND LEVODOPA 1 TABLET: 25; 100 TABLET ORAL at 09:12

## 2021-12-15 RX ADMIN — LEVOTHYROXINE SODIUM 62.5 MCG: 25 TABLET ORAL at 05:12

## 2021-12-15 RX ADMIN — PANTOPRAZOLE SODIUM 40 MG: 40 TABLET, DELAYED RELEASE ORAL at 05:12

## 2021-12-15 RX ADMIN — FLUTICASONE PROPIONATE 50 MCG: 50 SPRAY, METERED NASAL at 09:12

## 2021-12-15 RX ADMIN — CHLORHEXIDINE GLUCONATE 15 ML: 1.2 RINSE ORAL at 09:12

## 2021-12-16 PROBLEM — Z75.8 DISCHARGE PLANNING ISSUES: Status: ACTIVE | Noted: 2021-12-16

## 2021-12-16 PROBLEM — R53.81 PHYSICAL DECONDITIONING: Status: ACTIVE | Noted: 2021-12-16

## 2021-12-16 PROBLEM — G93.41 ENCEPHALOPATHY, METABOLIC: Status: ACTIVE | Noted: 2021-12-16

## 2021-12-16 PROBLEM — Z02.9 DISCHARGE PLANNING ISSUES: Status: ACTIVE | Noted: 2021-12-16

## 2021-12-16 PROBLEM — K20.80 ESOPHAGITIS, LOS ANGELES GRADE D: Status: ACTIVE | Noted: 2021-12-16

## 2021-12-16 PROBLEM — Z71.89 ADVANCE CARE PLANNING: Status: ACTIVE | Noted: 2021-12-16

## 2021-12-16 PROBLEM — K29.01 ACUTE SUPERFICIAL GASTRITIS WITH HEMORRHAGE: Status: ACTIVE | Noted: 2021-12-16

## 2021-12-28 ENCOUNTER — PATIENT OUTREACH (OUTPATIENT)
Dept: ADMINISTRATIVE | Facility: CLINIC | Age: 76
End: 2021-12-28
Payer: MEDICARE

## 2022-01-13 ENCOUNTER — TELEPHONE (OUTPATIENT)
Dept: FAMILY MEDICINE | Facility: CLINIC | Age: 77
End: 2022-01-13
Payer: MEDICARE

## 2022-01-13 RX ORDER — LEVOTHYROXINE SODIUM 137 UG/1
TABLET ORAL
Qty: 45 TABLET | Refills: 2 | Status: SHIPPED | OUTPATIENT
Start: 2022-01-13 | End: 2022-10-02

## 2022-01-13 NOTE — TELEPHONE ENCOUNTER
No new care gaps identified.  Powered by [employer] by [employer]. Reference number: [id number].   1/13/2022 9:06:46 AM CST

## 2022-01-13 NOTE — TELEPHONE ENCOUNTER
----- Message from [place] sent at 1/13/2022 12:32 PM CST -----  Contact: pt  Type: Needs Medical Advice    Who Called: pt  Best Call Back Number: [phone]     Requesting a call back regarding - pt is asking for a refill on levothyroxine (SYNTHROID) 137 MCG Tab tablet    Please send to -   [facility] [address] [facility], [place] - [address]        Please Advise- Thank you

## 2022-01-13 NOTE — TELEPHONE ENCOUNTER
Patient requesting a refill of Levothyroxine.  LR--12- (No Print)  LOV--11-  FOV--1-      Order pended in this encounter to e-scribe to [facility]. (Normal, Disp-45 tablets, R-0). Please advise. Thank you.

## 2022-01-25 ENCOUNTER — OFFICE VISIT (OUTPATIENT)
Dept: FAMILY MEDICINE | Facility: CLINIC | Age: 77
End: 2022-01-25
Attending: FAMILY MEDICINE
Payer: MEDICARE

## 2022-01-25 ENCOUNTER — HOSPITAL ENCOUNTER (OUTPATIENT)
Dept: RADIOLOGY | Facility: HOSPITAL | Age: 77
Discharge: HOME | End: 2022-01-25
Attending: FAMILY MEDICINE
Payer: MEDICARE

## 2022-01-25 VITALS
RESPIRATION RATE: 18 BRPM | WEIGHT: 120.69 LBS | BODY MASS INDEX: 21.38 KG/M2 | OXYGEN SATURATION: 97 % | HEART RATE: 64 BPM | DIASTOLIC BLOOD PRESSURE: 65 MMHG | SYSTOLIC BLOOD PRESSURE: 114 MMHG | HEIGHT: 63 IN | TEMPERATURE: 98 F

## 2022-01-25 DIAGNOSIS — K59.00 CONSTIPATION, UNSPECIFIED CONSTIPATION TYPE: ICD-10-CM

## 2022-01-25 DIAGNOSIS — K56.609 SBO (SMALL BOWEL OBSTRUCTION): ICD-10-CM

## 2022-01-25 DIAGNOSIS — K57.90 DIVERTICULOSIS: ICD-10-CM

## 2022-01-25 DIAGNOSIS — E03.9 ACQUIRED HYPOTHYROIDISM: Primary | ICD-10-CM

## 2022-01-25 DIAGNOSIS — K64.4 RESIDUAL HEMORRHOIDAL SKIN TAGS: ICD-10-CM

## 2022-01-25 DIAGNOSIS — K90.41 GLUTEN ENTEROPATHY: ICD-10-CM

## 2022-01-25 DIAGNOSIS — K52.9 CHRONIC DIARRHEA: ICD-10-CM

## 2022-01-25 DIAGNOSIS — N32.9 BLADDER DISORDER: ICD-10-CM

## 2022-01-25 DIAGNOSIS — G20.A1 PD (PARKINSON'S DISEASE): ICD-10-CM

## 2022-01-25 PROCEDURE — 99999 PR PBB SHADOW E&M-EST. PATIENT-LVL III: ICD-10-PCS | Mod: PBBFAC,,, | Performed by: FAMILY MEDICINE

## 2022-01-25 PROCEDURE — 99213 OFFICE O/P EST LOW 20 MIN: CPT | Mod: PBBFAC,PN | Performed by: FAMILY MEDICINE

## 2022-01-25 PROCEDURE — 99215 PR OFFICE/OUTPT VISIT, EST, LEVL V, 40-54 MIN: ICD-10-PCS | Mod: S$PBB,,, | Performed by: FAMILY MEDICINE

## 2022-01-25 PROCEDURE — 99999 PR PBB SHADOW E&M-EST. PATIENT-LVL III: CPT | Mod: PBBFAC,,, | Performed by: FAMILY MEDICINE

## 2022-01-25 PROCEDURE — 74019 RADEX ABDOMEN 2 VIEWS: CPT | Mod: TC,FY

## 2022-01-25 PROCEDURE — 74019 RADEX ABDOMEN 2 VIEWS: CPT | Mod: 26,,, | Performed by: RADIOLOGY

## 2022-01-25 PROCEDURE — 99215 OFFICE O/P EST HI 40 MIN: CPT | Mod: S$PBB,,, | Performed by: FAMILY MEDICINE

## 2022-01-25 PROCEDURE — 74019 XR ABDOMEN FLAT AND ERECT: ICD-10-PCS | Mod: 26,,, | Performed by: RADIOLOGY

## 2022-01-25 RX ORDER — TAMSULOSIN HYDROCHLORIDE 0.4 MG/1
0.4 CAPSULE ORAL DAILY
Qty: 30 CAPSULE | Refills: 11 | Status: ON HOLD | OUTPATIENT
Start: 2022-01-25 | End: 2023-02-06 | Stop reason: HOSPADM

## 2022-01-25 NOTE — PROGRESS NOTES
Subjective:       Patient ID: Ariana Tiwari is a 76 y.o. female.    Chief Complaint: Hospital Follow Up (Pt states that she is here for her tcc follow up, vomiting, weakness,blockage)    76-year-old female with a history of perforated viscus, diverticulosis, small bowel obstruction and gluten enteropathy presented to the emergency room at [facility] on December 4th with complaints of abdominal pain and nausea.  CT scan demonstrated small-bowel obstruction and she was found to have transaminitis and elevated lactic acidosis.  She was placed on IV antibiotics and an NG-tube was placed and she was given fluids which resolved a lactic acidosis.  An EGD demonstrated esophagitis and gastritis and she responded to NG suction with slow resolution of all symptoms such that 11 days after admission on December 15th she was transferred to [facility] skilled nursing unit where she remained from December 15 through December 27. She developed some elevated liver enzymes the day of transfer and these spiked the day after admission and then gradually resolved.  The hepatitis panel was negative and a right upper quadrant ultrasound was negative for any signs of obstruction.  She underwent physical therapy and occupational therapy and then was discharged on December 27th to the care of home health.  She was placed on Colace as a stool softener but she developed diarrhea again and that was stopped with resolution of the diarrhea.  She has abdominal distension and had been put on low lactose Ensure but she didn't like to\he taste of it and went back to regular Ensure so she is having a lot of gaseous distension.  She is having problems with external hemorrhoids and previously had one hemorrhoid operation already.  She gets a small amount of blood on the tissue with bowel movements but she has not had to strain very much.  Her main problem right now is bladder dysfunction with frequency and urgency and frequently having  accidents before she can not reach the bathroom.  This is increased tremendously at night getting up roughly every hour or 8 times a night.  She denies any burning, she has no change in color or odor of the urine and no cloudy or smoke a urine.  She has no fever or chills.  Post discharge from the skilled [facility] it the family was notified that a COVID case had developed wild the patient was in the unit and soon after the entire family came down with symptoms of COVID.  Most of them were checked and were positive but the patient was not tested, she was assumed to be positive.  All of them have recovered now and there is no residual symptoms.  The patient actually had the mild to symptoms of the family group.  Prior to onset of her symptoms her thyroid dose had been increased from 1/2 of a 125 mcg pill to 1/2 of a 137 mcg pill.  No thyroid level was checked while she was in the hospital or the skilled unit and she remains on the higher dose.  Her hair growth has improved since going on the thyroid and she has been more energetic but it could conceivably be contributing to her diarrhea and urinary problems.  She does need of TSH level.  She tends to have very soft semi liquid stools and the possibility of a high impaction would also be within the realm of possibility in this could contribute to bladder dysfunction as well.    Past Medical History:  No date: Allergy  No date: Diverticulosis  No date: Gluten enteropathy  No date: Gluten intolerance  No date: Hernia  No date: Hypothyroidism  9/16/2015: PD (Parkinson's disease)      Comment:  Right tremor type  No date: Peptic ulcer disease  No date: Right shoulder pain      Comment:  Cirtisone injection 3/26/15 - Dr. Tolbert  No date: Ulcer    Past Surgical History:  No date: ADENOIDECTOMY  03/01/2012: COLONOSCOPY      Comment:  Dr. Teresa, repeat in 10 years for screening  2/21/2018: COLONOSCOPY; N/A      Comment:  Procedure: COLONOSCOPY;  Surgeon: Radha Deng MD;                Location: [facility] ENDO;  Service: Endoscopy;  Laterality:                N/A;  9/16/2020: CORRECTION OF HAMMER TOE; Left      Comment:  Procedure: CORRECTION, HAMMER TOE;  Surgeon: William Sprague MD;  Location: [facility] OR;  Service: Orthopedics;                 Laterality: Left;  No date: COSMETIC SURGERY      Comment:  Broken nose  12/8/2021: ESOPHAGOGASTRODUODENOSCOPY; N/A      Comment:  Procedure: EGD (ESOPHAGOGASTRODUODENOSCOPY);  Surgeon:                Benji Valentino III, MD;  Location: [facility] ENDO;                 Service: Endoscopy;  Laterality: N/A;  left wrist: FRACTURE SURGERY      Comment:  With pin  No date: HEMORRHOID SURGERY  04/09/2015: HERNIA REPAIR; Left      Comment:  Dr Lo; left inguinal  7/10/2018: INTRALUMINAL GASTROINTESTINAL TRACT IMAGING VIA CAPSULE; N/  A      Comment:  Procedure: IMAGING, GI TRACT, INTRALUMINAL, VIA CAPSULE;               Surgeon: Radha Deng MD;  Location: [facility] ENDO;                 Service: Endoscopy;  Laterality: N/A;  9/29/2020: LYSIS OF ADHESIONS      Comment:  Procedure: LYSIS, ADHESIONS;  Surgeon: Eagle Gonzalez MD;  Location: [facility] OR;  Service: General;;  No date: RHINOPLASTY TIP  No date: TONSILLECTOMY  05/21/2015: UPPER GASTROINTESTINAL ENDOSCOPY      Comment:  Dr. Gomez    Current Outpatient Medications on File Prior to Visit:  acetaminophen (TYLENOL) 325 MG tablet, Take 2 tablets (650 mg total) by mouth every 6 (six) hours as needed for Pain., Disp: , Rfl: 0  carbidopa-levodopa  mg (SINEMET)  mg per tablet, Take 1 tablet by mouth 3 (three) times daily. New instructions, 1/2 pill currently  0930  1600  2130, Disp: , Rfl:   fluticasone propionate (FLONASE) 50 mcg/actuation nasal spray, 1 spray (50 mcg total) by Each Nostril route once daily., Disp: 18.2 mL, Rfl: 2  food supplemt, lactose-reduced (ENSURE CLEAR) Liqd, Take 5 mLs by mouth 3 (three) times daily with meals., Disp: , Rfl:    levothyroxine (SYNTHROID) 137 MCG Tab tablet, TAKE 1/2 (ONE-HALF) TABLET BY MOUTH IN THE MORNING BEFORE BREAKFAST, Disp: 45 tablet, Rfl: 2  multivitamin with minerals tablet, 1 tablet DAILY (route: oral), Disp: , Rfl:   pumpkin seed extract/soy germ (AZO BLADDER CONTROL ORAL), Take by mouth., Disp: , Rfl:   simethicone (MYLICON) 80 MG chewable tablet, Take 1 tablet (80 mg total) by mouth 3 (three) times daily as needed for Flatulence., Disp: , Rfl: 0  docusate sodium (COLACE) 100 MG capsule, Take 1 capsule (100 mg total) by mouth once daily. (Patient not taking: Reported on 1/25/2022), Disp: , Rfl: 0  pantoprazole (PROTONIX) 40 MG tablet, Take 1 tablet (40 mg total) by mouth 2 (two) times daily. (Patient not taking: Reported on 1/25/2022), Disp: 60 tablet, Rfl: 11  polyethylene glycol (GLYCOLAX) 17 gram PwPk, Take 17 g by mouth every other day. (Patient not taking: Reported on 1/25/2022), Disp: 30 packet, Rfl: 1  (DISCONTINUED) alendronate (FOSAMAX) 70 MG tablet, Take 1 tablet (70 mg total) by mouth every 7 days. (Patient not taking: Reported on 1/25/2022), Disp: 4 tablet, Rfl: 11    No current facility-administered medications on file prior to visit.        Review of Systems   Constitutional: Negative for chills, diaphoresis and fever.   Respiratory: Negative for cough, chest tightness and shortness of breath.    Cardiovascular: Negative for chest pain and palpitations.   Gastrointestinal: Positive for abdominal distention, anal bleeding and diarrhea. Negative for blood in stool, constipation, nausea and vomiting.   Genitourinary: Positive for frequency and urgency. Negative for decreased urine volume, difficulty urinating, dysuria, hematuria and pelvic pain.       Objective:      Physical Exam  Vitals and nursing note reviewed.   Constitutional:       General: She is not in acute distress.     Appearance: Normal appearance. She is normal weight. She is not ill-appearing, toxic-appearing or diaphoretic.       Comments: Good blood pressure control  Regular heart really and rhythm  Normal weight with a BMI of 21.4 she is down 1 lb from her previous visit with me November 16, 2021   HENT:      Head: Normocephalic and atraumatic.   Eyes:      General: No scleral icterus.     Extraocular Movements: Extraocular movements intact.      Pupils: Pupils are equal, round, and reactive to light.   Neck:      Vascular: No carotid bruit.   Cardiovascular:      Rate and Rhythm: Normal rate and regular rhythm.      Heart sounds: Normal heart sounds. No murmur heard.  No friction rub. No gallop.    Pulmonary:      Effort: Pulmonary effort is normal. No respiratory distress.      Breath sounds: Normal breath sounds. No stridor. No wheezing, rhonchi or rales.   Abdominal:      General: Abdomen is protuberant. Bowel sounds are increased. There is distension. There is no abdominal bruit. There are no signs of injury.      Palpations: Abdomen is soft. There is no shifting dullness, fluid wave, hepatomegaly, splenomegaly or mass.      Tenderness: There is no abdominal tenderness. There is no guarding or rebound. Negative signs include Mitchell's sign and McBurney's sign.   Genitourinary:     Rectum: Guaiac result negative. External hemorrhoid (She has a cluster of sentinel tags but no active hemorrhoids at this time.  The tags are almost entirely circumferential) present. No mass, tenderness or anal fissure. Normal anal tone.   Musculoskeletal:      Cervical back: Normal range of motion and neck supple. No rigidity or tenderness.   Lymphadenopathy:      Cervical: No cervical adenopathy.   Skin:     General: Skin is warm and dry.   Neurological:      Mental Status: She is alert and oriented to person, place, and time.   Psychiatric:         Mood and Affect: Mood normal.         Behavior: Behavior normal.         Thought Content: Thought content normal.         Judgment: Judgment normal.         Assessment:       1. Acquired hypothyroidism    2.  Bladder disorder    3. Constipation, unspecified constipation type    4. PD (Parkinson's disease)    5. Gluten enteropathy    6. Chronic diarrhea    7. Diverticulosis    8. Residual hemorrhoidal skin tags    9. SBO (small bowel obstruction)    10. BMI 21.0-21.9, adult        Plan:       1. Acquired hypothyroidism  Check TSH, if hyperthyroid we may have to reduce her dose back to the lower 1/2 of a 125 mcg.  - TSH; Future    2. Bladder disorder  Overactive bladder symptoms with no sign of UTI.  Trial Flomax  - tamsulosin (FLOMAX) 0.4 mg Cap; Take 1 capsule (0.4 mg total) by mouth once daily.  Dispense: 30 capsule; Refill: 11    3. Constipation, unspecified constipation type  Check x-ray for high impaction  - X-Ray Abdomen Flat And Erect; Future    4. PD (Parkinson's disease)  On Sinemet    5. Gluten enteropathy  More or less following a gluten free diet    6. Chronic diarrhea  Flares intermittently about every two weeks.  Suggested keeping a food diary to try and identify a potential food intolerance    7. Diverticulosis  Asymptomatic at present    8. Residual hemorrhoidal skin tags  Discussed consult for surgery, she has already had one hemorrhoid operation and her current status is more sentinel tags.  I do not think a hemorrhoid surgery is warranted or helpful at this point but I would be agreeable to a consult with surgery if desired.  That will be considered    9. SBO (small bowel obstruction)  Appears to be resolved at this time    10. BMI 21.0-21.9, adult  Good weight no changes needed here    I spent 54 minutes on this encounter.  This time includes face-to-face time, orders, chart review, test review, and documentation.

## 2022-01-28 ENCOUNTER — OFFICE VISIT (OUTPATIENT)
Dept: FAMILY MEDICINE | Facility: CLINIC | Age: 77
End: 2022-01-28
Payer: MEDICARE

## 2022-01-28 VITALS
WEIGHT: 122.56 LBS | HEIGHT: 63 IN | SYSTOLIC BLOOD PRESSURE: 104 MMHG | BODY MASS INDEX: 21.71 KG/M2 | OXYGEN SATURATION: 98 % | DIASTOLIC BLOOD PRESSURE: 62 MMHG | HEART RATE: 69 BPM

## 2022-01-28 DIAGNOSIS — R42 DIZZINESS ON STANDING: ICD-10-CM
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
## 2022-01-28 DIAGNOSIS — S41.111A SKIN TEAR OF RIGHT UPPER ARM WITHOUT COMPLICATION, INITIAL ENCOUNTER: ICD-10-CM
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
## 2022-01-28 DIAGNOSIS — S09.90XA INJURY OF HEAD, INITIAL ENCOUNTER: ICD-10-CM

## 2022-01-28 PROCEDURE — 99213 OFFICE O/P EST LOW 20 MIN: CPT | Mod: PBBFAC,PO | Performed by: NURSE PRACTITIONER

## 2022-01-28 PROCEDURE — 99214 PR OFFICE/OUTPT VISIT, EST, LEVL IV, 30-39 MIN: ICD-10-PCS | Mod: S$PBB,,, | Performed by: NURSE PRACTITIONER

## 2022-01-28 PROCEDURE — 99214 OFFICE O/P EST MOD 30 MIN: CPT | Mod: S$PBB,,, | Performed by: NURSE PRACTITIONER

## 2022-01-28 PROCEDURE — 99999 PR PBB SHADOW E&M-EST. PATIENT-LVL III: CPT | Mod: PBBFAC,,, | Performed by: NURSE PRACTITIONER

## 2022-01-28 PROCEDURE — 99999 PR PBB SHADOW E&M-EST. PATIENT-LVL III: ICD-10-PCS | Mod: PBBFAC,,, | Performed by: NURSE PRACTITIONER

## 2022-01-28 NOTE — PROGRESS NOTES
Subjective:       Patient ID: Ariana Tiwari is a 76 y.o. female.    Chief Complaint: Fall (Fell yesterday and hit head)  First time seeing me in the clinic.  Last seen by PCP. Tiffany ZULETA On 01/25/2022  HPI   She is here to follow up with a fall this morning at about 9:20 am. Fell getting out of chair this morning and ? Hit table or chair, did not loose conscious, denies headache.  She did fall on a ceramic like floor.  She does have chronic   She did feel light headed and dizzy after getting up  Vitals:    01/28/22 1036   BP: 104/62   Pulse: 69     Review of Systems   Constitutional:        Large hematoma to right posterior skull region   Neurological: Positive for dizziness and light-headedness. Negative for seizures, facial asymmetry and headaches.   Psychiatric/Behavioral: Negative for behavioral problems and confusion.       Objective:      Physical Exam  Vitals and nursing note reviewed.   Constitutional:       General: She is awake.      Appearance: Normal appearance.   HENT:      Head:        Comments: Large hematoma that has bruised about the size of a small plum  Eyes:      General: Lids are normal.      Comments: Slightly lag in response to left eye with pupillary reflex   Cardiovascular:      Rate and Rhythm: Regular rhythm.   Skin:     General: Skin is warm and dry.   Neurological:      Mental Status: She is alert and oriented to person, place, and time.      Motor: Weakness and tremor present.      Coordination: Coordination abnormal.      Gait: Gait abnormal.   Psychiatric:         Attention and Perception: Attention and perception normal.         Mood and Affect: Affect is tearful.         Speech: Speech normal.         Behavior: Behavior is cooperative.         Cognition and Memory: Cognition and memory normal.         Assessment & Plan:       Fall in home, initial encounter    Injury of head, initial encounter    Skin tear of right upper arm without complication, initial encounter    Dizziness  on standing        Daughter in law advised to take to ED for further evaluation and possible need for CT,  She is taking her to ED via personal vehicle for immediate evaluation.  Medication List with Changes/Refills   Current Medications    ACETAMINOPHEN (TYLENOL) 325 MG TABLET    Take 2 tablets (650 mg total) by mouth every 6 (six) hours as needed for Pain.    CARBIDOPA-LEVODOPA  MG (SINEMET)  MG PER TABLET    Take 1 tablet by mouth 3 (three) times daily. New instructions, 1/2 pill currently  0930  1600  2130    DOCUSATE SODIUM (COLACE) 100 MG CAPSULE    Take 1 capsule (100 mg total) by mouth once daily.    FLUTICASONE PROPIONATE (FLONASE) 50 MCG/ACTUATION NASAL SPRAY    1 spray (50 mcg total) by Each Nostril route once daily.    FOOD SUPPLEMT, LACTOSE-REDUCED (ENSURE CLEAR) LIQD    Take 5 mLs by mouth 3 (three) times daily with meals.    LEVOTHYROXINE (SYNTHROID) 137 MCG TAB TABLET    TAKE 1/2 (ONE-HALF) TABLET BY MOUTH IN THE MORNING BEFORE BREAKFAST    MULTIVITAMIN WITH MINERALS TABLET    1 tablet DAILY (route: oral)    PANTOPRAZOLE (PROTONIX) 40 MG TABLET    Take 1 tablet (40 mg total) by mouth 2 (two) times daily.    POLYETHYLENE GLYCOL (GLYCOLAX) 17 GRAM PWPK    Take 17 g by mouth every other day.    PUMPKIN SEED EXTRACT/SOY GERM (AZO BLADDER CONTROL ORAL)    Take by mouth.    SIMETHICONE (MYLICON) 80 MG CHEWABLE TABLET    Take 1 tablet (80 mg total) by mouth 3 (three) times daily as needed for Flatulence.    TAMSULOSIN (FLOMAX) 0.4 MG CAP    Take 1 capsule (0.4 mg total) by mouth once daily.     No follow-ups on file.

## 2022-05-09 ENCOUNTER — PATIENT MESSAGE (OUTPATIENT)
Dept: SMOKING CESSATION | Facility: CLINIC | Age: 77
End: 2022-05-09
Payer: MEDICARE

## 2022-05-17 ENCOUNTER — TELEPHONE (OUTPATIENT)
Dept: FAMILY MEDICINE | Facility: CLINIC | Age: 77
End: 2022-05-17
Payer: MEDICARE

## 2022-05-18 ENCOUNTER — OFFICE VISIT (OUTPATIENT)
Dept: FAMILY MEDICINE | Facility: CLINIC | Age: 77
End: 2022-05-18
Attending: FAMILY MEDICINE
Payer: MEDICARE

## 2022-05-18 VITALS
WEIGHT: 126.75 LBS | TEMPERATURE: 98 F | HEART RATE: 71 BPM | OXYGEN SATURATION: 95 % | DIASTOLIC BLOOD PRESSURE: 64 MMHG | BODY MASS INDEX: 22.46 KG/M2 | SYSTOLIC BLOOD PRESSURE: 100 MMHG | HEIGHT: 63 IN | RESPIRATION RATE: 18 BRPM

## 2022-05-18 DIAGNOSIS — G20.A1 PD (PARKINSON'S DISEASE): Primary | ICD-10-CM

## 2022-05-18 DIAGNOSIS — K90.41 GLUTEN ENTEROPATHY: ICD-10-CM

## 2022-05-18 DIAGNOSIS — K52.9 CHRONIC DIARRHEA: ICD-10-CM

## 2022-05-18 DIAGNOSIS — E03.9 ACQUIRED HYPOTHYROIDISM: ICD-10-CM

## 2022-05-18 DIAGNOSIS — R53.81 PHYSICAL DECONDITIONING: ICD-10-CM

## 2022-05-18 DIAGNOSIS — R06.02 SHORTNESS OF BREATH: ICD-10-CM

## 2022-05-18 PROCEDURE — 99999 PR PBB SHADOW E&M-EST. PATIENT-LVL IV: CPT | Mod: PBBFAC,,, | Performed by: FAMILY MEDICINE

## 2022-05-18 PROCEDURE — 99214 OFFICE O/P EST MOD 30 MIN: CPT | Mod: PBBFAC,PN | Performed by: FAMILY MEDICINE

## 2022-05-18 PROCEDURE — 99214 OFFICE O/P EST MOD 30 MIN: CPT | Mod: S$PBB,,, | Performed by: FAMILY MEDICINE

## 2022-05-18 PROCEDURE — 99999 PR PBB SHADOW E&M-EST. PATIENT-LVL IV: ICD-10-PCS | Mod: PBBFAC,,, | Performed by: FAMILY MEDICINE

## 2022-05-18 PROCEDURE — 99214 PR OFFICE/OUTPT VISIT, EST, LEVL IV, 30-39 MIN: ICD-10-PCS | Mod: S$PBB,,, | Performed by: FAMILY MEDICINE

## 2022-05-18 RX ORDER — SIMETHICONE 125 MG
1 CAPSULE ORAL 4 TIMES DAILY PRN
Status: ON HOLD | COMMUNITY
Start: 2021-08-04 | End: 2023-02-10 | Stop reason: SDUPTHER

## 2022-05-18 NOTE — PROGRESS NOTES
Subjective:       Patient ID: Ariana Tiwari is a 77 y.o. female.    Chief Complaint: Follow-up (6 month)    77-year-old female coming in for six month follow-up on multiple medical problems.  She has a history of Parkinson's disease on Sinemet, hypothyroidism on Synthroid 137 mcg, overactive bladder, gluten enteropathy and diverticulosis with intermittent small bowel obstructions, pancreatic cyst and esophagitis.  A few days ago she was complaining of some shortness of breath without any chest pain or tightness, without fever chills or unusual night sweats noting that she typically has stable night sweats with her Parkinson's.  Most of the symptoms have resolved leaving her with her typical abdominal bloating and intermittent diarrhea.  She has been eating some dark chocolate which generally does aggravate her diarrhea.  She has tried simethicone for the bloating and it does not seem to help much.  Bladder is stable with no dysuria change in color change in odor but some urgency.    Past Medical History:  No date: Allergy  No date: Diverticulosis  No date: Gluten enteropathy  No date: Gluten intolerance  No date: Hernia  No date: Hypothyroidism  9/16/2015: PD (Parkinson's disease)      Comment:  Right tremor type  No date: Peptic ulcer disease  No date: Right shoulder pain      Comment:  Cirtisone injection 3/26/15 - Dr. Tolbert  No date: Ulcer    Past Surgical History:  No date: ADENOIDECTOMY  03/01/2012: COLONOSCOPY      Comment:  Dr. Teresa, repeat in 10 years for screening  2/21/2018: COLONOSCOPY; N/A      Comment:  Procedure: COLONOSCOPY;  Surgeon: Radha Deng MD;               Location: [facility];  Service: Endoscopy;  Laterality:                N/A;  9/16/2020: CORRECTION OF HAMMER TOE; Left      Comment:  Procedure: CORRECTION, HAMMER TOE;  Surgeon: William Sprague MD;  Location: [facility] OR;  Service: Orthopedics;                 Laterality: Left;  No date: COSMETIC SURGERY       Comment:  Broken nose  12/8/2021: ESOPHAGOGASTRODUODENOSCOPY; N/A      Comment:  Procedure: EGD (ESOPHAGOGASTRODUODENOSCOPY);  Surgeon:                Benji Valentino III, MD;  Location: [facility] ENDO;                 Service: Endoscopy;  Laterality: N/A;  left wrist: FRACTURE SURGERY      Comment:  With pin  No date: HEMORRHOID SURGERY  04/09/2015: HERNIA REPAIR; Left      Comment:  Dr Lo; left inguinal  7/10/2018: INTRALUMINAL GASTROINTESTINAL TRACT IMAGING VIA CAPSULE; N/  A      Comment:  Procedure: IMAGING, GI TRACT, INTRALUMINAL, VIA CAPSULE;               Surgeon: aRdha Deng MD;  Location: [facility] ENDO;                 Service: Endoscopy;  Laterality: N/A;  9/29/2020: LYSIS OF ADHESIONS      Comment:  Procedure: LYSIS, ADHESIONS;  Surgeon: Eagle Gonzalez MD;  Location: [facility] OR;  Service: General;;  No date: RHINOPLASTY TIP  No date: TONSILLECTOMY  05/21/2015: UPPER GASTROINTESTINAL ENDOSCOPY      Comment:  Dr. Gomez,    Current Outpatient Medications on File Prior to Visit:  acetaminophen (TYLENOL) 325 MG tablet, Take 2 tablets (650 mg total) by mouth every 6 (six) hours as needed for Pain., Disp: , Rfl: 0  carbidopa-levodopa  mg (SINEMET)  mg per tablet, Take 1 tablet by mouth 3 (three) times daily. New instructions, 1/2 pill currently  0930  1600  2130, Disp: , Rfl:   docusate sodium (COLACE) 100 MG capsule, Take 1 capsule (100 mg total) by mouth once daily., Disp: , Rfl: 0  fluticasone propionate (FLONASE) 50 mcg/actuation nasal spray, 1 spray (50 mcg total) by Each Nostril route once daily., Disp: 18.2 mL, Rfl: 2  food supplemt, lactose-reduced (ENSURE CLEAR) Liqd, Take 5 mLs by mouth 3 (three) times daily with meals., Disp: , Rfl:   levothyroxine (SYNTHROID) 137 MCG Tab tablet, TAKE 1/2 (ONE-HALF) TABLET BY MOUTH IN THE MORNING BEFORE BREAKFAST, Disp: 45 tablet, Rfl: 2  multivitamin with minerals tablet, 1 tablet DAILY (route: oral), Disp: , Rfl:   pumpkin seed  extract/soy germ (AZO BLADDER CONTROL ORAL), Take by mouth., Disp: , Rfl:   simethicone (MYLICON) 125 mg Cap capsule, Take 1 capsule by mouth 4 (four) times daily as needed., Disp: , Rfl:   (DISCONTINUED) simethicone (MYLICON) 80 MG chewable tablet, Take 1 tablet (80 mg total) by mouth 3 (three) times daily as needed for Flatulence., Disp: , Rfl: 0  pantoprazole (PROTONIX) 40 MG tablet, Take 1 tablet (40 mg total) by mouth 2 (two) times daily. (Patient not taking: No sig reported), Disp: 60 tablet, Rfl: 11  polyethylene glycol (GLYCOLAX) 17 gram PwPk, Take 17 g by mouth every other day. (Patient not taking: No sig reported), Disp: 30 packet, Rfl: 1  tamsulosin (FLOMAX) 0.4 mg Cap, Take 1 capsule (0.4 mg total) by mouth once daily. (Patient not taking: Reported on 5/18/2022), Disp: 30 capsule, Rfl: 11    No current facility-administered medications on file prior to visit.        Review of Systems   Constitutional: Negative for chills, diaphoresis and fever.   HENT: Negative for congestion, postnasal drip, rhinorrhea, sinus pressure and sinus pain.    Respiratory: Positive for shortness of breath. Negative for cough, chest tightness and wheezing.    Cardiovascular: Negative for chest pain and palpitations.   Gastrointestinal: Positive for abdominal distention and diarrhea. Negative for blood in stool.   Genitourinary: Positive for frequency and urgency.       Objective:      Physical Exam  Vitals and nursing note reviewed.   Constitutional:       General: She is not in acute distress.     Appearance: Normal appearance. She is normal weight. She is not ill-appearing, toxic-appearing or diaphoretic.      Comments: Good blood pressure control  Normal weight with a BMI of 22.5 she is up 6.1 lb from her January 25, 2022 visit    ----------------------------------                   05/18/22                             1310            ----------------------------------   BP:              100/64            BP  "Location:        Left arm           Patient Position:        Sitting            BP Method:     Large (Manual)        Pulse:             71              Resp:              18              Temp:      97.7 °F (36.5 °C)       TempSrc:          Oral             SpO2:             95%              Weight: 57.5 kg (126 lb 12.2 oz)   Height:      5' 3" (1.6 m)        ----------------------------------     HENT:      Head: Normocephalic and atraumatic.      Right Ear: Tympanic membrane, ear canal and external ear normal. There is no impacted cerumen.      Left Ear: Tympanic membrane, ear canal and external ear normal. There is no impacted cerumen.      Nose: Nose normal. No congestion or rhinorrhea.      Mouth/Throat:      Mouth: Mucous membranes are moist.      Pharynx: Oropharynx is clear. No oropharyngeal exudate or posterior oropharyngeal erythema.   Eyes:      General: No scleral icterus.     Extraocular Movements: Extraocular movements intact.      Pupils: Pupils are equal, round, and reactive to light.   Neck:      Vascular: No carotid bruit.   Cardiovascular:      Rate and Rhythm: Normal rate and regular rhythm.      Heart sounds: Normal heart sounds. No murmur heard.    No friction rub. No gallop.   Pulmonary:      Effort: Pulmonary effort is normal. No respiratory distress.      Breath sounds: Normal breath sounds. No stridor. No wheezing, rhonchi or rales.   Chest:      Chest wall: No tenderness or crepitus. There is no dullness to percussion.   Abdominal:      General: Abdomen is protuberant. There is distension.      Palpations: There is no hepatomegaly, splenomegaly or mass.      Tenderness: There is no abdominal tenderness.   Musculoskeletal:         General: No swelling, tenderness or deformity.      Cervical back: Normal range of motion and neck supple. No rigidity or tenderness.      Right lower leg: No edema.      Left lower leg: No edema.   Lymphadenopathy:      Cervical: No " cervical adenopathy.   Neurological:      Mental Status: She is alert.      GCS: GCS eye subscore is 4. GCS verbal subscore is 5. GCS motor subscore is 6.      Motor: Tremor (Very prominent tremor both hands) and abnormal muscle tone (Generalized stiffness) present.      Gait: Gait abnormal (Shuffle).         Assessment:       1. PD (Parkinson's disease)    2. Shortness of breath    3. Acquired hypothyroidism    4. Gluten enteropathy    5. Chronic diarrhea    6. Physical deconditioning    7. BMI 22.0-22.9, adult        Plan:       1. PD (Parkinson's disease)  Stable on Sinemet    2. Shortness of breath  Seems to have resolved.  No evidence of active infection, pneumonia, upper respiratory symptoms and low likelihood of pulmonary embolism    3. Acquired hypothyroidism  Lab Results   Component Value Date    TSH 3.313 01/25/2022     Will recheck at next visit    4. Gluten enteropathy  Stable, semi compliant with diet    5. Chronic diarrhea  Stable    6. Physical deconditioning  Stable    7. BMI 22.0-22.9, adult  Slight weight gain    I spent 37 minutes on this encounter.  This time includes face-to-face time, orders, chart review, test review, and documentation.

## 2022-05-18 NOTE — TELEPHONE ENCOUNTER
I called the patient to confirm her appointment that she has with Dr. Petty on 05/17/2022 at 1:00pm. No answer left voicemail to return our call.  I will send the patient a portal message as well.

## 2022-10-03 DIAGNOSIS — Z71.89 COMPLEX CARE COORDINATION: ICD-10-CM

## 2022-11-01 DIAGNOSIS — K64.9 HEMORRHOIDS, UNSPECIFIED HEMORRHOID TYPE: Primary | ICD-10-CM

## 2022-11-01 RX ORDER — HYDROCORTISONE ACETATE 25 MG/1
25 SUPPOSITORY RECTAL 2 TIMES DAILY
Qty: 20 SUPPOSITORY | Refills: 0 | Status: SHIPPED | OUTPATIENT
Start: 2022-11-01 | End: 2022-11-11

## 2022-11-01 NOTE — TELEPHONE ENCOUNTER
Patient asking for refill of Anusol suppositories for hemorrhoids- small amount of bleeding after passing stool. [facility]

## 2022-11-01 NOTE — TELEPHONE ENCOUNTER
----- Message from Johann Loya sent at 11/1/2022  9:54 AM CDT -----  Contact: pt daughter  Type:  RX Refill Request    Who Called:  pt dasughter  Refill or New Rx:  refill   RX Name and Strength:  hydrocortisone (ANUSOL-HC) 25 mg suppository  How is the patient currently taking it? (ex. 1XDay):  twice a day   Is this a 30 day or 90 day RX:  ??  Preferred Pharmacy with phone number:    [id number] - [place], [place] - [address] [address]  Phone: [phone] Fax: [phone]      Local or Mail Order:  local   Ordering Provider:  dr. Malinda Akins Call Back Number:  [phone]    Additional Information:  pt needs a refill on medication above. Please advise.

## 2022-11-03 ENCOUNTER — TELEPHONE (OUTPATIENT)
Dept: FAMILY MEDICINE | Facility: CLINIC | Age: 77
End: 2022-11-03
Payer: MEDICARE

## 2022-11-25 ENCOUNTER — TELEPHONE (OUTPATIENT)
Dept: FAMILY MEDICINE | Facility: CLINIC | Age: 77
End: 2022-11-25
Payer: MEDICARE

## 2022-11-25 NOTE — TELEPHONE ENCOUNTER
----- Message from Breana Larson LPN sent at 11/3/2022  9:55 AM CDT -----  Regarding: anusol cream PA  Sent request for PA to Cover My Meds, 11/3/22 at 10am, awaiting response

## 2022-11-28 ENCOUNTER — TELEPHONE (OUTPATIENT)
Dept: FAMILY MEDICINE | Facility: CLINIC | Age: 77
End: 2022-11-28
Payer: MEDICARE

## 2022-11-28 NOTE — TELEPHONE ENCOUNTER
..I called the patient to confirm her appointment that she has with Dr. Petty on 11/29/2022 at 9:00am, no answer left voicemail to return our call. I will send the patient a portal message as well.

## 2022-11-29 ENCOUNTER — OFFICE VISIT (OUTPATIENT)
Dept: FAMILY MEDICINE | Facility: CLINIC | Age: 77
End: 2022-11-29
Attending: FAMILY MEDICINE
Payer: MEDICARE

## 2022-11-29 ENCOUNTER — TELEPHONE (OUTPATIENT)
Dept: FAMILY MEDICINE | Facility: CLINIC | Age: 77
End: 2022-11-29

## 2022-11-29 VITALS
HEIGHT: 63 IN | DIASTOLIC BLOOD PRESSURE: 65 MMHG | RESPIRATION RATE: 17 BRPM | TEMPERATURE: 98 F | WEIGHT: 128.31 LBS | BODY MASS INDEX: 22.73 KG/M2 | SYSTOLIC BLOOD PRESSURE: 110 MMHG | HEART RATE: 68 BPM | OXYGEN SATURATION: 96 %

## 2022-11-29 DIAGNOSIS — E03.9 ACQUIRED HYPOTHYROIDISM: ICD-10-CM

## 2022-11-29 DIAGNOSIS — H10.9 CONJUNCTIVITIS, UNSPECIFIED CONJUNCTIVITIS TYPE, UNSPECIFIED LATERALITY: ICD-10-CM

## 2022-11-29 DIAGNOSIS — R73.9 HYPERGLYCEMIA: ICD-10-CM

## 2022-11-29 DIAGNOSIS — L21.9 SEBORRHEA: ICD-10-CM

## 2022-11-29 DIAGNOSIS — R14.0 ABDOMINAL DISTENSION: ICD-10-CM

## 2022-11-29 DIAGNOSIS — J31.0 CHRONIC RHINITIS: Primary | ICD-10-CM

## 2022-11-29 DIAGNOSIS — G20.A1 PD (PARKINSON'S DISEASE): ICD-10-CM

## 2022-11-29 DIAGNOSIS — K90.41 GLUTEN ENTEROPATHY: ICD-10-CM

## 2022-11-29 PROCEDURE — 99999 PR PBB SHADOW E&M-EST. PATIENT-LVL IV: ICD-10-PCS | Mod: PBBFAC,,, | Performed by: FAMILY MEDICINE

## 2022-11-29 PROCEDURE — 99214 OFFICE O/P EST MOD 30 MIN: CPT | Mod: PBBFAC,PN | Performed by: FAMILY MEDICINE

## 2022-11-29 PROCEDURE — 99215 OFFICE O/P EST HI 40 MIN: CPT | Mod: S$PBB,,, | Performed by: FAMILY MEDICINE

## 2022-11-29 PROCEDURE — 99999 PR PBB SHADOW E&M-EST. PATIENT-LVL IV: CPT | Mod: PBBFAC,,, | Performed by: FAMILY MEDICINE

## 2022-11-29 PROCEDURE — 99215 PR OFFICE/OUTPT VISIT, EST, LEVL V, 40-54 MIN: ICD-10-PCS | Mod: S$PBB,,, | Performed by: FAMILY MEDICINE

## 2022-11-29 RX ORDER — AZELASTINE 1 MG/ML
1 SPRAY, METERED NASAL 2 TIMES DAILY PRN
Qty: 30 ML | Refills: 5 | Status: SHIPPED | OUTPATIENT
Start: 2022-11-29 | End: 2023-03-09

## 2022-11-29 RX ORDER — SIMETHICONE 125 MG
125 CAPSULE ORAL
Qty: 100 CAPSULE | Status: ON HOLD | OUTPATIENT
Start: 2022-11-29 | End: 2023-02-06 | Stop reason: HOSPADM

## 2022-11-29 NOTE — PROGRESS NOTES
Subjective:       Patient ID: Ariana Tiwari is a 77 y.o. female.    Chief Complaint: Hypothyroidism (Pt states that she is here for her 6 mo fu ) and Eye Problem (Pt states that her eyes are blood shot and in her words bugging her but unable to explain how )    77-year-old female with a history of parkinsonism, gluten enteropathy with chronic abdominal distension intermittent diarrhea intermittent hemorrhoidal bleeding status post hemorrhoidectomy also has chronic nasal discharge probably vasomotor rhinitis and chronic eye irritation.  She has flaking of the scalp and scalp itching and has become progressively less mobile.  Her daughter has been giving her fiber supplements which have helped with her chronic constipation but probably aggravated her bloating.  Flonase has not helped with her nasal discharge and she has used Atrovent in the past but it was not effective either.  She has not used Astelin.  She is used over-the-counter Visine type eyedrops with minimal relief.  She also has history of diverticulosis, pancreatic cyst, small-bowel obstruction, deconditioning, hyperglycemia, elevated liver functions and esophagitis.  She is not currently taking Protonix but she is not having any burning or knowing pains.  She has hypothyroidism as well.    Past Medical History:  No date: Allergy  No date: Diverticulosis  No date: Gluten enteropathy  No date: Gluten intolerance  No date: Hernia  No date: Hypothyroidism  9/16/2015: PD (Parkinson's disease)      Comment:  Right tremor type  No date: Peptic ulcer disease  No date: Right shoulder pain      Comment:  Cirtisone injection 3/26/15 - Dr. Tolbert  No date: Ulcer    Past Surgical History:  No date: ADENOIDECTOMY  03/01/2012: COLONOSCOPY      Comment:  Dr. Teresa, repeat in 10 years for screening  2/21/2018: COLONOSCOPY; N/A      Comment:  Procedure: COLONOSCOPY;  Surgeon: Radha Deng MD;               Location: [facility];  Service: Endoscopy;  Laterality:                 N/A;  9/16/2020: CORRECTION OF HAMMER TOE; Left      Comment:  Procedure: CORRECTION, HAMMER TOE;  Surgeon: William Sprague MD;  Location: [facility] OR;  Service: Orthopedics;                 Laterality: Left;  No date: COSMETIC SURGERY      Comment:  Broken nose  12/8/2021: ESOPHAGOGASTRODUODENOSCOPY; N/A      Comment:  Procedure: EGD (ESOPHAGOGASTRODUODENOSCOPY);  Surgeon:                Benji Valentino III, MD;  Location: [facility] ENDO;                 Service: Endoscopy;  Laterality: N/A;  left wrist: FRACTURE SURGERY      Comment:  With pin  No date: HEMORRHOID SURGERY  04/09/2015: HERNIA REPAIR; Left      Comment:  Dr Lo; left inguinal  7/10/2018: INTRALUMINAL GASTROINTESTINAL TRACT IMAGING VIA CAPSULE; N/  A      Comment:  Procedure: IMAGING, GI TRACT, INTRALUMINAL, VIA CAPSULE;               Surgeon: Radha Deng MD;  Location: [facility] ENDO;                 Service: Endoscopy;  Laterality: N/A;  9/29/2020: LYSIS OF ADHESIONS      Comment:  Procedure: LYSIS, ADHESIONS;  Surgeon: Eagle Gonzalez MD;  Location: [facility] OR;  Service: General;;  No date: RHINOPLASTY TIP  No date: TONSILLECTOMY  05/21/2015: UPPER GASTROINTESTINAL ENDOSCOPY      Comment:  Dr. Gomez    Review of patient's family history indicates:    Social History    Tobacco Use      Smoking status: Never      Smokeless tobacco: Never    Alcohol use: Yes      Alcohol/week: 11.7 standard drinks      Types: 14 Standard drinks or equivalent per week      Comment: 2 glasses wine per dinner    Drug use: No    Current Outpatient Medications on File Prior to Visit:  acetaminophen (TYLENOL) 325 MG tablet, Take 2 tablets (650 mg total) by mouth every 6 (six) hours as needed for Pain., Disp: , Rfl: 0  carbidopa-levodopa  mg (SINEMET)  mg per tablet, Take 1 tablet by mouth 3 (three) times daily. New instructions, 1/2 pill currently  0930  1600  2130, Disp: , Rfl:   docusate sodium (COLACE) 100 MG  capsule, Take 1 capsule (100 mg total) by mouth once daily., Disp: , Rfl: 0  fluticasone propionate (FLONASE) 50 mcg/actuation nasal spray, 1 spray (50 mcg total) by Each Nostril route once daily., Disp: 18.2 mL, Rfl: 2  food supplemt, lactose-reduced (ENSURE CLEAR) Liqd, Take 5 mLs by mouth 3 (three) times daily with meals., Disp: , Rfl:   levothyroxine (SYNTHROID) 137 MCG Tab tablet, TAKE 1/2 (ONE-HALF) TABLET BY MOUTH IN THE MORNING BEFORE BREAKFAST, Disp: 45 tablet, Rfl: 1  multivitamin with minerals tablet, 1 tablet DAILY (route: oral), Disp: , Rfl:   simethicone (MYLICON) 125 mg Cap capsule, Take 1 capsule by mouth 4 (four) times daily as needed., Disp: , Rfl:   pantoprazole (PROTONIX) 40 MG tablet, Take 1 tablet (40 mg total) by mouth 2 (two) times daily. (Patient not taking: Reported on 1/28/2022), Disp: 60 tablet, Rfl: 11  polyethylene glycol (GLYCOLAX) 17 gram PwPk, Take 17 g by mouth every other day. (Patient not taking: Reported on 1/25/2022), Disp: 30 packet, Rfl: 1  pumpkin seed extract/soy germ (AZO BLADDER CONTROL ORAL), Take by mouth., Disp: , Rfl:   tamsulosin (FLOMAX) 0.4 mg Cap, Take 1 capsule (0.4 mg total) by mouth once daily. (Patient not taking: Reported on 5/18/2022), Disp: 30 capsule, Rfl: 11    No current facility-administered medications on file prior to visit.        Review of Systems   Constitutional:  Negative for chills, diaphoresis and fever.   HENT:  Positive for congestion, postnasal drip and rhinorrhea.    Eyes:  Positive for discharge, redness and itching.   Respiratory:  Positive for choking (Rarely). Negative for cough, chest tightness and shortness of breath.    Cardiovascular:  Negative for chest pain and palpitations.   Gastrointestinal:  Positive for abdominal distention, anal bleeding and constipation. Negative for diarrhea, nausea and vomiting.   Genitourinary:  Negative for dysuria and frequency.   Musculoskeletal:  Positive for gait problem.   Skin:  Positive for color  change and rash.   Neurological:  Positive for dizziness and weakness.     Objective:      Physical Exam  Vitals and nursing note reviewed.   Constitutional:       General: She is not in acute distress.     Appearance: Normal appearance. She is normal weight. She is not ill-appearing, toxic-appearing or diaphoretic.      Comments: Good blood pressure control  Normal pulse with regular rhythm   Normal weight with a BMI of 22.7 she is up 1.5 lb from her May 18, 2022 visit   HENT:      Head: Normocephalic and atraumatic.      Right Ear: Tympanic membrane, ear canal and external ear normal. There is no impacted cerumen.      Left Ear: Tympanic membrane, ear canal and external ear normal. There is no impacted cerumen.      Nose: Congestion and rhinorrhea present.      Mouth/Throat:      Mouth: Mucous membranes are moist.      Pharynx: Oropharynx is clear. No oropharyngeal exudate or posterior oropharyngeal erythema.   Eyes:      General: Lids are normal.      Conjunctiva/sclera:      Right eye: Right conjunctiva is injected. No exudate.     Left eye: Left conjunctiva is injected. Chemosis present. No exudate.    Cardiovascular:      Rate and Rhythm: Normal rate and regular rhythm.      Heart sounds: Normal heart sounds. No murmur heard.    No friction rub. No gallop.   Pulmonary:      Effort: Pulmonary effort is normal. No respiratory distress.      Breath sounds: Normal breath sounds. No stridor. No wheezing, rhonchi or rales.   Abdominal:      General: Abdomen is protuberant. There is distension.      Palpations: There is no hepatomegaly, splenomegaly or mass.      Tenderness: There is no abdominal tenderness. There is no guarding or rebound. Negative signs include Mitchell's sign and McBurney's sign.      Hernia: A hernia is present. Hernia is present in the ventral area.      Comments: Prominent diastasis midline abdomen with several small ventral hernias overlying midline surgical scar   Musculoskeletal:      Cervical  back: Normal range of motion and neck supple.      Right lower leg: No edema.      Left lower leg: No edema.   Skin:     Comments: Diffuse erythema of the scalp with patches of scaly skin not limited to hairline   Neurological:      Mental Status: She is alert.       Assessment:       1. Chronic rhinitis    2. Conjunctivitis, unspecified conjunctivitis type, unspecified laterality    3. Gluten enteropathy    4. PD (Parkinson's disease)    5. Seborrhea    6. Abdominal distension    7. Acquired hypothyroidism    8. Hyperglycemia    9. BMI 22.0-22.9, adult          Plan:       1. Chronic rhinitis  Trial Astelin, if not successful add atrovent to the astelin  - azelastine (ASTELIN) 137 mcg (0.1 %) nasal spray; 1 spray (137 mcg total) by Nasal route 2 (two) times daily as needed for Rhinitis.  Dispense: 30 mL; Refill: 5    2. Conjunctivitis, unspecified conjunctivitis type, unspecified laterality  Naphcon-A drops up to qid prn  - naphazoline-pheniramine 0.025-0.3% (NAPHCON-A) 0.025-0.3 % ophthalmic solution; Place 1 drop into both eyes 4 (four) times daily as needed (eye irritation).  Dispense: 5 mL; Refill: 11    3. Gluten enteropathy  Continue fiber, take simethicone with each meal  - Comprehensive Metabolic Panel; Future  - CBC Auto Differential; Future    4. PD (Parkinson's disease)  Discussed power wheelchair.  She can not use a scooter because she can not use the handlebar type control.  She will need the joystick.  Suggested [facility] respiratory in rehab as a starting point  - Comprehensive Metabolic Panel; Future  - CBC Auto Differential; Future    5. Seborrhea  Denorex shampoo 2-3 times a week  - salicylic acid 3 % Sham; Shampoo scalp 2-3 times a week  Dispense: 236 mL; Refill: PRN    6. Abdominal distension  Fiber and simethicone  - simethicone (MYLICON) 125 mg Cap capsule; Take 1 capsule (125 mg total) by mouth 3 (three) times daily with meals.  Dispense: 100 capsule; Refill: PRN  - Comprehensive Metabolic  Panel; Future  - CBC Auto Differential; Future    7. Acquired hypothyroidism  Check tsh adjust dose if needed  - TSH; Future    8. Hyperglycemia  Check chem panel and a1c  - Comprehensive Metabolic Panel; Future  - Hemoglobin A1C; Future    9. BMI 22.0-22.9, adult    I spent 47 minutes on this encounter.  This time includes face-to-face time, orders, chart review, test review, and documentation.

## 2022-11-29 NOTE — TELEPHONE ENCOUNTER
----- Message from Alma Delia Petty sent at 11/29/2022 10:35 AM CST -----  Contact: Jayne fragoso [facility]  Type:  Pharmacy Calling to Clarify an RX    Name of Caller:  Jayne  Pharmacy Name:  [facility]  Prescription Name:  salicylic acid 3 % Sham  What do they need to clarify?:  stated they only carry the 6%  Best Call Back Number:  [phone]  Additional Information:  Thank You

## 2022-11-30 NOTE — TELEPHONE ENCOUNTER
Call placed to [facility]. Spoke to pharmacist (Mrs. Terry) for notification. Carmen Terry advised [facility] has Denorex Shampoo 3% available in store over the counter. Call placed to patient for notification of this matter as well. Call answered by patient's relative (Micaela Cedillon) who verbalized understanding.

## 2022-11-30 NOTE — TELEPHONE ENCOUNTER
I am afraid it might be too harsh for her.  I actually ordered the over-the-counter denorex 3% but [facility] does not have the variety that another pharmacy would.  She needs to go next door to [facility] and pick it up

## 2023-01-10 ENCOUNTER — LAB VISIT (OUTPATIENT)
Dept: LAB | Facility: HOSPITAL | Age: 78
End: 2023-01-10
Attending: FAMILY MEDICINE
Payer: MEDICARE

## 2023-01-10 DIAGNOSIS — E03.9 ACQUIRED HYPOTHYROIDISM: ICD-10-CM

## 2023-01-10 DIAGNOSIS — R73.9 HYPERGLYCEMIA: ICD-10-CM

## 2023-01-10 LAB
ESTIMATED AVG GLUCOSE: 82 MG/DL (ref 68–131)
HBA1C MFR BLD: 4.5 % (ref 4–5.6)
TSH SERPL DL<=0.005 MIU/L-ACNC: 2.78 UIU/ML (ref 0.4–4)

## 2023-01-10 PROCEDURE — 84443 ASSAY THYROID STIM HORMONE: CPT | Performed by: FAMILY MEDICINE

## 2023-01-10 PROCEDURE — 83036 HEMOGLOBIN GLYCOSYLATED A1C: CPT | Performed by: FAMILY MEDICINE

## 2023-01-10 PROCEDURE — 36415 COLL VENOUS BLD VENIPUNCTURE: CPT | Performed by: FAMILY MEDICINE

## 2023-01-27 ENCOUNTER — HOSPITAL ENCOUNTER (INPATIENT)
Facility: HOSPITAL | Age: 78
LOS: 10 days | Discharge: NURSING, REHAB OR LONG-TERM CARE | DRG: 388 | End: 2023-02-06
Attending: EMERGENCY MEDICINE | Admitting: STUDENT IN AN ORGANIZED HEALTH CARE EDUCATION/TRAINING PROGRAM
Payer: MEDICARE

## 2023-01-27 DIAGNOSIS — U07.1 COVID-19: ICD-10-CM

## 2023-01-27 DIAGNOSIS — R56.9 SEIZURE: ICD-10-CM

## 2023-01-27 DIAGNOSIS — Z46.59 ENCOUNTER FOR NASOGASTRIC (NG) TUBE PLACEMENT: ICD-10-CM

## 2023-01-27 DIAGNOSIS — R10.84 GENERALIZED ABDOMINAL PAIN: Primary | ICD-10-CM

## 2023-01-27 DIAGNOSIS — K56.609 SMALL BOWEL OBSTRUCTION: ICD-10-CM

## 2023-01-27 DIAGNOSIS — R41.82 ALTERED MENTAL STATUS: ICD-10-CM

## 2023-01-27 DIAGNOSIS — U07.1 COVID: ICD-10-CM

## 2023-01-27 DIAGNOSIS — K56.609 SBO (SMALL BOWEL OBSTRUCTION): ICD-10-CM

## 2023-01-27 LAB
ALBUMIN SERPL BCP-MCNC: 4.8 G/DL (ref 3.5–5.2)
ALP SERPL-CCNC: 88 U/L (ref 55–135)
ALT SERPL W/O P-5'-P-CCNC: 21 U/L (ref 10–44)
ANION GAP SERPL CALC-SCNC: 10 MMOL/L (ref 8–16)
AST SERPL-CCNC: 21 U/L (ref 10–40)
BACTERIA #/AREA URNS HPF: NEGATIVE /HPF
BASOPHILS # BLD AUTO: 0.05 K/UL (ref 0–0.2)
BASOPHILS NFR BLD: 0.4 % (ref 0–1.9)
BILIRUB SERPL-MCNC: 0.6 MG/DL (ref 0.1–1)
BILIRUB UR QL STRIP: NEGATIVE
BNP SERPL-MCNC: 329 PG/ML (ref 0–99)
BUN SERPL-MCNC: 13 MG/DL (ref 8–23)
CALCIUM SERPL-MCNC: 10.3 MG/DL (ref 8.7–10.5)
CHLORIDE SERPL-SCNC: 101 MMOL/L (ref 95–110)
CLARITY UR: CLEAR
CO2 SERPL-SCNC: 29 MMOL/L (ref 23–29)
COLOR UR: YELLOW
CREAT SERPL-MCNC: 0.5 MG/DL (ref 0.5–1.4)
CREAT SERPL-MCNC: 0.5 MG/DL (ref 0.5–1.4)
DIFFERENTIAL METHOD: ABNORMAL
EOSINOPHIL # BLD AUTO: 0.1 K/UL (ref 0–0.5)
EOSINOPHIL NFR BLD: 0.8 % (ref 0–8)
ERYTHROCYTE [DISTWIDTH] IN BLOOD BY AUTOMATED COUNT: 12.3 % (ref 11.5–14.5)
EST. GFR  (NO RACE VARIABLE): >60 ML/MIN/1.73 M^2
GLUCOSE SERPL-MCNC: 90 MG/DL (ref 70–110)
GLUCOSE UR QL STRIP: NEGATIVE
HCT VFR BLD AUTO: 46.9 % (ref 37–48.5)
HGB BLD-MCNC: 15.4 G/DL (ref 12–16)
HGB UR QL STRIP: ABNORMAL
HYALINE CASTS #/AREA URNS LPF: 4 /LPF
IMM GRANULOCYTES # BLD AUTO: 0.06 K/UL (ref 0–0.04)
IMM GRANULOCYTES NFR BLD AUTO: 0.5 % (ref 0–0.5)
KETONES UR QL STRIP: NEGATIVE
LEUKOCYTE ESTERASE UR QL STRIP: NEGATIVE
LIPASE SERPL-CCNC: 28 U/L (ref 4–60)
LYMPHOCYTES # BLD AUTO: 2.1 K/UL (ref 1–4.8)
LYMPHOCYTES NFR BLD: 16.5 % (ref 18–48)
MCH RBC QN AUTO: 31 PG (ref 27–31)
MCHC RBC AUTO-ENTMCNC: 32.8 G/DL (ref 32–36)
MCV RBC AUTO: 94 FL (ref 82–98)
MICROSCOPIC COMMENT: ABNORMAL
MONOCYTES # BLD AUTO: 0.6 K/UL (ref 0.3–1)
MONOCYTES NFR BLD: 5 % (ref 4–15)
NEUTROPHILS # BLD AUTO: 9.5 K/UL (ref 1.8–7.7)
NEUTROPHILS NFR BLD: 76.8 % (ref 38–73)
NITRITE UR QL STRIP: NEGATIVE
NRBC BLD-RTO: 0 /100 WBC
PH UR STRIP: 6 [PH] (ref 5–8)
PLATELET # BLD AUTO: 236 K/UL (ref 150–450)
PMV BLD AUTO: 10 FL (ref 9.2–12.9)
POTASSIUM SERPL-SCNC: 3.9 MMOL/L (ref 3.5–5.1)
PROT SERPL-MCNC: 8.3 G/DL (ref 6–8.4)
PROT UR QL STRIP: ABNORMAL
RBC # BLD AUTO: 4.97 M/UL (ref 4–5.4)
RBC #/AREA URNS HPF: 2 /HPF (ref 0–4)
SAMPLE: NORMAL
SARS-COV-2 RDRP RESP QL NAA+PROBE: POSITIVE
SODIUM SERPL-SCNC: 140 MMOL/L (ref 136–145)
SP GR UR STRIP: >1.03 (ref 1–1.03)
SQUAMOUS #/AREA URNS HPF: 0 /HPF
TROPONIN I SERPL HS-MCNC: 3.8 PG/ML (ref 0–14.9)
TROPONIN I SERPL HS-MCNC: 4 PG/ML (ref 0–14.9)
URN SPEC COLLECT METH UR: ABNORMAL
UROBILINOGEN UR STRIP-ACNC: NEGATIVE EU/DL
WBC # BLD AUTO: 12.39 K/UL (ref 3.9–12.7)
WBC #/AREA URNS HPF: 1 /HPF (ref 0–5)

## 2023-01-27 PROCEDURE — 83690 ASSAY OF LIPASE: CPT | Performed by: EMERGENCY MEDICINE

## 2023-01-27 PROCEDURE — 99285 EMERGENCY DEPT VISIT HI MDM: CPT | Mod: 25

## 2023-01-27 PROCEDURE — 63600175 PHARM REV CODE 636 W HCPCS: Performed by: EMERGENCY MEDICINE

## 2023-01-27 PROCEDURE — 93010 EKG 12-LEAD: ICD-10-PCS | Mod: ,,, | Performed by: INTERNAL MEDICINE

## 2023-01-27 PROCEDURE — 96374 THER/PROPH/DIAG INJ IV PUSH: CPT

## 2023-01-27 PROCEDURE — C9113 INJ PANTOPRAZOLE SODIUM, VIA: HCPCS | Performed by: EMERGENCY MEDICINE

## 2023-01-27 PROCEDURE — 12000002 HC ACUTE/MED SURGE SEMI-PRIVATE ROOM

## 2023-01-27 PROCEDURE — 83880 ASSAY OF NATRIURETIC PEPTIDE: CPT | Performed by: EMERGENCY MEDICINE

## 2023-01-27 PROCEDURE — 43752 NASAL/OROGASTRIC W/TUBE PLMT: CPT

## 2023-01-27 PROCEDURE — 25500020 PHARM REV CODE 255: Performed by: EMERGENCY MEDICINE

## 2023-01-27 PROCEDURE — 85025 COMPLETE CBC W/AUTO DIFF WBC: CPT | Performed by: EMERGENCY MEDICINE

## 2023-01-27 PROCEDURE — 93005 ELECTROCARDIOGRAM TRACING: CPT | Performed by: INTERNAL MEDICINE

## 2023-01-27 PROCEDURE — 63600175 PHARM REV CODE 636 W HCPCS: Performed by: NURSE PRACTITIONER

## 2023-01-27 PROCEDURE — 81001 URINALYSIS AUTO W/SCOPE: CPT | Performed by: EMERGENCY MEDICINE

## 2023-01-27 PROCEDURE — 96376 TX/PRO/DX INJ SAME DRUG ADON: CPT

## 2023-01-27 PROCEDURE — 80053 COMPREHEN METABOLIC PANEL: CPT | Performed by: EMERGENCY MEDICINE

## 2023-01-27 PROCEDURE — 93010 ELECTROCARDIOGRAM REPORT: CPT | Mod: ,,, | Performed by: INTERNAL MEDICINE

## 2023-01-27 PROCEDURE — 96375 TX/PRO/DX INJ NEW DRUG ADDON: CPT

## 2023-01-27 PROCEDURE — 84484 ASSAY OF TROPONIN QUANT: CPT | Mod: 91 | Performed by: EMERGENCY MEDICINE

## 2023-01-27 PROCEDURE — U0002 COVID-19 LAB TEST NON-CDC: HCPCS | Performed by: EMERGENCY MEDICINE

## 2023-01-27 RX ORDER — SODIUM CHLORIDE 0.9 % (FLUSH) 0.9 %
10 SYRINGE (ML) INJECTION
Status: DISCONTINUED | OUTPATIENT
Start: 2023-01-27 | End: 2023-02-06 | Stop reason: HOSPADM

## 2023-01-27 RX ORDER — FENTANYL CITRATE 50 UG/ML
25 INJECTION, SOLUTION INTRAMUSCULAR; INTRAVENOUS
Status: COMPLETED | OUTPATIENT
Start: 2023-01-27 | End: 2023-01-27

## 2023-01-27 RX ORDER — ONDANSETRON 2 MG/ML
4 INJECTION INTRAMUSCULAR; INTRAVENOUS
Status: COMPLETED | OUTPATIENT
Start: 2023-01-27 | End: 2023-01-27

## 2023-01-27 RX ORDER — PANTOPRAZOLE SODIUM 40 MG/10ML
40 INJECTION, POWDER, LYOPHILIZED, FOR SOLUTION INTRAVENOUS
Status: COMPLETED | OUTPATIENT
Start: 2023-01-27 | End: 2023-01-27

## 2023-01-27 RX ORDER — SODIUM CHLORIDE, SODIUM LACTATE, POTASSIUM CHLORIDE, CALCIUM CHLORIDE 600; 310; 30; 20 MG/100ML; MG/100ML; MG/100ML; MG/100ML
1000 INJECTION, SOLUTION INTRAVENOUS
Status: COMPLETED | OUTPATIENT
Start: 2023-01-27 | End: 2023-01-27

## 2023-01-27 RX ORDER — SODIUM CHLORIDE, SODIUM LACTATE, POTASSIUM CHLORIDE, CALCIUM CHLORIDE 600; 310; 30; 20 MG/100ML; MG/100ML; MG/100ML; MG/100ML
INJECTION, SOLUTION INTRAVENOUS CONTINUOUS
Status: DISCONTINUED | OUTPATIENT
Start: 2023-01-27 | End: 2023-02-06 | Stop reason: HOSPADM

## 2023-01-27 RX ORDER — MORPHINE SULFATE 2 MG/ML
1 INJECTION, SOLUTION INTRAMUSCULAR; INTRAVENOUS EVERY 6 HOURS PRN
Status: DISCONTINUED | OUTPATIENT
Start: 2023-01-27 | End: 2023-01-28

## 2023-01-27 RX ORDER — ONDANSETRON 2 MG/ML
4 INJECTION INTRAMUSCULAR; INTRAVENOUS EVERY 8 HOURS PRN
Status: DISCONTINUED | OUTPATIENT
Start: 2023-01-27 | End: 2023-02-06 | Stop reason: HOSPADM

## 2023-01-27 RX ADMIN — SODIUM CHLORIDE, SODIUM LACTATE, POTASSIUM CHLORIDE, AND CALCIUM CHLORIDE 1000 ML: .6; .31; .03; .02 INJECTION, SOLUTION INTRAVENOUS at 07:01

## 2023-01-27 RX ADMIN — FENTANYL CITRATE 25 MCG: 50 INJECTION INTRAMUSCULAR; INTRAVENOUS at 06:01

## 2023-01-27 RX ADMIN — ONDANSETRON 4 MG: 2 INJECTION INTRAMUSCULAR; INTRAVENOUS at 06:01

## 2023-01-27 RX ADMIN — ONDANSETRON HYDROCHLORIDE 4 MG: 2 SOLUTION INTRAMUSCULAR; INTRAVENOUS at 10:01

## 2023-01-27 RX ADMIN — PANTOPRAZOLE SODIUM 40 MG: 40 INJECTION, POWDER, FOR SOLUTION INTRAVENOUS at 06:01

## 2023-01-27 RX ADMIN — FENTANYL CITRATE 25 MCG: 50 INJECTION, SOLUTION INTRAMUSCULAR; INTRAVENOUS at 08:01

## 2023-01-27 RX ADMIN — IOHEXOL 100 ML: 350 INJECTION, SOLUTION INTRAVENOUS at 07:01

## 2023-01-27 RX ADMIN — MORPHINE SULFATE 1 MG: 2 INJECTION, SOLUTION INTRAMUSCULAR; INTRAVENOUS at 10:01

## 2023-01-28 LAB
ALBUMIN SERPL BCP-MCNC: 4 G/DL (ref 3.5–5.2)
ALP SERPL-CCNC: 81 U/L (ref 55–135)
ALT SERPL W/O P-5'-P-CCNC: 17 U/L (ref 10–44)
ANION GAP SERPL CALC-SCNC: 9 MMOL/L (ref 8–16)
AST SERPL-CCNC: 18 U/L (ref 10–40)
BASOPHILS # BLD AUTO: 0.03 K/UL (ref 0–0.2)
BASOPHILS NFR BLD: 0.4 % (ref 0–1.9)
BILIRUB SERPL-MCNC: 0.6 MG/DL (ref 0.1–1)
BUN SERPL-MCNC: 17 MG/DL (ref 8–23)
CALCIUM SERPL-MCNC: 8.7 MG/DL (ref 8.7–10.5)
CHLORIDE SERPL-SCNC: 105 MMOL/L (ref 95–110)
CO2 SERPL-SCNC: 25 MMOL/L (ref 23–29)
CREAT SERPL-MCNC: 0.4 MG/DL (ref 0.5–1.4)
CRP SERPL-MCNC: 0.28 MG/DL
DIFFERENTIAL METHOD: NORMAL
EOSINOPHIL # BLD AUTO: 0.1 K/UL (ref 0–0.5)
EOSINOPHIL NFR BLD: 0.7 % (ref 0–8)
ERYTHROCYTE [DISTWIDTH] IN BLOOD BY AUTOMATED COUNT: 12.2 % (ref 11.5–14.5)
ERYTHROCYTE [SEDIMENTATION RATE] IN BLOOD BY WESTERGREN METHOD: 6 MM/HR (ref 0–20)
EST. GFR  (NO RACE VARIABLE): >60 ML/MIN/1.73 M^2
FERRITIN SERPL-MCNC: 115 NG/ML (ref 20–300)
GLUCOSE SERPL-MCNC: 93 MG/DL (ref 70–110)
HCT VFR BLD AUTO: 41 % (ref 37–48.5)
HGB BLD-MCNC: 13.4 G/DL (ref 12–16)
IMM GRANULOCYTES # BLD AUTO: 0.03 K/UL (ref 0–0.04)
IMM GRANULOCYTES NFR BLD AUTO: 0.4 % (ref 0–0.5)
LYMPHOCYTES # BLD AUTO: 1.6 K/UL (ref 1–4.8)
LYMPHOCYTES NFR BLD: 19.2 % (ref 18–48)
MCH RBC QN AUTO: 30.7 PG (ref 27–31)
MCHC RBC AUTO-ENTMCNC: 32.7 G/DL (ref 32–36)
MCV RBC AUTO: 94 FL (ref 82–98)
MONOCYTES # BLD AUTO: 0.6 K/UL (ref 0.3–1)
MONOCYTES NFR BLD: 6.7 % (ref 4–15)
NEUTROPHILS # BLD AUTO: 6.2 K/UL (ref 1.8–7.7)
NEUTROPHILS NFR BLD: 72.6 % (ref 38–73)
NRBC BLD-RTO: 0 /100 WBC
PLATELET # BLD AUTO: 220 K/UL (ref 150–450)
PMV BLD AUTO: 10 FL (ref 9.2–12.9)
POTASSIUM SERPL-SCNC: 3.7 MMOL/L (ref 3.5–5.1)
PROCALCITONIN SERPL IA-MCNC: <0.05 NG/ML (ref 0–0.5)
PROT SERPL-MCNC: 6.6 G/DL (ref 6–8.4)
RBC # BLD AUTO: 4.36 M/UL (ref 4–5.4)
SODIUM SERPL-SCNC: 139 MMOL/L (ref 136–145)
TROPONIN I SERPL HS-MCNC: 3.7 PG/ML (ref 0–14.9)
TROPONIN I SERPL HS-MCNC: 3.7 PG/ML (ref 0–14.9)
WBC # BLD AUTO: 8.49 K/UL (ref 3.9–12.7)

## 2023-01-28 PROCEDURE — 12000002 HC ACUTE/MED SURGE SEMI-PRIVATE ROOM

## 2023-01-28 PROCEDURE — 99223 PR INITIAL HOSPITAL CARE,LEVL III: ICD-10-PCS | Mod: ,,, | Performed by: SURGERY

## 2023-01-28 PROCEDURE — 99223 1ST HOSP IP/OBS HIGH 75: CPT | Mod: ,,, | Performed by: SURGERY

## 2023-01-28 PROCEDURE — 80053 COMPREHEN METABOLIC PANEL: CPT | Performed by: NURSE PRACTITIONER

## 2023-01-28 PROCEDURE — 36415 COLL VENOUS BLD VENIPUNCTURE: CPT | Performed by: NURSE PRACTITIONER

## 2023-01-28 PROCEDURE — 86140 C-REACTIVE PROTEIN: CPT | Performed by: NURSE PRACTITIONER

## 2023-01-28 PROCEDURE — 63600175 PHARM REV CODE 636 W HCPCS: Performed by: NURSE PRACTITIONER

## 2023-01-28 PROCEDURE — 82728 ASSAY OF FERRITIN: CPT | Performed by: NURSE PRACTITIONER

## 2023-01-28 PROCEDURE — 85025 COMPLETE CBC W/AUTO DIFF WBC: CPT | Performed by: NURSE PRACTITIONER

## 2023-01-28 PROCEDURE — 84145 PROCALCITONIN (PCT): CPT | Performed by: NURSE PRACTITIONER

## 2023-01-28 PROCEDURE — 85651 RBC SED RATE NONAUTOMATED: CPT | Performed by: NURSE PRACTITIONER

## 2023-01-28 PROCEDURE — 84484 ASSAY OF TROPONIN QUANT: CPT | Performed by: NURSE PRACTITIONER

## 2023-01-28 RX ORDER — MORPHINE SULFATE 2 MG/ML
2 INJECTION, SOLUTION INTRAMUSCULAR; INTRAVENOUS EVERY 4 HOURS PRN
Status: DISCONTINUED | OUTPATIENT
Start: 2023-01-28 | End: 2023-01-31

## 2023-01-28 RX ADMIN — SODIUM CHLORIDE, SODIUM LACTATE, POTASSIUM CHLORIDE, AND CALCIUM CHLORIDE: .6; .31; .03; .02 INJECTION, SOLUTION INTRAVENOUS at 07:01

## 2023-01-28 RX ADMIN — SODIUM CHLORIDE, SODIUM LACTATE, POTASSIUM CHLORIDE, AND CALCIUM CHLORIDE: .6; .31; .03; .02 INJECTION, SOLUTION INTRAVENOUS at 12:01

## 2023-01-28 RX ADMIN — SODIUM CHLORIDE, SODIUM LACTATE, POTASSIUM CHLORIDE, AND CALCIUM CHLORIDE: .6; .31; .03; .02 INJECTION, SOLUTION INTRAVENOUS at 04:01

## 2023-01-28 NOTE — H&P
[facility] History & Physical Examination   Patient Name: Ariana Tiwari  MRN: [id number]  Patient Class: Emergency   Admission Date: 1/27/2023  5:51 PM  Length of Stay: 0  Attending Physician:   Primary Care Provider: Nba Petty MD  Face-to-Face encounter date: 01/27/2023  Code Status:Full Code  MPOA:  Chief Complaint: Abdominal Pain (Bloating and pain. History of obstructions)        Patient information was obtained from patient, past medical records and ER records.   HISTORY OF PRESENT ILLNESS:   Ariana Tiwari is a 77 y.o. old female who  has a past medical history of Allergy, Diverticulosis, Gluten enteropathy, Gluten intolerance, Hernia, Hypothyroidism, PD (Parkinson's disease) (9/16/2015), Peptic ulcer disease, Right shoulder pain, and Ulcer.. The patient presented to [facility] on 1/27/2023 with a primary complaint of Abdominal Pain (Bloating and pain. History of obstructions)  .   77 year old  female presents to emergency room with complaints of generalized abdominal pain.  Past medical history is include bowel obstructions, Parkinson's disease, hypertension hypothyroidism, diverticulitis and peptic ulcer disease    According to the patient's daughter earlier today the patient started having generalized abdominal pain.  The pain subsequently was located to the right upper quadrant she described the pain as a tight cramping sensation with associated nausea but no vomiting.  The patient is states that she had 2 bowel movements today that she described as normal in color and consistency.    In the emergency room a CT of the abdomen was performed revealing a high-grade small-bowel obstruction.  The patient states she has had obstructions of her bowels in the past with surgical intervention.    Incidentally the patient was found to have COVID-19 infection.  She did complain of generalized S fatigue but denies fever chills cough or shortness of  breath no loss of sense of smell or taste.  The patient states she recently came back off of a cruise on Sunday and believe she got exposed to COVID-19 while on a cruise    REVIEW OF SYSTEMS:   10 Point Review of System was performed and was found to be negative except for that mentioned already in the HPI and   Review of Systems (Negative unless checked off)  Review of Systems   Constitutional:  Positive for malaise/fatigue.   HENT: Negative.     Eyes: Negative.    Respiratory: Negative.     Cardiovascular: Negative.    Gastrointestinal:  Positive for abdominal pain and nausea.   Musculoskeletal:  Positive for myalgias.   Skin: Negative.    Neurological:  Positive for weakness.   Endo/Heme/Allergies: Negative.    Psychiatric/Behavioral: Negative.           PAST MEDICAL HISTORY:     Past Medical History:   Diagnosis Date    Allergy     Diverticulosis     Gluten enteropathy     Gluten intolerance     Hernia     Hypothyroidism     PD (Parkinson's disease) 9/16/2015    Right tremor type    Peptic ulcer disease     Right shoulder pain     Cirtisone injection 3/26/15 - Dr. Tolbert    Ulcer        PAST SURGICAL HISTORY:     Past Surgical History:   Procedure Laterality Date    ADENOIDECTOMY      COLONOSCOPY  03/01/2012    Dr. Teresa, repeat in 10 years for screening    COLONOSCOPY N/A 2/21/2018    Procedure: COLONOSCOPY;  Surgeon: Radha Deng MD;  [facility];  Service: Endoscopy;  Laterality: N/A;    CORRECTION OF HAMMER TOE Left 9/16/2020    Procedure: CORRECTION, HAMMER TOE;  Surgeon: William Sprague MD;  Location: [facility];  Service: Orthopedics;  Laterality: Left;    COSMETIC SURGERY      Broken nose    ESOPHAGOGASTRODUODENOSCOPY N/A 12/8/2021    Procedure: EGD (ESOPHAGOGASTRODUODENOSCOPY);  Surgeon: Benji Valentino III, MD;  Location: [facility];  Service: Endoscopy;  Laterality: N/A;    FRACTURE SURGERY  left wrist    With pin    HEMORRHOID SURGERY      HERNIA REPAIR Left 04/09/2015    Dr Lo;  left inguinal    INTRALUMINAL GASTROINTESTINAL TRACT IMAGING VIA CAPSULE N/A 7/10/2018    Procedure: IMAGING, GI TRACT, INTRALUMINAL, VIA CAPSULE;  Surgeon: Radha Deng MD;  Location: [facility] ENDO;  Service: Endoscopy;  Laterality: N/A;    LYSIS OF ADHESIONS  9/29/2020    Procedure: LYSIS, ADHESIONS;  Surgeon: Eagle Gonzalez MD;  Location: [facility] OR;  Service: General;;    RHINOPLASTY TIP      TONSILLECTOMY      UPPER GASTROINTESTINAL ENDOSCOPY  05/21/2015    Dr. Gomez       ALLERGIES:   Gluten    FAMILY HISTORY:     Family History   Problem Relation Age of Onset    Diabetes Mother     Diabetes Son     Obesity Sister     Alcohol abuse Brother     Heart disease Brother     No Known Problems Father     Early death Brother         self    Breast cancer Neg Hx     Colon cancer Neg Hx     Ovarian cancer Neg Hx     Colon polyps Neg Hx     Crohn's disease Neg Hx     Ulcerative colitis Neg Hx     Celiac disease Neg Hx        SOCIAL HISTORY:     Social History     Tobacco Use    Smoking status: Never    Smokeless tobacco: Never   Substance Use Topics    Alcohol use: Yes     Alcohol/week: 11.7 standard drinks     Types: 14 Standard drinks or equivalent per week     Comment: 2 glasses wine per dinner        Social History     Substance and Sexual Activity   Sexual Activity Never        HOME MEDICATIONS:     Prior to Admission medications    Medication Sig Start Date End Date Taking? Authorizing Provider   acetaminophen (TYLENOL) 325 MG tablet Take 2 tablets (650 mg total) by mouth every 6 (six) hours as needed for Pain. 12/27/21  Yes Jossue Begum MD   azelastine (ASTELIN) 137 mcg (0.1 %) nasal spray 1 spray (137 mcg total) by Nasal route 2 (two) times daily as needed for Rhinitis. 11/29/22 11/29/23 Yes Nba Petty MD   carbidopa-levodopa  mg (SINEMET)  mg per tablet Take 1 tablet by mouth 3 (three) times daily. 10/28/21  Yes Historical Provider   docusate sodium (COLACE) 100 MG capsule Take 1  capsule (100 mg total) by mouth once daily. 12/16/21  Yes Jennifer Kelley MD   fluticasone propionate (FLONASE) 50 mcg/actuation nasal spray 1 spray (50 mcg total) by Each Nostril route once daily. 10/18/21  Yes Shannon Mclaughlin PA-C   lactose-reduced food (ENSURE ORIGINAL ORAL) Take 1 Dose by mouth once daily.   Yes Historical Provider   levothyroxine (SYNTHROID) 137 MCG Tab tablet TAKE 1/2 (ONE-HALF) TABLET BY MOUTH IN THE MORNING BEFORE BREAKFAST 10/2/22  Yes Nba Petty MD   lycopene/lutein/fruit extracts (FRUIT AND VEGETABLE DAILY ORAL) Take 1 Dose by mouth once daily. Patient takes 2 fruit and 1 vegetable balance of nature vitamins in the morning and evening   Yes Historical Provider   simethicone (GAS-X ORAL) Take 1 Dose by mouth daily as needed.   Yes Historical Provider   food supplemt, lactose-reduced (ENSURE CLEAR) Liqd Take 5 mLs by mouth 3 (three) times daily with meals.    Historical Provider   multivitamin with minerals tablet 1 tablet DAILY (route: oral) 8/4/21   Historical Provider   pantoprazole (PROTONIX) 40 MG tablet Take 1 tablet (40 mg total) by mouth 2 (two) times daily.  Patient not taking: Reported on 1/28/2022 12/15/21 12/15/22  Jennifer Kelley MD   polyethylene glycol (GLYCOLAX) 17 gram PwPk Take 17 g by mouth every other day.  Patient not taking: Reported on 1/25/2022 12/28/21   Jossue Begum MD   pumpkin seed extract/soy germ (AZO BLADDER CONTROL ORAL) Take by mouth.    Historical Provider   salicylic acid 3 % Sham Shampoo scalp 2-3 times a week 11/29/22   Nba Petty MD   simethicone (MYLICON) 125 mg Cap capsule Take 1 capsule by mouth 4 (four) times daily as needed. 8/4/21   Historical Provider   simethicone (MYLICON) 125 mg Cap capsule Take 1 capsule (125 mg total) by mouth 3 (three) times daily with meals. 11/29/22   Nba Petty MD   tamsulosin (FLOMAX) 0.4 mg Cap Take 1 capsule (0.4 mg total) by mouth once daily.  Patient not taking: Reported on 5/18/2022  1/25/22 1/25/23  Nba Petty MD         PHYSICAL EXAM:   /68   Pulse 67   Temp 97.8 °F (36.6 °C) (Oral)   Resp 16   Wt 56.2 kg (124 lb)   LMP  (LMP Unknown)   SpO2 98%   BMI 21.97 kg/m²   Vitals Reviewed  General appearance:  Frail and ill-appearing female in no apparent distress.  Skin: No Rash.  Flushed  Neuro: Motor and sensory exams grossly intact. Good tone. Power in all 4 extremities 5/5.   HENT: Atraumatic head. Moist mucous membranes of oral cavity.  Eyes: Normal extraocular movements.   Neck: Supple. No evidence of lymphadenopathy. No thyroidomegaly.  Lungs: Clear to auscultation bilaterally. No wheezing present.   Heart: Regular rate and rhythm. S1 and S2 present with no murmurs/gallop/rub. No pedal edema. No JVD present.   Abdomen:  Mild distended, positive tenderness to palpate the upper and mid epigastric region No rebound tenderness/guarding. No masses or organomegaly. Bowel sounds hypoactive Bladder is not palpable.   Extremities: No cyanosis, clubbing, or edema.  Psych/mental status: Alert and oriented. Cooperative. Responds appropriately to questions.   EMERGENCY DEPARTMENT LABS AND IMAGING:   Following labs were Reviewed   Recent Labs   Lab 01/27/23  1826   WBC 12.39   HGB 15.4   HCT 46.9      CALCIUM 10.3   ALBUMIN 4.8   PROT 8.3      K 3.9   CO2 29      BUN 13   CREATININE 0.5   ALKPHOS 88   ALT 21   AST 21   BILITOT 0.6         BMP:   Recent Labs   Lab 01/27/23  1826   GLU 90      K 3.9      CO2 29   BUN 13   CREATININE 0.5   CALCIUM 10.3   , CMP   Recent Labs   Lab 01/27/23  1826      K 3.9      CO2 29   GLU 90   BUN 13   CREATININE 0.5   CALCIUM 10.3   PROT 8.3   ALBUMIN 4.8   BILITOT 0.6   ALKPHOS 88   AST 21   ALT 21   ANIONGAP 10   , CBC   Recent Labs   Lab 01/27/23  1826   WBC 12.39   HGB 15.4   HCT 46.9      , INR   Lab Results   Component Value Date    INR 1.1 12/15/2021    INR 1.2 09/29/2020    INR 1.1 09/21/2019   ,  Lipid Panel   Lab Results   Component Value Date    CHOL 144 03/23/2021    HDL 43 03/23/2021    LDLCALC 90.0 03/23/2021    TRIG 55 03/23/2021    CHOLHDL 29.9 03/23/2021   , Troponin No results for input(s): TROPONINI in the last 168 hours., A1C:   Recent Labs   Lab 01/10/23  0814   HGBA1C 4.5   , and All labs within the past 24 hours have been reviewed  Microbiology Results (last 7 days)       ** No results found for the last 168 hours. **          CT Abdomen Pelvis With Contrast   Final Result      X-Ray Chest AP Portable   Final Result        CT Abdomen Pelvis With Contrast    Result Date: 1/27/2023  CT ABDOMEN PELVIS WITH IV CONTRAST COMPARISONS:  CT abdomen and pelvis with contrast dated December 4, 2021. ADDITIONAL PERTINENT HISTORY:   Abdominal pain, acute TECHNIQUE:   Multiple axial images were obtained from the lung bases through the lesser trochanters after the adminstration of intravenous contrast.  One of the following dose optimization techniques was utilized in the performance of this exam: Automated exposure control; adjustment of the mA and/or kV according to the patient's size; or use of an iterative  reconstruction technique.  Specific details can be referenced in the facility's radiology CT exam operational policy. CONTRAST:   100 mL of IV Omnipaque 350. FINDINGS: Lung bases: Mild bibasilar atelectatic change. Free air/free fluid: none Liver:  negative Spleen: negative Adrenal glands: Continued mild prominence of the left adrenal gland stable from previous exam likely representing a small adenoma. However this does not meet Hounsfield criteria for an adenoma given that the average Hounsfield units are 75. Kidneys /ureters/urinary bladder: negative Pancreas: negative Gall Bladder:   Contracted gallbladder. Bowel / mesentery: There are continued findings of multiple dilated loops of small bowel extending deep within the pelvis and into the right lower quadrant. Of note in the right lower quadrant  near the point of transition there is swirling of the mesentery raising the potential for an underlying internal hernia in this location as the cause for the small bowel obstruction. The colon is decompressed and has a normal appearance. Lymph node assessment: negative Pelvic contents: negative Abdominal vasculature: Atherosclerotic disease of the abdominal aorta and its major branches. Surrounding soft tissues: negative Osseous structures: negative IMPRESSION: 1. Findings of a distal small bowel high-grade obstruction which may be potentially related to underlying internal hernia given the appearance in the right lower quadrant. Surgical consultation would be of benefit. 2. No other acute intra-abdominal or intrapelvic process. Electronically signed by:  Cain Christie MD  1/27/2023 8:13 PM CST Workstation: [id number]    X-Ray Chest AP Portable    Result Date: 1/27/2023  Reason: Chest Pain FINDINGS: Portable chest with comparison chest x-ray December 6, 2021 show normal cardiomediastinal silhouette. Lungs are clear. Pulmonary vasculature is normal. No acute osseous abnormality. IMPRESSION: No acute cardiopulmonary abnormality. Electronically signed by:  Elvin Bhat DO  1/27/2023 7:02 PM CST Workstation: [id number]      I personally reviewed and agree with the radiologist's findings  ASSESSMENT & PLAN:   Ariana Tiwari is a 77 y.o. female admitted for    Small Bowel Obstruction  -NPO  -IV hydration  -NG tube to LWIS  -general surgery consult/Dr. Gonzalez  -pain management    2. Hypothyroidism  -resume home medications once no longer NPO    3. COVID-19 infection  -COVID-19 isolation  -asymptomatic at this point no fever shortness of breath  -manage conservatively  -check inflammatory markers    4. Parkinson's disease  -continue home medications once no longer NPO    DVT Prophylaxis: will be placed on SCDs for DVT prophylaxis and will be advised to be as mobile as possible and sit in a chair as tolerated.    ____________________________________________________________________________  Face-to-Face encounter date: 01/27/2023  Encounter included review of the medical records, interviewing and examining the patient face-to-face, discussion with family and other health care providers including emergency medicine physician, admission orders, interpreting lab/test results and formulating a plan of care.   Medical Decision Making during this encounter was  [_] Low Complexity  [_] Moderate Complexity  [x] High Complexity  _________________________________________________________________________________    INPATIENT LIST OF MEDICATIONS   No current facility-administered medications for this encounter.    Current Outpatient Medications:     acetaminophen (TYLENOL) 325 MG tablet, Take 2 tablets (650 mg total) by mouth every 6 (six) hours as needed for Pain., Disp: , Rfl: 0    azelastine (ASTELIN) 137 mcg (0.1 %) nasal spray, 1 spray (137 mcg total) by Nasal route 2 (two) times daily as needed for Rhinitis., Disp: 30 mL, Rfl: 5    carbidopa-levodopa  mg (SINEMET)  mg per tablet, Take 1 tablet by mouth 3 (three) times daily., Disp: , Rfl:     docusate sodium (COLACE) 100 MG capsule, Take 1 capsule (100 mg total) by mouth once daily., Disp: , Rfl: 0    fluticasone propionate (FLONASE) 50 mcg/actuation nasal spray, 1 spray (50 mcg total) by Each Nostril route once daily., Disp: 18.2 mL, Rfl: 2    lactose-reduced food (ENSURE ORIGINAL ORAL), Take 1 Dose by mouth once daily., Disp: , Rfl:     levothyroxine (SYNTHROID) 137 MCG Tab tablet, TAKE 1/2 (ONE-HALF) TABLET BY MOUTH IN THE MORNING BEFORE BREAKFAST, Disp: 45 tablet, Rfl: 1    lycopene/lutein/fruit extracts (FRUIT AND VEGETABLE DAILY ORAL), Take 1 Dose by mouth once daily. Patient takes 2 fruit and 1 vegetable balance of nature vitamins in the morning and evening, Disp: , Rfl:     simethicone (GAS-X ORAL), Take 1 Dose by mouth daily as needed., Disp: , Rfl:     food  supplemt, lactose-reduced (ENSURE CLEAR) Liqd, Take 5 mLs by mouth 3 (three) times daily with meals., Disp: , Rfl:     multivitamin with minerals tablet, 1 tablet DAILY (route: oral), Disp: , Rfl:     pantoprazole (PROTONIX) 40 MG tablet, Take 1 tablet (40 mg total) by mouth 2 (two) times daily. (Patient not taking: Reported on 1/28/2022), Disp: 60 tablet, Rfl: 11    polyethylene glycol (GLYCOLAX) 17 gram PwPk, Take 17 g by mouth every other day. (Patient not taking: Reported on 1/25/2022), Disp: 30 packet, Rfl: 1    pumpkin seed extract/soy germ (AZO BLADDER CONTROL ORAL), Take by mouth., Disp: , Rfl:     salicylic acid 3 % Sham, Shampoo scalp 2-3 times a week, Disp: 236 mL, Rfl: PRN    simethicone (MYLICON) 125 mg Cap capsule, Take 1 capsule by mouth 4 (four) times daily as needed., Disp: , Rfl:     simethicone (MYLICON) 125 mg Cap capsule, Take 1 capsule (125 mg total) by mouth 3 (three) times daily with meals., Disp: 100 capsule, Rfl: PRN    tamsulosin (FLOMAX) 0.4 mg Cap, Take 1 capsule (0.4 mg total) by mouth once daily. (Patient not taking: Reported on 5/18/2022), Disp: 30 capsule, Rfl: 11      Scheduled Meds:  Continuous Infusions:  PRN Meds:.      Zoie Londono  [facility] Hospitalist NP  01/27/2023

## 2023-01-28 NOTE — ED PROVIDER NOTES
Encounter Date: 1/27/2023       History     Chief Complaint   Patient presents with    Abdominal Pain     Bloating and pain. History of obstructions     77 year-old female with history of abdominal ventral hernia, recurrent small-bowel obstructions x2.  Patient previous seen by Dr. Mcarthur.  Patient with peptic ulcer disease.  Presents to the emergency department with complaint of generalized abdominal distention and abdominal pain over last day and half.  States pain is worsened today.  Has had mild nausea, no vomiting, daughter was concerned patient may have a recurrent small-bowel obstruction.  Denies fevers, no chest pain, no shortness of breath, no other constitutional symptoms.    Review of patient's allergies indicates:   Allergen Reactions    Gluten Diarrhea     Past Medical History:   Diagnosis Date    Allergy     Diverticulosis     Gluten enteropathy     Gluten intolerance     Hernia     Hypothyroidism     PD (Parkinson's disease) 9/16/2015    Right tremor type    Peptic ulcer disease     Right shoulder pain     Cirtisone injection 3/26/15 - Dr. Tolbert    Ulcer      Past Surgical History:   Procedure Laterality Date    ADENOIDECTOMY      COLONOSCOPY  03/01/2012    Dr. Teresa, repeat in 10 years for screening    COLONOSCOPY N/A 2/21/2018    Procedure: COLONOSCOPY;  Surgeon: Radha Deng MD;  Location: [facility];  Service: Endoscopy;  Laterality: N/A;    CORRECTION OF HAMMER TOE Left 9/16/2020    Procedure: CORRECTION, HAMMER TOE;  Surgeon: William Sprague MD;  Location: [facility] OR;  Service: Orthopedics;  Laterality: Left;    COSMETIC SURGERY      Broken nose    ESOPHAGOGASTRODUODENOSCOPY N/A 12/8/2021    Procedure: EGD (ESOPHAGOGASTRODUODENOSCOPY);  Surgeon: Benji Valentino III, MD;  Location: [facility] ENDO;  Service: Endoscopy;  Laterality: N/A;    FRACTURE SURGERY  left wrist    With pin    HEMORRHOID SURGERY      HERNIA REPAIR Left 04/09/2015    Dr Lo; left inguinal    INTRALUMINAL  GASTROINTESTINAL TRACT IMAGING VIA CAPSULE N/A 7/10/2018    Procedure: IMAGING, GI TRACT, INTRALUMINAL, VIA CAPSULE;  Surgeon: Radha Deng MD;  Location: [facility];  Service: Endoscopy;  Laterality: N/A;    LYSIS OF ADHESIONS  9/29/2020    Procedure: LYSIS, ADHESIONS;  Surgeon: Eagle Gonzalez MD;  Location: [facility] OR;  Service: General;;    RHINOPLASTY TIP      TONSILLECTOMY      UPPER GASTROINTESTINAL ENDOSCOPY  05/21/2015    Dr. Gomez     Family History   Problem Relation Age of Onset    Diabetes Mother     Diabetes Son     Obesity Sister     Alcohol abuse Brother     Heart disease Brother     No Known Problems Father     Early death Brother         self    Breast cancer Neg Hx     Colon cancer Neg Hx     Ovarian cancer Neg Hx     Colon polyps Neg Hx     Crohn's disease Neg Hx     Ulcerative colitis Neg Hx     Celiac disease Neg Hx      Social History     Tobacco Use    Smoking status: Never    Smokeless tobacco: Never   Substance Use Topics    Alcohol use: Yes     Alcohol/week: 11.7 standard drinks     Types: 14 Standard drinks or equivalent per week     Comment: 2 glasses wine per dinner    Drug use: No     Review of Systems   Constitutional:  Negative for fever.   HENT:  Negative for sore throat.    Respiratory:  Negative for shortness of breath.    Cardiovascular:  Negative for chest pain.   Gastrointestinal:  Positive for abdominal distention, abdominal pain and nausea. Negative for vomiting.   Genitourinary:  Negative for dysuria.   Musculoskeletal:  Negative for back pain.   Skin:  Negative for rash.   Neurological:  Negative for weakness.   Hematological:  Does not bruise/bleed easily.     Physical Exam     Initial Vitals [01/27/23 1752]   BP Pulse Resp Temp SpO2   (!) 119/90 68 18 97.8 °F (36.6 °C) 100 %      MAP       --         Physical Exam    Nursing note and vitals reviewed.  Constitutional: She appears well-developed and well-nourished.   HENT:   Head: Normocephalic and atraumatic.   Nose:  Nose normal.   Mouth/Throat: Oropharynx is clear and moist.   Eyes: Conjunctivae and EOM are normal. Pupils are equal, round, and reactive to light.   Neck: Neck supple. No thyromegaly present. No tracheal deviation present.   Normal range of motion.  Cardiovascular:  Normal rate, regular rhythm, normal heart sounds and intact distal pulses.     Exam reveals no gallop and no friction rub.       No murmur heard.  Pulmonary/Chest: No stridor. No respiratory distress.   Course bilateral breath sounds no adventitious   Abdominal: Abdomen is soft. She exhibits no mass.   Positive abdominal distension, hyperactive bowel sounds, with large ventral hernia which was compressible, not incarcerated clinically on examination. There is no rebound and no guarding.   Musculoskeletal:         General: No edema. Normal range of motion.      Cervical back: Normal range of motion and neck supple.     Lymphadenopathy:     She has no cervical adenopathy.   Neurological: She is alert and oriented to person, place, and time. She has normal strength and normal reflexes. GCS score is 15. GCS eye subscore is 4. GCS verbal subscore is 5. GCS motor subscore is 6.   Skin: Skin is warm and dry. Capillary refill takes less than 2 seconds.   Psychiatric: She has a normal mood and affect.       ED Course   Procedures  Labs Reviewed   CBC W/ AUTO DIFFERENTIAL - Abnormal; Notable for the following components:       Result Value    Gran # (ANC) 9.5 (*)     Immature Grans (Abs) 0.06 (*)     Gran % 76.8 (*)     Lymph % 16.5 (*)     All other components within normal limits   B-TYPE NATRIURETIC PEPTIDE - Abnormal; Notable for the following components:     (*)     All other components within normal limits   SARS-COV-2 RNA AMPLIFICATION, QUAL - Abnormal; Notable for the following components:    SARS-CoV-2 RNA, Amplification, Qual Positive (*)     All other components within normal limits   COMPREHENSIVE METABOLIC PANEL   TROPONIN I HIGH SENSITIVITY    TROPONIN I HIGH SENSITIVITY   LIPASE   URINALYSIS, REFLEX TO URINE CULTURE   ISTAT CREATININE   POCT CREATININE     EKG Readings: (Independently Interpreted)   Rhythm: Normal Sinus Rhythm. Ectopy: No Ectopy. Conduction: Normal.     Imaging Results              X-Ray Chest 1 View (In process)                      CT Abdomen Pelvis With Contrast (Final result)  Result time 01/27/23 20:13:56      Final result by Cain Christie MD (01/27/23 20:13:56)                   Narrative:    CT ABDOMEN PELVIS WITH IV CONTRAST    COMPARISONS:  CT abdomen and pelvis with contrast dated December 4, 2021.    ADDITIONAL PERTINENT HISTORY:   Abdominal pain, acute    TECHNIQUE:   Multiple axial images were obtained from the lung bases through the lesser trochanters after the adminstration of intravenous contrast.  One of the following dose optimization techniques was utilized in the performance of this exam: Automated exposure control; adjustment of the mA and/or kV according to the patient's size; or use of an iterative  reconstruction technique.  Specific details can be referenced in the facility's radiology CT exam operational policy.    CONTRAST:   100 mL of IV Omnipaque 350.    FINDINGS:  Lung bases: Mild bibasilar atelectatic change.    Free air/free fluid: none  Liver:  negative  Spleen: negative  Adrenal glands: Continued mild prominence of the left adrenal gland stable from previous exam likely representing a small adenoma. However this does not meet Hounsfield criteria for an adenoma given that the average Hounsfield units are 75.  Kidneys /ureters/urinary bladder: negative  Pancreas: negative  Gall Bladder:   Contracted gallbladder.    Bowel / mesentery: There are continued findings of multiple dilated loops of small bowel extending deep within the pelvis and into the right lower quadrant. Of note in the right lower quadrant near the point of transition there is swirling of the mesentery raising the potential for an  underlying internal hernia in this location as the cause for the small bowel obstruction. The colon is decompressed and has a normal appearance.    Lymph node assessment: negative    Pelvic contents: negative  Abdominal vasculature: Atherosclerotic disease of the abdominal aorta and its major branches.    Surrounding soft tissues: negative  Osseous structures: negative      IMPRESSION:    1. Findings of a distal small bowel high-grade obstruction which may be potentially related to underlying internal hernia given the appearance in the right lower quadrant. Surgical consultation would be of benefit.  2. No other acute intra-abdominal or intrapelvic process.    Electronically signed by:  Cain Christie MD  1/27/2023 8:13 PM CST Workstation: [id number]                                     X-Ray Chest AP Portable (Final result)  Result time 01/27/23 19:02:36      Final result by Elvin Bhat MD (01/27/23 19:02:36)                   Narrative:    Reason: Chest Pain    FINDINGS:    Portable chest with comparison chest x-ray December 6, 2021 show normal cardiomediastinal silhouette.  Lungs are clear. Pulmonary vasculature is normal. No acute osseous abnormality.    IMPRESSION:    No acute cardiopulmonary abnormality.    Electronically signed by:  Elvin Bhat DO  1/27/2023 7:02 PM CST Workstation: [id number]                                     Medications   lactated ringers infusion (1,000 mLs Intravenous New Bag 1/27/23 1914)   fentaNYL 50 mcg/mL injection 25 mcg (25 mcg Intravenous Given 1/27/23 1841)   ondansetron injection 4 mg (4 mg Intravenous Given 1/27/23 1845)   pantoprazole injection 40 mg (40 mg Intravenous Given 1/27/23 1850)   iohexoL (OMNIPAQUE 350) injection 100 mL (100 mLs Intravenous Given 1/27/23 1932)   fentaNYL 50 mcg/mL injection 25 mcg (25 mcg Intravenous Given 1/27/23 2035)     Medical Decision Making:   Initial Assessment:   77 year-old female with history of abdominal ventral hernia, recurrent  small-bowel obstructions x2.  Patient previous seen by Dr. Mcarthur.  Patient with peptic ulcer disease.  Presents to the emergency department with complaint of generalized abdominal distention and abdominal pain over last day and half.  States pain is worsened today.  Has had mild nausea, no vomiting, daughter was concerned patient may have a recurrent small-bowel obstruction.  Denies fevers, no chest pain, no shortness of breath, no other constitutional symptoms.    Differential Diagnosis:   Bowel obstruction, ileus, ventral hernia, colitis, diverticulitis,  Independently Interpreted Test(s):   I have ordered and independently interpreted X-rays - see prior notes.  I have ordered and independently interpreted EKG Reading(s) - see prior notes  Clinical Tests:   Lab Tests: Ordered and Reviewed  Radiological Study: Ordered and Reviewed  Medical Tests: Ordered and Reviewed  ED Management:  Patient with abdominal pain nausea and vomiting and having pain with peristalsis.  Patient does have distended abdomen.  CT scan does show evidence of bowel obstruction with likely internal hernia being the cause of obstruction.  Patient had previous bowel surgery for bowel obstruction.  NG tube placed.  Patient is pain control at this time and hemodynamically stable.  Screening labs reviewed.  Case discussed with Dr. Gonzalez surgeon on-call who recommended NG tube and admission to Hospital Medicine for supportive care and depending on the response after NG tube he would consider surgery.  Based on this recommendation Hospital Medicine consulted for evaluation for admission and further management.  Screening labs and workup reviewed and hospitalist evaluate patient for admission and further management.  Patient currently hemodynamically stable.  EKG labs CT scan and x-ray independently interpreted by me and radiology report reviewed.  Patient also incidentally positive for COVID.  Patient otherwise does not have any symptoms of viral  illness                        Clinical Impression:   Final diagnoses:  [R10.84] Generalized abdominal pain (Primary)  [K56.609] Small bowel obstruction  [U07.1] COVID        ED Disposition Condition    Admit Fair                Nba Marin MD  01/27/23 2003       Judith Tompkins MD  01/27/23 2110       Judith Tompkins MD  01/27/23 2110       Judith Tompkins MD  01/27/23 2122

## 2023-01-28 NOTE — PROGRESS NOTES
[facility]  Progress Note    Patient name: Ariana Tiwari  MRN: [id number]  Admit Date: 1/27/2023   LOS: 1 day     SUBJECTIVE:     Principal problem: SBO (small bowel obstruction)    Interval History:  Patient presented to the ED with abdominal pain with nausea.  She had 2 BMs prior to coming to the ED.  She was admitted with SBO as demonstrated by CT scan. She's had SBO in the past and has required surgery.  ED workup also positive for Covid 19. She has no hypoxia. General surgery consulted.  NGT to LIWS. Repeat flat and erect with persistent loops of dilated small bowel.  She/Family reports she also has a bloated abdomen but distension did improve after placement of NGT.    This AM, she also reports pain to right knee.  She cannot move her leg secondary to pain in her knee.  Knee demonstrates effusion and increased warmth compared to left.    Hospital Course:    Scheduled Meds:  Continuous Infusions:   lactated ringers 100 mL/hr at 01/28/23 0027     PRN Meds:morphine, ondansetron, sodium chloride 0.9%    Review of patient's allergies indicates:   Allergen Reactions    Gluten Diarrhea       Review of Systems: As per interval history    OBJECTIVE:     Vital Signs (Most Recent)  Temp: 98.7 °F (37.1 °C) (01/28/23 0801)  Pulse: 67 (01/28/23 0801)  Resp: 16 (01/28/23 0801)  BP: (!) 115/52 (01/28/23 0801)  SpO2: (!) 93 % (01/28/23 0801)    Vital Signs Range (Last 24H):  Temp:  [97.8 °F (36.6 °C)-98.8 °F (37.1 °C)]   Pulse:  [67-81]   Resp:  [13-18]   BP: (115-129)/(52-90)   SpO2:  [93 %-100 %]     I & O (Last 24H):No intake or output data in the 24 hours ending 01/28/23 1300    Physical Exam:    Vitals and nursing note reviewed.     Constitutional:       General: Not in acute distress.     Appearance: Well-developed.   HENT:      Head: Normocephalic and atraumatic.   NGT  Eyes:      Pupils: Pupils are equal, round, and reactive to light.   Cardiovascular:      Rate and Rhythm: Regular  rhythm.   Pulmonary:      Effort: Pulmonary effort is normal.      Breath sounds: Normal breath sounds. No wheezing.   Abdominal:      General: There is no distension.      Palpations: Abdomen is soft.      Tenderness: There is no abdominal tenderness. There is no guarding or rebound.   Musculoskeletal:         Right knee effusion with increased warmth.  No erythema.  Skin:     Findings: No rash.   Neurological:      Mental Status: Alert and oriented to person, place, and time.    Has baseline tremor secondary to her Parkinsons    Laboratory:  I have reviewed all pertinent lab results within the past 24 hours.  CBC:   Recent Labs   Lab 01/28/23  0506   WBC 8.49   RBC 4.36   HGB 13.4   HCT 41.0      MCV 94   MCH 30.7   MCHC 32.7     CMP:   Recent Labs   Lab 01/28/23  0506   GLU 93   CALCIUM 8.7   ALBUMIN 4.0   PROT 6.6      K 3.7   CO2 25      BUN 17   CREATININE 0.4*   ALKPHOS 81   ALT 17   AST 18   BILITOT 0.6       Diagnostic Results:  X-Ray: Reviewed  Flat and erect with persistent dilation of small bowel loops    ASSESSMENT/PLAN:         Active Hospital Problems    Diagnosis  POA    *SBO (small bowel obstruction) [K56.609]  Yes    COVID-19 [U07.1]  Yes    PD (Parkinson's disease) [G20]  Yes     Right tremor type      Hypothyroidism [E03.9]  Yes      Resolved Hospital Problems   No resolved problems to display.         Plan:     -NGT to LIWS  -General surgery consulted and following  -Serial abdominal exams and Xrays  -Not passing gas.  Last BM yesterday.  -IVFs for hydration while NPO.  -CT concerning for ? Internal hernia.  Conservative measures for now.  No indication for emergent surgery.  -suspect right knee OA as source of pain and swelling. Prn IV pain medication given npo.  Ice.    -Covid isolation and symptomatic treatment.  Not hypoxic.       VTE Risk Mitigation (From admission, onward)           Ordered     IP VTE LOW RISK PATIENT  Once         01/27/23 2223     Place sequential  compression device  Until discontinued         01/27/23 2223                      Department Hospital Medicine  [facility]  Colette Paiz MD  Date of service: 01/28/2023

## 2023-01-28 NOTE — CONSULTS
[facility]  General Surgery  Consult Note    Patient Name: Ariana Tiwari  MRN: [id number]  Code Status: Full Code  Admission Date: 1/27/2023  Hospital Length of Stay: 1 days  Attending Physician: Colette Paiz MD  Primary Care Provider: Nba Petty MD    Patient information was obtained from patient and ER records.     Inpatient consult to General surgery  Consult performed by: Eagle Gonzalez MD  Consult ordered by: Judith Tompkins MD        Subjective:     Principal Problem: SBO (small bowel obstruction)    History of Present Illness: [age] year female presented to the hospital with complaints of abdominal pain and discomfort.  Patient has had previous bouts of bowel obstructions.  In 2020 patient had exploratory laparotomy with small-bowel resection secondary to small bowel diverticular disease.  Several months later she presented with pneumoperitoneum and underwent exploratory laparotomy with no significant intra-abdominal findings.  Since then she has had 1 or 2 admissions secondary to partial bowel obstructions.  She does struggle with chronic dysmotility and has always had dilated loops of small bowel on presentation.  Last night she was having loose stools and as mentioned was having epigastric abdominal pain and discomfort.  On presentation to the ER her labs and vitals were stable.  She did have a CT scan demonstrating findings concerning for partial small-bowel obstruction and possible internal hernia.  NG tube was placed.  Patient is sitting comfortably in bed today and notes that she has no complaints.  She does suffer with Parkinson's disease.      No current facility-administered medications on file prior to encounter.     Current Outpatient Medications on File Prior to Encounter   Medication Sig    acetaminophen (TYLENOL) 325 MG tablet Take 2 tablets (650 mg total) by mouth every 6 (six) hours as needed for Pain.    azelastine (ASTELIN) 137 mcg (0.1 %) nasal spray 1 spray (137  mcg total) by Nasal route 2 (two) times daily as needed for Rhinitis.    carbidopa-levodopa  mg (SINEMET)  mg per tablet Take 1 tablet by mouth 3 (three) times daily.    docusate sodium (COLACE) 100 MG capsule Take 1 capsule (100 mg total) by mouth once daily.    fluticasone propionate (FLONASE) 50 mcg/actuation nasal spray 1 spray (50 mcg total) by Each Nostril route once daily.    lactose-reduced food (ENSURE ORIGINAL ORAL) Take 1 Dose by mouth once daily.    levothyroxine (SYNTHROID) 137 MCG Tab tablet TAKE 1/2 (ONE-HALF) TABLET BY MOUTH IN THE MORNING BEFORE BREAKFAST    lycopene/lutein/fruit extracts (FRUIT AND VEGETABLE DAILY ORAL) Take 1 Dose by mouth once daily. Patient takes 2 fruit and 1 vegetable balance of nature vitamins in the morning and evening    simethicone (GAS-X ORAL) Take 1 Dose by mouth daily as needed.    food supplemt, lactose-reduced (ENSURE CLEAR) Liqd Take 5 mLs by mouth 3 (three) times daily with meals.    multivitamin with minerals tablet 1 tablet DAILY (route: oral)    pantoprazole (PROTONIX) 40 MG tablet Take 1 tablet (40 mg total) by mouth 2 (two) times daily. (Patient not taking: Reported on 1/28/2022)    polyethylene glycol (GLYCOLAX) 17 gram PwPk Take 17 g by mouth every other day. (Patient not taking: Reported on 1/25/2022)    pumpkin seed extract/soy germ (AZO BLADDER CONTROL ORAL) Take by mouth.    salicylic acid 3 % Sham Shampoo scalp 2-3 times a week    simethicone (MYLICON) 125 mg Cap capsule Take 1 capsule by mouth 4 (four) times daily as needed.    simethicone (MYLICON) 125 mg Cap capsule Take 1 capsule (125 mg total) by mouth 3 (three) times daily with meals.    tamsulosin (FLOMAX) 0.4 mg Cap Take 1 capsule (0.4 mg total) by mouth once daily. (Patient not taking: Reported on 5/18/2022)       Review of patient's allergies indicates:   Allergen Reactions    Gluten Diarrhea       Past Medical History:   Diagnosis Date    Allergy      Diverticulosis     Gluten enteropathy     Gluten intolerance     Hernia     Hypothyroidism     PD (Parkinson's disease) 9/16/2015    Right tremor type    Peptic ulcer disease     Right shoulder pain     Cirtisone injection 3/26/15 - Dr. Tolbert    Ulcer      Past Surgical History:   Procedure Laterality Date    ADENOIDECTOMY      COLONOSCOPY  03/01/2012    Dr. Teresa, repeat in 10 years for screening    COLONOSCOPY N/A 2/21/2018    Procedure: COLONOSCOPY;  Surgeon: Radha Deng MD;  Location: [facility];  Service: Endoscopy;  Laterality: N/A;    CORRECTION OF HAMMER TOE Left 9/16/2020    Procedure: CORRECTION, HAMMER TOE;  Surgeon: William Sprague MD;  Location: [facility] OR;  Service: Orthopedics;  Laterality: Left;    COSMETIC SURGERY      Broken nose    ESOPHAGOGASTRODUODENOSCOPY N/A 12/8/2021    Procedure: EGD (ESOPHAGOGASTRODUODENOSCOPY);  Surgeon: Benji Valentino III, MD;  Location: [facility];  Service: Endoscopy;  Laterality: N/A;    FRACTURE SURGERY  left wrist    With pin    HEMORRHOID SURGERY      HERNIA REPAIR Left 04/09/2015    Dr Lo; left inguinal    INTRALUMINAL GASTROINTESTINAL TRACT IMAGING VIA CAPSULE N/A 7/10/2018    Procedure: IMAGING, GI TRACT, INTRALUMINAL, VIA CAPSULE;  Surgeon: Radha Deng MD;  Location: [facility];  Service: Endoscopy;  Laterality: N/A;    LYSIS OF ADHESIONS  9/29/2020    Procedure: LYSIS, ADHESIONS;  Surgeon: Eagle Gonzalez MD;  Location: [facility];  Service: General;;    RHINOPLASTY TIP      TONSILLECTOMY      UPPER GASTROINTESTINAL ENDOSCOPY  05/21/2015    Dr. Gomez     Family History       Problem Relation (Age of Onset)    Alcohol abuse Brother    Diabetes Mother, Son    Early death Brother    Heart disease Brother    No Known Problems Father    Obesity Sister          Tobacco Use    Smoking status: Never    Smokeless tobacco: Never   Substance and Sexual Activity    Alcohol use: Yes     Alcohol/week: 11.7 standard drinks      Types: 14 Standard drinks or equivalent per week     Comment: 2 glasses wine per dinner    Drug use: No    Sexual activity: Never     Review of Systems   Constitutional:  Negative for activity change.   Gastrointestinal:  Positive for abdominal pain. Negative for abdominal distention, nausea and vomiting.   Objective:     Vital Signs (Most Recent):  Temp: 98.7 °F (37.1 °C) (01/28/23 0801)  Pulse: 67 (01/28/23 0801)  Resp: 16 (01/28/23 0801)  BP: (!) 115/52 (01/28/23 0801)  SpO2: (!) 93 % (01/28/23 0801)   Vital Signs (24h Range):  Temp:  [97.8 °F (36.6 °C)-98.8 °F (37.1 °C)] 98.7 °F (37.1 °C)  Pulse:  [67-81] 67  Resp:  [13-18] 16  SpO2:  [93 %-100 %] 93 %  BP: (115-129)/(52-90) 115/52     Weight: 56.5 kg (124 lb 9 oz)  Body mass index is 22.06 kg/m².    Physical Exam  Vitals reviewed.   Cardiovascular:      Rate and Rhythm: Normal rate.   Abdominal:      General: Abdomen is flat. There is distension.      Tenderness: There is no abdominal tenderness.      Hernia: A hernia is present.      Comments: Soft abdomen.  Reducible incisional hernia.  Faint bowel sounds noted.  No acute findings on exam.   Neurological:      Mental Status: She is alert.   Psychiatric:         Mood and Affect: Mood normal.         Behavior: Behavior normal.       Significant Labs:  I have reviewed all pertinent lab results within the past 24 hours.  CBC:   Recent Labs   Lab 01/28/23  0506   WBC 8.49   RBC 4.36   HGB 13.4   HCT 41.0      MCV 94   MCH 30.7   MCHC 32.7     BMP:   Recent Labs   Lab 01/28/23  0506   GLU 93      K 3.7      CO2 25   BUN 17   CREATININE 0.4*   CALCIUM 8.7       Significant Diagnostics:  CT scan reviewed.  Findings consistent with partial small-bowel obstruction.      Assessment/Plan:     * SBO (small bowel obstruction)  CT scan reviewed.  Clinically no signs of an acute abdomen.  We will continue NG tube decompression for the next 24-36 hours.  If no significant improvement will repeat CT scan  with oral contrast.  Lengthy discussion with patient's daughter in-law.  We will attempt conservative management as long as possible.  At present time no strong indication for surgical intervention      VTE Risk Mitigation (From admission, onward)         Ordered     IP VTE LOW RISK PATIENT  Once         01/27/23 2223     Place sequential compression device  Until discontinued         01/27/23 2223                Thank you for your consult. I will follow-up with patient. Please contact us if you have any additional questions.    Eagle Gonzalez MD  General Surgery  [facility]

## 2023-01-28 NOTE — PLAN OF CARE
Plan of care discussed with patient and daughter-in-law. All questions answered at this time. Call light within reach.

## 2023-01-28 NOTE — SUBJECTIVE & OBJECTIVE
No current facility-administered medications on file prior to encounter.     Current Outpatient Medications on File Prior to Encounter   Medication Sig    acetaminophen (TYLENOL) 325 MG tablet Take 2 tablets (650 mg total) by mouth every 6 (six) hours as needed for Pain.    azelastine (ASTELIN) 137 mcg (0.1 %) nasal spray 1 spray (137 mcg total) by Nasal route 2 (two) times daily as needed for Rhinitis.    carbidopa-levodopa  mg (SINEMET)  mg per tablet Take 1 tablet by mouth 3 (three) times daily.    docusate sodium (COLACE) 100 MG capsule Take 1 capsule (100 mg total) by mouth once daily.    fluticasone propionate (FLONASE) 50 mcg/actuation nasal spray 1 spray (50 mcg total) by Each Nostril route once daily.    lactose-reduced food (ENSURE ORIGINAL ORAL) Take 1 Dose by mouth once daily.    levothyroxine (SYNTHROID) 137 MCG Tab tablet TAKE 1/2 (ONE-HALF) TABLET BY MOUTH IN THE MORNING BEFORE BREAKFAST    lycopene/lutein/fruit extracts (FRUIT AND VEGETABLE DAILY ORAL) Take 1 Dose by mouth once daily. Patient takes 2 fruit and 1 vegetable balance of nature vitamins in the morning and evening    simethicone (GAS-X ORAL) Take 1 Dose by mouth daily as needed.    food supplemt, lactose-reduced (ENSURE CLEAR) Liqd Take 5 mLs by mouth 3 (three) times daily with meals.    multivitamin with minerals tablet 1 tablet DAILY (route: oral)    pantoprazole (PROTONIX) 40 MG tablet Take 1 tablet (40 mg total) by mouth 2 (two) times daily. (Patient not taking: Reported on 1/28/2022)    polyethylene glycol (GLYCOLAX) 17 gram PwPk Take 17 g by mouth every other day. (Patient not taking: Reported on 1/25/2022)    pumpkin seed extract/soy germ (AZO BLADDER CONTROL ORAL) Take by mouth.    salicylic acid 3 % Sham Shampoo scalp 2-3 times a week    simethicone (MYLICON) 125 mg Cap capsule Take 1 capsule by mouth 4 (four) times daily as needed.    simethicone (MYLICON) 125 mg Cap capsule Take 1 capsule (125 mg total) by mouth 3  (three) times daily with meals.    tamsulosin (FLOMAX) 0.4 mg Cap Take 1 capsule (0.4 mg total) by mouth once daily. (Patient not taking: Reported on 5/18/2022)       Review of patient's allergies indicates:   Allergen Reactions    Gluten Diarrhea       Past Medical History:   Diagnosis Date    Allergy     Diverticulosis     Gluten enteropathy     Gluten intolerance     Hernia     Hypothyroidism     PD (Parkinson's disease) 9/16/2015    Right tremor type    Peptic ulcer disease     Right shoulder pain     Cirtisone injection 3/26/15 - Dr. Tolbert    Ulcer      Past Surgical History:   Procedure Laterality Date    ADENOIDECTOMY      COLONOSCOPY  03/01/2012    Dr. Teresa, repeat in 10 years for screening    COLONOSCOPY N/A 2/21/2018    Procedure: COLONOSCOPY;  Surgeon: Radha Deng MD;  Location: [facility];  Service: Endoscopy;  Laterality: N/A;    CORRECTION OF HAMMER TOE Left 9/16/2020    Procedure: CORRECTION, HAMMER TOE;  Surgeon: William Sprague MD;  Location: [facility] OR;  Service: Orthopedics;  Laterality: Left;    COSMETIC SURGERY      Broken nose    ESOPHAGOGASTRODUODENOSCOPY N/A 12/8/2021    Procedure: EGD (ESOPHAGOGASTRODUODENOSCOPY);  Surgeon: Benji Valentino III, MD;  Location: [facility] ENDO;  Service: Endoscopy;  Laterality: N/A;    FRACTURE SURGERY  left wrist    With pin    HEMORRHOID SURGERY      HERNIA REPAIR Left 04/09/2015    Dr Lo; left inguinal    INTRALUMINAL GASTROINTESTINAL TRACT IMAGING VIA CAPSULE N/A 7/10/2018    Procedure: IMAGING, GI TRACT, INTRALUMINAL, VIA CAPSULE;  Surgeon: Radha Deng MD;  Location: [facility] ENDO;  Service: Endoscopy;  Laterality: N/A;    LYSIS OF ADHESIONS  9/29/2020    Procedure: LYSIS, ADHESIONS;  Surgeon: Eagle Gonzalez MD;  Location: [facility] OR;  Service: General;;    RHINOPLASTY TIP      TONSILLECTOMY      UPPER GASTROINTESTINAL ENDOSCOPY  05/21/2015    Dr. Gomez     Family History       Problem Relation (Age of Onset)    Alcohol abuse Brother     Diabetes Mother, Son    Early death Brother    Heart disease Brother    No Known Problems Father    Obesity Sister          Tobacco Use    Smoking status: Never    Smokeless tobacco: Never   Substance and Sexual Activity    Alcohol use: Yes     Alcohol/week: 11.7 standard drinks     Types: 14 Standard drinks or equivalent per week     Comment: 2 glasses wine per dinner    Drug use: No    Sexual activity: Never     Review of Systems   Constitutional:  Negative for activity change.   Gastrointestinal:  Positive for abdominal pain. Negative for abdominal distention, nausea and vomiting.   Objective:     Vital Signs (Most Recent):  Temp: 98.7 °F (37.1 °C) (01/28/23 0801)  Pulse: 67 (01/28/23 0801)  Resp: 16 (01/28/23 0801)  BP: (!) 115/52 (01/28/23 0801)  SpO2: (!) 93 % (01/28/23 0801)   Vital Signs (24h Range):  Temp:  [97.8 °F (36.6 °C)-98.8 °F (37.1 °C)] 98.7 °F (37.1 °C)  Pulse:  [67-81] 67  Resp:  [13-18] 16  SpO2:  [93 %-100 %] 93 %  BP: (115-129)/(52-90) 115/52     Weight: 56.5 kg (124 lb 9 oz)  Body mass index is 22.06 kg/m².    Physical Exam  Vitals reviewed.   Cardiovascular:      Rate and Rhythm: Normal rate.   Abdominal:      General: Abdomen is flat. There is distension.      Tenderness: There is no abdominal tenderness.      Hernia: A hernia is present.      Comments: Soft abdomen.  Reducible incisional hernia.  Faint bowel sounds noted.  No acute findings on exam.   Neurological:      Mental Status: She is alert.   Psychiatric:         Mood and Affect: Mood normal.         Behavior: Behavior normal.       Significant Labs:  I have reviewed all pertinent lab results within the past 24 hours.  CBC:   Recent Labs   Lab 01/28/23  0506   WBC 8.49   RBC 4.36   HGB 13.4   HCT 41.0      MCV 94   MCH 30.7   MCHC 32.7     BMP:   Recent Labs   Lab 01/28/23  0506   GLU 93      K 3.7      CO2 25   BUN 17   CREATININE 0.4*   CALCIUM 8.7       Significant Diagnostics:  CT scan reviewed.  Findings  consistent with partial small-bowel obstruction.

## 2023-01-28 NOTE — HPI
[age] year female presented to the hospital with complaints of abdominal pain and discomfort.  Patient has had previous bouts of bowel obstructions.  In 2020 patient had exploratory laparotomy with small-bowel resection secondary to small bowel diverticular disease.  Several months later she presented with pneumoperitoneum and underwent exploratory laparotomy with no significant intra-abdominal findings.  Since then she has had 1 or 2 admissions secondary to partial bowel obstructions.  She does struggle with chronic dysmotility and has always had dilated loops of small bowel on presentation.  Last night she was having loose stools and as mentioned was having epigastric abdominal pain and discomfort.  On presentation to the ER her labs and vitals were stable.  She did have a CT scan demonstrating findings concerning for partial small-bowel obstruction and possible internal hernia.  NG tube was placed.  Patient is sitting comfortably in bed today and notes that she has no complaints.  She does suffer with Parkinson's disease.

## 2023-01-28 NOTE — ASSESSMENT & PLAN NOTE
CT scan reviewed.  Clinically no signs of an acute abdomen.  We will continue NG tube decompression for the next 24-36 hours.  If no significant improvement will repeat CT scan with oral contrast.  Lengthy discussion with patient's daughter in-law.  We will attempt conservative management as long as possible.  At present time no strong indication for surgical intervention

## 2023-01-29 LAB
ALBUMIN SERPL BCP-MCNC: 3.5 G/DL (ref 3.5–5.2)
ALP SERPL-CCNC: 72 U/L (ref 55–135)
ALT SERPL W/O P-5'-P-CCNC: 16 U/L (ref 10–44)
ANION GAP SERPL CALC-SCNC: 6 MMOL/L (ref 8–16)
AST SERPL-CCNC: 19 U/L (ref 10–40)
BASOPHILS # BLD AUTO: 0.02 K/UL (ref 0–0.2)
BASOPHILS NFR BLD: 0.3 % (ref 0–1.9)
BILIRUB SERPL-MCNC: 0.9 MG/DL (ref 0.1–1)
BUN SERPL-MCNC: 10 MG/DL (ref 8–23)
CALCIUM SERPL-MCNC: 8.5 MG/DL (ref 8.7–10.5)
CHLORIDE SERPL-SCNC: 106 MMOL/L (ref 95–110)
CO2 SERPL-SCNC: 25 MMOL/L (ref 23–29)
CREAT SERPL-MCNC: 0.4 MG/DL (ref 0.5–1.4)
DIFFERENTIAL METHOD: NORMAL
EOSINOPHIL # BLD AUTO: 0.1 K/UL (ref 0–0.5)
EOSINOPHIL NFR BLD: 2 % (ref 0–8)
ERYTHROCYTE [DISTWIDTH] IN BLOOD BY AUTOMATED COUNT: 12.1 % (ref 11.5–14.5)
EST. GFR  (NO RACE VARIABLE): >60 ML/MIN/1.73 M^2
GLUCOSE SERPL-MCNC: 75 MG/DL (ref 70–110)
HCT VFR BLD AUTO: 40.1 % (ref 37–48.5)
HGB BLD-MCNC: 13 G/DL (ref 12–16)
IMM GRANULOCYTES # BLD AUTO: 0.03 K/UL (ref 0–0.04)
IMM GRANULOCYTES NFR BLD AUTO: 0.5 % (ref 0–0.5)
LYMPHOCYTES # BLD AUTO: 1.4 K/UL (ref 1–4.8)
LYMPHOCYTES NFR BLD: 22.6 % (ref 18–48)
MCH RBC QN AUTO: 30.2 PG (ref 27–31)
MCHC RBC AUTO-ENTMCNC: 32.4 G/DL (ref 32–36)
MCV RBC AUTO: 93 FL (ref 82–98)
MONOCYTES # BLD AUTO: 0.4 K/UL (ref 0.3–1)
MONOCYTES NFR BLD: 6.6 % (ref 4–15)
NEUTROPHILS # BLD AUTO: 4.1 K/UL (ref 1.8–7.7)
NEUTROPHILS NFR BLD: 68 % (ref 38–73)
NRBC BLD-RTO: 0 /100 WBC
PLATELET # BLD AUTO: 171 K/UL (ref 150–450)
PMV BLD AUTO: 10.1 FL (ref 9.2–12.9)
POTASSIUM SERPL-SCNC: 3.2 MMOL/L (ref 3.5–5.1)
PROT SERPL-MCNC: 6.1 G/DL (ref 6–8.4)
RBC # BLD AUTO: 4.3 M/UL (ref 4–5.4)
SODIUM SERPL-SCNC: 137 MMOL/L (ref 136–145)
WBC # BLD AUTO: 6.06 K/UL (ref 3.9–12.7)

## 2023-01-29 PROCEDURE — 85025 COMPLETE CBC W/AUTO DIFF WBC: CPT | Performed by: NURSE PRACTITIONER

## 2023-01-29 PROCEDURE — 12000002 HC ACUTE/MED SURGE SEMI-PRIVATE ROOM

## 2023-01-29 PROCEDURE — 99232 PR SUBSEQUENT HOSPITAL CARE,LEVL II: ICD-10-PCS | Mod: ,,, | Performed by: SURGERY

## 2023-01-29 PROCEDURE — 80053 COMPREHEN METABOLIC PANEL: CPT | Performed by: NURSE PRACTITIONER

## 2023-01-29 PROCEDURE — 99232 SBSQ HOSP IP/OBS MODERATE 35: CPT | Mod: ,,, | Performed by: SURGERY

## 2023-01-29 PROCEDURE — 63600175 PHARM REV CODE 636 W HCPCS: Performed by: SURGERY

## 2023-01-29 PROCEDURE — 63600175 PHARM REV CODE 636 W HCPCS: Performed by: NURSE PRACTITIONER

## 2023-01-29 PROCEDURE — 97116 GAIT TRAINING THERAPY: CPT

## 2023-01-29 PROCEDURE — 36415 COLL VENOUS BLD VENIPUNCTURE: CPT | Performed by: NURSE PRACTITIONER

## 2023-01-29 PROCEDURE — 97162 PT EVAL MOD COMPLEX 30 MIN: CPT

## 2023-01-29 PROCEDURE — 25500020 PHARM REV CODE 255: Performed by: INTERNAL MEDICINE

## 2023-01-29 PROCEDURE — 25000003 PHARM REV CODE 250: Performed by: SURGERY

## 2023-01-29 PROCEDURE — 63600175 PHARM REV CODE 636 W HCPCS: Performed by: INTERNAL MEDICINE

## 2023-01-29 RX ORDER — POTASSIUM CHLORIDE 7.45 MG/ML
60 INJECTION INTRAVENOUS
Status: DISCONTINUED | OUTPATIENT
Start: 2023-01-29 | End: 2023-02-06 | Stop reason: HOSPADM

## 2023-01-29 RX ORDER — POTASSIUM CHLORIDE 7.45 MG/ML
40 INJECTION INTRAVENOUS
Status: DISCONTINUED | OUTPATIENT
Start: 2023-01-29 | End: 2023-02-06 | Stop reason: HOSPADM

## 2023-01-29 RX ORDER — CALCIUM GLUCONATE 20 MG/ML
1 INJECTION, SOLUTION INTRAVENOUS
Status: DISCONTINUED | OUTPATIENT
Start: 2023-01-29 | End: 2023-02-06 | Stop reason: HOSPADM

## 2023-01-29 RX ORDER — MAGNESIUM SULFATE HEPTAHYDRATE 40 MG/ML
2 INJECTION, SOLUTION INTRAVENOUS
Status: DISCONTINUED | OUTPATIENT
Start: 2023-01-29 | End: 2023-02-06 | Stop reason: HOSPADM

## 2023-01-29 RX ORDER — POTASSIUM CHLORIDE 7.45 MG/ML
80 INJECTION INTRAVENOUS
Status: DISCONTINUED | OUTPATIENT
Start: 2023-01-29 | End: 2023-02-06 | Stop reason: HOSPADM

## 2023-01-29 RX ORDER — CALCIUM GLUCONATE 20 MG/ML
3 INJECTION, SOLUTION INTRAVENOUS
Status: DISCONTINUED | OUTPATIENT
Start: 2023-01-29 | End: 2023-02-06 | Stop reason: HOSPADM

## 2023-01-29 RX ORDER — MAGNESIUM SULFATE HEPTAHYDRATE 40 MG/ML
4 INJECTION, SOLUTION INTRAVENOUS
Status: DISCONTINUED | OUTPATIENT
Start: 2023-01-29 | End: 2023-02-06 | Stop reason: HOSPADM

## 2023-01-29 RX ORDER — METOCLOPRAMIDE HYDROCHLORIDE 5 MG/ML
10 INJECTION INTRAMUSCULAR; INTRAVENOUS EVERY 6 HOURS
Status: DISCONTINUED | OUTPATIENT
Start: 2023-01-29 | End: 2023-01-30

## 2023-01-29 RX ORDER — CALCIUM GLUCONATE 20 MG/ML
2 INJECTION, SOLUTION INTRAVENOUS
Status: DISCONTINUED | OUTPATIENT
Start: 2023-01-29 | End: 2023-02-06 | Stop reason: HOSPADM

## 2023-01-29 RX ORDER — BISACODYL 10 MG
10 SUPPOSITORY, RECTAL RECTAL DAILY
Status: DISCONTINUED | OUTPATIENT
Start: 2023-01-29 | End: 2023-02-02

## 2023-01-29 RX ADMIN — IOHEXOL 100 ML: 350 INJECTION, SOLUTION INTRAVENOUS at 09:01

## 2023-01-29 RX ADMIN — BISACODYL 10 MG: 10 SUPPOSITORY RECTAL at 03:01

## 2023-01-29 RX ADMIN — SODIUM CHLORIDE, SODIUM LACTATE, POTASSIUM CHLORIDE, AND CALCIUM CHLORIDE: .6; .31; .03; .02 INJECTION, SOLUTION INTRAVENOUS at 05:01

## 2023-01-29 RX ADMIN — SODIUM CHLORIDE, SODIUM LACTATE, POTASSIUM CHLORIDE, AND CALCIUM CHLORIDE: .6; .31; .03; .02 INJECTION, SOLUTION INTRAVENOUS at 04:01

## 2023-01-29 RX ADMIN — METOCLOPRAMIDE 10 MG: 5 INJECTION, SOLUTION INTRAMUSCULAR; INTRAVENOUS at 06:01

## 2023-01-29 RX ADMIN — POTASSIUM CHLORIDE 60 MEQ: 7.46 INJECTION, SOLUTION INTRAVENOUS at 05:01

## 2023-01-29 NOTE — PLAN OF CARE
Problem: Adult Inpatient Plan of Care  Goal: Plan of Care Review  Outcome: Ongoing, Progressing  Goal: Patient-Specific Goal (Individualized)  Outcome: Ongoing, Progressing  Goal: Absence of Hospital-Acquired Illness or Injury  Outcome: Ongoing, Progressing  Goal: Optimal Comfort and Wellbeing  Outcome: Ongoing, Progressing  Goal: Readiness for Transition of Care  Outcome: Ongoing, Progressing     Problem: Pain Acute  Goal: Acceptable Pain Control and Functional Ability  Outcome: Ongoing, Progressing     Problem: Skin Injury Risk Increased  Goal: Skin Health and Integrity  Outcome: Ongoing, Progressing     Problem: Impaired Wound Healing  Goal: Optimal Wound Healing  Outcome: Ongoing, Progressing

## 2023-01-29 NOTE — PLAN OF CARE
Problem: Physical Therapy  Goal: Physical Therapy Goal  Description: Goals to be met by: 23     Patient will increase functional independence with mobility by performin. Supine to sit with Stand-by Assistance  2. Sit to supine with Stand-by Assistance  3. Sit to stand transfer with Stand-by Assistance  4. Bed to chair transfer with Stand-by Assistance using Rolling Walker  5. Gait  x 150 feet with Stand-by Assistance and Contact Guard Assistance using Rolling Walker.     Outcome: POC established; Ongoing, Progressing

## 2023-01-29 NOTE — PLAN OF CARE
[facility]  Initial Discharge Assessment       Primary Care Provider: Nba Petty MD    Admission Diagnosis: SBO (small bowel obstruction) [K56.609]    Admission Date: 1/27/2023  Expected Discharge Date: TBD    Assessment completed with daughter in law / Micaela [phone] due to COVID-19 precautions. Micaela is caregiver to patient, assists with ADL's. States they have all necessary DME at home, no HH currently, patient has previously discharged to SNF at [facility] and they would be agreeable at [facility] if therapy recommends SNF. Case management to continue to monitor.    Discharge Barriers Identified: None    Payor: MEDICARE / Plan: MEDICARE PART A & B / Product Type: Government /     Extended Emergency Contact Information  Primary Emergency Contact: Je Tiwari  Address: [address]           [email or web address] [address] [place]  Mobile Phone: [phone]  Relation: Son  Preferred language: English   needed? No  Secondary Emergency Contact: Micaela Tiwari  Address: [address]           [place] [address] [place]  Mobile Phone: [phone]  Relation: Relative  Preferred language: English   needed? No    Discharge Plan A: Home with family  Discharge Plan B: Home with family      [facility] [address]  [address]  [place] [address]  Phone: [phone] Fax: [phone]    [facility] [place]  [address]  [facility] [address]  Phone: [phone] Fax: [phone]      Initial Assessment (most recent)       Adult Discharge Assessment - 01/29/23 1101          Discharge Assessment    Assessment Type Discharge Planning Assessment     Confirmed/corrected address, phone number and insurance Yes     Confirmed Demographics Correct on Facesheet     Source of Information family     If unable to respond/provide information was family/caregiver contacted? Yes     Contact Name/Number daughter  in law / Micaela [phone]     When was your last doctors appointment? --   December 2021    Communicated IAIN with patient/caregiver Date not available/Unable to determine     Reason For Admission SBO (small bowel obstruction)     People in Home child(malka), adult     Facility Arrived From: home     Do you expect to return to your current living situation? Yes     Do you have help at home or someone to help you manage your care at home? Yes     Who are your caregiver(s) and their phone number(s)? SBO (small bowel obstruction)     Prior to hospitilization cognitive status: Unable to Assess     Current cognitive status: Unable to Assess     Walking or Climbing Stairs ambulation difficulty, requires equipment     Dressing/Bathing bathing difficulty, requires equipment     Home Accessibility wheelchair accessible     Equipment Currently Used at Home rollator;grab bar;power chair     Readmission within 30 days? No     Patient currently being followed by outpatient case management? No     Do you currently have service(s) that help you manage your care at home? No     Do you take prescription medications? Yes     Do you have prescription coverage? Yes     Do you have any problems affording any of your prescribed medications? No     Is the patient taking medications as prescribed? yes     Who is going to help you get home at discharge? daughter in law / Micaela [phone]     How do you get to doctors appointments? family or friend will provide     Are you on dialysis? No     Do you take coumadin? No     Discharge Plan A Home with family     Discharge Plan B Home with family     DME Needed Upon Discharge  none     Discharge Plan discussed with: Adult children     Discharge Barriers Identified None

## 2023-01-29 NOTE — PROGRESS NOTES
"[facility] Medicine  Progress Note    Patient name: Ariana Tiwari  MRN: [id number]  Admit Date: 1/27/2023   LOS: 2 days     SUBJECTIVE:     Principal problem: SBO (small bowel obstruction)    Interval History:  She reports resolution of her right knee pain and it is no longer swollen or warm. She got up to the chair fairly well but is weak.  Her family at bedside felt she did okay though did have some "parkinson freezing" in her gait.  She is passing a little gas this AM. No BM.  No nausea.  Repeat Flat and erect is unchanged with distended loops of small bowel. A little confused this AM in that she thought she was at home rather than in the hospital but not significantly changed from yesterday.    Hospital Course:    Patient presented to the ED with abdominal pain with nausea.  She had 2 BMs prior to coming to the ED.  She was admitted with SBO as demonstrated by CT scan. She's had SBO in the past and has required surgery.  ED workup also positive for Covid 19. She has no hypoxia. General surgery consulted.  NGT to LIWS. Repeat flat and erect with persistent loops of dilated small bowel.  She/Family reports she also has a bloated abdomen but distension did improve after placement of NGT.    Scheduled Meds:  Continuous Infusions:   lactated ringers 100 mL/hr at 01/29/23 0415     PRN Meds:calcium gluconate IVPB, calcium gluconate IVPB, calcium gluconate IVPB, magnesium sulfate IVPB, magnesium sulfate IVPB, morphine, ondansetron, potassium chloride **AND** potassium chloride **AND** potassium chloride, sodium chloride 0.9%, sodium phosphate IVPB, sodium phosphate IVPB, sodium phosphate IVPB    Review of patient's allergies indicates:   Allergen Reactions    Gluten Diarrhea       Review of Systems: As per interval history    OBJECTIVE:     Vital Signs (Most Recent)  Temp: 98.3 °F (36.8 °C) (01/29/23 1100)  Pulse: 60 (01/29/23 1100)  Resp: 16 (01/29/23 1100)  BP: 131/61 (01/29/23 1100)  SpO2: " (!) 93 % (01/29/23 1100)    Vital Signs Range (Last 24H):  Temp:  [97.6 °F (36.4 °C)-98.3 °F (36.8 °C)]   Pulse:  [60-63]   Resp:  [14-16]   BP: (123-136)/(52-64)   SpO2:  [93 %-95 %]     I & O (Last 24H):  Intake/Output Summary (Last 24 hours) at 1/29/2023 1236  Last data filed at 1/29/2023 0540  Gross per 24 hour   Intake --   Output 1100 ml   Net -1100 ml       Physical Exam:    Vitals and nursing note reviewed.     Constitutional:       General: Not in acute distress.     Appearance: Well-developed.   HENT:      Head: Normocephalic and atraumatic.   NGT  Eyes:      Pupils: Pupils are equal, round, and reactive to light.   Cardiovascular:      Rate and Rhythm: Regular rhythm.   Pulmonary:      Effort: Pulmonary effort is normal.      Breath sounds: Normal breath sounds. No wheezing.   Abdominal:      General: There is no distension.      Palpations: Abdomen is soft.      Tenderness: There is no abdominal tenderness. There is no guarding or rebound.   Musculoskeletal:         Right knee effusion with increased warmth.  No erythema.  Skin:     Findings: No rash.   Neurological:      Mental Status: Alert and oriented to person, place, and time.    Has baseline tremor secondary to her Parkinsons    Laboratory:  I have reviewed all pertinent lab results within the past 24 hours.  CBC:   Recent Labs   Lab 01/29/23  0529   WBC 6.06   RBC 4.30   HGB 13.0   HCT 40.1      MCV 93   MCH 30.2   MCHC 32.4       CMP:   Recent Labs   Lab 01/29/23  0529   GLU 75   CALCIUM 8.5*   ALBUMIN 3.5   PROT 6.1      K 3.2*   CO2 25      BUN 10   CREATININE 0.4*   ALKPHOS 72   ALT 16   AST 19   BILITOT 0.9         Diagnostic Results:  X-Ray: Reviewed  Flat and erect with persistent dilation of small bowel loops    ASSESSMENT/PLAN:         Active Hospital Problems    Diagnosis  POA    *SBO (small bowel obstruction) [K56.609]  Yes    COVID-19 [U07.1]  Yes    PD (Parkinson's disease) [G20]  Yes     Right tremor type       Hypothyroidism [E03.9]  Yes      Resolved Hospital Problems   No resolved problems to display.         Plan:     -NGT to LIWS  -General surgery consulted and following  -Serial abdominal exams and Xrays  -Now passing a little gas but flat and erect Xrays are unchanged dilated loops of small bowel.  Per surgeon's note, consideration for oral contrast CT scan soon if not making more progress.  -IVFs for hydration while NPO.  -CT concerning for ? Internal hernia.  Conservative measures for now.  No indication for emergent surgery. No peritoneal signs.   -suspect right knee OA as source of pain and swelling. Prn IV pain medication given npo.  Ice.  This has seemingly resolved 1/29.  -Covid isolation and symptomatic treatment.  Not hypoxic.   -PT, out of bed as able       VTE Risk Mitigation (From admission, onward)           Ordered     IP VTE LOW RISK PATIENT  Once         01/27/23 2223     Place sequential compression device  Until discontinued         01/27/23 2223                      Department Hospital Medicine  [facility]  Colette Paiz MD  Date of service: 01/29/2023

## 2023-01-29 NOTE — PROGRESS NOTES
Pt seen and examined. Resting comfortably in bed.  Denies abd pain.  No n/v.  Reports flatus    Wt Readings from Last 3 Encounters:   01/28/23 56.5 kg (124 lb 9 oz)   11/29/22 58.2 kg (128 lb 4.9 oz)   05/18/22 57.5 kg (126 lb 12.2 oz)     Temp Readings from Last 3 Encounters:   01/29/23 98.3 °F (36.8 °C) (Oral)   11/29/22 97.9 °F (36.6 °C) (Oral)   05/18/22 97.7 °F (36.5 °C) (Oral)     BP Readings from Last 3 Encounters:   01/29/23 131/61   11/29/22 110/65   05/18/22 100/64     Pulse Readings from Last 3 Encounters:   01/29/23 60   11/29/22 68   05/18/22 71     AAOx3  Sinus  Soft/nd/nt.  Easily reducible inc hernia  BS are present    Lab Results   Component Value Date    WBC 6.06 01/29/2023    HGB 13.0 01/29/2023    HCT 40.1 01/29/2023    MCV 93 01/29/2023     01/29/2023       BMP  Lab Results   Component Value Date     01/29/2023    K 3.2 (L) 01/29/2023     01/29/2023    CO2 25 01/29/2023    BUN 10 01/29/2023    CREATININE 0.4 (L) 01/29/2023    CALCIUM 8.5 (L) 01/29/2023    ANIONGAP 6 (L) 01/29/2023    EGFRNORACEVR >60.0 01/29/2023     A/P:  resolving PSBO  Ambulate  Will get oral contrast ct scan to evaluate for contrast passage into the colon.    reglan

## 2023-01-29 NOTE — PT/OT/SLP EVAL
"Physical Therapy Evaluation    Patient Name:  Ariana Tiwari   MRN:  [id number]    Recommendations:     Discharge Recommendations:   HHPT  Discharge Equipment Recommendations: none   Barriers to discharge: None    Assessment:     Ariana Tiwari is a 77 y.o. female admitted with a medical diagnosis of SBO (small bowel obstruction).  She presents with the following impairments/functional limitations: weakness, impaired functional mobility, decreased safety awareness, impaired cognition, impaired endurance, gait instability, decreased coordination, abnormal tone, impaired self care skills, impaired balance, impaired fine motor Pt just returning to supine with nursing after toileting. Agreeable to activity. Daughter in law present and providing history. Pt is alert and cooperative. She required min A for bed mobility, CGA to stand with RW. Ambulated in room x 60 ft with CGA and intermittent assistance for walker management as pt usually uses a rollator. Pt receives assistance at home for ADL's and sometimes ambulation when she is tired. Otherwise she ambulates at home and yard with rollator without assistance. She will benefit from continued PT to maximize safety and preserve strength and mobility.    Rehab Prognosis: Good; patient would benefit from acute skilled PT services to address these deficits and reach maximum level of function.    Recent Surgery: * No surgery found *      Plan:     During this hospitalization, patient to be seen 5 x/week to address the identified rehab impairments via gait training, therapeutic activities, therapeutic exercises, neuromuscular re-education and progress toward the following goals:    Plan of Care Expires:  02/28/23    Subjective     Chief Complaint: "I'm hungry. When can I eat?"  Patient/Family Comments/goals: DIL concerned that patient with regress while here and requests daily activity  Pain/Comfort:  Pain Rating 1: 0/10  Pain Rating Post-Intervention 1: 0/10    Patients " cultural, spiritual, Buddhism conflicts given the current situation: no    Living Environment:  Lives with son and DIL in [facility], 3 RODNEY at one entrance but other entrance has no steps  Prior to admission, patients level of function was min A to supervision with functional mobility. More assistance needed for bathing, dressing  ADL's.  Equipment used at home: rollator, grab bar, wheelchair (adjustable bed).  DME owned (not currently used): none.  Upon discharge, patient will have assistance from family.    Objective:     Communicated with nurse Chelsea, prior to session.  Patient found supine with NG tube, peripheral IV  upon PT entry to room.    General Precautions: Standard, fall (Parkinsons)  Orthopedic Precautions:N/A   Braces: N/A  Respiratory Status: Room air    Exams:  Cognitive Exam:  Patient is oriented to Person, Place, and Situation  Fine Motor Coordination:    -       Impaired  Left hand thumb/finger opposition skills , and Right hand thumb/finger opposition skills .  Gross Motor Coordination:  impaired--Parkinsons  RLE ROM: WFL  RLE Strength: WFL  LLE ROM: WFL  LLE Strength: WFL    Functional Mobility:  Bed Mobility:     Supine to Sit: minimum assistance  Sit to Supine: minimum assistance  Transfers:     Sit to Stand:  contact guard assistance with rolling walker  Gait: 60 ft with RW CGA/Min A plus VC's  Balance: good sitting balance , fair standing balance with UE support of AD      AM-PAC 6 CLICK MOBILITY  Total Score:18       Treatment & Education:  Pt and DIL educated on PT roles and goals, DC recommendations, safety, use of call light  Pt reports up to BS chair this am. Requests back to bed.  Patient left HOB elevated with all lines intact, call button in reach, bed alarm on, and DIL present.    GOALS:   Multidisciplinary Problems       Physical Therapy Goals          Problem: Physical Therapy    Goal Priority Disciplines Outcome Goal Variances Interventions   Physical Therapy Goal     PT, PT/OT  Ongoing, Progressing     Description: Goals to be met by: 23     Patient will increase functional independence with mobility by performin. Supine to sit with Stand-by Assistance  2. Sit to supine with Stand-by Assistance  3. Sit to stand transfer with Stand-by Assistance  4. Bed to chair transfer with Stand-by Assistance using Rolling Walker  5. Gait  x 150 feet with Stand-by Assistance and Contact Guard Assistance using Rolling Walker.                          History:     Past Medical History:   Diagnosis Date    Allergy     Diverticulosis     Gluten enteropathy     Gluten intolerance     Hernia     Hypothyroidism     PD (Parkinson's disease) 2015    Right tremor type    Peptic ulcer disease     Right shoulder pain     Cirtisone injection 3/26/15 - Dr. Tolbert    Ulcer        Past Surgical History:   Procedure Laterality Date    ADENOIDECTOMY      COLONOSCOPY  2012    Dr. Teresa, repeat in 10 years for screening    COLONOSCOPY N/A 2018    Procedure: COLONOSCOPY;  Surgeon: Radha Deng MD;  Location: [facility];  Service: Endoscopy;  Laterality: N/A;    CORRECTION OF HAMMER TOE Left 2020    Procedure: CORRECTION, HAMMER TOE;  Surgeon: William Sprague MD;  Location: [facility];  Service: Orthopedics;  Laterality: Left;    COSMETIC SURGERY      Broken nose    ESOPHAGOGASTRODUODENOSCOPY N/A 2021    Procedure: EGD (ESOPHAGOGASTRODUODENOSCOPY);  Surgeon: Benji Valentino III, MD;  Location: [facility];  Service: Endoscopy;  Laterality: N/A;    FRACTURE SURGERY  left wrist    With pin    HEMORRHOID SURGERY      HERNIA REPAIR Left 2015    Dr Lo; left inguinal    INTRALUMINAL GASTROINTESTINAL TRACT IMAGING VIA CAPSULE N/A 7/10/2018    Procedure: IMAGING, GI TRACT, INTRALUMINAL, VIA CAPSULE;  Surgeon: Radha Deng MD;  Location: [facility];  Service: Endoscopy;  Laterality: N/A;    LYSIS OF ADHESIONS  2020    Procedure: LYSIS, ADHESIONS;  Surgeon: Eagle SILVER  MD Carlos;  Location: [facility];  Service: General;;    RHINOPLASTY TIP      TONSILLECTOMY      UPPER GASTROINTESTINAL ENDOSCOPY  05/21/2015    Dr. Gomez       Time Tracking:     PT Received On: 01/29/23  PT Start Time: 1310     PT Stop Time: 1335  PT Total Time (min): 25 min     Billable Minutes: Evaluation 13 and Gait Training 12      01/29/2023

## 2023-01-30 PROBLEM — R56.9 SEIZURE: Status: ACTIVE | Noted: 2023-01-30

## 2023-01-30 LAB
ALBUMIN SERPL BCP-MCNC: 3.8 G/DL (ref 3.5–5.2)
ALLENS TEST: ABNORMAL
ALP SERPL-CCNC: 78 U/L (ref 55–135)
ALT SERPL W/O P-5'-P-CCNC: 17 U/L (ref 10–44)
ANION GAP SERPL CALC-SCNC: 13 MMOL/L (ref 8–16)
AST SERPL-CCNC: 20 U/L (ref 10–40)
BASOPHILS # BLD AUTO: 0.04 K/UL (ref 0–0.2)
BASOPHILS NFR BLD: 0.6 % (ref 0–1.9)
BILIRUB SERPL-MCNC: 1.6 MG/DL (ref 0.1–1)
BUN SERPL-MCNC: 7 MG/DL (ref 8–23)
CALCIUM SERPL-MCNC: 8.8 MG/DL (ref 8.7–10.5)
CHLORIDE SERPL-SCNC: 101 MMOL/L (ref 95–110)
CO2 SERPL-SCNC: 24 MMOL/L (ref 23–29)
CREAT SERPL-MCNC: 0.3 MG/DL (ref 0.5–1.4)
DELSYS: ABNORMAL
DIFFERENTIAL METHOD: ABNORMAL
EOSINOPHIL # BLD AUTO: 0.1 K/UL (ref 0–0.5)
EOSINOPHIL NFR BLD: 1.2 % (ref 0–8)
ERYTHROCYTE [DISTWIDTH] IN BLOOD BY AUTOMATED COUNT: 12 % (ref 11.5–14.5)
EST. GFR  (NO RACE VARIABLE): >60 ML/MIN/1.73 M^2
GLUCOSE SERPL-MCNC: 100 MG/DL (ref 70–110)
GLUCOSE SERPL-MCNC: 61 MG/DL (ref 70–110)
GLUCOSE SERPL-MCNC: 90 MG/DL (ref 70–110)
HCO3 UR-SCNC: 17 MMOL/L (ref 24–28)
HCT VFR BLD AUTO: 40.6 % (ref 37–48.5)
HCT VFR BLD CALC: 43 %PCV (ref 36–54)
HGB BLD-MCNC: 13.6 G/DL (ref 12–16)
IMM GRANULOCYTES # BLD AUTO: 0.03 K/UL (ref 0–0.04)
IMM GRANULOCYTES NFR BLD AUTO: 0.4 % (ref 0–0.5)
LACTATE SERPL-SCNC: 1 MMOL/L (ref 0.5–1.9)
LYMPHOCYTES # BLD AUTO: 1.2 K/UL (ref 1–4.8)
LYMPHOCYTES NFR BLD: 17.7 % (ref 18–48)
MAGNESIUM SERPL-MCNC: 1.6 MG/DL (ref 1.6–2.6)
MCH RBC QN AUTO: 30.7 PG (ref 27–31)
MCHC RBC AUTO-ENTMCNC: 33.5 G/DL (ref 32–36)
MCV RBC AUTO: 92 FL (ref 82–98)
MONOCYTES # BLD AUTO: 0.6 K/UL (ref 0.3–1)
MONOCYTES NFR BLD: 7.9 % (ref 4–15)
NEUTROPHILS # BLD AUTO: 5 K/UL (ref 1.8–7.7)
NEUTROPHILS NFR BLD: 72.2 % (ref 38–73)
NRBC BLD-RTO: 0 /100 WBC
PCO2 BLDA: 34.5 MMHG (ref 35–45)
PH SMN: 7.3 [PH] (ref 7.35–7.45)
PLATELET # BLD AUTO: 175 K/UL (ref 150–450)
PMV BLD AUTO: 10.1 FL (ref 9.2–12.9)
PO2 BLDA: 210 MMHG (ref 80–100)
POC BE: -9 MMOL/L
POC IONIZED CALCIUM: 1.2 MMOL/L (ref 1.06–1.42)
POC SATURATED O2: 100 % (ref 95–100)
POC TCO2: 18 MMOL/L (ref 23–27)
POTASSIUM BLD-SCNC: 3.1 MMOL/L (ref 3.5–5.1)
POTASSIUM SERPL-SCNC: 3.5 MMOL/L (ref 3.5–5.1)
PROT SERPL-MCNC: 6.4 G/DL (ref 6–8.4)
RBC # BLD AUTO: 4.43 M/UL (ref 4–5.4)
SAMPLE: ABNORMAL
SITE: ABNORMAL
SODIUM BLD-SCNC: 140 MMOL/L (ref 136–145)
SODIUM SERPL-SCNC: 138 MMOL/L (ref 136–145)
WBC # BLD AUTO: 6.94 K/UL (ref 3.9–12.7)

## 2023-01-30 PROCEDURE — 85025 COMPLETE CBC W/AUTO DIFF WBC: CPT | Performed by: NURSE PRACTITIONER

## 2023-01-30 PROCEDURE — 82803 BLOOD GASES ANY COMBINATION: CPT

## 2023-01-30 PROCEDURE — 25000003 PHARM REV CODE 250: Performed by: SURGERY

## 2023-01-30 PROCEDURE — 36600 WITHDRAWAL OF ARTERIAL BLOOD: CPT

## 2023-01-30 PROCEDURE — 99223 PR INITIAL HOSPITAL CARE,LEVL III: ICD-10-PCS | Mod: CR,,, | Performed by: INTERNAL MEDICINE

## 2023-01-30 PROCEDURE — 83735 ASSAY OF MAGNESIUM: CPT | Performed by: NURSE PRACTITIONER

## 2023-01-30 PROCEDURE — 80053 COMPREHEN METABOLIC PANEL: CPT | Performed by: NURSE PRACTITIONER

## 2023-01-30 PROCEDURE — 63600175 PHARM REV CODE 636 W HCPCS: Performed by: INTERNAL MEDICINE

## 2023-01-30 PROCEDURE — 99223 1ST HOSP IP/OBS HIGH 75: CPT | Mod: CR,,, | Performed by: INTERNAL MEDICINE

## 2023-01-30 PROCEDURE — 27000221 HC OXYGEN, UP TO 24 HOURS

## 2023-01-30 PROCEDURE — 99900035 HC TECH TIME PER 15 MIN (STAT)

## 2023-01-30 PROCEDURE — 93010 ELECTROCARDIOGRAM REPORT: CPT | Mod: ,,, | Performed by: INTERNAL MEDICINE

## 2023-01-30 PROCEDURE — 36415 COLL VENOUS BLD VENIPUNCTURE: CPT | Performed by: INTERNAL MEDICINE

## 2023-01-30 PROCEDURE — 93005 ELECTROCARDIOGRAM TRACING: CPT | Performed by: INTERNAL MEDICINE

## 2023-01-30 PROCEDURE — 95819 EEG AWAKE AND ASLEEP: CPT

## 2023-01-30 PROCEDURE — 20000000 HC ICU ROOM

## 2023-01-30 PROCEDURE — 36415 COLL VENOUS BLD VENIPUNCTURE: CPT | Performed by: NURSE PRACTITIONER

## 2023-01-30 PROCEDURE — 93010 EKG 12-LEAD: ICD-10-PCS | Mod: ,,, | Performed by: INTERNAL MEDICINE

## 2023-01-30 PROCEDURE — 83605 ASSAY OF LACTIC ACID: CPT | Performed by: INTERNAL MEDICINE

## 2023-01-30 RX ORDER — LORAZEPAM 2 MG/ML
1 INJECTION INTRAMUSCULAR
Status: DISCONTINUED | OUTPATIENT
Start: 2023-01-30 | End: 2023-01-30

## 2023-01-30 RX ORDER — ENOXAPARIN SODIUM 100 MG/ML
40 INJECTION SUBCUTANEOUS
Status: DISCONTINUED | OUTPATIENT
Start: 2023-01-30 | End: 2023-02-06 | Stop reason: HOSPADM

## 2023-01-30 RX ORDER — LORAZEPAM 2 MG/ML
5 INJECTION INTRAMUSCULAR
Status: DISCONTINUED | OUTPATIENT
Start: 2023-01-30 | End: 2023-02-06 | Stop reason: HOSPADM

## 2023-01-30 RX ORDER — LEVETIRACETAM 10 MG/ML
1000 INJECTION INTRAVASCULAR ONCE
Status: COMPLETED | OUTPATIENT
Start: 2023-01-30 | End: 2023-01-30

## 2023-01-30 RX ORDER — LEVETIRACETAM 5 MG/ML
500 INJECTION INTRAVASCULAR EVERY 12 HOURS
Status: DISCONTINUED | OUTPATIENT
Start: 2023-01-30 | End: 2023-02-06 | Stop reason: HOSPADM

## 2023-01-30 RX ORDER — ACETAMINOPHEN 10 MG/ML
1000 INJECTION, SOLUTION INTRAVENOUS ONCE
Status: COMPLETED | OUTPATIENT
Start: 2023-01-30 | End: 2023-01-30

## 2023-01-30 RX ADMIN — MORPHINE SULFATE 2 MG: 2 INJECTION, SOLUTION INTRAMUSCULAR; INTRAVENOUS at 02:01

## 2023-01-30 RX ADMIN — LEVETIRACETAM 500 MG: 5 INJECTION INTRAVENOUS at 08:01

## 2023-01-30 RX ADMIN — POTASSIUM CHLORIDE 10 MEQ: 7.46 INJECTION, SOLUTION INTRAVENOUS at 03:01

## 2023-01-30 RX ADMIN — POTASSIUM CHLORIDE 10 MEQ: 7.46 INJECTION, SOLUTION INTRAVENOUS at 05:01

## 2023-01-30 RX ADMIN — LEVETIRACETAM 1000 MG: 10 INJECTION INTRAVENOUS at 01:01

## 2023-01-30 RX ADMIN — BISACODYL 10 MG: 10 SUPPOSITORY RECTAL at 09:01

## 2023-01-30 RX ADMIN — POTASSIUM CHLORIDE 10 MEQ: 7.46 INJECTION, SOLUTION INTRAVENOUS at 01:01

## 2023-01-30 RX ADMIN — ENOXAPARIN SODIUM 40 MG: 100 INJECTION SUBCUTANEOUS at 03:01

## 2023-01-30 RX ADMIN — MAGNESIUM SULFATE HEPTAHYDRATE 2 G: 40 INJECTION, SOLUTION INTRAVENOUS at 03:01

## 2023-01-30 RX ADMIN — ACETAMINOPHEN 1000 MG: 10 INJECTION, SOLUTION INTRAVENOUS at 08:01

## 2023-01-30 NOTE — PROCEDURES
EEG REPORT    NAME: Ariana Kramer Te  : 1945  MRN: [id number]    DATE of EE2023    CLINICAL INDICATION: This is a 77 y.o. female with history of Parkinson's Disease and COVID-19 infection being evaluated for new onset seizure.    MEDICATIONS:  Scheduled Meds:   bisacodyL  10 mg Rectal Daily    enoxparin  40 mg Subcutaneous Q24H    levetiracetam IV  500 mg Intravenous Q12H     Continuous Infusions:   lactated ringers 100 mL/hr at 23 1747     PRN Meds:.calcium gluconate IVPB, calcium gluconate IVPB, calcium gluconate IVPB, lorazepam, magnesium sulfate IVPB, magnesium sulfate IVPB, morphine, ondansetron, potassium chloride **AND** potassium chloride **AND** potassium chloride, sodium chloride 0.9%, sodium phosphate IVPB, sodium phosphate IVPB, sodium phosphate IVPB      EEG DESCRIPTION:  A 16-channel EEG was performed with electrode placement in 10/[employer] System. Longitudinal bipolar and referential montages were utilized in analysis. Total duration of this study was 25 minutes.    This is a technically difficult study with moderate muscle and motion artifacts.    The background consists of a mixture of theta and delta rhythms, with no posterior dominant rhythm seen. Stage II sleep structures are not seen. Intermittent triphasic waves are seen throughout the study.    Photic stimulation is performed with no abnormal reactivity noted. Hyperventilation is not performed.     No epileptiform discharges or seizures are captured.    Impression:  This is an abnormal EEG due to diffuse slowing of the background activity consistent with a moderate to severe encephalopathy. No epileptiform discharges or seizures are captured. Triphasic waves suggestive of metabolic encephalopathy.  Findings of this study indicate a generalized encephalopathic process that is nonspecific in type. Toxic, metabolic, or structural abnormalities may be considered.  Further clinical correlation is advised.      Mei Bryant  MD Brian  Neurology

## 2023-01-30 NOTE — CONSULTS
Pulmonary/Critical Care Consult      PATIENT NAME: Ariana Tiwari  MRN: [id number]  TODAY'S DATE: 2023  11:17 AM  ADMIT DATE: 2023  AGE: 77 y.o. : 1945    CONSULT REQUESTED BY: Colette Paiz MD    REASON FOR CONSULT:   Possible seizure    HISTORY OF PRESENT ILLNESS   Ariana Tiwari is a 77 y.o. female with a PMH of Parkinson's disease, hypothyroidism, prior bowel obstruction, diverticulosis, gluten enteropathy, and PUD currently admitted with SBO in the setting of COVID on whom we have been consulted for possible seizure.    For her SBO, the patient has been managed conservatively with NGT intermittent suction, bowel regimen, and metoclopramide.    Today, the patient had a code called for possible seizure activity. Per the patient's daughter, who was in the room when the episode started, the patient was smacking her lips, her eyes rolled back in her head, and she tightly contracted her arms into her chest. The patient apparently then pushed her sheet down and pushed her left leg out of the bed. She was unresponsive to her daughter then. Nursing staff came in and called a code. Lorazepam was not administered, and the abnormal motor function had ceased by the time I arrived in the room less than 2 minutes later, at which point she was obtunded but breathing and apparently protecting her airway. She gradually became more awake over the ensuing 30 minutes. Chronic rolling tremors of the hands and forearms gradually resumed as well.    Importantly, the patient's daughter reports that for some time recently, the patient has had brief episodes of lip smacking sometimes with bending over for no apparent reason during which time she is not responsive, but she recovers quickly and spontaneously. She has not yet been worked up by a neurologist.    REVIEW OF SYSTEMS  Unable to obtain due to mental status.    ALLERGIES  Review of patient's allergies indicates:   Allergen Reactions    Gluten Diarrhea        INPATIENT SCHEDULED MEDICATIONS   bisacodyL  10 mg Rectal Daily    levetiracetam IV  1,000 mg Intravenous Once    levetiracetam IV  500 mg Intravenous Q12H      lactated ringers 100 mL/hr at 01/29/23 2833       MEDICAL AND SURGICAL HISTORY  Past Medical History:   Diagnosis Date    Allergy     Diverticulosis     Gluten enteropathy     Gluten intolerance     Hernia     Hypothyroidism     PD (Parkinson's disease) 9/16/2015    Right tremor type    Peptic ulcer disease     Right shoulder pain     Cirtisone injection 3/26/15 - Dr. Tolbert    Ulcer      Past Surgical History:   Procedure Laterality Date    ADENOIDECTOMY      COLONOSCOPY  03/01/2012    Dr. Teresa, repeat in 10 years for screening    COLONOSCOPY N/A 2/21/2018    Procedure: COLONOSCOPY;  Surgeon: Radha Deng MD;  Location: [facility];  Service: Endoscopy;  Laterality: N/A;    CORRECTION OF HAMMER TOE Left 9/16/2020    Procedure: CORRECTION, HAMMER TOE;  Surgeon: William Sprague MD;  Location: [facility] OR;  Service: Orthopedics;  Laterality: Left;    COSMETIC SURGERY      Broken nose    ESOPHAGOGASTRODUODENOSCOPY N/A 12/8/2021    Procedure: EGD (ESOPHAGOGASTRODUODENOSCOPY);  Surgeon: Benji Valentino III, MD;  Location: [facility];  Service: Endoscopy;  Laterality: N/A;    FRACTURE SURGERY  left wrist    With pin    HEMORRHOID SURGERY      HERNIA REPAIR Left 04/09/2015    Dr Lo; left inguinal    INTRALUMINAL GASTROINTESTINAL TRACT IMAGING VIA CAPSULE N/A 7/10/2018    Procedure: IMAGING, GI TRACT, INTRALUMINAL, VIA CAPSULE;  Surgeon: Radha Deng MD;  Location: [facility];  Service: Endoscopy;  Laterality: N/A;    LYSIS OF ADHESIONS  9/29/2020    Procedure: LYSIS, ADHESIONS;  Surgeon: Eagle Gonzalez MD;  Location: [facility] OR;  Service: General;;    RHINOPLASTY TIP      TONSILLECTOMY      UPPER GASTROINTESTINAL ENDOSCOPY  05/21/2015    Dr. Gomez       ALCOHOL, TOBACCO AND DRUG USE  Social History     Tobacco Use   Smoking Status Never    Smokeless Tobacco Never     Social History     Substance and Sexual Activity   Alcohol Use Yes    Alcohol/week: 11.7 standard drinks    Types: 14 Standard drinks or equivalent per week    Comment: 2 glasses wine per dinner     Social History     Substance and Sexual Activity   Drug Use No       FAMILY HISTORY  Family History   Problem Relation Age of Onset    Diabetes Mother     Diabetes Son     Obesity Sister     Alcohol abuse Brother     Heart disease Brother     No Known Problems Father     Early death Brother         self    Breast cancer Neg Hx     Colon cancer Neg Hx     Ovarian cancer Neg Hx     Colon polyps Neg Hx     Crohn's disease Neg Hx     Ulcerative colitis Neg Hx     Celiac disease Neg Hx        VITAL SIGNS (MOST RECENT)  Temp: 98 °F (36.7 °C) (01/30/23 0713)  Pulse: (!) 125 (01/30/23 1053)  Resp: 18 (01/30/23 1053)  BP: 134/69 (01/30/23 0713)  SpO2: 100 % (01/30/23 1053)    INTAKE AND OUTPUT (LAST 24 HOURS):  Intake/Output Summary (Last 24 hours) at 1/30/2023 1117  Last data filed at 1/30/2023 0602  Gross per 24 hour   Intake 0 ml   Output 1900 ml   Net -1900 ml       WEIGHT  Wt Readings from Last 1 Encounters:   01/28/23 56.5 kg (124 lb 9 oz)       PHYSICAL EXAM  GENERAL: Elderly woman with arm tremors unresponsive to verbal stimuli but apparently awake.  HEENT: PERRLA  NECK: Supple.  HEART: Regular rate and normal rhythm. No murmur or gallop auscultated.  LUNGS: Clear to auscultation and percussion. Lung excursion symmetrical.  ABDOMEN: Bowel sounds PRESENT. Soft, non-tender, non-distended, no masses palpated.  EXTREMITIES: Normal muscle tone and joint movement, no cyanosis or clubbing.   LYMPHATICS: No adenopathy palpated, no edema.  SKIN: Dry, intact, no lesions.   NEURO: Unresponsive to verbal stimuli.  PSYCH: Unable to assess.    ACUTE PHASE REACTANT (LAST 24 HOURS)  No results for input(s): FERRITIN, CRP, LDH, DDIMER in the last 24 hours.    CBC LAST (LAST 24 HOURS)  Recent Labs   Lab  01/30/23  0457 01/30/23  1041   WBC 6.94  --    RBC 4.43  --    HGB 13.6  --    HCT 40.6 43   MCV 92  --    MCH 30.7  --    MCHC 33.5  --    RDW 12.0  --      --    MPV 10.1  --    GRAN 72.2  5.0  --    LYMPH 17.7*  1.2  --    MONO 7.9  0.6  --    BASO 0.04  --    NRBC 0  --        CHEMISTRY LAST (LAST 24 HOURS)  Recent Labs   Lab 01/30/23  0457 01/30/23  1041     --    K 3.5  --      --    CO2 24  --    ANIONGAP 13  --    BUN 7*  --    CREATININE 0.3*  --    GLU 61*  --    CALCIUM 8.8  --    PH  --  7.302*   ALBUMIN 3.8  --    PROT 6.4  --    ALKPHOS 78  --    ALT 17  --    AST 20  --    BILITOT 1.6*  --        COAGULATION LAST (LAST 24 HOURS)  No results for input(s): LABPT, INR, APTT in the last 24 hours.    CARDIAC PROFILE (LAST 24 HOURS)  Recent Labs   Lab 01/27/23  1826   *       LAST 7 DAYS MICROBIOLOGY   Microbiology Results (last 7 days)       ** No results found for the last 168 hours. **            MOST RECENT IMAGING  X-Ray Abdomen Flat And Erect  Spine and upright views of the abdomen are compared to prior study dated 1/29/2023    Clinical history is small bowel obstruction    There is an NG tube coiled in the stomach.    There are stable scattered mildly dilated loops of small bowel with air-fluid levels compatible with partial small bowel obstruction. No organomegaly or abnormal soft tissue mass. There is no pneumoperitoneum.    The lung bases are clear.    IMPRESSION: Stable mildly dilated loops of small bowel with scattered air-fluid levels compatible with partial small bowel obstruction    Electronically signed by:  Nallely Alexander MD  1/30/2023 8:03 AM CST Workstation: [id number]      CURRENT VISIT EKG  Results for orders placed or performed during the hospital encounter of 01/27/23   EKG 12-lead    Narrative    Test Reason : R07.9,    Vent. Rate : 066 BPM     Atrial Rate : 066 BPM     P-R Int : 168 ms          QRS Dur : 080 ms      QT Int : 432 ms       P-R-T Axes :  017 -31 099 degrees     QTc Int : 452 ms    Normal sinus rhythm  Left axis deviation  T wave abnormality, consider lateral ischemia  Abnormal ECG  When compared with ECG of 04-DEC-2021 14:57,  No significant change was found  Confirmed by Rodo Finch MD ([id number]) on 1/29/2023 1:52:21 PM    Referred By: JENELLE   SELF           Confirmed By:Rodo Finch MD       ECHOCARDIOGRAM RESULTS  Results for orders placed during the hospital encounter of 09/29/20    Echo Color Flow Doppler? Yes; Bubble Contrast? No    Interpretation Summary  · The estimated PA systolic pressure is 42 mmHg.  · There is left ventricular concentric remodeling.  · The left ventricle is normal in size with normal systolic function. The estimated ejection fraction is 68%.  · Normal left ventricular diastolic function.  · Mild left atrial enlargement.  · Normal right ventricular systolic function.  · Normal central venous pressure (3 mmHg).        RESPIRATORY SUPPORT          LAST ARTERIAL BLOOD GAS  ABG  Recent Labs   Lab 01/30/23  1041   PH 7.302*   PO2 210*   PCO2 34.5*   HCO3 17.0*   BE -9         ASSESSMENT/PLAN:   Ariana Tiwari is a 77 y.o. female with a PMH significant for Parkinson's disease, hypothyroidism, prior bowel obstruction, diverticulosis, gluten enteropathy, and PUD currently admitted with SBO in the setting of COVID on whom we have been consulted for possible seizure.    NEUROLOGIC  #Possible generalized tonic-clonic seizure  #Possible prior focal seizures  #Parkinson's disease  - load Keppra now  - 0.1 mg/kg (~5 mg) lorazepam IV if seizure lasting >5 minutes  - stat CT head  - metoclopramide d/c'ed, as it lowers seizure threshold  - neurology consult    CARDIAC  No acute issues.    PULMONARY  No acute issues.    GASTROINTESTINAL  #Small bowel obstruction, resolved  #Diverticulosis  #PUD  - maintain NGT for now  - bowel regimen  - avoid opiates as possible    HEMATOLOGIC  No acute issues.    RENAL/GENITOURINARY  No acute  issues.    INFECTIOUS DISEASE  #COVID-19  - monitor for hypoxemia    ENDOCRINE  #Hypothyroidism  - continue home levothyroxine when able to take PO   - if >48 hours, start IV    DVT prophylaxis: start lovenox  Stress ulcer prophylaxis: not indicated  Code status: FULL  Disposition: Transfer to ICU      Pj Miranda MD  [facility]  Department of Pulmonary and Critical Care Medicine  Date of Service: 01/30/2023  11:17 AM

## 2023-01-30 NOTE — NURSING
Nurse just left out of isolation room from administering schedule medication. Pt was alert in no noted distress vs stable. Noc/o at time.  At around 1015am fami;ly member yelled out to staff emergency. Two staff member entered room pt noted to have change in loc noted pale, frothing from mouth eyes rolling back.  Rapid response called. Team arrive to floor quickly. Documented in code tab see assement pt stablized and transferred to icu per md.

## 2023-01-30 NOTE — PROGRESS NOTES
Pt seen and examined.  REsting comfortably  Denies n/v.  Reports flatus and BM  CT scan reviewed.  Findings suggestive of possible volvulus.   Pt with chronic dilated small bowel making assessment difficulty    Wt Readings from Last 3 Encounters:   01/28/23 56.5 kg (124 lb 9 oz)   11/29/22 58.2 kg (128 lb 4.9 oz)   05/18/22 57.5 kg (126 lb 12.2 oz)     Temp Readings from Last 3 Encounters:   01/30/23 98.4 °F (36.9 °C) (Oral)   11/29/22 97.9 °F (36.6 °C) (Oral)   05/18/22 97.7 °F (36.5 °C) (Oral)     BP Readings from Last 3 Encounters:   01/30/23 135/63   11/29/22 110/65   05/18/22 100/64     Pulse Readings from Last 3 Encounters:   01/30/23 63   11/29/22 68   05/18/22 71     AAOx3  Sinus  Soft/nd/nt  BS present  2+ pulses     Lab Results   Component Value Date    WBC 6.94 01/30/2023    HGB 13.6 01/30/2023    HCT 40.6 01/30/2023    MCV 92 01/30/2023     01/30/2023       BMP  Lab Results   Component Value Date     01/30/2023    K 3.5 01/30/2023     01/30/2023    CO2 24 01/30/2023    BUN 7 (L) 01/30/2023    CREATININE 0.3 (L) 01/30/2023    CALCIUM 8.8 01/30/2023    ANIONGAP 13 01/30/2023    EGFRNORACEVR >60.0 01/30/2023     A/P: PSBO  Clinical exam and labs do not support findings on CT scan.  D/w pt and family.  Preferred to continue with conservative mgmt.  Will repeat xray this AM to see if contrast passes into the colon.  If passes into colon will likely remove NG and adv die.  If not will cont conservative mgmt.

## 2023-01-30 NOTE — PT/OT/SLP PROGRESS
Physical Therapy      Patient Name:  Ariana Kramer Te   MRN:  [id number]    Patient not seen today secondary to Nurse/ DEVIN hold (code called this am.Transfered to ICU.). Will follow-up tomorrow.

## 2023-01-31 LAB
ALBUMIN SERPL BCP-MCNC: 3.5 G/DL (ref 3.5–5.2)
ALP SERPL-CCNC: 72 U/L (ref 55–135)
ALT SERPL W/O P-5'-P-CCNC: 16 U/L (ref 10–44)
ANION GAP SERPL CALC-SCNC: 12 MMOL/L (ref 8–16)
AST SERPL-CCNC: 17 U/L (ref 10–40)
BASOPHILS # BLD AUTO: 0.01 K/UL (ref 0–0.2)
BASOPHILS NFR BLD: 0.2 % (ref 0–1.9)
BILIRUB SERPL-MCNC: 1.1 MG/DL (ref 0.1–1)
BUN SERPL-MCNC: <5 MG/DL (ref 8–23)
CALCIUM SERPL-MCNC: 8.4 MG/DL (ref 8.7–10.5)
CHLORIDE SERPL-SCNC: 101 MMOL/L (ref 95–110)
CO2 SERPL-SCNC: 26 MMOL/L (ref 23–29)
CREAT SERPL-MCNC: 0.4 MG/DL (ref 0.5–1.4)
DIFFERENTIAL METHOD: ABNORMAL
EOSINOPHIL # BLD AUTO: 0.1 K/UL (ref 0–0.5)
EOSINOPHIL NFR BLD: 0.8 % (ref 0–8)
ERYTHROCYTE [DISTWIDTH] IN BLOOD BY AUTOMATED COUNT: 11.9 % (ref 11.5–14.5)
EST. GFR  (NO RACE VARIABLE): >60 ML/MIN/1.73 M^2
GLUCOSE SERPL-MCNC: 115 MG/DL (ref 70–110)
HCT VFR BLD AUTO: 38.9 % (ref 37–48.5)
HGB BLD-MCNC: 13.1 G/DL (ref 12–16)
IMM GRANULOCYTES # BLD AUTO: 0.02 K/UL (ref 0–0.04)
IMM GRANULOCYTES NFR BLD AUTO: 0.3 % (ref 0–0.5)
LYMPHOCYTES # BLD AUTO: 1 K/UL (ref 1–4.8)
LYMPHOCYTES NFR BLD: 16.6 % (ref 18–48)
MAGNESIUM SERPL-MCNC: 2 MG/DL (ref 1.6–2.6)
MCH RBC QN AUTO: 30.8 PG (ref 27–31)
MCHC RBC AUTO-ENTMCNC: 33.7 G/DL (ref 32–36)
MCV RBC AUTO: 91 FL (ref 82–98)
MONOCYTES # BLD AUTO: 0.6 K/UL (ref 0.3–1)
MONOCYTES NFR BLD: 9.9 % (ref 4–15)
NEUTROPHILS # BLD AUTO: 4.3 K/UL (ref 1.8–7.7)
NEUTROPHILS NFR BLD: 72.2 % (ref 38–73)
NRBC BLD-RTO: 0 /100 WBC
PLATELET # BLD AUTO: 181 K/UL (ref 150–450)
PMV BLD AUTO: 10.1 FL (ref 9.2–12.9)
POTASSIUM SERPL-SCNC: 3.2 MMOL/L (ref 3.5–5.1)
PROT SERPL-MCNC: 6.3 G/DL (ref 6–8.4)
RBC # BLD AUTO: 4.26 M/UL (ref 4–5.4)
SODIUM SERPL-SCNC: 139 MMOL/L (ref 136–145)
WBC # BLD AUTO: 5.97 K/UL (ref 3.9–12.7)

## 2023-01-31 PROCEDURE — 94799 UNLISTED PULMONARY SVC/PX: CPT

## 2023-01-31 PROCEDURE — 63600175 PHARM REV CODE 636 W HCPCS: Performed by: NURSE PRACTITIONER

## 2023-01-31 PROCEDURE — 25000003 PHARM REV CODE 250: Performed by: STUDENT IN AN ORGANIZED HEALTH CARE EDUCATION/TRAINING PROGRAM

## 2023-01-31 PROCEDURE — 63600175 PHARM REV CODE 636 W HCPCS: Performed by: INTERNAL MEDICINE

## 2023-01-31 PROCEDURE — 27000221 HC OXYGEN, UP TO 24 HOURS

## 2023-01-31 PROCEDURE — 25000003 PHARM REV CODE 250: Performed by: SURGERY

## 2023-01-31 PROCEDURE — 83735 ASSAY OF MAGNESIUM: CPT | Performed by: NURSE PRACTITIONER

## 2023-01-31 PROCEDURE — 99291 PR CRITICAL CARE, E/M 30-74 MINUTES: ICD-10-PCS | Mod: CR,,, | Performed by: INTERNAL MEDICINE

## 2023-01-31 PROCEDURE — 85025 COMPLETE CBC W/AUTO DIFF WBC: CPT | Performed by: NURSE PRACTITIONER

## 2023-01-31 PROCEDURE — 80053 COMPREHEN METABOLIC PANEL: CPT | Performed by: NURSE PRACTITIONER

## 2023-01-31 PROCEDURE — 12000002 HC ACUTE/MED SURGE SEMI-PRIVATE ROOM

## 2023-01-31 PROCEDURE — 99900031 HC PATIENT EDUCATION (STAT)

## 2023-01-31 PROCEDURE — 99900035 HC TECH TIME PER 15 MIN (STAT)

## 2023-01-31 PROCEDURE — 36415 COLL VENOUS BLD VENIPUNCTURE: CPT | Performed by: NURSE PRACTITIONER

## 2023-01-31 PROCEDURE — 99291 CRITICAL CARE FIRST HOUR: CPT | Mod: CR,,, | Performed by: INTERNAL MEDICINE

## 2023-01-31 PROCEDURE — 94761 N-INVAS EAR/PLS OXIMETRY MLT: CPT

## 2023-01-31 PROCEDURE — 25000003 PHARM REV CODE 250

## 2023-01-31 PROCEDURE — 25000003 PHARM REV CODE 250: Performed by: INTERNAL MEDICINE

## 2023-01-31 RX ORDER — LEVOTHYROXINE SODIUM ANHYDROUS 100 UG/5ML
100 INJECTION, POWDER, LYOPHILIZED, FOR SOLUTION INTRAVENOUS DAILY
Status: DISCONTINUED | OUTPATIENT
Start: 2023-01-31 | End: 2023-02-06 | Stop reason: HOSPADM

## 2023-01-31 RX ORDER — CARBOXYMETHYLCELLULOSE SODIUM 5 MG/ML
1 SOLUTION/ DROPS OPHTHALMIC 3 TIMES DAILY PRN
Status: DISCONTINUED | OUTPATIENT
Start: 2023-01-31 | End: 2023-02-06 | Stop reason: HOSPADM

## 2023-01-31 RX ORDER — CARBIDOPA AND LEVODOPA 25; 100 MG/1; MG/1
1 TABLET ORAL 4 TIMES DAILY
Status: DISCONTINUED | OUTPATIENT
Start: 2023-01-31 | End: 2023-02-06 | Stop reason: HOSPADM

## 2023-01-31 RX ORDER — CHLORHEXIDINE GLUCONATE ORAL RINSE 1.2 MG/ML
15 SOLUTION DENTAL 2 TIMES DAILY
Status: COMPLETED | OUTPATIENT
Start: 2023-01-31 | End: 2023-02-04

## 2023-01-31 RX ORDER — MUPIROCIN 20 MG/G
OINTMENT TOPICAL 2 TIMES DAILY
Status: COMPLETED | OUTPATIENT
Start: 2023-01-31 | End: 2023-02-04

## 2023-01-31 RX ADMIN — ENOXAPARIN SODIUM 40 MG: 100 INJECTION SUBCUTANEOUS at 02:01

## 2023-01-31 RX ADMIN — BISACODYL 10 MG: 10 SUPPOSITORY RECTAL at 09:01

## 2023-01-31 RX ADMIN — CHLORHEXIDINE GLUCONATE 15 ML: 1.2 RINSE ORAL at 08:01

## 2023-01-31 RX ADMIN — CHLORHEXIDINE GLUCONATE 15 ML: 1.2 RINSE ORAL at 09:01

## 2023-01-31 RX ADMIN — MUPIROCIN 1 G: 20 OINTMENT TOPICAL at 09:01

## 2023-01-31 RX ADMIN — MUPIROCIN 1 G: 20 OINTMENT TOPICAL at 08:01

## 2023-01-31 RX ADMIN — CARBOXYMETHYLCELLULOSE SODIUM 1 DROP: 0.5 SOLUTION, GEL FORMING / DROPS OPHTHALMIC at 06:01

## 2023-01-31 RX ADMIN — LEVETIRACETAM 500 MG: 5 INJECTION INTRAVENOUS at 08:01

## 2023-01-31 RX ADMIN — CARBIDOPA AND LEVODOPA 1 TABLET: 25; 100 TABLET ORAL at 08:01

## 2023-01-31 RX ADMIN — SODIUM CHLORIDE, SODIUM LACTATE, POTASSIUM CHLORIDE, AND CALCIUM CHLORIDE: .6; .31; .03; .02 INJECTION, SOLUTION INTRAVENOUS at 06:01

## 2023-01-31 RX ADMIN — LEVETIRACETAM 500 MG: 5 INJECTION INTRAVENOUS at 09:01

## 2023-01-31 RX ADMIN — LEVOTHYROXINE SODIUM ANHYDROUS 100 MCG: 100 INJECTION, POWDER, LYOPHILIZED, FOR SOLUTION INTRAVENOUS at 12:01

## 2023-01-31 RX ADMIN — POTASSIUM CHLORIDE 60 MEQ: 7.46 INJECTION, SOLUTION INTRAVENOUS at 06:01

## 2023-01-31 NOTE — CARE UPDATE
Continue oxygen   01/31/23 0800   PRE-TX-O2   Device (Oxygen Therapy) nasal cannula   $ Is the patient on Low Flow Oxygen? Yes   Flow (L/min) 2   Pulse Oximetry Type Continuous   $ Pulse Oximetry - Multiple Charge Pulse Oximetry - Multiple   Education   $ Education DME Oxygen;15 min   Respiratory Evaluation   $ Care Plan Tech Time 15 min   $ Eval/Re-eval Charges Re-evaluation

## 2023-01-31 NOTE — PROGRESS NOTES
[facility] Medicine  Progress Note    Patient name: Ariana Tiwari  MRN: [id number]  Admit Date: 1/27/2023   LOS: 3 days     SUBJECTIVE:     Principal problem: SBO (small bowel obstruction)    Interval History:  CT abd performed and reveals  some twisting of the bowel and mesenteric vessels to suggest volvulus.  LA checked and is normal.  Surgery monitoring to see if bowel contrast will transition through the colon.    Jennifer Winn called this AM.  Patient's daughter in law reports she noted patient smacking her lips. She did not respond when she asked if she was okay but instead lifted her leg like she was trying to get out of bed.  She then clenched up and started foaming at the mouth. She reports patient has done smacking of her lips at home in recent past. She has not been worked up for this (seizures versus part of her parkinsons) and she has never had this event where she clenches or foams at the mouth. She did not lose her pulse.  She did require a NRB as initially not alert.  She was moved to the ICU.  Accucheck was 90. ABG done with metabolic acidosis. CXR with atelectasis, no aspiration.  Head CT done with no acute process.  She slowly started to come around and seizure with post ictal state suspected.  She was loaded with keppra, EEG ordered and Neurology consulted.  Pulmonary/Critical care kindly responded.  Given no immediate plans for surgery, I have started Lovenox.     Hospital Course:    Patient presented to the ED with abdominal pain with nausea.  She had 2 BMs prior to coming to the ED.  She was admitted with SBO as demonstrated by CT scan. She's had SBO in the past and has required surgery.  ED workup also positive for Covid 19. She has no hypoxia. General surgery consulted.  NGT to LIWS. Repeat flat and erect with persistent loops of dilated small bowel.  She/Family reports she also has a bloated abdomen but distension did improve after placement of NGT.    Scheduled Meds:    acetaminophen  1,000 mg Intravenous Once    bisacodyL  10 mg Rectal Daily    enoxparin  40 mg Subcutaneous Q24H    levetiracetam IV  500 mg Intravenous Q12H     Continuous Infusions:   lactated ringers 100 mL/hr at 01/29/23 1747     PRN Meds:calcium gluconate IVPB, calcium gluconate IVPB, calcium gluconate IVPB, lorazepam, magnesium sulfate IVPB, magnesium sulfate IVPB, morphine, ondansetron, potassium chloride **AND** potassium chloride **AND** potassium chloride, sodium chloride 0.9%, sodium phosphate IVPB, sodium phosphate IVPB, sodium phosphate IVPB    Review of patient's allergies indicates:   Allergen Reactions    Gluten Diarrhea       Review of Systems: As per interval history    OBJECTIVE:     Vital Signs (Most Recent)  Temp: 98.9 °F (37.2 °C) (01/30/23 1715)  Pulse: 79 (01/30/23 1900)  Resp: 17 (01/30/23 1900)  BP: (!) 153/73 (01/30/23 1900)  SpO2: 99 % (01/30/23 1900)    Vital Signs Range (Last 24H):  Temp:  [97.8 °F (36.6 °C)-99.8 °F (37.7 °C)]   Pulse:  [2-125]   Resp:  [16-34]   BP: (129-159)/()   SpO2:  [94 %-100 %]     I & O (Last 24H):  Intake/Output Summary (Last 24 hours) at 1/30/2023 1959  Last data filed at 1/30/2023 1830  Gross per 24 hour   Intake 800 ml   Output 2450 ml   Net -1650 ml       Physical Exam:    Vitals and nursing note reviewed.     Constitutional:       General: During code, she was no responsive with blank stare      Appearance: Well-developed.   HENT:      Head: Normocephalic and atraumatic.   NGT  Eyes:      Pupils: Pupils are equal, round, and reactive to light.   Cardiovascular:      Rate and Rhythm: Regular rhythm.   Pulmonary:      Effort: Pulmonary effort is normal.      Breath sounds: Normal breath sounds. No wheezing.   Had some accessory muscle use with abdominal muscles  Abdominal:      General: There is no distension.      Palpations: Abdomen is soft.      Tenderness: There is no abdominal tenderness. There is no guarding or rebound.   Musculoskeletal:         No effusions  Skin:     Findings: No rash.   Neurological:      Mental Status: Not responsive    Has baseline tremor secondary to her Parkinsons    Laboratory:  I have reviewed all pertinent lab results within the past 24 hours.  CBC:   Recent Labs   Lab 01/30/23 0457 01/30/23  1041   WBC 6.94  --    RBC 4.43  --    HGB 13.6  --    HCT 40.6 43     --    MCV 92  --    MCH 30.7  --    MCHC 33.5  --      CMP:   Recent Labs   Lab 01/30/23 0457   GLU 61*   CALCIUM 8.8   ALBUMIN 3.8   PROT 6.4      K 3.5   CO2 24      BUN 7*   CREATININE 0.3*   ALKPHOS 78   ALT 17   AST 20   BILITOT 1.6*       Diagnostic Results:  X-Ray: Reviewed  Flat and erect with persistent dilation of small bowel loops    ASSESSMENT/PLAN:         Active Hospital Problems    Diagnosis  POA    *SBO (small bowel obstruction) [K56.609]  Yes    Seizure [R56.9]  Yes    COVID-19 [U07.1]  Yes    PD (Parkinson's disease) [G20]  Yes     Right tremor type      Hypothyroidism [E03.9]  Yes      Resolved Hospital Problems   No resolved problems to display.         Plan:     -New ? Seizure.  EEG.  Neurology consult.  CT head with no acute process.  Seizure precautions.  Loaded with keppra and started 500mg IV BID. Moved to ICU.   -NGT to LIWS  -General surgery consulted and following  -Serial abdominal exams and Xrays  -Xrays with persistent SBO.  CT scan per Surgery and suggests volvulus.  Continuing supportive care at this time per surgery and will follow imaging to see if oral contrast makes it through the area of twisting.  -IVFs for hydration while NPO.  -CT concerning for ? Internal hernia.  Conservative measures for now.  No indication for emergent surgery. No peritoneal signs.   -suspect right knee OA as source of pain and swelling. Prn IV pain medication given npo.  Ice.  This has seemingly resolved 1/29.  -Covid isolation and symptomatic treatment.  Not hypoxic.   -PT, out of bed as able       VTE Risk Mitigation (From admission,  onward)           Ordered     enoxaparin injection 40 mg  Every 24 hours (non-standard times)         01/30/23 1257     IP VTE LOW RISK PATIENT  Once         01/27/23 2223     Place sequential compression device  Until discontinued         01/27/23 2223                      Department Hospital Medicine  [facility]  Colette Paiz MD  Date of service: 01/30/2023

## 2023-01-31 NOTE — PROGRESS NOTES
Events yesterday noted  Reports having flatus and BM  NO n/v.    Wt Readings from Last 3 Encounters:   01/28/23 56.5 kg (124 lb 9 oz)   11/29/22 58.2 kg (128 lb 4.9 oz)   05/18/22 57.5 kg (126 lb 12.2 oz)     Temp Readings from Last 3 Encounters:   01/31/23 98.1 °F (36.7 °C) (Oral)   11/29/22 97.9 °F (36.6 °C) (Oral)   05/18/22 97.7 °F (36.5 °C) (Oral)     BP Readings from Last 3 Encounters:   01/31/23 138/68   11/29/22 110/65   05/18/22 100/64     Pulse Readings from Last 3 Encounters:   01/31/23 73   11/29/22 68   05/18/22 71     AAAOx3  Sinus  Soft/nt/nd      Lab Results   Component Value Date    WBC 5.97 01/31/2023    HGB 13.1 01/31/2023    HCT 38.9 01/31/2023    MCV 91 01/31/2023     01/31/2023       BMP  Lab Results   Component Value Date     01/31/2023    K 3.2 (L) 01/31/2023     01/31/2023    CO2 26 01/31/2023    BUN <5 (L) 01/31/2023    CREATININE 0.4 (L) 01/31/2023    CALCIUM 8.4 (L) 01/31/2023    ANIONGAP 12 01/31/2023    EGFRNORACEVR >60.0 01/31/2023     A/P: resolving PSBO  Remove NG  Start clears and adv as tolerated  Surgery to sign off reconsult prn

## 2023-01-31 NOTE — PT/OT/SLP PROGRESS
Physical Therapy      Patient Name:  Ariana Kramer Te   MRN:  [id number]    Patient not seen today secondary to  (on hold per nurse). .

## 2023-01-31 NOTE — CONSULTS
[facility]  Neurology Consult    Patient Name: Ariana Tiwari   MRN: [id number]  : 1945  TODAY'S DATE: 2023  ADMIT DATE: 2023  5:51 PM                                          CONSULTED PROVIDER: Mei Torres MD, Neurologist. Cellphone: [phone]  CONSULT REQUESTED BY: Colette Paiz MD     Chief Complaint   Patient presents with    Abdominal Pain     Bloating and pain. History of obstructions        HPI per EMR:  CT abd performed and reveals  some twisting of the bowel and mesenteric vessels to suggest volvulus.  LA checked and is normal.  Surgery monitoring to see if bowel contrast will transition through the colon.     Code Blue called this AM.  Patient's daughter in law reports she noted patient smacking her lips. She did not respond when she asked if she was okay but instead lifted her leg like she was trying to get out of bed.  She then clenched up and started foaming at the mouth. She reports patient has done smacking of her lips at home in recent past. She has not been worked up for this (seizures versus part of her parkinsons) and she has never had this event where she clenches or foams at the mouth. She did not lose her pulse.  She did require a NRB as initially not alert.  She was moved to the ICU.  Accucheck was 90. ABG done with metabolic acidosis. CXR with atelectasis, no aspiration.  Head CT done with no acute process.  She slowly started to come around and seizure with post ictal state suspected.  She was loaded with keppra, EEG ordered and Neurology consulted.  Pulmonary/Critical care kindly responded.  Given no immediate plans for surgery, I have started Lovenox.      Hospital Course:     Patient presented to the ED with abdominal pain with nausea.  She had 2 BMs prior to coming to the ED.  She was admitted with SBO as demonstrated by CT scan. She's had SBO in the past and has required surgery.  ED workup also positive for Covid 19. She has no hypoxia.  General surgery consulted.  NGT to LIWS. Repeat flat and erect with persistent loops of dilated small bowel.  She/Family reports she also has a bloated abdomen but distension did improve after placement of NGT.     Neurology Consult:  Patient was seen and examined by me today. She is a 78 yo woman with history of Parkinson's Disease and asymptomatic COVID-19 infection who was initially admitted for abdominal pain and SBO and was then transferred to the ICU after code blue was called when she suddenly became unresponsive with generalized tonic-clonic activity. Daughter in law at bedside provided history. Patient had been experiencing episodes of staring and mouth smacking that lasted seconds at a time. On those moments, patient would be awake but unresponsive, and she is unable to recall the events. On this admission, she had a generalized tonic clonic seizure with prolonged post-ictal state, reason why she was stepped up to the ICU. She was started on Keppra 500 mg BID and has had no further seizures after this. Of note, she takes CD/LD at home which was not started since patient was NPO, but now she has NGT so will restart. No focal deficits reported.    Review of Systems:    A comprehensive ROS was performed and all systems were negative except as noted above.    Past Medical History:  has a past medical history of Allergy, Diverticulosis, Gluten enteropathy, Gluten intolerance, Hernia, Hypothyroidism, PD (Parkinson's disease) (9/16/2015), Peptic ulcer disease, Right shoulder pain, and Ulcer.    Past Surgical History:  has a past surgical history that includes Adenoidectomy; Tonsillectomy; Hemorrhoid surgery; Rhinoplasty tip; Fracture surgery (left wrist); Cosmetic surgery; Hernia repair (Left, 04/09/2015); Upper gastrointestinal endoscopy (05/21/2015); Colonoscopy (03/01/2012); Colonoscopy (N/A, 2/21/2018); Intraluminal gastrointestinal tract imaging via capsule (N/A, 7/10/2018); Correction of hammer toe (Left,  9/16/2020); Lysis of adhesions (9/29/2020); and Esophagogastroduodenoscopy (N/A, 12/8/2021).    Family Medical History: family history includes Alcohol abuse in her brother; Diabetes in her mother and son; Early death in her brother; Heart disease in her brother; No Known Problems in her father; Obesity in her sister.    Social History:  reports that she has never smoked. She has never used smokeless tobacco. She reports current alcohol use of about 11.7 standard drinks per week. She reports that she does not use drugs.    PCP: Nba Petty MD    Allergies:   Review of patient's allergies indicates:   Allergen Reactions    Gluten Diarrhea       Medications:   No current facility-administered medications on file prior to encounter.     Current Outpatient Medications on File Prior to Encounter   Medication Sig Dispense Refill    acetaminophen (TYLENOL) 325 MG tablet Take 2 tablets (650 mg total) by mouth every 6 (six) hours as needed for Pain.  0    azelastine (ASTELIN) 137 mcg (0.1 %) nasal spray 1 spray (137 mcg total) by Nasal route 2 (two) times daily as needed for Rhinitis. 30 mL 5    carbidopa-levodopa  mg (SINEMET)  mg per tablet Take 1 tablet by mouth 3 (three) times daily.      docusate sodium (COLACE) 100 MG capsule Take 1 capsule (100 mg total) by mouth once daily.  0    fluticasone propionate (FLONASE) 50 mcg/actuation nasal spray 1 spray (50 mcg total) by Each Nostril route once daily. 18.2 mL 2    lactose-reduced food (ENSURE ORIGINAL ORAL) Take 1 Dose by mouth once daily.      levothyroxine (SYNTHROID) 137 MCG Tab tablet TAKE 1/2 (ONE-HALF) TABLET BY MOUTH IN THE MORNING BEFORE BREAKFAST 45 tablet 1    lycopene/lutein/fruit extracts (FRUIT AND VEGETABLE DAILY ORAL) Take 1 Dose by mouth once daily. Patient takes 2 fruit and 1 vegetable balance of nature vitamins in the morning and evening      simethicone (GAS-X ORAL) Take 1 Dose by mouth daily as needed.      food supplemt,  lactose-reduced (ENSURE CLEAR) Liqd Take 5 mLs by mouth 3 (three) times daily with meals.      multivitamin with minerals tablet 1 tablet DAILY (route: oral)      pantoprazole (PROTONIX) 40 MG tablet Take 1 tablet (40 mg total) by mouth 2 (two) times daily. (Patient not taking: Reported on 1/28/2022) 60 tablet 11    polyethylene glycol (GLYCOLAX) 17 gram PwPk Take 17 g by mouth every other day. (Patient not taking: Reported on 1/25/2022) 30 packet 1    pumpkin seed extract/soy germ (AZO BLADDER CONTROL ORAL) Take by mouth.      salicylic acid 3 % Sham Shampoo scalp 2-3 times a week 236 mL PRN    simethicone (MYLICON) 125 mg Cap capsule Take 1 capsule by mouth 4 (four) times daily as needed.      simethicone (MYLICON) 125 mg Cap capsule Take 1 capsule (125 mg total) by mouth 3 (three) times daily with meals. 100 capsule PRN    tamsulosin (FLOMAX) 0.4 mg Cap Take 1 capsule (0.4 mg total) by mouth once daily. (Patient not taking: Reported on 5/18/2022) 30 capsule 11     Scheduled Meds:   bisacodyL  10 mg Rectal Daily    carbidopa-levodopa  mg  1 tablet Oral QID    chlorhexidine  15 mL Mouth/Throat BID    enoxparin  40 mg Subcutaneous Q24H    levetiracetam IV  500 mg Intravenous Q12H    levothyroxine  100 mcg Intravenous Daily    mupirocin   Nasal BID     Continuous Infusions:   lactated ringers 100 mL/hr at 01/31/23 0630     PRN Meds:.calcium gluconate IVPB, calcium gluconate IVPB, calcium gluconate IVPB, carboxymethylcellulose, lorazepam, magnesium sulfate IVPB, magnesium sulfate IVPB, ondansetron, potassium chloride **AND** potassium chloride **AND** potassium chloride, sodium chloride 0.9%, sodium phosphate IVPB, sodium phosphate IVPB, sodium phosphate IVPB      Physical Exam  Current Vitals:  Vitals:    01/31/23 0600   BP: (!) 168/73   Pulse: 75   Resp: 15   Temp:        Physical Exam:  General: AO x2  HEENT: PERRL, EOMI  CV: regular rhythm  Lungs: no respiratory distress  Abdomen: soft    Neurological  Exam    MENTAL STATUS EXAM:  Patient is awake but drowsy, oriented to self and place but not time or situation.  Her voice is hypophonic, she has masked facies, bradykinesia and mild resting tremor in upper extremities worse on the right side.  Her speech is slow but intelligible, naming is intact as well as comprehension and repetition.    CRANIAL NERVE EXAM:  II/III: fundoscopic exam deferred, PERRL; visual fields full to threat  III/IV/VI: EOMI  V: no deficits appreciated to light touch  VII: no facial asymmetry noted  VIII: no deficits in hearing bilaterally  IX/X: palate @ ML and raises symmetrically  XI: shoulder shrug 5/5 bilaterally; head turn 5/5 bilaterally  XII: tongue to midline w/out asymmetry    MOTOR EXAM:  Decreased bulk, increased tone throughout worse on the right upper and lower extremity, there is cogwheel rigidity on the right arm, resting tremor in both hands but worse on the right.  She is at least antigravity in all 4 extremities equally, detailed testing deferred.    REFLEXES:  2+ in bilateral upper and lower extremities, no Nielson's, no clonus.    SENSORY EXAM  Light touch intact throughout in upper and lower extremities without deficits.    COORDINATION/CEREBELLAR EXAM:  FTN: no dysmetria or other signs of appendicular ataxia  HTS: unable to perform    GAIT:  Deferred for safety.        Laboratory Data & Studies    Recent Labs   Lab 01/29/23 0529 01/30/23 0457 01/31/23 0440   WBC 6.06 6.94 5.97   HGB 13.0 13.6 13.1    175 181   MCV 93 92 91       Recent Labs   Lab 01/29/23 0529 01/30/23 0457 01/31/23  0440    138 139   K 3.2* 3.5 3.2*    101 101   CO2 25 24 26   BUN 10 7* <5*   GLU 75 61* 115*   CALCIUM 8.5* 8.8 8.4*   MG  --  1.6  --        Recent Labs   Lab 01/29/23 0529 01/30/23 0457 01/31/23  0440   PROT 6.1 6.4 6.3   ALBUMIN 3.5 3.8 3.5   BILITOT 0.9 1.6* 1.1*   AST 19 20 17   ALT 16 17 16   ALKPHOS 72 78 72       No results for input(s): LABPT, INR, APTT in  the last 168 hours.    No results for input(s): HGBA1C, CHOL, TRIG, LDLCALC, HDL, TSH in the last 168 hours.    Microbiology:  Microbiology Results (last 7 days)       ** No results found for the last 168 hours. **            Imaging:  X-Ray Chest 1 View    Result Date: 1/28/2023  Reason: Nasogastric tube placement. FINDINGS: Portable chest with comparison chest x-ray January 27, 2023 show normal cardiomediastinal silhouette. Nasogastric tube tip and side-port are below the gastroesophageal junction. Bibasilar subsegmental atelectasis Pulmonary vasculature is normal. No acute osseous abnormality. IMPRESSION: 1.  Nasogastric tube tip and side-port are below the gastroesophageal junction. 2.  Bibasilar subsegmental atelectasis. Electronically signed by:  Elvin Bhat DO  1/28/2023 6:06 AM CST Workstation: [id number]    X-Ray Chest 1 View    Result Date: 1/28/2023  CHEST X-RAY 1 VIEW on 1/27/2023 at 11:28 PM CLINICAL INDICATION: NG tube placement COMPARISON: 1/27/2023 at 9:40 PM FINDINGS: The lung apices and upper chest was not fully imaged on this exam. NG tube remains curled in the upper stomach with its tip in the upper body of the stomach. There is minimal basilar atelectasis or scarring. The visualized lungs are otherwise clear. Heart is within normal limits for size. IMPRESSION: NG tube tip in the upper body of the stomach. Electronically signed by:  Bereket Tran  1/28/2023 1:00 AM CST Workstation: [id number]    X-Ray Abdomen Flat And Erect    Result Date: 1/31/2023  2 view abdomen CLINICAL DATA: Small bowel obstruction FINDINGS: Comparison to January 30. Supine and lateral decubitus images are submitted. There is gaseous distention of primarily small bowel loops throughout the abdomen and pelvis. Contrast material is noted within contracted hepatic flexure and transverse colon. No mass, suspicious calcification, or free air is identified. Nasogastric tube is present, with tip either within the third  portion of the duodenum or coiled at the level of the gastric antrum. No free air is identified. IMPRESSION: 1. Abnormal bowel gas pattern compatible with mechanical small bowel obstruction. Compared to January 30, gaseous distention of small bowel loops appears mildly increased. Electronically signed by:  Benji Wiley MD  1/31/2023 6:48 AM CST Workstation: [id number]    X-Ray Abdomen Flat And Erect    Result Date: 1/30/2023  Spine and upright views of the abdomen are compared to prior study dated 1/29/2023 Clinical history is small bowel obstruction There is an NG tube coiled in the stomach. There are stable scattered mildly dilated loops of small bowel with air-fluid levels compatible with partial small bowel obstruction. No organomegaly or abnormal soft tissue mass. There is no pneumoperitoneum. The lung bases are clear. IMPRESSION: Stable mildly dilated loops of small bowel with scattered air-fluid levels compatible with partial small bowel obstruction Electronically signed by:  Nallely Alexander MD  1/30/2023 8:03 AM CST Workstation: [id number]    X-Ray Abdomen Flat And Erect    Result Date: 1/29/2023  REASON: sbo TECHNIQUE: AP abdominal radiograph. COMPARISON: Abdominal radiograph January 28, 2023. FINDINGS: Multiple loops of gas distended bowel again noted, not significantly changed from previous exam. Nasogastric tube tip and side port below the gastroesophageal junction. Air-fluid levels are noted on decubitus views. IMPRESSION: Multiple loops of gas distended bowel are unchanged from previous exam. Electronically signed by:  Elvin Bhat DO  1/29/2023 12:14 PM CST Workstation: [id number]    X-Ray Abdomen Flat And Erect    Result Date: 1/28/2023  REASON: sbo TECHNIQUE: AP abdominal radiograph. COMPARISON: Abdominal radiographs of the 12th 2021 FINDINGS: There are multiple loops of gas distended loops of bowel, involving large and small bowel. Nasogastric tube tip and side-port are below the  gastroesophageal junction. Because lung bases are unremarkable. No acute osseous abnormality. IMPRESSION: Multiple gas distended loops of bowel likely reflects ileus or distal obstruction. Electronically signed by:  Elvin Bhat DO  1/28/2023 9:44 AM CST Workstation: [id number]    CT Head Without Contrast    Result Date: 1/30/2023  CMS MANDATED QUALITY DATA - CT RADIATION  436 All CT scans at this facility utilize dose modulation, iterative reconstruction, and/or weight based dosing when appropriate to reduce radiation dose to as low as reasonably achievable. CT head without contrast Clinical data: Possible seizure COMPARISON: 9/29/2020 FINDINGS: Noninfusion images were obtained from the skull base to the vertex. There is no intracranial mass, hemorrhage, or midline shift. Ventricles and sulci are mildly prominent. There are no pathologic extra-axial fluid collections. There is no evidence of cortical ischemic change. Changes of mild chronic microvascular white matter disease are noted. Cerebellum and brainstem are normal. The calvarium is intact. There is an NG tube in place. IMPRESSION:    1. No acute intracranial abnormalities. Chronic findings as discussed above. Electronically signed by:  Nallely Alexander MD  1/30/2023 12:33 PM CST Workstation: [id number]    CT Abdomen Pelvis With Contrast    Result Date: 1/29/2023  INDICATION: Bowel obstruction suspected EXAMINATION: CT ABDOMEN AND PELVIS WITH CONTRAST - CT ABDOMEN PELVIS WITH IV CONTRAST TECHNIQUE: Helically acquired images were obtained of the abdomen and pelvis following IV contrast. A radiation dose optimization technique was used for this scan. IV Contrast dosage and agent: 100 mL Omnipaque 350 Oral contrast: Yes COMPARISON: CT abdomen pelvis 1/27/2023 ____________________________________________ FINDINGS: LOWER CHEST: There is chronic scarring at both lung bases. There is no consolidation. There is no pleural effusion. No cardiomegaly or pericardial  effusion. LIVER: Homogeneous.  No focal mass. GALLBLADDER AND BILIARY TREE: No calcified gallstones.    No intra- or extrahepatic biliary ductal dilation. PANCREAS: There is no evidence of mass or inflammatory process. SPLEEN: Spleen is normal in size with no focal lesion. ADRENAL GLANDS: There is a 1.5 cm left adrenal nodule. This is unchanged compared to 2020 consistent with a benign adenoma. This does not warrant any follow-up. The right adrenal gland is normal. KIDNEYS AND URETERS: Normal renal size and position.  There is no evidence of renal mass or calcification. No hydronephrosis. URINARY BLADDER: Unremarkable. PERITONEUM: No ascites or free air. BOWEL: Appendix not visualized. Colon is decompressed. Again noted are multiple dilated small bowel loops. These measure up to 4.2 cm in diameter. In the right lower quadrant there is swirling and twisting of the mesentery transition point. Findings are consistent with a small bowel volvulus. This is seen best on coronal images 24 through 38 series 5. Multiple dilated loops of small bowel or peripheral to the colon. This could represent a volvulus recurring through an internal hernia. There is no small bowel wall thickening. There is no evidence of pneumatosis. In addition there appears to be twisting of the proximal stomach with compared to prior study. This suggests possible gastric volvulus. There is no distention of the proximal stomach or distal esophagus. VASCULAR STRUCTURES AND RETROPERITONEUM: Aorta and IVC are normal in caliber.  There is no evidence of aneurysm. There is no evidence of retroperitoneal lymphadenopathy, mass, or fluid collection REPRODUCTIVE ORGANS: There is no evidence of pelvic mass. ABDOMINAL WALL: Small umbilical hernia is present. BONES: No acute fracture or focal bone lesion. IMPRESSION: Findings are consistent with small bowel obstruction. There is a transition point in the lower abdomen associated with swirling and twisting of the  bowel and mesenteric vessels suggesting volvulus. This may be due to internal hernia There is also twisting of the proximal stomach which appears new. Gastric volvulus is not excluded. Electronically signed by:  Chris Wright MD  1/29/2023 9:52 PM CST Workstation: [id number]    CT Abdomen Pelvis With Contrast    Result Date: 1/27/2023  CT ABDOMEN PELVIS WITH IV CONTRAST COMPARISONS:  CT abdomen and pelvis with contrast dated December 4, 2021. ADDITIONAL PERTINENT HISTORY:   Abdominal pain, acute TECHNIQUE:   Multiple axial images were obtained from the lung bases through the lesser trochanters after the adminstration of intravenous contrast.  One of the following dose optimization techniques was utilized in the performance of this exam: Automated exposure control; adjustment of the mA and/or kV according to the patient's size; or use of an iterative  reconstruction technique.  Specific details can be referenced in the facility's radiology CT exam operational policy. CONTRAST:   100 mL of IV Omnipaque 350. FINDINGS: Lung bases: Mild bibasilar atelectatic change. Free air/free fluid: none Liver:  negative Spleen: negative Adrenal glands: Continued mild prominence of the left adrenal gland stable from previous exam likely representing a small adenoma. However this does not meet Hounsfield criteria for an adenoma given that the average Hounsfield units are 75. Kidneys /ureters/urinary bladder: negative Pancreas: negative Gall Bladder:   Contracted gallbladder. Bowel / mesentery: There are continued findings of multiple dilated loops of small bowel extending deep within the pelvis and into the right lower quadrant. Of note in the right lower quadrant near the point of transition there is swirling of the mesentery raising the potential for an underlying internal hernia in this location as the cause for the small bowel obstruction. The colon is decompressed and has a normal appearance. Lymph node assessment: negative Pelvic  contents: negative Abdominal vasculature: Atherosclerotic disease of the abdominal aorta and its major branches. Surrounding soft tissues: negative Osseous structures: negative IMPRESSION: 1. Findings of a distal small bowel high-grade obstruction which may be potentially related to underlying internal hernia given the appearance in the right lower quadrant. Surgical consultation would be of benefit. 2. No other acute intra-abdominal or intrapelvic process. Electronically signed by:  Cain Christie MD  1/27/2023 8:13 PM CST Workstation: [id number]    X-Ray Chest AP Portable    Result Date: 1/30/2023  Portable chest x-ray at 11:03 AM is compared to prior study of 1/27/2023 Clinical history is hypoxia, code There is an NG tube descending into the stomach. The cardiomediastinal silhouette is normal in size. There is atelectasis in the lung bases. The remainder the lungs are clear. There are no acute osseous abnormalities. IMPRESSION: Atelectasis in the lung bases with no acute pulmonary process. The remainder the lungs are clear. Electronically signed by:  Nallely Alexander MD  1/30/2023 11:35 AM CST Workstation: [id number]    X-Ray Chest AP Portable    Result Date: 1/27/2023  Reason: Chest Pain FINDINGS: Portable chest with comparison chest x-ray December 6, 2021 show normal cardiomediastinal silhouette. Lungs are clear. Pulmonary vasculature is normal. No acute osseous abnormality. IMPRESSION: No acute cardiopulmonary abnormality. Electronically signed by:  Elvin Bhat DO  1/27/2023 7:02 PM CST Workstation: [id number]      Assessment and Plan:    Seizure-like activity  History of Parkinson's Disease  76 yo woman with history of Parkinson's Disease and asymptomatic COVID-19 infection who was initially admitted for abdominal pain and SBO and was then transferred to the ICU after code blue was called when she suddenly became unresponsive with generalized tonic-clonic activity. Of note, patient had been experiencing  episodes of staring and mouth smacking that lasted seconds at a time for the past year or so, which is most concerning for seizures. She was started on Keppra 500 mg BID and has had no further seizures after this. Her neurological examination is non focal but significant for parkinsonian features. CT brain negative for acute intracranial abnormality.  - Admitted to hospital medicine with q4 hour neuro checks, on telemetry, continuous pulse oximetry  - Restarted home Carbidopa/Levodopa 25/100 mg, 1 tablet four times a day  - Continue Keppra 500 mg BID for seizures  - CT brain showed no acute abnormality  - EEG was performed and showed no evidence of epileptiform activity  - Assessment for rehab with PT/OT/SLP evaluation and treatment.  - Patient will need EEG LTM as outpatient to better assess for epileptiform abnormalities  - Will sign off at this time. Please call with questions or concerns.    Seizure education:   No driving for now, no swimming alone, no climbing high areas, no operation of heavy machinery or working with high risk electricity equipment.  Continue to take AEDs as prescribed. If any major side effects from neurological medications go to the ED including mood changes and severe depression.  Patient and/or next of kin informed.    Patient was counseled in stroke signs, symptoms and risk factors. She was instructed to call [phone] immediately if experiencing acute neurological deficits.  Patient counseled in healthy diet and physical activity. Importance of medication adherence and follow-up were emphasized.    DVT prophylaxis with chemo/SCD prophylaxis  Extensively discussed lifestyle modifications as prophylactic measures for stroke prevention including smoking cessation, adequate blood pressure management, healthy diet and regular exercise.    Patient to follow up with [facility] at [phone] within 3 days from discharge.     Stroke education was provided including stroke risk factors  modification and any acute neurological changes including weakness, confusion, visual changes to come straight to the ER.     All questions were answered.                              Thank you kindly for including us in the care of this patient. Please do not hesitate to contact us with any questions.    Critical Care:  65 minutes of critical care time has been spent evaluating with the patient. Time includes chart review not limited to diagnostic imaging, labs, and vitals, patient assessment, discussion with family and nursing, current order evaluations, and new order entries.      Mei Torres MD  Neurology

## 2023-01-31 NOTE — PROGRESS NOTES
Pulmonary/Critical Care Progress Note      PATIENT NAME: Ariana Tiwari  MRN: [id number]  TODAY'S DATE: 2023  11:17 AM  ADMIT DATE: 2023  AGE: 77 y.o. : 1945    CONSULT REQUESTED BY: Adriano Crow, *    REASON FOR CONSULT:   Possible seizure    HISTORY OF PRESENT ILLNESS   Ariana Tiwari is a 77 y.o. female with a PMH of Parkinson's disease, hypothyroidism, prior bowel obstruction, diverticulosis, gluten enteropathy, and PUD currently admitted with SBO in the setting of COVID on whom we have been consulted for possible seizure.    For her SBO, the patient has been managed conservatively with NGT intermittent suction, bowel regimen, and metoclopramide.    Today, the patient had a code called for possible seizure activity. Per the patient's daughter, who was in the room when the episode started, the patient was smacking her lips, her eyes rolled back in her head, and she tightly contracted her arms into her chest. The patient apparently then pushed her sheet down and pushed her left leg out of the bed. She was unresponsive to her daughter then. Nursing staff came in and called a code. Lorazepam was not administered, and the abnormal motor function had ceased by the time I arrived in the room less than 2 minutes later, at which point she was obtunded but breathing and apparently protecting her airway. She gradually became more awake over the ensuing 30 minutes. Chronic rolling tremors of the hands and forearms gradually resumed as well.    Importantly, the patient's daughter reports that for some time recently, the patient has had brief episodes of lip smacking sometimes with bending over for no apparent reason during which time she is not responsive, but she recovers quickly and spontaneously. She has not yet been worked up by a neurologist.    23: EEG yesterday afternoon shows diffuse slowing but no epileptiform discharges. CT head with no acute changes. More awake and alert today  but still very lethargic and confused.    REVIEW OF SYSTEMS  Unable to obtain due to mental status.    ALLERGIES  Review of patient's allergies indicates:   Allergen Reactions    Gluten Diarrhea       INPATIENT SCHEDULED MEDICATIONS   bisacodyL  10 mg Rectal Daily    chlorhexidine  15 mL Mouth/Throat BID    enoxparin  40 mg Subcutaneous Q24H    levetiracetam IV  500 mg Intravenous Q12H    mupirocin   Nasal BID      lactated ringers 100 mL/hr at 01/31/23 0630       MEDICAL AND SURGICAL HISTORY  Past Medical History:   Diagnosis Date    Allergy     Diverticulosis     Gluten enteropathy     Gluten intolerance     Hernia     Hypothyroidism     PD (Parkinson's disease) 9/16/2015    Right tremor type    Peptic ulcer disease     Right shoulder pain     Cirtisone injection 3/26/15 - Dr. Tolbert    Ulcer      Past Surgical History:   Procedure Laterality Date    ADENOIDECTOMY      COLONOSCOPY  03/01/2012    Dr. Teresa, repeat in 10 years for screening    COLONOSCOPY N/A 2/21/2018    Procedure: COLONOSCOPY;  Surgeon: Radha Deng MD;  Location: [facility];  Service: Endoscopy;  Laterality: N/A;    CORRECTION OF HAMMER TOE Left 9/16/2020    Procedure: CORRECTION, HAMMER TOE;  Surgeon: William Sprague MD;  Location: [facility];  Service: Orthopedics;  Laterality: Left;    COSMETIC SURGERY      Broken nose    ESOPHAGOGASTRODUODENOSCOPY N/A 12/8/2021    Procedure: EGD (ESOPHAGOGASTRODUODENOSCOPY);  Surgeon: Benji Valentino III, MD;  Location: [facility];  Service: Endoscopy;  Laterality: N/A;    FRACTURE SURGERY  left wrist    With pin    HEMORRHOID SURGERY      HERNIA REPAIR Left 04/09/2015    Dr Lo; left inguinal    INTRALUMINAL GASTROINTESTINAL TRACT IMAGING VIA CAPSULE N/A 7/10/2018    Procedure: IMAGING, GI TRACT, INTRALUMINAL, VIA CAPSULE;  Surgeon: Radha Deng MD;  Location: [facility];  Service: Endoscopy;  Laterality: N/A;    LYSIS OF ADHESIONS  9/29/2020    Procedure: LYSIS, ADHESIONS;  Surgeon: Eagle  ADRIANNE Gonzalez MD;  Location: [facility];  Service: General;;    RHINOPLASTY TIP      TONSILLECTOMY      UPPER GASTROINTESTINAL ENDOSCOPY  05/21/2015    Dr. Gomez       ALCOHOL, TOBACCO AND DRUG USE  Social History     Tobacco Use   Smoking Status Never   Smokeless Tobacco Never     Social History     Substance and Sexual Activity   Alcohol Use Yes    Alcohol/week: 11.7 standard drinks    Types: 14 Standard drinks or equivalent per week    Comment: 2 glasses wine per dinner     Social History     Substance and Sexual Activity   Drug Use No       FAMILY HISTORY  Family History   Problem Relation Age of Onset    Diabetes Mother     Diabetes Son     Obesity Sister     Alcohol abuse Brother     Heart disease Brother     No Known Problems Father     Early death Brother         self    Breast cancer Neg Hx     Colon cancer Neg Hx     Ovarian cancer Neg Hx     Colon polyps Neg Hx     Crohn's disease Neg Hx     Ulcerative colitis Neg Hx     Celiac disease Neg Hx        VITAL SIGNS (MOST RECENT)  Temp: (P) 97.9 °F (36.6 °C) (01/31/23 0701)  Pulse: 65 (01/31/23 0801)  Resp: 20 (01/31/23 0801)  BP: (!) 154/69 (01/31/23 0801)  SpO2: 98 % (01/31/23 0801)    INTAKE AND OUTPUT (LAST 24 HOURS):  Intake/Output Summary (Last 24 hours) at 1/31/2023 0914  Last data filed at 1/31/2023 0600  Gross per 24 hour   Intake 2150 ml   Output 1900 ml   Net 250 ml       WEIGHT  Wt Readings from Last 1 Encounters:   01/28/23 56.5 kg (124 lb 9 oz)       PHYSICAL EXAM  GENERAL: Lethargic but arousable.  HEENT: PERRLA, EOMI  NECK: Supple.  HEART: Regular rate and normal rhythm. No murmur or gallop auscultated.  LUNGS: Clear to auscultation and percussion. Lung excursion symmetrical.  ABDOMEN: Bowel sounds PRESENT. Soft, non-tender, non-distended, no masses palpated.  EXTREMITIES: No cyanosis or clubbing.   LYMPHATICS:trace pretibial pitting edema  SKIN: Dry, intact, no lesions.   NEURO: A&Ox1, responds to questions, opens eyes spontaneously and to  command.  PSYCH: calm    ACUTE PHASE REACTANT (LAST 24 HOURS)  No results for input(s): FERRITIN, CRP, LDH, DDIMER in the last 24 hours.    CBC LAST (LAST 24 HOURS)  Recent Labs   Lab 01/31/23  0440   WBC 5.97   RBC 4.26   HGB 13.1   HCT 38.9   MCV 91   MCH 30.8   MCHC 33.7   RDW 11.9      MPV 10.1   GRAN 72.2  4.3   LYMPH 16.6*  1.0   MONO 9.9  0.6   BASO 0.01   NRBC 0       CHEMISTRY LAST (LAST 24 HOURS)  Recent Labs   Lab 01/30/23  1041 01/31/23  0440   NA  --  139   K  --  3.2*   CL  --  101   CO2  --  26   ANIONGAP  --  12   BUN  --  <5*   CREATININE  --  0.4*   GLU  --  115*   CALCIUM  --  8.4*   PH 7.302*  --    MG  --  2.0   ALBUMIN  --  3.5   PROT  --  6.3   ALKPHOS  --  72   ALT  --  16   AST  --  17   BILITOT  --  1.1*       COAGULATION LAST (LAST 24 HOURS)  No results for input(s): LABPT, INR, APTT in the last 24 hours.    CARDIAC PROFILE (LAST 24 HOURS)  Recent Labs   Lab 01/27/23  1826   *       LAST 7 DAYS MICROBIOLOGY   Microbiology Results (last 7 days)       ** No results found for the last 168 hours. **            MOST RECENT IMAGING  X-Ray Abdomen Flat And Erect  2 view abdomen    CLINICAL DATA: Small bowel obstruction    FINDINGS: Comparison to January 30. Supine and lateral decubitus images are submitted. There is gaseous distention of primarily small bowel loops throughout the abdomen and pelvis. Contrast material is noted within contracted hepatic flexure and transverse colon. No mass, suspicious calcification, or free air is identified.    Nasogastric tube is present, with tip either within the third portion of the duodenum or coiled at the level of the gastric antrum.    No free air is identified.    IMPRESSION:  1. Abnormal bowel gas pattern compatible with mechanical small bowel obstruction. Compared to January 30, gaseous distention of small bowel loops appears mildly increased.    Electronically signed by:  Benji Wiley MD  1/31/2023 6:48 AM CST Workstation:  [id number]      CURRENT VISIT EKG  Results for orders placed or performed during the hospital encounter of 01/27/23   EKG 12-lead    Narrative    Test Reason : R07.9,    Vent. Rate : 066 BPM     Atrial Rate : 066 BPM     P-R Int : 168 ms          QRS Dur : 080 ms      QT Int : 432 ms       P-R-T Axes : 017 -31 099 degrees     QTc Int : 452 ms    Normal sinus rhythm  Left axis deviation  T wave abnormality, consider lateral ischemia  Abnormal ECG  When compared with ECG of 04-DEC-2021 14:57,  No significant change was found  Confirmed by Rodo Finch MD ([id number]) on 1/29/2023 1:52:21 PM    Referred By: [email or web address]   SELF           Confirmed By:Rodo Finch MD       ECHOCARDIOGRAM RESULTS  Results for orders placed during the hospital encounter of 09/29/20    Echo Color Flow Doppler? Yes; Bubble Contrast? No    Interpretation Summary  · The estimated PA systolic pressure is 42 mmHg.  · There is left ventricular concentric remodeling.  · The left ventricle is normal in size with normal systolic function. The estimated ejection fraction is 68%.  · Normal left ventricular diastolic function.  · Mild left atrial enlargement.  · Normal right ventricular systolic function.  · Normal central venous pressure (3 mmHg).        RESPIRATORY SUPPORT          LAST ARTERIAL BLOOD GAS  ABG  Recent Labs   Lab 01/30/23  1041   PH 7.302*   PO2 210*   PCO2 34.5*   HCO3 17.0*   BE -9         ASSESSMENT/PLAN:   Ariana Tiwari is a 77 y.o. female with a PMH significant for Parkinson's disease, hypothyroidism, prior bowel obstruction, diverticulosis, gluten enteropathy, and PUD currently admitted with SBO in the setting of COVID on whom we have been consulted for possible seizure.    NEUROLOGIC  #Possible generalized tonic-clonic seizure  #Possible prior focal seizures  #Parkinson's disease  EEG 1/30 shows diffuse slowing but no epileptiform discharges. CT head 1/30 with no acute findings.  - continue Keppra for now  - 0.1 mg/kg (~5  mg) lorazepam IV if seizure lasting >5 minutes  - neurology consult  - avoid sedating meds (morphine d/c'ed)    CARDIAC  No acute issues.    PULMONARY  No acute issues.    GASTROINTESTINAL  #Small bowel obstruction, resolved  #Diverticulosis  #PUD  - maintain NGT for now  - bowel regimen  - avoid opiates as possible    HEMATOLOGIC  No acute issues.    RENAL/GENITOURINARY  No acute issues.    INFECTIOUS DISEASE  #COVID-19  - monitor for hypoxemia    ENDOCRINE  #Hypothyroidism  - started IV levothyroxine 100 mcg daily  - convert back to home levothyroxine when able to take PO    DVT prophylaxis: start lovenox  Stress ulcer prophylaxis: not indicated  Code status: FULL  Disposition: Continue care in ICU today; likely stable for step down tomorrow.    Upon my evaluation, this patient had a high probability of imminent or life-threatening deterioration, which required my direct attention, intervention, and personal management.    I have personally provided 35 minutes of critical care time exclusive of time spent on separately billable procedures. Over 50% of the time of this encounter was spent in direct care at the bedside. Time includes review of laboratory data, radiology results, discussion with consultants, and monitoring for potential decompensation. Interventions were performed as documented above.    Pj Miranda MD  [facility]  Department of Pulmonary and Critical Care Medicine  Date of Service: 01/31/2023  11:17 AM

## 2023-02-01 LAB
ALBUMIN SERPL BCP-MCNC: 3.2 G/DL (ref 3.5–5.2)
ALP SERPL-CCNC: 63 U/L (ref 55–135)
ALT SERPL W/O P-5'-P-CCNC: 7 U/L (ref 10–44)
ANION GAP SERPL CALC-SCNC: 5 MMOL/L (ref 8–16)
AST SERPL-CCNC: 16 U/L (ref 10–40)
BASOPHILS # BLD AUTO: 0.02 K/UL (ref 0–0.2)
BASOPHILS NFR BLD: 0.4 % (ref 0–1.9)
BILIRUB SERPL-MCNC: 0.7 MG/DL (ref 0.1–1)
BUN SERPL-MCNC: 5 MG/DL (ref 8–23)
CALCIUM SERPL-MCNC: 8.5 MG/DL (ref 8.7–10.5)
CHLORIDE SERPL-SCNC: 105 MMOL/L (ref 95–110)
CO2 SERPL-SCNC: 26 MMOL/L (ref 23–29)
CREAT SERPL-MCNC: 0.3 MG/DL (ref 0.5–1.4)
DIFFERENTIAL METHOD: ABNORMAL
EOSINOPHIL # BLD AUTO: 0.1 K/UL (ref 0–0.5)
EOSINOPHIL NFR BLD: 2.2 % (ref 0–8)
ERYTHROCYTE [DISTWIDTH] IN BLOOD BY AUTOMATED COUNT: 12.1 % (ref 11.5–14.5)
EST. GFR  (NO RACE VARIABLE): >60 ML/MIN/1.73 M^2
GLUCOSE SERPL-MCNC: 97 MG/DL (ref 70–110)
HCT VFR BLD AUTO: 40.9 % (ref 37–48.5)
HGB BLD-MCNC: 13.3 G/DL (ref 12–16)
IMM GRANULOCYTES # BLD AUTO: 0.02 K/UL (ref 0–0.04)
IMM GRANULOCYTES NFR BLD AUTO: 0.4 % (ref 0–0.5)
LYMPHOCYTES # BLD AUTO: 0.9 K/UL (ref 1–4.8)
LYMPHOCYTES NFR BLD: 18.7 % (ref 18–48)
MCH RBC QN AUTO: 30.5 PG (ref 27–31)
MCHC RBC AUTO-ENTMCNC: 32.5 G/DL (ref 32–36)
MCV RBC AUTO: 94 FL (ref 82–98)
MONOCYTES # BLD AUTO: 0.5 K/UL (ref 0.3–1)
MONOCYTES NFR BLD: 10.2 % (ref 4–15)
NEUTROPHILS # BLD AUTO: 3.4 K/UL (ref 1.8–7.7)
NEUTROPHILS NFR BLD: 68.1 % (ref 38–73)
NRBC BLD-RTO: 0 /100 WBC
PLATELET # BLD AUTO: 167 K/UL (ref 150–450)
PMV BLD AUTO: 10.1 FL (ref 9.2–12.9)
POTASSIUM SERPL-SCNC: 3.5 MMOL/L (ref 3.5–5.1)
PROT SERPL-MCNC: 6.1 G/DL (ref 6–8.4)
RBC # BLD AUTO: 4.36 M/UL (ref 4–5.4)
SODIUM SERPL-SCNC: 136 MMOL/L (ref 136–145)
WBC # BLD AUTO: 4.98 K/UL (ref 3.9–12.7)

## 2023-02-01 PROCEDURE — 25000003 PHARM REV CODE 250: Performed by: STUDENT IN AN ORGANIZED HEALTH CARE EDUCATION/TRAINING PROGRAM

## 2023-02-01 PROCEDURE — 85025 COMPLETE CBC W/AUTO DIFF WBC: CPT | Performed by: NURSE PRACTITIONER

## 2023-02-01 PROCEDURE — 80053 COMPREHEN METABOLIC PANEL: CPT | Performed by: NURSE PRACTITIONER

## 2023-02-01 PROCEDURE — 63600175 PHARM REV CODE 636 W HCPCS: Performed by: STUDENT IN AN ORGANIZED HEALTH CARE EDUCATION/TRAINING PROGRAM

## 2023-02-01 PROCEDURE — 12000002 HC ACUTE/MED SURGE SEMI-PRIVATE ROOM

## 2023-02-01 PROCEDURE — 36415 COLL VENOUS BLD VENIPUNCTURE: CPT | Performed by: NURSE PRACTITIONER

## 2023-02-01 PROCEDURE — 97530 THERAPEUTIC ACTIVITIES: CPT

## 2023-02-01 RX ADMIN — CARBIDOPA AND LEVODOPA 1 TABLET: 25; 100 TABLET ORAL at 02:02

## 2023-02-01 RX ADMIN — LEVOTHYROXINE SODIUM ANHYDROUS 100 MCG: 100 INJECTION, POWDER, LYOPHILIZED, FOR SOLUTION INTRAVENOUS at 09:02

## 2023-02-01 RX ADMIN — BISACODYL 10 MG: 10 SUPPOSITORY RECTAL at 09:02

## 2023-02-01 RX ADMIN — CHLORHEXIDINE GLUCONATE 15 ML: 1.2 RINSE ORAL at 09:02

## 2023-02-01 RX ADMIN — MUPIROCIN 1 G: 20 OINTMENT TOPICAL at 09:02

## 2023-02-01 RX ADMIN — CARBIDOPA AND LEVODOPA 1 TABLET: 25; 100 TABLET ORAL at 09:02

## 2023-02-01 RX ADMIN — ENOXAPARIN SODIUM 40 MG: 100 INJECTION SUBCUTANEOUS at 02:02

## 2023-02-01 RX ADMIN — CARBIDOPA AND LEVODOPA 1 TABLET: 25; 100 TABLET ORAL at 05:02

## 2023-02-01 RX ADMIN — LEVETIRACETAM 500 MG: 5 INJECTION INTRAVENOUS at 09:02

## 2023-02-01 RX ADMIN — LEVETIRACETAM 500 MG: 5 INJECTION INTRAVENOUS at 10:02

## 2023-02-01 NOTE — PROGRESS NOTES
[facility] Medicine  Progress Note    Patient name: Ariana Tiwari  MRN: [id number]  Admit Date: 1/27/2023   LOS: 4 days     SUBJECTIVE:     Principal problem: SBO (small bowel obstruction)    Interval History:      1/31- Large BM today, contrast passed through colon.  Exam is benign.  Start diet and trx out of ICU      Code Blue called this AM.  Patient's daughter in law reports she noted patient smacking her lips. She did not respond when she asked if she was okay but instead lifted her leg like she was trying to get out of bed.  She then clenched up and started foaming at the mouth. She reports patient has done smacking of her lips at home in recent past. She has not been worked up for this (seizures versus part of her parkinsons) and she has never had this event where she clenches or foams at the mouth. She did not lose her pulse.  She did require a NRB as initially not alert.  She was moved to the ICU.  Accucheck was 90. ABG done with metabolic acidosis. CXR with atelectasis, no aspiration.  Head CT done with no acute process.  She slowly started to come around and seizure with post ictal state suspected.  She was loaded with keppra, EEG ordered and Neurology consulted.  Pulmonary/Critical care kindly responded.  Given no immediate plans for surgery, I have started Lovenox.     Hospital Course:    Patient presented to the ED with abdominal pain with nausea.  She had 2 BMs prior to coming to the ED.  She was admitted with SBO as demonstrated by CT scan. She's had SBO in the past and has required surgery.  ED workup also positive for Covid 19. She has no hypoxia. General surgery consulted.  NGT to LIWS. Repeat flat and erect with persistent loops of dilated small bowel.  She/Family reports she also has a bloated abdomen but distension did improve after placement of NGT.    Scheduled Meds:   bisacodyL  10 mg Rectal Daily    carbidopa-levodopa  mg  1 tablet Oral QID    chlorhexidine   15 mL Mouth/Throat BID    enoxparin  40 mg Subcutaneous Q24H    levetiracetam IV  500 mg Intravenous Q12H    levothyroxine  100 mcg Intravenous Daily    mupirocin   Nasal BID     Continuous Infusions:   lactated ringers 100 mL/hr at 01/31/23 0630     PRN Meds:calcium gluconate IVPB, calcium gluconate IVPB, calcium gluconate IVPB, carboxymethylcellulose, lorazepam, magnesium sulfate IVPB, magnesium sulfate IVPB, ondansetron, potassium chloride **AND** potassium chloride **AND** potassium chloride, sodium chloride 0.9%, sodium phosphate IVPB, sodium phosphate IVPB, sodium phosphate IVPB    Review of patient's allergies indicates:   Allergen Reactions    Gluten Diarrhea       Review of Systems: As per interval history    OBJECTIVE:     Vital Signs (Most Recent)  Temp: 98 °F (36.7 °C) (01/31/23 1515)  Pulse: 70 (01/31/23 1801)  Resp: (!) 21 (01/31/23 1801)  BP: (!) 127/59 (01/31/23 1801)  SpO2: (!) 94 % (01/31/23 1801)    Vital Signs Range (Last 24H):  Temp:  [97.9 °F (36.6 °C)-98.6 °F (37 °C)]   Pulse:  [60-80]   Resp:  [13-22]   BP: (127-171)/(59-79)   SpO2:  [94 %-100 %]     I & O (Last 24H):  Intake/Output Summary (Last 24 hours) at 1/31/2023 1939  Last data filed at 1/31/2023 1801  Gross per 24 hour   Intake 1850 ml   Output 2400 ml   Net -550 ml         Physical Exam:    Vitals and nursing note reviewed.     Constitutional:       General: comfortable  HENT:      Head: Normocephalic and atraumatic.   NGT  Cardiovascular:      Rate and Rhythm: Regular rhythm.   Pulmonary:      Effort: Pulmonary effort is normal.      Breath sounds: Normal breath sounds. No wheezing.     Abdominal:      General: There is no distension.      Palpations: Abdomen is soft.      Tenderness: There is no abdominal tenderness. There is no guarding or rebound.   Musculoskeletal:        No effusions  Skin:     Findings: No rash.   Neurological:      Mental Status: Not responsive    Has baseline tremor secondary to her  Parkinsons    Laboratory:  I have reviewed all pertinent lab results within the past 24 hours.  CBC:   Recent Labs   Lab 01/31/23  0440   WBC 5.97   RBC 4.26   HGB 13.1   HCT 38.9      MCV 91   MCH 30.8   MCHC 33.7       CMP:   Recent Labs   Lab 01/31/23  0440   *   CALCIUM 8.4*   ALBUMIN 3.5   PROT 6.3      K 3.2*   CO2 26      BUN <5*   CREATININE 0.4*   ALKPHOS 72   ALT 16   AST 17   BILITOT 1.1*         Diagnostic Results:  X-Ray: Reviewed  Flat and erect with persistent dilation of small bowel loops    ASSESSMENT/PLAN:         Active Hospital Problems    Diagnosis  POA    *SBO (small bowel obstruction) [K56.609]  Yes    Seizure [R56.9]  Yes    COVID-19 [U07.1]  Yes    PD (Parkinson's disease) [G20]  Yes     Right tremor type      Hypothyroidism [E03.9]  Yes      Resolved Hospital Problems   No resolved problems to display.         Plan:     -New ? Seizure.  EEG.  Neurology consult.  CT head with no acute process.  Seizure precautions.  Loaded with keppra and started 500mg IV BID. Moved to ICU.   -NGT to [facility]  -General surgery consulted and following  -Serial abdominal exams and Xrays  -Xrays with persistent SBO.  CT scan per Surgery and suggests volvulus.  Continuing supportive care at this time per surgery and will follow imaging to see if oral contrast makes it through the area of twisting.  -IVFs for hydration while NPO.  -CT concerning for ? Internal hernia.  Conservative measures for now.  No indication for emergent surgery. No peritoneal signs.   -suspect right knee OA as source of pain and swelling. Prn IV pain medication given npo.  Ice.  This has seemingly resolved 1/29.  -Covid isolation and symptomatic treatment.  Not hypoxic.   -PT, out of bed as able       VTE Risk Mitigation (From admission, onward)           Ordered     enoxaparin injection 40 mg  Every 24 hours (non-standard times)         01/30/23 1257     IP VTE LOW RISK PATIENT  Once         01/27/23 2223     Place  sequential compression device  Until discontinued         01/27/23 2223                      Department Hospital Medicine  [facility]  Adriano Crow MD  Date of service: 01/31/2023

## 2023-02-01 NOTE — PLAN OF CARE
Spoke with patients daughter/Micaela at bedside for recommendations of Home Health, Daughter is unsure of home health due to patient now needing full assistance with all ADL's. Daughter became emotional regarding her mothers care and caring for her on her own,  suggested possible Skilled nursing to [facility] and provided the daughter with a list of facilities. Daughter wants to revisit the topic of HH vs. SNF on 2/2/23.       02/01/23 1822   Discharge Reassessment   Assessment Type Discharge Planning Reassessment   Discharge Plan discussed with: Adult children   Communicated IAIN with patient/caregiver Date not available/Unable to determine   Discharge Plan A Home Health   Discharge Plan B Skilled Nursing Facility   DME Needed Upon Discharge  none   Discharge Barriers Identified None   Why the patient remains in the hospital Requires continued medical care   Post-Acute Status   Post-Acute Authorization Placement   Post-Acute Placement Status Patient List Provided   Discharge Delays None known at this time

## 2023-02-01 NOTE — PLAN OF CARE
Improvements today no seizure activity, OOB to chair very weak, BM with mod lg amt light lorenzo soft stool, Dr Zamora informed on visit orders received to discontinue NGTand start clear liq diet

## 2023-02-01 NOTE — PT/OT/SLP PROGRESS
Physical Therapy Treatment    Patient Name:  Ariana Tiwari   MRN:  [id number]    Recommendations:     Discharge Recommendations: home health PT, nursing facility, skilled (pending progress)  Discharge Equipment Recommendations: none  Barriers to discharge:  increase assist with mobility    Assessment:     Ariana Tiwari is a 77 y.o. female admitted with a medical diagnosis of SBO (small bowel obstruction).  She presents with the following impairments/functional limitations: weakness, impaired endurance, impaired self care skills, impaired functional mobility, gait instability, impaired balance, decreased lower extremity function, decreased safety awareness, impaired cardiopulmonary response to activity.    Pt found in bed with HOB elevated and daughter in law at bedside. Daughter in law an active participant session. This is first visit since patient new dx of seizures. Discussed with MD benefit for OT eval at this time and change in discharge recommendations. Pt noted to have functional decline since eval on 1/29/23. Pt requires mod A x 2 for bed mobility, min A x 2 to mod A x 2 for sit to stand from EOB. Emphasis on hip extension, shoulders up and back as well as upward gaze. Transfers and postures improved over increase trials with greatest difficulty with optimal head positioning. Attempt to ambulate but patient only able to take 2 steps with mod A x 2 with RW then unable to continue to progress LE.    Rehab Prognosis: Fair; patient would benefit from acute skilled PT services to address these deficits and reach maximum level of function.    Recent Surgery: * No surgery found *      Plan:     During this hospitalization, patient to be seen 6 x/week to address the identified rehab impairments via gait training, therapeutic activities, therapeutic exercises, neuromuscular re-education and progress toward the following goals:    Plan of Care Expires:  03/01/23    Subjective     Chief Complaint: none  stated  Patient/Family Comments/goals: continue to progress towards prior level of function  Pain/Comfort:  Pain Rating 1: 0/10      Objective:     Communicated with RN prior to session.  Patient found HOB elevated with peripheral IV, telemetry, PureWick upon PT entry to room.     General Precautions: Standard, fall, contact, droplet, airborne  Orthopedic Precautions: N/A  Braces: N/A  Respiratory Status: Room air     Functional Mobility:  Bed Mobility:     Supine to Sit: moderate assistance and of 2 persons  Transfers:     Sit to Stand:  minimum assistance and of 2 persons with rolling walker  Bed to Chair: moderate assistance and of 2 persons with  no AD  using  Stand Pivot  Gait: 2 steps with mod A x 2 RW forward flexed posture with downward gaze      AM-PAC 6 CLICK MOBILITY  Turning over in bed (including adjusting bedclothes, sheets and blankets)?: 3  Sitting down on and standing up from a chair with arms (e.g., wheelchair, bedside commode, etc.): 2  Moving from lying on back to sitting on the side of the bed?: 2  Moving to and from a bed to a chair (including a wheelchair)?: 2  Need to walk in hospital room?: 2  Climbing 3-5 steps with a railing?: 2  Basic Mobility Total Score: 13       Treatment & Education:  Pt educated on POC, discharge recommendation, importance of time OOB, proper form with bed mobility/transfers, upright posture in static sitting and standing, need for assist with mobility, use of call bell to seek assistance as needed and fall prevention      Patient left up in chair with all lines intact, call button in reach, RN notified, and daughter in law present..    GOALS:   Multidisciplinary Problems       Physical Therapy Goals          Problem: Physical Therapy    Goal Priority Disciplines Outcome Goal Variances Interventions   Physical Therapy Goal     PT, PT/OT Ongoing, Progressing     Description: Goals to be met by: 2/28/23     Patient will increase functional independence with mobility by  performin. Supine to sit with Stand-by Assistance  2. Sit to supine with Stand-by Assistance  3. Sit to stand transfer with Stand-by Assistance  4. Bed to chair transfer with Stand-by Assistance using Rolling Walker  5. Gait  x 150 feet with Stand-by Assistance and Contact Guard Assistance using Rolling Walker.                          Time Tracking:     PT Received On: 23  PT Start Time: 1028     PT Stop Time: 1054  PT Total Time (min): 26 min     Billable Minutes: Therapeutic Activity 26    Treatment Type: Treatment  PT/PTA: PT     PTA Visit Number: 0     2023

## 2023-02-01 NOTE — PLAN OF CARE
Chart reviewed. A good plan appears to be in place for the patient's acute illness, including seizures and resolving bowel obstruction. No pulmonary issues noted.    Pulmonary & Critical Care Medicine will sign off at this time.   Please call with any further questions or concerns.

## 2023-02-01 NOTE — PROGRESS NOTES
[facility]  Adult Nutrition   Progress Note (Follow-Up)    SUMMARY      Recommendations:   1. Advance diet as tolerated per MD.  2. RD to offer appropriate supplements as tolerated.  3. RD to monitor diet progression, tolerance, labs, weight, and will make nutrition recommendations accordingly.      Goals:   Goals: Nutrition to be initiated as medically appropriate.Progressing    Dietitian Rounds Brief  Patient had flatus and BM yesterday per MD note. MD advanced diet to Clear Liquids and then as tolerated. Discussed with RN due to pt being asleep at visit. RN states pt drank 100% of clear liquids and will probably continue to advance. No history of  weight loss or decreased appetite prior to admit per nursing initial screen. RD to offer appropriate supplements and encourage intake.     Diet order: Clear Liquids    % Intake of Estimated Energy Needs: 75 - 100 %  % Meal Intake: 75 - 100 %--Clears    Estimated/Assessed Needs  Weight Used For Calorie Calculations: 56.5 kg (124 lb 9 oz)  Energy Calorie Requirements (kcal): 8327-0917 (25-30 kcal/kg)  Energy Need Method: Kcal/kg  Protein Requirements: 57-85 (1.0-1.5 gm/kg)  Weight Used For Protein Calculations: 56.5 kg (124 lb 9 oz)  Fluid Requirements (mL): 1413 (25 ml/kg)     RDA Method (mL): 1413     Weight History:  Wt Readings from Last 5 Encounters:   01/28/23 56.5 kg (124 lb 9 oz)   11/29/22 58.2 kg (128 lb 4.9 oz)   05/18/22 57.5 kg (126 lb 12.2 oz)   01/28/22 55.6 kg (122 lb 9.2 oz)   01/25/22 54.8 kg (120 lb 11.2 oz)      Reason for Assessment  Reason For Assessment: other (see comments) (ICU admit)  Diagnosis: other (see comments) (SBO)  Relevant Medical History: Hypothyroidism, PD, Gluten enteropathy, Diverticulosis  Interdisciplinary Rounds: attended    Medications:Pertinent Medications Reviewed  Scheduled Meds:   bisacodyL  10 mg Rectal Daily    carbidopa-levodopa  mg  1 tablet Oral QID    chlorhexidine  15 mL Mouth/Throat BID     enoxparin  40 mg Subcutaneous Q24H    levetiracetam IV  500 mg Intravenous Q12H    levothyroxine  100 mcg Intravenous Daily    mupirocin   Nasal BID     Continuous Infusions:   lactated ringers 100 mL/hr at 01/31/23 0630     PRN Meds:.calcium gluconate IVPB, calcium gluconate IVPB, calcium gluconate IVPB, carboxymethylcellulose, lorazepam, magnesium sulfate IVPB, magnesium sulfate IVPB, ondansetron, potassium chloride **AND** potassium chloride **AND** potassium chloride, sodium chloride 0.9%, sodium phosphate IVPB, sodium phosphate IVPB, sodium phosphate IVPB    Labs: Pertinent Labs Reviewed  Clinical Chemistry:  Recent Labs   Lab 01/27/23  1826 01/31/23  0440    139   K 3.9 3.2*    101   CO2 29 26   GLU 90 115*   BUN 13 <5*   CREATININE 0.5 0.4*   CALCIUM 10.3 8.4*   PROT 8.3 6.3   ALBUMIN 4.8 3.5   BILITOT 0.6 1.1*   ALKPHOS 88 72   AST 21 17   ALT 21 16   ANIONGAP 10 12   MG  --  2.0   LIPASE 28  --     < > = values in this interval not displayed.     CBC:   Recent Labs   Lab 01/31/23  0440   WBC 5.97   RBC 4.26   HGB 13.1   HCT 38.9      MCV 91   MCH 30.8   MCHC 33.7     Cardiac Profile:  Recent Labs   Lab 01/27/23 1826   *     Inflammatory Labs:  Recent Labs   Lab 01/28/23  0039   CRP 0.28     Monitor and Evaluation  Food and Nutrient Intake: energy intake, food and beverage intake  Food and Nutrient Adminstration: diet order  Knowledge/Beliefs/Attitudes: food and nutrition knowledge/skill  Physical Activity and Function: nutrition-related ADLs and IADLs  Anthropometric Measurements: weight, weight change, body mass index  Biochemical Data, Medical Tests and Procedures: electrolyte and renal panel, gastrointestinal profile, glucose/endocrine profile  Nutrition-Focused Physical Findings: overall appearance     Nutrition Risk  Level of Risk/Frequency of Follow-up: moderate-high     Nutrition Follow-Up  RD Follow-up?: Yes    Modesta Li RD 02/01/2023 12:01 PM

## 2023-02-02 LAB
ALBUMIN SERPL BCP-MCNC: 3.3 G/DL (ref 3.5–5.2)
ALP SERPL-CCNC: 70 U/L (ref 55–135)
ALT SERPL W/O P-5'-P-CCNC: 15 U/L (ref 10–44)
ANION GAP SERPL CALC-SCNC: 9 MMOL/L (ref 8–16)
AST SERPL-CCNC: 25 U/L (ref 10–40)
BILIRUB SERPL-MCNC: 0.8 MG/DL (ref 0.1–1)
BUN SERPL-MCNC: 6 MG/DL (ref 8–23)
CALCIUM SERPL-MCNC: 8.8 MG/DL (ref 8.7–10.5)
CHLORIDE SERPL-SCNC: 106 MMOL/L (ref 95–110)
CO2 SERPL-SCNC: 23 MMOL/L (ref 23–29)
CREAT SERPL-MCNC: 0.4 MG/DL (ref 0.5–1.4)
EST. GFR  (NO RACE VARIABLE): >60 ML/MIN/1.73 M^2
GLUCOSE SERPL-MCNC: 87 MG/DL (ref 70–110)
POTASSIUM SERPL-SCNC: 3.8 MMOL/L (ref 3.5–5.1)
PROT SERPL-MCNC: 6.2 G/DL (ref 6–8.4)
SODIUM SERPL-SCNC: 138 MMOL/L (ref 136–145)

## 2023-02-02 PROCEDURE — 97116 GAIT TRAINING THERAPY: CPT

## 2023-02-02 PROCEDURE — 63600175 PHARM REV CODE 636 W HCPCS: Performed by: STUDENT IN AN ORGANIZED HEALTH CARE EDUCATION/TRAINING PROGRAM

## 2023-02-02 PROCEDURE — 25000003 PHARM REV CODE 250: Performed by: STUDENT IN AN ORGANIZED HEALTH CARE EDUCATION/TRAINING PROGRAM

## 2023-02-02 PROCEDURE — 97530 THERAPEUTIC ACTIVITIES: CPT

## 2023-02-02 PROCEDURE — 80053 COMPREHEN METABOLIC PANEL: CPT | Performed by: STUDENT IN AN ORGANIZED HEALTH CARE EDUCATION/TRAINING PROGRAM

## 2023-02-02 PROCEDURE — 30200315 PPD INTRADERMAL TEST REV CODE 302: Performed by: STUDENT IN AN ORGANIZED HEALTH CARE EDUCATION/TRAINING PROGRAM

## 2023-02-02 PROCEDURE — 36415 COLL VENOUS BLD VENIPUNCTURE: CPT | Performed by: STUDENT IN AN ORGANIZED HEALTH CARE EDUCATION/TRAINING PROGRAM

## 2023-02-02 PROCEDURE — 86580 TB INTRADERMAL TEST: CPT | Performed by: STUDENT IN AN ORGANIZED HEALTH CARE EDUCATION/TRAINING PROGRAM

## 2023-02-02 PROCEDURE — 12000002 HC ACUTE/MED SURGE SEMI-PRIVATE ROOM

## 2023-02-02 PROCEDURE — 97535 SELF CARE MNGMENT TRAINING: CPT

## 2023-02-02 PROCEDURE — 97166 OT EVAL MOD COMPLEX 45 MIN: CPT

## 2023-02-02 RX ADMIN — LEVETIRACETAM 500 MG: 5 INJECTION INTRAVENOUS at 08:02

## 2023-02-02 RX ADMIN — SODIUM CHLORIDE, SODIUM LACTATE, POTASSIUM CHLORIDE, AND CALCIUM CHLORIDE: .6; .31; .03; .02 INJECTION, SOLUTION INTRAVENOUS at 12:02

## 2023-02-02 RX ADMIN — CARBIDOPA AND LEVODOPA 1 TABLET: 25; 100 TABLET ORAL at 08:02

## 2023-02-02 RX ADMIN — MUPIROCIN 1 G: 20 OINTMENT TOPICAL at 08:02

## 2023-02-02 RX ADMIN — CARBIDOPA AND LEVODOPA 1 TABLET: 25; 100 TABLET ORAL at 05:02

## 2023-02-02 RX ADMIN — CHLORHEXIDINE GLUCONATE 15 ML: 1.2 RINSE ORAL at 08:02

## 2023-02-02 RX ADMIN — ENOXAPARIN SODIUM 40 MG: 100 INJECTION SUBCUTANEOUS at 02:02

## 2023-02-02 RX ADMIN — TUBERCULIN PURIFIED PROTEIN DERIVATIVE 5 UNITS: 5 INJECTION, SOLUTION INTRADERMAL at 05:02

## 2023-02-02 RX ADMIN — LORAZEPAM 5 MG: 2 INJECTION INTRAMUSCULAR; INTRAVENOUS at 08:02

## 2023-02-02 RX ADMIN — CARBIDOPA AND LEVODOPA 1 TABLET: 25; 100 TABLET ORAL at 02:02

## 2023-02-02 NOTE — PT/OT/SLP EVAL
Occupational Therapy   Evaluation    Name: Ariana Tiwari  MRN: [id number]  Admitting Diagnosis: SBO (small bowel obstruction)  Recent Surgery: * No surgery found *      Recommendations:     Discharge Recommendations: nursing facility, skilled, home health OT  Discharge Equipment Recommendations:  none  Barriers to discharge:  None    Assessment:     Ariana Tiwari is a 77 y.o. female with a medical diagnosis of SBO (small bowel obstruction).  She presents with poor coordination especially with standing and transfers. Patient required assistance with turning RW during transfers and verbal cues required with advancing BLE. Noted patient with trunk and neck flexion in standing posture, requiring frequent verbal cues to keep head up. Performance deficits affecting function: weakness, impaired endurance, impaired self care skills, impaired functional mobility, gait instability, impaired balance, decreased lower extremity function, decreased upper extremity function, decreased safety awareness, decreased coordination, impaired cardiopulmonary response to activity.      Rehab Prognosis: Fair; patient would benefit from acute skilled OT services to address these deficits and reach maximum level of function.       Plan:     Patient to be seen 3 x/week to address the above listed problems via self-care/home management, therapeutic activities, therapeutic exercises  Plan of Care Expires: 03/02/23  Plan of Care Reviewed with: patient, daughter    Subjective     Chief Complaint: none  Patient/Family Comments/goals: none    Occupational Profile:  Living Environment: Patient lives with son and daughter-in-law in a [facility] with 10 small RODNEY and no HR's.  Previous level of function: Patient required assistance with dressing, bathing, and BM hygiene. Patient was ambulatory with rollator.   Equipment Used at Home: power chair, grab bar, shower chair (rollator (tri-wheel with no seat); 2 lift chairs)  Assistance upon Discharge: Patient  will receive assistance from family.     Pain/Comfort:  Pain Rating 1: 0/10  Pain Rating Post-Intervention 1: 0/10    Patients cultural, spiritual, Lutheran conflicts given the current situation:      Objective:     Communicated with: nurse Albert prior to session.  Patient found HOB elevated with peripheral IV, telemetry upon OT entry to room.    General Precautions: Standard, fall, airborne, contact, droplet  Orthopedic Precautions: N/A  Braces: N/A  Respiratory Status: Room air    Occupational Performance:    Bed Mobility:    Patient completed Scooting/Bridging with moderate assistance  Patient completed Supine to Sit with moderate assistance  Patient completed Sit to Supine with moderate assistance    Functional Mobility/Transfers:  Patient completed Sit <> Stand Transfer with minimum assistance and moderate assistance  with  rolling walker   Patient completed Bed > Chair Transfer using Stand Pivot technique with minimum assistance and moderate assistance with rolling walker  Patient completed Bed > Bedside commode  Stand Pivot technique with minimum assistance with rolling walker  Functional Mobility: Patient ambulated few steps from bed>BSC>bed>chair requiring Min to Mod A using RW. Assist with advancing RW.     Activities of Daily Living:  Lower Body Dressing: maximal assistance to don/doff socks while seated EOB  Toileting: moderate assistance mostly with standing balance while patient performed mike-hygiene while standing at BSC    Cognitive/Visual Perceptual:  Cognitive/Psychosocial Skills:     -       Oriented to: Person, Place, and Time   -       Follows Commands/attention:Follows two-step commands  -       Communication: clear/fluent  -       Safety awareness/insight to disability: impaired   -       Mood/Affect/Coping skills/emotional control: Appropriate to situation and Cooperative  Visual/Perceptual:      -Intact     Physical Exam:  Postural examination/scapula alignment:    -       Rounded  shoulders  -       Forward head  -       Abnormal trunk flexion  Upper Extremity Range of Motion:     -       Right Upper Extremity: WFL except at shldr  -       Left Upper Extremity: WFL except at shldr  Upper Extremity Strength:    -       Right Upper Extremity: WFL except at shldr  -       Left Upper Extremity: WFL except at shldr   Strength:    -       Right Upper Extremity: WFL  -       Left Upper Extremity: WFL  Fine Motor Coordination:    -       Intact  Gross motor coordination:   WFL in BUE    AMPAC 6 Click ADL:  AMPA Total Score: 15    Treatment & Education:  OT ed pt on OT role & POC as well as discharge recommendations.  OT ed patient on safety with walker use for functional mobility with cues for hand placement & sequencing.       Patient left up in chair with all lines intact, call button in reach, nurse notified, and daughter-in-law present    GOALS:   Multidisciplinary Problems       Occupational Therapy Goals          Problem: Occupational Therapy    Goal Priority Disciplines Outcome Interventions   Occupational Therapy Goal     OT, PT/OT Ongoing, Progressing    Description: Goals to be met by: 3/2/2023     Patient will increase functional independence with ADLs by performing:    Grooming while seated at sink with Supervision.  Toileting from bedside commode with Supervision for hygiene and clothing management.   Supine to sit with Stand-by Assistance.  Toilet transfer to bedside commode with Stand-by Assistance.  Upper extremity exercise program, with supervision.                         History:     Past Medical History:   Diagnosis Date    Allergy     Diverticulosis     Gluten enteropathy     Gluten intolerance     Hernia     Hypothyroidism     PD (Parkinson's disease) 9/16/2015    Right tremor type    Peptic ulcer disease     Right shoulder pain     Cirtisone injection 3/26/15 - Dr. Tolbert    Ulcer          Past Surgical History:   Procedure Laterality Date    ADENOIDECTOMY      COLONOSCOPY   03/01/2012    Dr. Teresa, repeat in 10 years for screening    COLONOSCOPY N/A 2/21/2018    Procedure: COLONOSCOPY;  Surgeon: Radha Deng MD;  Location: [facility];  Service: Endoscopy;  Laterality: N/A;    CORRECTION OF HAMMER TOE Left 9/16/2020    Procedure: CORRECTION, HAMMER TOE;  Surgeon: William Sprague MD;  Location: [facility] OR;  Service: Orthopedics;  Laterality: Left;    COSMETIC SURGERY      Broken nose    ESOPHAGOGASTRODUODENOSCOPY N/A 12/8/2021    Procedure: EGD (ESOPHAGOGASTRODUODENOSCOPY);  Surgeon: Benji Valentino III, MD;  Location: [facility];  Service: Endoscopy;  Laterality: N/A;    FRACTURE SURGERY  left wrist    With pin    HEMORRHOID SURGERY      HERNIA REPAIR Left 04/09/2015    Dr Lo; left inguinal    INTRALUMINAL GASTROINTESTINAL TRACT IMAGING VIA CAPSULE N/A 7/10/2018    Procedure: IMAGING, GI TRACT, INTRALUMINAL, VIA CAPSULE;  Surgeon: Radha Deng MD;  Location: [facility];  Service: Endoscopy;  Laterality: N/A;    LYSIS OF ADHESIONS  9/29/2020    Procedure: LYSIS, ADHESIONS;  Surgeon: Eagle Gonzalez MD;  Location: [facility];  Service: General;;    RHINOPLASTY TIP      TONSILLECTOMY      UPPER GASTROINTESTINAL ENDOSCOPY  05/21/2015    Dr. Gomez       Time Tracking:     OT Date of Treatment: 02/02/23  OT Start Time: 0950  OT Stop Time: 1028  OT Total Time (min): 38 min    Billable Minutes:Evaluation 10  Self Care/Home Management 28    2/2/2023

## 2023-02-02 NOTE — PT/OT/SLP PROGRESS
Physical Therapy Treatment    Patient Name:  Ariana Tiwari   MRN:  [id number]    Recommendations:     Discharge Recommendations: home health PT, nursing facility, skilled (pending progress and medical course)  Discharge Equipment Recommendations: none  Barriers to discharge:  increase assist with mobility    Assessment:     Ariana Tiwari is a 77 y.o. female admitted with a medical diagnosis of SBO (small bowel obstruction).  She presents with the following impairments/functional limitations: weakness, impaired endurance, impaired self care skills, impaired functional mobility, gait instability, impaired balance, decreased lower extremity function, decreased safety awareness, impaired cardiopulmonary response to activity.    Pt found up in chair with DIL at bed side. Pt presents with improved upright posture and head positioning in static sitting. Pt with significant improvement in sit to stand to min A after VI for proper HP. Pt ambulated 12 ft x 2 with RW and mod A increase time for LE progression and LE for proper foot placement. VI throughout for upright posture and uprward gaze. Daughter in law provided additional cueing as needed.     Rehab Prognosis: Fair; patient would benefit from acute skilled PT services to address these deficits and reach maximum level of function.    Recent Surgery: * No surgery found *      Plan:     During this hospitalization, patient to be seen 6 x/week to address the identified rehab impairments via gait training, therapeutic activities, therapeutic exercises, neuromuscular re-education and progress toward the following goals:    Plan of Care Expires:  03/02/23    Subjective     Chief Complaint: weakness  Patient/Family Comments/goals: continue to get better  Pain/Comfort:  Pain Rating 1: 0/10      Objective:     Communicated with RN prior to session.  Patient found up in chair with peripheral IV, telemetry upon PT entry to room.     General Precautions: Standard, fall, droplet,  contact, airborne  Orthopedic Precautions: N/A  Braces: N/A  Respiratory Status: Room air     Functional Mobility:  Transfers:     Sit to Stand:  minimum assistance with rolling walker  Gait: 12 ft x 2 with RW and mod A VI for upright posture and upward gaze      AM-PAC 6 CLICK MOBILITY          Treatment & Education:  Pt educated on POC, discharge recommendation, importance of time OOB, optimal gait pattern, upright posture and upward gaze in sitting and standing, need for assist with mobility, use of call bell to seek assistance as needed and fall prevention      Patient left up in chair with all lines intact, call button in reach, and daughter in law present..    GOALS:   Multidisciplinary Problems       Physical Therapy Goals          Problem: Physical Therapy    Goal Priority Disciplines Outcome Goal Variances Interventions   Physical Therapy Goal     PT, PT/OT Ongoing, Progressing     Description: Goals to be met by: 23     Patient will increase functional independence with mobility by performin. Supine to sit with Stand-by Assistance  2. Sit to supine with Stand-by Assistance  3. Sit to stand transfer with Stand-by Assistance  4. Bed to chair transfer with Stand-by Assistance using Rolling Walker  5. Gait  x 150 feet with Stand-by Assistance and Contact Guard Assistance using Rolling Walker.                          Time Tracking:     PT Received On: 23  PT Start Time: 1045     PT Stop Time: 1112  PT Total Time (min): 27 min     Billable Minutes: Gait Training 10 and Therapeutic Activity 17    Treatment Type: Treatment  PT/PTA: PT     PTA Visit Number: 0     2023

## 2023-02-02 NOTE — PLAN OF CARE
Problem: Physical Therapy  Goal: Physical Therapy Goal  Description: Goals to be met by: 23     Patient will increase functional independence with mobility by performin. Supine to sit with Stand-by Assistance  2. Sit to supine with Stand-by Assistance  3. Sit to stand transfer with Stand-by Assistance  4. Bed to chair transfer with Stand-by Assistance using Rolling Walker  5. Gait  x 150 feet with Stand-by Assistance and Contact Guard Assistance using Rolling Walker.     Outcome: Ongoing, Progressing

## 2023-02-02 NOTE — NURSING
0700: report received, in bed with no complaints  1800: tb skin test to right arm, uneventful shift.

## 2023-02-02 NOTE — PROGRESS NOTES
[facility] Medicine  Progress Note    Patient name: Ariana Tiwari  MRN: [id number]  Admit Date: 1/27/2023   LOS: 5 days     SUBJECTIVE:     Principal problem: SBO (small bowel obstruction)    Interval History:      2/1- awake and alert, able to eat some breakfast.  Vitals stable.  Working with PT.  She is very weak and may need SNF.      1/31- Large BM today, contrast passed through colon.  Exam is benign.  Start diet and trx out of ICU      [employer] called this AM.  Patient's daughter in law reports she noted patient smacking her lips. She did not respond when she asked if she was okay but instead lifted her leg like she was trying to get out of bed.  She then clenched up and started foaming at the mouth. She reports patient has done smacking of her lips at home in recent past. She has not been worked up for this (seizures versus part of her parkinsons) and she has never had this event where she clenches or foams at the mouth. She did not lose her pulse.  She did require a NRB as initially not alert.  She was moved to the ICU.  Accucheck was 90. ABG done with metabolic acidosis. CXR with atelectasis, no aspiration.  Head CT done with no acute process.  She slowly started to come around and seizure with post ictal state suspected.  She was loaded with keppra, EEG ordered and Neurology consulted.  Pulmonary/Critical care kindly responded.  Given no immediate plans for surgery, I have started Lovenox.     Hospital Course:    Patient presented to the ED with abdominal pain with nausea.  She had 2 BMs prior to coming to the ED.  She was admitted with SBO as demonstrated by CT scan. She's had SBO in the past and has required surgery.  ED workup also positive for Covid 19. She has no hypoxia. General surgery consulted.  NGT to [facility]. Repeat flat and erect with persistent loops of dilated small bowel.  She/Family reports she also has a bloated abdomen but distension did improve after placement of  NGT.    Scheduled Meds:   bisacodyL  10 mg Rectal Daily    carbidopa-levodopa  mg  1 tablet Oral QID    chlorhexidine  15 mL Mouth/Throat BID    enoxparin  40 mg Subcutaneous Q24H    levetiracetam IV  500 mg Intravenous Q12H    levothyroxine  100 mcg Intravenous Daily    mupirocin   Nasal BID     Continuous Infusions:   lactated ringers 100 mL/hr at 01/31/23 0630     PRN Meds:calcium gluconate IVPB, calcium gluconate IVPB, calcium gluconate IVPB, carboxymethylcellulose, lorazepam, magnesium sulfate IVPB, magnesium sulfate IVPB, ondansetron, potassium chloride **AND** potassium chloride **AND** potassium chloride, sodium chloride 0.9%, sodium phosphate IVPB, sodium phosphate IVPB, sodium phosphate IVPB    Review of patient's allergies indicates:   Allergen Reactions    Gluten Diarrhea       Review of Systems: As per interval history    OBJECTIVE:     Vital Signs (Most Recent)  Temp: 97.6 °F (36.4 °C) (02/01/23 1948)  Pulse: 73 (02/01/23 1948)  Resp: 18 (02/01/23 1948)  BP: 100/62 (02/01/23 1948)  SpO2: 96 % (02/01/23 1948)    Vital Signs Range (Last 24H):  Temp:  [97.6 °F (36.4 °C)-98.3 °F (36.8 °C)]   Pulse:  [63-79]   Resp:  [18-23]   BP: (100-138)/(58-73)   SpO2:  [91 %-97 %]     I & O (Last 24H):  Intake/Output Summary (Last 24 hours) at 2/1/2023 2013  Last data filed at 2/1/2023 1948  Gross per 24 hour   Intake --   Output 2100 ml   Net -2100 ml         Physical Exam:    Vitals and nursing note reviewed.     Constitutional:       General: comfortable  HENT:      Head: Normocephalic and atraumatic.   NGT  Cardiovascular:      Rate and Rhythm: Regular rhythm.   Pulmonary:      Effort: Pulmonary effort is normal.      Breath sounds: Normal breath sounds. No wheezing.     Abdominal:      General: There is no distension.      Palpations: Abdomen is soft.      Tenderness: There is no abdominal tenderness. There is no guarding or rebound.   Musculoskeletal:        No effusions  Skin:     Findings: No rash.    Neurological:      Mental Status: Not responsive    Has baseline tremor secondary to her Parkinsons    Laboratory:  I have reviewed all pertinent lab results within the past 24 hours.  CBC:   Recent Labs   Lab 02/01/23  1202   WBC 4.98   RBC 4.36   HGB 13.3   HCT 40.9      MCV 94   MCH 30.5   MCHC 32.5       CMP:   Recent Labs   Lab 02/01/23  1202   GLU 97   CALCIUM 8.5*   ALBUMIN 3.2*   PROT 6.1      K 3.5   CO2 26      BUN 5*   CREATININE 0.3*   ALKPHOS 63   ALT 7*   AST 16   BILITOT 0.7         Diagnostic Results:  X-Ray: Reviewed  Flat and erect with persistent dilation of small bowel loops    ASSESSMENT/PLAN:         Active Hospital Problems    Diagnosis  POA    *SBO (small bowel obstruction) [K56.609]  Yes    Seizure [R56.9]  Yes    COVID-19 [U07.1]  Yes    PD (Parkinson's disease) [G20]  Yes     Right tremor type      Hypothyroidism [E03.9]  Yes      Resolved Hospital Problems   No resolved problems to display.         Plan:     -New ? Seizure.  EEG.  Neurology consult.  CT head with no acute process.  Seizure precautions.  Loaded with keppra and started 500mg IV BID. Moved to ICU.   -NGT to [facility]  -General surgery consulted and following  -Serial abdominal exams and Xrays  -Xrays with persistent SBO.  CT scan per Surgery and suggests volvulus.  Continuing supportive care at this time per surgery and will follow imaging to see if oral contrast makes it through the area of twisting.  -IVFs for hydration while NPO.  -CT concerning for ? Internal hernia.  Conservative measures for now.  No indication for emergent surgery. No peritoneal signs.   -suspect right knee OA as source of pain and swelling. Prn IV pain medication given npo.  Ice.  This has seemingly resolved 1/29.  -Covid isolation and symptomatic treatment.  Not hypoxic.   -PT, out of bed as able       VTE Risk Mitigation (From admission, onward)           Ordered     enoxaparin injection 40 mg  Every 24 hours (non-standard times)          01/30/23 1257     IP VTE LOW RISK PATIENT  Once         01/27/23 2223     Place sequential compression device  Until discontinued         01/27/23 2223                      Department Hospital Medicine  [facility]  Adriano Crow MD  Date of service: 02/01/2023

## 2023-02-02 NOTE — PLAN OF CARE
Problem: Occupational Therapy  Goal: Occupational Therapy Goal  Description: Goals to be met by: 3/2/2023     Patient will increase functional independence with ADLs by performing:    Grooming while seated at sink with Supervision.  Toileting from bedside commode with Supervision for hygiene and clothing management.   Supine to sit with Stand-by Assistance.  Toilet transfer to bedside commode with Stand-by Assistance.  Upper extremity exercise program, with supervision.    Outcome: Ongoing, Progressing

## 2023-02-02 NOTE — PLAN OF CARE
Spoke with patient at bedside concerning Skilled Nursing placement, to which patient verbalizes understanding and agreement.  Patient choice form signed and scanned into .  Referral sent to the following facilities via [facility]: [facility], [facility], [facility], and [facility].    Will continue to follow for accepting facility.    LOCET completed with Ahmet and JENNIFER completed and successfully faxed to the Office of Aging and Adult Services at [phone].  Awaiting form 142.        02/02/23 1603   Post-Acute Status   Post-Acute Authorization Placement   Post-Acute Placement Status Referrals Sent   Patient choice form signed by patient/caregiver List with quality metrics by geographic area provided   Discharge Delays None known at this time   Discharge Plan   Discharge Plan A Skilled Nursing Facility   Discharge Plan B Skilled Nursing Facility

## 2023-02-03 LAB
ALBUMIN SERPL BCP-MCNC: 3.5 G/DL (ref 3.5–5.2)
ALP SERPL-CCNC: 68 U/L (ref 55–135)
ALT SERPL W/O P-5'-P-CCNC: 19 U/L (ref 10–44)
ANION GAP SERPL CALC-SCNC: 5 MMOL/L (ref 8–16)
AST SERPL-CCNC: 22 U/L (ref 10–40)
BASOPHILS # BLD AUTO: 0.03 K/UL (ref 0–0.2)
BASOPHILS NFR BLD: 0.7 % (ref 0–1.9)
BILIRUB SERPL-MCNC: 0.5 MG/DL (ref 0.1–1)
BUN SERPL-MCNC: 9 MG/DL (ref 8–23)
CALCIUM SERPL-MCNC: 8.7 MG/DL (ref 8.7–10.5)
CHLORIDE SERPL-SCNC: 106 MMOL/L (ref 95–110)
CO2 SERPL-SCNC: 30 MMOL/L (ref 23–29)
CREAT SERPL-MCNC: 0.3 MG/DL (ref 0.5–1.4)
DIFFERENTIAL METHOD: NORMAL
EOSINOPHIL # BLD AUTO: 0.1 K/UL (ref 0–0.5)
EOSINOPHIL NFR BLD: 2.1 % (ref 0–8)
ERYTHROCYTE [DISTWIDTH] IN BLOOD BY AUTOMATED COUNT: 11.9 % (ref 11.5–14.5)
EST. GFR  (NO RACE VARIABLE): >60 ML/MIN/1.73 M^2
GLUCOSE SERPL-MCNC: 108 MG/DL (ref 70–110)
GLUCOSE SERPL-MCNC: 69 MG/DL (ref 70–110)
HCT VFR BLD AUTO: 40.2 % (ref 37–48.5)
HGB BLD-MCNC: 12.9 G/DL (ref 12–16)
IMM GRANULOCYTES # BLD AUTO: 0.02 K/UL (ref 0–0.04)
IMM GRANULOCYTES NFR BLD AUTO: 0.5 % (ref 0–0.5)
LYMPHOCYTES # BLD AUTO: 1 K/UL (ref 1–4.8)
LYMPHOCYTES NFR BLD: 22.8 % (ref 18–48)
MCH RBC QN AUTO: 30.4 PG (ref 27–31)
MCHC RBC AUTO-ENTMCNC: 32.1 G/DL (ref 32–36)
MCV RBC AUTO: 95 FL (ref 82–98)
MONOCYTES # BLD AUTO: 0.4 K/UL (ref 0.3–1)
MONOCYTES NFR BLD: 8.2 % (ref 4–15)
NEUTROPHILS # BLD AUTO: 2.8 K/UL (ref 1.8–7.7)
NEUTROPHILS NFR BLD: 65.7 % (ref 38–73)
NRBC BLD-RTO: 0 /100 WBC
PLATELET # BLD AUTO: 168 K/UL (ref 150–450)
PMV BLD AUTO: 10.5 FL (ref 9.2–12.9)
POTASSIUM SERPL-SCNC: 3.4 MMOL/L (ref 3.5–5.1)
PROT SERPL-MCNC: 6.2 G/DL (ref 6–8.4)
RBC # BLD AUTO: 4.24 M/UL (ref 4–5.4)
SODIUM SERPL-SCNC: 141 MMOL/L (ref 136–145)
WBC # BLD AUTO: 4.29 K/UL (ref 3.9–12.7)

## 2023-02-03 PROCEDURE — 85025 COMPLETE CBC W/AUTO DIFF WBC: CPT | Performed by: STUDENT IN AN ORGANIZED HEALTH CARE EDUCATION/TRAINING PROGRAM

## 2023-02-03 PROCEDURE — 63600175 PHARM REV CODE 636 W HCPCS: Performed by: STUDENT IN AN ORGANIZED HEALTH CARE EDUCATION/TRAINING PROGRAM

## 2023-02-03 PROCEDURE — 97116 GAIT TRAINING THERAPY: CPT

## 2023-02-03 PROCEDURE — 25000003 PHARM REV CODE 250: Performed by: STUDENT IN AN ORGANIZED HEALTH CARE EDUCATION/TRAINING PROGRAM

## 2023-02-03 PROCEDURE — 36415 COLL VENOUS BLD VENIPUNCTURE: CPT | Performed by: STUDENT IN AN ORGANIZED HEALTH CARE EDUCATION/TRAINING PROGRAM

## 2023-02-03 PROCEDURE — 12000002 HC ACUTE/MED SURGE SEMI-PRIVATE ROOM

## 2023-02-03 PROCEDURE — 80053 COMPREHEN METABOLIC PANEL: CPT | Performed by: STUDENT IN AN ORGANIZED HEALTH CARE EDUCATION/TRAINING PROGRAM

## 2023-02-03 RX ORDER — POTASSIUM CHLORIDE 20 MEQ/1
40 TABLET, EXTENDED RELEASE ORAL ONCE
Status: COMPLETED | OUTPATIENT
Start: 2023-02-03 | End: 2023-02-03

## 2023-02-03 RX ADMIN — CARBIDOPA AND LEVODOPA 1 TABLET: 25; 100 TABLET ORAL at 05:02

## 2023-02-03 RX ADMIN — CHLORHEXIDINE GLUCONATE 15 ML: 1.2 RINSE ORAL at 09:02

## 2023-02-03 RX ADMIN — MUPIROCIN: 20 OINTMENT TOPICAL at 09:02

## 2023-02-03 RX ADMIN — LEVETIRACETAM 500 MG: 5 INJECTION INTRAVENOUS at 09:02

## 2023-02-03 RX ADMIN — CARBIDOPA AND LEVODOPA 1 TABLET: 25; 100 TABLET ORAL at 09:02

## 2023-02-03 RX ADMIN — CARBIDOPA AND LEVODOPA 1 TABLET: 25; 100 TABLET ORAL at 11:02

## 2023-02-03 RX ADMIN — LEVOTHYROXINE SODIUM ANHYDROUS 100 MCG: 100 INJECTION, POWDER, LYOPHILIZED, FOR SOLUTION INTRAVENOUS at 05:02

## 2023-02-03 RX ADMIN — CARBIDOPA AND LEVODOPA 1 TABLET: 25; 100 TABLET ORAL at 03:02

## 2023-02-03 RX ADMIN — ENOXAPARIN SODIUM 40 MG: 100 INJECTION SUBCUTANEOUS at 03:02

## 2023-02-03 RX ADMIN — CHLORHEXIDINE GLUCONATE 15 ML: 1.2 RINSE ORAL at 11:02

## 2023-02-03 RX ADMIN — POTASSIUM CHLORIDE 40 MEQ: 20 TABLET, EXTENDED RELEASE ORAL at 11:02

## 2023-02-03 RX ADMIN — LEVETIRACETAM 500 MG: 5 INJECTION INTRAVENOUS at 11:02

## 2023-02-03 RX ADMIN — SODIUM CHLORIDE, SODIUM LACTATE, POTASSIUM CHLORIDE, AND CALCIUM CHLORIDE: .6; .31; .03; .02 INJECTION, SOLUTION INTRAVENOUS at 05:02

## 2023-02-03 NOTE — PROGRESS NOTES
[facility] Medicine  Progress Note    Patient name: Ariana Tiwari  MRN: [id number]  Admit Date: 1/27/2023   LOS: 6 days     SUBJECTIVE:     Principal problem: SBO (small bowel obstruction)    Interval History:      2/2- up to bedside chair eating, feels well, vitals stable.  Pending possible SNF    2/1- awake and alert, able to eat some breakfast.  Vitals stable.  Working with PT.  She is very weak and may need SNF.      1/31- Large BM today, contrast passed through colon.  Exam is benign.  Start diet and trx out of ICU      Code Blue called this AM.  Patient's daughter in law reports she noted patient smacking her lips. She did not respond when she asked if she was okay but instead lifted her leg like she was trying to get out of bed.  She then clenched up and started foaming at the mouth. She reports patient has done smacking of her lips at home in recent past. She has not been worked up for this (seizures versus part of her parkinsons) and she has never had this event where she clenches or foams at the mouth. She did not lose her pulse.  She did require a NRB as initially not alert.  She was moved to the ICU.  Accucheck was 90. ABG done with metabolic acidosis. CXR with atelectasis, no aspiration.  Head CT done with no acute process.  She slowly started to come around and seizure with post ictal state suspected.  She was loaded with keppra, EEG ordered and Neurology consulted.  Pulmonary/Critical care kindly responded.  Given no immediate plans for surgery, I have started Lovenox.     Hospital Course:    Patient presented to the ED with abdominal pain with nausea.  She had 2 BMs prior to coming to the ED.  She was admitted with SBO as demonstrated by CT scan. She's had SBO in the past and has required surgery.  ED workup also positive for Covid 19. She has no hypoxia. General surgery consulted.  NGT to LIWS. Repeat flat and erect with persistent loops of dilated small bowel.  She/Family  reports she also has a bloated abdomen but distension did improve after placement of NGT.    Scheduled Meds:   carbidopa-levodopa  mg  1 tablet Oral QID    chlorhexidine  15 mL Mouth/Throat BID    enoxparin  40 mg Subcutaneous Q24H    levetiracetam IV  500 mg Intravenous Q12H    levothyroxine  100 mcg Intravenous Daily    mupirocin   Nasal BID     Continuous Infusions:   lactated ringers 100 mL/hr at 02/02/23 1226     PRN Meds:calcium gluconate IVPB, calcium gluconate IVPB, calcium gluconate IVPB, carboxymethylcellulose, lorazepam, magnesium sulfate IVPB, magnesium sulfate IVPB, ondansetron, potassium chloride **AND** potassium chloride **AND** potassium chloride, sodium chloride 0.9%, sodium phosphate IVPB, sodium phosphate IVPB, sodium phosphate IVPB    Review of patient's allergies indicates:   Allergen Reactions    Gluten Diarrhea       Review of Systems: As per interval history    OBJECTIVE:     Vital Signs (Most Recent)  Temp: 98.7 °F (37.1 °C) (02/02/23 1615)  Pulse: 68 (02/02/23 1615)  Resp: 16 (02/02/23 1615)  BP: (!) 102/59 (02/02/23 1615)  SpO2: 95 % (02/02/23 1615)    Vital Signs Range (Last 24H):  Temp:  [97.4 °F (36.3 °C)-98.7 °F (37.1 °C)]   Pulse:  [63-83]   Resp:  [14-18]   BP: (100-115)/(59-69)   SpO2:  [91 %-97 %]     I & O (Last 24H):  Intake/Output Summary (Last 24 hours) at 2/2/2023 1826  Last data filed at 2/1/2023 1948  Gross per 24 hour   Intake --   Output 800 ml   Net -800 ml         Physical Exam:    Vitals and nursing note reviewed.     Constitutional:       General: comfortable  HENT:      Head: Normocephalic and atraumatic.   NGT  Cardiovascular:      Rate and Rhythm: Regular rhythm.   Pulmonary:      Effort: Pulmonary effort is normal.      Breath sounds: Normal breath sounds. No wheezing.     Abdominal:      General: There is no distension.      Palpations: Abdomen is soft.      Tenderness: There is no abdominal tenderness. There is no guarding or rebound.   Musculoskeletal:         No effusions  Skin:     Findings: No rash.   Neurological:      Mental Status: Not responsive    Has baseline tremor secondary to her Parkinsons    Laboratory:  I have reviewed all pertinent lab results within the past 24 hours.  CBC:   Recent Labs   Lab 02/01/23  1202   WBC 4.98   RBC 4.36   HGB 13.3   HCT 40.9      MCV 94   MCH 30.5   MCHC 32.5       CMP:   Recent Labs   Lab 02/02/23  0502   GLU 87   CALCIUM 8.8   ALBUMIN 3.3*   PROT 6.2      K 3.8   CO2 23      BUN 6*   CREATININE 0.4*   ALKPHOS 70   ALT 15   AST 25   BILITOT 0.8         Diagnostic Results:  X-Ray: Reviewed  Flat and erect with persistent dilation of small bowel loops    ASSESSMENT/PLAN:         Active Hospital Problems    Diagnosis  POA    *SBO (small bowel obstruction) [K56.609]  Yes    Seizure [R56.9]  Yes    COVID-19 [U07.1]  Yes    PD (Parkinson's disease) [G20]  Yes     Right tremor type      Hypothyroidism [E03.9]  Yes      Resolved Hospital Problems   No resolved problems to display.         Plan:     -New ? Seizure.  EEG.  Neurology consult.  CT head with no acute process.  Seizure precautions.  Loaded with keppra and started 500mg IV BID. Moved to ICU.   -NGT to LIWS  -General surgery consulted and following  -Serial abdominal exams and Xrays  -Xrays with persistent SBO.  CT scan per Surgery and suggests volvulus.  Continuing supportive care at this time per surgery and will follow imaging to see if oral contrast makes it through the area of twisting.  -IVFs for hydration while NPO.  -CT concerning for ? Internal hernia.  Conservative measures for now.  No indication for emergent surgery. No peritoneal signs.   -suspect right knee OA as source of pain and swelling. Prn IV pain medication given npo.  Ice.  This has seemingly resolved 1/29.  -Covid isolation and symptomatic treatment.  Not hypoxic.   -PT, out of bed as able   -likely dc to snf      VTE Risk Mitigation (From admission, onward)           Ordered      enoxaparin injection 40 mg  Every 24 hours (non-standard times)         01/30/23 1257     IP VTE LOW RISK PATIENT  Once         01/27/23 2223     Place sequential compression device  Until discontinued         01/27/23 2223                      Department Hospital Medicine  [facility]  Adriano Crow MD  Date of service: 02/02/2023

## 2023-02-03 NOTE — PROGRESS NOTES
[facility]  Adult Nutrition   Progress Note (Follow-Up)    SUMMARY      Recommendations:   1. Continue current diet as tolerated.   2. Family and nursing to encourage po intake.     Goals:   Goals: Nutrition to be initiated as medically appropriate.Met    Dietitian Rounds Brief  Patient diet is advanced to [place], Dysphagia Soft (IDDSI Level 6). Nursing and patient's family assist and encourage good po intake. Last BM 2/01/23. Patient is pending dc to SNF. RD to follow.    Diet order: [place], Dysphagia Soft (IDDSI Level 6).    % Intake of Estimated Energy Needs: 50 - 75 %  % Meal Intake: 50 - 75 %    Estimated/Assessed Needs  Weight Used For Calorie Calculations: 56.5 kg (124 lb 9 oz)  Energy Calorie Requirements (kcal): 1790-5628 (25-30 kcal/kg)  Energy Need Method: Kcal/kg  Protein Requirements: 57-85 (1.0-1.5 gm/kg)  Weight Used For Protein Calculations: 56.5 kg (124 lb 9 oz)  Fluid Requirements (mL): 1413 (25 ml/kg)     RDA Method (mL): 1413       Weight History:  Wt Readings from Last 5 Encounters:   01/28/23 56.5 kg (124 lb 9 oz)   11/29/22 58.2 kg (128 lb 4.9 oz)   05/18/22 57.5 kg (126 lb 12.2 oz)   01/28/22 55.6 kg (122 lb 9.2 oz)   01/25/22 54.8 kg (120 lb 11.2 oz)        Reason for Assessment  Reason For Assessment: other (see comments) (ICU admit)  Diagnosis: other (see comments) (SBO)  Relevant Medical History: Hypothyroidism, PD, Gluten enteropathy, Diverticulosis  Interdisciplinary Rounds: attended    Medications:Pertinent Medications Reviewed  Scheduled Meds:   carbidopa-levodopa  mg  1 tablet Oral QID    chlorhexidine  15 mL Mouth/Throat BID    enoxparin  40 mg Subcutaneous Q24H    levetiracetam IV  500 mg Intravenous Q12H    levothyroxine  100 mcg Intravenous Daily    mupirocin   Nasal BID     Continuous Infusions:   lactated ringers 100 mL/hr at 02/02/23 1226     PRN Meds:.calcium gluconate IVPB, calcium gluconate IVPB, calcium gluconate IVPB, carboxymethylcellulose, lorazepam,  magnesium sulfate IVPB, magnesium sulfate IVPB, ondansetron, potassium chloride **AND** potassium chloride **AND** potassium chloride, sodium chloride 0.9%, sodium phosphate IVPB, sodium phosphate IVPB, sodium phosphate IVPB    Labs: Pertinent Labs Reviewed  Clinical Chemistry:  Recent Labs   Lab 01/27/23  1826 01/28/23  0506 01/31/23  0440 02/01/23  1202 02/03/23  0540      < > 139   < > 141   K 3.9   < > 3.2*   < > 3.4*      < > 101   < > 106   CO2 29   < > 26   < > 30*   GLU 90   < > 115*   < > 108   BUN 13   < > <5*   < > 9   CREATININE 0.5   < > 0.4*   < > 0.3*   CALCIUM 10.3   < > 8.4*   < > 8.7   PROT 8.3   < > 6.3   < > 6.2   ALBUMIN 4.8   < > 3.5   < > 3.5   BILITOT 0.6   < > 1.1*   < > 0.5   ALKPHOS 88   < > 72   < > 68   AST 21   < > 17   < > 22   ALT 21   < > 16   < > 19   ANIONGAP 10   < > 12   < > 5*   MG  --    < > 2.0  --   --    LIPASE 28  --   --   --   --     < > = values in this interval not displayed.     CBC:   Recent Labs   Lab 02/03/23  0540   WBC 4.29   RBC 4.24   HGB 12.9   HCT 40.2      MCV 95   MCH 30.4   MCHC 32.1     Lipid Panel:  No results for input(s): CHOL, HDL, LDLCALC, TRIG, CHOLHDL in the last 168 hours.  Cardiac Profile:  Recent Labs   Lab 01/27/23  1826   *     Inflammatory Labs:  Recent Labs   Lab 01/28/23  0039   CRP 0.28     Monitor and Evaluation  Food and Nutrient Intake: energy intake, food and beverage intake  Food and Nutrient Adminstration: diet order  Knowledge/Beliefs/Attitudes: food and nutrition knowledge/skill  Physical Activity and Function: nutrition-related ADLs and IADLs  Anthropometric Measurements: weight, weight change, body mass index  Biochemical Data, Medical Tests and Procedures: electrolyte and renal panel, gastrointestinal profile, glucose/endocrine profile  Nutrition-Focused Physical Findings: overall appearance     Nutrition Risk  Level of Risk/Frequency of Follow-up: moderate-high     Nutrition Follow-Up  RD Follow-up?:  Yes    Modesta Li, ANSON 02/03/2023 3:05 PM

## 2023-02-03 NOTE — PT/OT/SLP PROGRESS
Occupational Therapy      Patient Name:  Ariana Kramer Te   MRN:  [id number]    Patient not seen today secondary to Other (Comment) (therapist unavailable). Will follow-up next service date.    2/3/2023

## 2023-02-03 NOTE — PLAN OF CARE
Skilled Nursing referrals sent to the following facilities via [facility]\"": [facility], [facility], [facility], and [facility].    Per [facility]-Willing to accept once patient is 10 day post positive covid test, 2/5/23 makes 10th day    Per [facility]-Per [facility], willing to accept but won't have a bed until Monday 2/6/23    Per [facility]-Willing to accept once patient is 10 day post positive covid test    142 Received and uploaded to        02/03/23 1051   Post-Acute Status   Post-Acute Authorization Placement   Post-Acute Placement Status Pending post-acute provider review/more information requested   Patient choice form signed by patient/caregiver List with quality metrics by geographic area provided   Discharge Delays None known at this time   Discharge Plan   Discharge Plan A Skilled Nursing Facility   Discharge Plan B Skilled Nursing Facility

## 2023-02-04 LAB
ALBUMIN SERPL BCP-MCNC: 3.7 G/DL (ref 3.5–5.2)
ALP SERPL-CCNC: 73 U/L (ref 55–135)
ALT SERPL W/O P-5'-P-CCNC: 26 U/L (ref 10–44)
ANION GAP SERPL CALC-SCNC: 10 MMOL/L (ref 8–16)
AST SERPL-CCNC: 38 U/L (ref 10–40)
BASOPHILS # BLD AUTO: 0.04 K/UL (ref 0–0.2)
BASOPHILS NFR BLD: 0.9 % (ref 0–1.9)
BILIRUB SERPL-MCNC: 0.4 MG/DL (ref 0.1–1)
BUN SERPL-MCNC: 7 MG/DL (ref 8–23)
CALCIUM SERPL-MCNC: 9 MG/DL (ref 8.7–10.5)
CHLORIDE SERPL-SCNC: 103 MMOL/L (ref 95–110)
CO2 SERPL-SCNC: 28 MMOL/L (ref 23–29)
CREAT SERPL-MCNC: 0.4 MG/DL (ref 0.5–1.4)
DIFFERENTIAL METHOD: NORMAL
EOSINOPHIL # BLD AUTO: 0.1 K/UL (ref 0–0.5)
EOSINOPHIL NFR BLD: 2.6 % (ref 0–8)
ERYTHROCYTE [DISTWIDTH] IN BLOOD BY AUTOMATED COUNT: 12 % (ref 11.5–14.5)
EST. GFR  (NO RACE VARIABLE): >60 ML/MIN/1.73 M^2
GLUCOSE SERPL-MCNC: 91 MG/DL (ref 70–110)
HCT VFR BLD AUTO: 42.2 % (ref 37–48.5)
HGB BLD-MCNC: 13.9 G/DL (ref 12–16)
IMM GRANULOCYTES # BLD AUTO: 0.02 K/UL (ref 0–0.04)
IMM GRANULOCYTES NFR BLD AUTO: 0.4 % (ref 0–0.5)
LYMPHOCYTES # BLD AUTO: 1.3 K/UL (ref 1–4.8)
LYMPHOCYTES NFR BLD: 26.6 % (ref 18–48)
MCH RBC QN AUTO: 30.6 PG (ref 27–31)
MCHC RBC AUTO-ENTMCNC: 32.9 G/DL (ref 32–36)
MCV RBC AUTO: 93 FL (ref 82–98)
MONOCYTES # BLD AUTO: 0.4 K/UL (ref 0.3–1)
MONOCYTES NFR BLD: 9.4 % (ref 4–15)
NEUTROPHILS # BLD AUTO: 2.8 K/UL (ref 1.8–7.7)
NEUTROPHILS NFR BLD: 60.1 % (ref 38–73)
NRBC BLD-RTO: 0 /100 WBC
PLATELET # BLD AUTO: 201 K/UL (ref 150–450)
PMV BLD AUTO: 10 FL (ref 9.2–12.9)
POTASSIUM SERPL-SCNC: 3.5 MMOL/L (ref 3.5–5.1)
PROT SERPL-MCNC: 6.7 G/DL (ref 6–8.4)
RBC # BLD AUTO: 4.54 M/UL (ref 4–5.4)
SODIUM SERPL-SCNC: 141 MMOL/L (ref 136–145)
WBC # BLD AUTO: 4.7 K/UL (ref 3.9–12.7)

## 2023-02-04 PROCEDURE — 80053 COMPREHEN METABOLIC PANEL: CPT | Performed by: STUDENT IN AN ORGANIZED HEALTH CARE EDUCATION/TRAINING PROGRAM

## 2023-02-04 PROCEDURE — 25000003 PHARM REV CODE 250: Performed by: STUDENT IN AN ORGANIZED HEALTH CARE EDUCATION/TRAINING PROGRAM

## 2023-02-04 PROCEDURE — 63600175 PHARM REV CODE 636 W HCPCS: Performed by: STUDENT IN AN ORGANIZED HEALTH CARE EDUCATION/TRAINING PROGRAM

## 2023-02-04 PROCEDURE — 12000002 HC ACUTE/MED SURGE SEMI-PRIVATE ROOM

## 2023-02-04 PROCEDURE — 85025 COMPLETE CBC W/AUTO DIFF WBC: CPT | Performed by: STUDENT IN AN ORGANIZED HEALTH CARE EDUCATION/TRAINING PROGRAM

## 2023-02-04 PROCEDURE — 97535 SELF CARE MNGMENT TRAINING: CPT

## 2023-02-04 PROCEDURE — 97110 THERAPEUTIC EXERCISES: CPT

## 2023-02-04 PROCEDURE — 36415 COLL VENOUS BLD VENIPUNCTURE: CPT | Performed by: STUDENT IN AN ORGANIZED HEALTH CARE EDUCATION/TRAINING PROGRAM

## 2023-02-04 RX ADMIN — CARBIDOPA AND LEVODOPA 1 TABLET: 25; 100 TABLET ORAL at 03:02

## 2023-02-04 RX ADMIN — MUPIROCIN 1 G: 20 OINTMENT TOPICAL at 09:02

## 2023-02-04 RX ADMIN — LEVOTHYROXINE SODIUM ANHYDROUS 100 MCG: 100 INJECTION, POWDER, LYOPHILIZED, FOR SOLUTION INTRAVENOUS at 05:02

## 2023-02-04 RX ADMIN — CARBIDOPA AND LEVODOPA 1 TABLET: 25; 100 TABLET ORAL at 09:02

## 2023-02-04 RX ADMIN — CHLORHEXIDINE GLUCONATE 15 ML: 1.2 RINSE ORAL at 09:02

## 2023-02-04 RX ADMIN — CARBIDOPA AND LEVODOPA 1 TABLET: 25; 100 TABLET ORAL at 06:02

## 2023-02-04 RX ADMIN — LEVETIRACETAM 500 MG: 5 INJECTION INTRAVENOUS at 09:02

## 2023-02-04 RX ADMIN — SODIUM CHLORIDE, SODIUM LACTATE, POTASSIUM CHLORIDE, AND CALCIUM CHLORIDE: .6; .31; .03; .02 INJECTION, SOLUTION INTRAVENOUS at 09:02

## 2023-02-04 RX ADMIN — ENOXAPARIN SODIUM 40 MG: 100 INJECTION SUBCUTANEOUS at 03:02

## 2023-02-04 NOTE — PT/OT/SLP PROGRESS
Occupational Therapy   Treatment    Name: Ariana Tiwari  MRN: [id number]  Admitting Diagnosis:  SBO (small bowel obstruction)       Recommendations:     Discharge Recommendations: nursing facility, skilled  Discharge Equipment Recommendations:  none  Barriers to discharge:       Assessment:     Ariana Tiwari is a 77 y.o. female with a medical diagnosis of SBO (small bowel obstruction). Performance deficits affecting function are weakness, impaired endurance, impaired self care skills, impaired functional mobility, gait instability, impaired balance, decreased coordination, decreased upper extremity function, decreased lower extremity function, decreased safety awareness, pain, impaired coordination, impaired fine motor, impaired cardiopulmonary response to activity. Pt eating yogurt upon entry and stated that her breakfast was cold and didn't want it. Pt asking for cheerios instead, nursing notified.    Rehab Prognosis:  Fair; patient would benefit from acute skilled OT services to address these deficits and reach maximum level of function.       Plan:     Patient to be seen 3 x/week to address the above listed problems via self-care/home management, therapeutic activities, therapeutic exercises  Plan of Care Expires: 03/02/23  Plan of Care Reviewed with: patient    Subjective     Pain/Comfort:  Pain Rating 1:  (not rated)  Location 1: gluteal  Pain Addressed 1: Reposition    Objective:     Communicated with: nursing prior to session.  Patient found HOB elevated with bed alarm, PureWick, telemetry, peripheral IV upon OT entry to room.    General Precautions: Standard, fall, droplet, contact, airborne    Orthopedic Precautions:N/A  Braces: N/A  Respiratory Status: Room air     Occupational Performance:     Bed Mobility:    Patient completed Rolling/Turning to Right with maximal assistance to adjust patient and place pillow under L hip to decrease pain to sore     Activities of Daily Living:  Feeding:  setup  assistance for containers/packages; pt able to feed self once setup, but with increased time due to bilateral UE tremors; pt with intermittent spillage of yogurt, requiring assist to clean  Grooming: setup assistance bed level to wash face     Toileting: prince      Treatment & Education:  Pt educated on role of OT/POC, importance of OOB/EOB activity, use of call bell, and safety during ADLs and transfers.     Patient left HOB elevated with all lines intact, call button in reach, and bed alarm on    GOALS:   Multidisciplinary Problems       Occupational Therapy Goals          Problem: Occupational Therapy    Goal Priority Disciplines Outcome Interventions   Occupational Therapy Goal     OT, PT/OT Ongoing, Progressing    Description: Goals to be met by: 3/2/2023     Patient will increase functional independence with ADLs by performing:    Grooming while seated at sink with Supervision.  Toileting from bedside commode with Supervision for hygiene and clothing management.   Supine to sit with Stand-by Assistance.  Toilet transfer to bedside commode with Stand-by Assistance.  Upper extremity exercise program, with supervision.                         Time Tracking:     OT Date of Treatment: 02/04/23  OT Start Time: 0841  OT Stop Time: 0904  OT Total Time (min): 23 min    Billable Minutes:Self Care/Home Management 23    OT/TAL: OT          2/4/2023

## 2023-02-04 NOTE — NURSING
0700: report received, in bed with no distress  1500: sat up for goo part of shift in chair, now in bed.

## 2023-02-04 NOTE — PROGRESS NOTES
Critical access hospital Medicine  Progress Note    Patient name: Ariana Tiwari  MRN: [id number]  Admit Date: 1/27/2023   LOS: 8 days     SUBJECTIVE:     Principal problem: SBO (small bowel obstruction)    Interval History:      2/4- patient is sleeping, on RA, arouses easily.  Vitals stable.  Pending placement.      2/3- patient is sleeping, arouses easily.  Vitals stable.  Working with PT.  Medically ready for discharge.      2/2- up to bedside chair eating, feels well, vitals stable.  Pending possible SNF    2/1- awake and alert, able to eat some breakfast.  Vitals stable.  Working with PT.  She is very weak and may need SNF.      1/31- Large BM today, contrast passed through colon.  Exam is benign.  Start diet and trx out of ICU      Code Blue called this AM.  Patient's daughter in law reports she noted patient smacking her lips. She did not respond when she asked if she was okay but instead lifted her leg like she was trying to get out of bed.  She then clenched up and started foaming at the mouth. She reports patient has done smacking of her lips at home in recent past. She has not been worked up for this (seizures versus part of her parkinsons) and she has never had this event where she clenches or foams at the mouth. She did not lose her pulse.  She did require a NRB as initially not alert.  She was moved to the ICU.  Accucheck was 90. ABG done with metabolic acidosis. CXR with atelectasis, no aspiration.  Head CT done with no acute process.  She slowly started to come around and seizure with post ictal state suspected.  She was loaded with keppra, EEG ordered and Neurology consulted.  Pulmonary/Critical care kindly responded.  Given no immediate plans for surgery, I have started Lovenox.     Hospital Course:    Patient presented to the ED with abdominal pain with nausea.  She had 2 BMs prior to coming to the ED.  She was admitted with SBO as demonstrated by CT scan. She's had SBO in the past and  has required surgery.  ED workup also positive for Covid 19. She has no hypoxia. General surgery consulted.  NGT to LIWS. Repeat flat and erect with persistent loops of dilated small bowel.  She/Family reports she also has a bloated abdomen but distension did improve after placement of NGT.    Scheduled Meds:   carbidopa-levodopa  mg  1 tablet Oral QID    chlorhexidine  15 mL Mouth/Throat BID    enoxparin  40 mg Subcutaneous Q24H    levetiracetam IV  500 mg Intravenous Q12H    levothyroxine  100 mcg Intravenous Daily    mupirocin   Nasal BID     Continuous Infusions:   lactated ringers 100 mL/hr at 02/03/23 1734     PRN Meds:calcium gluconate IVPB, calcium gluconate IVPB, calcium gluconate IVPB, carboxymethylcellulose, lorazepam, magnesium sulfate IVPB, magnesium sulfate IVPB, ondansetron, potassium chloride **AND** potassium chloride **AND** potassium chloride, sodium chloride 0.9%, sodium phosphate IVPB, sodium phosphate IVPB, sodium phosphate IVPB    Review of patient's allergies indicates:   Allergen Reactions    Gluten Diarrhea       Review of Systems: As per interval history    OBJECTIVE:     Vital Signs (Most Recent)  Temp: 98.1 °F (36.7 °C) (02/04/23 0832)  Pulse: 69 (02/04/23 0832)  Resp: 19 (02/04/23 0832)  BP: 103/80 (02/04/23 0832)  SpO2: 99 % (02/04/23 0832)    Vital Signs Range (Last 24H):  Temp:  [98.1 °F (36.7 °C)-99.2 °F (37.3 °C)]   Pulse:  []   Resp:  [19-20]   BP: (103-170)/(64-90)   SpO2:  [95 %-99 %]     I & O (Last 24H):No intake or output data in the 24 hours ending 02/04/23 0904      Physical Exam:    Vitals and nursing note reviewed.     Constitutional:       General: comfortable  HENT:      Head: Normocephalic and atraumatic.   NGT  Cardiovascular:      Rate and Rhythm: Regular rhythm.   Pulmonary:      Effort: Pulmonary effort is normal.      Breath sounds: Normal breath sounds. No wheezing.     Abdominal:      General: There is no distension.      Palpations: Abdomen is soft.       Tenderness: There is no abdominal tenderness. There is no guarding or rebound.   Musculoskeletal:        No effusions  Skin:     Findings: No rash.   Neurological:      Mental Status: Not responsive    Has baseline tremor secondary to her Parkinsons    Laboratory:  I have reviewed all pertinent lab results within the past 24 hours.  CBC:   Recent Labs   Lab 02/04/23  0444   WBC 4.70   RBC 4.54   HGB 13.9   HCT 42.2      MCV 93   MCH 30.6   MCHC 32.9       CMP:   Recent Labs   Lab 02/04/23 0444   GLU 91   CALCIUM 9.0   ALBUMIN 3.7   PROT 6.7      K 3.5   CO2 28      BUN 7*   CREATININE 0.4*   ALKPHOS 73   ALT 26   AST 38   BILITOT 0.4         Diagnostic Results:  X-Ray: Reviewed  Flat and erect with persistent dilation of small bowel loops    ASSESSMENT/PLAN:         Active Hospital Problems    Diagnosis  POA    *SBO (small bowel obstruction) [K56.609]  Yes    Seizure [R56.9]  Yes    COVID-19 [U07.1]  Yes    PD (Parkinson's disease) [G20]  Yes     Right tremor type      Hypothyroidism [E03.9]  Yes      Resolved Hospital Problems   No resolved problems to display.         Plan:     -New ? Seizure.  EEG.  Neurology consult.  CT head with no acute process.  Seizure precautions.  Loaded with keppra and started 500mg IV BID. Moved to ICU.   -NGT to [facility]  -General surgery consulted and following  -Serial abdominal exams and Xrays  -Xrays with persistent SBO.  CT scan per Surgery and suggests volvulus.  Continuing supportive care at this time per surgery and will follow imaging to see if oral contrast makes it through the area of twisting.  -IVFs for hydration while NPO.  -CT concerning for ? Internal hernia.  Conservative measures for now.  No indication for emergent surgery. No peritoneal signs.   -suspect right knee OA as source of pain and swelling. Prn IV pain medication given npo.  Ice.  This has resolved 1/29.  -Covid isolation and symptomatic treatment.  Not hypoxic.   -PT, out of bed as  able   -pending dc to snf      VTE Risk Mitigation (From admission, onward)           Ordered     enoxaparin injection 40 mg  Every 24 hours (non-standard times)         01/30/23 1257     IP VTE LOW RISK PATIENT  Once         01/27/23 2223     Place sequential compression device  Until discontinued         01/27/23 2223                      Department Hospital Medicine  [facility]  Adriano Crow MD  Date of service: 02/04/2023

## 2023-02-04 NOTE — PROGRESS NOTES
[facility] Medicine  Progress Note    Patient name: Ariana Tiwari  MRN: [id number]  Admit Date: 1/27/2023   LOS: 7 days     SUBJECTIVE:     Principal problem: SBO (small bowel obstruction)    Interval History:      2/3- patient is sleeping, arouses easily.  Vitals stable.  Working with PT.  Medically ready for discharge.      2/2- up to bedside chair eating, feels well, vitals stable.  Pending possible SNF    2/1- awake and alert, able to eat some breakfast.  Vitals stable.  Working with PT.  She is very weak and may need SNF.      1/31- Large BM today, contrast passed through colon.  Exam is benign.  Start diet and trx out of ICU      Code Blue called this AM.  Patient's daughter in law reports she noted patient smacking her lips. She did not respond when she asked if she was okay but instead lifted her leg like she was trying to get out of bed.  She then clenched up and started foaming at the mouth. She reports patient has done smacking of her lips at home in recent past. She has not been worked up for this (seizures versus part of her parkinsons) and she has never had this event where she clenches or foams at the mouth. She did not lose her pulse.  She did require a NRB as initially not alert.  She was moved to the ICU.  Accucheck was 90. ABG done with metabolic acidosis. CXR with atelectasis, no aspiration.  Head CT done with no acute process.  She slowly started to come around and seizure with post ictal state suspected.  She was loaded with keppra, EEG ordered and Neurology consulted.  Pulmonary/Critical care kindly responded.  Given no immediate plans for surgery, I have started Lovenox.     Hospital Course:    Patient presented to the ED with abdominal pain with nausea.  She had 2 BMs prior to coming to the ED.  She was admitted with SBO as demonstrated by CT scan. She's had SBO in the past and has required surgery.  ED workup also positive for Covid 19. She has no hypoxia. General  surgery consulted.  NGT to LIWS. Repeat flat and erect with persistent loops of dilated small bowel.  She/Family reports she also has a bloated abdomen but distension did improve after placement of NGT.    Scheduled Meds:   carbidopa-levodopa  mg  1 tablet Oral QID    chlorhexidine  15 mL Mouth/Throat BID    enoxparin  40 mg Subcutaneous Q24H    levetiracetam IV  500 mg Intravenous Q12H    levothyroxine  100 mcg Intravenous Daily    mupirocin   Nasal BID     Continuous Infusions:   lactated ringers 100 mL/hr at 02/03/23 1734     PRN Meds:calcium gluconate IVPB, calcium gluconate IVPB, calcium gluconate IVPB, carboxymethylcellulose, lorazepam, magnesium sulfate IVPB, magnesium sulfate IVPB, ondansetron, potassium chloride **AND** potassium chloride **AND** potassium chloride, sodium chloride 0.9%, sodium phosphate IVPB, sodium phosphate IVPB, sodium phosphate IVPB    Review of patient's allergies indicates:   Allergen Reactions    Gluten Diarrhea       Review of Systems: As per interval history    OBJECTIVE:     Vital Signs (Most Recent)  Temp: 99.1 °F (37.3 °C) (02/03/23 1732)  Pulse: 68 (02/03/23 1732)  Resp: 19 (02/03/23 1732)  BP: (!) 170/90 (nurse notified) (02/03/23 1732)  SpO2: 98 % (02/03/23 1732)    Vital Signs Range (Last 24H):  Temp:  [97.5 °F (36.4 °C)-99.1 °F (37.3 °C)]   Pulse:  [56-70]   Resp:  [16-19]   BP: (109-170)/(60-90)   SpO2:  [93 %-99 %]     I & O (Last 24H):No intake or output data in the 24 hours ending 02/03/23 1904      Physical Exam:    Vitals and nursing note reviewed.     Constitutional:       General: comfortable  HENT:      Head: Normocephalic and atraumatic.   NGT  Cardiovascular:      Rate and Rhythm: Regular rhythm.   Pulmonary:      Effort: Pulmonary effort is normal.      Breath sounds: Normal breath sounds. No wheezing.     Abdominal:      General: There is no distension.      Palpations: Abdomen is soft.      Tenderness: There is no abdominal tenderness. There is no  guarding or rebound.   Musculoskeletal:        No effusions  Skin:     Findings: No rash.   Neurological:      Mental Status: Not responsive    Has baseline tremor secondary to her Parkinsons    Laboratory:  I have reviewed all pertinent lab results within the past 24 hours.  CBC:   Recent Labs   Lab 02/03/23  0540   WBC 4.29   RBC 4.24   HGB 12.9   HCT 40.2      MCV 95   MCH 30.4   MCHC 32.1       CMP:   Recent Labs   Lab 02/03/23  0540      CALCIUM 8.7   ALBUMIN 3.5   PROT 6.2      K 3.4*   CO2 30*      BUN 9   CREATININE 0.3*   ALKPHOS 68   ALT 19   AST 22   BILITOT 0.5         Diagnostic Results:  X-Ray: Reviewed  Flat and erect with persistent dilation of small bowel loops    ASSESSMENT/PLAN:         Active Hospital Problems    Diagnosis  POA    *SBO (small bowel obstruction) [K56.609]  Yes    Seizure [R56.9]  Yes    COVID-19 [U07.1]  Yes    PD (Parkinson's disease) [G20]  Yes     Right tremor type      Hypothyroidism [E03.9]  Yes      Resolved Hospital Problems   No resolved problems to display.         Plan:     -New ? Seizure.  EEG.  Neurology consult.  CT head with no acute process.  Seizure precautions.  Loaded with keppra and started 500mg IV BID. Moved to ICU.   -NGT to [facility]  -General surgery consulted and following  -Serial abdominal exams and Xrays  -Xrays with persistent SBO.  CT scan per Surgery and suggests volvulus.  Continuing supportive care at this time per surgery and will follow imaging to see if oral contrast makes it through the area of twisting.  -IVFs for hydration while NPO.  -CT concerning for ? Internal hernia.  Conservative measures for now.  No indication for emergent surgery. No peritoneal signs.   -suspect right knee OA as source of pain and swelling. Prn IV pain medication given npo.  Ice.  This has seemingly resolved 1/29.  -Covid isolation and symptomatic treatment.  Not hypoxic.   -PT, out of bed as able   -pending dc to snf      VTE Risk Mitigation  (From admission, onward)           Ordered     enoxaparin injection 40 mg  Every 24 hours (non-standard times)         01/30/23 1257     IP VTE LOW RISK PATIENT  Once         01/27/23 2223     Place sequential compression device  Until discontinued         01/27/23 2223                      Department Hospital Medicine  [facility]  Adriano Crow MD  Date of service: 02/03/2023

## 2023-02-04 NOTE — PT/OT/SLP PROGRESS
Physical Therapy Treatment    Patient Name:  Ariana Tiwari   MRN:  [id number]    Recommendations:     Discharge Recommendations: nursing facility, skilled  Discharge Equipment Recommendations: none  Barriers to discharge: None    Assessment:     Ariana Tiwari is a 77 y.o. female admitted with a medical diagnosis of SBO (small bowel obstruction).  She presents with the following impairments/functional limitations: weakness, impaired endurance, impaired functional mobility, gait instability, impaired balance, decreased upper extremity function, decreased lower extremity function, decreased safety awareness, impaired cardiopulmonary response to activity. Patient endorses fatigue, but is agreeable with PT treatment to perform LE therex in bed. She performed 10 reps of ankle pumps, heel slides with assistance to initiate movement, straight leg raises, and quad sets (5 reps bilaterally with increased difficulty).     Rehab Prognosis: Good; patient would benefit from acute skilled PT services to address these deficits and reach maximum level of function.    Recent Surgery: * No surgery found *      Plan:     During this hospitalization, patient to be seen 6 x/week to address the identified rehab impairments via gait training, therapeutic activities, therapeutic exercises, neuromuscular re-education and progress toward the following goals:    Plan of Care Expires:  03/02/23    Subjective     Chief Complaint: tired  Patient/Family Comments/goals: agrees to exercise  Pain/Comfort:  Pain Rating 1: 0/10      Objective:     Communicated with RN prior to session.  Patient found HOB elevated with bed alarm, PureWick, peripheral IV, telemetry upon PT entry to room.     General Precautions: Standard, airborne, contact, droplet, fall  Orthopedic Precautions: N/A  Braces: N/A  Respiratory Status: Room air     Functional Mobility:  No functional mobility performed today       AM-PAC 6 CLICK MOBILITY          Treatment &  Education:  Patient was educated on the importance of OOB activity and functional mobility to negate negative effects of prolonged bed rest during hospitalization, safe transfers and ambulation, and D/C planning     10 reps of ankle pumps, heel slides with assistance to initiate movement, straight leg raises, and quad sets (5 reps bilaterally with increased difficulty).     Patient left HOB elevated with all lines intact, call button in reach, and bed alarm on..    GOALS:   Multidisciplinary Problems       Physical Therapy Goals          Problem: Physical Therapy    Goal Priority Disciplines Outcome Goal Variances Interventions   Physical Therapy Goal     PT, PT/OT Ongoing, Progressing     Description: Goals to be met by: 23     Patient will increase functional independence with mobility by performin. Supine to sit with Stand-by Assistance  2. Sit to supine with Stand-by Assistance  3. Sit to stand transfer with Stand-by Assistance  4. Bed to chair transfer with Stand-by Assistance using Rolling Walker  5. Gait  x 150 feet with Stand-by Assistance and Contact Guard Assistance using Rolling Walker.                          Time Tracking:     PT Received On: 23  PT Start Time: 1331     PT Stop Time: 1345  PT Total Time (min): 14 min     Billable Minutes: Therapeutic Exercise 14    Treatment Type: Treatment  PT/PTA: PT     PTA Visit Number: 0     2023

## 2023-02-05 LAB
ALBUMIN SERPL BCP-MCNC: 3.6 G/DL (ref 3.5–5.2)
ALP SERPL-CCNC: 67 U/L (ref 55–135)
ALT SERPL W/O P-5'-P-CCNC: 31 U/L (ref 10–44)
ANION GAP SERPL CALC-SCNC: 11 MMOL/L (ref 8–16)
AST SERPL-CCNC: 42 U/L (ref 10–40)
BASOPHILS # BLD AUTO: 0.04 K/UL (ref 0–0.2)
BASOPHILS NFR BLD: 0.8 % (ref 0–1.9)
BILIRUB SERPL-MCNC: 0.5 MG/DL (ref 0.1–1)
BUN SERPL-MCNC: 13 MG/DL (ref 8–23)
CALCIUM SERPL-MCNC: 9.6 MG/DL (ref 8.7–10.5)
CHLORIDE SERPL-SCNC: 104 MMOL/L (ref 95–110)
CO2 SERPL-SCNC: 26 MMOL/L (ref 23–29)
CREAT SERPL-MCNC: 0.4 MG/DL (ref 0.5–1.4)
DIFFERENTIAL METHOD: NORMAL
EOSINOPHIL # BLD AUTO: 0.1 K/UL (ref 0–0.5)
EOSINOPHIL NFR BLD: 2.8 % (ref 0–8)
ERYTHROCYTE [DISTWIDTH] IN BLOOD BY AUTOMATED COUNT: 11.9 % (ref 11.5–14.5)
EST. GFR  (NO RACE VARIABLE): >60 ML/MIN/1.73 M^2
GLUCOSE SERPL-MCNC: 102 MG/DL (ref 70–110)
HCT VFR BLD AUTO: 40.3 % (ref 37–48.5)
HGB BLD-MCNC: 13.2 G/DL (ref 12–16)
IMM GRANULOCYTES # BLD AUTO: 0.01 K/UL (ref 0–0.04)
IMM GRANULOCYTES NFR BLD AUTO: 0.2 % (ref 0–0.5)
LYMPHOCYTES # BLD AUTO: 1.2 K/UL (ref 1–4.8)
LYMPHOCYTES NFR BLD: 24.4 % (ref 18–48)
MCH RBC QN AUTO: 30.3 PG (ref 27–31)
MCHC RBC AUTO-ENTMCNC: 32.8 G/DL (ref 32–36)
MCV RBC AUTO: 92 FL (ref 82–98)
MONOCYTES # BLD AUTO: 0.5 K/UL (ref 0.3–1)
MONOCYTES NFR BLD: 9.4 % (ref 4–15)
NEUTROPHILS # BLD AUTO: 3.1 K/UL (ref 1.8–7.7)
NEUTROPHILS NFR BLD: 62.4 % (ref 38–73)
NRBC BLD-RTO: 0 /100 WBC
PLATELET # BLD AUTO: 213 K/UL (ref 150–450)
PMV BLD AUTO: 10 FL (ref 9.2–12.9)
POTASSIUM SERPL-SCNC: 3.6 MMOL/L (ref 3.5–5.1)
PROT SERPL-MCNC: 6.7 G/DL (ref 6–8.4)
RBC # BLD AUTO: 4.36 M/UL (ref 4–5.4)
SODIUM SERPL-SCNC: 141 MMOL/L (ref 136–145)
WBC # BLD AUTO: 5 K/UL (ref 3.9–12.7)

## 2023-02-05 PROCEDURE — 85025 COMPLETE CBC W/AUTO DIFF WBC: CPT | Performed by: STUDENT IN AN ORGANIZED HEALTH CARE EDUCATION/TRAINING PROGRAM

## 2023-02-05 PROCEDURE — 80053 COMPREHEN METABOLIC PANEL: CPT | Performed by: STUDENT IN AN ORGANIZED HEALTH CARE EDUCATION/TRAINING PROGRAM

## 2023-02-05 PROCEDURE — 63600175 PHARM REV CODE 636 W HCPCS: Performed by: STUDENT IN AN ORGANIZED HEALTH CARE EDUCATION/TRAINING PROGRAM

## 2023-02-05 PROCEDURE — 25000003 PHARM REV CODE 250: Performed by: STUDENT IN AN ORGANIZED HEALTH CARE EDUCATION/TRAINING PROGRAM

## 2023-02-05 PROCEDURE — 36415 COLL VENOUS BLD VENIPUNCTURE: CPT | Performed by: STUDENT IN AN ORGANIZED HEALTH CARE EDUCATION/TRAINING PROGRAM

## 2023-02-05 PROCEDURE — 12000002 HC ACUTE/MED SURGE SEMI-PRIVATE ROOM

## 2023-02-05 RX ADMIN — SODIUM CHLORIDE, SODIUM LACTATE, POTASSIUM CHLORIDE, AND CALCIUM CHLORIDE: .6; .31; .03; .02 INJECTION, SOLUTION INTRAVENOUS at 08:02

## 2023-02-05 RX ADMIN — CARBIDOPA AND LEVODOPA 1 TABLET: 25; 100 TABLET ORAL at 05:02

## 2023-02-05 RX ADMIN — ENOXAPARIN SODIUM 40 MG: 100 INJECTION SUBCUTANEOUS at 03:02

## 2023-02-05 RX ADMIN — LEVETIRACETAM 500 MG: 5 INJECTION INTRAVENOUS at 11:02

## 2023-02-05 RX ADMIN — LEVOTHYROXINE SODIUM ANHYDROUS 100 MCG: 100 INJECTION, POWDER, LYOPHILIZED, FOR SOLUTION INTRAVENOUS at 05:02

## 2023-02-05 RX ADMIN — LEVETIRACETAM 500 MG: 5 INJECTION INTRAVENOUS at 08:02

## 2023-02-05 RX ADMIN — CARBIDOPA AND LEVODOPA 1 TABLET: 25; 100 TABLET ORAL at 08:02

## 2023-02-05 RX ADMIN — CARBIDOPA AND LEVODOPA 1 TABLET: 25; 100 TABLET ORAL at 01:02

## 2023-02-05 RX ADMIN — CARBIDOPA AND LEVODOPA 1 TABLET: 25; 100 TABLET ORAL at 09:02

## 2023-02-05 NOTE — PROGRESS NOTES
[facility] Medicine  Progress Note    Patient name: Ariana Tiwari  MRN: [id number]  Admit Date: 1/27/2023   LOS: 9 days     SUBJECTIVE:     Principal problem: SBO (small bowel obstruction)    Interval History:      2/5- patient is sleeping, on RA, arouses easily.  She is a little confused this morning, thought she was at home but easily re-oriented.    Vitals stable.  Pending placement.      2/4- patient is sleeping, on RA, arouses easily.  Vitals stable.  Pending placement.      2/3- patient is sleeping, arouses easily.  Vitals stable.  Working with PT.  Medically ready for discharge.      2/2- up to bedside chair eating, feels well, vitals stable.  Pending possible SNF    2/1- awake and alert, able to eat some breakfast.  Vitals stable.  Working with PT.  She is very weak and may need SNF.      1/31- Large BM today, contrast passed through colon.  Exam is benign.  Start diet and trx out of ICU      Code Blue called this AM.  Patient's daughter in law reports she noted patient smacking her lips. She did not respond when she asked if she was okay but instead lifted her leg like she was trying to get out of bed.  She then clenched up and started foaming at the mouth. She reports patient has done smacking of her lips at home in recent past. She has not been worked up for this (seizures versus part of her parkinsons) and she has never had this event where she clenches or foams at the mouth. She did not lose her pulse.  She did require a NRB as initially not alert.  She was moved to the ICU.  Accucheck was 90. ABG done with metabolic acidosis. CXR with atelectasis, no aspiration.  Head CT done with no acute process.  She slowly started to come around and seizure with post ictal state suspected.  She was loaded with keppra, EEG ordered and Neurology consulted.  Pulmonary/Critical care kindly responded.  Given no immediate plans for surgery, I have started Lovenox.     Hospital Course:    Patient  presented to the ED with abdominal pain with nausea.  She had 2 BMs prior to coming to the ED.  She was admitted with SBO as demonstrated by CT scan. She's had SBO in the past and has required surgery.  ED workup also positive for Covid 19. She has no hypoxia. General surgery consulted.  NGT to LIWS. Repeat flat and erect with persistent loops of dilated small bowel.  She/Family reports she also has a bloated abdomen but distension did improve after placement of NGT.    Scheduled Meds:   carbidopa-levodopa  mg  1 tablet Oral QID    enoxparin  40 mg Subcutaneous Q24H    levetiracetam IV  500 mg Intravenous Q12H    levothyroxine  100 mcg Intravenous Daily     Continuous Infusions:   lactated ringers 100 mL/hr at 02/04/23 0948     PRN Meds:calcium gluconate IVPB, calcium gluconate IVPB, calcium gluconate IVPB, carboxymethylcellulose, lorazepam, magnesium sulfate IVPB, magnesium sulfate IVPB, ondansetron, potassium chloride **AND** potassium chloride **AND** potassium chloride, sodium chloride 0.9%, sodium phosphate IVPB, sodium phosphate IVPB, sodium phosphate IVPB    Review of patient's allergies indicates:   Allergen Reactions    Gluten Diarrhea       Review of Systems: As per interval history    OBJECTIVE:     Vital Signs (Most Recent)  Temp: 97.1 °F (36.2 °C) (02/05/23 0300)  Pulse: 73 (02/05/23 0300)  Resp: 19 (02/05/23 0300)  BP: (!) 108/57 (02/05/23 0300)  SpO2: 97 % (02/05/23 0300)    Vital Signs Range (Last 24H):  Temp:  [96.8 °F (36 °C)-99.5 °F (37.5 °C)]   Pulse:  [60-79]   Resp:  [18-19]   BP: (103-124)/(57-85)   SpO2:  [95 %-99 %]     I & O (Last 24H):  Intake/Output Summary (Last 24 hours) at 2/5/2023 8427  Last data filed at 2/5/2023 0449  Gross per 24 hour   Intake --   Output 3050 ml   Net -3050 ml         Physical Exam:    Vitals and nursing note reviewed.     Constitutional:       General: comfortable  HENT:      Head: Normocephalic and atraumatic.   NGT  Cardiovascular:      Rate and Rhythm:  Regular rhythm.   Pulmonary:      Effort: Pulmonary effort is normal.      Breath sounds: Normal breath sounds. No wheezing.     Abdominal:      General: There is no distension.      Palpations: Abdomen is soft.      Tenderness: There is no abdominal tenderness. There is no guarding or rebound.   Musculoskeletal:        No effusions  Skin:     Findings: No rash.   Neurological:      Mental Status: Not responsive    Has baseline tremor secondary to her Parkinsons    Laboratory:  I have reviewed all pertinent lab results within the past 24 hours.  CBC:   Recent Labs   Lab 02/05/23  0421   WBC 5.00   RBC 4.36   HGB 13.2   HCT 40.3      MCV 92   MCH 30.3   MCHC 32.8       CMP:   Recent Labs   Lab 02/05/23  0421      CALCIUM 9.6   ALBUMIN 3.6   PROT 6.7      K 3.6   CO2 26      BUN 13   CREATININE 0.4*   ALKPHOS 67   ALT 31   AST 42*   BILITOT 0.5         Diagnostic Results:  X-Ray: Reviewed  Flat and erect with persistent dilation of small bowel loops    ASSESSMENT/PLAN:         Active Hospital Problems    Diagnosis  POA    *SBO (small bowel obstruction) [K56.609]  Yes    Seizure [R56.9]  Yes    COVID-19 [U07.1]  Yes    PD (Parkinson's disease) [G20]  Yes     Right tremor type      Hypothyroidism [E03.9]  Yes      Resolved Hospital Problems   No resolved problems to display.         Plan:     -New ? Seizure.  EEG.  Neurology consult.  CT head with no acute process.  Seizure precautions.  Loaded with keppra and started 500mg IV BID. Moved to ICU.   -NGT to [facility]  -General surgery consulted and following  -Serial abdominal exams and Xrays  -Xrays with persistent SBO.  CT scan per Surgery and suggests volvulus.  Continuing supportive care at this time per surgery and will follow imaging to see if oral contrast makes it through the area of twisting.  -IVFs for hydration while NPO.  -CT concerning for ? Internal hernia.  Conservative measures for now.  No indication for emergent surgery. No  peritoneal signs.   -suspect right knee OA as source of pain and swelling. Prn IV pain medication given npo.  Ice.  This has resolved 1/29.  -Covid isolation and symptomatic treatment.  Not hypoxic.   -PT, out of bed as able   -pending dc to snf      VTE Risk Mitigation (From admission, onward)           Ordered     enoxaparin injection 40 mg  Every 24 hours (non-standard times)         01/30/23 1257     IP VTE LOW RISK PATIENT  Once         01/27/23 2223     Place sequential compression device  Until discontinued         01/27/23 2223                      Department Hospital Medicine  formerly [facility]  Adriano Crow MD  Date of service: 02/05/2023

## 2023-02-06 VITALS
HEIGHT: 63 IN | WEIGHT: 124.56 LBS | OXYGEN SATURATION: 97 % | BODY MASS INDEX: 22.07 KG/M2 | DIASTOLIC BLOOD PRESSURE: 70 MMHG | TEMPERATURE: 98 F | SYSTOLIC BLOOD PRESSURE: 121 MMHG | HEART RATE: 72 BPM | RESPIRATION RATE: 17 BRPM

## 2023-02-06 LAB
ALBUMIN SERPL BCP-MCNC: 3.5 G/DL (ref 3.5–5.2)
ALP SERPL-CCNC: 66 U/L (ref 55–135)
ALT SERPL W/O P-5'-P-CCNC: 38 U/L (ref 10–44)
ANION GAP SERPL CALC-SCNC: 8 MMOL/L (ref 8–16)
AST SERPL-CCNC: 34 U/L (ref 10–40)
BASOPHILS # BLD AUTO: 0.05 K/UL (ref 0–0.2)
BASOPHILS NFR BLD: 0.9 % (ref 0–1.9)
BILIRUB SERPL-MCNC: 0.5 MG/DL (ref 0.1–1)
BUN SERPL-MCNC: 13 MG/DL (ref 8–23)
CALCIUM SERPL-MCNC: 9 MG/DL (ref 8.7–10.5)
CHLORIDE SERPL-SCNC: 107 MMOL/L (ref 95–110)
CO2 SERPL-SCNC: 26 MMOL/L (ref 23–29)
CREAT SERPL-MCNC: 0.4 MG/DL (ref 0.5–1.4)
DIFFERENTIAL METHOD: NORMAL
EOSINOPHIL # BLD AUTO: 0.1 K/UL (ref 0–0.5)
EOSINOPHIL NFR BLD: 2.2 % (ref 0–8)
ERYTHROCYTE [DISTWIDTH] IN BLOOD BY AUTOMATED COUNT: 11.9 % (ref 11.5–14.5)
EST. GFR  (NO RACE VARIABLE): >60 ML/MIN/1.73 M^2
GLUCOSE SERPL-MCNC: 95 MG/DL (ref 70–110)
HCT VFR BLD AUTO: 41.1 % (ref 37–48.5)
HGB BLD-MCNC: 13.4 G/DL (ref 12–16)
IMM GRANULOCYTES # BLD AUTO: 0.02 K/UL (ref 0–0.04)
IMM GRANULOCYTES NFR BLD AUTO: 0.4 % (ref 0–0.5)
LYMPHOCYTES # BLD AUTO: 1.3 K/UL (ref 1–4.8)
LYMPHOCYTES NFR BLD: 23.4 % (ref 18–48)
MCH RBC QN AUTO: 30.2 PG (ref 27–31)
MCHC RBC AUTO-ENTMCNC: 32.6 G/DL (ref 32–36)
MCV RBC AUTO: 93 FL (ref 82–98)
MONOCYTES # BLD AUTO: 0.5 K/UL (ref 0.3–1)
MONOCYTES NFR BLD: 9.7 % (ref 4–15)
NEUTROPHILS # BLD AUTO: 3.4 K/UL (ref 1.8–7.7)
NEUTROPHILS NFR BLD: 63.4 % (ref 38–73)
NRBC BLD-RTO: 0 /100 WBC
PLATELET # BLD AUTO: 212 K/UL (ref 150–450)
PMV BLD AUTO: 9.9 FL (ref 9.2–12.9)
POTASSIUM SERPL-SCNC: 3.8 MMOL/L (ref 3.5–5.1)
PROT SERPL-MCNC: 6.7 G/DL (ref 6–8.4)
RBC # BLD AUTO: 4.44 M/UL (ref 4–5.4)
SODIUM SERPL-SCNC: 141 MMOL/L (ref 136–145)
WBC # BLD AUTO: 5.38 K/UL (ref 3.9–12.7)

## 2023-02-06 PROCEDURE — 97116 GAIT TRAINING THERAPY: CPT

## 2023-02-06 PROCEDURE — 63600175 PHARM REV CODE 636 W HCPCS: Performed by: STUDENT IN AN ORGANIZED HEALTH CARE EDUCATION/TRAINING PROGRAM

## 2023-02-06 PROCEDURE — 80053 COMPREHEN METABOLIC PANEL: CPT | Performed by: STUDENT IN AN ORGANIZED HEALTH CARE EDUCATION/TRAINING PROGRAM

## 2023-02-06 PROCEDURE — 85025 COMPLETE CBC W/AUTO DIFF WBC: CPT | Performed by: STUDENT IN AN ORGANIZED HEALTH CARE EDUCATION/TRAINING PROGRAM

## 2023-02-06 PROCEDURE — 25000003 PHARM REV CODE 250: Performed by: STUDENT IN AN ORGANIZED HEALTH CARE EDUCATION/TRAINING PROGRAM

## 2023-02-06 PROCEDURE — 36415 COLL VENOUS BLD VENIPUNCTURE: CPT | Performed by: STUDENT IN AN ORGANIZED HEALTH CARE EDUCATION/TRAINING PROGRAM

## 2023-02-06 PROCEDURE — 97530 THERAPEUTIC ACTIVITIES: CPT

## 2023-02-06 RX ORDER — LEVETIRACETAM 500 MG/1
500 TABLET ORAL 2 TIMES DAILY
Qty: 60 TABLET | Refills: 11 | Status: ON HOLD | OUTPATIENT
Start: 2023-02-06 | End: 2023-02-10 | Stop reason: SDUPTHER

## 2023-02-06 RX ADMIN — LEVOTHYROXINE SODIUM ANHYDROUS 100 MCG: 100 INJECTION, POWDER, LYOPHILIZED, FOR SOLUTION INTRAVENOUS at 05:02

## 2023-02-06 RX ADMIN — CARBIDOPA AND LEVODOPA 1 TABLET: 25; 100 TABLET ORAL at 12:02

## 2023-02-06 RX ADMIN — LEVETIRACETAM 500 MG: 5 INJECTION INTRAVENOUS at 08:02

## 2023-02-06 RX ADMIN — CARBIDOPA AND LEVODOPA 1 TABLET: 25; 100 TABLET ORAL at 08:02

## 2023-02-06 RX ADMIN — ENOXAPARIN SODIUM 40 MG: 100 INJECTION SUBCUTANEOUS at 03:02

## 2023-02-06 NOTE — PROGRESS NOTES
Pt prepared for d/c. All questions and concerns addressed. Son at bedside. Transportation van at bedside to transport pt to [facility] via wheelchair.

## 2023-02-06 NOTE — PLAN OF CARE
Per liaison Lillie, patient is accepted to discharge to [facility] with Skilled Nursing. Facility will provide transportation    RN notified via secure chat to call report to Susana at [phone].    Discharge orders and chart reviewed with no further post-acute discharge needs identified at this time.  At this time, patient is cleared for discharge from Case Management standpoint.        02/06/23 1412   Final Note   Assessment Type Final Discharge Note   Anticipated Discharge Disposition SNF   What phone number can be called within the next 1-3 days to see how you are doing after discharge? [phone]   Post-Acute Status   Post-Acute Authorization Placement   Post-Acute Placement Status Set-up Complete/Auth obtained   Discharge Delays (!) Ambulance Transport/Facility Transport

## 2023-02-06 NOTE — PT/OT/SLP PROGRESS
Physical Therapy Treatment    Patient Name:  Ariana Tiwari   MRN:  [id number]    Recommendations:     Discharge Recommendations: nursing facility, skilled  Discharge Equipment Recommendations: none  Barriers to discharge: Decreased caregiver support    Assessment:     Ariana Tiwari is a 77 y.o. female admitted with a medical diagnosis of SBO (small bowel obstruction).  She presents with the following impairments/functional limitations: weakness, impaired endurance, impaired self care skills, impaired functional mobility, gait instability, impaired balance, decreased upper extremity function, decreased lower extremity function, decreased safety awareness, impaired cardiopulmonary response to activity .    Rehab Prognosis: Fair; patient would benefit from acute skilled PT services to address these deficits and reach maximum level of function.    Recent Surgery: * No surgery found *      Plan:     During this hospitalization, patient to be seen 6 x/week to address the identified rehab impairments via gait training, therapeutic activities, therapeutic exercises and progress toward the following goals:    Plan of Care Expires:  03/02/23    Subjective     Chief Complaint: None   Patient/Family Comments/goals: Increased ambulation  Pain/Comfort:         Objective:     Communicated with nurse prior to session.  Patient found sitting edge of bed with PureWick, peripheral IV, telemetry upon PT entry to room.     General Precautions: Standard, airborne, contact, droplet, fall  Orthopedic Precautions: N/A  Braces: N/A  Respiratory Status: Room air     Functional Mobility:  Transfers:     Sit to Stand:  minimum assistance and of 2 persons with rolling walker  Gait: 12 ft x2 RW and Mod  A . Minimal B foot clearance      AM-PAC 6 CLICK MOBILITY          Treatment & Education:  T/f training sit <>stand, gait training with cueing for increased step length, and in creased trunk and head ext.    Patient left up in chair with all  lines intact, call button in reach, and her son  present..    GOALS:   Multidisciplinary Problems       Physical Therapy Goals          Problem: Physical Therapy    Goal Priority Disciplines Outcome Goal Variances Interventions   Physical Therapy Goal     PT, PT/OT Ongoing, Progressing     Description: Goals to be met by: 23     Patient will increase functional independence with mobility by performin. Supine to sit with Stand-by Assistance  2. Sit to supine with Stand-by Assistance  3. Sit to stand transfer with Stand-by Assistance  4. Bed to chair transfer with Stand-by Assistance using Rolling Walker  5. Gait  x 150 feet with Stand-by Assistance and Contact Guard Assistance using Rolling Walker.                          Time Tracking:     PT Received On: 23  PT Start Time: 0957     PT Stop Time: 1020  PT Total Time (min): 23 min     Billable Minutes: Gait Training 13 minutes  and Therapeutic Activity 10 minutes    Treatment Type: Treatment  PT/PTA: PT     PTA Visit Number: 0     2023

## 2023-02-06 NOTE — DISCHARGE SUMMARY
[facility]  Discharge Summary  Patient Name: Ariana Tiwari MRN: [id number]   Patient Class: IP- Inpatient  Length of Stay: 10   Admission Date: 1/27/2023  5:51 PM Attending Physician: Adriano Crow MD   Primary Care Provider: Nba Petty MD Face-to-Face encounter date: 02/06/2023   Chief Complaint: Abdominal Pain (Bloating and pain. History of obstructions)    Date of Discharge: 2/6/2023  Discharge Disposition:   Condition: Stable       Reason for Hospitalization     Active Hospital Problems    Diagnosis    *SBO (small bowel obstruction)    Seizure    COVID-19    PD (Parkinson's disease)     Right tremor type      Hypothyroidism         Brief History of Present Illness    Ariana Tiwari is a 77 y.o.  female who  has a past medical history of Allergy, Diverticulosis, Gluten enteropathy, Gluten intolerance, Hernia, Hypothyroidism, PD (Parkinson's disease) (9/16/2015), Peptic ulcer disease, Right shoulder pain, and Ulcer.. The patient presented to [facility] on 1/27/2023 with a primary complaint of Abdominal Pain (Bloating and pain. History of obstructions)      For the full HPI please refer to the History & Physical from this admission.    Hospital Course By Problem with Pertinent Findings     Patient presented to the ED with abdominal pain with nausea. She had 2 BMs prior to coming to the ED. She was admitted with SBO as demonstrated by CT scan. She's had SBO in the past and has required surgery. ED workup also positive for Covid 19. She has no hypoxia. General surgery consulted. NGT to LIWS. Repeat flat and erect with persistent loops of dilated small bowel. She/Family reports she also has a bloated abdomen but distension did improve after placement of NGT.   -NGT to LIWS  -General surgery consulted and following  -Serial abdominal exams and Xrays  -Xrays with persistent SBO.  CT scan per Surgery and suggests volvulus.  Continuing supportive care at this time per surgery  "and will follow imaging to see if oral contrast makes it through the area of twisting.  -IVFs for hydration while NPO.  -CT concerning for ? Internal hernia.  Conservative measures for now.  No indication for emergent surgery. No peritoneal signs.   -suspect right knee OA as source of pain and swelling. Prn IV pain medication given npo.  Ice.  This has resolved 1/29.  -Covid isolation and symptomatic treatment.  Not hypoxic.   -PT, out of bed as able   -pending dc to snf         Patient was seen and examined on the date of discharge and determined to be suitable for discharge.    Physical Exam  /70   Pulse 72   Temp 97.9 °F (36.6 °C) (Axillary)   Resp 17   Ht 5' 3" (1.6 m)   Wt 56.5 kg (124 lb 9 oz)   LMP  (LMP Unknown)   SpO2 97%   Breastfeeding No   BMI 22.06 kg/m²   Vitals reviewed.    Constitutional: No distress.   HENT: Atraumatic.   Cardiovascular: Normal rate, regular rhythm.  S1 S2.    Pulmonary/Chest: Effort normal. Clear to auscultation bilaterally. No wheezes.   Abdominal: Soft. Bowel sounds are normal. Exhibits no distension and no mass. No tenderness  Neurological: Alert.   Skin: Skin is warm and dry.     Following labs were Reviewed   Recent Labs   Lab 02/06/23  0452   WBC 5.38   HGB 13.4   HCT 41.1      CALCIUM 9.0   ALBUMIN 3.5   PROT 6.7      K 3.8   CO2 26      BUN 13   CREATININE 0.4*   ALKPHOS 66   ALT 38   AST 34   BILITOT 0.5     No results found for: POCTGLUCOSE     All labs within the past 24 hours have been reviewed    Microbiology Results (last 7 days)       ** No results found for the last 168 hours. **          X-Ray Abdomen Flat And Erect   Final Result      CT Head Without Contrast   Final Result      X-Ray Chest AP Portable   Final Result      X-Ray Abdomen Flat And Erect   Final Result      CT Abdomen Pelvis With Contrast   Final Result      X-Ray Abdomen Flat And Erect   Final Result      X-Ray Abdomen Flat And Erect   Final Result      X-Ray Chest 1 " View   Final Result      X-Ray Chest 1 View   Final Result      CT Abdomen Pelvis With Contrast   Final Result      X-Ray Chest AP Portable   Final Result          No results found for this or any previous visit.      Consultants and Procedures   Consultants:  Consults (From admission, onward)          Status Ordering Provider     Inpatient consult to   Once        Provider:  (Not yet assigned)    Acknowledged RAUL REDDY     Inpatient consult to Pulmonology  Once        Provider:  Pj iMranda MD    Completed PJ MIRANDA     Inpatient consult to Neurology  Once        Provider:  Frank Billingsley MD    Completed GRIS SAUL     Inpatient consult to Hospitalist  Once        Provider:  Zoie Londono NP    Acknowledged RAUL REDDY     Inpatient consult to General surgery  Once        Provider:  Eagle Gonzalez MD    Completed KAL VIGIL            Procedures:   * No surgery found *     Discharge Information:   Diet:  Resume Cardiac diet/Diabetic Diet    Physical Activity:  Activity as tolerated    Instructions:  1. Take all medications as prescribed  2. Keep all follow-up appointments  3. Return to the hospital or call your primary care physicians if any worsening symptoms such as chest pain, shortness of breath, bleeding,  intractable pain, fever >101 occur.      Follow-Up Appointments:  Please call your primary care physician to schedule an appointment in 1 week time.     Follow-up Information       [facility] Follow up.    Specialty: Skilled Nursing Facility  Contact information:  [address] [place] [id number] [phone]                             Pending laboratory work/Tests to be performed/followed by the Primary care Physician:    The patient was discharged with discharge instructions reviewed in written and verbal form. All questions were answered and prescriptions were provided. The importance  of making follow up appointments and compliance of medications has been emphasized. The patient will follow up in 1 week or sooner as needed with the PCP. Tthe patient understands and agrees with the plan. Upon discharge, patient needs to be on following medications.    Discharge Medications:     Medication List        ASK your doctor about these medications      acetaminophen 325 MG tablet  Commonly known as: TYLENOL  Take 2 tablets (650 mg total) by mouth every 6 (six) hours as needed for Pain.     azelastine 137 mcg (0.1 %) nasal spray  Commonly known as: ASTELIN  1 spray (137 mcg total) by Nasal route 2 (two) times daily as needed for Rhinitis.     AZO BLADDER CONTROL ORAL     carbidopa-levodopa  mg  mg per tablet  Commonly known as: SINEMET     docusate sodium 100 MG capsule  Commonly known as: COLACE  Take 1 capsule (100 mg total) by mouth once daily.     * ENSURE CLEAR Liqd  Generic drug: food supplemt, lactose-reduced     * ENSURE ORIGINAL ORAL     fluticasone propionate 50 mcg/actuation nasal spray  Commonly known as: FLONASE  1 spray (50 mcg total) by Each Nostril route once daily.     FRUIT AND VEGETABLE DAILY ORAL     levothyroxine 137 MCG Tab tablet  Commonly known as: SYNTHROID  TAKE 1/2 (ONE-HALF) TABLET BY MOUTH IN THE MORNING BEFORE BREAKFAST     multivitamin with minerals tablet     pantoprazole 40 MG tablet  Commonly known as: PROTONIX  Take 1 tablet (40 mg total) by mouth 2 (two) times daily.     polyethylene glycol 17 gram Pwpk  Commonly known as: GLYCOLAX  Take 17 g by mouth every other day.     salicylic acid 3 % Sham  Shampoo scalp 2-3 times a week     * simethicone 125 mg Cap capsule  Commonly known as: MYLICON     * simethicone 125 mg Cap capsule  Commonly known as: MYLICON  Take 1 capsule (125 mg total) by mouth 3 (three) times daily with meals.     * GAS-X ORAL     tamsulosin 0.4 mg Cap  Commonly known as: FLOMAX  Take 1 capsule (0.4 mg total) by mouth once daily.           *  This list has 5 medication(s) that are the same as other medications prescribed for you. Read the directions carefully, and ask your doctor or other care provider to review them with you.                    I spent 35 minutes preparing the discharge for this patient including reviewing records from previous encounters, preparation of discharge summary, assessing and final examination of the patient, discharge medicine reconciliation, discussing plan of care, follow up and education and prescriptions.       Adriano Crow  [facility]

## 2023-02-06 NOTE — PT/OT/SLP PROGRESS
Occupational Therapy      Patient Name:  Ariana Kramer Te   MRN:  [id number]    Patient not seen today secondary to Other (Comment) (pending d/c.). Will follow-up next service date if d/c falls through.    2/6/2023

## 2023-02-07 NOTE — PT/OT/SLP DISCHARGE
Occupational Therapy Discharge Summary    Ariana Tiwari  MRN: [id number]   Principal Problem: SBO (small bowel obstruction)      Patient Discharged from acute Occupational Therapy on 2/6/2023.  Please refer to prior OT note dated 2/4/2023 for functional status.    Assessment:      Patient appropriate for care in another setting.    Objective:     GOALS:   Multidisciplinary Problems       Occupational Therapy Goals       Not on file              Multidisciplinary Problems (Resolved)          Problem: Occupational Therapy    Goal Priority Disciplines Outcome Interventions   Occupational Therapy Goal   (Resolved)     OT, PT/OT Met    Description: Goals to be met by: 3/2/2023     Patient will increase functional independence with ADLs by performing:    Grooming while seated at sink with Supervision.  Toileting from bedside commode with Supervision for hygiene and clothing management.   Supine to sit with Stand-by Assistance.  Toilet transfer to bedside commode with Stand-by Assistance.  Upper extremity exercise program, with supervision.                         Reasons for Discontinuation of Therapy Services  Transfer to alternate level of care.      Plan:     Patient Discharged to: Skilled Nursing Facility    2/7/2023

## 2023-02-10 PROBLEM — U07.1 COVID-19: Status: RESOLVED | Noted: 2023-01-27 | Resolved: 2023-02-10

## 2023-02-10 PROBLEM — K56.609 SBO (SMALL BOWEL OBSTRUCTION): Status: RESOLVED | Noted: 2019-09-17 | Resolved: 2023-02-10

## 2023-02-21 NOTE — PHYSICIAN QUERY
PT Name: Ariana Tiwari  MR #: [id number]    DOCUMENTATION CLARIFICATION     CDS/: Tri Chacon               Contact information:  This form is a permanent document in the medical record.     Query Date: February 21, 2023    By submitting this query, we are merely seeking further clarification of documentation. Please utilize your independent clinical judgment when addressing the question(s) below.    The Medical Record contains the following:   Indicators   Supporting Clinical Findings Location in Medical Record    AMS, Confusion,  LOC, etc.  A little confused this AM in that she thought she was at home rather than in the hospital but not significantly changed from yesterday    More awake and alert today but still very lethargic and confused.    She is a little confused this morning, thought she was at home but easily re-oriented Progress Notes by Colette Paiz MD1/29/2023      Progress Notes by Pj Miranda MD1/31/2023  Progress Notes by Adriano Crow MD2/5/2023    Acute/Chronic Illness  *SBO (small bowel obstruction)    Seizure    COVID-19    PD (Parkinson's disease)       Right tremor type    Discharge Summary    Radiology Findings Noninfusion images were obtained from the skull base to the vertex. There is no intracranial mass, hemorrhage, or midline shift. Ventricles and sulci are mildly prominent. There are no pathologic extra-axial fluid collections. There is no evidence of cortical ischemic change. Changes of mild chronic microvascular white matter disease are noted. Cerebellum and brainstem are normal. The calvarium is intact. CT Head 1/30    EEG This is an abnormal EEG due to diffuse slowing of the background activity consistent with a moderate to severe encephalopathy. Triphasic waves suggestive of metabolic encephalopathy. Procedures by Mei Wen MD1/30/2023       Provider, please specify the diagnosis or diagnoses associated with above clinical findings.  [ x  ]  Metabolic Encephalopathy    [   ] Toxic Encephalopathy    [   ] Anoxic/Hypoxic Encephalopathy   [   ] Hypertensive Encephalopathy    [   ] Other Encephalopathy (please specify): ____________________   [   ] Other neurological condition- Includes Post-ictal altered mental status (please specify condition): __________   [   ] Encephalopathy Ruled Out     Present on Admission (POA) Status:    [   ] Yes (Y)   [   ] No (N)   [   ] Clinically Undetermined (W)   [   ] Documentation insufficient to determine if condition is POA (U)       Form No. 95300

## 2023-03-09 ENCOUNTER — OFFICE VISIT (OUTPATIENT)
Dept: FAMILY MEDICINE | Facility: CLINIC | Age: 78
End: 2023-03-09
Attending: FAMILY MEDICINE
Payer: MEDICARE

## 2023-03-09 VITALS
WEIGHT: 118.63 LBS | DIASTOLIC BLOOD PRESSURE: 64 MMHG | HEART RATE: 70 BPM | HEIGHT: 63 IN | TEMPERATURE: 98 F | SYSTOLIC BLOOD PRESSURE: 101 MMHG | RESPIRATION RATE: 17 BRPM | BODY MASS INDEX: 21.02 KG/M2 | OXYGEN SATURATION: 95 %

## 2023-03-09 DIAGNOSIS — J06.9 UPPER RESPIRATORY TRACT INFECTION, UNSPECIFIED TYPE: ICD-10-CM

## 2023-03-09 DIAGNOSIS — K56.609 SMALL BOWEL OBSTRUCTION: Primary | ICD-10-CM

## 2023-03-09 DIAGNOSIS — G40.909 SEIZURE DISORDER: ICD-10-CM

## 2023-03-09 DIAGNOSIS — M17.12 ARTHRITIS OF LEFT KNEE: ICD-10-CM

## 2023-03-09 DIAGNOSIS — G47.00 INSOMNIA, UNSPECIFIED TYPE: ICD-10-CM

## 2023-03-09 DIAGNOSIS — F32.9 REACTIVE DEPRESSION: ICD-10-CM

## 2023-03-09 DIAGNOSIS — G20.A1 PD (PARKINSON'S DISEASE): ICD-10-CM

## 2023-03-09 DIAGNOSIS — K90.41 GLUTEN ENTEROPATHY: ICD-10-CM

## 2023-03-09 PROCEDURE — 99214 OFFICE O/P EST MOD 30 MIN: CPT | Mod: PBBFAC,PN | Performed by: FAMILY MEDICINE

## 2023-03-09 PROCEDURE — 99999 PR PBB SHADOW E&M-EST. PATIENT-LVL IV: CPT | Mod: PBBFAC,,, | Performed by: FAMILY MEDICINE

## 2023-03-09 PROCEDURE — 99999 PR PBB SHADOW E&M-EST. PATIENT-LVL IV: ICD-10-PCS | Mod: PBBFAC,,, | Performed by: FAMILY MEDICINE

## 2023-03-09 PROCEDURE — 99214 PR OFFICE/OUTPT VISIT, EST, LEVL IV, 30-39 MIN: ICD-10-PCS | Mod: S$PBB,,, | Performed by: FAMILY MEDICINE

## 2023-03-09 PROCEDURE — 99214 OFFICE O/P EST MOD 30 MIN: CPT | Mod: S$PBB,,, | Performed by: FAMILY MEDICINE

## 2023-03-09 RX ORDER — TRAZODONE HYDROCHLORIDE 50 MG/1
25 TABLET ORAL NIGHTLY PRN
Qty: 15 TABLET | Refills: 3 | Status: SHIPPED | OUTPATIENT
Start: 2023-03-09 | End: 2023-05-10

## 2023-03-09 NOTE — PROGRESS NOTES
Subjective:       Patient ID: Ariana Tiwari is a 77 y.o. female.    Chief Complaint: Hospital Follow Up (Pt states that she is here for a hospital follow up: seizure, abdominal pain, obstruction, internal hernia, COVID/)    77-year-old female coming in for a hospital follow-up from [facility] January 27 through February 6.  The patient presented with abdominal pain and bloating with a history of small-bowel obstruction and was confirmed on CT scan to have small-bowel obstruction and incidentally found to have COVID-19 infection.  An NG tube was placed and she was followed by General surgery but her symptoms started to resolve and she was doing better by January 29 followed by a generalized seizure on January 30th.  Review of lab showed a relatively low blood sugar that morning and a borderline low magnesium.  She had been getting metoclopramide to help with her bowel obstruction in that was discontinued.  She was seen by Neurology and was started on Keppra and has had no further seizures.  On February 6 she was transferred to the skilled unit at [facility] and remain there until February 10th when she was sent home.  She continues to have some abdominal discomfort and bloating and is taking softeners and fiber supplements with adequate fluids and seems to be getting pretty good nutrition with several meals a day and especially big breakfast.  She is using some ensure as a nutritional supplement.  She is complaining of pain in her left knee was stiffness, she does not sleep well, and she is feeling depressed.  She has a follow-up with Dr. Parker for Neurology who was replacing Dr. Armstrong her former neurologist who has retired.  She is on Sinemet 251 daily, Keppra 500 twice daily, Synthroid 137 mcg 1/2 daily along with some supplements another medications.  She has prominent rhinorrhea and has used multitude of products including Atrovent Astelin and Flonase all with no relief.    Past Medical  History:  No date: Allergy  No date: Diverticulosis  No date: Gluten enteropathy  No date: Gluten intolerance  No date: Hernia  No date: Hypothyroidism  9/16/2015: PD (Parkinson's disease)      Comment:  Right tremor type  No date: Peptic ulcer disease  No date: Right shoulder pain      Comment:  Cirtisone injection 3/26/15 - Dr. Tolbert  No date: Ulcer    Past Surgical History:  No date: ADENOIDECTOMY  03/01/2012: COLONOSCOPY      Comment:  Dr. Teresa, repeat in 10 years for screening  2/21/2018: COLONOSCOPY; N/A      Comment:  Procedure: COLONOSCOPY;  Surgeon: Radha Deng MD;               Location: [facility];  Service: Endoscopy;  Laterality:                N/A;  9/16/2020: CORRECTION OF HAMMER TOE; Left      Comment:  Procedure: CORRECTION, HAMMER TOE;  Surgeon: William Sprague MD;  Location: [facility] OR;  Service: Orthopedics;                 Laterality: Left;  No date: COSMETIC SURGERY      Comment:  Broken nose  12/8/2021: ESOPHAGOGASTRODUODENOSCOPY; N/A      Comment:  Procedure: EGD (ESOPHAGOGASTRODUODENOSCOPY);  Surgeon:                Benji Valentino III, MD;  Location: [facility];                 Service: Endoscopy;  Laterality: N/A;  left wrist: FRACTURE SURGERY      Comment:  With pin  No date: HEMORRHOID SURGERY  04/09/2015: HERNIA REPAIR; Left      Comment:  Dr Lo; left inguinal  7/10/2018: INTRALUMINAL GASTROINTESTINAL TRACT IMAGING VIA CAPSULE; N/  A      Comment:  Procedure: IMAGING, GI TRACT, INTRALUMINAL, VIA CAPSULE;               Surgeon: Radha Deng MD;  [facility]: [facility] ENDO;                 Service: Endoscopy;  Laterality: N/A;  9/29/2020: LYSIS OF ADHESIONS      Comment:  Procedure: LYSIS, ADHESIONS;  Surgeon: Eagle Gonzalez MD;  Location: [facility] OR;  Service: General;;  No date: RHINOPLASTY TIP  No date: TONSILLECTOMY  05/21/2015: UPPER GASTROINTESTINAL ENDOSCOPY      Comment:  Dr. Gomez    Current Outpatient Medications on File Prior to  Visit:  acetaminophen (TYLENOL) 325 MG tablet, Take 2 tablets (650 mg total) by mouth every 6 (six) hours as needed for Pain., Disp: , Rfl: 0  carbidopa-levodopa  mg (SINEMET)  mg per tablet, Take 1 tablet by mouth 3 (three) times daily., Disp: , Rfl:   diclofenac sodium (VOLTAREN) 1 % Gel, Apply 4 g topically 2 (two) times daily., Disp: 50 g, Rfl: 1  docusate sodium (COLACE) 100 MG capsule, Take 1 capsule (100 mg total) by mouth once daily., Disp: , Rfl: 0  glucose 4 GM chewable tablet, Take 4 tablets (16 g total) by mouth as needed for Low blood sugar., Disp: 20 tablet, Rfl: 1  lactose-reduced food (ENSURE ORIGINAL ORAL), Take 1 Dose by mouth once daily., Disp: , Rfl:   levETIRAcetam (KEPPRA) 500 MG Tab, Take 1 tablet (500 mg total) by mouth 2 (two) times daily., Disp: 60 tablet, Rfl: 1  levothyroxine (SYNTHROID) 137 MCG Tab tablet, TAKE 1/2 (ONE-HALF) TABLET BY MOUTH IN THE MORNING BEFORE BREAKFAST, Disp: 45 tablet, Rfl: 1  lycopene/lutein/fruit extracts (FRUIT AND VEGETABLE DAILY ORAL), Take 1 Dose by mouth once daily. Patient takes 2 fruit and 1 vegetable balance of nature vitamins in the morning and evening, Disp: , Rfl:   salicylic acid 3 % Sham, Shampoo scalp 2-3 times a week, Disp: 236 mL, Rfl: PRN  simethicone (MYLICON) 125 mg Cap capsule, Take 1 capsule (125 mg total) by mouth 4 (four) times daily as needed for Flatulence., Disp: 30 each, Rfl: 0  multivitamin with minerals tablet, Take 1 tablet by mouth once daily. (Patient not taking: Reported on 3/9/2023), Disp: 50 tablet, Rfl:   [DISCONTINUED] azelastine (ASTELIN) 137 mcg (0.1 %) nasal spray, 1 spray (137 mcg total) by Nasal route 2 (two) times daily as needed for Rhinitis. (Patient not taking: Reported on 3/9/2023), Disp: 30 mL, Rfl: 5  [DISCONTINUED] fluticasone propionate (FLONASE) 50 mcg/actuation nasal spray, 1 spray (50 mcg total) by Each Nostril route once daily. (Patient not taking: Reported on 3/9/2023), Disp: 18.2 mL, Rfl: 2    No  current facility-administered medications on file prior to visit.        Review of Systems   Constitutional:  Negative for chills and fever.   HENT:  Positive for congestion, postnasal drip and rhinorrhea.    Respiratory:  Positive for cough (The whole family has had an upper respiratory infection, she has a largely nonproductive cough and increased congestion but no fever). Negative for chest tightness and shortness of breath.    Gastrointestinal:  Positive for abdominal distention and diarrhea. Negative for abdominal pain, anal bleeding and blood in stool.   Genitourinary:  Negative for dysuria and frequency.   Musculoskeletal:  Positive for arthralgias and joint swelling.   Psychiatric/Behavioral:  Positive for dysphoric mood and sleep disturbance.      Objective:      Physical Exam  Vitals and nursing note reviewed.   Constitutional:       General: She is not in acute distress.     Appearance: Normal appearance. She is normal weight. She is ill-appearing. She is not toxic-appearing or diaphoretic.      Comments: Good blood pressure control   Normal weight with a BMI of 21.0 she is down 9.7 lb from her last visit with me November 29, 2022   HENT:      Head: Normocephalic and atraumatic.      Right Ear: Tympanic membrane, ear canal and external ear normal. There is no impacted cerumen.      Left Ear: Tympanic membrane, ear canal and external ear normal. There is no impacted cerumen.      Nose: Congestion and rhinorrhea present.      Mouth/Throat:      Mouth: Mucous membranes are moist.      Pharynx: Oropharynx is clear. No oropharyngeal exudate or posterior oropharyngeal erythema.   Eyes:      Extraocular Movements: Extraocular movements intact.      Pupils: Pupils are equal, round, and reactive to light.   Neck:      Vascular: No carotid bruit.   Cardiovascular:      Rate and Rhythm: Normal rate and regular rhythm.      Heart sounds: Normal heart sounds. No murmur heard.    No friction rub. No gallop.    Pulmonary:      Effort: Pulmonary effort is normal. No respiratory distress.      Breath sounds: Normal breath sounds. No stridor. No wheezing, rhonchi or rales.   Abdominal:      General: Abdomen is protuberant. Bowel sounds are increased.      Palpations: Abdomen is soft. There is no shifting dullness, fluid wave, hepatomegaly or splenomegaly.      Tenderness: There is no abdominal tenderness. There is no guarding or rebound. Negative signs include Mitchell's sign and McBurney's sign.   Musculoskeletal:      Cervical back: Normal range of motion and neck supple. No rigidity or tenderness.      Right knee: No swelling, effusion or crepitus. Decreased range of motion.      Left knee: Swelling and effusion present. No crepitus. Decreased range of motion.      Comments: Left knee slightly warm to touch relative to right.   Lymphadenopathy:      Cervical: No cervical adenopathy.   Neurological:      Mental Status: She is alert.       Assessment:       1. Small bowel obstruction    2. PD (Parkinson's disease)    3. Seizure disorder    4. Upper respiratory tract infection, unspecified type    5. Arthritis of left knee    6. Insomnia, unspecified type    7. Reactive depression    8. Gluten enteropathy    9. BMI 21.0-21.9, adult          Plan:       1. Small bowel obstruction  Resolved    2. PD (Parkinson's disease)  Stable on Sinemet under care of Dr. Parker    3. Seizure disorder  New onset possibly multifactorial, has upcoming appointment with Dr. Parker    4. Upper respiratory tract infection, unspecified type  Appears viral, no sign of bacterial infection, slowly resolving.  Symptomatic treatment only    5. Arthritis of left knee  Continue using Voltaren gel avoid oral anti inflammatories    6. Insomnia, unspecified type  Trial low-dose trazodone 25 mg and may increase to 50 if needed    7. Reactive depression  SSRIs and SNRIs all could provoke seizure activity.  Were going to work with her sleep using the trazodone and  at her recheck May 10th consider adding low-dose Lexapro    8. Gluten enteropathy  Followed by GI    9. BMI 21.0-21.9, adult

## 2023-04-18 DIAGNOSIS — E03.9 ACQUIRED HYPOTHYROIDISM: Primary | ICD-10-CM

## 2023-04-18 RX ORDER — LEVOTHYROXINE SODIUM 137 UG/1
68.5 TABLET ORAL
Qty: 45 TABLET | Refills: 2 | Status: SHIPPED | OUTPATIENT
Start: 2023-04-18 | End: 2024-01-23

## 2023-04-18 NOTE — TELEPHONE ENCOUNTER
No new care gaps identified.  [facility] Embedded Care Gaps. Reference number: [id number]. 4/18/2023   2:38:17 PM CDT

## 2023-04-18 NOTE — TELEPHONE ENCOUNTER
Refill Decision Note   Ariana Tiwari  is requesting a refill authorization.  Brief Assessment and Rationale for Refill:  Approve     Medication Therapy Plan:       Medication Reconciliation Completed: No    Comments:     No Care Gaps recommended.     Note composed:3:54 PM 04/18/2023

## 2023-05-03 DIAGNOSIS — Z71.89 COMPLEX CARE COORDINATION: ICD-10-CM

## 2023-05-10 ENCOUNTER — OFFICE VISIT (OUTPATIENT)
Dept: FAMILY MEDICINE | Facility: CLINIC | Age: 78
End: 2023-05-10
Attending: FAMILY MEDICINE
Payer: MEDICARE

## 2023-05-10 VITALS
HEART RATE: 72 BPM | BODY MASS INDEX: 21.05 KG/M2 | SYSTOLIC BLOOD PRESSURE: 103 MMHG | WEIGHT: 118.81 LBS | OXYGEN SATURATION: 96 % | HEIGHT: 63 IN | TEMPERATURE: 98 F | DIASTOLIC BLOOD PRESSURE: 63 MMHG | RESPIRATION RATE: 17 BRPM

## 2023-05-10 DIAGNOSIS — E03.9 ACQUIRED HYPOTHYROIDISM: ICD-10-CM

## 2023-05-10 DIAGNOSIS — I70.0 AORTIC ATHEROSCLEROSIS: ICD-10-CM

## 2023-05-10 DIAGNOSIS — R56.9 SEIZURE: ICD-10-CM

## 2023-05-10 DIAGNOSIS — K90.41 GLUTEN ENTEROPATHY: ICD-10-CM

## 2023-05-10 DIAGNOSIS — K52.9 CHRONIC DIARRHEA: ICD-10-CM

## 2023-05-10 DIAGNOSIS — G20.A1 PD (PARKINSON'S DISEASE): ICD-10-CM

## 2023-05-10 DIAGNOSIS — L71.9 ROSACEA: Primary | ICD-10-CM

## 2023-05-10 PROCEDURE — 99214 PR OFFICE/OUTPT VISIT, EST, LEVL IV, 30-39 MIN: ICD-10-PCS | Mod: S$PBB,,, | Performed by: FAMILY MEDICINE

## 2023-05-10 PROCEDURE — 99999 PR PBB SHADOW E&M-EST. PATIENT-LVL III: CPT | Mod: PBBFAC,,, | Performed by: FAMILY MEDICINE

## 2023-05-10 PROCEDURE — 99213 OFFICE O/P EST LOW 20 MIN: CPT | Mod: PBBFAC,PN | Performed by: FAMILY MEDICINE

## 2023-05-10 PROCEDURE — 99214 OFFICE O/P EST MOD 30 MIN: CPT | Mod: S$PBB,,, | Performed by: FAMILY MEDICINE

## 2023-05-10 PROCEDURE — 99999 PR PBB SHADOW E&M-EST. PATIENT-LVL III: ICD-10-PCS | Mod: PBBFAC,,, | Performed by: FAMILY MEDICINE

## 2023-05-10 RX ORDER — METRONIDAZOLE 10 MG/G
GEL TOPICAL DAILY
Qty: 60 G | Refills: 11 | Status: ON HOLD | OUTPATIENT
Start: 2023-05-10 | End: 2023-09-19 | Stop reason: HOSPADM

## 2023-05-10 RX ORDER — LEVETIRACETAM 500 MG/1
500 TABLET ORAL 2 TIMES DAILY
Qty: 60 TABLET | Refills: 5 | Status: SHIPPED | OUTPATIENT
Start: 2023-05-10 | End: 2023-11-14 | Stop reason: SDUPTHER

## 2023-05-10 RX ORDER — TRAZODONE HYDROCHLORIDE 50 MG/1
50 TABLET ORAL NIGHTLY PRN
Qty: 90 TABLET | Refills: 3 | Status: SHIPPED | OUTPATIENT
Start: 2023-05-10 | End: 2024-05-09

## 2023-05-10 NOTE — PROGRESS NOTES
Subjective:       Patient ID: Ariana Tiwari is a 78 y.o. female.    Chief Complaint: Follow-up (Pt states that she is here for her 6 mo fu )    78-year-old female coming in for follow-up of multiple medical problems.  She has a history of Parkinson's disease and Dr. Parker recently changed her to Rytary 23.75/95 slow release carbidopa levodopa taking three 3 times a day.  It does seem to improve her symptoms although she still has pretty severe tremor.  She needs a refill on her Keppra for seizure disorder and she needs an increase and refill on her trazodone for sleep.  The half 50 mg pill is frequently insufficient and she would like to go up to the full pill.  She has a history of hypothyroidism and recently had her thyroid level checked and a refill was already sent in for that.  She has a history of hyperglycemia, gluten enteropathy with chronic diarrhea, pancreatic cyst esophagitis gastritis and deconditioning.  Her newest complaint is some pimples and facial rash affecting the malar areas and the nose predominantly appearing very much to be rosacea.  She is been using some moisturizer creams without much relief.    Past Medical History:  No date: Allergy  No date: Diverticulosis  No date: Gluten enteropathy  No date: Gluten intolerance  No date: Hernia  No date: Hypothyroidism  9/16/2015: PD (Parkinson's disease)      Comment:  Right tremor type  No date: Peptic ulcer disease  No date: Right shoulder pain      Comment:  Cirtisone injection 3/26/15 - Dr. Tolbert  No date: Ulcer    Past Surgical History:  No date: ADENOIDECTOMY  03/01/2012: COLONOSCOPY      Comment:  Dr. Teresa, repeat in 10 years for screening  2/21/2018: COLONOSCOPY; N/A      Comment:  Procedure: COLONOSCOPY;  Surgeon: Radha Deng MD;               Location: [facility];  Service: Endoscopy;  Laterality:                N/A;  9/16/2020: CORRECTION OF HAMMER TOE; Left      Comment:  Procedure: CORRECTION, HAMMER TOE;  Surgeon: William HAILE                 MD Celestine;  Location: [facility] OR;  Service: Orthopedics;                 Laterality: Left;  No date: COSMETIC SURGERY      Comment:  Broken nose  12/8/2021: ESOPHAGOGASTRODUODENOSCOPY; N/A      Comment:  Procedure: EGD (ESOPHAGOGASTRODUODENOSCOPY);  Surgeon:                Benji Valentino III, MD;  Location: [facility] ENDO;                 Service: Endoscopy;  Laterality: N/A;  left wrist: FRACTURE SURGERY      Comment:  With pin  No date: HEMORRHOID SURGERY  04/09/2015: HERNIA REPAIR; Left      Comment:  Dr Lo; left inguinal  7/10/2018: INTRALUMINAL GASTROINTESTINAL TRACT IMAGING VIA CAPSULE; N/  A      Comment:  Procedure: IMAGING, GI TRACT, INTRALUMINAL, VIA CAPSULE;               Surgeon: Radha Deng MD;  Location: [facility] ENDO;                 Service: Endoscopy;  Laterality: N/A;  9/29/2020: LYSIS OF ADHESIONS      Comment:  Procedure: LYSIS, ADHESIONS;  Surgeon: Eagle Gonzalez MD;  Location: [facility] OR;  Service: General;;  No date: RHINOPLASTY TIP  No date: TONSILLECTOMY  05/21/2015: UPPER GASTROINTESTINAL ENDOSCOPY      Comment:  Dr. Gomez    Review of patient's family history indicates:    Social History    Tobacco Use      Smoking status: Never      Smokeless tobacco: Never    Alcohol use: Yes      Alcohol/week: 11.7 standard drinks      Types: 14 Standard drinks or equivalent per week      Comment: 2 glasses wine per dinner    Drug use: No    Current Outpatient Medications on File Prior to Visit:  acetaminophen (TYLENOL) 325 MG tablet, Take 2 tablets (650 mg total) by mouth every 6 (six) hours as needed for Pain., Disp: , Rfl: 0  carbidopa-levodopa (RYTARY) 23.75-95 mg CpSR, Take 3 capsules by mouth 3 times daily for 14 days, Disp: 126 capsule, Rfl: 0  diclofenac sodium (VOLTAREN) 1 % Gel, Apply 4 g topically 2 (two) times daily., Disp: 50 g, Rfl: 1  docusate sodium (COLACE) 100 MG capsule, Take 1 capsule (100 mg total) by mouth once daily., Disp: , Rfl: 0  glucose 4 GM  chewable tablet, Take 4 tablets (16 g total) by mouth as needed for Low blood sugar., Disp: 20 tablet, Rfl: 1  lactose-reduced food (ENSURE ORIGINAL ORAL), Take 1 Dose by mouth once daily., Disp: , Rfl:   levothyroxine (SYNTHROID) 137 MCG Tab tablet, Take 0.5 tablets (68.5 mcg total) by mouth before breakfast., Disp: 45 tablet, Rfl: 2  lycopene/lutein/fruit extracts (FRUIT AND VEGETABLE DAILY ORAL), Take 1 Dose by mouth once daily. Patient takes 2 fruit and 1 vegetable balance of nature vitamins in the morning and evening, Disp: , Rfl:   salicylic acid 3 % Sham, Shampoo scalp 2-3 times a week, Disp: 236 mL, Rfl: PRN  simethicone (MYLICON) 125 mg Cap capsule, Take 1 capsule (125 mg total) by mouth 4 (four) times daily as needed for Flatulence., Disp: 30 each, Rfl: 0  [DISCONTINUED] levETIRAcetam (KEPPRA) 500 MG Tab, Take 1 tablet (500 mg total) by mouth 2 (two) times daily., Disp: 60 tablet, Rfl: 1  [DISCONTINUED] traZODone (DESYREL) 50 MG tablet, Take 0.5 tablets (25 mg total) by mouth nightly as needed for Insomnia., Disp: 15 tablet, Rfl: 3  [DISCONTINUED] carbidopa-levodopa  mg (SINEMET)  mg per tablet, Take 1 tablet by mouth 3 (three) times daily., Disp: , Rfl:   [DISCONTINUED] multivitamin with minerals tablet, Take 1 tablet by mouth once daily. (Patient not taking: Reported on 3/9/2023), Disp: 50 tablet, Rfl:     No current facility-administered medications on file prior to visit.        Review of Systems   Constitutional:  Negative for chills, diaphoresis and fever.   Respiratory:  Negative for chest tightness and shortness of breath.    Gastrointestinal:  Positive for diarrhea. Negative for blood in stool, nausea and vomiting.   Musculoskeletal:  Positive for gait problem.   Skin:  Positive for color change and rash.   Neurological:  Positive for tremors and weakness. Negative for seizures.     Objective:      Physical Exam  Vitals and nursing note reviewed.   Constitutional:       General: She is  not in acute distress.     Appearance: Normal appearance. She is normal weight. She is ill-appearing. She is not toxic-appearing or diaphoretic.      Comments: Good blood pressure control  Normal weight with a BMI of 21.1 but she is down 9.5 lb from her last visit with me November 29, 2022   HENT:      Head: Normocephalic and atraumatic.   Cardiovascular:      Rate and Rhythm: Normal rate and regular rhythm.      Heart sounds: Normal heart sounds. No murmur heard.    No friction rub. No gallop.   Pulmonary:      Effort: Pulmonary effort is normal. No respiratory distress.      Breath sounds: Normal breath sounds. No stridor. No wheezing, rhonchi or rales.   Abdominal:      Comments: Abdominal distention with increased tympany.  In spite of using Mylicon 3 times daily.  She does drink liquid using a straw which may be causing some air ingestion   Musculoskeletal:      Right lower leg: No edema.      Left lower leg: No edema.   Skin:     Findings: Bruising present.   Neurological:      Mental Status: She is alert and oriented to person, place, and time.      GCS: GCS eye subscore is 4. GCS verbal subscore is 5. GCS motor subscore is 6.      Motor: Tremor and abnormal muscle tone present.       Assessment:       1. Rosacea    2. Acquired hypothyroidism    3. PD (Parkinson's disease)    4. Seizure    5. Gluten enteropathy    6. Chronic diarrhea    7. Aortic atherosclerosis    8. BMI 21.0-21.9, adult        Plan:       1. Rosacea  Trial of Metrogel daily  - metronidazole 1% (METROGEL) 1 % Gel; Apply topically once daily.  Dispense: 60 g; Refill: 11    2. Acquired hypothyroidism  Lab Results   Component Value Date    TSH 2.780 01/10/2023     Levothyroxine already refilled    3. PD (Parkinson's disease)  Switch to slow release carbidopa senna dopa by Dr. Parker    4. Seizure  Asymptomatic currently    5. Gluten enteropathy  On gluten free diet, still get some diarrhea    6. Chronic diarrhea  See above    7. Aortic  atherosclerosis  Most recently noted on CT abdomen and pelvis from January 2023, stable    8. BMI 21.0-21.9, adult  Good weight but continues to slowly lose most likely from the enteropathy

## 2023-05-22 ENCOUNTER — TELEPHONE (OUTPATIENT)
Dept: FAMILY MEDICINE | Facility: CLINIC | Age: 78
End: 2023-05-22
Payer: MEDICARE

## 2023-05-22 NOTE — TELEPHONE ENCOUNTER
----- Message from Alma Delia Petty sent at 5/22/2023 11:02 AM CDT -----  Contact: Micaela, daughter in law  Type: Needs Medical Advice  Who Called:  Patients daughter in law Micaela  Symptoms (please be specific):  Pt having UTI symtoms, burning when urinates  How long has patient had these symptoms:  2 days  Pharmacy name and phone #:  N/A  Best Call Back Number: [phone]  Additional Information: Pt is currently in [place], on vacation and is needing to see if we can possibly get her something called in to a drugstore out there if possible. Please call back to advise.

## 2023-05-22 NOTE — TELEPHONE ENCOUNTER
Micaela given recommendation to take patient urgent care facility - Dr. Petty doesn't call out antibiotics without testing.

## 2023-08-08 ENCOUNTER — HOSPITAL ENCOUNTER (EMERGENCY)
Facility: HOSPITAL | Age: 78
Discharge: HOME | End: 2023-08-08
Attending: EMERGENCY MEDICINE
Payer: MEDICARE

## 2023-08-08 VITALS
RESPIRATION RATE: 15 BRPM | OXYGEN SATURATION: 97 % | DIASTOLIC BLOOD PRESSURE: 93 MMHG | BODY MASS INDEX: 20.91 KG/M2 | HEIGHT: 63 IN | TEMPERATURE: 98 F | HEART RATE: 65 BPM | SYSTOLIC BLOOD PRESSURE: 134 MMHG | WEIGHT: 118 LBS
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
## 2023-08-08 DIAGNOSIS — S09.90XA INJURY OF HEAD, INITIAL ENCOUNTER: Primary | ICD-10-CM

## 2023-08-08 DIAGNOSIS — S01.01XA LACERATION OF SCALP, INITIAL ENCOUNTER: ICD-10-CM

## 2023-08-08 PROCEDURE — 12001 RPR S/N/AX/GEN/TRNK 2.5CM/<: CPT

## 2023-08-08 PROCEDURE — 99284 EMERGENCY DEPT VISIT MOD MDM: CPT | Mod: 25

## 2023-08-08 NOTE — FIRST PROVIDER EVALUATION
"Medical screening examination initiated.  I have conducted a focused provider triage encounter, findings are as follows:    Brief history of present illness:  Patient presents to ED for concern for fall.  Patient reports she lost her balance and fell and hit the side of her head.  Patient denies loss of consciousness.    Vitals:    08/08/23 1823   BP: (!) 133/94   Pulse: 63   Resp: 16   Temp: 97.8 °F (36.6 °C)   TempSrc: Oral   SpO2: 97%   Weight: 53.5 kg (118 lb)   Height: 5' 3" (1.6 m)       Pertinent physical exam:  Patient is awake and alert in no acute distress.    Brief workup plan:  CT    Preliminary workup initiated; this workup will be continued and followed by the physician or advanced practice provider that is assigned to the patient when roomed.  "

## 2023-08-09 NOTE — ED PROVIDER NOTES
Encounter Date: 8/8/2023       History     Chief Complaint   Patient presents with    Head Injury     Trip and fall, denies LOC, no blood thinners     78-year-old female presents emergency department with a fall.  Patient has Parkinson's and was not using her walker and was reaching for something in her closet and lost her balance and fell.  She hit her head on the cabinet or dresser on the way down.  She says that she did not lose consciousness.  No vomiting.  No seizure activity. patient is not on any blood thinners.  Denies any neck pain.  Denies any weakness in arms or legs.  No other injuries reported.      Review of patient's allergies indicates:   Allergen Reactions    Gluten Diarrhea     Past Medical History:   Diagnosis Date    Allergy     Diverticulosis     Gluten enteropathy     Gluten intolerance     Hernia     Hypothyroidism     PD (Parkinson's disease) 9/16/2015    Right tremor type    Peptic ulcer disease     Right shoulder pain     Cirtisone injection 3/26/15 - Dr. Tolbert    Ulcer      Past Surgical History:   Procedure Laterality Date    ADENOIDECTOMY      COLONOSCOPY  03/01/2012    Dr. Teresa, repeat in 10 years for screening    COLONOSCOPY N/A 2/21/2018    Procedure: COLONOSCOPY;  Surgeon: Radha Deng MD;  Location: [facility];  Service: Endoscopy;  Laterality: N/A;    CORRECTION OF HAMMER TOE Left 9/16/2020    Procedure: CORRECTION, HAMMER TOE;  Surgeon: William Sprague MD;  Location: [facility] OR;  Service: Orthopedics;  Laterality: Left;    COSMETIC SURGERY      Broken nose    ESOPHAGOGASTRODUODENOSCOPY N/A 12/8/2021    Procedure: EGD (ESOPHAGOGASTRODUODENOSCOPY);  Surgeon: Benji Valentino III, MD;  Location: [facility] ENDO;  Service: Endoscopy;  Laterality: N/A;    FRACTURE SURGERY  left wrist    With pin    HEMORRHOID SURGERY      HERNIA REPAIR Left 04/09/2015    Dr Lo; left inguinal    INTRALUMINAL GASTROINTESTINAL TRACT IMAGING VIA CAPSULE N/A 7/10/2018    Procedure: IMAGING, GI  TRACT, INTRALUMINAL, VIA CAPSULE;  Surgeon: Radha Deng MD;  Location: [facility] ENDO;  Service: Endoscopy;  Laterality: N/A;    LYSIS OF ADHESIONS  9/29/2020    Procedure: LYSIS, ADHESIONS;  Surgeon: Eagle Gonzalez MD;  Location: [facility] OR;  Service: General;;    RHINOPLASTY TIP      TONSILLECTOMY      UPPER GASTROINTESTINAL ENDOSCOPY  05/21/2015    Dr. Gomez     Family History   Problem Relation Age of Onset    Diabetes Mother     Diabetes Son     Obesity Sister     Alcohol abuse Brother     Heart disease Brother     No Known Problems Father     Early death Brother         self    Breast cancer Neg Hx     Colon cancer Neg Hx     Ovarian cancer Neg Hx     Colon polyps Neg Hx     Crohn's disease Neg Hx     Ulcerative colitis Neg Hx     Celiac disease Neg Hx      Social History     Tobacco Use    Smoking status: Never    Smokeless tobacco: Never   Substance Use Topics    Alcohol use: Yes     Alcohol/week: 11.7 standard drinks of alcohol     Types: 14 Standard drinks or equivalent per week     Comment: 2 glasses wine per dinner    Drug use: No     Review of Systems   Constitutional:  Negative for chills and fever.   HENT:  Negative for sore throat.    Respiratory:  Negative for shortness of breath.    Cardiovascular:  Negative for chest pain.   Gastrointestinal:  Negative for abdominal pain and nausea.   Genitourinary:  Negative for dysuria.   Musculoskeletal:  Negative for back pain.   Skin:  Positive for wound. Negative for rash.   Neurological:  Positive for headaches. Negative for syncope and weakness.   Hematological:  Does not bruise/bleed easily.   All other systems reviewed and are negative.      Physical Exam     Initial Vitals [08/08/23 1823]   BP Pulse Resp Temp SpO2   (!) 133/94 63 16 97.8 °F (36.6 °C) 97 %      MAP       --         Physical Exam    Nursing note and vitals reviewed.  Constitutional: She appears well-developed and well-nourished.   HENT:   Head: Normocephalic and atraumatic.    Mouth/Throat: No oropharyngeal exudate.   Right parietal scalp abrasion/small laceration 1.5 cm.   Eyes: Conjunctivae and EOM are normal. Pupils are equal, round, and reactive to light.   Neck: Neck supple. No tracheal deviation present.   No midline tenderness to palpation   Cardiovascular:  Normal rate, regular rhythm, normal heart sounds and intact distal pulses.           No murmur heard.  Pulmonary/Chest: Breath sounds normal. No stridor. No respiratory distress. She has no wheezes. She has no rhonchi. She has no rales.   Abdominal: Abdomen is soft. She exhibits no distension. There is no abdominal tenderness. There is no rebound.   Musculoskeletal:         General: No tenderness or edema. Normal range of motion.      Cervical back: Neck supple.      Comments: Right upper arm small abrasion noted to the lateral aspect of the distal arm     Neurological: She is alert and oriented to person, place, and time. She has normal strength. No cranial nerve deficit or sensory deficit. GCS score is 15. GCS eye subscore is 4. GCS verbal subscore is 5. GCS motor subscore is 6.   Skin: Skin is warm and dry. Capillary refill takes less than 2 seconds. No rash noted. No erythema. No pallor.   Psychiatric: She has a normal mood and affect. Thought content normal.         ED Course   Lac Repair    Date/Time: 8/8/2023 8:16 PM    Performed by: Marin Schulz DO  Authorized by: Marin Schulz DO    Consent:     Consent obtained:  Verbal    Consent given by:  Patient    Risks discussed:  Infection and need for additional repair    Alternatives discussed:  No treatment  Universal protocol:     Patient identity confirmed:  Verbally with patient  Anesthesia:     Anesthesia method:  None  Laceration details:     Location:  Scalp    Scalp location:  R parietal    Length (cm):  1.5  Pre-procedure details:     Preparation:  Patient was prepped and draped in usual sterile fashion  Exploration:     Limited defect created (wound  extended): no      Hemostasis achieved with:  Direct pressure    Wound exploration: wound explored through full range of motion      Contaminated: no    Treatment:     Area cleansed with:  Saline    Amount of cleaning:  Standard    Irrigation solution:  Sterile water    Visualized foreign bodies/material removed: no      Debridement:  None    Undermining:  None    Scar revision: no    Skin repair:     Repair method:  Tissue adhesive  Approximation:     Approximation:  Close  Repair type:     Repair type:  Simple  Post-procedure details:     Dressing:  Bulky dressing    Procedure completion:  Tolerated well, no immediate complications    Labs Reviewed - No data to display       Imaging Results              CT Cervical Spine Without Contrast (Final result)  Result time 08/08/23 19:32:30      Final result by Jose Cruz Mann MD (08/08/23 19:32:30)                   Narrative:    EXAM: CT CERVICAL SPINE WITHOUT CONTRAST    DATE: 8/8/2023 6:40 PM CDT    INDICATION: Neck pain. Trauma    COMPARISON: None    TECHNIQUE:  Volumetric CT of the cervical spine is acquired without contrast. Axial, coronal and sagittal images are provided. CMS MANDATED QUALITY DATA - CT RADIATION 436.All CT scans at this facility utilize dose modulation, iterative reconstruction, and/or weight based dosing when appropriate to reduce radiation dose to as low as reasonably achievable.  IV contrast: None.  DLP (mSv): Refer to CT protocol form    The spine is imaged from the skull base to the level of T2.    : Noncontributory.    Bones: No acute fracture or malalignment is identified. Vertebral body heights are preserved. Small vertebral body osteophytes and mild degenerative disc height loss is evident. Reversal normal cervical lordotic curvature may be secondary to patient positioning or muscle spasm. The spinous processes are equidistant and the facets are well aligned. Rudimentary bilateral C7 cervical ribs are seen.    Soft tissues: No soft  tissue abnormality is identified.    IMPRESSION:  No acute abnormality.        Electronically signed by:  Jose Cruz Mann DO  8/8/2023 7:32 PM CDT Workstation: [id number]                                     CT Head Without Contrast (Final result)  Result time 08/08/23 19:29:39      Final result by Jose Cruz Mann MD (08/08/23 19:29:39)                   Narrative:    EXAM: CT BRAIN WITHOUT CONTRAST    DATE: 8/8/2023 6:40 PM CDT    INDICATION: Fall. Trauma.    COMPARISON: Head CT January 30, 2023    TECHNIQUE: Axial CT images of the brain were obtained. Sagittal and coronal reformats. CMS MANDATED QUALITY DATA - CT RADIATION 436. All CT scans at this facility utilize dose modulation, iterative reconstruction, and/or weight based dosing when appropriate to reduce radiation dose to as low as reasonably achievable.  IV contrast: None.  DLP(mSv): Refer to CT protocol form    FINDINGS:    There is no edema, hemorrhage, mass lesion or other acute intracranial abnormality.    Moderate frontotemporal predominate cerebral atrophy with commensurate ventricular dilation. Scattered periventricular subcortical hypodensities are noted bilaterally in keeping with microvascular ischemic disease.    There is no fracture of the skull, skull base, or visible facial bones.    IMPRESSION:    1.  Microvascular ischemic disease without other acute intracranial abnormality.        Electronically signed by:  Jose Cruz Mann DO  8/8/2023 7:29 PM CDT Workstation: [id number]                                     Medications - No data to display  Medical Decision Making:   Differential Diagnosis:   Includes but not limited to fall, laceration, abrasion, hematoma, intracranial hemorrhage, fracture, dislocation  ED Management:  Emergent evaluation of a 78-year-old female presents emergency department a fall.  Patient emergency department has a small laceration to the right parietal temporal region of the scalp.  Patient had small amount of  bleeding.  Patient had wound repaired at bedside with glue also did the hair apposition technique to tamponade the bleeding.  Patient also had a dressing placed.  Patient improved.  CT scan does not show any hemorrhage or fracture.  Cervical spine is within normal limits.  Patient has no focal deficits.  Patient has generalized weakness secondary to her Parkinson's.  Plan to discharge home with follow-up with PCP.  Seems that patient had a mechanical fall secondary to loss of balance secondary to her Parkinson's.  Family present at bedside and comfortable with no further workup including labs, EKG, urine, etcetera.  They are comfortable with taking her home.    A dictation software program was used for this note.  Please expect some simple typographical  errors in this note.    I had a detailed discussion with the patient and/or guardian regarding: The historical points, exam findings, and diagnostic results supporting the discharge diagnosis, lab results, pertinent radiology results, and the need for outpatient follow-up, for definitive care with a family practitioner and to return to the emergency department if symptoms worsen or persist or if there are any questions or concerns that arise at home. All questions have been answered in detail. Strict return to Emergency Department precautions have been provided.                             Clinical Impression:   Final diagnoses:  [S01.01XA] Laceration of scalp, initial encounter  [S09.90XA] Injury of head, initial encounter (Primary)  [W19.XXXA] Fall, initial encounter        ED Disposition Condition    Discharge Stable          ED Prescriptions    None       Follow-up Information       Follow up With Specialties Details Why Contact Info Additional Information    Nba Petty MD Family Medicine In 3 days  [address] [facility]  [address]  [facility] [address]  [phone]       [facility] - Emergency Dept Emergency Medicine  If symptoms worsen [address]  [facility] [address]  [phone] 1st floor             Marin Schulz DO  08/08/23 2018

## 2023-09-07 ENCOUNTER — TELEPHONE (OUTPATIENT)
Dept: FAMILY MEDICINE | Facility: CLINIC | Age: 78
End: 2023-09-07
Payer: MEDICARE

## 2023-09-07 NOTE — TELEPHONE ENCOUNTER
----- Message from Abigail Borja sent at 9/7/2023  2:13 PM CDT -----  Type:  Sooner Appointment Request    Caller is requesting a sooner appointment.  Caller declined first available appointment listed below.  Caller will not accept being placed on the waitlist and is requesting a message be sent to doctor.    Name of Caller:  Pt's daughter gloria Hinojosa    When is the first available appointment?  11/2023    Symptoms:  hospital f.u    Would the patient rather a call back or a response via [facility]?  Call back    Best Call Back Number:  [phone]    Additional Information:   Micaela is trying to get pt in for a 1 wk hospital visit.  Please call back to advise. Thanks!

## 2023-09-07 NOTE — TELEPHONE ENCOUNTER
Called Micaela- appt made with Ms. Winslow 9/13/23 at 11:00. Was seen at [facility]. They will bring in records.

## 2023-09-08 ENCOUNTER — TELEPHONE (OUTPATIENT)
Dept: FAMILY MEDICINE | Facility: CLINIC | Age: 78
End: 2023-09-08
Payer: MEDICARE

## 2023-09-08 NOTE — TELEPHONE ENCOUNTER
----- Message from Brittney Sultana sent at 9/8/2023  3:37 PM CDT -----  Contact: erin  Type: Needs Medical Advice  Who Called:  erin  Best Call Back Number: [phone]  Additional Information: Pt is struggling with diet plan and need to have a dietician to talk to pt.Please call back and advise.

## 2023-09-08 NOTE — TELEPHONE ENCOUNTER
Spoke to Micaela- discussed progressive diet she is on after small bowel obstruction. Patient has appt 9/13/23 for follow up.

## 2023-09-13 ENCOUNTER — OFFICE VISIT (OUTPATIENT)
Dept: FAMILY MEDICINE | Facility: CLINIC | Age: 78
End: 2023-09-13
Payer: MEDICARE

## 2023-09-13 ENCOUNTER — TELEPHONE (OUTPATIENT)
Dept: BARIATRICS | Facility: CLINIC | Age: 78
End: 2023-09-13
Payer: MEDICARE

## 2023-09-13 ENCOUNTER — HOSPITAL ENCOUNTER (OUTPATIENT)
Dept: RADIOLOGY | Facility: HOSPITAL | Age: 78
Discharge: HOME | DRG: 389 | End: 2023-09-13
Attending: PHYSICIAN ASSISTANT
Payer: MEDICARE

## 2023-09-13 ENCOUNTER — HOSPITAL ENCOUNTER (INPATIENT)
Facility: HOSPITAL | Age: 78
LOS: 6 days | Discharge: HOME HEALTH | DRG: 389 | End: 2023-09-19
Attending: EMERGENCY MEDICINE | Admitting: STUDENT IN AN ORGANIZED HEALTH CARE EDUCATION/TRAINING PROGRAM
Payer: MEDICARE

## 2023-09-13 VITALS
HEART RATE: 73 BPM | BODY MASS INDEX: 19.14 KG/M2 | TEMPERATURE: 98 F | WEIGHT: 108 LBS | HEIGHT: 63 IN | DIASTOLIC BLOOD PRESSURE: 70 MMHG | SYSTOLIC BLOOD PRESSURE: 116 MMHG | OXYGEN SATURATION: 95 %

## 2023-09-13 DIAGNOSIS — K56.609 INTESTINAL OBSTRUCTION, UNSPECIFIED CAUSE, UNSPECIFIED WHETHER PARTIAL OR COMPLETE: ICD-10-CM

## 2023-09-13 DIAGNOSIS — K56.609 SMALL BOWEL OBSTRUCTION: Primary | ICD-10-CM

## 2023-09-13 DIAGNOSIS — Z01.89 ENCOUNTER FOR IMAGING STUDY TO CONFIRM NASOGASTRIC (NG) TUBE PLACEMENT: ICD-10-CM

## 2023-09-13 DIAGNOSIS — K56.7 ILEUS: ICD-10-CM

## 2023-09-13 DIAGNOSIS — Z87.19 HX SBO: ICD-10-CM

## 2023-09-13 DIAGNOSIS — Z87.19 HX SBO: Primary | ICD-10-CM

## 2023-09-13 PROBLEM — K57.90 DIVERTICULOSIS: Status: RESOLVED | Noted: 2018-02-21 | Resolved: 2023-09-13

## 2023-09-13 PROBLEM — R19.7 DIARRHEA: Status: RESOLVED | Noted: 2018-08-09 | Resolved: 2023-09-13

## 2023-09-13 PROBLEM — Z02.9 DISCHARGE PLANNING ISSUES: Status: RESOLVED | Noted: 2021-12-16 | Resolved: 2023-09-13

## 2023-09-13 PROBLEM — Z75.8 DISCHARGE PLANNING ISSUES: Status: RESOLVED | Noted: 2021-12-16 | Resolved: 2023-09-13

## 2023-09-13 PROBLEM — K29.01 ACUTE SUPERFICIAL GASTRITIS WITH HEMORRHAGE: Status: RESOLVED | Noted: 2021-12-16 | Resolved: 2023-09-13

## 2023-09-13 PROBLEM — R93.89 ABNORMAL CT SCAN: Status: RESOLVED | Noted: 2018-07-10 | Resolved: 2023-09-13

## 2023-09-13 PROBLEM — Z71.89 ADVANCE CARE PLANNING: Status: RESOLVED | Noted: 2021-12-16 | Resolved: 2023-09-13

## 2023-09-13 LAB
ALBUMIN SERPL BCP-MCNC: 4.1 G/DL (ref 3.5–5.2)
ALP SERPL-CCNC: 83 U/L (ref 55–135)
ALT SERPL W/O P-5'-P-CCNC: 6 U/L (ref 10–44)
ANION GAP SERPL CALC-SCNC: 6 MMOL/L (ref 8–16)
AST SERPL-CCNC: 18 U/L (ref 10–40)
BASOPHILS # BLD AUTO: 0.04 K/UL (ref 0–0.2)
BASOPHILS NFR BLD: 0.9 % (ref 0–1.9)
BILIRUB SERPL-MCNC: 0.7 MG/DL (ref 0.1–1)
BUN SERPL-MCNC: 13 MG/DL (ref 8–23)
CALCIUM SERPL-MCNC: 9 MG/DL (ref 8.7–10.5)
CHLORIDE SERPL-SCNC: 101 MMOL/L (ref 95–110)
CO2 SERPL-SCNC: 29 MMOL/L (ref 23–29)
CREAT SERPL-MCNC: 0.4 MG/DL (ref 0.5–1.4)
CREAT SERPL-MCNC: 0.5 MG/DL (ref 0.5–1.4)
DIFFERENTIAL METHOD: ABNORMAL
EOSINOPHIL # BLD AUTO: 0.1 K/UL (ref 0–0.5)
EOSINOPHIL NFR BLD: 1.5 % (ref 0–8)
ERYTHROCYTE [DISTWIDTH] IN BLOOD BY AUTOMATED COUNT: 12 % (ref 11.5–14.5)
EST. GFR  (NO RACE VARIABLE): >60 ML/MIN/1.73 M^2
GLUCOSE SERPL-MCNC: 74 MG/DL (ref 70–110)
HCT VFR BLD AUTO: 43.4 % (ref 37–48.5)
HGB BLD-MCNC: 14.2 G/DL (ref 12–16)
IMM GRANULOCYTES # BLD AUTO: 0.01 K/UL (ref 0–0.04)
IMM GRANULOCYTES NFR BLD AUTO: 0.2 % (ref 0–0.5)
LACTATE SERPL-SCNC: 0.7 MMOL/L (ref 0.5–1.9)
LIPASE SERPL-CCNC: 30 U/L (ref 4–60)
LYMPHOCYTES # BLD AUTO: 1.1 K/UL (ref 1–4.8)
LYMPHOCYTES NFR BLD: 23.6 % (ref 18–48)
MAGNESIUM SERPL-MCNC: 1.8 MG/DL (ref 1.6–2.6)
MCH RBC QN AUTO: 31.1 PG (ref 27–31)
MCHC RBC AUTO-ENTMCNC: 32.7 G/DL (ref 32–36)
MCV RBC AUTO: 95 FL (ref 82–98)
MONOCYTES # BLD AUTO: 0.3 K/UL (ref 0.3–1)
MONOCYTES NFR BLD: 6.4 % (ref 4–15)
NEUTROPHILS # BLD AUTO: 3.1 K/UL (ref 1.8–7.7)
NEUTROPHILS NFR BLD: 67.4 % (ref 38–73)
NRBC BLD-RTO: 0 /100 WBC
PLATELET # BLD AUTO: 257 K/UL (ref 150–450)
PMV BLD AUTO: 9.6 FL (ref 9.2–12.9)
POTASSIUM SERPL-SCNC: 3.6 MMOL/L (ref 3.5–5.1)
PROT SERPL-MCNC: 7.3 G/DL (ref 6–8.4)
RBC # BLD AUTO: 4.56 M/UL (ref 4–5.4)
SAMPLE: ABNORMAL
SODIUM SERPL-SCNC: 136 MMOL/L (ref 136–145)
TROPONIN I SERPL HS-MCNC: 4 PG/ML (ref 0–14.9)
WBC # BLD AUTO: 4.53 K/UL (ref 3.9–12.7)

## 2023-09-13 PROCEDURE — 80053 COMPREHEN METABOLIC PANEL: CPT | Performed by: EMERGENCY MEDICINE

## 2023-09-13 PROCEDURE — 74019 RADEX ABDOMEN 2 VIEWS: CPT | Mod: 26,,, | Performed by: RADIOLOGY

## 2023-09-13 PROCEDURE — 43752 NASAL/OROGASTRIC W/TUBE PLMT: CPT

## 2023-09-13 PROCEDURE — 74019 XR ABDOMEN FLAT AND ERECT: ICD-10-PCS | Mod: 26,,, | Performed by: RADIOLOGY

## 2023-09-13 PROCEDURE — 83735 ASSAY OF MAGNESIUM: CPT | Performed by: EMERGENCY MEDICINE

## 2023-09-13 PROCEDURE — 99214 PR OFFICE/OUTPT VISIT, EST, LEVL IV, 30-39 MIN: ICD-10-PCS | Mod: S$PBB,,, | Performed by: PHYSICIAN ASSISTANT

## 2023-09-13 PROCEDURE — 99999 PR PBB SHADOW E&M-EST. PATIENT-LVL IV: CPT | Mod: PBBFAC,,, | Performed by: PHYSICIAN ASSISTANT

## 2023-09-13 PROCEDURE — 83605 ASSAY OF LACTIC ACID: CPT | Performed by: STUDENT IN AN ORGANIZED HEALTH CARE EDUCATION/TRAINING PROGRAM

## 2023-09-13 PROCEDURE — 94761 N-INVAS EAR/PLS OXIMETRY MLT: CPT

## 2023-09-13 PROCEDURE — 99285 EMERGENCY DEPT VISIT HI MDM: CPT | Mod: 25

## 2023-09-13 PROCEDURE — 99214 OFFICE O/P EST MOD 30 MIN: CPT | Mod: S$PBB,,, | Performed by: PHYSICIAN ASSISTANT

## 2023-09-13 PROCEDURE — 25000003 PHARM REV CODE 250: Performed by: EMERGENCY MEDICINE

## 2023-09-13 PROCEDURE — 25000003 PHARM REV CODE 250: Performed by: STUDENT IN AN ORGANIZED HEALTH CARE EDUCATION/TRAINING PROGRAM

## 2023-09-13 PROCEDURE — 25500020 PHARM REV CODE 255: Performed by: EMERGENCY MEDICINE

## 2023-09-13 PROCEDURE — 84484 ASSAY OF TROPONIN QUANT: CPT | Performed by: EMERGENCY MEDICINE

## 2023-09-13 PROCEDURE — 93010 EKG 12-LEAD: ICD-10-PCS | Mod: ,,, | Performed by: GENERAL PRACTICE

## 2023-09-13 PROCEDURE — 83690 ASSAY OF LIPASE: CPT | Performed by: EMERGENCY MEDICINE

## 2023-09-13 PROCEDURE — 63600175 PHARM REV CODE 636 W HCPCS: Performed by: STUDENT IN AN ORGANIZED HEALTH CARE EDUCATION/TRAINING PROGRAM

## 2023-09-13 PROCEDURE — 93005 ELECTROCARDIOGRAM TRACING: CPT | Performed by: GENERAL PRACTICE

## 2023-09-13 PROCEDURE — 12000002 HC ACUTE/MED SURGE SEMI-PRIVATE ROOM

## 2023-09-13 PROCEDURE — 74019 RADEX ABDOMEN 2 VIEWS: CPT | Mod: TC

## 2023-09-13 PROCEDURE — 99214 OFFICE O/P EST MOD 30 MIN: CPT | Mod: PBBFAC,PN | Performed by: PHYSICIAN ASSISTANT

## 2023-09-13 PROCEDURE — 85025 COMPLETE CBC W/AUTO DIFF WBC: CPT | Performed by: EMERGENCY MEDICINE

## 2023-09-13 PROCEDURE — 93010 ELECTROCARDIOGRAM REPORT: CPT | Mod: ,,, | Performed by: GENERAL PRACTICE

## 2023-09-13 PROCEDURE — 99999 PR PBB SHADOW E&M-EST. PATIENT-LVL IV: ICD-10-PCS | Mod: PBBFAC,,, | Performed by: PHYSICIAN ASSISTANT

## 2023-09-13 RX ORDER — ONDANSETRON 2 MG/ML
4 INJECTION INTRAMUSCULAR; INTRAVENOUS EVERY 8 HOURS PRN
Status: DISCONTINUED | OUTPATIENT
Start: 2023-09-13 | End: 2023-09-19 | Stop reason: HOSPADM

## 2023-09-13 RX ORDER — TALC
6 POWDER (GRAM) TOPICAL NIGHTLY PRN
Status: DISCONTINUED | OUTPATIENT
Start: 2023-09-13 | End: 2023-09-19 | Stop reason: HOSPADM

## 2023-09-13 RX ORDER — SODIUM CHLORIDE 0.9 % (FLUSH) 0.9 %
10 SYRINGE (ML) INJECTION EVERY 6 HOURS PRN
Status: DISCONTINUED | OUTPATIENT
Start: 2023-09-13 | End: 2023-09-19 | Stop reason: HOSPADM

## 2023-09-13 RX ORDER — PROCHLORPERAZINE EDISYLATE 5 MG/ML
5 INJECTION INTRAMUSCULAR; INTRAVENOUS EVERY 6 HOURS PRN
Status: DISCONTINUED | OUTPATIENT
Start: 2023-09-13 | End: 2023-09-19 | Stop reason: HOSPADM

## 2023-09-13 RX ORDER — MORPHINE SULFATE 4 MG/ML
4 INJECTION, SOLUTION INTRAMUSCULAR; INTRAVENOUS EVERY 4 HOURS PRN
Status: DISCONTINUED | OUTPATIENT
Start: 2023-09-13 | End: 2023-09-19 | Stop reason: HOSPADM

## 2023-09-13 RX ORDER — TRAZODONE HYDROCHLORIDE 50 MG/1
50 TABLET ORAL NIGHTLY PRN
Status: DISCONTINUED | OUTPATIENT
Start: 2023-09-13 | End: 2023-09-19 | Stop reason: HOSPADM

## 2023-09-13 RX ORDER — POLYETHYLENE GLYCOL 3350 17 G/17G
17 POWDER, FOR SOLUTION ORAL DAILY
Status: DISCONTINUED | OUTPATIENT
Start: 2023-09-14 | End: 2023-09-19 | Stop reason: HOSPADM

## 2023-09-13 RX ORDER — HYDROMORPHONE HYDROCHLORIDE 1 MG/ML
0.25 INJECTION, SOLUTION INTRAMUSCULAR; INTRAVENOUS; SUBCUTANEOUS
Status: ACTIVE | OUTPATIENT
Start: 2023-09-13 | End: 2023-09-14

## 2023-09-13 RX ORDER — SODIUM CHLORIDE, SODIUM LACTATE, POTASSIUM CHLORIDE, CALCIUM CHLORIDE 600; 310; 30; 20 MG/100ML; MG/100ML; MG/100ML; MG/100ML
INJECTION, SOLUTION INTRAVENOUS CONTINUOUS
Status: DISCONTINUED | OUTPATIENT
Start: 2023-09-13 | End: 2023-09-14

## 2023-09-13 RX ORDER — LEVETIRACETAM 500 MG/1
500 TABLET ORAL 2 TIMES DAILY
Status: DISCONTINUED | OUTPATIENT
Start: 2023-09-13 | End: 2023-09-19 | Stop reason: HOSPADM

## 2023-09-13 RX ORDER — ONDANSETRON 2 MG/ML
4 INJECTION INTRAMUSCULAR; INTRAVENOUS
Status: ACTIVE | OUTPATIENT
Start: 2023-09-13 | End: 2023-09-14

## 2023-09-13 RX ORDER — ERYTHROMYCIN 250 MG/1
1 TABLET, DELAYED RELEASE ORAL 3 TIMES DAILY
COMMUNITY
Start: 2023-09-05 | End: 2023-09-22 | Stop reason: ALTCHOICE

## 2023-09-13 RX ORDER — LACTOSE-REDUCED FOOD
237 LIQUID (ML) ORAL 2 TIMES DAILY
COMMUNITY

## 2023-09-13 RX ORDER — AZELASTINE 1 MG/ML
1 SPRAY, METERED NASAL 2 TIMES DAILY
COMMUNITY

## 2023-09-13 RX ORDER — ENOXAPARIN SODIUM 100 MG/ML
40 INJECTION SUBCUTANEOUS EVERY 24 HOURS
Status: DISCONTINUED | OUTPATIENT
Start: 2023-09-13 | End: 2023-09-19 | Stop reason: HOSPADM

## 2023-09-13 RX ORDER — HYDROCODONE BITARTRATE AND ACETAMINOPHEN 5; 325 MG/1; MG/1
1 TABLET ORAL EVERY 4 HOURS PRN
Status: DISCONTINUED | OUTPATIENT
Start: 2023-09-13 | End: 2023-09-19 | Stop reason: HOSPADM

## 2023-09-13 RX ORDER — ACETAMINOPHEN 325 MG/1
650 TABLET ORAL EVERY 8 HOURS PRN
Status: DISCONTINUED | OUTPATIENT
Start: 2023-09-13 | End: 2023-09-19 | Stop reason: HOSPADM

## 2023-09-13 RX ADMIN — IOHEXOL 100 ML: 350 INJECTION, SOLUTION INTRAVENOUS at 03:09

## 2023-09-13 RX ADMIN — ENOXAPARIN SODIUM 40 MG: 40 INJECTION SUBCUTANEOUS at 08:09

## 2023-09-13 RX ADMIN — SODIUM CHLORIDE 500 ML: 0.9 INJECTION, SOLUTION INTRAVENOUS at 02:09

## 2023-09-13 RX ADMIN — LEVETIRACETAM 500 MG: 500 TABLET, FILM COATED ORAL at 08:09

## 2023-09-13 RX ADMIN — SODIUM CHLORIDE, SODIUM LACTATE, POTASSIUM CHLORIDE, AND CALCIUM CHLORIDE: .6; .31; .03; .02 INJECTION, SOLUTION INTRAVENOUS at 08:09

## 2023-09-13 RX ADMIN — TRAZODONE HYDROCHLORIDE 50 MG: 50 TABLET ORAL at 10:09

## 2023-09-13 NOTE — TELEPHONE ENCOUNTER
----- Message from Hiwot Oliver sent at 9/13/2023  4:45 PM CDT -----  Regarding: appt  Type:  Sooner Apoointment Request      Name of Caller:pt    When is the first available appointment?dept booked    Symptoms:      Best Call Back Number:[phone]      Additional Information: pt needs an appt. please call to discuss.

## 2023-09-13 NOTE — PHARMACY MED REC
"Admission Medication History     The home medication history was taken by Jose Londono.    You may go to "Admission" then "Reconcile Home Medications" tabs to review and/or act upon these items.     The home medication list has been updated by the Pharmacy department.   Please read ALL comments highlighted in yellow.   Please address this information as you see fit.    Feel free to contact us if you have any questions or require assistance.      The medications listed below were removed from the home medication list. Please reorder if appropriate:  Patient reports no longer taking the following medication(s):  Nasonex Nasal      Medications listed below were obtained from: Patient/family and Analytic software- [employer]  Current Facility-Administered Medications on File Prior to Encounter   Medication Dose Route Frequency Provider Last Rate Last Admin    [DISCONTINUED] carbidopa-levodopa 23.75-95 mg CpSR  3 capsule Oral  Provider, Generic External Data        [DISCONTINUED] dextrose 5 % and 0.9 % NaCl with KCl 20 mEq infusion   Intravenous  Provider, Generic External Data        [DISCONTINUED] erythromycin EC tablet  250 mg Oral  Provider, Generic External Data        [DISCONTINUED] famotidine (PF) injection  20 mg Intravenous  Provider, Generic External Data        [DISCONTINUED] fentaNYL 50 mcg/mL injection  50 mcg Intravenous  Provider, Generic External Data        [DISCONTINUED] GENERIC EXTERNAL MEDICATION     Provider, Generic External Data        [DISCONTINUED] GENERIC EXTERNAL MEDICATION     Provider, Generic External Data        [DISCONTINUED] GENERIC EXTERNAL MEDICATION  2-8 mg Intravenous Q2H PRN Provider, Generic External Data        [DISCONTINUED] GENERIC EXTERNAL MEDICATION  68.5 mcg Oral  Provider, Generic External Data        [DISCONTINUED] levETIRAcetam injection  500 mg Intravenous  Provider, Generic External Data        [DISCONTINUED] ondansetron injection  4 mg Intravenous Q6H PRN Provider, " Generic External Data        [DISCONTINUED] phenyleph-min oil-petrolatum 0.25-14-74.9 % ointment   Rectal QID PRN Provider, Generic External Data        [DISCONTINUED] traZODone tablet  50 mg Oral  Provider, Generic External Data         Current Outpatient Medications on File Prior to Encounter   Medication Sig Dispense Refill    acetaminophen (TYLENOL) 325 MG tablet Take 2 tablets (650 mg total) by mouth every 6 (six) hours as needed for Pain.  0    azelastine (ASTELIN) 137 mcg (0.1 %) nasal spray 1 spray by Nasal route 2 (two) times daily.      carbidopa-levodopa (RYTARY) 23.75-95 mg CpSR Take 3 capsules by mouth 3 times daily for 14 days (Patient taking differently: Take 3 capsules by mouth 3 (three) times daily.) 126 capsule 0    diclofenac sodium (VOLTAREN) 1 % Gel Apply 4 g topically 2 (two) times daily. 50 g 1    docusate sodium (COLACE) 100 MG capsule Take 1 capsule (100 mg total) by mouth once daily.  0    erythromycin (KATT-TAB) 250 mg TbEC Take 1 tablet by mouth 3 (three) times daily.      food supplemt, lactose-reduced (ENSURE ORIGINAL) Liqd Take 237 mLs by mouth once daily.      glucose 4 GM chewable tablet Take 4 tablets (16 g total) by mouth as needed for Low blood sugar. 20 tablet 1    levETIRAcetam (KEPPRA) 500 MG Tab Take 1 tablet (500 mg total) by mouth 2 (two) times daily. 60 tablet 5    levothyroxine (SYNTHROID) 137 MCG Tab tablet Take 0.5 tablets (68.5 mcg total) by mouth before breakfast. 45 tablet 2    lycopene/lutein/fruit extracts (FRUIT AND VEGETABLE DAILY ORAL) Take 1 Dose by mouth once daily. Patient takes 2 fruit and 1 vegetable balance of nature vitamins in the morning and evening      salicylic acid 3 % Sham Shampoo scalp 2-3 times a week (Patient taking differently: Apply 1 application  topically As instructed. Shampoo scalp 2-3 times a week) 236 mL PRN    simethicone (MYLICON) 125 mg Cap capsule Take 1 capsule (125 mg total) by mouth 4 (four) times daily as needed for Flatulence. 30  each 0    traZODone (DESYREL) 50 MG tablet Take 1 tablet (50 mg total) by mouth nightly as needed for Insomnia. 90 tablet 3    metronidazole 1% (METROGEL) 1 % Gel Apply topically once daily. (Patient not taking: Reported on 9/13/2023) 60 g 11    [DISCONTINUED] lactose-reduced food (ENSURE ORIGINAL ORAL) Take 1 Dose by mouth once daily.      [DISCONTINUED] mometasone furoate (NASONEX NASL) by Nasal route.             Jose Londono  EXT 1925                 .

## 2023-09-13 NOTE — PROGRESS NOTES
Subjective     Patient ID: Ariana Tiwari is a 78 y.o. female.    Chief Complaint: Follow-up (hospital)    Patient presents for hospital discharge follow-up.  She was on a cruise with her family about 3 weeks ago in [place] and after the cruise developed another small-bowel obstruction and ended up in the hospital.  Her hospital course is as follows:    COURSE IN HOSPITAL:  This is a 78 year old female who presented with a SBO, an NGT was placed, and she improved over the following days. A SBFT was performed and contrast was to the colon in 60 minutes, but she continued to have dilated bowel. Her bowel function returned, but when diet was advanced to full liquid diet, she became distended and nauseous. AXR showed no obstruction, more consistent with ileus. She was backed down to NPO, IVF, and erythromycin was started for motility. She improved, and clear diet was reintroduced. She has now had bowel function again, and she is less distended. She will remain on clear liquids, as [place], she may have occasional chicken noodle soup and saltine crackers to supplement her clears, and clear protein supplement drinks.      Patient only ate a clear liquid diet for a day or so after discharge and has progressed to scrambled eggs and grits as well as ensure.  She has not had a bowel movement in the last 6 days but her daughter says she is passing gas.  Patient has been taking Colace and drinking smooth move senna tea with no relief.  She denies any nausea or vomiting      Review of Systems   Constitutional:  Negative for activity change and appetite change.   HENT:  Negative for nosebleeds.    Eyes:  Negative for pain and visual disturbance.   Respiratory:  Negative for chest tightness and shortness of breath.    Cardiovascular:  Negative for chest pain, palpitations and leg swelling.   Gastrointestinal:  Positive for abdominal distention, change in bowel habit, constipation and  "change in bowel habit. Negative for abdominal pain, anal bleeding, blood in stool, diarrhea, nausea, rectal pain, vomiting, reflux and fecal incontinence.   Genitourinary:  Negative for difficulty urinating, dysuria and frequency.   Musculoskeletal:  Negative for arthralgias, back pain, gait problem, joint swelling and neck pain.   Neurological:  Negative for dizziness, tremors, weakness, light-headedness, numbness and headaches.          Objective   Vitals:    09/13/23 1057   BP: 116/70   BP Location: Right arm   Patient Position: Sitting   BP Method: Medium (Manual)   Pulse: 73   Temp: 97.5 °F (36.4 °C)   TempSrc: Oral   SpO2: 95%   Weight: 49 kg (108 lb 0.4 oz)   Height: 5' 3" (1.6 m)      Physical Exam  Constitutional:       Appearance: She is well-developed.   HENT:      Head: Normocephalic and atraumatic.   Eyes:      Conjunctiva/sclera: Conjunctivae normal.      Pupils: Pupils are equal, round, and reactive to light.   Neck:      Vascular: No JVD.   Cardiovascular:      Rate and Rhythm: Normal rate and regular rhythm.      Heart sounds: No murmur heard.     No friction rub. No gallop.   Pulmonary:      Effort: Pulmonary effort is normal. No respiratory distress.      Breath sounds: Normal breath sounds. No wheezing or rales.   Abdominal:      General: Bowel sounds are increased. There is distension.      Palpations: Abdomen is soft.      Tenderness: There is generalized abdominal tenderness.      Hernia: A hernia is present. Hernia is present in the umbilical area.   Musculoskeletal:      Cervical back: Normal range of motion and neck supple.   Skin:     General: Skin is warm and dry.   Neurological:      Mental Status: She is alert and oriented to person, place, and time.   Psychiatric:         Mood and Affect: Mood normal.         Behavior: Behavior normal.         Thought Content: Thought content normal.         Judgment: Judgment normal.            Assessment and Plan     1. Hx SBO  -     Cancel: X-Ray " Abdomen Flat And Erect; Future; Expected date: 09/13/2023  -     X-Ray Abdomen Flat And Erect; Future; Expected date: 09/13/2023    2. Intestinal obstruction, unspecified cause, unspecified whether partial or complete  -     CT Abdomen Pelvis With Contrast; Future; Expected date: 09/13/2023  -     X-Ray Abdomen Flat And Erect; Future; Expected date: 09/13/2023        We will get a KUB 1st and if it is safe to proceed with a CT with contrast rule out another bowel obstruction.  May need to send patient to the emergency room for hospital admission.         No follow-ups on file.

## 2023-09-13 NOTE — H&P
[facility] - Emergency Dept  Hospital Medicine  History & Physical    Patient Name: Ariana Tiwari  MRN: [id number]  Patient Class: OP- Observation  Admission Date: 9/13/2023  Attending Physician: Pepe Birch MD   Primary Care Provider: Nba Petty MD         Patient information was obtained from patient, relative(s) and ER records.     Subjective:     Principal Problem:Ileus    Chief Complaint:   Chief Complaint   Patient presents with    Constipation     Pt sent over from PCP for two possible bowel obstructions. Denies abdominal pain or vomiting.         HPI: Patient is a 78-year-old female with a history of Parkinson's disease, recurrent small-bowel obstructions, hypothyroid, GERD, she is presenting today with persistent abdominal distention and ileus.  The patient was hospitalized in [place] 2 weeks ago at the end of August.  The patient was found to have ileus at that time with little to no improvement.  The family wanted to return to [place] and the patient was discharged from the facility there and she returned here hoping that her ileus will resolve with time.  After advancing her diet, continuing to monitor at home she has not had any improvement in her ileus.  She continues to have persistent abdominal distention and discomfort.  She is passing gas but not having any bowel movements.  She is been taking her medications as prescribed despite the ileus.  In the past, she has improved with gastric decompression with NG tube.  This was placed in the ER.  She denies fever, chills, severe abdominal pain.  No nausea or vomiting at this time.  No chest pain or shortness a breath.    In the ER, vital signs are stable, P 90, R 16, /75, O2 sat 90% on room air.  CT abdomen pelvis obtained which shows numerous segments of markedly dilated fluid-filled small bowel.  No specific transition point is identified however the terminal ileum is decompressed.  At this point, NG tube was  placed in the ER and patient will be admitted for further management of ileus.      Past Medical History:   Diagnosis Date    Allergy     Diverticulosis     Gluten enteropathy     Gluten intolerance     Hernia     Hypothyroidism     PD (Parkinson's disease) 9/16/2015    Right tremor type    Peptic ulcer disease     Right shoulder pain     Cirtisone injection 3/26/15 - Dr. Tolbert    Ulcer        Past Surgical History:   Procedure Laterality Date    ADENOIDECTOMY      COLONOSCOPY  03/01/2012    Dr. Teresa, repeat in 10 years for screening    COLONOSCOPY N/A 2/21/2018    Procedure: COLONOSCOPY;  Surgeon: Radha Deng MD;  Location: [facility];  Service: Endoscopy;  Laterality: N/A;    CORRECTION OF HAMMER TOE Left 9/16/2020    Procedure: CORRECTION, HAMMER TOE;  Surgeon: William Sprague MD;  Location: [facility];  Service: Orthopedics;  Laterality: Left;    COSMETIC SURGERY      Broken nose    ESOPHAGOGASTRODUODENOSCOPY N/A 12/8/2021    Procedure: EGD (ESOPHAGOGASTRODUODENOSCOPY);  Surgeon: Benji Valentino III, MD;  Location: [facility];  Service: Endoscopy;  Laterality: N/A;    FRACTURE SURGERY  left wrist    With pin    HEMORRHOID SURGERY      HERNIA REPAIR Left 04/09/2015    Dr Lo; left inguinal    INTRALUMINAL GASTROINTESTINAL TRACT IMAGING VIA CAPSULE N/A 7/10/2018    Procedure: IMAGING, GI TRACT, INTRALUMINAL, VIA CAPSULE;  Surgeon: Radha Deng MD;  Location: [facility];  Service: Endoscopy;  Laterality: N/A;    LYSIS OF ADHESIONS  9/29/2020    Procedure: LYSIS, ADHESIONS;  Surgeon: Eagle Gonzalez MD;  Location: [facility];  Service: General;;    RHINOPLASTY TIP      TONSILLECTOMY      UPPER GASTROINTESTINAL ENDOSCOPY  05/21/2015    Dr. Gomez       Review of patient's allergies indicates:   Allergen Reactions    Gluten Diarrhea       Current Facility-Administered Medications on File Prior to Encounter   Medication    [DISCONTINUED] carbidopa-levodopa 23.75-95 mg CpSR     [DISCONTINUED] dextrose 5 % and 0.9 % NaCl with KCl 20 mEq infusion    [DISCONTINUED] erythromycin EC tablet    [DISCONTINUED] famotidine (PF) injection    [DISCONTINUED] fentaNYL 50 mcg/mL injection    [DISCONTINUED] GENERIC EXTERNAL MEDICATION    [DISCONTINUED] GENERIC EXTERNAL MEDICATION    [DISCONTINUED] GENERIC EXTERNAL MEDICATION    [DISCONTINUED] GENERIC EXTERNAL MEDICATION    [DISCONTINUED] levETIRAcetam injection    [DISCONTINUED] ondansetron injection    [DISCONTINUED] phenyleph-min oil-petrolatum 0.25-14-74.9 % ointment    [DISCONTINUED] traZODone tablet     Current Outpatient Medications on File Prior to Encounter   Medication Sig    acetaminophen (TYLENOL) 325 MG tablet Take 2 tablets (650 mg total) by mouth every 6 (six) hours as needed for Pain.    azelastine (ASTELIN) 137 mcg (0.1 %) nasal spray 1 spray by Nasal route 2 (two) times daily.    carbidopa-levodopa (RYTARY) 23.75-95 mg CpSR Take 3 capsules by mouth 3 times daily for 14 days    diclofenac sodium (VOLTAREN) 1 % Gel Apply 4 g topically 2 (two) times daily.    docusate sodium (COLACE) 100 MG capsule Take 1 capsule (100 mg total) by mouth once daily.    erythromycin (KATT-TAB) 250 mg TbEC Take 1 tablet by mouth 3 (three) times daily.    glucose 4 GM chewable tablet Take 4 tablets (16 g total) by mouth as needed for Low blood sugar.    lactose-reduced food (ENSURE ORIGINAL ORAL) Take 1 Dose by mouth once daily.    levETIRAcetam (KEPPRA) 500 MG Tab Take 1 tablet (500 mg total) by mouth 2 (two) times daily.    levothyroxine (SYNTHROID) 137 MCG Tab tablet Take 0.5 tablets (68.5 mcg total) by mouth before breakfast.    lycopene/lutein/fruit extracts (FRUIT AND VEGETABLE DAILY ORAL) Take 1 Dose by mouth once daily. Patient takes 2 fruit and 1 vegetable balance of nature vitamins in the morning and evening    metronidazole 1% (METROGEL) 1 % Gel Apply topically once daily.    mometasone furoate (NASONEX NASL) by Nasal route.     salicylic acid 3 % Sham Shampoo scalp 2-3 times a week    simethicone (MYLICON) 125 mg Cap capsule Take 1 capsule (125 mg total) by mouth 4 (four) times daily as needed for Flatulence.    traZODone (DESYREL) 50 MG tablet Take 1 tablet (50 mg total) by mouth nightly as needed for Insomnia.     Family History       Problem Relation (Age of Onset)    Alcohol abuse Brother    Diabetes Mother, Son    Early death Brother    Heart disease Brother    No Known Problems Father    Obesity Sister          Tobacco Use    Smoking status: Never    Smokeless tobacco: Never   Substance and Sexual Activity    Alcohol use: Yes     Alcohol/week: 11.7 standard drinks of alcohol     Types: 14 Standard drinks or equivalent per week     Comment: 2 glasses wine per dinner    Drug use: No    Sexual activity: Never     Review of Systems   All other systems reviewed and are negative.    Objective:     Vital Signs (Most Recent):  Temp: 97.3 °F (36.3 °C) (09/13/23 1351)  Pulse: 90 (09/13/23 1351)  Resp: 16 (09/13/23 1351)  BP: 118/75 (09/13/23 1351)  SpO2: 98 % (09/13/23 1351) Vital Signs (24h Range):  Temp:  [97.3 °F (36.3 °C)-97.5 °F (36.4 °C)] 97.3 °F (36.3 °C)  Pulse:  [73-90] 90  Resp:  [16] 16  SpO2:  [95 %-98 %] 98 %  BP: (116-118)/(70-75) 118/75     Weight: 50.3 kg (111 lb)  Body mass index is 19.66 kg/m².     Physical Exam  Constitutional:       Appearance: Normal appearance. She is normal weight.   HENT:      Head: Normocephalic and atraumatic.      Nose: Nose normal.      Mouth/Throat:      Mouth: Mucous membranes are moist.   Eyes:      Conjunctiva/sclera: Conjunctivae normal.   Cardiovascular:      Rate and Rhythm: Normal rate and regular rhythm.      Pulses: Normal pulses.      Heart sounds: Normal heart sounds. No murmur heard.     No friction rub. No gallop.   Pulmonary:      Effort: Pulmonary effort is normal.      Breath sounds: Normal breath sounds. No wheezing or rales.   Abdominal:      General: Abdomen is flat.  Bowel sounds are normal. There is distension.      Palpations: Abdomen is soft.      Tenderness: There is no abdominal tenderness. There is no guarding.      Comments: She has moderate bowel sounds, no significant tenderness to palpation.   Musculoskeletal:         General: No swelling. Normal range of motion.      Cervical back: Normal range of motion and neck supple.   Skin:     General: Skin is warm and dry.   Neurological:      General: No focal deficit present.      Mental Status: She is alert.   Psychiatric:         Mood and Affect: Mood normal.         Thought Content: Thought content normal.         Judgment: Judgment normal.                Significant Labs: All pertinent labs within the past 24 hours have been reviewed.    Significant Imaging: I have reviewed all pertinent imaging results/findings within the past 24 hours.    Assessment/Plan:     * Ileus  Patient is here with abdominal distention and persistent ongoing ileus after being discharged from the hospital and [place] 2 weeks ago.  They were hopeful that upon returning home she would have improvement in her symptoms but she is not had any bowel movements.  She does continue to have flatus.  - NG tube for decompression  - will go ahead and consult General surgery as she has seen Dr. Mcarthur and Dr. Gonzalez in the past  - NPO  - IV fluids  - consult nutrition for parenteral nutrition recommendations given the longevity of her persistent ileus and lack of oral intake  - check lactic acid      Anemia  Hemoglobin is stable  Trend      PD (Parkinson's disease)  Patient with Parkinson's disease on carbidopa-levodopa  She will bring her home time release capsule  At her previous hospitalization they were clamping her NG tube and allowing her to take her carbidopa given that she has severe symptoms without it  Will try to continue this process here  Ambulate as much as possible, up in chair during the day      Hypothyroidism  Continue Synthroid        VTE Risk  Mitigation (From admission, onward)    None             On 09/13/2023, patient should be placed in hospital observation services under my care.        Pepe Birch MD  Department of Hospital Medicine  [facility] - Emergency Dept

## 2023-09-13 NOTE — HPI
Patient is a 78-year-old female with a history of Parkinson's disease, recurrent small-bowel obstructions, hypothyroid, GERD, she is presenting today with persistent abdominal distention and ileus.  The patient was hospitalized in [place] 2 weeks ago at the end of August.  The patient was found to have ileus at that time with little to no improvement.  The family wanted to return to [place] and the patient was discharged from the facility there and she returned here hoping that her ileus will resolve with time.  After advancing her diet, continuing to monitor at home she has not had any improvement in her ileus.  She continues to have persistent abdominal distention and discomfort.  She is passing gas but not having any bowel movements.  She is been taking her medications as prescribed despite the ileus.  In the past, she has improved with gastric decompression with NG tube.  This was placed in the ER.  She denies fever, chills, severe abdominal pain.  No nausea or vomiting at this time.  No chest pain or shortness a breath.    In the ER, vital signs are stable, P 90, R 16, /75, O2 sat 90% on room air.  CT abdomen pelvis obtained which shows numerous segments of markedly dilated fluid-filled small bowel.  No specific transition point is identified however the terminal ileum is decompressed.  At this point, NG tube was placed in the ER and patient will be admitted for further management of ileus.

## 2023-09-13 NOTE — TELEPHONE ENCOUNTER
called pt back and spoke to daughter and told her  wants her to wait to see what the surgeon on call wants to do before we schedule a f/u appt. Daughter understood

## 2023-09-13 NOTE — ED PROVIDER NOTES
Encounter Date: 9/13/2023       History     Chief Complaint   Patient presents with    Constipation     Pt sent over from PCP for two possible bowel obstructions. Denies abdominal pain or vomiting.      Patient with history of multiple bowel obstructions.  Discharged from the hospital a proximally 1 week ago while on a cruise in [place].  She had NG tube with resolution of symptoms.  Patient reports no bowel movement for 6 days.  Patient reports 1 episode emesis with nausea.  Intermittent abdominal pain.  Patient saw her primary care physician today.  Outpatient x-ray ordered.  Patient's x-ray did show bowel obstruction.  Patient sent here for further evaluation.  There is no chest pain or shortness breath.      Review of patient's allergies indicates:   Allergen Reactions    Gluten Diarrhea     Past Medical History:   Diagnosis Date    Allergy     Diverticulosis     Gluten enteropathy     Gluten intolerance     Hernia     Hypothyroidism     PD (Parkinson's disease) 9/16/2015    Right tremor type    Peptic ulcer disease     Right shoulder pain     Cirtisone injection 3/26/15 - Dr. Tolbert    Ulcer      Past Surgical History:   Procedure Laterality Date    ADENOIDECTOMY      COLONOSCOPY  03/01/2012    Dr. Teresa, repeat in 10 years for screening    COLONOSCOPY N/A 2/21/2018    Procedure: COLONOSCOPY;  Surgeon: Radha Deng MD;  Location: [facility];  Service: Endoscopy;  Laterality: N/A;    CORRECTION OF HAMMER TOE Left 9/16/2020    Procedure: CORRECTION, HAMMER TOE;  Surgeon: William Sprague MD;  Location: [facility] OR;  Service: Orthopedics;  Laterality: Left;    COSMETIC SURGERY      Broken nose    ESOPHAGOGASTRODUODENOSCOPY N/A 12/8/2021    Procedure: EGD (ESOPHAGOGASTRODUODENOSCOPY);  Surgeon: Benji Valentino III, MD;  Location: [facility] ENDO;  Service: Endoscopy;  Laterality: N/A;    FRACTURE SURGERY  left wrist    With pin    HEMORRHOID SURGERY      HERNIA REPAIR Left 04/09/2015    Dr Lo; left inguinal     INTRALUMINAL GASTROINTESTINAL TRACT IMAGING VIA CAPSULE N/A 7/10/2018    Procedure: IMAGING, GI TRACT, INTRALUMINAL, VIA CAPSULE;  Surgeon: Radha Deng MD;  Location: [facility];  Service: Endoscopy;  Laterality: N/A;    LYSIS OF ADHESIONS  9/29/2020    Procedure: LYSIS, ADHESIONS;  Surgeon: Eagle Gonzalez MD;  Location: [facility] OR;  Service: General;;    RHINOPLASTY TIP      TONSILLECTOMY      UPPER GASTROINTESTINAL ENDOSCOPY  05/21/2015    Dr. Gomez     Family History   Problem Relation Age of Onset    Diabetes Mother     Diabetes Son     Obesity Sister     Alcohol abuse Brother     Heart disease Brother     No Known Problems Father     Early death Brother         self    Breast cancer Neg Hx     Colon cancer Neg Hx     Ovarian cancer Neg Hx     Colon polyps Neg Hx     Crohn's disease Neg Hx     Ulcerative colitis Neg Hx     Celiac disease Neg Hx      Social History     Tobacco Use    Smoking status: Never    Smokeless tobacco: Never   Substance Use Topics    Alcohol use: Yes     Alcohol/week: 11.7 standard drinks of alcohol     Types: 14 Standard drinks or equivalent per week     Comment: 2 glasses wine per dinner    Drug use: No     Review of Systems   Constitutional:  Negative for chills and fever.   HENT:  Negative for congestion.    Eyes:  Negative for visual disturbance.   Respiratory:  Negative for shortness of breath.    Cardiovascular:  Negative for chest pain and palpitations.   Gastrointestinal:  Positive for abdominal pain, nausea and vomiting. Negative for blood in stool.   Genitourinary:  Negative for dysuria.   Musculoskeletal:  Negative for joint swelling.   Neurological:  Negative for headaches.   Psychiatric/Behavioral:  Negative for confusion.        Physical Exam     Initial Vitals [09/13/23 1351]   BP Pulse Resp Temp SpO2   118/75 90 16 97.3 °F (36.3 °C) 98 %      MAP       --         Physical Exam    Nursing note and vitals reviewed.  Constitutional: She is not diaphoretic. No  distress.   HENT:   Head: Normocephalic and atraumatic.   Eyes: Conjunctivae are normal.   Neck:   Normal range of motion.  Cardiovascular:  Normal rate.           Pulmonary/Chest: Breath sounds normal.   Abdominal: Abdomen is soft. Bowel sounds are normal.   Abdomen is slightly distended.  No real tenderness.  No guarding or rebound.  High-pitched bowel sounds noted.   Musculoskeletal:         General: Normal range of motion.      Cervical back: Normal range of motion.     Neurological: She is alert. She has normal strength. No cranial nerve deficit or sensory deficit.   No gross deficits   Skin: No rash noted.   Psychiatric:   Patient is pleasant cooperative.  Patient does have a baseline tremor.         ED Course   Procedures  Labs Reviewed   CBC W/ AUTO DIFFERENTIAL - Abnormal; Notable for the following components:       Result Value    MCH 31.1 (*)     All other components within normal limits   COMPREHENSIVE METABOLIC PANEL - Abnormal; Notable for the following components:    ALT 6 (*)     Anion Gap 6 (*)     All other components within normal limits   ISTAT CREATININE - Abnormal; Notable for the following components:    POC Creatinine 0.4 (*)     All other components within normal limits   LIPASE   MAGNESIUM   TROPONIN I HIGH SENSITIVITY   MAGNESIUM   TROPONIN I HIGH SENSITIVITY   LACTIC ACID, PLASMA        ECG Results              EKG 12-lead (Final result)  Result time 09/17/23 23:27:35      Final result by Interface, Lab In [facility] (09/17/23 23:27:35)                   Narrative:    Test Reason : R10.9,    Vent. Rate : 068 BPM     Atrial Rate : 000 BPM     P-R Int : 000 ms          QRS Dur : 080 ms      QT Int : 448 ms       P-R-T Axes : 000 -45 050 degrees     QTc Int : 476 ms    POOR BASELINE RHYTHM INTERPRETATION  NOT POSSIBLE  Left axis deviation  Nonspecific ST and T wave abnormality  Abnormal ECG  When compared with ECG of 30-JAN-2023 10:58,  Junctional rhythm has replaced Sinus rhythm  Vent. rate  has decreased BY  52 BPM  ST no longer depressed in Inferior leads  ST no longer depressed in Anterior leads  T wave inversion no longer evident in Lateral leads  Confirmed by Silvana GONZALEZ, Marvin CELIS ([id number]) on 9/17/2023 11:27:30 PM    Referred By: [email or web address]   SELF           Confirmed By:Marvin Pina MD                                  Imaging Results              CT Abdomen Pelvis With Contrast (Final result)  Result time 09/13/23 16:38:38      Final result by Preet Baltazar MD (09/13/23 16:38:38)                   Narrative:      EXAM: CT ABDOMEN PELVIS WITH CONTRAST    HISTORY: Bowel obstruction suspected    COMPARISON: CT scan of the abdomen and pelvis dated 1/29/2023    TECHNIQUE: Multiple axial 2 mm thick images were obtained through the abdomen and pelvis IV contrast only.    This exam was performed according to our departmental dose-optimization program, which includes automated exposure control, adjustment of the mA and/or kV according to patient size and/or use of iterative reconstruction technique.    Unless otherwise stated, incidental findings do not require dedicated follow-up imaging.    FINDINGS: There are numerous segments of air and fluid-filled markedly dilated small bowel is noted throughout the abdomen and pelvis. The colon is not distended. It contains formed stool. The findings are consistent with distal small bowel obstruction. Similar, but less prominent findings were present on the previous CT in January. As on the previous study, there is swirling of the mesenteric vessels in the pelvis suggesting an internal hernia as the cause of the obstruction. There is also abrupt tapered narrowing of a mildly dilated segment of small bowel in this region that further indicates this is likely the site of the obstruction. The terminal ileum is completely collapsed. There is no free air or free fluid in the abdomen or pelvis. There is no pneumatosis. The liver, spleen, pancreas, and kidneys are  unremarkable. There is no hydronephrosis or hydroureter ureter. There is a 2.6 x 1.4 cm diameter low-density solid mass in the left adrenal gland that is unchanged, and is quite likely an adenoma. The right adrenal gland appears normal. Images through the lung bases show minor linear atelectasis in both lower lobes. There is mild to moderate vascular calcification.    IMPRESSION:   Numerous segments of moderate to markedly dilated air and fluid-filled small bowel distributed throughout the abdomen and pelvis. There is very little nondilated small bowel. However, the terminal ileum is completely decompressed. Findings are consistent with distal small bowel obstruction. The site of the obstruction is suspected be within the pelvis along the midline. An internal hernia suspected as etiology. Similar findings were evident on the previous CT scan dated 1/29/2023.    Electronically signed by:  Preet Baltazar MD  9/13/2023 4:38 PM CDT Workstation: [id number]                                     X-Ray Chest AP Portable (Final result)  Result time 09/13/23 15:59:58      Final result by Juan Manuel Mejia MD (09/13/23 15:59:58)                   Narrative:    HISTORY: Nasogastric tube placement,  constipation.    FINDINGS: Portable chest radiograph at 1543 hours compared to prior exams shows nasogastric tube with distal tip at the level of the gastroesophageal junction or gastric cardia region. The cardiomediastinal silhouette and pulmonary vasculature are within normal limits.    The lungs are hypoexpanded, with no consolidation, large pleural effusion, or evidence of pulmonary edema. Right lower lung bandlike opacities suggests chronic atelectasis, with no pneumothorax. The bones are diffusely osteopenic.    IMPRESSION: Nasogastric tube as described.    Electronically signed by:  Juan Manuel Mejia MD  9/13/2023 3:59 PM CDT Workstation: [id number]                                     Medications   ondansetron injection 4 mg (4 mg  Intravenous Not Given 9/13/23 1445)   HYDROmorphone injection 0.25 mg (0.25 mg Intravenous Not Given 9/13/23 1445)   amino acid 4.25% - dextrose 5% (CLINIMIX-E) solution (0 mLs Intravenous Paused 9/15/23 0612)   sodium chloride 0.9% bolus 500 mL 500 mL (500 mLs Intravenous New Bag 9/13/23 1448)   iohexoL (OMNIPAQUE 350) injection 100 mL (100 mLs Intravenous Given 9/13/23 1552)   fat emulsion 20% infusion 250 mL (0 mLs Intravenous Stopped 9/15/23 1020)   fat emulsion 20% infusion 250 mL (0 mLs Intravenous Stopped 9/16/23 1000)   sodium phosphates 19-7 gram/118 mL enema 1 enema (1 enema Rectal Given 9/18/23 1942)     Medical Decision Making  Patient presents with differential diagnosis of small-bowel obstruction versus ileus.  CT obtained.  Patient does have dilated loops of small bowel.  There appears to be a transition point in the midline pelvis.  Possible internal hernia.  General surgery consulted.  Discussed with Dr. Vigil.  He will evaluated emergently.  NG tube ordered.  Fluid resuscitation initiated.  Hospitalist consulted for admission.    Amount and/or Complexity of Data Reviewed  Labs: ordered. Decision-making details documented in ED Course.  Radiology: ordered.    Risk  Prescription drug management.               ED Course as of 10/03/23 2129   Wed Sep 13, 2023   1631 MPV: 9.6 [EL]      ED Course User Index  [EL] Benji Lucas MD                    Clinical Impression:   Final diagnoses:  [Z01.89] Encounter for imaging study to confirm nasogastric (NG) tube placement  [K56.609] Small bowel obstruction (Primary)        ED Disposition Condition    Observation                 Benji Lucas MD  09/13/23 2018       Benji Lucas MD  10/03/23 2129

## 2023-09-13 NOTE — ASSESSMENT & PLAN NOTE
Patient with Parkinson's disease on carbidopa-levodopa  She will bring her home time release capsule  At her previous hospitalization they were clamping her NG tube and allowing her to take her carbidopa given that she has severe symptoms without it  Will try to continue this process here  Ambulate as much as possible, up in chair during the day

## 2023-09-13 NOTE — SUBJECTIVE & OBJECTIVE
Past Medical History:   Diagnosis Date    Allergy     Diverticulosis     Gluten enteropathy     Gluten intolerance     Hernia     Hypothyroidism     PD (Parkinson's disease) 9/16/2015    Right tremor type    Peptic ulcer disease     Right shoulder pain     Cirtisone injection 3/26/15 - Dr. Tolbert    Ulcer        Past Surgical History:   Procedure Laterality Date    ADENOIDECTOMY      COLONOSCOPY  03/01/2012    Dr. Teresa, repeat in 10 years for screening    COLONOSCOPY N/A 2/21/2018    Procedure: COLONOSCOPY;  Surgeon: Radha Deng MD;  Location: [facility];  Service: Endoscopy;  Laterality: N/A;    CORRECTION OF HAMMER TOE Left 9/16/2020    Procedure: CORRECTION, HAMMER TOE;  Surgeon: William Sprague MD;  Location: [facility] OR;  Service: Orthopedics;  Laterality: Left;    COSMETIC SURGERY      Broken nose    ESOPHAGOGASTRODUODENOSCOPY N/A 12/8/2021    Procedure: EGD (ESOPHAGOGASTRODUODENOSCOPY);  Surgeon: Benji Valentino III, MD;  Location: [facility];  Service: Endoscopy;  Laterality: N/A;    FRACTURE SURGERY  left wrist    With pin    HEMORRHOID SURGERY      HERNIA REPAIR Left 04/09/2015    Dr Lo; left inguinal    INTRALUMINAL GASTROINTESTINAL TRACT IMAGING VIA CAPSULE N/A 7/10/2018    Procedure: IMAGING, GI TRACT, INTRALUMINAL, VIA CAPSULE;  Surgeon: Radha Deng MD;  Location: [facility] ENDO;  Service: Endoscopy;  Laterality: N/A;    LYSIS OF ADHESIONS  9/29/2020    Procedure: LYSIS, ADHESIONS;  Surgeon: Eagle Gonzalez MD;  Location: [facility] OR;  Service: General;;    RHINOPLASTY TIP      TONSILLECTOMY      UPPER GASTROINTESTINAL ENDOSCOPY  05/21/2015    Dr. Gomez       Review of patient's allergies indicates:   Allergen Reactions    Gluten Diarrhea       Current Facility-Administered Medications on File Prior to Encounter   Medication    [DISCONTINUED] carbidopa-levodopa 23.75-95 mg CpSR    [DISCONTINUED] dextrose 5 % and 0.9 % NaCl with KCl 20 mEq infusion    [DISCONTINUED] erythromycin EC tablet     [DISCONTINUED] famotidine (PF) injection    [DISCONTINUED] fentaNYL 50 mcg/mL injection    [DISCONTINUED] GENERIC EXTERNAL MEDICATION    [DISCONTINUED] GENERIC EXTERNAL MEDICATION    [DISCONTINUED] GENERIC EXTERNAL MEDICATION    [DISCONTINUED] GENERIC EXTERNAL MEDICATION    [DISCONTINUED] levETIRAcetam injection    [DISCONTINUED] ondansetron injection    [DISCONTINUED] phenyleph-min oil-petrolatum 0.25-14-74.9 % ointment    [DISCONTINUED] traZODone tablet     Current Outpatient Medications on File Prior to Encounter   Medication Sig    acetaminophen (TYLENOL) 325 MG tablet Take 2 tablets (650 mg total) by mouth every 6 (six) hours as needed for Pain.    azelastine (ASTELIN) 137 mcg (0.1 %) nasal spray 1 spray by Nasal route 2 (two) times daily.    carbidopa-levodopa (RYTARY) 23.75-95 mg CpSR Take 3 capsules by mouth 3 times daily for 14 days    diclofenac sodium (VOLTAREN) 1 % Gel Apply 4 g topically 2 (two) times daily.    docusate sodium (COLACE) 100 MG capsule Take 1 capsule (100 mg total) by mouth once daily.    erythromycin (KATT-TAB) 250 mg TbEC Take 1 tablet by mouth 3 (three) times daily.    glucose 4 GM chewable tablet Take 4 tablets (16 g total) by mouth as needed for Low blood sugar.    lactose-reduced food (ENSURE ORIGINAL ORAL) Take 1 Dose by mouth once daily.    levETIRAcetam (KEPPRA) 500 MG Tab Take 1 tablet (500 mg total) by mouth 2 (two) times daily.    levothyroxine (SYNTHROID) 137 MCG Tab tablet Take 0.5 tablets (68.5 mcg total) by mouth before breakfast.    lycopene/lutein/fruit extracts (FRUIT AND VEGETABLE DAILY ORAL) Take 1 Dose by mouth once daily. Patient takes 2 fruit and 1 vegetable balance of nature vitamins in the morning and evening    metronidazole 1% (METROGEL) 1 % Gel Apply topically once daily.    mometasone furoate (NASONEX NASL) by Nasal route.    salicylic acid 3 % Sham Shampoo scalp 2-3 times a week    simethicone (MYLICON) 125 mg Cap capsule Take 1 capsule (125 mg total) by  mouth 4 (four) times daily as needed for Flatulence.    traZODone (DESYREL) 50 MG tablet Take 1 tablet (50 mg total) by mouth nightly as needed for Insomnia.     Family History       Problem Relation (Age of Onset)    Alcohol abuse Brother    Diabetes Mother, Son    Early death Brother    Heart disease Brother    No Known Problems Father    Obesity Sister          Tobacco Use    Smoking status: Never    Smokeless tobacco: Never   Substance and Sexual Activity    Alcohol use: Yes     Alcohol/week: 11.7 standard drinks of alcohol     Types: 14 Standard drinks or equivalent per week     Comment: 2 glasses wine per dinner    Drug use: No    Sexual activity: Never     Review of Systems   All other systems reviewed and are negative.    Objective:     Vital Signs (Most Recent):  Temp: 97.3 °F (36.3 °C) (09/13/23 1351)  Pulse: 90 (09/13/23 1351)  Resp: 16 (09/13/23 1351)  BP: 118/75 (09/13/23 1351)  SpO2: 98 % (09/13/23 1351) Vital Signs (24h Range):  Temp:  [97.3 °F (36.3 °C)-97.5 °F (36.4 °C)] 97.3 °F (36.3 °C)  Pulse:  [73-90] 90  Resp:  [16] 16  SpO2:  [95 %-98 %] 98 %  BP: (116-118)/(70-75) 118/75     Weight: 50.3 kg (111 lb)  Body mass index is 19.66 kg/m².     Physical Exam  Constitutional:       Appearance: Normal appearance. She is normal weight.   HENT:      Head: Normocephalic and atraumatic.      Nose: Nose normal.      Mouth/Throat:      Mouth: Mucous membranes are moist.   Eyes:      Conjunctiva/sclera: Conjunctivae normal.   Cardiovascular:      Rate and Rhythm: Normal rate and regular rhythm.      Pulses: Normal pulses.      Heart sounds: Normal heart sounds. No murmur heard.     No friction rub. No gallop.   Pulmonary:      Effort: Pulmonary effort is normal.      Breath sounds: Normal breath sounds. No wheezing or rales.   Abdominal:      General: Abdomen is flat. Bowel sounds are normal. There is distension.      Palpations: Abdomen is soft.      Tenderness: There is no abdominal tenderness. There is no  guarding.      Comments: She has moderate bowel sounds, no significant tenderness to palpation.   Musculoskeletal:         General: No swelling. Normal range of motion.      Cervical back: Normal range of motion and neck supple.   Skin:     General: Skin is warm and dry.   Neurological:      General: No focal deficit present.      Mental Status: She is alert.   Psychiatric:         Mood and Affect: Mood normal.         Thought Content: Thought content normal.         Judgment: Judgment normal.                Significant Labs: All pertinent labs within the past 24 hours have been reviewed.    Significant Imaging: I have reviewed all pertinent imaging results/findings within the past 24 hours.

## 2023-09-13 NOTE — ASSESSMENT & PLAN NOTE
Patient is here with abdominal distention and persistent ongoing ileus after being discharged from the hospital and [place] 2 weeks ago.  They were hopeful that upon returning home she would have improvement in her symptoms but she is not had any bowel movements.  She does continue to have flatus.  - NG tube for decompression  - will go ahead and consult General surgery as she has seen Dr. Mcarthur and Dr. Gonzalez in the past  - NPO  - IV fluids  - consult nutrition for parenteral nutrition recommendations given the longevity of her persistent ileus and lack of oral intake  - check lactic acid

## 2023-09-14 LAB
ALBUMIN SERPL BCP-MCNC: 3.3 G/DL (ref 3.5–5.2)
ALP SERPL-CCNC: 70 U/L (ref 55–135)
ALT SERPL W/O P-5'-P-CCNC: 5 U/L (ref 10–44)
ANION GAP SERPL CALC-SCNC: 5 MMOL/L (ref 8–16)
AST SERPL-CCNC: 13 U/L (ref 10–40)
BASOPHILS # BLD AUTO: 0.03 K/UL (ref 0–0.2)
BASOPHILS NFR BLD: 0.7 % (ref 0–1.9)
BILIRUB SERPL-MCNC: 0.8 MG/DL (ref 0.1–1)
BUN SERPL-MCNC: 10 MG/DL (ref 8–23)
CALCIUM SERPL-MCNC: 8.5 MG/DL (ref 8.7–10.5)
CHLORIDE SERPL-SCNC: 108 MMOL/L (ref 95–110)
CO2 SERPL-SCNC: 26 MMOL/L (ref 23–29)
CREAT SERPL-MCNC: 0.4 MG/DL (ref 0.5–1.4)
DIFFERENTIAL METHOD: NORMAL
EOSINOPHIL # BLD AUTO: 0.1 K/UL (ref 0–0.5)
EOSINOPHIL NFR BLD: 2.1 % (ref 0–8)
ERYTHROCYTE [DISTWIDTH] IN BLOOD BY AUTOMATED COUNT: 11.8 % (ref 11.5–14.5)
EST. GFR  (NO RACE VARIABLE): >60 ML/MIN/1.73 M^2
GLUCOSE SERPL-MCNC: 67 MG/DL (ref 70–110)
HCT VFR BLD AUTO: 39.4 % (ref 37–48.5)
HGB BLD-MCNC: 12.6 G/DL (ref 12–16)
IMM GRANULOCYTES # BLD AUTO: 0.02 K/UL (ref 0–0.04)
IMM GRANULOCYTES NFR BLD AUTO: 0.5 % (ref 0–0.5)
LYMPHOCYTES # BLD AUTO: 1.6 K/UL (ref 1–4.8)
LYMPHOCYTES NFR BLD: 38.1 % (ref 18–48)
MCH RBC QN AUTO: 30.4 PG (ref 27–31)
MCHC RBC AUTO-ENTMCNC: 32 G/DL (ref 32–36)
MCV RBC AUTO: 95 FL (ref 82–98)
MONOCYTES # BLD AUTO: 0.3 K/UL (ref 0.3–1)
MONOCYTES NFR BLD: 7.6 % (ref 4–15)
NEUTROPHILS # BLD AUTO: 2.1 K/UL (ref 1.8–7.7)
NEUTROPHILS NFR BLD: 51 % (ref 38–73)
NRBC BLD-RTO: 0 /100 WBC
PLATELET # BLD AUTO: 223 K/UL (ref 150–450)
PMV BLD AUTO: 10 FL (ref 9.2–12.9)
POTASSIUM SERPL-SCNC: 3.6 MMOL/L (ref 3.5–5.1)
PROT SERPL-MCNC: 6.1 G/DL (ref 6–8.4)
RBC # BLD AUTO: 4.14 M/UL (ref 4–5.4)
SODIUM SERPL-SCNC: 139 MMOL/L (ref 136–145)
WBC # BLD AUTO: 4.2 K/UL (ref 3.9–12.7)

## 2023-09-14 PROCEDURE — B4185 PARENTERAL SOL 10 GM LIPIDS: HCPCS | Performed by: STUDENT IN AN ORGANIZED HEALTH CARE EDUCATION/TRAINING PROGRAM

## 2023-09-14 PROCEDURE — 99900031 HC PATIENT EDUCATION (STAT)

## 2023-09-14 PROCEDURE — 94760 N-INVAS EAR/PLS OXIMETRY 1: CPT

## 2023-09-14 PROCEDURE — 85025 COMPLETE CBC W/AUTO DIFF WBC: CPT | Performed by: STUDENT IN AN ORGANIZED HEALTH CARE EDUCATION/TRAINING PROGRAM

## 2023-09-14 PROCEDURE — 63600175 PHARM REV CODE 636 W HCPCS: Performed by: STUDENT IN AN ORGANIZED HEALTH CARE EDUCATION/TRAINING PROGRAM

## 2023-09-14 PROCEDURE — 25000003 PHARM REV CODE 250: Performed by: STUDENT IN AN ORGANIZED HEALTH CARE EDUCATION/TRAINING PROGRAM

## 2023-09-14 PROCEDURE — 80053 COMPREHEN METABOLIC PANEL: CPT | Performed by: STUDENT IN AN ORGANIZED HEALTH CARE EDUCATION/TRAINING PROGRAM

## 2023-09-14 PROCEDURE — 36415 COLL VENOUS BLD VENIPUNCTURE: CPT | Performed by: STUDENT IN AN ORGANIZED HEALTH CARE EDUCATION/TRAINING PROGRAM

## 2023-09-14 PROCEDURE — 12000002 HC ACUTE/MED SURGE SEMI-PRIVATE ROOM

## 2023-09-14 RX ORDER — CARBOXYMETHYLCELLULOSE SODIUM 5 MG/ML
1 SOLUTION/ DROPS OPHTHALMIC 2 TIMES DAILY
Status: DISCONTINUED | OUTPATIENT
Start: 2023-09-14 | End: 2023-09-19 | Stop reason: HOSPADM

## 2023-09-14 RX ADMIN — SODIUM CHLORIDE, SODIUM LACTATE, POTASSIUM CHLORIDE, AND CALCIUM CHLORIDE: .6; .31; .03; .02 INJECTION, SOLUTION INTRAVENOUS at 10:09

## 2023-09-14 RX ADMIN — LEUCINE, PHENYLALANINE, LYSINE, METHIONINE, ISOLEUCINE, VALINE, HISTIDINE, THREONINE, TRYPTOPHAN, ALANINE, GLYCINE, ARGININE, PROLINE, SERINE, TYROSINE, SODIUM ACETATE, DIBASIC POTASSIUM PHOSPHATE, MAGNESIUM CHLORIDE, SODIUM CHLORIDE, CALCIUM CHLORIDE, DEXTROSE
311; 238; 247; 170; 255; 247; 204; 179; 77; 880; 438; 489; 289; 213; 17; 297; 261; 51; 77; 33; 5 INJECTION INTRAVENOUS at 12:09

## 2023-09-14 RX ADMIN — LEVETIRACETAM 500 MG: 500 TABLET, FILM COATED ORAL at 09:09

## 2023-09-14 RX ADMIN — I.V. FAT EMULSION 250 ML: 20 EMULSION INTRAVENOUS at 10:09

## 2023-09-14 RX ADMIN — ENOXAPARIN SODIUM 40 MG: 40 INJECTION SUBCUTANEOUS at 05:09

## 2023-09-14 RX ADMIN — CARBOXYMETHYLCELLULOSE SODIUM 1 DROP: 5 SOLUTION/ DROPS OPHTHALMIC at 10:09

## 2023-09-14 RX ADMIN — TRAZODONE HYDROCHLORIDE 50 MG: 50 TABLET ORAL at 09:09

## 2023-09-14 RX ADMIN — CARBOXYMETHYLCELLULOSE SODIUM 1 DROP: 5 SOLUTION/ DROPS OPHTHALMIC at 09:09

## 2023-09-14 RX ADMIN — LEVOTHYROXINE SODIUM 62.5 MCG: 0.03 TABLET ORAL at 06:09

## 2023-09-14 NOTE — PLAN OF CARE
Problem: Parenteral Nutrition  Goal: Effective Intravenous Nutrition Therapy Delivery  Intervention: Optimize Intravenous Nutrition Delivery  Flowsheets (Taken 9/14/2023 1234)  Nutrition Support Management: parenteral nutrition initiated---  1. Recommend Clinimix E @ 75 mL/hr + 250 mL, 20% IVL to provide 1112 kcal and 76.5 gm protein.   2. Should long-term PN be required, patient will need a central line.

## 2023-09-14 NOTE — RESPIRATORY THERAPY
09/13/23 2006   Patient Assessment/Suction   Level of Consciousness (AVPU) alert   Respiratory Effort Normal;Unlabored   Expansion/Accessory Muscles/Retractions no retractions;no use of accessory muscles   Rhythm/Pattern, Respiratory depth regular;no shortness of breath reported;pattern regular;unlabored   PRE-TX-O2   Device (Oxygen Therapy) room air   SpO2 (!) 94 %   Pulse Oximetry Type Intermittent   $ Pulse Oximetry - Multiple Charge Pulse Oximetry - Multiple   Pulse 67   Resp 18

## 2023-09-14 NOTE — NURSING
Nurses Note -- 4 Eyes      9/14/2023   5:16 AM      Skin assessed during: Admit      [x] No Altered Skin Integrity Present    []Prevention Measures Documented      [] Yes- Altered Skin Integrity Present or Discovered   [] LDA Added if Not in Epic (Describe Wound)   [] New Altered Skin Integrity was Present on Admit and Documented in LDA   [] Wound Image Taken    Wound Care Consulted? No    Attending Nurse:  Mone Coulter RN/Staff Member:   golden cabrera

## 2023-09-14 NOTE — PLAN OF CARE
Problem: Adult Inpatient Plan of Care  Goal: Plan of Care Review  Outcome: Ongoing, Progressing  Goal: Patient-Specific Goal (Individualized)  Outcome: Ongoing, Progressing  Goal: Absence of Hospital-Acquired Illness or Injury  Outcome: Ongoing, Progressing  Goal: Optimal Comfort and Wellbeing  Outcome: Ongoing, Progressing  Goal: Readiness for Transition of Care  Outcome: Ongoing, Progressing     Problem: Skin Injury Risk Increased  Goal: Skin Health and Integrity  Outcome: Ongoing, Progressing     Problem: Parenteral Nutrition  Goal: Effective Intravenous Nutrition Therapy Delivery  Outcome: Ongoing, Progressing     Problem: Fall Injury Risk  Goal: Absence of Fall and Fall-Related Injury  Outcome: Ongoing, Progressing     Problem: Coping Ineffective  Goal: Effective Coping  Outcome: Ongoing, Progressing     Problem: Pain Acute  Goal: Acceptable Pain Control and Functional Ability  Outcome: Ongoing, Progressing     Problem: Fluid Deficit (Intestinal Obstruction)  Goal: Fluid Balance  Outcome: Ongoing, Progressing     Problem: Infection (Intestinal Obstruction)  Goal: Absence of Infection Signs and Symptoms  Outcome: Ongoing, Progressing     Problem: Nausea and Vomiting (Intestinal Obstruction)  Goal: Nausea and Vomiting Relief  Outcome: Ongoing, Progressing

## 2023-09-14 NOTE — CARE UPDATE
09/14/23 1150   Patient Assessment/Suction   Level of Consciousness (AVPU) alert   Respiratory Effort Normal   Expansion/Accessory Muscles/Retractions no use of accessory muscles   All Lung Fields Breath Sounds clear   Rhythm/Pattern, Respiratory unlabored   Cough Frequency no cough   PRE-TX-O2   Device (Oxygen Therapy) room air   SpO2 (!) 94 %   Pulse Oximetry Type Intermittent   $ Pulse Oximetry - Single Charge Pulse Oximetry - Single   Pulse 72   Resp 16   Education   $ Education 15 min;Other (see comment)  (sats)

## 2023-09-14 NOTE — CONSULTS
"[facility]  Adult Nutrition   Consult Note (Initial Assessment)     SUMMARY     Recommendations  Recommendation/Intervention:   1. Recommend Clinimix E @ 75 mL/hr + 250 mL, 20% IVL to provide 1112 kcal and 76.5 gm protein.   2. Should long-term PN be required, patient will need a central line.   3. RD to montior for tolerance, labs, and status change PRN.    Goals:   1. Patient to receive >/= 50% EEN / EPN in 24-48 hours.   2. Lab values trend to target range.  Nutrition Goal Status: new    Communication of RD Recs: reviewed with physician    Dietitian Rounds Brief  Consult for PN. Patient does not have a central line; RD recs for PPN for now. Clinimix E + 250 mL 20% IVL provides 73% EEN and 100% EPN.    Patient with history of Parkinson's disease, recurrent small-bowel obstructions, hypothyroid, and GERD. Pt admits with persistent abdominal distention and ileus. Last po intake 9/13/23 and last BM 9/6/23 per nursing intake notes.   RD to monitor and manage PPN tolerance, labs, diet advancement, and need for long-term nutrition support PRN.    Malnutrition Assessment   Patient with 6%, 7 lb weight loss in 3 months and decreased intake. Pt at risk for malnutrition.           Diet order:   Current Diet Order: NPO           Evaluation of Received Nutrient/Fluid Intake  IV Fluid (mL): 1800  Energy Calories Required: not meeting needs  Protein Required: not meeting needs  Fluid Required: meeting needs  Tolerance: tolerating     % Intake of Estimated Energy Needs: 0%  % Meal Intake: NPO    No intake or output data in the 24 hours ending 09/14/23 1132     Anthropometrics  Temp: 97.3 °F (36.3 °C)  Height Method: Stated  Height: 5' 3" (160 cm)  Height (inches): 63 in  Weight Method: Bed Scale  Weight: 50.3 kg (111 lb)  Weight (lb): 111 lb  Ideal Body Weight (IBW), Female: 115 lb  % Ideal Body Weight, Female (lb): 96.52 %  BMI (Calculated): 19.7  BMI Grade: 18.5-24.9 - normal       Estimated/Assessed " Needs  Weight Used For Calorie Calculations: 50.3 kg (110 lb 14.3 oz)  Energy Calorie Requirements (kcal): 1941-5919 / day (30-35 kcal/kg for weight gain)  Energy Need Method: Kcal/kg  Protein Requirements:  gm/day (1.5-2.0 gm/kg)  Weight Used For Protein Calculations: 50.3 kg (110 lb 14.3 oz)     Estimated Fluid Requirement Method: RDA Method  RDA Method (mL): 1509       Reason for Assessment  Reason For Assessment: consult, new TPN  Diagnosis: gastrointestinal disease  Relevant Medical History: Parkinson's disease, recurrent small-bowel obstructions, hypothyroid, GERD  Interdisciplinary Rounds: did not attend    Nutrition/Diet History  Spiritual, Cultural Beliefs, Jew Practices, Values that Affect Care: no  Food Allergies: NKFA (Gluten)  Factors Affecting Nutritional Intake: NPO    Nutrition Risk Screen  Nutrition Risk Screen: reduced oral intake over the last month     MST Score: 0  Have you recently lost weight without trying?: No  Weight loss score: 0  Have you been eating poorly because of a decreased appetite?: No  Appetite score: 0       Weight History:  Wt Readings from Last 5 Encounters:   09/13/23 50.3 kg (111 lb)   09/13/23 49 kg (108 lb 0.4 oz)   08/08/23 53.5 kg (118 lb)   05/10/23 53.9 kg (118 lb 13.3 oz)   03/09/23 53.8 kg (118 lb 9.7 oz)        Lab/Procedures/Meds: Pertinent Labs/Meds Reviewed    Medications:Pertinent Medications Reviewed  Scheduled Meds:   carbidopa-levodopa  3 capsule Oral TID    carboxymethylcellulose  1 drop Both Eyes BID    enoxparin  40 mg Subcutaneous Daily    levETIRAcetam  500 mg Oral BID    levothyroxine  62.5 mcg Oral Before breakfast    polyethylene glycol  17 g Oral Daily     Continuous Infusions:   lactated ringers 75 mL/hr at 09/14/23 1005     PRN Meds:.acetaminophen, HYDROcodone-acetaminophen, melatonin, morphine, ondansetron, prochlorperazine, sodium chloride 0.9%, traZODone    Labs: Pertinent Labs Reviewed  Clinical Chemistry:  Recent Labs   Lab  09/13/23  1452 09/14/23  0403    139   K 3.6 3.6    108   CO2 29 26   GLU 74 67*   BUN 13 10   CREATININE 0.5 0.4*   CALCIUM 9.0 8.5*   PROT 7.3 6.1   ALBUMIN 4.1 3.3*   BILITOT 0.7 0.8   ALKPHOS 83 70   AST 18 13   ALT 6* 5*   ANIONGAP 6* 5*   MG 1.8  --    LIPASE 30  --      CBC:   Recent Labs   Lab 09/14/23  0403   WBC 4.20   RBC 4.14   HGB 12.6   HCT 39.4      MCV 95   MCH 30.4   MCHC 32.0     Monitor and Evaluation  Food and Nutrient Intake: energy intake, food and beverage intake  Food and Nutrient Adminstration: diet order, enteral and parenteral nutrition administration  Knowledge/Beliefs/Attitudes: food and nutrition knowledge/skill  Anthropometric Measurements: weight, weight change, body mass index  Biochemical Data, Medical Tests and Procedures: electrolyte and renal panel, gastrointestinal profile, glucose/endocrine profile, inflammatory profile, lipid profile  Nutrition-Focused Physical Findings: overall appearance     Nutrition Risk  Level of Risk/Frequency of Follow-up: high     Nutrition Follow-Up  RD Follow-up?: Yes      Modesta Li RD, LDN 09/14/2023 11:31 AM

## 2023-09-14 NOTE — PROGRESS NOTES
[facility]    Progress Note    Patient Name: Ariana Tiwari  MRN: [id number]  Patient Class: IP- Inpatient   Admission Date: 9/13/2023  1:43 PM  Length of Stay: 1  Attending Physician: Sophie Law MD  Primary Care Provider: Nba Petty MD  Face-to-Face encounter date: 09/14/2023  Code status:  Chief Complaint: Constipation (Pt sent over from PCP for two possible bowel obstructions. Denies abdominal pain or vomiting. )        Subjective:    HPI:Patient is a 78-year-old female with a history of Parkinson's disease, recurrent small-bowel obstructions, hypothyroid, GERD, she is presenting today with persistent abdominal distention and ileus.  The patient was hospitalized in [place] 2 weeks ago at the end of August.  The patient was found to have ileus at that time with little to no improvement.  The family wanted to return to [place] and the patient was discharged from the facility there and she returned here hoping that her ileus will resolve with time.  After advancing her diet, continuing to monitor at home she has not had any improvement in her ileus.  She continues to have persistent abdominal distention and discomfort.  She is passing gas but not having any bowel movements.  She is been taking her medications as prescribed despite the ileus.  In the past, she has improved with gastric decompression with NG tube.  This was placed in the ER.  She denies fever, chills, severe abdominal pain.  No nausea or vomiting at this time.  No chest pain or shortness a breath.     In the ER, vital signs are stable, P 90, R 16, /75, O2 sat 90% on room air.  CT abdomen pelvis obtained which shows numerous segments of markedly dilated fluid-filled small bowel.  No specific transition point is identified however the terminal ileum is decompressed.  At this point, NG tube was placed in the ER and patient will be admitted for further management of ileus.       Interval History:    9/14:  NG tube still actively draining.  Will repeat KUB and surgery to re-evaluate.  Patient has had history of abdominal surgeries x2.  Patient states that she is doing well and slept pretty well today.  Bilateral red eyes noted, patient denies any itching or pain and states that is due to dry eyes.  Will add eyedrops. Family present at bedside.No concerns/issues overnight reported by the patient or the nursing staff.    Review of Systems All other Review of Systems were found to be negative expect for that mentioned already in HPI.     Objective:     Vitals:    09/13/23 2350 09/14/23 0100 09/14/23 0451 09/14/23 0757   BP:  118/66 104/68 128/62   BP Location:       Patient Position:       Pulse:  60 67 68   Resp:  17 18 16   Temp:  97.5 °F (36.4 °C) 97.3 °F (36.3 °C) 97.3 °F (36.3 °C)   TempSrc:   Oral    SpO2: 95% 98% 95% 97%   Weight:       Height:            Vitals reviewed.  Constitutional: No distress.   HENT: NC  Head: Atraumatic.   Cardiovascular: Normal rate, regular rhythm and normal heart sounds.   Pulmonary/Chest: Effort normal. No wheezes.   Abdominal: Soft. Bowel sounds are normal. No distension and no mass. No tenderness  Neurological: Alert.   Skin: Skin is warm and dry.   Psych: Appropriate mood and affect    Following labs were Reviewed   CBC:  Recent Labs   Lab 09/14/23  0403   WBC 4.20   HGB 12.6   HCT 39.4        CMP:  Recent Labs   Lab 09/14/23  0403   CALCIUM 8.5*   ALBUMIN 3.3*   PROT 6.1      K 3.6   CO2 26      BUN 10   CREATININE 0.4*   ALKPHOS 70   ALT 5*   AST 13   BILITOT 0.8       Micro Results  Microbiology Results (last 7 days)       ** No results found for the last 168 hours. **             Radiology Reports  X-Ray Abdomen AP 1 View    Result Date: 9/14/2023  HISTORY: f/u ileus COMPARISON:Comparison is made with the patient's previous imaging study of 2/7/2023. FINDINGS:Nasogastric tube projects below the level of the gastroesophageal junction tip oriented  within the region of the gastric fundus has been employed as compared to previous. Several widespread loops of intestinal bowel gas are again redemonstrated throughout the abdominal cavity in keeping with mild ileus. Gas reaches the ascending colon. There are atheromatous calcifications of the splenic artery. The osseous structures reveal no significant finding. Several calcified densities in the lower pelvis are attributed towards midline calcified fibroids. IMPRESSION: Dilated small bowel loops taken on the appearance of ileus without evidence of definable change upon comparison. Electronically signed by:  Demetrius Samuels MD  9/14/2023 9:46 AM CDT Workstation: [id number]    CT Abdomen Pelvis With Contrast    Result Date: 9/13/2023  EXAM: CT ABDOMEN PELVIS WITH CONTRAST HISTORY: Bowel obstruction suspected COMPARISON: CT scan of the abdomen and pelvis dated 1/29/2023 TECHNIQUE: Multiple axial 2 mm thick images were obtained through the abdomen and pelvis IV contrast only. This exam was performed according to our departmental dose-optimization program, which includes automated exposure control, adjustment of the mA and/or kV according to patient size and/or use of iterative reconstruction technique. Unless otherwise stated, incidental findings do not require dedicated follow-up imaging. FINDINGS: There are numerous segments of air and fluid-filled markedly dilated small bowel is noted throughout the abdomen and pelvis. The colon is not distended. It contains formed stool. The findings are consistent with distal small bowel obstruction. Similar, but less prominent findings were present on the previous CT in January. As on the previous study, there is swirling of the mesenteric vessels in the pelvis suggesting an internal hernia as the cause of the obstruction. There is also abrupt tapered narrowing of a mildly dilated segment of small bowel in this region that further indicates this is likely the site of the  obstruction. The terminal ileum is completely collapsed. There is no free air or free fluid in the abdomen or pelvis. There is no pneumatosis. The liver, spleen, pancreas, and kidneys are unremarkable. There is no hydronephrosis or hydroureter ureter. There is a 2.6 x 1.4 cm diameter low-density solid mass in the left adrenal gland that is unchanged, and is quite likely an adenoma. The right adrenal gland appears normal. Images through the lung bases show minor linear atelectasis in both lower lobes. There is mild to moderate vascular calcification. IMPRESSION:   Numerous segments of moderate to markedly dilated air and fluid-filled small bowel distributed throughout the abdomen and pelvis. There is very little nondilated small bowel. However, the terminal ileum is completely decompressed. Findings are consistent with distal small bowel obstruction. The site of the obstruction is suspected be within the pelvis along the midline. An internal hernia suspected as etiology. Similar findings were evident on the previous CT scan dated 1/29/2023. Electronically signed by:  Preet Baltazar MD  9/13/2023 4:38 PM CDT Workstation: [id number]    X-Ray Chest AP Portable    Result Date: 9/13/2023  HISTORY: Nasogastric tube placement,  constipation. FINDINGS: Portable chest radiograph at 1543 hours compared to prior exams shows nasogastric tube with distal tip at the level of the gastroesophageal junction or gastric cardia region. The cardiomediastinal silhouette and pulmonary vasculature are within normal limits. The lungs are hypoexpanded, with no consolidation, large pleural effusion, or evidence of pulmonary edema. Right lower lung bandlike opacities suggests chronic atelectasis, with no pneumothorax. The bones are diffusely osteopenic. IMPRESSION: Nasogastric tube as described. Electronically signed by:  Juan Manuel Mejia MD  9/13/2023 3:59 PM CDT Workstation: [id number]    X-Ray Abdomen Flat And Erect    Result Date:  9/13/2023  EXAMINATION: XR ABDOMEN FLAT AND ERECT CLINICAL HISTORY: Personal history of other diseases of the digestive system TECHNIQUE: Flat and erect AP views of the abdomen were performed. COMPARISON: 02/07/2023 FINDINGS: There is diffuse dilatation of bowel with air-fluid levels present.  This is likely both small bowel and colon.  No free air is demonstrated.     Diffuse bowel dilatation concerning for small bowel obstruction.  Further evaluation with CT is recommended.  Further evaluation with CT is recommended. This report was flagged in Epic as abnormal. Electronically signed by: Rai Jennings MD Date:    09/13/2023 Time:    12:55    XR ABDOMEN 2+ VIEWS    Result Date: 9/2/2023  EXAMINATION:  XR ABDOMEN 2 VIEWS HISTORY:  ileus TECHNIQUE: 2 views of the abdomen. COMPARISON:  8/31/2023. CT 8/29/2023. Correlation with small bowel follow-through 8/31/2023. FINDINGS: There is moderate gaseous distention of the small bowel. There is oral contrast within the colon. No free air. No organomegaly or suspicious calcifications. The lung bases are grossly clear. No acute bony abnormality.    IMPRESSION: 1.  Ongoing moderate distention of the small bowel; favor ileus. 2.  Oral contrast is present within the colon, and small bowel obstruction is considered unlikely. Electronically signed by:  Al Castillo MD  09/02/2023 09:26 AM [facility] Workstation: [id number]    XR Small Bowel Follow Through    Result Date: 8/31/2023  EXAMINATION:  FL SMALL BOWEL WATER SOLUBLE HISTORY:  assess SBO COMPARISON:  8/31/2023 PROCEDURALIST: Radha ARAUZ PEAK SKIN DOSE (PSD): 0 mGy FINDINGS:   200 mL Omnipaque 240 administered through nasogastric tube. Contrast quickly progressed to markedly dilated small bowel loops and reaches the ascending colon at 60 minutes. There is marked gaseous distention of the large and small bowel in all 4 quadrants and abdominal distention as well.    IMPRESSION:  Radiographic evidence of severe ileus.  Electronically signed by:  Adriano Wright MD  08/31/2023 10:36 AM [employer] Workstation: [id number]    X-Ray Abdomen Portable    Result Date: 8/31/2023  EXAMINATION:  XR ABDOMEN PORTABLE HISTORY: sbo TECHNIQUE: Single view of the abdomen. COMPARISON: 8/30/2023. FINDINGS: There is NG tube with the tip projecting over the gastric fundus. Redemonstration of dilated bowel loops with some air-fluid levels. There is no pneumoperitoneum, visible mass or abnormal calcification.  There are no acute osseous abnormalities.      IMPRESSION: 1. NG tube with the tip projecting over the stomach. 2. Dilated bowel loops with air-fluid level, suggestive of small bowel obstruction. Electronically signed by:  Annette Stephenson MD  08/31/2023 06:33 AM [employer] Workstation: [id number]    X-Ray Abdomen Portable    Result Date: 8/30/2023  EXAMINATION:  XR ABDOMEN PORTABLE HISTORY: NG placement verification TECHNIQUE: Single view of the abdomen. COMPARISON: 8/29/2023. FINDINGS: There is NG tube with the distal portion appeared to be looped in the tip projecting over the medial side of the gastric fundus. Bowel gas pattern is normal without dilatation or air-fluid levels.  There is no pneumoperitoneum, visible mass or abnormal calcification.  There are no acute osseous abnormalities.      IMPRESSION: NG tube with the distal portion appeared to be looped in the tip projecting over the medial side of the fundus. Electronically signed by:  Annette Stephenson MD  08/30/2023 12:45 AM [employer] Workstation: [id number]    XR ABDOMEN 1 VIEW    Result Date: 8/30/2023  EXAMINATION:  XR ABDOMEN 1 VIEW HISTORY: NGT position TECHNIQUE: Single view of the abdomen. COMPARISON: August 29, 2023 FINDINGS: Interval placement of enteric tube with tip and side-port project over the expected location of the stomach. Gaseous distention of small bowel in the abdomen. Contrast within the left renal calyces. Lung bases are clear. No acute bony abnormality.    IMPRESSION: Enteric  tube with tip and side port projecting over the stomach. Electronically signed by:  Ac Tucker MD  08/29/2023 09:24 PM [employer] Workstation: [id number]    CT Abdomen Pelvis With Contrast    Result Date: 8/29/2023  EXAMINATION:  CT ABDOMEN PELVIS W CONTRAST HISTORY:  Indication Not Found - See Additional Reason for Exam Comments; Previous bowel obstruction, bowel surgery, potential internal hernia, no suspicion that she is we obstructed, please start oral contrast, TECHNIQUE:  Computed tomography of the abdomen and pelvis with intravenous contrast. Multiplanar 2-D reformats were performed and evaluated. This CT examination was performed using 1 more the following dose reduction techniques: Automated exposure control, adjustment of mA and/or kV according to patient size, or use of iterative reconstruction technique. CONTRAST: IOHEXOL 300 MG/ML INJECTION SOLN: 75 mL; IOHEXOL 300 MG/ML INJECTION SOLN: 50 mL CTDI: CTDI: 10.64 CTDIv; CTDLP: 527 mGy-cm DLP COMPARISON: Same day abdominal radiographs. FINDINGS: LOWER CHEST: Bibasilar atelectasis. No pleural effusion. LIVER: Normal in size and contour. No focal lesion. GALLBLADDER: Distended. No radiopaque stone. No definite gallbladder wall thickening. BILE DUCTS: Grossly unremarkable. PANCREAS: No mass, ductal dilation, or mike-pancreatic fluid. SPLEEN: Normal size.  No focal lesion. ADRENALS: 2 cm left adrenal nodule. Normal right adrenal gland. KIDNEYS AND URETERS: Normal size and contour. No hydronephrosis. URINARY BLADDER: Normal contour. GASTROINTESTINAL TRACT: Stomach is moderately dilated with air-fluid level. Diffuse dilation of small bowel with air-fluid levels. There is dilation of to at least 4 cm in diameter. There is a region of focal decreased small bowel caliber in the right abdomen with surrounding spiraling mesenteric vessels (series 4 image 45 through 60) concerning for a site of closed loop obstruction. The colon is decompressed. APPENDIX: Not  definitely visualized. LYMPH NODES: No lymphadenopathy. ABDOMINAL AORTA AND OTHER VESSELS: Normal caliber aorta and IVC. REPRODUCTIVE ORGANS: No pathologic process MUSCULOSKELETAL: No acute or suspicious osseous abnormality. ADDITIONAL FINDINGS: None.    IMPRESSION: Small bowel obstruction with suspected closed-loop site in the right mid abdomen. NOTIFICATION:  These findings were discussed by telephone with KELLY WHITE on 8/29/2023 10:49 PM CDT Indeterminate 2 cm left adrenal gland nodule. Recommend nonemergent follow-up with dedicated MR or CT of the adrenal glands. Electronically signed by:  Ac Tucker MD  08/29/2023 07:51 PM [employer] Workstation: [id number]    X-Ray Abdomen Flat And Erect    Result Date: 8/29/2023  EXAMINATION:  XR ABDOMEN SUPINE AND UPRIGHT HISTORY:  Abdominal distention, history of bowel obstruction TECHNIQUE: Supine and erect views of the abdomen. COMPARISON:  None. FINDINGS: Nonspecific bowel gas pattern with a few air-fluid levels, apparently in both small and large bowel. No evidence of free air in the abdomen. No suspicious calcifications. No acute bony abnormality.    IMPRESSION:  Nonspecific bowel gas pattern with a few layering air-fluid levels in colon and likely small bowel. Electronically signed by:  Ac Tucker MD  08/29/2023 05:17 PM [employer] Workstation: [id number]       Meds  Scheduled Meds:   carbidopa-levodopa  3 capsule Oral TID    carboxymethylcellulose  1 drop Both Eyes BID    enoxparin  40 mg Subcutaneous Daily    levETIRAcetam  500 mg Oral BID    levothyroxine  62.5 mcg Oral Before breakfast    polyethylene glycol  17 g Oral Daily     Continuous Infusions:   lactated ringers 75 mL/hr at 09/14/23 1005     PRN Meds:.acetaminophen, HYDROcodone-acetaminophen, melatonin, morphine, ondansetron, prochlorperazine, sodium chloride 0.9%, traZODone.    Active PT: No  Active OT: No  Active SLP: No  Assessment & Plan:   * Ileus  Patient is here with abdominal distention  and persistent ongoing ileus after being discharged from the hospital and [place] 2 weeks ago.  They were hopeful that upon returning home she would have improvement in her symptoms but she is not had any bowel movements.  She does continue to have flatus.  - NG tube actively draining  - repeat KUB, general surgery on board  - NPO  - IV fluids       Anemia  Hemoglobin is stable  Trend        PD (Parkinson's disease)  Patient with Parkinson's disease on carbidopa-levodopa  She will bring her home time release capsule  At her previous hospitalization they were clamping her NG tube and allowing her to take her carbidopa given that she has severe symptoms without it  Will try to continue this process here  Ambulate as much as possible, up in chair during the day        Hypothyroidism  Continue Synthroid                Discharge Planning:   Is the patient medically ready for discharge?: no    Reason for patient still in hospital (select all that apply): Patient trending condition and Treatment    Above encounter included review of the medical records, interviewing and examining the patient face-to-face, discussion with family and other health care providers, ordering and interpreting lab/test results and formulating a plan of care.     Medical Decision Making:      [_] Low Complexity  [_] Moderate Complexity  [x] High Complexity      Sophie Law MD  Department of Hospital Medicine   [facility]

## 2023-09-14 NOTE — PLAN OF CARE
[facility]  Initial Discharge Assessment       Primary Care Provider: Nba Petty MD    Spoke with patient and daughter-in-law.  Confirmed face sheet.  Patient is oriented and is walking to the bathroom in the room per fmy.  She uses a rolling walker,  electric pink wheel chair and a walk in tub.  Plan is home with family.  The daughter in law may consider HH at discharge.      Admission Diagnosis: Abdominal pain [R10.9]    Admission Date: 9/13/2023  Expected Discharge Date:     Transition of Care Barriers: None    Payor: MEDICARE / Plan: MEDICARE PART A & B / Product Type: Government /     Extended Emergency Contact Information  Primary Emergency Contact: Je Tiwari  Address: [address] [place] [address] [place]  Mobile Phone: [phone]  Relation: Son  Preferred language: English   needed? No  Secondary Emergency Contact: Micaela Tiwari  Address: [address]           [place] [address] [place]  Mobile Phone: [phone]  Relation: Relative  Preferred language: English   needed? No    Discharge Plan A: Home with family  Discharge Plan B: Home Health      [facility] [address] [place] [address] [address] [facility]  [address] [address]  Phone: [phone] Fax: [phone]    [facility]  [facility] [place] [address]  Phone: [phone] Fax: [phone]      Initial Assessment (most recent)       Adult Discharge Assessment - 09/14/23 1021          Discharge Assessment    Assessment Type Discharge Planning Assessment     Confirmed/corrected address, phone number and insurance Yes     Source of Information patient;family     Reason For Admission Ileus     People in Home child(malka), adult     Do you expect to return to your current living situation? Yes     Do you have help at home or someone to help you manage your care at home? Yes     Who are your caregiver(s) and their phone  number(s)? TeMicaela, daughter in law   [phone]     Prior to hospitilization cognitive status: Alert/Oriented     Current cognitive status: Alert/Oriented     Walking or Climbing Stairs ambulation difficulty, requires equipment     Dressing/Bathing dressing difficulty, requires equipment     Equipment Currently Used at Home walker, rolling;wheelchair     Readmission within 30 days? Yes     Patient currently being followed by outpatient case management? No     Do you currently have service(s) that help you manage your care at home? No     Do you take prescription medications? Yes     Do you have prescription coverage? Yes     Do you have any problems affording any of your prescribed medications? No     Is the patient taking medications as prescribed? yes     Who is going to help you get home at discharge? Micaela Tiwari Relative   [phone]     How do you get to doctors appointments? family or friend will provide     Are you on dialysis? No     Do you take coumadin? No     DME Needed Upon Discharge  none     Discharge Plan discussed with: Patient;Adult children     Transition of Care Barriers None     Discharge Plan A Home with family     Discharge Plan B Home Health

## 2023-09-14 NOTE — CONSULTS
[facility]  General Surgery  Consult Note    Inpatient consult to General Surgery  Consult performed by: Sterling Vigil III, MD  Consult ordered by: Pepe Birch MD  Reason for consult: SBO        Subjective:     Chief Complaint/Reason for Admission: SBO    History of Present Illness: 78 year old female with history of parkinson's disease. She was admitted today with a recurrent Small bowel obstruction. She was admitted for SBO in [place] while on a trip two weeks ago. She clinically resolved with NGT decompression. She has been eating, passing flatus but constipated since coming back home. She went for a follow up with her PCP today. Abdominal xray showed an obstructive pattern. She was referred to the ED for further work up. CT showed diffusely dilated small bowel with transition point in the mid pelvis and possible swirling of the mesentery. She reports no significant symptoms today. No abdominal pain. No nausea. She is passing flatus. Family states she has been at baseline except for constipation which is new. She has history of two laparotomies - small bowel resection for diverticular perforation Jan 2020 and ex lap with lysis of adhesions for pneumoperitoneum 9/2020 - no perforation seen but extensive small bowel diverticulosis. She has had several admissions for SBO - about once per year last three years.  She had admission in Jan. CT in January of this year showed the same findings.   Family reports chronic abdominal bloating. She is awake and alert.     Current Facility-Administered Medications on File Prior to Encounter   Medication    [DISCONTINUED] carbidopa-levodopa 23.75-95 mg CpSR    [DISCONTINUED] dextrose 5 % and 0.9 % NaCl with KCl 20 mEq infusion    [DISCONTINUED] erythromycin EC tablet    [DISCONTINUED] famotidine (PF) injection    [DISCONTINUED] fentaNYL 50 mcg/mL injection    [DISCONTINUED] GENERIC EXTERNAL MEDICATION    [DISCONTINUED] GENERIC EXTERNAL MEDICATION     [DISCONTINUED] GENERIC EXTERNAL MEDICATION    [DISCONTINUED] GENERIC EXTERNAL MEDICATION    [DISCONTINUED] levETIRAcetam injection    [DISCONTINUED] ondansetron injection    [DISCONTINUED] phenyleph-min oil-petrolatum 0.25-14-74.9 % ointment    [DISCONTINUED] traZODone tablet     Current Outpatient Medications on File Prior to Encounter   Medication Sig    acetaminophen (TYLENOL) 325 MG tablet Take 2 tablets (650 mg total) by mouth every 6 (six) hours as needed for Pain.    azelastine (ASTELIN) 137 mcg (0.1 %) nasal spray 1 spray by Nasal route 2 (two) times daily.    carbidopa-levodopa (RYTARY) 23.75-95 mg CpSR Take 3 capsules by mouth 3 times daily for 14 days (Patient taking differently: Take 3 capsules by mouth 3 (three) times daily.)    diclofenac sodium (VOLTAREN) 1 % Gel Apply 4 g topically 2 (two) times daily.    docusate sodium (COLACE) 100 MG capsule Take 1 capsule (100 mg total) by mouth once daily.    erythromycin (KATT-TAB) 250 mg TbEC Take 1 tablet by mouth 3 (three) times daily.    food supplemt, lactose-reduced (ENSURE ORIGINAL) Liqd Take 237 mLs by mouth once daily.    glucose 4 GM chewable tablet Take 4 tablets (16 g total) by mouth as needed for Low blood sugar.    levETIRAcetam (KEPPRA) 500 MG Tab Take 1 tablet (500 mg total) by mouth 2 (two) times daily.    levothyroxine (SYNTHROID) 137 MCG Tab tablet Take 0.5 tablets (68.5 mcg total) by mouth before breakfast.    lycopene/lutein/fruit extracts (FRUIT AND VEGETABLE DAILY ORAL) Take 1 Dose by mouth once daily. Patient takes 2 fruit and 1 vegetable balance of nature vitamins in the morning and evening    salicylic acid 3 % Sham Shampoo scalp 2-3 times a week (Patient taking differently: Apply 1 application  topically As instructed. Shampoo scalp 2-3 times a week)    simethicone (MYLICON) 125 mg Cap capsule Take 1 capsule (125 mg total) by mouth 4 (four) times daily as needed for Flatulence.    traZODone (DESYREL) 50 MG tablet Take 1 tablet (50 mg  total) by mouth nightly as needed for Insomnia.    metronidazole 1% (METROGEL) 1 % Gel Apply topically once daily. (Patient not taking: Reported on 9/13/2023)    [DISCONTINUED] lactose-reduced food (ENSURE ORIGINAL ORAL) Take 1 Dose by mouth once daily.    [DISCONTINUED] mometasone furoate (NASONEX NASL) by Nasal route.       Review of patient's allergies indicates:   Allergen Reactions    Gluten Diarrhea       Past Medical History:   Diagnosis Date    Allergy     Diverticulosis     Gluten enteropathy     Gluten intolerance     Hernia     Hypothyroidism     PD (Parkinson's disease) 9/16/2015    Right tremor type    Peptic ulcer disease     Right shoulder pain     Cirtisone injection 3/26/15 - Dr. Tolbert    Ulcer      Past Surgical History:   Procedure Laterality Date    ADENOIDECTOMY      COLONOSCOPY  03/01/2012    Dr. Teresa, repeat in 10 years for screening    COLONOSCOPY N/A 2/21/2018    Procedure: COLONOSCOPY;  Surgeon: Radha Deng MD;  Location: [facility];  Service: Endoscopy;  Laterality: N/A;    CORRECTION OF HAMMER TOE Left 9/16/2020    Procedure: CORRECTION, HAMMER TOE;  Surgeon: William Sprague MD;  Location: [facility] OR;  Service: Orthopedics;  Laterality: Left;    COSMETIC SURGERY      Broken nose    ESOPHAGOGASTRODUODENOSCOPY N/A 12/8/2021    Procedure: EGD (ESOPHAGOGASTRODUODENOSCOPY);  Surgeon: Benji Valentino III, MD;  Location: [facility];  Service: Endoscopy;  Laterality: N/A;    FRACTURE SURGERY  left wrist    With pin    HEMORRHOID SURGERY      HERNIA REPAIR Left 04/09/2015    Dr Lo; left inguinal    INTRALUMINAL GASTROINTESTINAL TRACT IMAGING VIA CAPSULE N/A 7/10/2018    Procedure: IMAGING, GI TRACT, INTRALUMINAL, VIA CAPSULE;  Surgeon: Radha Deng MD;  Location: [facility] ENDO;  Service: Endoscopy;  Laterality: N/A;    LYSIS OF ADHESIONS  9/29/2020    Procedure: LYSIS, ADHESIONS;  Surgeon: Eagle Gonzalez MD;  Location: [facility] OR;  Service: General;;    RHINOPLASTY TIP       TONSILLECTOMY      UPPER GASTROINTESTINAL ENDOSCOPY  05/21/2015    Dr. Gomez     Family History       Problem Relation (Age of Onset)    Alcohol abuse Brother    Diabetes Mother, Son    Early death Brother    Heart disease Brother    No Known Problems Father    Obesity Sister          Tobacco Use    Smoking status: Never    Smokeless tobacco: Never   Substance and Sexual Activity    Alcohol use: Yes     Alcohol/week: 11.7 standard drinks of alcohol     Types: 14 Standard drinks or equivalent per week     Comment: 2 glasses wine per dinner    Drug use: No    Sexual activity: Never     Review of Systems   Gastrointestinal:  Positive for constipation.     Objective:     Vital Signs (Most Recent):  Temp: 97.3 °F (36.3 °C) (09/13/23 1351)  Pulse: 67 (09/13/23 2006)  Resp: 18 (09/13/23 2006)  BP: (!) 106/57 (09/13/23 1800)  SpO2: (!) 94 % (09/13/23 2006) Vital Signs (24h Range):  Temp:  [97.3 °F (36.3 °C)-97.5 °F (36.4 °C)] 97.3 °F (36.3 °C)  Pulse:  [60-90] 67  Resp:  [11-18] 18  SpO2:  [94 %-100 %] 94 %  BP: (106-132)/(56-75) 106/57     Weight: 50.3 kg (111 lb)  Body mass index is 19.66 kg/m².    No intake or output data in the 24 hours ending 09/13/23 2134    Physical Exam  Constitutional:       General: She is awake.      Comments: Elderly. Frail. Female NAD   Pulmonary:      Effort: Pulmonary effort is normal. No tachypnea, bradypnea, accessory muscle usage or respiratory distress.   Abdominal:      General: There is distension.      Palpations: Abdomen is soft.      Tenderness: There is no abdominal tenderness. There is no guarding.      Comments: Soft. Mild distention  non tender no guarding    Neurological:      Mental Status: She is alert.   Psychiatric:         Behavior: Behavior is cooperative.         Significant Labs:  CBC:   Recent Labs   Lab 09/13/23  1452   WBC 4.53   RBC 4.56   HGB 14.2   HCT 43.4      MCV 95   MCH 31.1*   MCHC 32.7     CMP:   Recent Labs   Lab 09/13/23  1452   GLU 74   CALCIUM  9.0   ALBUMIN 4.1   PROT 7.3      K 3.6   CO2 29      BUN 13   CREATININE 0.5   ALKPHOS 83   ALT 6*   AST 18   BILITOT 0.7       Significant Diagnostics:  I have reviewed all pertinent imaging results/findings within the past 24 hours.    Assessment/Plan:     Active Diagnoses:    Diagnosis Date Noted POA    PRINCIPAL PROBLEM:  Ileus [K56.7] 09/13/2023 Unknown    Anemia [D64.9] 02/03/2020 Yes    PD (Parkinson's disease) [G20] 09/16/2015 Yes    Hypothyroidism [E03.9]  Yes      Problems Resolved During this Admission:   Patient admitted. NGT placed. She has benign exam. Vitals stable. Family states she is essentially at base line but has been constipated x 1 week.   CT findings are concerning with a transition point and swirl sign in the mesentery.   I am comfortable with observation at least for tonight. Will reevaluate with serial exams.     Thank you for your consult.     Sterling Vigil III, MD  General Surgery  [facility]

## 2023-09-15 LAB
BACTERIA #/AREA URNS HPF: ABNORMAL /HPF
BILIRUB UR QL STRIP: NEGATIVE
CLARITY UR: ABNORMAL
COLOR UR: YELLOW
GLUCOSE UR QL STRIP: NEGATIVE
HGB UR QL STRIP: ABNORMAL
HYALINE CASTS #/AREA URNS LPF: 48 /LPF
KETONES UR QL STRIP: ABNORMAL
LEUKOCYTE ESTERASE UR QL STRIP: ABNORMAL
MICROSCOPIC COMMENT: ABNORMAL
NITRITE UR QL STRIP: NEGATIVE
PH UR STRIP: 7 [PH] (ref 5–8)
PROT UR QL STRIP: ABNORMAL
RBC #/AREA URNS HPF: 3 /HPF (ref 0–4)
SP GR UR STRIP: 1.01 (ref 1–1.03)
SQUAMOUS #/AREA URNS HPF: 8 /HPF
URN SPEC COLLECT METH UR: ABNORMAL
UROBILINOGEN UR STRIP-ACNC: ABNORMAL EU/DL
WBC #/AREA URNS HPF: 83 /HPF (ref 0–5)

## 2023-09-15 PROCEDURE — 25000003 PHARM REV CODE 250: Performed by: STUDENT IN AN ORGANIZED HEALTH CARE EDUCATION/TRAINING PROGRAM

## 2023-09-15 PROCEDURE — B4185 PARENTERAL SOL 10 GM LIPIDS: HCPCS | Performed by: STUDENT IN AN ORGANIZED HEALTH CARE EDUCATION/TRAINING PROGRAM

## 2023-09-15 PROCEDURE — 81001 URINALYSIS AUTO W/SCOPE: CPT | Performed by: STUDENT IN AN ORGANIZED HEALTH CARE EDUCATION/TRAINING PROGRAM

## 2023-09-15 PROCEDURE — 63600175 PHARM REV CODE 636 W HCPCS: Performed by: STUDENT IN AN ORGANIZED HEALTH CARE EDUCATION/TRAINING PROGRAM

## 2023-09-15 PROCEDURE — 87086 URINE CULTURE/COLONY COUNT: CPT | Performed by: STUDENT IN AN ORGANIZED HEALTH CARE EDUCATION/TRAINING PROGRAM

## 2023-09-15 PROCEDURE — 12000002 HC ACUTE/MED SURGE SEMI-PRIVATE ROOM

## 2023-09-15 RX ADMIN — LEVETIRACETAM 500 MG: 500 TABLET, FILM COATED ORAL at 09:09

## 2023-09-15 RX ADMIN — Medication 6 MG: at 09:09

## 2023-09-15 RX ADMIN — CARBOXYMETHYLCELLULOSE SODIUM 1 DROP: 5 SOLUTION/ DROPS OPHTHALMIC at 04:09

## 2023-09-15 RX ADMIN — LEUCINE, PHENYLALANINE, LYSINE, METHIONINE, ISOLEUCINE, VALINE, HISTIDINE, THREONINE, TRYPTOPHAN, ALANINE, GLYCINE, ARGININE, PROLINE, SERINE, TYROSINE, SODIUM ACETATE, DIBASIC POTASSIUM PHOSPHATE, MAGNESIUM CHLORIDE, SODIUM CHLORIDE, CALCIUM CHLORIDE, DEXTROSE
311; 238; 247; 170; 255; 247; 204; 179; 77; 880; 438; 489; 289; 213; 17; 297; 261; 51; 77; 33; 5 INJECTION INTRAVENOUS at 02:09

## 2023-09-15 RX ADMIN — LEUCINE, PHENYLALANINE, LYSINE, METHIONINE, ISOLEUCINE, VALINE, HISTIDINE, THREONINE, TRYPTOPHAN, ALANINE, GLYCINE, ARGININE, PROLINE, SERINE, TYROSINE, SODIUM ACETATE, DIBASIC POTASSIUM PHOSPHATE, MAGNESIUM CHLORIDE, SODIUM CHLORIDE, CALCIUM CHLORIDE, DEXTROSE
311; 238; 247; 170; 255; 247; 204; 179; 77; 880; 438; 489; 289; 213; 17; 297; 261; 51; 77; 33; 5 INJECTION INTRAVENOUS at 10:09

## 2023-09-15 RX ADMIN — LEVOTHYROXINE SODIUM 62.5 MCG: 0.03 TABLET ORAL at 05:09

## 2023-09-15 RX ADMIN — ENOXAPARIN SODIUM 40 MG: 40 INJECTION SUBCUTANEOUS at 09:09

## 2023-09-15 RX ADMIN — CARBOXYMETHYLCELLULOSE SODIUM 1 DROP: 5 SOLUTION/ DROPS OPHTHALMIC at 09:09

## 2023-09-15 RX ADMIN — I.V. FAT EMULSION 250 ML: 20 EMULSION INTRAVENOUS at 10:09

## 2023-09-15 RX ADMIN — TRAZODONE HYDROCHLORIDE 50 MG: 50 TABLET ORAL at 09:09

## 2023-09-15 RX ADMIN — POLYETHYLENE GLYCOL 3350 17 G: 17 POWDER, FOR SOLUTION ORAL at 10:09

## 2023-09-15 RX ADMIN — LEVETIRACETAM 500 MG: 500 TABLET, FILM COATED ORAL at 10:09

## 2023-09-15 NOTE — PROGRESS NOTES
[facility]  General Surgery  Progress Note    Subjective:     Interval History:  Patient awake and alert.  States she is feeling about at baseline.  Passing some flatus.  Had small bowel movement with enema today.  NG tube has been clamped for about 24 hours.    Post-Op Info:  * No surgery found *          Medications:  Continuous Infusions:   amino acids 4.25%-[id number]       Scheduled Meds:   carbidopa-levodopa  3 capsule Oral TID    carboxymethylcellulose  1 drop Both Eyes BID    enoxparin  40 mg Subcutaneous Daily    fat emulsion 20%  250 mL Intravenous Daily    levETIRAcetam  500 mg Oral BID    levothyroxine  62.5 mcg Oral Before breakfast    polyethylene glycol  17 g Oral Daily     PRN Meds:acetaminophen, HYDROcodone-acetaminophen, melatonin, morphine, ondansetron, prochlorperazine, sodium chloride 0.9%, traZODone     Objective:     Vital Signs (Most Recent):  Temp: 97.6 °F (36.4 °C) (09/15/23 1200)  Pulse: 75 (09/15/23 1200)  Resp: 16 (09/15/23 1200)  BP: 116/76 (09/15/23 1200)  SpO2: 95 % (09/15/23 1200) Vital Signs (24h Range):  Temp:  [97.5 °F (36.4 °C)-97.8 °F (36.6 °C)] 97.6 °F (36.4 °C)  Pulse:  [60-75] 75  Resp:  [16-19] 16  SpO2:  [93 %-96 %] 95 %  BP: (116-138)/(58-87) 116/76       Intake/Output Summary (Last 24 hours) at 9/15/2023 1504  Last data filed at 9/15/2023 1109  Gross per 24 hour   Intake 120 ml   Output --   Net 120 ml       Physical Exam  Constitutional:       General: She is awake.      Comments: Elderly. Frail. Female NAD   Pulmonary:      Effort: Pulmonary effort is normal. No tachypnea, bradypnea, accessory muscle usage or respiratory distress.   Abdominal:      Palpations: Abdomen is soft.      Tenderness: There is no abdominal tenderness. There is no guarding.      Comments: Soft. Mild distention  non tender. no guarding    Neurological:      Mental Status: She is alert.   Psychiatric:         Behavior: Behavior is cooperative.         Significant Labs:  CBC:    Recent Labs   Lab 09/14/23  0403   WBC 4.20   RBC 4.14   HGB 12.6   HCT 39.4      MCV 95   MCH 30.4   MCHC 32.0     CMP:   Recent Labs   Lab 09/14/23  0403   GLU 67*   CALCIUM 8.5*   ALBUMIN 3.3*   PROT 6.1      K 3.6   CO2 26      BUN 10   CREATININE 0.4*   ALKPHOS 70   ALT 5*   AST 13   BILITOT 0.8       Significant Diagnostics:  I have reviewed all pertinent imaging results/findings within the past 24 hours.    Assessment/Plan:     Active Diagnoses:    Diagnosis Date Noted POA    PRINCIPAL PROBLEM:  Ileus [K56.7] 09/13/2023 Unknown    Anemia [D64.9] 02/03/2020 Yes    PD (Parkinson's disease) [G20] 09/16/2015 Yes    Hypothyroidism [E03.9]  Yes      Problems Resolved During this Admission:   -we patient has done okay with NG tube clamped.  Still having flatus.  Constipation is an issue.  Not been nauseated or will remove NGT and advance to full liquids. Recommend continuing a bowel regimen with Miralax daily.       Sterling Vigil III, MD  General Surgery  [facility]

## 2023-09-15 NOTE — NURSING
09/15/2023      Assumed care of patient.   Assessment and vital signs assessed.   Labs and meds reviewed.  Last documentation =  Temp: 97.8 °F (36.6 °C) (09/15/23 0814)  Pulse: 69 (09/15/23 0814)  Resp: 16 (09/15/23 0814)  BP: 125/60 (09/15/23 0814)  SpO2: 96 % (09/15/23 0814)       Pt awake in the bed, looking at tv, call bell in reach, bed in lowest position, no pain or concerns at this time.

## 2023-09-15 NOTE — PLAN OF CARE
Patient diet just advanced to Clear liquids; she had a little Jello per nurse.  Problem: Parenteral Nutrition  Goal: Effective Intravenous Nutrition Therapy Delivery  Intervention: Optimize Intravenous Nutrition Delivery  Flowsheets (Taken 9/15/2023 6228)  Nutrition Support Management: parenteral nutrition continued as ordered--  1. Clinimix E @ 75 mL/hr + 250 mL, 20% IVL to provide 1112 kcal and 76.5 gm protein.   2. Should long-term PN be required, patient will need a central line.

## 2023-09-15 NOTE — PROGRESS NOTES
[facility]  General Surgery  Progress Note    Subjective:     Interval History:  Patient states she is feeling back to baseline.  Passing flatus.  No nausea.  No abdominal pain.  Still no bowel movement today.  NG tube in place and working.  No fevers or chills.    Post-Op Info:  * No surgery found *          Medications:  Continuous Infusions:   amino acids 4.25%-[id number] 75 mL/hr at 09/14/23 1254     Scheduled Meds:   carbidopa-levodopa  3 capsule Oral TID    carboxymethylcellulose  1 drop Both Eyes BID    enoxparin  40 mg Subcutaneous Daily    fat emulsion 20%  250 mL Intravenous Daily    levETIRAcetam  500 mg Oral BID    levothyroxine  62.5 mcg Oral Before breakfast    polyethylene glycol  17 g Oral Daily     PRN Meds:acetaminophen, HYDROcodone-acetaminophen, melatonin, morphine, ondansetron, prochlorperazine, sodium chloride 0.9%, traZODone     Objective:     Vital Signs (Most Recent):  Temp: 97.7 °F (36.5 °C) (09/14/23 2005)  Pulse: 68 (09/14/23 2005)  Resp: 19 (09/14/23 2005)  BP: 136/87 (09/14/23 2005)  SpO2: 95 % (09/14/23 2005) Vital Signs (24h Range):  Temp:  [97.3 °F (36.3 °C)-97.8 °F (36.6 °C)] 97.7 °F (36.5 °C)  Pulse:  [60-72] 68  Resp:  [16-19] 19  SpO2:  [93 %-98 %] 95 %  BP: (104-136)/(58-87) 136/87       Intake/Output Summary (Last 24 hours) at 9/14/2023 2105  Last data filed at 9/14/2023 1651  Gross per 24 hour   Intake 0 ml   Output --   Net 0 ml       Physical Exam  Constitutional:       General: She is awake.      Comments: Elderly. Frail. Female NAD   Pulmonary:      Effort: Pulmonary effort is normal. No tachypnea, bradypnea, accessory muscle usage or respiratory distress.   Abdominal:      Palpations: Abdomen is soft.      Tenderness: There is no abdominal tenderness. There is no guarding.      Comments: Soft. Mild distention  non tender. no guarding    Neurological:      Mental Status: She is alert.   Psychiatric:         Behavior: Behavior is cooperative.          Significant Labs:  CBC:   Recent Labs   Lab 09/14/23  0403   WBC 4.20   RBC 4.14   HGB 12.6   HCT 39.4      MCV 95   MCH 30.4   MCHC 32.0     CMP:   Recent Labs   Lab 09/14/23  0403   GLU 67*   CALCIUM 8.5*   ALBUMIN 3.3*   PROT 6.1      K 3.6   CO2 26      BUN 10   CREATININE 0.4*   ALKPHOS 70   ALT 5*   AST 13   BILITOT 0.8       Significant Diagnostics:  I have reviewed all pertinent imaging results/findings within the past 24 hours.    Assessment/Plan:     Active Diagnoses:    Diagnosis Date Noted POA    PRINCIPAL PROBLEM:  Ileus [K56.7] 09/13/2023 Unknown    Anemia [D64.9] 02/03/2020 Yes    PD (Parkinson's disease) [G20] 09/16/2015 Yes    Hypothyroidism [E03.9]  Yes      Problems Resolved During this Admission:   Clinically patient doing well.  Will clamp NG tube.  Okay for sips of clears and ice chips.  I think it would be okay for enema or suppository for constipation.      Sterling Vigil III, MD  General Surgery  [facility]

## 2023-09-16 LAB
ALBUMIN SERPL BCP-MCNC: 3.4 G/DL (ref 3.5–5.2)
ALP SERPL-CCNC: 67 U/L (ref 55–135)
ALT SERPL W/O P-5'-P-CCNC: 7 U/L (ref 10–44)
ANION GAP SERPL CALC-SCNC: 5 MMOL/L (ref 8–16)
AST SERPL-CCNC: 11 U/L (ref 10–40)
BASOPHILS # BLD AUTO: 0.05 K/UL (ref 0–0.2)
BASOPHILS NFR BLD: 1.1 % (ref 0–1.9)
BILIRUB SERPL-MCNC: 0.4 MG/DL (ref 0.1–1)
BUN SERPL-MCNC: 13 MG/DL (ref 8–23)
CALCIUM SERPL-MCNC: 8.7 MG/DL (ref 8.7–10.5)
CHLORIDE SERPL-SCNC: 106 MMOL/L (ref 95–110)
CO2 SERPL-SCNC: 28 MMOL/L (ref 23–29)
CREAT SERPL-MCNC: 0.4 MG/DL (ref 0.5–1.4)
DIFFERENTIAL METHOD: ABNORMAL
EOSINOPHIL # BLD AUTO: 0.1 K/UL (ref 0–0.5)
EOSINOPHIL NFR BLD: 1.7 % (ref 0–8)
ERYTHROCYTE [DISTWIDTH] IN BLOOD BY AUTOMATED COUNT: 12 % (ref 11.5–14.5)
EST. GFR  (NO RACE VARIABLE): >60 ML/MIN/1.73 M^2
GLUCOSE SERPL-MCNC: 107 MG/DL (ref 70–110)
HCT VFR BLD AUTO: 36.8 % (ref 37–48.5)
HGB BLD-MCNC: 12.2 G/DL (ref 12–16)
IMM GRANULOCYTES # BLD AUTO: 0.02 K/UL (ref 0–0.04)
IMM GRANULOCYTES NFR BLD AUTO: 0.4 % (ref 0–0.5)
LYMPHOCYTES # BLD AUTO: 1.2 K/UL (ref 1–4.8)
LYMPHOCYTES NFR BLD: 26.6 % (ref 18–48)
MAGNESIUM SERPL-MCNC: 1.9 MG/DL (ref 1.6–2.6)
MCH RBC QN AUTO: 31.2 PG (ref 27–31)
MCHC RBC AUTO-ENTMCNC: 33.2 G/DL (ref 32–36)
MCV RBC AUTO: 94 FL (ref 82–98)
MONOCYTES # BLD AUTO: 0.3 K/UL (ref 0.3–1)
MONOCYTES NFR BLD: 7.2 % (ref 4–15)
NEUTROPHILS # BLD AUTO: 2.9 K/UL (ref 1.8–7.7)
NEUTROPHILS NFR BLD: 63 % (ref 38–73)
NRBC BLD-RTO: 0 /100 WBC
PHOSPHATE SERPL-MCNC: 3.6 MG/DL (ref 2.7–4.5)
PLATELET # BLD AUTO: 213 K/UL (ref 150–450)
PMV BLD AUTO: 9.8 FL (ref 9.2–12.9)
POTASSIUM SERPL-SCNC: 4.3 MMOL/L (ref 3.5–5.1)
PREALB SERPL-MCNC: 12 MG/DL (ref 20–43)
PROT SERPL-MCNC: 6 G/DL (ref 6–8.4)
RBC # BLD AUTO: 3.91 M/UL (ref 4–5.4)
SODIUM SERPL-SCNC: 139 MMOL/L (ref 136–145)
TRIGL SERPL-MCNC: 81 MG/DL (ref 30–150)
WBC # BLD AUTO: 4.59 K/UL (ref 3.9–12.7)

## 2023-09-16 PROCEDURE — 36415 COLL VENOUS BLD VENIPUNCTURE: CPT | Performed by: STUDENT IN AN ORGANIZED HEALTH CARE EDUCATION/TRAINING PROGRAM

## 2023-09-16 PROCEDURE — 84100 ASSAY OF PHOSPHORUS: CPT | Performed by: STUDENT IN AN ORGANIZED HEALTH CARE EDUCATION/TRAINING PROGRAM

## 2023-09-16 PROCEDURE — 25000003 PHARM REV CODE 250: Performed by: STUDENT IN AN ORGANIZED HEALTH CARE EDUCATION/TRAINING PROGRAM

## 2023-09-16 PROCEDURE — 63600175 PHARM REV CODE 636 W HCPCS: Performed by: STUDENT IN AN ORGANIZED HEALTH CARE EDUCATION/TRAINING PROGRAM

## 2023-09-16 PROCEDURE — 97535 SELF CARE MNGMENT TRAINING: CPT

## 2023-09-16 PROCEDURE — 97166 OT EVAL MOD COMPLEX 45 MIN: CPT

## 2023-09-16 PROCEDURE — 84134 ASSAY OF PREALBUMIN: CPT | Performed by: STUDENT IN AN ORGANIZED HEALTH CARE EDUCATION/TRAINING PROGRAM

## 2023-09-16 PROCEDURE — 99231 PR SUBSEQUENT HOSPITAL CARE,LEVL I: ICD-10-PCS | Mod: ,,, | Performed by: SURGERY

## 2023-09-16 PROCEDURE — 80053 COMPREHEN METABOLIC PANEL: CPT | Performed by: STUDENT IN AN ORGANIZED HEALTH CARE EDUCATION/TRAINING PROGRAM

## 2023-09-16 PROCEDURE — 85025 COMPLETE CBC W/AUTO DIFF WBC: CPT | Performed by: STUDENT IN AN ORGANIZED HEALTH CARE EDUCATION/TRAINING PROGRAM

## 2023-09-16 PROCEDURE — 99231 SBSQ HOSP IP/OBS SF/LOW 25: CPT | Mod: ,,, | Performed by: SURGERY

## 2023-09-16 PROCEDURE — 12000002 HC ACUTE/MED SURGE SEMI-PRIVATE ROOM

## 2023-09-16 PROCEDURE — 84478 ASSAY OF TRIGLYCERIDES: CPT | Performed by: STUDENT IN AN ORGANIZED HEALTH CARE EDUCATION/TRAINING PROGRAM

## 2023-09-16 PROCEDURE — 83735 ASSAY OF MAGNESIUM: CPT | Performed by: STUDENT IN AN ORGANIZED HEALTH CARE EDUCATION/TRAINING PROGRAM

## 2023-09-16 RX ADMIN — LEVETIRACETAM 500 MG: 500 TABLET, FILM COATED ORAL at 09:09

## 2023-09-16 RX ADMIN — CEFTRIAXONE SODIUM 1 G: 1 INJECTION, POWDER, FOR SOLUTION INTRAMUSCULAR; INTRAVENOUS at 11:09

## 2023-09-16 RX ADMIN — LEUCINE, PHENYLALANINE, LYSINE, METHIONINE, ISOLEUCINE, VALINE, HISTIDINE, THREONINE, TRYPTOPHAN, ALANINE, GLYCINE, ARGININE, PROLINE, SERINE, TYROSINE, SODIUM ACETATE, DIBASIC POTASSIUM PHOSPHATE, MAGNESIUM CHLORIDE, SODIUM CHLORIDE, CALCIUM CHLORIDE, DEXTROSE
311; 238; 247; 170; 255; 247; 204; 179; 77; 880; 438; 489; 289; 213; 17; 297; 261; 51; 77; 33; 5 INJECTION INTRAVENOUS at 04:09

## 2023-09-16 RX ADMIN — LEVETIRACETAM 500 MG: 500 TABLET, FILM COATED ORAL at 10:09

## 2023-09-16 RX ADMIN — CARBOXYMETHYLCELLULOSE SODIUM 1 DROP: 5 SOLUTION/ DROPS OPHTHALMIC at 09:09

## 2023-09-16 RX ADMIN — ENOXAPARIN SODIUM 40 MG: 40 INJECTION SUBCUTANEOUS at 04:09

## 2023-09-16 RX ADMIN — TRAZODONE HYDROCHLORIDE 50 MG: 50 TABLET ORAL at 10:09

## 2023-09-16 RX ADMIN — LEVOTHYROXINE SODIUM 62.5 MCG: 0.03 TABLET ORAL at 06:09

## 2023-09-16 RX ADMIN — Medication 6 MG: at 10:09

## 2023-09-16 RX ADMIN — CARBOXYMETHYLCELLULOSE SODIUM 1 DROP: 5 SOLUTION/ DROPS OPHTHALMIC at 10:09

## 2023-09-16 RX ADMIN — POLYETHYLENE GLYCOL 3350 17 G: 17 POWDER, FOR SOLUTION ORAL at 09:09

## 2023-09-16 NOTE — PROGRESS NOTES
[facility]  Adult Nutrition   Progress Note (Nutrition Support Management)    SUMMARY     Recommendations  1.) Continue Clinimix E @ 75mL/hr to provide 612 kcals, 77gm protein; GIR 1.2 throughout the weekend.    RD ordered one additional day of lipids (20%) to provide an additional 500 kcals. Will infuse today only.   2.) Continue advancing diet as tolerated to gluten free diet restrictions.   3.) Continue miralax daily to aid in GI motility. Suggest lactobacillus acidophilus to promote gut health.   4.) RD to monitor and provide recommendations PRN.    Goals:   1.) Pt to receive/tolerate >75% of nutrition via PPN.   2.) Pt to consume/tolerate >50% of meals over 48hr.   3.) Pt to have bowel movements daily.  Nutrition Goal Status: progressing towards goal  Communication of RD Recs: other (comment)    Dietitian Rounds Brief  Follow up for PPN: NGT removed overnight with diet advanced to full liquid. Pt has not consumed any meals at this time. Clinimix E infusing @ 75mL/hr with lipids Friday (providing 1112 kcals, 77gm protein). Triglycerides WNL. Will continue PPN + one day of lipids over weekend to meet nutritional needs. +BM 9/15 after enema. RD to reassess on Monday.    Diet order:   Current Diet Order: full liquid diet      Current Nutrition Support Formula Ordered: Clinimix 4.25/5  Current Nutrition Support Rate Ordered: 75 (ml)  Current Nutrition Support Frequency Ordered: mL/hr    Evaluation of Received Nutrient/Fluid Intake  Parenteral Calories (kcal): 612  Parenteral Protein (gm): 76.5  Parenteral Fluid (mL): 1800  Lipid Calories (kcals): 500 kcals  GIR (Glucose Infusion Rate) (mg/kg/min): 1.2 mg/kg/min  Total Calories (kcal): 1112  Total Calories (kcal/kg): 22  % Kcal Needs: 73  Total Protein (gm): 77  Total Protein (gm/kg): 1.5  % Protein Needs: 100  Energy Calories Required: not meeting needs  Protein Required: meeting needs  Fluid Required: meeting needs     % Intake of Estimated Energy  "Needs: 75 - 100 %  % Meal Intake: 0 - 25 %      Intake/Output Summary (Last 24 hours) at 9/16/2023 0823  Last data filed at 9/16/2023 0614  Gross per 24 hour   Intake 600 ml   Output 800 ml   Net -200 ml        Anthropometrics  Temp: 98.5 °F (36.9 °C)  Height Method: Stated  Height: 5' 3" (160 cm)  Height (inches): 63 in  Weight Method: Bed Scale  Weight: 50.3 kg (111 lb)  Weight (lb): 111 lb  Ideal Body Weight (IBW), Female: 115 lb  % Ideal Body Weight, Female (lb): 96.52 %  BMI (Calculated): 19.7  BMI Grade: 18.5-24.9 - normal       Estimated/Assessed Needs  Weight Used For Calorie Calculations: 50.3 kg (110 lb 14.3 oz)  Energy Calorie Requirements (kcal): 4012-1932 / day (30-35 kcal/kg for weight gain)  Energy Need Method: Kcal/kg  Protein Requirements:  gm/day (1.5-2.0 gm/kg)  Weight Used For Protein Calculations: 50.3 kg (110 lb 14.3 oz)     Estimated Fluid Requirement Method: RDA Method  RDA Method (mL): 1509       Reason for Assessment  Reason For Assessment: RD follow-up  Diagnosis: gastrointestinal disease  Relevant Medical History: Parkinson's disease, recurrent small-bowel obstructions, hypothyroid, GERD  Interdisciplinary Rounds: did not attend    Nutrition/Diet History  Spiritual, Cultural Beliefs, Shinto Practices, Values that Affect Care: no  Food Allergies: other (see comments) (gluten)  Factors Affecting Nutritional Intake: None identified at this time    Nutrition Risk Screen  Nutrition Risk Screen: reduced oral intake over the last month     MST Score: 0  Have you recently lost weight without trying?: No  Weight loss score: 0  Have you been eating poorly because of a decreased appetite?: No  Appetite score: 0       Weight History:  Wt Readings from Last 5 Encounters:   09/13/23 50.3 kg (111 lb)   09/13/23 49 kg (108 lb 0.4 oz)   08/08/23 53.5 kg (118 lb)   05/10/23 53.9 kg (118 lb 13.3 oz)   03/09/23 53.8 kg (118 lb 9.7 oz)        Lab/Procedures/Meds: Pertinent Labs/Meds " Reviewed    Medications:Pertinent Medications Reviewed  Scheduled Meds:   carbidopa-levodopa  3 capsule Oral TID    carboxymethylcellulose  1 drop Both Eyes BID    enoxparin  40 mg Subcutaneous Daily    fat emulsion 20%  250 mL Intravenous Daily    levETIRAcetam  500 mg Oral BID    levothyroxine  62.5 mcg Oral Before breakfast    polyethylene glycol  17 g Oral Daily     Continuous Infusions:   amino acids 4.25%-[id number] 75 mL/hr at 09/16/23 0454     PRN Meds:.acetaminophen, HYDROcodone-acetaminophen, melatonin, morphine, ondansetron, prochlorperazine, sodium chloride 0.9%, traZODone    Labs: Pertinent Labs Reviewed  Clinical Chemistry:  Recent Labs   Lab 09/13/23  1452 09/14/23  0403 09/16/23  0415    139 139   K 3.6 3.6 4.3    108 106   CO2 29 26 28   GLU 74 67* 107   BUN 13 10 13   CREATININE 0.5 0.4* 0.4*   CALCIUM 9.0 8.5* 8.7   PROT 7.3 6.1 6.0   ALBUMIN 4.1 3.3* 3.4*   BILITOT 0.7 0.8 0.4   ALKPHOS 83 70 67   AST 18 13 11   ALT 6* 5* 7*   ANIONGAP 6* 5* 5*   MG 1.8  --  1.9   PHOS  --   --  3.6   LIPASE 30  --   --      CBC:   Recent Labs   Lab 09/16/23 0415   WBC 4.59   RBC 3.91*   HGB 12.2   HCT 36.8*      MCV 94   MCH 31.2*   MCHC 33.2     Lipid Panel:  Recent Labs   Lab 09/16/23  0415   TRIG 81       Monitor and Evaluation  Food and Nutrient Intake: energy intake, food and beverage intake, parenteral nutrition intake  Food and Nutrient Adminstration: diet order, enteral and parenteral nutrition administration  Knowledge/Beliefs/Attitudes: food and nutrition knowledge/skill  Physical Activity and Function: nutrition-related ADLs and IADLs  Anthropometric Measurements: weight, weight change, body mass index  Biochemical Data, Medical Tests and Procedures: electrolyte and renal panel, gastrointestinal profile, glucose/endocrine profile  Nutrition-Focused Physical Findings: overall appearance     Nutrition Risk  Level of Risk/Frequency of Follow-up: high     Nutrition Follow-Up  RD  Follow-up?: Yes      Tomasa Batista, ANSON 09/16/2023 8:23 AM

## 2023-09-16 NOTE — PROGRESS NOTES
General Surgery Progress Note    Admit Date: 9/13/2023  S/P: * No surgery found *    Post-operative Day:      Hospital Day: 4    SUBJECTIVE:   Continues to pass flatus.  Had bowel function with enema but none spontaneous. NG tube was removed. Tolerating small amount of full liquids.  Denies any significant abdominal pain or distention worse than baseline.    OBJECTIVE:     Vital Signs (Most Recent)  Temp:  [97.5 °F (36.4 °C)-98.5 °F (36.9 °C)] 98 °F (36.7 °C)  Pulse:  [61-75] 72  Resp:  [16-18] 16  SpO2:  [94 %-97 %] 94 %  BP: (104-135)/(59-76) 117/62    I&Os:  I/O last 3 completed shifts:  In: 600 [P.O.:600]  Out: 800 [Urine:800]    Physical Exam:  Gen: NAD, AAOx3  HEENT: Anicteric sclera  Pulm: unlabored, symmetrical   Abd: Appears somewhat distended but reportedly at baseline, soft and compressible, no rebound, no guarding, well-healed incision with multiple small reducible incisional hernias    Laboratory:  CBC:   Recent Labs   Lab 09/16/23  0415   WBC 4.59   RBC 3.91*   HGB 12.2   HCT 36.8*      MCV 94   MCH 31.2*   MCHC 33.2     CMP:   Recent Labs   Lab 09/16/23  0415      CALCIUM 8.7   ALBUMIN 3.4*   PROT 6.0      K 4.3   CO2 28      BUN 13   CREATININE 0.4*   ALKPHOS 67   ALT 7*   AST 11   BILITOT 0.4     Labs within the past 24 hours have been reviewed.    ASSESSMENT/PLAN:     Patient Active Problem List    Diagnosis Date Noted    Ileus 09/13/2023    Aortic atherosclerosis 05/10/2023    Seizure 01/30/2023    Physical deconditioning 12/16/2021    Encephalopathy, metabolic 12/16/2021    Esophagitis, Rozel grade D 12/16/2021    Elevated liver enzymes 12/15/2021    Hyperglycemia 12/04/2021    Hammer toe of left foot 08/25/2020    Anemia 02/03/2020    Adenoma 02/03/2020    Enteritis 09/17/2019    Chronic left shoulder pain 05/10/2018    Pancreas cyst 04/12/2018    Chronic diarrhea 02/21/2018    Hemorrhoids 02/21/2018    PD (Parkinson's disease) 09/16/2015    Breast lump on right  side at 1 o'clock position 03/11/2015    Lump of skin of back 03/11/2015    Hypothyroidism     Gluten enteropathy 04/11/2014         78 y.o. female with small-bowel obstruction  -obstruction appears to be resolving  -diet as tolerated, once p.o. intake increases can wean from parenteral nutrition  -continue MiraLax  -optimize electrolytes  -on Rocephin for UTI

## 2023-09-16 NOTE — PT/OT/SLP PROGRESS
Physical Therapy      Patient Name:  Ariana Kramer Te   MRN:  [id number]    Patient not seen today secondary to Patient unwilling to participate. She reports fatigue after working with OT and getting up to bathroom this morning. Will follow-up 09/17/23.

## 2023-09-16 NOTE — PLAN OF CARE
Problem: Skin Injury Risk Increased  Goal: Skin Health and Integrity  Intervention: Promote and Optimize Oral Intake  Flowsheets (Taken 9/16/2023 0825)  Oral Nutrition Promotion:   calorie-dense foods provided   other (see comments)     Problem: Parenteral Nutrition  Goal: Effective Intravenous Nutrition Therapy Delivery  Outcome: Ongoing, Progressing  Intervention: Optimize Intravenous Nutrition Delivery  Flowsheets (Taken 9/16/2023 0825)  Nutrition Support Management:   parenteral nutrition continued as ordered   weight trending reviewed   other (see comments)  Medication Review/Management: medications reviewed     Problem: Oral Intake Inadequate  Goal: Improved Oral Intake  Outcome: Ongoing, Progressing  Intervention: Promote and Optimize Oral Intake  Flowsheets (Taken 9/16/2023 0825)  Oral Nutrition Promotion:   calorie-dense foods provided   other (see comments)     Recommendations  1.) Continue Clinimix E @ 75mL/hr to provide 612 kcals, 77gm protein; GIR 1.2 throughout the weekend.               RD ordered one additional day of lipids (20%) to provide an additional 500 kcals. Will infuse today only.   2.) Continue advancing diet as tolerated to gluten free diet restrictions.   3.) Continue miralax daily to aid in GI motility. Suggest lactobacillus acidophilus to promote gut health.   4.) RD to monitor and provide recommendations PRN.     Goals:   1.) Pt to receive/tolerate >75% of nutrition via PPN.   2.) Pt to consume/tolerate >50% of meals over 48hr.   3.) Pt to have bowel movements daily.  Nutrition Goal Status: progressing towards goal  Communication of RD Recs: other (comment)

## 2023-09-16 NOTE — PROGRESS NOTES
[facility]    Progress Note    Patient Name: Ariana Tiwari  MRN: [id number]  Patient Class: IP- Inpatient   Admission Date: 9/13/2023  1:43 PM  Length of Stay: 3  Attending Physician: Sophie Law MD  Primary Care Provider: Nba Petty MD  Face-to-Face encounter date: 09/16/2023  Code status:  Chief Complaint: Constipation (Pt sent over from PCP for two possible bowel obstructions. Denies abdominal pain or vomiting. )        Subjective:    HPI:Patient is a 78-year-old female with a history of Parkinson's disease, recurrent small-bowel obstructions, hypothyroid, GERD, she is presenting today with persistent abdominal distention and ileus.  The patient was hospitalized in [place] 2 weeks ago at the end of August.  The patient was found to have ileus at that time with little to no improvement.  The family wanted to return to [place] and the patient was discharged from the facility there and she returned here hoping that her ileus will resolve with time.  After advancing her diet, continuing to monitor at home she has not had any improvement in her ileus.  She continues to have persistent abdominal distention and discomfort.  She is passing gas but not having any bowel movements.  She is been taking her medications as prescribed despite the ileus.  In the past, she has improved with gastric decompression with NG tube.  This was placed in the ER.  She denies fever, chills, severe abdominal pain.  No nausea or vomiting at this time.  No chest pain or shortness a breath.     In the ER, vital signs are stable, P 90, R 16, /75, O2 sat 90% on room air.  CT abdomen pelvis obtained which shows numerous segments of markedly dilated fluid-filled small bowel.  No specific transition point is identified however the terminal ileum is decompressed.  At this point, NG tube was placed in the ER and patient will be admitted for further management of ileus.       Interval History:  Continued Stay Note   Zeny     Patient Name: Olu Munoz  MRN: [id number]  JOYCELYN Villatoro       9/14:  NG tube still actively draining.  Will repeat KUB and surgery to re-evaluate.  Patient has had history of abdominal surgeries x2.  Patient states that she is doing well and slept pretty well today.  Bilateral red eyes noted, patient denies any itching or pain and states that is due to dry eyes.  Will add eyedrops. Family present at bedside.No concerns/issues overnight reported by the patient or the nursing staff.    9/15:  Patient noted to be uncomfortable eyes bloodshot.  She states that she did not sleep well last night.  Surgery evaluated patient yesterday and clamped NG tube and recommended advancement to clear sips.  Enema and suppository also recommended.  Patient states that she is passing gas already and is eager to eat.    9/16:  Patient had bowel movement yesterday.  Patient looking significantly better today.  NG tube has been removed and patient advanced to full liquid diet.  Acute overnight events.  Patient currently on IV Rocephin for UTI.  Urine culture pending    Review of Systems All other Review of Systems were found to be negative expect for that mentioned already in HPI.     Objective:     Vitals:    09/15/23 1716 09/15/23 2325 09/16/23 0410 09/16/23 0700   BP: 135/66 119/67 (!) 104/59 117/62   Pulse: 70 69 61 72   Resp: 18 18 18 16   Temp: 97.5 °F (36.4 °C) 98.3 °F (36.8 °C) 98.5 °F (36.9 °C) 98 °F (36.7 °C)   TempSrc: Oral Oral Oral Oral   SpO2: 95% 97% 96% (!) 94%   Weight:       Height:            Vitals reviewed.  Constitutional: No distress.   HENT: NC, bloodshot eyes  Head: Atraumatic.   Cardiovascular: Normal rate, regular rhythm and normal heart sounds.   Pulmonary/Chest: Effort normal. No wheezes.   Abdominal: Soft. Bowel sounds are normal. No distension and no mass. No tenderness  Neurological: Alert.   Skin: Skin is warm and dry.   Psych: Appropriate mood and affect    Following labs were Reviewed   CBC:  Recent Labs   Lab 09/16/23  0415   WBC 4.59   HGB 12.2   HCT 36.8*           CMP:  Recent Labs   Lab 09/16/23  0415   CALCIUM 8.7   ALBUMIN 3.4*   PROT 6.0      K 4.3   CO2 28      BUN 13   CREATININE 0.4*   ALKPHOS 67   ALT 7*   AST 11   BILITOT 0.4         Micro Results  Microbiology Results (last 7 days)       Procedure Component Value Units Date/Time    Urine culture [[id number]] Collected: 09/15/23 9977    Order Status: No result Specimen: Urine Updated: 09/15/23 1335             Radiology Reports  X-Ray Abdomen AP 1 View    Result Date: 9/14/2023  HISTORY: f/u ileus COMPARISON:Comparison is made with the patient's previous imaging study of 2/7/2023. FINDINGS:Nasogastric tube projects below the level of the gastroesophageal junction tip oriented within the region of the gastric fundus has been employed as compared to previous. Several widespread loops of intestinal bowel gas are again redemonstrated throughout the abdominal cavity in keeping with mild ileus. Gas reaches the ascending colon. There are atheromatous calcifications of the splenic artery. The osseous structures reveal no significant finding. Several calcified densities in the lower pelvis are attributed towards midline calcified fibroids. IMPRESSION: Dilated small bowel loops taken on the appearance of ileus without evidence of definable change upon comparison. Electronically signed by:  Demetrius Samuels MD  9/14/2023 9:46 AM CDT Workstation: [id number]    CT Abdomen Pelvis With Contrast    Result Date: 9/13/2023  EXAM: CT ABDOMEN PELVIS WITH CONTRAST HISTORY: Bowel obstruction suspected COMPARISON: CT scan of the abdomen and pelvis dated 1/29/2023 TECHNIQUE: Multiple axial 2 mm thick images were obtained through the abdomen and pelvis IV contrast only. This exam was performed according to our departmental dose-optimization program, which includes automated exposure control, adjustment of the mA and/or kV according to patient size and/or use of iterative reconstruction technique. Unless otherwise  stated, incidental findings do not require dedicated follow-up imaging. FINDINGS: There are numerous segments of air and fluid-filled markedly dilated small bowel is noted throughout the abdomen and pelvis. The colon is not distended. It contains formed stool. The findings are consistent with distal small bowel obstruction. Similar, but less prominent findings were present on the previous CT in January. As on the previous study, there is swirling of the mesenteric vessels in the pelvis suggesting an internal hernia as the cause of the obstruction. There is also abrupt tapered narrowing of a mildly dilated segment of small bowel in this region that further indicates this is likely the site of the obstruction. The terminal ileum is completely collapsed. There is no free air or free fluid in the abdomen or pelvis. There is no pneumatosis. The liver, spleen, pancreas, and kidneys are unremarkable. There is no hydronephrosis or hydroureter ureter. There is a 2.6 x 1.4 cm diameter low-density solid mass in the left adrenal gland that is unchanged, and is quite likely an adenoma. The right adrenal gland appears normal. Images through the lung bases show minor linear atelectasis in both lower lobes. There is mild to moderate vascular calcification. IMPRESSION:   Numerous segments of moderate to markedly dilated air and fluid-filled small bowel distributed throughout the abdomen and pelvis. There is very little nondilated small bowel. However, the terminal ileum is completely decompressed. Findings are consistent with distal small bowel obstruction. The site of the obstruction is suspected be within the pelvis along the midline. An internal hernia suspected as etiology. Similar findings were evident on the previous CT scan dated 1/29/2023. Electronically signed by:  Preet Baltazar MD  9/13/2023 4:38 PM CDT Workstation: [id number]    X-Ray Chest AP Portable    Result Date: 9/13/2023  HISTORY: Nasogastric tube placement,   constipation. FINDINGS: Portable chest radiograph at 1543 hours compared to prior exams shows nasogastric tube with distal tip at the level of the gastroesophageal junction or gastric cardia region. The cardiomediastinal silhouette and pulmonary vasculature are within normal limits. The lungs are hypoexpanded, with no consolidation, large pleural effusion, or evidence of pulmonary edema. Right lower lung bandlike opacities suggests chronic atelectasis, with no pneumothorax. The bones are diffusely osteopenic. IMPRESSION: Nasogastric tube as described. Electronically signed by:  Juan Manuel Mejia MD  9/13/2023 3:59 PM CDT Workstation: [id number]    X-Ray Abdomen Flat And Erect    Result Date: 9/13/2023  EXAMINATION: XR ABDOMEN FLAT AND ERECT CLINICAL HISTORY: Personal history of other diseases of the digestive system TECHNIQUE: Flat and erect AP views of the abdomen were performed. COMPARISON: 02/07/2023 FINDINGS: There is diffuse dilatation of bowel with air-fluid levels present.  This is likely both small bowel and colon.  No free air is demonstrated.     Diffuse bowel dilatation concerning for small bowel obstruction.  Further evaluation with CT is recommended.  Further evaluation with CT is recommended. This report was flagged in Epic as abnormal. Electronically signed by: Rai Jennings MD Date:    09/13/2023 Time:    12:55    XR ABDOMEN 2+ VIEWS    Result Date: 9/2/2023  EXAMINATION:  XR ABDOMEN 2 VIEWS HISTORY:  ileus TECHNIQUE: 2 views of the abdomen. COMPARISON:  8/31/2023. CT 8/29/2023. Correlation with small bowel follow-through 8/31/2023. FINDINGS: There is moderate gaseous distention of the small bowel. There is oral contrast within the colon. No free air. No organomegaly or suspicious calcifications. The lung bases are grossly clear. No acute bony abnormality.    IMPRESSION: 1.  Ongoing moderate distention of the small bowel; favor ileus. 2.  Oral contrast is present within the colon, and small bowel  obstruction is considered unlikely. Electronically signed by:  Al Castillo MD  09/02/2023 09:26 AM [employer] Workstation: [id number]    XR Small Bowel Follow Through    Result Date: 8/31/2023  EXAMINATION:  FL SMALL BOWEL WATER SOLUBLE HISTORY:  assess SBO COMPARISON:  8/31/2023 PROCEDURALIST: Radha ARAUZ PEAK SKIN DOSE (PSD): 0 mGy FINDINGS:   200 mL Omnipaque 240 administered through nasogastric tube. Contrast quickly progressed to markedly dilated small bowel loops and reaches the ascending colon at 60 minutes. There is marked gaseous distention of the large and small bowel in all 4 quadrants and abdominal distention as well.    IMPRESSION:  Radiographic evidence of severe ileus. Electronically signed by:  Adriano Wright MD  08/31/2023 10:36 AM [employer] Workstation: [id number]    X-Ray Abdomen Portable    Result Date: 8/31/2023  EXAMINATION:  XR ABDOMEN PORTABLE HISTORY: sbo TECHNIQUE: Single view of the abdomen. COMPARISON: 8/30/2023. FINDINGS: There is NG tube with the tip projecting over the gastric fundus. Redemonstration of dilated bowel loops with some air-fluid levels. There is no pneumoperitoneum, visible mass or abnormal calcification.  There are no acute osseous abnormalities.      IMPRESSION: 1. NG tube with the tip projecting over the stomach. 2. Dilated bowel loops with air-fluid level, suggestive of small bowel obstruction. Electronically signed by:  Annette Stephenson MD  08/31/2023 06:33 AM [employer] Workstation: [id number]    X-Ray Abdomen Portable    Result Date: 8/30/2023  EXAMINATION:  XR ABDOMEN PORTABLE HISTORY: NG placement verification TECHNIQUE: Single view of the abdomen. COMPARISON: 8/29/2023. FINDINGS: There is NG tube with the distal portion appeared to be looped in the tip projecting over the medial side of the gastric fundus. Bowel gas pattern is normal without dilatation or air-fluid levels.  There is no pneumoperitoneum, visible mass or abnormal calcification.  There are no acute  osseous abnormalities.      IMPRESSION: NG tube with the distal portion appeared to be looped in the tip projecting over the medial side of the fundus. Electronically signed by:  Annette Stephenson MD  08/30/2023 12:45 AM [employer] Workstation: [id number]    XR ABDOMEN 1 VIEW    Result Date: 8/30/2023  EXAMINATION:  XR ABDOMEN 1 VIEW HISTORY: NGT position TECHNIQUE: Single view of the abdomen. COMPARISON: August 29, 2023 FINDINGS: Interval placement of enteric tube with tip and side-port project over the expected location of the stomach. Gaseous distention of small bowel in the abdomen. Contrast within the left renal calyces. Lung bases are clear. No acute bony abnormality.    IMPRESSION: Enteric tube with tip and side port projecting over the stomach. Electronically signed by:  Ac Tucker MD  08/29/2023 09:24 PM [employer] Workstation: [id number]    CT Abdomen Pelvis With Contrast    Result Date: 8/29/2023  EXAMINATION:  CT ABDOMEN PELVIS W CONTRAST HISTORY:  Indication Not Found - See Additional Reason for Exam Comments; Previous bowel obstruction, bowel surgery, potential internal hernia, no suspicion that she is we obstructed, please start oral contrast, TECHNIQUE:  Computed tomography of the abdomen and pelvis with intravenous contrast. Multiplanar 2-D reformats were performed and evaluated. This CT examination was performed using 1 more the following dose reduction techniques: Automated exposure control, adjustment of mA and/or kV according to patient size, or use of iterative reconstruction technique. CONTRAST: IOHEXOL 300 MG/ML INJECTION SOLN: 75 mL; IOHEXOL 300 MG/ML INJECTION SOLN: 50 mL CTDI: CTDI: 10.64 CTDIv; CTDLP: 527 mGy-cm DLP COMPARISON: Same day abdominal radiographs. FINDINGS: LOWER CHEST: Bibasilar atelectasis. No pleural effusion. LIVER: Normal in size and contour. No focal lesion. GALLBLADDER: Distended. No radiopaque stone. No definite gallbladder wall thickening. BILE DUCTS: Grossly unremarkable.  PANCREAS: No mass, ductal dilation, or mike-pancreatic fluid. SPLEEN: Normal size.  No focal lesion. ADRENALS: 2 cm left adrenal nodule. Normal right adrenal gland. KIDNEYS AND URETERS: Normal size and contour. No hydronephrosis. URINARY BLADDER: Normal contour. GASTROINTESTINAL TRACT: Stomach is moderately dilated with air-fluid level. Diffuse dilation of small bowel with air-fluid levels. There is dilation of to at least 4 cm in diameter. There is a region of focal decreased small bowel caliber in the right abdomen with surrounding spiraling mesenteric vessels (series 4 image 45 through 60) concerning for a site of closed loop obstruction. The colon is decompressed. APPENDIX: Not definitely visualized. LYMPH NODES: No lymphadenopathy. ABDOMINAL AORTA AND OTHER VESSELS: Normal caliber aorta and IVC. REPRODUCTIVE ORGANS: No pathologic process MUSCULOSKELETAL: No acute or suspicious osseous abnormality. ADDITIONAL FINDINGS: None.    IMPRESSION: Small bowel obstruction with suspected closed-loop site in the right mid abdomen. NOTIFICATION:  These findings were discussed by telephone with KELLY WHITE on 8/29/2023 10:49 PM CDT Indeterminate 2 cm left adrenal gland nodule. Recommend nonemergent follow-up with dedicated MR or CT of the adrenal glands. Electronically signed by:  Ac Tucker MD  08/29/2023 07:51 PM [employer] Workstation: [id number]    X-Ray Abdomen Flat And Erect    Result Date: 8/29/2023  EXAMINATION:  XR ABDOMEN SUPINE AND UPRIGHT HISTORY:  Abdominal distention, history of bowel obstruction TECHNIQUE: Supine and erect views of the abdomen. COMPARISON:  None. FINDINGS: Nonspecific bowel gas pattern with a few air-fluid levels, apparently in both small and large bowel. No evidence of free air in the abdomen. No suspicious calcifications. No acute bony abnormality.    IMPRESSION:  Nonspecific bowel gas pattern with a few layering air-fluid levels in colon and likely small bowel. Electronically signed  by:  Ac Tucker MD  08/29/2023 05:17 PM [employer] Workstation: [id number]       Meds  Scheduled Meds:   carbidopa-levodopa  3 capsule Oral TID    carboxymethylcellulose  1 drop Both Eyes BID    enoxparin  40 mg Subcutaneous Daily    fat emulsion  250 mL Intravenous Q24H    levETIRAcetam  500 mg Oral BID    levothyroxine  62.5 mcg Oral Before breakfast    polyethylene glycol  17 g Oral Daily     Continuous Infusions:   amino acids 4.25%-uwbul-Lf-L2Q 75 mL/hr at 09/16/23 0454     PRN Meds:.acetaminophen, HYDROcodone-acetaminophen, melatonin, morphine, ondansetron, prochlorperazine, sodium chloride 0.9%, traZODone.    Active PT: No  Active OT: No  Active SLP: No  Assessment & Plan:   * Ileus  Patient is here with abdominal distention and persistent ongoing ileus after being discharged from the hospital and [place] 2 weeks ago.  They were hopeful that upon returning home she would have improvement in her symptoms but she is not had any bowel movements.  She does continue to have flatus.  - NG tube discontinued  - general surgery on board  - clearance by surgery to advanced to full liquids  - IV fluids       Anemia  Hemoglobin is stable  Trend        PD (Parkinson's disease)  Patient with Parkinson's disease on carbidopa-levodopa  She will bring her home time release capsule  At her previous hospitalization they were clamping her NG tube and allowing her to take her carbidopa given that she has severe symptoms without it  Will try to continue this process here  Ambulate as much as possible, up in chair during the day        Hypothyroidism  Continue Synthroid       UTI  IV Rocephin daily  Urine culture pending             Discharge Planning:   Is the patient medically ready for discharge?: no    Reason for patient still in hospital (select all that apply): Patient trending condition and Treatment    Above encounter included review of the medical records, interviewing and examining the patient face-to-face, discussion  with family and other health care providers, ordering and interpreting lab/test results and formulating a plan of care.     Medical Decision Making:      [_] Low Complexity  [_] Moderate Complexity  [x] High Complexity      Sophie Law MD  Department of Hospital Medicine   [facility]

## 2023-09-16 NOTE — NURSING
NG tubed removed; pt tolerated well; pt resting comfortably;bed in lowest position& call light in reach; daughter at bedside

## 2023-09-16 NOTE — PT/OT/SLP EVAL
Occupational Therapy   Evaluation    Name: Ariana Tiwari  MRN: [id number]  Admitting Diagnosis: Ileus  Recent Surgery: * No surgery found *      Recommendations:     Discharge Recommendations: home health OT  Discharge Equipment Recommendations:  none  Barriers to discharge:  None    Assessment:     Ariana Tiwari is a 78 y.o. female with a medical diagnosis of Ileus. Pt agreeable to OT evaluation this AM. Performance deficits affecting function: weakness, impaired endurance, impaired self care skills, impaired functional mobility, gait instability, impaired balance, decreased coordination, decreased upper extremity function, decreased lower extremity function, decreased safety awareness, impaired coordination, impaired cardiopulmonary response to activity.      Rehab Prognosis: Fair; patient would benefit from acute skilled OT services to address these deficits and reach maximum level of function.       Plan:     Patient to be seen 5 x/week to address the above listed problems via self-care/home management, therapeutic activities, therapeutic exercises  Plan of Care Expires: 10/16/23  Plan of Care Reviewed with: patient, sibling    Subjective     Chief Complaint: none stated  Patient/Family Comments/goals: none stated    Occupational Profile:  Living Environment: Pt lives with son and DIL in a 1 story home with 10 RODNEY with no railing. Pt has a walk-in tub  Previous level of function: Assist with ADLs from DIL; Supervision/occasional assist with mobility with rollator  Roles and Routines: mother; sister  Equipment Used at Home: rollator, grab bar, power chair, wheelchair (walk-in tub)  Assistance upon Discharge: yes, from family    Pain/Comfort:  Pain Rating 1: 0/10    Patients cultural, spiritual, Ep[employer]opalian conflicts given the current situation:      Objective:     Communicated with: nursing prior to session.  Patient found HOB elevated with bed alarm, telemetry, peripheral IV upon OT entry to room.    General  Precautions: Standard, fall  Orthopedic Precautions: N/A  Braces: N/A  Respiratory Status: Room air    Occupational Performance:    Bed Mobility:    Patient completed Supine to Sit with moderate assistance  Patient completed Sit to Supine with minimum assistance    Activities of Daily Living:  Grooming: setup assistance bed level wash face      Cognitive/Visual Perceptual:  Cognitive/Psychosocial Skills:     -       Follows Commands/attention:Follows one-step commands  -       Communication: clear/fluent  -       Safety awareness/insight to disability: impaired   -       Mood/Affect/Coping skills/emotional control: Appropriate to situation, Cooperative, and Pleasant    Physical Exam:  Balance:    -       SBA/CGA seated balance  Upper Extremity Range of Motion:     -       Right Upper Extremity: WFL  -       Left Upper Extremity: WFL  Upper Extremity Strength:    -       Right Upper Extremity: WFL  -       Left Upper Extremity: WFL   Strength:    -       Right Upper Extremity: fair   -       Left Upper Extremity: fair   Fine/Gross Motor Coordination: impaired bilaterally due to tremors (PD)    AMPAC 6 Click ADL:  AMPAC Total Score: 16    Treatment & Education:  Pt educated on role of OT/POC, importance of OOB/EOB activity, use of call bell, and safety during ADLs, transfers, and functional mobility.    Patient left HOB elevated with all lines intact, call button in reach, bed alarm on, and nurse and sister present    GOALS:   Multidisciplinary Problems       Occupational Therapy Goals          Problem: Occupational Therapy    Goal Priority Disciplines Outcome Interventions   Occupational Therapy Goal     OT, PT/OT     Description: Goals to be met by: 10/16/23     Patient will increase functional independence with ADLs by performing:    UE Dressing with Minimal Assistance.  LE Dressing with Minimal Assistance.  Grooming while seated with Set-up Assistance.  Toileting from toilet with Minimal Assistance for  hygiene and clothing management.   Toilet transfer to toilet with Minimal Assistance.                         History:     Past Medical History:   Diagnosis Date    Allergy     Diverticulosis     Gluten enteropathy     Gluten intolerance     Hernia     Hypothyroidism     PD (Parkinson's disease) 9/16/2015    Right tremor type    Peptic ulcer disease     Right shoulder pain     Cirtisone injection 3/26/15 - Dr. Tolbert    Ulcer          Past Surgical History:   Procedure Laterality Date    ADENOIDECTOMY      COLONOSCOPY  03/01/2012    Dr. Teresa, repeat in 10 years for screening    COLONOSCOPY N/A 2/21/2018    Procedure: COLONOSCOPY;  Surgeon: Radha Deng MD;  Location: [facility];  Service: Endoscopy;  Laterality: N/A;    CORRECTION OF HAMMER TOE Left 9/16/2020    Procedure: CORRECTION, HAMMER TOE;  Surgeon: William Sprague MD;  Location: [facility];  Service: Orthopedics;  Laterality: Left;    COSMETIC SURGERY      Broken nose    ESOPHAGOGASTRODUODENOSCOPY N/A 12/8/2021    Procedure: EGD (ESOPHAGOGASTRODUODENOSCOPY);  Surgeon: Benji Valentino III, MD;  Location: [facility];  Service: Endoscopy;  Laterality: N/A;    FRACTURE SURGERY  left wrist    With pin    HEMORRHOID SURGERY      HERNIA REPAIR Left 04/09/2015    Dr Lo; left inguinal    INTRALUMINAL GASTROINTESTINAL TRACT IMAGING VIA CAPSULE N/A 7/10/2018    Procedure: IMAGING, GI TRACT, INTRALUMINAL, VIA CAPSULE;  Surgeon: Radha Deng MD;  Location: [facility];  Service: Endoscopy;  Laterality: N/A;    LYSIS OF ADHESIONS  9/29/2020    Procedure: LYSIS, ADHESIONS;  Surgeon: Eagle Gonzalez MD;  Location: [facility];  Service: General;;    RHINOPLASTY TIP      TONSILLECTOMY      UPPER GASTROINTESTINAL ENDOSCOPY  05/21/2015    Dr. Gomez       Time Tracking:     OT Date of Treatment: 09/16/23  OT Start Time: 1107  OT Stop Time: 1122  OT Total Time (min): 15 min    Billable Minutes:Evaluation 7  Therapeutic Activity 8    9/16/2023

## 2023-09-16 NOTE — PLAN OF CARE
Problem: Adult Inpatient Plan of Care  Goal: Plan of Care Review  Outcome: Ongoing, Progressing  Goal: Patient-Specific Goal (Individualized)  Outcome: Ongoing, Progressing  Goal: Absence of Hospital-Acquired Illness or Injury  Outcome: Ongoing, Progressing  Goal: Optimal Comfort and Wellbeing  Outcome: Ongoing, Progressing  Goal: Readiness for Transition of Care  Outcome: Ongoing, Progressing     Problem: Skin Injury Risk Increased  Goal: Skin Health and Integrity  Outcome: Ongoing, Progressing     Problem: Parenteral Nutrition  Goal: Effective Intravenous Nutrition Therapy Delivery  Outcome: Ongoing, Progressing     Problem: Fall Injury Risk  Goal: Absence of Fall and Fall-Related Injury  Outcome: Ongoing, Progressing     Problem: Coping Ineffective  Goal: Effective Coping  Outcome: Ongoing, Progressing     Problem: Pain Acute  Goal: Acceptable Pain Control and Functional Ability  Outcome: Ongoing, Progressing     Problem: Fluid Deficit (Intestinal Obstruction)  Goal: Fluid Balance  Outcome: Ongoing, Progressing     Problem: Infection (Intestinal Obstruction)  Goal: Absence of Infection Signs and Symptoms  Outcome: Ongoing, Progressing     Problem: Nausea and Vomiting (Intestinal Obstruction)  Goal: Nausea and Vomiting Relief  Outcome: Ongoing, Progressing     Problem: Oral Intake Inadequate  Goal: Improved Oral Intake  Outcome: Ongoing, Progressing

## 2023-09-17 LAB
ALBUMIN SERPL BCP-MCNC: 3.6 G/DL (ref 3.5–5.2)
ALP SERPL-CCNC: 67 U/L (ref 55–135)
ALT SERPL W/O P-5'-P-CCNC: 7 U/L (ref 10–44)
ANION GAP SERPL CALC-SCNC: 5 MMOL/L (ref 8–16)
AST SERPL-CCNC: 23 U/L (ref 10–40)
BACTERIA UR CULT: NORMAL
BACTERIA UR CULT: NORMAL
BASOPHILS # BLD AUTO: 0.04 K/UL (ref 0–0.2)
BASOPHILS NFR BLD: 1 % (ref 0–1.9)
BILIRUB SERPL-MCNC: 0.6 MG/DL (ref 0.1–1)
BUN SERPL-MCNC: 16 MG/DL (ref 8–23)
CALCIUM SERPL-MCNC: 8.9 MG/DL (ref 8.7–10.5)
CHLORIDE SERPL-SCNC: 104 MMOL/L (ref 95–110)
CO2 SERPL-SCNC: 29 MMOL/L (ref 23–29)
CREAT SERPL-MCNC: 0.3 MG/DL (ref 0.5–1.4)
DIFFERENTIAL METHOD: NORMAL
EOSINOPHIL # BLD AUTO: 0.1 K/UL (ref 0–0.5)
EOSINOPHIL NFR BLD: 2.4 % (ref 0–8)
ERYTHROCYTE [DISTWIDTH] IN BLOOD BY AUTOMATED COUNT: 12.1 % (ref 11.5–14.5)
EST. GFR  (NO RACE VARIABLE): >60 ML/MIN/1.73 M^2
GLUCOSE SERPL-MCNC: 93 MG/DL (ref 70–110)
HCT VFR BLD AUTO: 40.5 % (ref 37–48.5)
HGB BLD-MCNC: 13.2 G/DL (ref 12–16)
IMM GRANULOCYTES # BLD AUTO: 0.01 K/UL (ref 0–0.04)
IMM GRANULOCYTES NFR BLD AUTO: 0.2 % (ref 0–0.5)
LYMPHOCYTES # BLD AUTO: 1.4 K/UL (ref 1–4.8)
LYMPHOCYTES NFR BLD: 33.6 % (ref 18–48)
MAGNESIUM SERPL-MCNC: 1.9 MG/DL (ref 1.6–2.6)
MCH RBC QN AUTO: 31 PG (ref 27–31)
MCHC RBC AUTO-ENTMCNC: 32.6 G/DL (ref 32–36)
MCV RBC AUTO: 95 FL (ref 82–98)
MONOCYTES # BLD AUTO: 0.3 K/UL (ref 0.3–1)
MONOCYTES NFR BLD: 8.2 % (ref 4–15)
NEUTROPHILS # BLD AUTO: 2.3 K/UL (ref 1.8–7.7)
NEUTROPHILS NFR BLD: 54.6 % (ref 38–73)
NRBC BLD-RTO: 0 /100 WBC
PLATELET # BLD AUTO: 194 K/UL (ref 150–450)
PMV BLD AUTO: 10.1 FL (ref 9.2–12.9)
POTASSIUM SERPL-SCNC: 4.3 MMOL/L (ref 3.5–5.1)
PROT SERPL-MCNC: 6.5 G/DL (ref 6–8.4)
RBC # BLD AUTO: 4.26 M/UL (ref 4–5.4)
SODIUM SERPL-SCNC: 138 MMOL/L (ref 136–145)
WBC # BLD AUTO: 4.14 K/UL (ref 3.9–12.7)

## 2023-09-17 PROCEDURE — 85025 COMPLETE CBC W/AUTO DIFF WBC: CPT | Performed by: STUDENT IN AN ORGANIZED HEALTH CARE EDUCATION/TRAINING PROGRAM

## 2023-09-17 PROCEDURE — 97116 GAIT TRAINING THERAPY: CPT

## 2023-09-17 PROCEDURE — 97161 PT EVAL LOW COMPLEX 20 MIN: CPT

## 2023-09-17 PROCEDURE — 99231 PR SUBSEQUENT HOSPITAL CARE,LEVL I: ICD-10-PCS | Mod: ,,, | Performed by: SURGERY

## 2023-09-17 PROCEDURE — 63600175 PHARM REV CODE 636 W HCPCS: Performed by: STUDENT IN AN ORGANIZED HEALTH CARE EDUCATION/TRAINING PROGRAM

## 2023-09-17 PROCEDURE — 12000002 HC ACUTE/MED SURGE SEMI-PRIVATE ROOM

## 2023-09-17 PROCEDURE — 80053 COMPREHEN METABOLIC PANEL: CPT | Performed by: STUDENT IN AN ORGANIZED HEALTH CARE EDUCATION/TRAINING PROGRAM

## 2023-09-17 PROCEDURE — 83735 ASSAY OF MAGNESIUM: CPT | Performed by: STUDENT IN AN ORGANIZED HEALTH CARE EDUCATION/TRAINING PROGRAM

## 2023-09-17 PROCEDURE — 25000003 PHARM REV CODE 250: Performed by: STUDENT IN AN ORGANIZED HEALTH CARE EDUCATION/TRAINING PROGRAM

## 2023-09-17 PROCEDURE — 99231 SBSQ HOSP IP/OBS SF/LOW 25: CPT | Mod: ,,, | Performed by: SURGERY

## 2023-09-17 PROCEDURE — 36415 COLL VENOUS BLD VENIPUNCTURE: CPT | Performed by: STUDENT IN AN ORGANIZED HEALTH CARE EDUCATION/TRAINING PROGRAM

## 2023-09-17 RX ADMIN — CEFTRIAXONE SODIUM 1 G: 1 INJECTION, POWDER, FOR SOLUTION INTRAMUSCULAR; INTRAVENOUS at 11:09

## 2023-09-17 RX ADMIN — LEVETIRACETAM 500 MG: 500 TABLET, FILM COATED ORAL at 09:09

## 2023-09-17 RX ADMIN — TRAZODONE HYDROCHLORIDE 50 MG: 50 TABLET ORAL at 08:09

## 2023-09-17 RX ADMIN — CARBOXYMETHYLCELLULOSE SODIUM 1 DROP: 5 SOLUTION/ DROPS OPHTHALMIC at 10:09

## 2023-09-17 RX ADMIN — ENOXAPARIN SODIUM 40 MG: 40 INJECTION SUBCUTANEOUS at 05:09

## 2023-09-17 RX ADMIN — POLYETHYLENE GLYCOL 3350 17 G: 17 POWDER, FOR SOLUTION ORAL at 09:09

## 2023-09-17 RX ADMIN — LEVOTHYROXINE SODIUM 62.5 MCG: 0.03 TABLET ORAL at 05:09

## 2023-09-17 RX ADMIN — CARBOXYMETHYLCELLULOSE SODIUM 1 DROP: 5 SOLUTION/ DROPS OPHTHALMIC at 08:09

## 2023-09-17 RX ADMIN — LEVETIRACETAM 500 MG: 500 TABLET, FILM COATED ORAL at 08:09

## 2023-09-17 NOTE — PT/OT/SLP EVAL
Physical Therapy Evaluation    Patient Name:  Ariana Tiwari   MRN:  [id number]    Recommendations:     Discharge Recommendations: home (with assistance from son and DIL. Patient declines HHPT)   Discharge Equipment Recommendations: none   Barriers to discharge: None    Assessment:     Ariana Tiwari is a 78 y.o. female admitted with a medical diagnosis of Ileus.  She presents with the following impairments/functional limitations: weakness, impaired endurance, impaired self care skills, impaired functional mobility, gait instability, impaired balance, decreased upper extremity function, decreased lower extremity function, decreased safety awareness. Patient is agreeable to participation with PT evaluation. She requires ModA for supine to sit and Leonardo for sit to stand with HHA. Patient declines RW use for ambulation and ambulated 140' with L HHA, CGA-Leonardo, and inconsistent gait speed with intermittent shuffling attributed to Parkinsons. Upon return to room patient agrees to sit up in chair with chair alarm on and RN present. She declines HHPT upon D/C noting her DIL helps her a lot.     Rehab Prognosis: Good; patient would benefit from acute skilled PT services to address these deficits and reach maximum level of function.    Recent Surgery: * No surgery found *      Plan:     During this hospitalization, patient to be seen 6 x/week to address the identified rehab impairments via gait training, therapeutic activities, therapeutic exercises, neuromuscular re-education and progress toward the following goals:    Plan of Care Expires:  10/17/23    Subjective     Chief Complaint: did not sleep much last night   Patient/Family Comments/goals: agrees to walk   Pain/Comfort:  Pain Rating 1: 0/10    Patients cultural, spiritual, Pentecostalism conflicts given the current situation:      Living Environment:  Patient lives with son and DIL in a [facility] with 10 RODNEY (short height and deep steps) with no RODNEY. She uses HHA from family  member for stair navigation.  Prior to admission, patients level of function was using HHA or rollator for ambulation and WC for longer distance. She endorses 1 fall in the past year. She denies any recent PT.   Equipment used at home: rollator, wheelchair.  DME owned (not currently used): none.  Upon discharge, patient will have assistance from family.    Objective:     Communicated with MARIE Webster prior to session.  Patient found HOB elevated with bed alarm, telemetry  upon PT entry to room.    General Precautions: Standard, fall  Orthopedic Precautions:N/A   Braces: N/A  Respiratory Status: Room air    Exams:  RLE Strength: WFL  LLE Strength: WFL    Functional Mobility:  Bed Mobility:     Scooting: moderate assistance  Supine to Sit: moderate assistance  Transfers:     Sit to Stand:  minimum assistance with hand-held assist  Gait: declines RW use for ambulation and ambulated 140' with L HHA, CGA-Leonardo, and inconsistent gait speed with intermittent shuffling attributed to Parkinsons      AM-PAC 6 CLICK MOBILITY  Total Score:15       Treatment & Education:  Patient was educated on the importance of OOB activity and functional mobility to negate negative effects of prolonged bed rest during hospitalization, safe transfers and ambulation, and D/C planning     Patient left up in chair with all lines intact, call button in reach, chair alarm on, and RN and PCT present.    GOALS:   Multidisciplinary Problems       Physical Therapy Goals          Problem: Physical Therapy    Goal Priority Disciplines Outcome Goal Variances Interventions   Physical Therapy Goal     PT, PT/OT      Description: Goals to be met by: 10/17/23    Patient will increase functional independence with mobility by performin. Supine to sit with Supervision  2. Sit to stand transfer with Supervision  3. Bed to chair transfer with Supervision using Rolling Walker or HHA  4. Gait  x 250 feet with Supervision using Rolling Walker or HHA.   5. Lower  extremity exercise program x20 reps per handout, with supervision                       History:     Past Medical History:   Diagnosis Date    Allergy     Diverticulosis     Gluten enteropathy     Gluten intolerance     Hernia     Hypothyroidism     PD (Parkinson's disease) 9/16/2015    Right tremor type    Peptic ulcer disease     Right shoulder pain     Cirtisone injection 3/26/15 - Dr. Tolbert    Ulcer        Past Surgical History:   Procedure Laterality Date    ADENOIDECTOMY      COLONOSCOPY  03/01/2012    Dr. Teresa, repeat in 10 years for screening    COLONOSCOPY N/A 2/21/2018    Procedure: COLONOSCOPY;  Surgeon: Radha Deng MD;  Location: [facility];  Service: Endoscopy;  Laterality: N/A;    CORRECTION OF HAMMER TOE Left 9/16/2020    Procedure: CORRECTION, HAMMER TOE;  Surgeon: William Sprague MD;  Location: [facility];  Service: Orthopedics;  Laterality: Left;    COSMETIC SURGERY      Broken nose    ESOPHAGOGASTRODUODENOSCOPY N/A 12/8/2021    Procedure: EGD (ESOPHAGOGASTRODUODENOSCOPY);  Surgeon: Benji Valentino III, MD;  Location: [facility];  Service: Endoscopy;  Laterality: N/A;    FRACTURE SURGERY  left wrist    With pin    HEMORRHOID SURGERY      HERNIA REPAIR Left 04/09/2015    Dr Lo; left inguinal    INTRALUMINAL GASTROINTESTINAL TRACT IMAGING VIA CAPSULE N/A 7/10/2018    Procedure: IMAGING, GI TRACT, INTRALUMINAL, VIA CAPSULE;  Surgeon: Radha Deng MD;  Location: [facility];  Service: Endoscopy;  Laterality: N/A;    LYSIS OF ADHESIONS  9/29/2020    Procedure: LYSIS, ADHESIONS;  Surgeon: Eagle Gonzalez MD;  Location: [facility];  Service: General;;    RHINOPLASTY TIP      TONSILLECTOMY      UPPER GASTROINTESTINAL ENDOSCOPY  05/21/2015    Dr. Gomez       Time Tracking:     PT Received On: 09/17/23  PT Start Time: 0935     PT Stop Time: 0957  PT Total Time (min): 22 min     Billable Minutes: Evaluation 10 and Gait Training 12      09/17/2023

## 2023-09-17 NOTE — PROGRESS NOTES
General Surgery Progress Note    Admit Date: 9/13/2023  S/P: * No surgery found *    Post-operative Day:      Hospital Day: 5    SUBJECTIVE:   Continues to pass flatus and had small amount loose rectal output while passing gas. Tolerated full liquids with only 1 episode of mild nausea without vomiting.  Requesting more regular food. Has ambulated the halls and is sitting in a chair.    OBJECTIVE:     Vital Signs (Most Recent)  Temp:  [96.9 °F (36.1 °C)-98.7 °F (37.1 °C)] 97.7 °F (36.5 °C)  Pulse:  [58-80] 75  Resp:  [16-20] 19  SpO2:  [92 %-98 %] 92 %  BP: ()/(52-67) 132/60    I&Os:  I/O last 3 completed shifts:  In: 340 [P.O.:340]  Out: 700 [Urine:700]    Physical Exam:  Gen: NAD, AAOx3  HEENT: Anicteric sclera  Pulm: unlabored, symmetrical   Abd: Appears somewhat distended but reportedly at baseline, soft and compressible, no rebound, no guarding, well-healed incision with multiple small reducible incisional hernias    Laboratory:  CBC:   Recent Labs   Lab 09/17/23  0454   WBC 4.14   RBC 4.26   HGB 13.2   HCT 40.5      MCV 95   MCH 31.0   MCHC 32.6       CMP:   Recent Labs   Lab 09/17/23  0454   GLU 93   CALCIUM 8.9   ALBUMIN 3.6   PROT 6.5      K 4.3   CO2 29      BUN 16   CREATININE 0.3*   ALKPHOS 67   ALT 7*   AST 23   BILITOT 0.6       Labs within the past 24 hours have been reviewed.    ASSESSMENT/PLAN:     Patient Active Problem List    Diagnosis Date Noted    Ileus 09/13/2023    Aortic atherosclerosis 05/10/2023    Seizure 01/30/2023    Physical deconditioning 12/16/2021    Encephalopathy, metabolic 12/16/2021    Esophagitis, Weed grade D 12/16/2021    Elevated liver enzymes 12/15/2021    Hyperglycemia 12/04/2021    Hammer toe of left foot 08/25/2020    Anemia 02/03/2020    Adenoma 02/03/2020    Enteritis 09/17/2019    Chronic left shoulder pain 05/10/2018    Pancreas cyst 04/12/2018    Chronic diarrhea 02/21/2018    Hemorrhoids 02/21/2018    PD (Parkinson's disease)  09/16/2015    Breast lump on right side at 1 o'clock position 03/11/2015    Lump of skin of back 03/11/2015    Hypothyroidism     Gluten enteropathy 04/11/2014         78 y.o. female with small-bowel obstruction  -obstruction appears mostly resolved  -soft diet as tolerated, stop parenteral nutrition  -continue MiraLax  -optimize electrolytes  -on Rocephin for UTI

## 2023-09-17 NOTE — PROGRESS NOTES
[facility] Medicine    Progress Note    Patient Name: Ariana Tiwari  MRN: [id number]  Patient Class: IP- Inpatient   Admission Date: 9/13/2023  1:43 PM  Length of Stay: 4  Attending Physician: Sophie Law MD  Primary Care Provider: Nba Petty MD  Face-to-Face encounter date: 09/17/2023  Code status:  Chief Complaint: Constipation (Pt sent over from PCP for two possible bowel obstructions. Denies abdominal pain or vomiting. )        Subjective:    HPI:Patient is a 78-year-old female with a history of Parkinson's disease, recurrent small-bowel obstructions, hypothyroid, GERD, she is presenting today with persistent abdominal distention and ileus.  The patient was hospitalized in [place] 2 weeks ago at the end of August.  The patient was found to have ileus at that time with little to no improvement.  The family wanted to return to [place] and the patient was discharged from the facility there and she returned here hoping that her ileus will resolve with time.  After advancing her diet, continuing to monitor at home she has not had any improvement in her ileus.  She continues to have persistent abdominal distention and discomfort.  She is passing gas but not having any bowel movements.  She is been taking her medications as prescribed despite the ileus.  In the past, she has improved with gastric decompression with NG tube.  This was placed in the ER.  She denies fever, chills, severe abdominal pain.  No nausea or vomiting at this time.  No chest pain or shortness a breath.     In the ER, vital signs are stable, P 90, R 16, /75, O2 sat 90% on room air.  CT abdomen pelvis obtained which shows numerous segments of markedly dilated fluid-filled small bowel.  No specific transition point is identified however the terminal ileum is decompressed.  At this point, NG tube was placed in the ER and patient will be admitted for further management of ileus.       Interval History:    9/14:  NG tube still actively draining.  Will repeat KUB and surgery to re-evaluate.  Patient has had history of abdominal surgeries x2.  Patient states that she is doing well and slept pretty well today.  Bilateral red eyes noted, patient denies any itching or pain and states that is due to dry eyes.  Will add eyedrops. Family present at bedside.No concerns/issues overnight reported by the patient or the nursing staff.    9/15:  Patient noted to be uncomfortable eyes bloodshot.  She states that she did not sleep well last night.  Surgery evaluated patient yesterday and clamped NG tube and recommended advancement to clear sips.  Enema and suppository also recommended.  Patient states that she is passing gas already and is eager to eat.    9/16:  Patient had bowel movement yesterday.  Patient looking significantly better today.  NG tube has been removed and patient advanced to full liquid diet.  Acute overnight events.  Patient currently on IV Rocephin for UTI.  Urine culture pending    9/17:  Patient doing well, will continue to advance diet as tolerated.  Acute overnight event.    Review of Systems All other Review of Systems were found to be negative expect for that mentioned already in HPI.     Objective:     Vitals:    09/16/23 1959 09/17/23 0011 09/17/23 0453 09/17/23 0724   BP: 98/65 (!) 97/52 (!) 123/57 121/61   Pulse: 72 65 (!) 58 (!) 59   Resp: 20 18 18 16   Temp: 97.4 °F (36.3 °C) 98.5 °F (36.9 °C) 98.7 °F (37.1 °C) 96.9 °F (36.1 °C)   TempSrc:    Oral   SpO2: 95% 95% 97% 98%   Weight:       Height:            Vitals reviewed.  Constitutional: No distress.   HENT: NC, bloodshot eyes  Head: Atraumatic.   Cardiovascular: Normal rate, regular rhythm and normal heart sounds.   Pulmonary/Chest: Effort normal. No wheezes.   Abdominal: Soft. Bowel sounds are normal. No distension and no mass. No tenderness  Neurological: Alert.   Skin: Skin is warm and dry.   Psych: Appropriate mood and affect    Following labs  were Reviewed   CBC:  Recent Labs   Lab 09/17/23 0454   WBC 4.14   HGB 13.2   HCT 40.5          CMP:  Recent Labs   Lab 09/17/23 0454   CALCIUM 8.9   ALBUMIN 3.6   PROT 6.5      K 4.3   CO2 29      BUN 16   CREATININE 0.3*   ALKPHOS 67   ALT 7*   AST 23   BILITOT 0.6         Micro Results  Microbiology Results (last 7 days)       Procedure Component Value Units Date/Time    Urine culture [[id number]] Collected: 09/15/23 1557    Order Status: Completed Specimen: Urine Updated: 09/17/23 0856     Urine Culture, Routine Multiple organisms isolated. None in predominance.  Repeat if      clinically necessary.    Narrative:      Specimen Source->Urine             Radiology Reports  X-Ray Abdomen AP 1 View    Result Date: 9/14/2023  HISTORY: f/u ileus COMPARISON:Comparison is made with the patient's previous imaging study of 2/7/2023. FINDINGS:Nasogastric tube projects below the level of the gastroesophageal junction tip oriented within the region of the gastric fundus has been employed as compared to previous. Several widespread loops of intestinal bowel gas are again redemonstrated throughout the abdominal cavity in keeping with mild ileus. Gas reaches the ascending colon. There are atheromatous calcifications of the splenic artery. The osseous structures reveal no significant finding. Several calcified densities in the lower pelvis are attributed towards midline calcified fibroids. IMPRESSION: Dilated small bowel loops taken on the appearance of ileus without evidence of definable change upon comparison. Electronically signed by:  Demetrius Samuels MD  9/14/2023 9:46 AM CDT Workstation: [id number]    CT Abdomen Pelvis With Contrast    Result Date: 9/13/2023  EXAM: CT ABDOMEN PELVIS WITH CONTRAST HISTORY: Bowel obstruction suspected COMPARISON: CT scan of the abdomen and pelvis dated 1/29/2023 TECHNIQUE: Multiple axial 2 mm thick images were obtained through the abdomen and pelvis IV contrast only. This  exam was performed according to our departmental dose-optimization program, which includes automated exposure control, adjustment of the mA and/or kV according to patient size and/or use of iterative reconstruction technique. Unless otherwise stated, incidental findings do not require dedicated follow-up imaging. FINDINGS: There are numerous segments of air and fluid-filled markedly dilated small bowel is noted throughout the abdomen and pelvis. The colon is not distended. It contains formed stool. The findings are consistent with distal small bowel obstruction. Similar, but less prominent findings were present on the previous CT in January. As on the previous study, there is swirling of the mesenteric vessels in the pelvis suggesting an internal hernia as the cause of the obstruction. There is also abrupt tapered narrowing of a mildly dilated segment of small bowel in this region that further indicates this is likely the site of the obstruction. The terminal ileum is completely collapsed. There is no free air or free fluid in the abdomen or pelvis. There is no pneumatosis. The liver, spleen, pancreas, and kidneys are unremarkable. There is no hydronephrosis or hydroureter ureter. There is a 2.6 x 1.4 cm diameter low-density solid mass in the left adrenal gland that is unchanged, and is quite likely an adenoma. The right adrenal gland appears normal. Images through the lung bases show minor linear atelectasis in both lower lobes. There is mild to moderate vascular calcification. IMPRESSION:   Numerous segments of moderate to markedly dilated air and fluid-filled small bowel distributed throughout the abdomen and pelvis. There is very little nondilated small bowel. However, the terminal ileum is completely decompressed. Findings are consistent with distal small bowel obstruction. The site of the obstruction is suspected be within the pelvis along the midline. An internal hernia suspected as etiology. Similar findings  were evident on the previous CT scan dated 1/29/2023. Electronically signed by:  Preet Baltazar MD  9/13/2023 4:38 PM CDT Workstation: [id number]    X-Ray Chest AP Portable    Result Date: 9/13/2023  HISTORY: Nasogastric tube placement,  constipation. FINDINGS: Portable chest radiograph at 1543 hours compared to prior exams shows nasogastric tube with distal tip at the level of the gastroesophageal junction or gastric cardia region. The cardiomediastinal silhouette and pulmonary vasculature are within normal limits. The lungs are hypoexpanded, with no consolidation, large pleural effusion, or evidence of pulmonary edema. Right lower lung bandlike opacities suggests chronic atelectasis, with no pneumothorax. The bones are diffusely osteopenic. IMPRESSION: Nasogastric tube as described. Electronically signed by:  Juan Manuel Mejia MD  9/13/2023 3:59 PM CDT Workstation: [id number]    X-Ray Abdomen Flat And Erect    Result Date: 9/13/2023  EXAMINATION: XR ABDOMEN FLAT AND ERECT CLINICAL HISTORY: Personal history of other diseases of the digestive system TECHNIQUE: Flat and erect AP views of the abdomen were performed. COMPARISON: 02/07/2023 FINDINGS: There is diffuse dilatation of bowel with air-fluid levels present.  This is likely both small bowel and colon.  No free air is demonstrated.     Diffuse bowel dilatation concerning for small bowel obstruction.  Further evaluation with CT is recommended.  Further evaluation with CT is recommended. This report was flagged in Epic as abnormal. Electronically signed by: Rai Jennings MD Date:    09/13/2023 Time:    12:55    XR ABDOMEN 2+ VIEWS    Result Date: 9/2/2023  EXAMINATION:  XR ABDOMEN 2 VIEWS HISTORY:  ileus TECHNIQUE: 2 views of the abdomen. COMPARISON:  8/31/2023. CT 8/29/2023. Correlation with small bowel follow-through 8/31/2023. FINDINGS: There is moderate gaseous distention of the small bowel. There is oral contrast within the colon. No free air. No organomegaly  or suspicious calcifications. The lung bases are grossly clear. No acute bony abnormality.    IMPRESSION: 1.  Ongoing moderate distention of the small bowel; favor ileus. 2.  Oral contrast is present within the colon, and small bowel obstruction is considered unlikely. Electronically signed by:  Al Castillo MD  09/02/2023 09:26 AM [employer] Workstation: [id number]    XR Small Bowel Follow Through    Result Date: 8/31/2023  EXAMINATION:  FL SMALL BOWEL WATER SOLUBLE HISTORY:  assess SBO COMPARISON:  8/31/2023 PROCEDURALIST: Radha ARAUZ PEAK SKIN DOSE (PSD): 0 mGy FINDINGS:   200 mL Omnipaque 240 administered through nasogastric tube. Contrast quickly progressed to markedly dilated small bowel loops and reaches the ascending colon at 60 minutes. There is marked gaseous distention of the large and small bowel in all 4 quadrants and abdominal distention as well.    IMPRESSION:  Radiographic evidence of severe ileus. Electronically signed by:  Adriano Wright MD  08/31/2023 10:36 AM [employer] Workstation: [id number]    X-Ray Abdomen Portable    Result Date: 8/31/2023  EXAMINATION:  XR ABDOMEN PORTABLE HISTORY: sbo TECHNIQUE: Single view of the abdomen. COMPARISON: 8/30/2023. FINDINGS: There is NG tube with the tip projecting over the gastric fundus. Redemonstration of dilated bowel loops with some air-fluid levels. There is no pneumoperitoneum, visible mass or abnormal calcification.  There are no acute osseous abnormalities.      IMPRESSION: 1. NG tube with the tip projecting over the stomach. 2. Dilated bowel loops with air-fluid level, suggestive of small bowel obstruction. Electronically signed by:  Annette Stephenson MD  08/31/2023 06:33 AM [employer] Workstation: [id number]    X-Ray Abdomen Portable    Result Date: 8/30/2023  EXAMINATION:  XR ABDOMEN PORTABLE HISTORY: NG placement verification TECHNIQUE: Single view of the abdomen. COMPARISON: 8/29/2023. FINDINGS: There is NG tube with the distal portion appeared to be  looped in the tip projecting over the medial side of the gastric fundus. Bowel gas pattern is normal without dilatation or air-fluid levels.  There is no pneumoperitoneum, visible mass or abnormal calcification.  There are no acute osseous abnormalities.      IMPRESSION: NG tube with the distal portion appeared to be looped in the tip projecting over the medial side of the fundus. Electronically signed by:  Annette Stephenson MD  08/30/2023 12:45 AM [employer] Workstation: [id number]    XR ABDOMEN 1 VIEW    Result Date: 8/30/2023  EXAMINATION:  XR ABDOMEN 1 VIEW HISTORY: NGT position TECHNIQUE: Single view of the abdomen. COMPARISON: August 29, 2023 FINDINGS: Interval placement of enteric tube with tip and side-port project over the expected location of the stomach. Gaseous distention of small bowel in the abdomen. Contrast within the left renal calyces. Lung bases are clear. No acute bony abnormality.    IMPRESSION: Enteric tube with tip and side port projecting over the stomach. Electronically signed by:  Ac Tucker MD  08/29/2023 09:24 PM [employer] Workstation: [id number]    CT Abdomen Pelvis With Contrast    Result Date: 8/29/2023  EXAMINATION:  CT ABDOMEN PELVIS W CONTRAST HISTORY:  Indication Not Found - See Additional Reason for Exam Comments; Previous bowel obstruction, bowel surgery, potential internal hernia, no suspicion that she is we obstructed, please start oral contrast, TECHNIQUE:  Computed tomography of the abdomen and pelvis with intravenous contrast. Multiplanar 2-D reformats were performed and evaluated. This CT examination was performed using 1 more the following dose reduction techniques: Automated exposure control, adjustment of mA and/or kV according to patient size, or use of iterative reconstruction technique. CONTRAST: IOHEXOL 300 MG/ML INJECTION SOLN: 75 mL; IOHEXOL 300 MG/ML INJECTION SOLN: 50 mL CTDI: CTDI: 10.64 CTDIv; CTDLP: 527 mGy-cm DLP COMPARISON: Same day abdominal radiographs. FINDINGS:  LOWER CHEST: Bibasilar atelectasis. No pleural effusion. LIVER: Normal in size and contour. No focal lesion. GALLBLADDER: Distended. No radiopaque stone. No definite gallbladder wall thickening. BILE DUCTS: Grossly unremarkable. PANCREAS: No mass, ductal dilation, or mike-pancreatic fluid. SPLEEN: Normal size.  No focal lesion. ADRENALS: 2 cm left adrenal nodule. Normal right adrenal gland. KIDNEYS AND URETERS: Normal size and contour. No hydronephrosis. URINARY BLADDER: Normal contour. GASTROINTESTINAL TRACT: Stomach is moderately dilated with air-fluid level. Diffuse dilation of small bowel with air-fluid levels. There is dilation of to at least 4 cm in diameter. There is a region of focal decreased small bowel caliber in the right abdomen with surrounding spiraling mesenteric vessels (series 4 image 45 through 60) concerning for a site of closed loop obstruction. The colon is decompressed. APPENDIX: Not definitely visualized. LYMPH NODES: No lymphadenopathy. ABDOMINAL AORTA AND OTHER VESSELS: Normal caliber aorta and IVC. REPRODUCTIVE ORGANS: No pathologic process MUSCULOSKELETAL: No acute or suspicious osseous abnormality. ADDITIONAL FINDINGS: None.    IMPRESSION: Small bowel obstruction with suspected closed-loop site in the right mid abdomen. NOTIFICATION:  These findings were discussed by telephone with KELLY WHITE on 8/29/2023 10:49 PM CDT Indeterminate 2 cm left adrenal gland nodule. Recommend nonemergent follow-up with dedicated MR or CT of the adrenal glands. Electronically signed by:  Ac Tucker MD  08/29/2023 07:51 PM [employer] Workstation: [id number]    X-Ray Abdomen Flat And Erect    Result Date: 8/29/2023  EXAMINATION:  XR ABDOMEN SUPINE AND UPRIGHT HISTORY:  Abdominal distention, history of bowel obstruction TECHNIQUE: Supine and erect views of the abdomen. COMPARISON:  None. FINDINGS: Nonspecific bowel gas pattern with a few air-fluid levels, apparently in both small and large bowel. No  evidence of free air in the abdomen. No suspicious calcifications. No acute bony abnormality.    IMPRESSION:  Nonspecific bowel gas pattern with a few layering air-fluid levels in colon and likely small bowel. Electronically signed by:  Ac Tucker MD  08/29/2023 05:17 PM [employer] Workstation: [id number]       Meds  Scheduled Meds:   carbidopa-levodopa  3 capsule Oral TID    carboxymethylcellulose  1 drop Both Eyes BID    cefTRIAXone (ROCEPHIN) IVPB  1 g Intravenous Q24H    enoxparin  40 mg Subcutaneous Daily    levETIRAcetam  500 mg Oral BID    levothyroxine  62.5 mcg Oral Before breakfast    polyethylene glycol  17 g Oral Daily     Continuous Infusions:      PRN Meds:.acetaminophen, HYDROcodone-acetaminophen, melatonin, morphine, ondansetron, prochlorperazine, sodium chloride 0.9%, traZODone.    Active PT: Yes  Active OT: Yes  Active SLP: No  Assessment & Plan:   * Ileus  Patient is here with abdominal distention and persistent ongoing ileus after being discharged from the hospital and [place] 2 weeks ago.  They were hopeful that upon returning home she would have improvement in her symptoms but she is not had any bowel movements.  She does continue to have flatus.  - NG tube discontinued  - general surgery on board  - clearance by surgery to advanced diet  - IV fluids       Anemia  Hemoglobin is stable  Trend        PD (Parkinson's disease)  Patient with Parkinson's disease on carbidopa-levodopa  She will bring her home time release capsule  At her previous hospitalization they were clamping her NG tube and allowing her to take her carbidopa given that she has severe symptoms without it  Will try to continue this process here  Ambulate as much as possible, up in chair during the day        Hypothyroidism  Continue Synthroid       UTI  IV Rocephin daily  Urine culture pending             Discharge Planning:   Is the patient medically ready for discharge?: no    Reason for patient still in hospital (select all  that apply): Patient trending condition and Treatment    Above encounter included review of the medical records, interviewing and examining the patient face-to-face, discussion with family and other health care providers, ordering and interpreting lab/test results and formulating a plan of care.     Medical Decision Making:      [_] Low Complexity  [_] Moderate Complexity  [x] High Complexity      Sophie Law MD  Department of Hospital Medicine   [facility]

## 2023-09-18 LAB
ANION GAP SERPL CALC-SCNC: 6 MMOL/L (ref 8–16)
BUN SERPL-MCNC: 16 MG/DL (ref 8–23)
CALCIUM SERPL-MCNC: 8.8 MG/DL (ref 8.7–10.5)
CHLORIDE SERPL-SCNC: 105 MMOL/L (ref 95–110)
CO2 SERPL-SCNC: 28 MMOL/L (ref 23–29)
CREAT SERPL-MCNC: 0.4 MG/DL (ref 0.5–1.4)
EST. GFR  (NO RACE VARIABLE): >60 ML/MIN/1.73 M^2
GLUCOSE SERPL-MCNC: 78 MG/DL (ref 70–110)
POTASSIUM SERPL-SCNC: 3.9 MMOL/L (ref 3.5–5.1)
SODIUM SERPL-SCNC: 139 MMOL/L (ref 136–145)

## 2023-09-18 PROCEDURE — 25000003 PHARM REV CODE 250: Performed by: SURGERY

## 2023-09-18 PROCEDURE — 80048 BASIC METABOLIC PNL TOTAL CA: CPT | Performed by: STUDENT IN AN ORGANIZED HEALTH CARE EDUCATION/TRAINING PROGRAM

## 2023-09-18 PROCEDURE — 12000002 HC ACUTE/MED SURGE SEMI-PRIVATE ROOM

## 2023-09-18 PROCEDURE — 25000003 PHARM REV CODE 250: Performed by: STUDENT IN AN ORGANIZED HEALTH CARE EDUCATION/TRAINING PROGRAM

## 2023-09-18 PROCEDURE — 63600175 PHARM REV CODE 636 W HCPCS: Performed by: STUDENT IN AN ORGANIZED HEALTH CARE EDUCATION/TRAINING PROGRAM

## 2023-09-18 PROCEDURE — 97116 GAIT TRAINING THERAPY: CPT | Mod: CQ

## 2023-09-18 PROCEDURE — 36415 COLL VENOUS BLD VENIPUNCTURE: CPT | Performed by: STUDENT IN AN ORGANIZED HEALTH CARE EDUCATION/TRAINING PROGRAM

## 2023-09-18 PROCEDURE — 97535 SELF CARE MNGMENT TRAINING: CPT

## 2023-09-18 RX ORDER — AMOXICILLIN 250 MG
1 CAPSULE ORAL 2 TIMES DAILY
Status: DISCONTINUED | OUTPATIENT
Start: 2023-09-18 | End: 2023-09-19 | Stop reason: HOSPADM

## 2023-09-18 RX ADMIN — SODIUM PHOSPHATE 1 ENEMA: 7; 19 ENEMA RECTAL at 07:09

## 2023-09-18 RX ADMIN — LEVOTHYROXINE SODIUM 62.5 MCG: 0.03 TABLET ORAL at 05:09

## 2023-09-18 RX ADMIN — CARBOXYMETHYLCELLULOSE SODIUM 1 DROP: 5 SOLUTION/ DROPS OPHTHALMIC at 09:09

## 2023-09-18 RX ADMIN — TRAZODONE HYDROCHLORIDE 50 MG: 50 TABLET ORAL at 08:09

## 2023-09-18 RX ADMIN — POLYETHYLENE GLYCOL 3350 17 G: 17 POWDER, FOR SOLUTION ORAL at 09:09

## 2023-09-18 RX ADMIN — SENNOSIDES AND DOCUSATE SODIUM 1 TABLET: 50; 8.6 TABLET ORAL at 12:09

## 2023-09-18 RX ADMIN — ENOXAPARIN SODIUM 40 MG: 40 INJECTION SUBCUTANEOUS at 05:09

## 2023-09-18 RX ADMIN — LEVETIRACETAM 500 MG: 500 TABLET, FILM COATED ORAL at 09:09

## 2023-09-18 RX ADMIN — LEVETIRACETAM 500 MG: 500 TABLET, FILM COATED ORAL at 08:09

## 2023-09-18 RX ADMIN — SENNOSIDES AND DOCUSATE SODIUM 1 TABLET: 50; 8.6 TABLET ORAL at 08:09

## 2023-09-18 RX ADMIN — CEFTRIAXONE SODIUM 1 G: 1 INJECTION, POWDER, FOR SOLUTION INTRAMUSCULAR; INTRAVENOUS at 12:09

## 2023-09-18 NOTE — PT/OT/SLP PROGRESS
Physical Therapy Treatment    Patient Name:  Ariana Tiwari   MRN:  [id number]    Recommendations:     Discharge Recommendations: home health PT  Discharge Equipment Recommendations: none  Barriers to discharge:  assist with mobility, decreased activity tolerance    Assessment:     Ariana Tiwari is a 78 y.o. female admitted with a medical diagnosis of Ileus.  She presents with the following impairments/functional limitations: weakness, impaired endurance, impaired self care skills, impaired functional mobility, gait instability, impaired balance, decreased coordination, decreased upper extremity function, decreased lower extremity function, decreased safety awareness, impaired coordination, impaired cardiopulmonary response to activity.    Pt with HOB elevated and daughter present. Pt agreeable to visit. Pt required min assist for bed mobility. Pt required min assist with sit to stand transfer with RW and min assist. Daughter reports that she usually ambulates with pt by having an arm around her and holding her hand. Pt ambulated 140' with min hand hold assist with therapist holding L hand. Verbal cuing for sequencing and to take larger steps due to intermittent shuffling attributed to Parkinsons. Pt returned to bed at end of session.    Rehab Prognosis: Fair; patient would benefit from acute skilled PT services to address these deficits and reach maximum level of function.    Recent Surgery: * No surgery found *      Plan:     During this hospitalization, patient to be seen 6 x/week to address the identified rehab impairments via gait training, therapeutic activities, therapeutic exercises, neuromuscular re-education and progress toward the following goals:    Plan of Care Expires:  10/17/23    Subjective     Chief Complaint: pt reports fatigue from ambulated earlier and working with OT  Patient/Family Comments/goals: to get better  Pain/Comfort:  Pain Rating 1: 0/10      Objective:     Communicated with RN  prior to session.  Patient found HOB elevated with telemetry, bed alarm upon PT entry to room.     General Precautions: Standard, fall  Orthopedic Precautions: N/A  Braces: N/A  Respiratory Status: Room air     Functional Mobility:  Bed Mobility:     Supine to Sit: minimum assistance  Sit to Supine: minimum assistance  Transfers:     Sit to Stand:  minimum assistance with rolling walker  Gait: x 140' with min HHA, holding LUE; intermittent shuffling steps attributed to parkinsons, with VC for bigger steps      AM-PAC 6 CLICK MOBILITY          Treatment & Education:  Pt educated on importance of time OOB, importance of intermittent mobility, safe techniques for transfers/ambulation, discharge recommendations/options, and use of call light for assistance and fall prevention.      Patient left HOB elevated with all lines intact, call button in reach, bed alarm on, and daughter present..    GOALS:   Multidisciplinary Problems       Physical Therapy Goals          Problem: Physical Therapy    Goal Priority Disciplines Outcome Goal Variances Interventions   Physical Therapy Goal     PT, PT/OT      Description: Goals to be met by: 10/17/23    Patient will increase functional independence with mobility by performin. Supine to sit with Supervision  2. Sit to stand transfer with Supervision  3. Bed to chair transfer with Supervision using Rolling Walker or HHA  4. Gait  x 250 feet with Supervision using Rolling Walker or HHA.   5. Lower extremity exercise program x20 reps per handout, with supervision                       Time Tracking:     PT Received On: 23  PT Start Time: 1341     PT Stop Time: 1353  PT Total Time (min): 12 min     Billable Minutes: Gait Training 12    Treatment Type: Treatment  PT/PTA: PTA     Number of PTA visits since last PT visit: 2023

## 2023-09-18 NOTE — PT/OT/SLP PROGRESS
Occupational Therapy   Treatment    Name: Ariana Tiwari  MRN: [id number]  Admitting Diagnosis:  Ileus       Recommendations:     Discharge Recommendations: home health OT  Discharge Equipment Recommendations:  none  Barriers to discharge:  None    Assessment:     Ariana Tiwari is a 78 y.o. female with a medical diagnosis of Ileus.  Pt agreeable to OT therapy session this AM. Performance deficits affecting function are weakness, impaired endurance, impaired self care skills, impaired functional mobility, gait instability, impaired balance, decreased coordination, decreased upper extremity function, decreased lower extremity function, decreased safety awareness, impaired coordination, impaired cardiopulmonary response to activity.     Rehab Prognosis:  Fair; patient would benefit from acute skilled OT services to address these deficits and reach maximum level of function.       Plan:     Patient to be seen 5 x/week to address the above listed problems via therapeutic activities, therapeutic exercises, self-care/home management  Plan of Care Expires: 10/16/23  Plan of Care Reviewed with: patient    Subjective     Chief Complaint: none stated  Patient/Family Comments/goals: none stated  Pain/Comfort:  Pain Rating 1: 0/10    Objective:     Communicated with: nursing prior to session.  Patient found  seated on toilet  with telemetry upon OT entry to room.    General Precautions: Standard, fall    Orthopedic Precautions:N/A  Braces: N/A  Respiratory Status: Room air     Occupational Performance:     Bed Mobility:    Patient completed Sit to Supine with minimum assistance     Functional Mobility/Transfers:  Patient completed Sit <> Stand Transfer with moderate assistance  with  hand-held assist and grab bars(s)  from toilet  Functional Mobility: pt amb in room with Min A with HHA with no major LOB or SOB; cues needed for safety with ambulating; unsteady    Activities of Daily Living:  Grooming: minimum assistance for  balance while standing at sink to wash hands  Toileting: total assistance for hygiene and for clothing management while pt in standing with moderate assist for balance    Therapeutic Exercise:  BUE ROM exercises while seated EOB x 10 reps with SBA in all major planes/joints to improve strength and endurance needed for ADLs; Pt tolerated well    Treatment & Education:  Pt educated on role of OT/POC, importance of OOB/EOB activity, use of call bell, and safety during ADLs, transfers, and functional mobility.    Patient left HOB elevated with all lines intact, call button in reach, bed alarm on, and daughter present    GOALS:   Multidisciplinary Problems       Occupational Therapy Goals          Problem: Occupational Therapy    Goal Priority Disciplines Outcome Interventions   Occupational Therapy Goal     OT, PT/OT     Description: Goals to be met by: 10/16/23     Patient will increase functional independence with ADLs by performing:    UE Dressing with Minimal Assistance.  LE Dressing with Minimal Assistance.  Grooming while seated with Set-up Assistance.  Toileting from toilet with Minimal Assistance for hygiene and clothing management.   Toilet transfer to toilet with Minimal Assistance.                         Time Tracking:     OT Date of Treatment: 09/18/23  OT Start Time: 1322  OT Stop Time: 1340  OT Total Time (min): 18 min    Billable Minutes:Self Care/Home Management 18    OT/TAL: OT          9/18/2023

## 2023-09-18 NOTE — NURSING
RN Navigator met with patient, her son, Je, and daughter in law Micaela Wooten, at bedside to discuss scheduling tcc/hospital follow up appt. upon discharge. Pt is in bed laying down with hob slightly elevated, sleeping soundly  Pt's son and dtr in law, with whom she lives, agreeable to schedule hospital follow up appt at [facility]. RN Navigator informed pt 's son and dtr in law, of scheduled appointment Date: 9/22 Time:  11:00 a.m. and family members to to bring all medication bottles to Discharge Clinic follow up appointment.  Discharge Clinic information handout, appointment letter and folder provided to patient. Pt states transportation will be provided by pt's son and dtr in law. Family members also reminded to call [facility] Contact number, [phone], with any questions or if appointment needs to be rescheduled.

## 2023-09-18 NOTE — PLAN OF CARE
09/18/23 1610   Discharge Reassessment   Assessment Type Discharge Planning Reassessment   Did the patient's condition or plan change since previous assessment? No   Discharge Plan discussed with: Patient;Adult children   Communicated IAIN with patient/caregiver Yes   Discharge Plan A Home Health   Discharge Plan B Home Health   DME Needed Upon Discharge  none   Transition of Care Barriers None   Why the patient remains in the hospital Requires continued medical care   Post-Acute Status   Post-Acute Authorization Home Health   Home Health Status Referrals Sent   Patient choice form signed by patient/caregiver List from [employer]   Discharge Delays None known at this time     Need for home health noted, patient and family ok with [facility] health, referral sent, cm to follow for acceptance.

## 2023-09-18 NOTE — PROGRESS NOTES
[facility]  General Surgery  Progress Note    Subjective:     Interval History:  No acute events overnight.  Patient states she is comfortable.  Passing flatus.  Tolerating diet.  Still has not had a significant bowel movement over the last couple days.  Had moderate bowel movement after a 2nd enema several days ago.    Post-Op Info:  * No surgery found *          Medications:  Continuous Infusions:  Scheduled Meds:   carbidopa-levodopa  3 capsule Oral TID    carboxymethylcellulose  1 drop Both Eyes BID    cefTRIAXone (ROCEPHIN) IVPB  1 g Intravenous Q24H    enoxparin  40 mg Subcutaneous Daily    levETIRAcetam  500 mg Oral BID    levothyroxine  62.5 mcg Oral Before breakfast    polyethylene glycol  17 g Oral Daily    senna-docusate 8.6-50 mg  1 tablet Oral BID    sodium phosphates  1 enema Rectal Once     PRN Meds:acetaminophen, HYDROcodone-acetaminophen, melatonin, morphine, ondansetron, prochlorperazine, sodium chloride 0.9%, traZODone     Objective:     Vital Signs (Most Recent):  Temp: 97.5 °F (36.4 °C) (09/18/23 1555)  Pulse: 83 (09/18/23 1555)  Resp: 18 (09/18/23 1555)  BP: 113/71 (09/18/23 1555)  SpO2: 95 % (09/18/23 1555) Vital Signs (24h Range):  Temp:  [97.5 °F (36.4 °C)-98.4 °F (36.9 °C)] 97.5 °F (36.4 °C)  Pulse:  [60-97] 83  Resp:  [18] 18  SpO2:  [93 %-96 %] 95 %  BP: (103-113)/(52-85) 113/71       Intake/Output Summary (Last 24 hours) at 9/18/2023 1825  Last data filed at 9/18/2023 1815  Gross per 24 hour   Intake 960 ml   Output --   Net 960 ml       Physical Exam  Constitutional:       General: She is awake.      Comments: Elderly. Frail. Female NAD   Pulmonary:      Effort: Pulmonary effort is normal. No tachypnea, bradypnea, accessory muscle usage or respiratory distress.   Abdominal:      Palpations: Abdomen is soft.      Tenderness: There is no abdominal tenderness. There is no guarding.      Comments: Soft. Mild distention  non tender. no guarding    Neurological:      Mental  Status: She is alert.   Psychiatric:         Behavior: Behavior is cooperative.         Significant Labs:  CBC:   Recent Labs   Lab 09/17/23  0454   WBC 4.14   RBC 4.26   HGB 13.2   HCT 40.5      MCV 95   MCH 31.0   MCHC 32.6     CMP:   Recent Labs   Lab 09/17/23  0454 09/18/23  0451   GLU 93 78   CALCIUM 8.9 8.8   ALBUMIN 3.6  --    PROT 6.5  --     139   K 4.3 3.9   CO2 29 28    105   BUN 16 16   CREATININE 0.3* 0.4*   ALKPHOS 67  --    ALT 7*  --    AST 23  --    BILITOT 0.6  --        Significant Diagnostics:  None    Assessment/Plan:     Active Diagnoses:    Diagnosis Date Noted POA    PRINCIPAL PROBLEM:  Ileus [K56.7] 09/13/2023 Unknown    Anemia [D64.9] 02/03/2020 Yes    PD (Parkinson's disease) [G20] 09/16/2015 Yes    Hypothyroidism [E03.9]  Yes      Problems Resolved During this Admission:     -clinically patient is doing well.  She is tolerating diet.  She is passing flatus.  She has been constipated during her stay and since her last admission from [place]. OK from surgery standpoint to proceed with enemas or suppositories. She has been started on miralax daily.     Sterling Vigil III, MD  General Surgery  [facility]

## 2023-09-18 NOTE — PROGRESS NOTES
[facility] Medicine    Progress Note    Patient Name: Ariana Tiwari  MRN: [id number]  Patient Class: IP- Inpatient   Admission Date: 9/13/2023  1:43 PM  Length of Stay: 5  Attending Physician: Sophie Law MD  Primary Care Provider: Nba Petty MD  Face-to-Face encounter date: 09/18/2023  Code status:  Chief Complaint: Constipation (Pt sent over from PCP for two possible bowel obstructions. Denies abdominal pain or vomiting. )        Subjective:    HPI:Patient is a 78-year-old female with a history of Parkinson's disease, recurrent small-bowel obstructions, hypothyroid, GERD, she is presenting today with persistent abdominal distention and ileus.  The patient was hospitalized in [place] 2 weeks ago at the end of August.  The patient was found to have ileus at that time with little to no improvement.  The family wanted to return to [place] and the patient was discharged from the facility there and she returned here hoping that her ileus will resolve with time.  After advancing her diet, continuing to monitor at home she has not had any improvement in her ileus.  She continues to have persistent abdominal distention and discomfort.  She is passing gas but not having any bowel movements.  She is been taking her medications as prescribed despite the ileus.  In the past, she has improved with gastric decompression with NG tube.  This was placed in the ER.  She denies fever, chills, severe abdominal pain.  No nausea or vomiting at this time.  No chest pain or shortness a breath.     In the ER, vital signs are stable, P 90, R 16, /75, O2 sat 90% on room air.  CT abdomen pelvis obtained which shows numerous segments of markedly dilated fluid-filled small bowel.  No specific transition point is identified however the terminal ileum is decompressed.  At this point, NG tube was placed in the ER and patient will be admitted for further management of ileus.       Interval History:    9/14:  NG tube still actively draining.  Will repeat KUB and surgery to re-evaluate.  Patient has had history of abdominal surgeries x2.  Patient states that she is doing well and slept pretty well today.  Bilateral red eyes noted, patient denies any itching or pain and states that is due to dry eyes.  Will add eyedrops. Family present at bedside.No concerns/issues overnight reported by the patient or the nursing staff.    9/15:  Patient noted to be uncomfortable eyes bloodshot.  She states that she did not sleep well last night.  Surgery evaluated patient yesterday and clamped NG tube and recommended advancement to clear sips.  Enema and suppository also recommended.  Patient states that she is passing gas already and is eager to eat.    9/16:  Patient had bowel movement yesterday.  Patient looking significantly better today.  NG tube has been removed and patient advanced to full liquid diet.  Acute overnight events.  Patient currently on IV Rocephin for UTI.  Urine culture pending    9/17:  Patient doing well, will continue to advance diet as tolerated.  No acute overnight event.    9/18:  Patient doing well and tolerating soft diet.  Patient has not had a bowel movement but states that she is passing gas.  Would start patient on senna b.i.d..  Patient has no bowel movement by tomorrow, consider KUB in a.m.    Review of Systems All other Review of Systems were found to be negative expect for that mentioned already in HPI.     Objective:     Vitals:    09/17/23 2001 09/18/23 0030 09/18/23 0444 09/18/23 0700   BP: (!) 107/52 108/85 (!) 103/59 (!) 107/55  Comment: amaris shabazz notified   Pulse: 67 60 60 64   Resp: 18 18 18 18   Temp: 97.7 °F (36.5 °C) 97.8 °F (36.6 °C) 97.7 °F (36.5 °C) 98.2 °F (36.8 °C)   TempSrc:       SpO2: (!) 93% 96% (!) 94% 95%   Weight:       Height:            Vitals reviewed.  Constitutional: No distress.   HENT: NC, bloodshot eyes  Head: Atraumatic.   Cardiovascular: Normal rate, regular  "rhythm and normal heart sounds.   Pulmonary/Chest: Effort normal. No wheezes.   Abdominal: Soft. Bowel sounds are normal. No distension and no mass. No tenderness  Neurological: Alert.   Skin: Skin is warm and dry.   Psych: Appropriate mood and affect    Following labs were Reviewed   CBC:  No results for input(s): "WBC", "HGB", "HCT", "PLT" in the last 24 hours.      CMP:  Recent Labs   Lab 09/18/23  0451   CALCIUM 8.8      K 3.9   CO2 28      BUN 16   CREATININE 0.4*         Micro Results  Microbiology Results (last 7 days)       Procedure Component Value Units Date/Time    Urine culture [[id number]] Collected: 09/15/23 1557    Order Status: Completed Specimen: Urine Updated: 09/17/23 0856     Urine Culture, Routine Multiple organisms isolated. None in predominance.  Repeat if      clinically necessary.    Narrative:      Specimen Source->Urine             Radiology Reports  X-Ray Abdomen AP 1 View    Result Date: 9/14/2023  HISTORY: f/u ileus COMPARISON:Comparison is made with the patient's previous imaging study of 2/7/2023. FINDINGS:Nasogastric tube projects below the level of the gastroesophageal junction tip oriented within the region of the gastric fundus has been employed as compared to previous. Several widespread loops of intestinal bowel gas are again redemonstrated throughout the abdominal cavity in keeping with mild ileus. Gas reaches the ascending colon. There are atheromatous calcifications of the splenic artery. The osseous structures reveal no significant finding. Several calcified densities in the lower pelvis are attributed towards midline calcified fibroids. IMPRESSION: Dilated small bowel loops taken on the appearance of ileus without evidence of definable change upon comparison. Electronically signed by:  Demetrius Samuels MD  9/14/2023 9:46 AM CDT Workstation: [id number]    CT Abdomen Pelvis With Contrast    Result Date: 9/13/2023  EXAM: CT ABDOMEN PELVIS WITH CONTRAST HISTORY: " Bowel obstruction suspected COMPARISON: CT scan of the abdomen and pelvis dated 1/29/2023 TECHNIQUE: Multiple axial 2 mm thick images were obtained through the abdomen and pelvis IV contrast only. This exam was performed according to our departmental dose-optimization program, which includes automated exposure control, adjustment of the mA and/or kV according to patient size and/or use of iterative reconstruction technique. Unless otherwise stated, incidental findings do not require dedicated follow-up imaging. FINDINGS: There are numerous segments of air and fluid-filled markedly dilated small bowel is noted throughout the abdomen and pelvis. The colon is not distended. It contains formed stool. The findings are consistent with distal small bowel obstruction. Similar, but less prominent findings were present on the previous CT in January. As on the previous study, there is swirling of the mesenteric vessels in the pelvis suggesting an internal hernia as the cause of the obstruction. There is also abrupt tapered narrowing of a mildly dilated segment of small bowel in this region that further indicates this is likely the site of the obstruction. The terminal ileum is completely collapsed. There is no free air or free fluid in the abdomen or pelvis. There is no pneumatosis. The liver, spleen, pancreas, and kidneys are unremarkable. There is no hydronephrosis or hydroureter ureter. There is a 2.6 x 1.4 cm diameter low-density solid mass in the left adrenal gland that is unchanged, and is quite likely an adenoma. The right adrenal gland appears normal. Images through the lung bases show minor linear atelectasis in both lower lobes. There is mild to moderate vascular calcification. IMPRESSION:   Numerous segments of moderate to markedly dilated air and fluid-filled small bowel distributed throughout the abdomen and pelvis. There is very little nondilated small bowel. However, the terminal ileum is completely  decompressed. Findings are consistent with distal small bowel obstruction. The site of the obstruction is suspected be within the pelvis along the midline. An internal hernia suspected as etiology. Similar findings were evident on the previous CT scan dated 1/29/2023. Electronically signed by:  Preet Baltazar MD  9/13/2023 4:38 PM CDT Workstation: [id number]    X-Ray Chest AP Portable    Result Date: 9/13/2023  HISTORY: Nasogastric tube placement,  constipation. FINDINGS: Portable chest radiograph at 1543 hours compared to prior exams shows nasogastric tube with distal tip at the level of the gastroesophageal junction or gastric cardia region. The cardiomediastinal silhouette and pulmonary vasculature are within normal limits. The lungs are hypoexpanded, with no consolidation, large pleural effusion, or evidence of pulmonary edema. Right lower lung bandlike opacities suggests chronic atelectasis, with no pneumothorax. The bones are diffusely osteopenic. IMPRESSION: Nasogastric tube as described. Electronically signed by:  Juan Manuel Mejia MD  9/13/2023 3:59 PM CDT Workstation: [id number]    X-Ray Abdomen Flat And Erect    Result Date: 9/13/2023  EXAMINATION: XR ABDOMEN FLAT AND ERECT CLINICAL HISTORY: Personal history of other diseases of the digestive system TECHNIQUE: Flat and erect AP views of the abdomen were performed. COMPARISON: 02/07/2023 FINDINGS: There is diffuse dilatation of bowel with air-fluid levels present.  This is likely both small bowel and colon.  No free air is demonstrated.     Diffuse bowel dilatation concerning for small bowel obstruction.  Further evaluation with CT is recommended.  Further evaluation with CT is recommended. This report was flagged in Epic as abnormal. Electronically signed by: Rai Jennings MD Date:    09/13/2023 Time:    12:55    XR ABDOMEN 2+ VIEWS    Result Date: 9/2/2023  EXAMINATION:  XR ABDOMEN 2 VIEWS HISTORY:  ileus TECHNIQUE: 2 views of the abdomen. COMPARISON:   8/31/2023. CT 8/29/2023. Correlation with small bowel follow-through 8/31/2023. FINDINGS: There is moderate gaseous distention of the small bowel. There is oral contrast within the colon. No free air. No organomegaly or suspicious calcifications. The lung bases are grossly clear. No acute bony abnormality.    IMPRESSION: 1.  Ongoing moderate distention of the small bowel; favor ileus. 2.  Oral contrast is present within the colon, and small bowel obstruction is considered unlikely. Electronically signed by:  Al Castillo MD  09/02/2023 09:26 AM [employer] Workstation: [id number]    XR Small Bowel Follow Through    Result Date: 8/31/2023  EXAMINATION:  FL SMALL BOWEL WATER SOLUBLE HISTORY:  assess SBO COMPARISON:  8/31/2023 PROCEDURALIST: Radha ARAUZ PEAK SKIN DOSE (PSD): 0 mGy FINDINGS:   200 mL Omnipaque 240 administered through nasogastric tube. Contrast quickly progressed to markedly dilated small bowel loops and reaches the ascending colon at 60 minutes. There is marked gaseous distention of the large and small bowel in all 4 quadrants and abdominal distention as well.    IMPRESSION:  Radiographic evidence of severe ileus. Electronically signed by:  Adriano Wright MD  08/31/2023 10:36 AM [employer] Workstation: [id number]    X-Ray Abdomen Portable    Result Date: 8/31/2023  EXAMINATION:  XR ABDOMEN PORTABLE HISTORY: sbo TECHNIQUE: Single view of the abdomen. COMPARISON: 8/30/2023. FINDINGS: There is NG tube with the tip projecting over the gastric fundus. Redemonstration of dilated bowel loops with some air-fluid levels. There is no pneumoperitoneum, visible mass or abnormal calcification.  There are no acute osseous abnormalities.      IMPRESSION: 1. NG tube with the tip projecting over the stomach. 2. Dilated bowel loops with air-fluid level, suggestive of small bowel obstruction. Electronically signed by:  Annette Stephenson MD  08/31/2023 06:33 AM [employer] Workstation: [id number]    X-Ray Abdomen  Portable    Result Date: 8/30/2023  EXAMINATION:  XR ABDOMEN PORTABLE HISTORY: NG placement verification TECHNIQUE: Single view of the abdomen. COMPARISON: 8/29/2023. FINDINGS: There is NG tube with the distal portion appeared to be looped in the tip projecting over the medial side of the gastric fundus. Bowel gas pattern is normal without dilatation or air-fluid levels.  There is no pneumoperitoneum, visible mass or abnormal calcification.  There are no acute osseous abnormalities.      IMPRESSION: NG tube with the distal portion appeared to be looped in the tip projecting over the medial side of the fundus. Electronically signed by:  Annette Stephenson MD  08/30/2023 12:45 AM [employer] Workstation: [id number]    XR ABDOMEN 1 VIEW    Result Date: 8/30/2023  EXAMINATION:  XR ABDOMEN 1 VIEW HISTORY: NGT position TECHNIQUE: Single view of the abdomen. COMPARISON: August 29, 2023 FINDINGS: Interval placement of enteric tube with tip and side-port project over the expected location of the stomach. Gaseous distention of small bowel in the abdomen. Contrast within the left renal calyces. Lung bases are clear. No acute bony abnormality.    IMPRESSION: Enteric tube with tip and side port projecting over the stomach. Electronically signed by:  Ac Tucker MD  08/29/2023 09:24 PM [employer] Workstation: [id number]    CT Abdomen Pelvis With Contrast    Result Date: 8/29/2023  EXAMINATION:  CT ABDOMEN PELVIS W CONTRAST HISTORY:  Indication Not Found - See Additional Reason for Exam Comments; Previous bowel obstruction, bowel surgery, potential internal hernia, no suspicion that she is we obstructed, please start oral contrast, TECHNIQUE:  Computed tomography of the abdomen and pelvis with intravenous contrast. Multiplanar 2-D reformats were performed and evaluated. This CT examination was performed using 1 more the following dose reduction techniques: Automated exposure control, adjustment of mA and/or kV according to patient size, or  use of iterative reconstruction technique. CONTRAST: IOHEXOL 300 MG/ML INJECTION SOLN: 75 mL; IOHEXOL 300 MG/ML INJECTION SOLN: 50 mL CTDI: CTDI: 10.64 CTDIv; CTDLP: 527 mGy-cm DLP COMPARISON: Same day abdominal radiographs. FINDINGS: LOWER CHEST: Bibasilar atelectasis. No pleural effusion. LIVER: Normal in size and contour. No focal lesion. GALLBLADDER: Distended. No radiopaque stone. No definite gallbladder wall thickening. BILE DUCTS: Grossly unremarkable. PANCREAS: No mass, ductal dilation, or mike-pancreatic fluid. SPLEEN: Normal size.  No focal lesion. ADRENALS: 2 cm left adrenal nodule. Normal right adrenal gland. KIDNEYS AND URETERS: Normal size and contour. No hydronephrosis. URINARY BLADDER: Normal contour. GASTROINTESTINAL TRACT: Stomach is moderately dilated with air-fluid level. Diffuse dilation of small bowel with air-fluid levels. There is dilation of to at least 4 cm in diameter. There is a region of focal decreased small bowel caliber in the right abdomen with surrounding spiraling mesenteric vessels (series 4 image 45 through 60) concerning for a site of closed loop obstruction. The colon is decompressed. APPENDIX: Not definitely visualized. LYMPH NODES: No lymphadenopathy. ABDOMINAL AORTA AND OTHER VESSELS: Normal caliber aorta and IVC. REPRODUCTIVE ORGANS: No pathologic process MUSCULOSKELETAL: No acute or suspicious osseous abnormality. ADDITIONAL FINDINGS: None.    IMPRESSION: Small bowel obstruction with suspected closed-loop site in the right mid abdomen. NOTIFICATION:  These findings were discussed by telephone with KELLY WHITE on 8/29/2023 10:49 PM CDT Indeterminate 2 cm left adrenal gland nodule. Recommend nonemergent follow-up with dedicated MR or CT of the adrenal glands. Electronically signed by:  Ac Tucker MD  08/29/2023 07:51 PM [employer] Workstation: [id number]    X-Ray Abdomen Flat And Erect    Result Date: 8/29/2023  EXAMINATION:  XR ABDOMEN SUPINE AND UPRIGHT HISTORY:   Abdominal distention, history of bowel obstruction TECHNIQUE: Supine and erect views of the abdomen. COMPARISON:  None. FINDINGS: Nonspecific bowel gas pattern with a few air-fluid levels, apparently in both small and large bowel. No evidence of free air in the abdomen. No suspicious calcifications. No acute bony abnormality.    IMPRESSION:  Nonspecific bowel gas pattern with a few layering air-fluid levels in colon and likely small bowel. Electronically signed by:  Ac Tucker MD  08/29/2023 05:17 PM AKDT Workstation: [id number]       Meds  Scheduled Meds:   carbidopa-levodopa  3 capsule Oral TID    carboxymethylcellulose  1 drop Both Eyes BID    cefTRIAXone (ROCEPHIN) IVPB  1 g Intravenous Q24H    enoxparin  40 mg Subcutaneous Daily    levETIRAcetam  500 mg Oral BID    levothyroxine  62.5 mcg Oral Before breakfast    polyethylene glycol  17 g Oral Daily    senna-docusate 8.6-50 mg  1 tablet Oral BID     Continuous Infusions:      PRN Meds:.acetaminophen, HYDROcodone-acetaminophen, melatonin, morphine, ondansetron, prochlorperazine, sodium chloride 0.9%, traZODone.    Active PT: Yes  Active OT: Yes  Active SLP: No  Assessment & Plan:   * Ileus  Resolved  - NG tube discontinued  - general surgery on board  - clearance by surgery to advanced diet  Senna added to MiraLax  - IV fluids       Anemia  Hemoglobin is stable  Trend        PD (Parkinson's disease)  Patient with Parkinson's disease on carbidopa-levodopa  She will bring her home time release capsule  At her previous hospitalization they were clamping her NG tube and allowing her to take her carbidopa given that she has severe symptoms without it  Will try to continue this process here  Ambulate as much as possible, up in chair during the day        Hypothyroidism  Continue Synthroid       UTI  IV Rocephin daily  Urine culture pending             Discharge Planning:   Is the patient medically ready for discharge?: no    Reason for patient still in hospital  (select all that apply): Patient trending condition and Treatment    Above encounter included review of the medical records, interviewing and examining the patient face-to-face, discussion with family and other health care providers, ordering and interpreting lab/test results and formulating a plan of care.     Medical Decision Making:      [_] Low Complexity  [_] Moderate Complexity  [x] High Complexity      Sophie Law MD  Department of Hospital Medicine   [facility]

## 2023-09-18 NOTE — NURSING
Daughter at bedside reports patient up multiple times throughout the night. Patient and daughter report passing gas and able to void w/ ease but unable to pass any stool at this time. Will pass findings along in report.

## 2023-09-19 ENCOUNTER — TELEPHONE (OUTPATIENT)
Dept: SURGERY | Facility: CLINIC | Age: 78
End: 2023-09-19
Payer: MEDICARE

## 2023-09-19 VITALS
HEIGHT: 63 IN | DIASTOLIC BLOOD PRESSURE: 53 MMHG | SYSTOLIC BLOOD PRESSURE: 103 MMHG | OXYGEN SATURATION: 95 % | BODY MASS INDEX: 19.67 KG/M2 | HEART RATE: 68 BPM | WEIGHT: 111 LBS | TEMPERATURE: 99 F | RESPIRATION RATE: 18 BRPM

## 2023-09-19 LAB
ANION GAP SERPL CALC-SCNC: 4 MMOL/L (ref 8–16)
BUN SERPL-MCNC: 20 MG/DL (ref 8–23)
CALCIUM SERPL-MCNC: 8.6 MG/DL (ref 8.7–10.5)
CHLORIDE SERPL-SCNC: 106 MMOL/L (ref 95–110)
CO2 SERPL-SCNC: 27 MMOL/L (ref 23–29)
CREAT SERPL-MCNC: 0.4 MG/DL (ref 0.5–1.4)
EST. GFR  (NO RACE VARIABLE): >60 ML/MIN/1.73 M^2
GLUCOSE SERPL-MCNC: 78 MG/DL (ref 70–110)
POTASSIUM SERPL-SCNC: 4.2 MMOL/L (ref 3.5–5.1)
SODIUM SERPL-SCNC: 137 MMOL/L (ref 136–145)

## 2023-09-19 PROCEDURE — 25000003 PHARM REV CODE 250: Performed by: STUDENT IN AN ORGANIZED HEALTH CARE EDUCATION/TRAINING PROGRAM

## 2023-09-19 PROCEDURE — 80048 BASIC METABOLIC PNL TOTAL CA: CPT | Performed by: STUDENT IN AN ORGANIZED HEALTH CARE EDUCATION/TRAINING PROGRAM

## 2023-09-19 PROCEDURE — 63600175 PHARM REV CODE 636 W HCPCS: Performed by: STUDENT IN AN ORGANIZED HEALTH CARE EDUCATION/TRAINING PROGRAM

## 2023-09-19 PROCEDURE — 36415 COLL VENOUS BLD VENIPUNCTURE: CPT | Performed by: STUDENT IN AN ORGANIZED HEALTH CARE EDUCATION/TRAINING PROGRAM

## 2023-09-19 RX ORDER — LEVODOPA AND CARBIDOPA 95; 23.75 MG/1; MG/1
3 CAPSULE, EXTENDED RELEASE ORAL 3 TIMES DAILY
Start: 2023-09-19

## 2023-09-19 RX ORDER — DOCUSATE SODIUM 100 MG/1
100 CAPSULE, LIQUID FILLED ORAL DAILY
Qty: 90 CAPSULE | Refills: 0
Start: 2023-09-19

## 2023-09-19 RX ORDER — POLYETHYLENE GLYCOL 3350 17 G/17G
17 POWDER, FOR SOLUTION ORAL DAILY
Qty: 100 EACH | Refills: 0 | Status: SHIPPED | OUTPATIENT
Start: 2023-09-20

## 2023-09-19 RX ADMIN — POLYETHYLENE GLYCOL 3350 17 G: 17 POWDER, FOR SOLUTION ORAL at 09:09

## 2023-09-19 RX ADMIN — LEVOTHYROXINE SODIUM 62.5 MCG: 0.03 TABLET ORAL at 05:09

## 2023-09-19 RX ADMIN — SENNOSIDES AND DOCUSATE SODIUM 1 TABLET: 50; 8.6 TABLET ORAL at 09:09

## 2023-09-19 RX ADMIN — CARBOXYMETHYLCELLULOSE SODIUM 1 DROP: 5 SOLUTION/ DROPS OPHTHALMIC at 09:09

## 2023-09-19 RX ADMIN — LEVETIRACETAM 500 MG: 500 TABLET, FILM COATED ORAL at 09:09

## 2023-09-19 RX ADMIN — CEFTRIAXONE SODIUM 1 G: 1 INJECTION, POWDER, FOR SOLUTION INTRAMUSCULAR; INTRAVENOUS at 10:09

## 2023-09-19 NOTE — PLAN OF CARE
Problem: Adult Inpatient Plan of Care  Goal: Plan of Care Review  9/19/2023 1152 by Beti Samson RN  Outcome: Met  9/19/2023 0651 by Beti Samson RN  Outcome: Ongoing, Progressing  Goal: Patient-Specific Goal (Individualized)  9/19/2023 1152 by Beti Samson RN  Outcome: Met  9/19/2023 0651 by Beti Samson RN  Outcome: Ongoing, Progressing  Goal: Absence of Hospital-Acquired Illness or Injury  9/19/2023 1152 by Beti Samson RN  Outcome: Met  9/19/2023 0651 by Beti Samson RN  Outcome: Ongoing, Progressing  Goal: Optimal Comfort and Wellbeing  9/19/2023 1152 by Beti Samson RN  Outcome: Met  9/19/2023 0651 by Beti Samson RN  Outcome: Ongoing, Progressing  Goal: Readiness for Transition of Care  9/19/2023 1152 by Beti Samson RN  Outcome: Met  9/19/2023 0651 by Beti Samson RN  Outcome: Ongoing, Progressing     Problem: Skin Injury Risk Increased  Goal: Skin Health and Integrity  9/19/2023 1152 by Beti Samson RN  Outcome: Met  9/19/2023 0651 by Beti Samson RN  Outcome: Ongoing, Progressing     Problem: Parenteral Nutrition  Goal: Effective Intravenous Nutrition Therapy Delivery  9/19/2023 1152 by Beti Samson RN  Outcome: Met  9/19/2023 0651 by Beti Samson RN  Outcome: Ongoing, Progressing     Problem: Fall Injury Risk  Goal: Absence of Fall and Fall-Related Injury  9/19/2023 1152 by Beti Samson RN  Outcome: Met  9/19/2023 0651 by Beti Samson RN  Outcome: Ongoing, Progressing     Problem: Coping Ineffective  Goal: Effective Coping  9/19/2023 1152 by Beti Samson RN  Outcome: Met  9/19/2023 0651 by Beti Samson RN  Outcome: Ongoing, Progressing     Problem: Pain Acute  Goal: Acceptable Pain Control and Functional Ability  9/19/2023 1152 by Beti Samson RN  Outcome: Met  9/19/2023 0651 by Beti Samson RN  Outcome: Ongoing, Progressing     Problem: Fluid Deficit (Intestinal Obstruction)  Goal: Fluid Balance  9/19/2023 1152 by Beti Samson RN  Outcome: Met  9/19/2023 0651 by Chapin, Beti, RN  Outcome: Ongoing, Progressing     Problem:  Infection (Intestinal Obstruction)  Goal: Absence of Infection Signs and Symptoms  9/19/2023 1152 by Beti Samson RN  Outcome: Met  9/19/2023 0651 by Beti Samson RN  Outcome: Ongoing, Progressing     Problem: Nausea and Vomiting (Intestinal Obstruction)  Goal: Nausea and Vomiting Relief  9/19/2023 1152 by Beti Samson RN  Outcome: Met  9/19/2023 0651 by Beti Samson RN  Outcome: Ongoing, Progressing     Problem: Oral Intake Inadequate  Goal: Improved Oral Intake  9/19/2023 1152 by Beti Samson RN  Outcome: Met  9/19/2023 0651 by Beti Samson RN  Outcome: Ongoing, Progressing

## 2023-09-19 NOTE — DISCHARGE INSTRUCTIONS
Nutrition  Foods to eat:    Low/minimal residue diet: Eat food containing less fiber; refined grains, soft well - cooked fruits, vegetables and meat  Fluid diet: Broths, soup, pudding, custard, ice cream and liquid nutritional supplements.  Foods to avoid:    Foods rich in fiber such as raw vegetables and whole grains.  Nuts and beans  Tough, fibrous meats with gristle, and meat with casings.

## 2023-09-19 NOTE — PLAN OF CARE
Problem: Physical Therapy  Goal: Physical Therapy Goal  Description: Goals to be met by: 10/17/23    Patient will increase functional independence with mobility by performin. Supine to sit with Supervision  2. Sit to stand transfer with Supervision  3. Bed to chair transfer with Supervision using Rolling Walker or HHA  4. Gait  x 250 feet with Supervision using Rolling Walker or HHA.   5. Lower extremity exercise program x20 reps per handout, with supervision  Outcome: Ongoing, Progressing

## 2023-09-19 NOTE — PT/OT/SLP PROGRESS
Occupational Therapy      Patient Name:  Ariana Kramer Te   MRN:  [id number]    Patient not seen today secondary to Patient fatigue. Will follow-up tomorrow.    9/19/2023

## 2023-09-19 NOTE — PLAN OF CARE
09/19/23 1146   Final Note   Assessment Type Final Discharge Note   Anticipated Discharge Disposition Home-Health   What phone number can be called within the next 1-3 days to see how you are doing after discharge? [phone]   Hospital Resources/Appts/Education Provided Post-Acute resouces added to AVS   Post-Acute Status   Post-Acute Authorization Home Health   Home Health Status Set-up Complete/Auth obtained   Discharge Delays None known at this time     Pt to discharge home with [facility] and will be seen 9/21/2023.    Patient cleared for discharge from case management standpoint.    Follow up appointments scheduled and added to AVS.( Dc clinic scheduled, Pettio's office to contact pt)    Chart and discharge orders reviewed.  Patient discharged home with no further case management needs.

## 2023-09-19 NOTE — NURSING
Discharge instructions given to patient and family, both stated understood. IV remove telly box remove.Pt going home with [facility].

## 2023-09-19 NOTE — PT/OT/SLP PROGRESS
Physical Therapy      Patient Name:  Ariana Kramer Te   MRN:  [id number]    Patient not seen today secondary to Patient fatigue, Other (Comment) (Pt prepping for discharge.). Will follow-up next service date if discharge falls through.

## 2023-09-19 NOTE — PLAN OF CARE
09/19/23 0914   Post-Acute Status   Post-Acute Authorization Home Health   Home Health Status Pending medical clearance/testing     CarePort Alert: YES response from [facility] re: Referral [id number] for patient in [facility] [id number]: Yes, willing to accept patient

## 2023-09-19 NOTE — TELEPHONE ENCOUNTER
----- Message from Laila Beatty RN sent at 9/19/2023 11:37 AM CDT -----  Regarding: hospital follow up  Hospital requesting follow up for SBO with Dr Vigil on Monday, 9/25.  Please contact the pt for scheduling    Thanks,  Rogerio  Ext [phone]

## 2023-09-19 NOTE — DISCHARGE SUMMARY
[facility]  Discharge Summary      Patient Name: Ariana Tiwari  MRN: [id number]  JAN: [id number]  Patient Class: IP- Inpatient  Admission Date: 9/13/2023  Hospital Length of Stay: 6 days  Discharge Date and Time:  09/19/2023 11:43 AM  Attending Physician: Sophie Law MD   Discharging Provider: Sophie Law MD  Primary Care Provider: Nba Petty MD    Primary Care Team: Networked reference to record PCT     HPI:   Patient is a 78-year-old female with a history of Parkinson's disease, recurrent small-bowel obstructions, hypothyroid, GERD, she is presenting today with persistent abdominal distention and ileus.  The patient was hospitalized in [place] 2 weeks ago at the end of August.  The patient was found to have ileus at that time with little to no improvement.  The family wanted to return to [place] and the patient was discharged from the facility there and she returned here hoping that her ileus will resolve with time.  After advancing her diet, continuing to monitor at home she has not had any improvement in her ileus.  She continues to have persistent abdominal distention and discomfort.  She is passing gas but not having any bowel movements.  She is been taking her medications as prescribed despite the ileus.  In the past, she has improved with gastric decompression with NG tube.  This was placed in the ER.  She denies fever, chills, severe abdominal pain.  No nausea or vomiting at this time.  No chest pain or shortness a breath.    In the ER, vital signs are stable, P 90, R 16, /75, O2 sat 90% on room air.  CT abdomen pelvis obtained which shows numerous segments of markedly dilated fluid-filled small bowel.  No specific transition point is identified however the terminal ileum is decompressed.  At this point, NG tube was placed in the ER and patient will be admitted for further management of ileus.      * No surgery found *      Hospital Course:     9/14:  NG tube still actively draining.  Will repeat KUB and surgery to re-evaluate.  Patient has had history of abdominal surgeries x2.  Patient states that she is doing well and slept pretty well today.  Bilateral red eyes noted, patient denies any itching or pain and states that is due to dry eyes.  Will add eyedrops. Family present at bedside.No concerns/issues overnight reported by the patient or the nursing staff.     9/15:  Patient noted to be uncomfortable eyes bloodshot.  She states that she did not sleep well last night.  Surgery evaluated patient yesterday and clamped NG tube and recommended advancement to clear sips.  Enema and suppository also recommended.  Patient states that she is passing gas already and is eager to eat.     9/16:  Patient had bowel movement yesterday.  Patient looking significantly better today.  NG tube has been removed and patient advanced to full liquid diet.  Acute overnight events.  Patient currently on IV Rocephin for UTI.  Urine culture pending     9/17:  Patient doing well, will continue to advance diet as tolerated.  No acute overnight event.     9/18:  Patient doing well and tolerating soft diet.  Patient has not had a bowel movement but states that she is passing gas.  Would start patient on senna b.i.d..  Patient has no bowel movement by tomorrow, consider KUB in a.m.     9/19:  Patient had a bowel movement yesterday, subsequently doing better today sitting up in bed and eating.  Patient tolerating diet, and has been passing flatus.  Patient subsequently cleared by surgery and patient to follow-up outpatient next week.    Physical examination on discharge:  Constitutional: No distress.   HENT: NC  Head: Atraumatic.   Cardiovascular: Normal rate, regular rhythm and normal heart sounds.   Pulmonary/Chest: Effort normal. No wheezes.   Abdominal: Soft. Bowel sounds are normal. No distension and no mass. No tenderness  Neurological: Alert.   Skin: Skin is warm and  dry.   Psych: Appropriate mood and affect    I have seen the patient on the day of discharge and reviewed the discharge instructions as outlined.      Goals of Care Treatment Preferences:  Code Status: Full Code      Consults:   Consults (From admission, onward)        Status Ordering Provider     Inpatient consult to Registered Dietitian/Nutritionist  Once        Provider:  (Not yet assigned)    Completed SAMANTHA ODOM     Inpatient consult to General Surgery  Once        Provider:  Sterling Vigil III, MD    Completed SAMANTHA ODOM          No new Assessment & Plan notes have been filed under this hospital service since the last note was generated.  Service: Hospital Medicine    Final Active Diagnoses:    Diagnosis Date Noted POA    PRINCIPAL PROBLEM:  Ileus [K56.7] 09/13/2023 Yes    Anemia [D64.9] 02/03/2020 Yes    PD (Parkinson's disease) [G20] 09/16/2015 Yes    Hypothyroidism [E03.9]  Yes      Problems Resolved During this Admission:       Discharged Condition: good    [place] [facility]    Follow Up:   Contact information for follow-up providers     [facility] Follow up.    Specialty: Primary Care  Why: Hospital follow up scheduled 9/22 at 11:00 a.m.  Contact information:  [address]  [facility] [id number] [phone]  Additional information:  [address]           Sterling Vigil III, MD Follow up on 9/25/2023.    Specialties: General Surgery, Surgery  Contact information:  [address]  [address]  [facility] [address]  [phone]                   Contact information for after-discharge care     Home Medical Care     [facility] .    Services: Home Nursing, Home Rehabilitation  Contact information:  [address]  [facility] [id number] [phone]                           Patient Instructions:      Ambulatory referral/consult to Outpatient Case Management   Referral Priority: Routine Referral  "Type: Consultation   Referral Reason: Specialty Services Required   Number of Visits Requested: 1     Ambulatory referral/consult to Home Health   Standing Status: Future   Referral Priority: Routine Referral Type: Home Health   Referral Reason: Specialty Services Required   Requested Specialty: Home Health Services   Number of Visits Requested: 1     Activity as tolerated       Significant Diagnostic Studies: Labs:   BMP:   Recent Labs   Lab 09/18/23  0451 09/19/23  0418   GLU 78 78    137   K 3.9 4.2    106   CO2 28 27   BUN 16 20   CREATININE 0.4* 0.4*   CALCIUM 8.8 8.6*   , CMP   Recent Labs   Lab 09/18/23  0451 09/19/23  0418    137   K 3.9 4.2    106   CO2 28 27   GLU 78 78   BUN 16 20   CREATININE 0.4* 0.4*   CALCIUM 8.8 8.6*   ANIONGAP 6* 4*   , CBC No results for input(s): "WBC", "HGB", "HCT", "PLT" in the last 48 hours. and All labs within the past 24 hours have been reviewed  Microbiology:   Blood Culture   Lab Results   Component Value Date    LABBLOO No growth after 5 days. 12/06/2021     Radiology: X-Ray: CXR: X-Ray Chest 1 View (CXR): No results found for this visit on 09/13/23. and X-Ray Chest PA and Lateral (CXR): No results found for this visit on 09/13/23. and KUB: X-Ray Abdomen AP 1 View (KUB):   Results for orders placed or performed during the hospital encounter of 09/13/23   X-Ray Abdomen AP 1 View    Narrative    HISTORY: f/u ileus    COMPARISON:Comparison is made with the patient's previous imaging study of 2/7/2023.    FINDINGS:Nasogastric tube projects below the level of the gastroesophageal junction tip oriented within the region of the gastric fundus has been employed as compared to previous. Several widespread loops of intestinal bowel gas are again redemonstrated throughout the abdominal cavity in keeping with mild ileus. Gas reaches the ascending colon. There are atheromatous calcifications of the splenic artery. The osseous structures reveal no significant " finding. Several calcified densities in the lower pelvis are attributed towards midline calcified fibroids.    IMPRESSION: Dilated small bowel loops taken on the appearance of ileus without evidence of definable change upon comparison.    Electronically signed by:  Demetrius Samuels MD  9/14/2023 9:46 AM CDT Workstation: [id number]     CT scan: CT ABDOMEN PELVIS WITH CONTRAST:   Results for orders placed or performed during the hospital encounter of 09/13/23   CT Abdomen Pelvis With Contrast    Narrative    EXAM: CT ABDOMEN PELVIS WITH CONTRAST    HISTORY: Bowel obstruction suspected    COMPARISON: CT scan of the abdomen and pelvis dated 1/29/2023    TECHNIQUE: Multiple axial 2 mm thick images were obtained through the abdomen and pelvis IV contrast only.    This exam was performed according to our departmental dose-optimization program, which includes automated exposure control, adjustment of the mA and/or kV according to patient size and/or use of iterative reconstruction technique.    Unless otherwise stated, incidental findings do not require dedicated follow-up imaging.    FINDINGS: There are numerous segments of air and fluid-filled markedly dilated small bowel is noted throughout the abdomen and pelvis. The colon is not distended. It contains formed stool. The findings are consistent with distal small bowel obstruction. Similar, but less prominent findings were present on the previous CT in January. As on the previous study, there is swirling of the mesenteric vessels in the pelvis suggesting an internal hernia as the cause of the obstruction. There is also abrupt tapered narrowing of a mildly dilated segment of small bowel in this region that further indicates this is likely the site of the obstruction. The terminal ileum is completely collapsed. There is no free air or free fluid in the abdomen or pelvis. There is no pneumatosis. The liver, spleen, pancreas, and kidneys are unremarkable. There is no  hydronephrosis or hydroureter ureter. There is a 2.6 x 1.4 cm diameter low-density solid mass in the left adrenal gland that is unchanged, and is quite likely an adenoma. The right adrenal gland appears normal. Images through the lung bases show minor linear atelectasis in both lower lobes. There is mild to moderate vascular calcification.    IMPRESSION:   Numerous segments of moderate to markedly dilated air and fluid-filled small bowel distributed throughout the abdomen and pelvis. There is very little nondilated small bowel. However, the terminal ileum is completely decompressed. Findings are consistent with distal small bowel obstruction. The site of the obstruction is suspected be within the pelvis along the midline. An internal hernia suspected as etiology. Similar findings were evident on the previous CT scan dated 1/29/2023.    Electronically signed by:  Preet Baltazar MD  9/13/2023 4:38 PM CDT Workstation: [id number]    and CT ABDOMEN PELVIS WITHOUT CONTRAST: No results found for this visit on 09/13/23.    Pending Diagnostic Studies:     Procedure Component Value Units Date/Time    Magnesium [[id number]]     Order Status: Sent Lab Status: No result     Specimen: Blood          Medications:  Reconciled Home Medications:      Medication List      START taking these medications    polyethylene glycol 17 gram Pwpk  Commonly known as: GLYCOLAX  Take 17 g by mouth once daily.  Start taking on: September 20, 2023        CHANGE how you take these medications    salicylic acid 3 % Sham  Shampoo scalp 2-3 times a week  What changed:   · how much to take  · how to take this  · when to take this  · reasons to take this        CONTINUE taking these medications    acetaminophen 325 MG tablet  Commonly known as: TYLENOL  Take 2 tablets (650 mg total) by mouth every 6 (six) hours as needed for Pain.     azelastine 137 mcg (0.1 %) nasal spray  Commonly known as: ASTELIN  1 spray by Nasal route 2 (two) times daily.      diclofenac sodium 1 % Gel  Commonly known as: VOLTAREN  Apply 4 g topically 2 (two) times daily.     docusate sodium 100 MG capsule  Commonly known as: COLACE  Take 1 capsule (100 mg total) by mouth once daily.     ENSURE ORIGINAL Liqd  Generic drug: food supplemt, lactose-reduced  Take 237 mLs by mouth once daily.     erythromycin 250 mg Tbec  Commonly known as: KATT-TAB  Take 1 tablet by mouth 3 (three) times daily.     FRUIT AND VEGETABLE DAILY ORAL  Take 1 Dose by mouth once daily. Patient takes 2 fruit and 1 vegetable balance of nature vitamins in the morning and evening     glucose 4 GM chewable tablet  Take 4 tablets (16 g total) by mouth as needed for Low blood sugar.     levETIRAcetam 500 MG Tab  Commonly known as: KEPPRA  Take 1 tablet (500 mg total) by mouth 2 (two) times daily.     levothyroxine 137 MCG Tab tablet  Commonly known as: SYNTHROID  Take 0.5 tablets (68.5 mcg total) by mouth before breakfast.     RYTARY 23.75-95 mg Cpsr  Generic drug: carbidopa-levodopa  Take 3 capsules by mouth 3 (three) times daily.     simethicone 125 mg Cap capsule  Commonly known as: MYLICON  Take 1 capsule (125 mg total) by mouth 4 (four) times daily as needed for Flatulence.     traZODone 50 MG tablet  Commonly known as: DESYREL  Take 1 tablet (50 mg total) by mouth nightly as needed for Insomnia.        STOP taking these medications    metronidazole 1% 1 % Gel  Commonly known as: METROGEL            Indwelling Lines/Drains at time of discharge:   Lines/Drains/Airways     None                 Time spent on the discharge of patient: 35 minutes         Sophie Law MD  Department of Hospital Medicine  [facility]

## 2023-09-19 NOTE — NURSING
0030---patient with Bmx1 this shift so far. Order received for enema. Patient given x1 w/ results 15 minutes later/ patient up to BSC and noted to have large amounts of gas expelled and followed by a moderate nearing large amount of creamy/soft/formed stool.

## 2023-09-20 ENCOUNTER — PATIENT OUTREACH (OUTPATIENT)
Dept: ADMINISTRATIVE | Facility: CLINIC | Age: 78
End: 2023-09-20
Payer: MEDICARE

## 2023-09-20 NOTE — PROGRESS NOTES
C3 nurse spoke with Ariana Tiwari ( DNL)  for a TCC post hospital discharge follow up call. The patient has a scheduled HOSFU appointment with [facility] PCP on 09/22/2023 @ 1100.    Message sent to MD staff

## 2023-09-22 ENCOUNTER — OFFICE VISIT (OUTPATIENT)
Dept: PRIMARY CARE CLINIC | Facility: CLINIC | Age: 78
End: 2023-09-22
Payer: MEDICARE

## 2023-09-22 VITALS
HEIGHT: 63 IN | BODY MASS INDEX: 19.82 KG/M2 | HEART RATE: 85 BPM | OXYGEN SATURATION: 95 % | TEMPERATURE: 98 F | DIASTOLIC BLOOD PRESSURE: 70 MMHG | SYSTOLIC BLOOD PRESSURE: 110 MMHG | WEIGHT: 111.88 LBS

## 2023-09-22 DIAGNOSIS — K56.609 SMALL BOWEL OBSTRUCTION: Primary | ICD-10-CM

## 2023-09-22 PROCEDURE — 99999 PR PBB SHADOW E&M-EST. PATIENT-LVL V: ICD-10-PCS | Mod: PBBFAC,,, | Performed by: NURSE PRACTITIONER

## 2023-09-22 PROCEDURE — 99215 OFFICE O/P EST HI 40 MIN: CPT | Mod: PBBFAC,PN | Performed by: NURSE PRACTITIONER

## 2023-09-22 PROCEDURE — 99999 PR PBB SHADOW E&M-EST. PATIENT-LVL V: CPT | Mod: PBBFAC,,, | Performed by: NURSE PRACTITIONER

## 2023-09-22 PROCEDURE — 99214 OFFICE O/P EST MOD 30 MIN: CPT | Mod: S$PBB,,, | Performed by: NURSE PRACTITIONER

## 2023-09-22 PROCEDURE — 99214 PR OFFICE/OUTPT VISIT, EST, LEVL IV, 30-39 MIN: ICD-10-PCS | Mod: S$PBB,,, | Performed by: NURSE PRACTITIONER

## 2023-09-22 NOTE — PROGRESS NOTES
Transitional Care Note  Subjective:       Patient ID: Ariana Tiwari is a 78 y.o. female.  Chief Complaint: Transitional Care    Family and/or Caretaker present at visit?  Yes.  Diagnostic tests reviewed/disposition: No diagnosic tests pending after this hospitalization.  Disease/illness education: SBO  Home health/community services discussion/referrals: Patient does not have home health established from hospital visit.  They do not need home health.  If needed, we will set up home health for the patient.   Establishment or re-establishment of referral orders for community resources: No other necessary community resources.   Discussion with other health care providers: No discussion with other health care providers necessary.   PETRA Tiwari is a 78 year old female who presents to the Discharge Clinic for evaluation after admission to [facility] for a SBO. She was hospitalized with NG tube decompression and surgery consultation. She progressed well with BM's and pain management. She denies abdominal pain, nausea, or vomiting. No fever or chills. Endorses her appetite is almost back to normal. BMs are daily.  Review of Systems   Constitutional: Negative.    HENT: Negative.     Respiratory: Negative.     Cardiovascular: Negative.    Gastrointestinal: Negative.    Genitourinary: Negative.    Musculoskeletal: Negative.    Skin: Negative.    Neurological: Negative.    Hematological: Negative.    Psychiatric/Behavioral: Negative.         Objective:      Physical Exam  Nursing note reviewed.   Constitutional:       General: She is not in acute distress.     Appearance: Normal appearance. She is normal weight. She is not toxic-appearing.   HENT:      Mouth/Throat:      Mouth: Mucous membranes are moist.      Pharynx: Oropharynx is clear. No oropharyngeal exudate or posterior oropharyngeal erythema.   Eyes:      Conjunctiva/sclera: Conjunctivae normal.      Pupils: Pupils are equal, round, and reactive to light.    Cardiovascular:      Rate and Rhythm: Normal rate and regular rhythm.      Heart sounds: Murmur heard.   Pulmonary:      Effort: Pulmonary effort is normal. No respiratory distress.      Breath sounds: Normal breath sounds. No wheezing or rales.   Abdominal:      General: There is no distension.      Palpations: Abdomen is soft.      Tenderness: There is no abdominal tenderness. There is no guarding or rebound.   Musculoskeletal:         General: No tenderness. Normal range of motion.      Cervical back: Normal range of motion and neck supple. No rigidity or tenderness.   Lymphadenopathy:      Cervical: No cervical adenopathy.   Skin:     General: Skin is warm and dry.      Capillary Refill: Capillary refill takes less than 2 seconds.      Coloration: Skin is not pale.      Findings: No erythema or rash.   Neurological:      General: No focal deficit present.      Mental Status: She is alert and oriented to person, place, and time.   Psychiatric:         Mood and Affect: Mood normal.         Behavior: Behavior normal.         Thought Content: Thought content normal.         Judgment: Judgment normal.         Assessment:       SBO resolved.    Plan:       SBO  Follow up with Dr. Granger as scheduled  Diet as tolerated  ER for any return of abdominal pain      Parkinsons    Continue Carbidopa-Levodopa  Neurology follow up with Dr Parker as scheduled.    Seizures    Continue Manuel Parker follow up as scheduled.

## 2023-09-26 ENCOUNTER — OUTPATIENT CASE MANAGEMENT (OUTPATIENT)
Dept: ADMINISTRATIVE | Facility: OTHER | Age: 78
End: 2023-09-26
Payer: MEDICARE

## 2023-09-26 NOTE — PROGRESS NOTES
09/26/23-Attempt assessment with patient/caregiver for outpatient case management. Declines OPCM. Will mail OPCM brochure and RN contact info. OPCM Case Closure.

## 2023-10-06 ENCOUNTER — TELEPHONE (OUTPATIENT)
Dept: FAMILY MEDICINE | Facility: CLINIC | Age: 78
End: 2023-10-06
Payer: MEDICARE

## 2023-10-06 NOTE — TELEPHONE ENCOUNTER
We should be able to do so but I do not have any of the paperwork from [facility] yet.  What do we need OT and PT to do?  Is she having falls, unable to walk?  General weakness?  I do not know her current status and I do not know what she needs to be able to give the orders.  Does she have a preferred PT/OT provider?

## 2023-10-06 NOTE — TELEPHONE ENCOUNTER
----- Message from Flores Rod sent at 10/6/2023 12:50 PM CDT -----  Contact: Daughter in Law Nomee Te  Type:  Needs Medical Advice    Who Called: Daughter in law Nomee Te  Would the patient rather a call back or a response via [facility]? call  Best Call Back Number: [phone]    Additional Information: caller is requesting dr to extend occupational therapy and physical therapy for pt. Please advise and thank you.

## 2023-10-06 NOTE — TELEPHONE ENCOUNTER
Patient getting OT/PT through [facility] since hospitalization. She was told they would be discharging patient. Family would like to continue with PT/OT for strengthening and balance. [facility] told her to contact pcp for order to continue home health.

## 2023-10-12 NOTE — TELEPHONE ENCOUNTER
Unless they can take a verbal order I can not do it.  [facility] will not allow it

## 2023-10-12 NOTE — TELEPHONE ENCOUNTER
Spke to [place] with [facility] today. Need order to say this for PT/OT order: to re-evaluate to continue therapy needs. Patient family asking to continue to gain strength and balance.

## 2023-10-24 ENCOUNTER — OFFICE VISIT (OUTPATIENT)
Dept: SURGERY | Facility: CLINIC | Age: 78
End: 2023-10-24
Payer: MEDICARE

## 2023-10-24 ENCOUNTER — DOCUMENT SCAN (OUTPATIENT)
Dept: HOME HEALTH SERVICES | Facility: HOSPITAL | Age: 78
End: 2023-10-24
Payer: MEDICARE

## 2023-10-24 VITALS — HEART RATE: 66 BPM | DIASTOLIC BLOOD PRESSURE: 66 MMHG | SYSTOLIC BLOOD PRESSURE: 100 MMHG | TEMPERATURE: 98 F

## 2023-10-24 DIAGNOSIS — Z87.19 HISTORY OF SMALL BOWEL OBSTRUCTION: Primary | ICD-10-CM

## 2023-10-24 DIAGNOSIS — G20.B2 PARKINSON'S DISEASE WITH DYSKINESIA AND FLUCTUATING MANIFESTATIONS: ICD-10-CM

## 2023-10-24 DIAGNOSIS — R53.81 PHYSICAL DECONDITIONING: ICD-10-CM

## 2023-10-24 PROCEDURE — 99213 PR OFFICE/OUTPT VISIT, EST, LEVL III, 20-29 MIN: ICD-10-PCS | Mod: S$GLB,,, | Performed by: SURGERY

## 2023-10-24 PROCEDURE — 99213 OFFICE O/P EST LOW 20 MIN: CPT | Mod: S$GLB,,, | Performed by: SURGERY

## 2023-10-24 RX ORDER — GUAIFENESIN 1200 MG
650 TABLET, EXTENDED RELEASE 12 HR ORAL EVERY 6 HOURS PRN
COMMUNITY
Start: 2023-09-21

## 2023-10-24 RX ORDER — DICLOFENAC SODIUM 10 MG/G
4 GEL TOPICAL 4 TIMES DAILY
COMMUNITY
Start: 2023-09-21

## 2023-10-29 NOTE — PROGRESS NOTES
Subjective:       Patient ID: Ariana Tiwari is a 78 y.o. female.    Chief Complaint: Post-op Evaluation (Hospital f/u)      HPI:  78 year old female with history of significant Parkinson's, multiple abdominal surgeries. She was recently admitted with SBO. Resolved nonoperatively. She has been feeling well. No obstructive symptoms since discharge.     Past Medical History:   Diagnosis Date    Allergy     Diverticulosis     Gluten enteropathy     Gluten intolerance     Hernia     Hypothyroidism     PD (Parkinson's disease) 9/16/2015    Right tremor type    Peptic ulcer disease     Right shoulder pain     Cirtisone injection 3/26/15 - Dr. Tolbert    Ulcer      Past Surgical History:   Procedure Laterality Date    ADENOIDECTOMY      COLONOSCOPY  03/01/2012    Dr. Teresa, repeat in 10 years for screening    COLONOSCOPY N/A 2/21/2018    Procedure: COLONOSCOPY;  Surgeon: Radha Deng MD;  Location: [facility];  Service: Endoscopy;  Laterality: N/A;    CORRECTION OF HAMMER TOE Left 9/16/2020    Procedure: CORRECTION, HAMMER TOE;  Surgeon: William Sprague MD;  Location: [facility] OR;  Service: Orthopedics;  Laterality: Left;    COSMETIC SURGERY      Broken nose    ESOPHAGOGASTRODUODENOSCOPY N/A 12/8/2021    Procedure: EGD (ESOPHAGOGASTRODUODENOSCOPY);  Surgeon: Benji Valentino III, MD;  Location: [facility];  Service: Endoscopy;  Laterality: N/A;    FRACTURE SURGERY  left wrist    With pin    HEMORRHOID SURGERY      HERNIA REPAIR Left 04/09/2015    Dr Lo; left inguinal    INTRALUMINAL GASTROINTESTINAL TRACT IMAGING VIA CAPSULE N/A 7/10/2018    Procedure: IMAGING, GI TRACT, INTRALUMINAL, VIA CAPSULE;  Surgeon: Radha Deng MD;  Location: [facility];  Service: Endoscopy;  Laterality: N/A;    LYSIS OF ADHESIONS  9/29/2020    Procedure: LYSIS, ADHESIONS;  Surgeon: Eagel Gonzalez MD;  Location: [facility] OR;  Service: General;;    RHINOPLASTY TIP      TONSILLECTOMY      UPPER GASTROINTESTINAL ENDOSCOPY  05/21/2015     Dr. Gomez     Review of patient's allergies indicates:   Allergen Reactions    Gluten Diarrhea     Medication List with Changes/Refills   Current Medications    ACETAMINOPHEN (TYLENOL) 325 MG TABLET    Take 2 tablets (650 mg total) by mouth every 6 (six) hours as needed for Pain.    ACETAMINOPHEN 325 MG CAP    2 tablet EVERY 6 HOURS (route: oral)    AZELASTINE (ASTELIN) 137 MCG (0.1 %) NASAL SPRAY    1 spray by Nasal route 2 (two) times daily.    CARBIDOPA-LEVODOPA (RYTARY) 23.75-95 MG CPSR    Take 3 capsules by mouth 3 (three) times daily.    DICLOFENAC SODIUM (VOLTAREN) 1 % GEL    Apply 4 g topically 2 (two) times daily.    DICLOFENAC SODIUM (VOLTAREN) 1 % GEL    1 inch 4 TIMES DAILY (route: topical)    DOCUSATE SODIUM (COLACE) 100 MG CAPSULE    Take 1 capsule (100 mg total) by mouth once daily.    FOOD SUPPLEMT, LACTOSE-REDUCED (ENSURE ORIGINAL) LIQD    Take 237 mLs by mouth once daily.    GLUCOSE 4 GM CHEWABLE TABLET    Take 4 tablets (16 g total) by mouth as needed for Low blood sugar.    LEVETIRACETAM (KEPPRA) 500 MG TAB    Take 1 tablet (500 mg total) by mouth 2 (two) times daily.    LEVOTHYROXINE (SYNTHROID) 137 MCG TAB TABLET    Take 0.5 tablets (68.5 mcg total) by mouth before breakfast.    LYCOPENE/LUTEIN/FRUIT EXTRACTS (FRUIT AND VEGETABLE DAILY ORAL)    Take 1 Dose by mouth once daily. Patient takes 2 fruit and 1 vegetable balance of nature vitamins in the morning and evening    POLYETHYLENE GLYCOL (GLYCOLAX) 17 GRAM PWPK    Take 17 g by mouth once daily.    SALICYLIC ACID 3 % SHAM    Shampoo scalp 2-3 times a week    SIMETHICONE (MYLICON) 125 MG CAP CAPSULE    Take 1 capsule (125 mg total) by mouth 4 (four) times daily as needed for Flatulence.    TRAZODONE (DESYREL) 50 MG TABLET    Take 1 tablet (50 mg total) by mouth nightly as needed for Insomnia.     Family History   Problem Relation Age of Onset    Diabetes Mother     Diabetes Son     Obesity Sister     Alcohol abuse Brother     Heart disease  Brother     No Known Problems Father     Early death Brother         self    Breast cancer Neg Hx     Colon cancer Neg Hx     Ovarian cancer Neg Hx     Colon polyps Neg Hx     Crohn's disease Neg Hx     Ulcerative colitis Neg Hx     Celiac disease Neg Hx      Social History     Socioeconomic History    Marital status:    Tobacco Use    Smoking status: Never    Smokeless tobacco: Never   Substance and Sexual Activity    Alcohol use: Yes     Alcohol/week: 11.7 standard drinks of alcohol     Types: 14 Standard drinks or equivalent per week     Comment: 2 glasses wine per dinner    Drug use: No    Sexual activity: Never     Social Determinants of Health     Financial Resource Strain: Low Risk  (2/7/2023)    Overall Financial Resource Strain (CARDIA)     Difficulty of Paying Living Expenses: Not hard at all   Food Insecurity: No Food Insecurity (2/7/2023)    Hunger Vital Sign     Worried About Running Out of Food in the Last Year: Never true     Ran Out of Food in the Last Year: Never true   Transportation Needs: No Transportation Needs (2/7/2023)    PRAPARE - Transportation     Lack of Transportation (Medical): No     Lack of Transportation (Non-Medical): No   Stress: No Stress Concern Present (2/7/2023)    [employer] [place] [employer] - Occupational Stress Questionnaire     Feeling of Stress : Only a little   Social Connections: Moderately Isolated (2/7/2023)    Social Connection and Isolation Panel [NHANES]     Frequency of Communication with Friends and Family: More than three times a week     Frequency of Social Gatherings with Friends and Family: More than three times a week     Attends Islam Services: More than 4 times per year     Active Member of Clubs or Organizations: No     Attends Club or Organization Meetings: Never     Marital Status:    Housing Stability: Low Risk  (2/7/2023)    Housing Stability Vital Sign     Unable to Pay for Housing in the Last Year: No     Number of  Places Lived in the Last Year: 1     Unstable Housing in the Last Year: No         Review of Systems    Objective:      Physical Exam  Constitutional:       General: She is not in acute distress.     Appearance: She is ill-appearing. She is not toxic-appearing.      Comments: Frail. Elderly female in NAD   Abdominal:      General: There is no distension.      Palpations: Abdomen is soft.      Tenderness: There is no abdominal tenderness.   Musculoskeletal:      Cervical back: Neck supple.   Neurological:      Mental Status: She is alert and oriented to person, place, and time.   Psychiatric:         Behavior: Behavior is cooperative.         Assessment/Plan:   History of small bowel obstruction    Physical deconditioning    Parkinson's disease with dyskinesia and fluctuating manifestations    NO obstructive symptoms since discharge. Tolerating diet. We discussed returning to the ED if experiencing any obstructive symptoms. No new surgery recs. Follow up PRN

## 2023-11-03 ENCOUNTER — DOCUMENT SCAN (OUTPATIENT)
Dept: HOME HEALTH SERVICES | Facility: HOSPITAL | Age: 78
End: 2023-11-03
Payer: MEDICARE

## 2023-11-14 ENCOUNTER — DOCUMENT SCAN (OUTPATIENT)
Dept: HOME HEALTH SERVICES | Facility: HOSPITAL | Age: 78
End: 2023-11-14
Payer: MEDICARE

## 2023-11-14 ENCOUNTER — LAB VISIT (OUTPATIENT)
Dept: LAB | Facility: HOSPITAL | Age: 78
End: 2023-11-14
Attending: FAMILY MEDICINE
Payer: MEDICARE

## 2023-11-14 ENCOUNTER — OFFICE VISIT (OUTPATIENT)
Dept: FAMILY MEDICINE | Facility: CLINIC | Age: 78
End: 2023-11-14
Attending: FAMILY MEDICINE
Payer: MEDICARE

## 2023-11-14 VITALS
SYSTOLIC BLOOD PRESSURE: 98 MMHG | WEIGHT: 114 LBS | HEIGHT: 63 IN | TEMPERATURE: 98 F | OXYGEN SATURATION: 100 % | HEART RATE: 68 BPM | BODY MASS INDEX: 20.2 KG/M2 | DIASTOLIC BLOOD PRESSURE: 68 MMHG | RESPIRATION RATE: 14 BRPM

## 2023-11-14 DIAGNOSIS — K56.7 ILEUS: Primary | ICD-10-CM

## 2023-11-14 DIAGNOSIS — E03.9 ACQUIRED HYPOTHYROIDISM: ICD-10-CM

## 2023-11-14 DIAGNOSIS — G20.B2 PARKINSON'S DISEASE WITH DYSKINESIA AND FLUCTUATING MANIFESTATIONS: ICD-10-CM

## 2023-11-14 DIAGNOSIS — R53.81 PHYSICAL DECONDITIONING: ICD-10-CM

## 2023-11-14 LAB — TSH SERPL DL<=0.005 MIU/L-ACNC: 2.25 UIU/ML (ref 0.4–4)

## 2023-11-14 PROCEDURE — 99215 OFFICE O/P EST HI 40 MIN: CPT | Mod: PBBFAC,PN | Performed by: FAMILY MEDICINE

## 2023-11-14 PROCEDURE — 99999 PR PBB SHADOW E&M-EST. PATIENT-LVL V: ICD-10-PCS | Mod: PBBFAC,,, | Performed by: FAMILY MEDICINE

## 2023-11-14 PROCEDURE — 99213 PR OFFICE/OUTPT VISIT, EST, LEVL III, 20-29 MIN: ICD-10-PCS | Mod: S$PBB,,, | Performed by: FAMILY MEDICINE

## 2023-11-14 PROCEDURE — 99499 NO LOS: ICD-10-PCS | Mod: ,,, | Performed by: FAMILY MEDICINE

## 2023-11-14 PROCEDURE — 99213 OFFICE O/P EST LOW 20 MIN: CPT | Mod: S$PBB,,, | Performed by: FAMILY MEDICINE

## 2023-11-14 PROCEDURE — 36415 COLL VENOUS BLD VENIPUNCTURE: CPT | Performed by: FAMILY MEDICINE

## 2023-11-14 PROCEDURE — 99999 PR PBB SHADOW E&M-EST. PATIENT-LVL V: CPT | Mod: PBBFAC,,, | Performed by: FAMILY MEDICINE

## 2023-11-14 PROCEDURE — 84443 ASSAY THYROID STIM HORMONE: CPT | Performed by: FAMILY MEDICINE

## 2023-11-14 PROCEDURE — 99499 UNLISTED E&M SERVICE: CPT | Mod: ,,, | Performed by: FAMILY MEDICINE

## 2023-11-14 RX ORDER — LEVETIRACETAM 500 MG/1
500 TABLET ORAL 2 TIMES DAILY
Qty: 60 TABLET | Refills: 5 | Status: SHIPPED | OUTPATIENT
Start: 2023-11-14 | End: 2024-11-13

## 2023-11-14 RX ORDER — IVERMECTIN 10 MG/G
1 CREAM TOPICAL ONCE
COMMUNITY

## 2023-11-14 NOTE — PROGRESS NOTES
"Subjective:       Patient ID: Ariana Tiwari is a 78 y.o. female.    Chief Complaint: Follow-up    See 8-year-old female coming in for a hospital follow-up from a stay at [facility] September 13 through September 19.  She had been on a trip to [place] when she developed an ileus and was hospitalized briefly.  She improved somewhat and was discharged with the intent of coming back home to [place] and seeking care but she actually waited about two weeks before doing so.  She had not had a bowel movement for several weeks when she finally presented on September 13th with persistent ileus.  An NG tube was placed and surgery was consulted.  She showed some improvement and they began clamping the NG tube intermittently resulting in finally having a bowel movement on September 18th.  She was discharged the next day on the 19th.  We do have records in epic from the hospital in [place] which indicated a mildly elevated TSH with a normal T4.  This has not been rechecked and potentially could be contributing to the problem.  She has an extensive history of gluten intolerance pancreatic cyst small-bowel obstruction and previous surgery along with elevated liver functions prediabetes hypothyroidism anemia parkinsonism and metabolic encephalopathy with seizure disorder on Keppra.  Patient's blood pressure is noted to be mildly low today and she is not on any antihypertensives.  She does not have any complaints of dizziness or lightheadedness however.  She feels well, she is been eating fairly normally and she has a "tea" that she drinks if she has not had a bowel movement for two days that has some fiber as well as other ingredients and it usually works well for her.  She does take MiraLax regularly as well.  She is also on Gas-X 4 times daily for distention.    Past Medical History:  No date: Allergy  No date: Diverticulosis  No date: Gluten enteropathy  No date: Gluten intolerance  No date: Hernia  No date: " Hypothyroidism  9/16/2015: PD (Parkinson's disease)      Comment:  Right tremor type  No date: Peptic ulcer disease  No date: Right shoulder pain      Comment:  Cirtisone injection 3/26/15 - Dr. Tolbert  No date: Ulcer    Past Surgical History:  No date: ADENOIDECTOMY  03/01/2012: COLONOSCOPY      Comment:  Dr. Teresa, repeat in 10 years for screening  2/21/2018: COLONOSCOPY; N/A      Comment:  Procedure: COLONOSCOPY;  Surgeon: Radha Deng MD;               Location: [facility];  Service: Endoscopy;  Laterality:                N/A;  9/16/2020: CORRECTION OF HAMMER TOE; Left      Comment:  Procedure: CORRECTION, HAMMER TOE;  Surgeon: William Sprague MD;  Location: [facility] OR;  Service: Orthopedics;                 Laterality: Left;  No date: COSMETIC SURGERY      Comment:  Broken nose  12/8/2021: ESOPHAGOGASTRODUODENOSCOPY; N/A      Comment:  Procedure: EGD (ESOPHAGOGASTRODUODENOSCOPY);  Surgeon:                Benji Valentino III, MD;  Location: [facility];                 Service: Endoscopy;  Laterality: N/A;  left wrist: FRACTURE SURGERY      Comment:  With pin  No date: HEMORRHOID SURGERY  04/09/2015: HERNIA REPAIR; Left      Comment:  Dr Lo; left inguinal  7/10/2018: INTRALUMINAL GASTROINTESTINAL TRACT IMAGING VIA CAPSULE; N/  A      Comment:  Procedure: IMAGING, GI TRACT, INTRALUMINAL, VIA CAPSULE;               Surgeon: Radha Deng MD;  Location: [facility];                 Service: Endoscopy;  Laterality: N/A;  9/29/2020: LYSIS OF ADHESIONS      Comment:  Procedure: LYSIS, ADHESIONS;  Surgeon: Eagle Gonzalez MD;  Location: [facility];  Service: General;;  No date: RHINOPLASTY TIP  No date: TONSILLECTOMY  05/21/2015: UPPER GASTROINTESTINAL ENDOSCOPY      Comment:  Dr. Gomez    Current Outpatient Medications on File Prior to Visit:  acetaminophen (TYLENOL) 325 MG tablet, Take 2 tablets (650 mg total) by mouth every 6 (six) hours as needed for Pain., Disp: , Rfl:  0  acetaminophen 325 mg Cap, 2 tablet EVERY 6 HOURS (route: oral), Disp: , Rfl:   azelastine (ASTELIN) 137 mcg (0.1 %) nasal spray, 1 spray by Nasal route 2 (two) times daily., Disp: , Rfl:   carbidopa-levodopa (RYTARY) 23.75-95 mg CpSR, Take 3 capsules by mouth 3 (three) times daily., Disp: , Rfl:   diclofenac sodium (VOLTAREN) 1 % Gel, Apply 4 g topically 2 (two) times daily., Disp: 50 g, Rfl: 1  diclofenac sodium (VOLTAREN) 1 % Gel, 1 inch 4 TIMES DAILY (route: topical), Disp: , Rfl:   docusate sodium (COLACE) 100 MG capsule, Take 1 capsule (100 mg total) by mouth once daily., Disp: 90 capsule, Rfl: 0  food supplemt, lactose-reduced (ENSURE ORIGINAL) Liqd, Take 237 mLs by mouth once daily., Disp: , Rfl:   glucose 4 GM chewable tablet, Take 4 tablets (16 g total) by mouth as needed for Low blood sugar., Disp: 20 tablet, Rfl: 1  ivermectin (SOOLANTRA) 1 % Crea, Apply 1 % topically once., Disp: , Rfl:   levothyroxine (SYNTHROID) 137 MCG Tab tablet, Take 0.5 tablets (68.5 mcg total) by mouth before breakfast., Disp: 45 tablet, Rfl: 2  lycopene/lutein/fruit extracts (FRUIT AND VEGETABLE DAILY ORAL), Take 1 Dose by mouth once daily. Patient takes 2 fruit and 1 vegetable balance of nature vitamins in the morning and evening, Disp: , Rfl:   polyethylene glycol (GLYCOLAX) 17 gram PwPk, Take 17 g by mouth once daily., Disp: 100 each, Rfl: 0  salicylic acid 3 % Sham, Shampoo scalp 2-3 times a week (Patient taking differently: Apply 1 application  topically As instructed. Shampoo scalp 2-3 times a week), Disp: 236 mL, Rfl: PRN  simethicone (MYLICON) 125 mg Cap capsule, Take 1 capsule (125 mg total) by mouth 4 (four) times daily as needed for Flatulence., Disp: 30 each, Rfl: 0  traZODone (DESYREL) 50 MG tablet, Take 1 tablet (50 mg total) by mouth nightly as needed for Insomnia., Disp: 90 tablet, Rfl: 3  [DISCONTINUED] levETIRAcetam (KEPPRA) 500 MG Tab, Take 1 tablet (500 mg total) by mouth 2 (two) times daily., Disp: 60  tablet, Rfl: 5    No current facility-administered medications on file prior to visit.          Review of Systems   Constitutional:  Negative for chills, fatigue and fever.   Cardiovascular:  Negative for chest pain and palpitations.   Gastrointestinal:  Positive for abdominal distention and constipation. Negative for abdominal pain, anal bleeding, blood in stool, diarrhea, nausea and vomiting.   Skin:         She had a skin cancer removed from the base of the neck on the left side over the head of the clavicle and had two small apparently hemangiomas removed from the right mid back that were symptomatic by her dermatologist.       Objective:      Physical Exam  Vitals and nursing note reviewed.   Constitutional:       General: She is not in acute distress.     Appearance: Normal appearance. She is normal weight. She is not ill-appearing, toxic-appearing or diaphoretic.      Comments: Low blood pressure   Normal weight with a BMI of 20.2 she is down 4.9 lb from her last visit with me May 10, 2023   Cardiovascular:      Rate and Rhythm: Normal rate and regular rhythm.      Heart sounds: Normal heart sounds.      No friction rub. No gallop.   Pulmonary:      Effort: Pulmonary effort is normal. No respiratory distress.      Breath sounds: Normal breath sounds. No stridor. No wheezing, rhonchi or rales.   Chest:      Chest wall: No tenderness.   Abdominal:      General: Abdomen is protuberant. Bowel sounds are increased. There is distension. There is no abdominal bruit. There are no signs of injury.      Palpations: Abdomen is soft. There is no shifting dullness, fluid wave, hepatomegaly, splenomegaly, mass or pulsatile mass.      Tenderness: There is no abdominal tenderness. There is no guarding or rebound. Negative signs include Mitchell's sign and McBurney's sign.      Hernia: No hernia is present.   Neurological:      Mental Status: She is alert.         Assessment:       1. Ileus    2. Acquired hypothyroidism    3.  Parkinson's disease with dyskinesia and fluctuating manifestations    4. Physical deconditioning        Plan:       1. Ileus  Resolved    2. Acquired hypothyroidism  Borderline low from [place].  Will recheck TSH and correct dosage if needed  - TSH; Future    3. Parkinson's disease with dyskinesia and fluctuating manifestations  Stable    4. Physical deconditioning  Improving

## 2023-12-03 DIAGNOSIS — Z71.89 COMPLEX CARE COORDINATION: ICD-10-CM

## 2024-01-11 DIAGNOSIS — Z00.00 ENCOUNTER FOR MEDICARE ANNUAL WELLNESS EXAM: ICD-10-CM

## 2024-01-20 ENCOUNTER — HOSPITAL ENCOUNTER (OUTPATIENT)
Dept: RADIOLOGY | Facility: HOSPITAL | Age: 79
Discharge: HOME | End: 2024-01-20
Attending: SURGERY | Admitting: STUDENT IN AN ORGANIZED HEALTH CARE EDUCATION/TRAINING PROGRAM
Payer: MEDICARE

## 2024-01-20 ENCOUNTER — HOSPITAL ENCOUNTER (OUTPATIENT)
Facility: HOSPITAL | Age: 79
Discharge: HOME | End: 2024-01-20
Attending: STUDENT IN AN ORGANIZED HEALTH CARE EDUCATION/TRAINING PROGRAM | Admitting: STUDENT IN AN ORGANIZED HEALTH CARE EDUCATION/TRAINING PROGRAM
Payer: MEDICARE

## 2024-01-20 VITALS
HEIGHT: 63 IN | WEIGHT: 120 LBS | BODY MASS INDEX: 21.26 KG/M2 | HEART RATE: 71 BPM | RESPIRATION RATE: 17 BRPM | SYSTOLIC BLOOD PRESSURE: 139 MMHG | TEMPERATURE: 98 F | OXYGEN SATURATION: 97 % | DIASTOLIC BLOOD PRESSURE: 63 MMHG

## 2024-01-20 DIAGNOSIS — K56.609 SMALL BOWEL OBSTRUCTION: ICD-10-CM

## 2024-01-20 DIAGNOSIS — K56.609: Primary | ICD-10-CM

## 2024-01-20 DIAGNOSIS — R07.9 CHEST PAIN: ICD-10-CM

## 2024-01-20 LAB
ALBUMIN SERPL BCP-MCNC: 4.4 G/DL (ref 3.5–5.2)
ALP SERPL-CCNC: 81 U/L (ref 55–135)
ALT SERPL W/O P-5'-P-CCNC: 9 U/L (ref 10–44)
ANION GAP SERPL CALC-SCNC: 8 MMOL/L (ref 8–16)
AST SERPL-CCNC: 17 U/L (ref 10–40)
BACTERIA #/AREA URNS HPF: NEGATIVE /HPF
BASOPHILS # BLD AUTO: 0.02 K/UL (ref 0–0.2)
BASOPHILS NFR BLD: 0.3 % (ref 0–1.9)
BILIRUB SERPL-MCNC: 0.5 MG/DL (ref 0.1–1)
BILIRUB UR QL STRIP: NEGATIVE
BUN SERPL-MCNC: 12 MG/DL (ref 8–23)
CALCIUM SERPL-MCNC: 9.6 MG/DL (ref 8.7–10.5)
CHLORIDE SERPL-SCNC: 103 MMOL/L (ref 95–110)
CLARITY UR: CLEAR
CO2 SERPL-SCNC: 27 MMOL/L (ref 23–29)
COLOR UR: YELLOW
CREAT SERPL-MCNC: 0.4 MG/DL (ref 0.5–1.4)
DIFFERENTIAL METHOD BLD: ABNORMAL
EOSINOPHIL # BLD AUTO: 0 K/UL (ref 0–0.5)
EOSINOPHIL NFR BLD: 0.1 % (ref 0–8)
ERYTHROCYTE [DISTWIDTH] IN BLOOD BY AUTOMATED COUNT: 12.7 % (ref 11.5–14.5)
EST. GFR  (NO RACE VARIABLE): >60 ML/MIN/1.73 M^2
GLUCOSE SERPL-MCNC: 98 MG/DL (ref 70–110)
GLUCOSE UR QL STRIP: NEGATIVE
HCT VFR BLD AUTO: 43.3 % (ref 37–48.5)
HGB BLD-MCNC: 14.8 G/DL (ref 12–16)
HGB UR QL STRIP: ABNORMAL
HYALINE CASTS #/AREA URNS LPF: 3 /LPF
IMM GRANULOCYTES # BLD AUTO: 0.02 K/UL (ref 0–0.04)
IMM GRANULOCYTES NFR BLD AUTO: 0.3 % (ref 0–0.5)
INFLUENZA A, MOLECULAR: NEGATIVE
INFLUENZA B, MOLECULAR: NEGATIVE
KETONES UR QL STRIP: NEGATIVE
LEUKOCYTE ESTERASE UR QL STRIP: ABNORMAL
LIPASE SERPL-CCNC: 3 U/L (ref 4–60)
LYMPHOCYTES # BLD AUTO: 1.4 K/UL (ref 1–4.8)
LYMPHOCYTES NFR BLD: 20.2 % (ref 18–48)
MCH RBC QN AUTO: 31.2 PG (ref 27–31)
MCHC RBC AUTO-ENTMCNC: 34.2 G/DL (ref 32–36)
MCV RBC AUTO: 91 FL (ref 82–98)
MICROSCOPIC COMMENT: ABNORMAL
MONOCYTES # BLD AUTO: 0.3 K/UL (ref 0.3–1)
MONOCYTES NFR BLD: 4.6 % (ref 4–15)
NEUTROPHILS # BLD AUTO: 5 K/UL (ref 1.8–7.7)
NEUTROPHILS NFR BLD: 74.5 % (ref 38–73)
NITRITE UR QL STRIP: NEGATIVE
NRBC BLD-RTO: 0 /100 WBC
PH UR STRIP: 7 [PH] (ref 5–8)
PLATELET # BLD AUTO: 168 K/UL (ref 150–450)
PMV BLD AUTO: 9.8 FL (ref 9.2–12.9)
POTASSIUM SERPL-SCNC: 4.2 MMOL/L (ref 3.5–5.1)
PROT SERPL-MCNC: 7.4 G/DL (ref 6–8.4)
PROT UR QL STRIP: ABNORMAL
RBC # BLD AUTO: 4.74 M/UL (ref 4–5.4)
RBC #/AREA URNS HPF: 3 /HPF (ref 0–4)
SARS-COV-2 RDRP RESP QL NAA+PROBE: NEGATIVE
SODIUM SERPL-SCNC: 138 MMOL/L (ref 136–145)
SP GR UR STRIP: >1.03 (ref 1–1.03)
SPECIMEN SOURCE: NORMAL
SQUAMOUS #/AREA URNS HPF: 2 /HPF
URN SPEC COLLECT METH UR: ABNORMAL
UROBILINOGEN UR STRIP-ACNC: NEGATIVE EU/DL
WBC # BLD AUTO: 6.74 K/UL (ref 3.9–12.7)
WBC #/AREA URNS HPF: 37 /HPF (ref 0–5)

## 2024-01-20 PROCEDURE — 87086 URINE CULTURE/COLONY COUNT: CPT | Performed by: STUDENT IN AN ORGANIZED HEALTH CARE EDUCATION/TRAINING PROGRAM

## 2024-01-20 PROCEDURE — 63600175 PHARM REV CODE 636 W HCPCS: Performed by: STUDENT IN AN ORGANIZED HEALTH CARE EDUCATION/TRAINING PROGRAM

## 2024-01-20 PROCEDURE — 25500020 PHARM REV CODE 255: Performed by: EMERGENCY MEDICINE

## 2024-01-20 PROCEDURE — 81001 URINALYSIS AUTO W/SCOPE: CPT | Performed by: STUDENT IN AN ORGANIZED HEALTH CARE EDUCATION/TRAINING PROGRAM

## 2024-01-20 PROCEDURE — 99285 EMERGENCY DEPT VISIT HI MDM: CPT | Mod: 25

## 2024-01-20 PROCEDURE — 83690 ASSAY OF LIPASE: CPT | Performed by: STUDENT IN AN ORGANIZED HEALTH CARE EDUCATION/TRAINING PROGRAM

## 2024-01-20 PROCEDURE — A9537 TC99M MEBROFENIN: HCPCS

## 2024-01-20 PROCEDURE — U0002 COVID-19 LAB TEST NON-CDC: HCPCS | Performed by: STUDENT IN AN ORGANIZED HEALTH CARE EDUCATION/TRAINING PROGRAM

## 2024-01-20 PROCEDURE — 87502 INFLUENZA DNA AMP PROBE: CPT | Performed by: STUDENT IN AN ORGANIZED HEALTH CARE EDUCATION/TRAINING PROGRAM

## 2024-01-20 PROCEDURE — 85025 COMPLETE CBC W/AUTO DIFF WBC: CPT | Performed by: STUDENT IN AN ORGANIZED HEALTH CARE EDUCATION/TRAINING PROGRAM

## 2024-01-20 PROCEDURE — G0378 HOSPITAL OBSERVATION PER HR: HCPCS

## 2024-01-20 PROCEDURE — 80053 COMPREHEN METABOLIC PANEL: CPT | Performed by: STUDENT IN AN ORGANIZED HEALTH CARE EDUCATION/TRAINING PROGRAM

## 2024-01-20 RX ORDER — SODIUM,POTASSIUM PHOSPHATES 280-250MG
2 POWDER IN PACKET (EA) ORAL
Status: DISCONTINUED | OUTPATIENT
Start: 2024-01-20 | End: 2024-01-20 | Stop reason: HOSPADM

## 2024-01-20 RX ORDER — ACETAMINOPHEN 325 MG/1
650 TABLET ORAL EVERY 6 HOURS PRN
Status: DISCONTINUED | OUTPATIENT
Start: 2024-01-20 | End: 2024-01-20 | Stop reason: HOSPADM

## 2024-01-20 RX ORDER — ONDANSETRON 4 MG/1
8 TABLET, ORALLY DISINTEGRATING ORAL EVERY 8 HOURS PRN
Status: DISCONTINUED | OUTPATIENT
Start: 2024-01-20 | End: 2024-01-20 | Stop reason: HOSPADM

## 2024-01-20 RX ORDER — SODIUM CHLORIDE 0.9 % (FLUSH) 0.9 %
10 SYRINGE (ML) INJECTION EVERY 12 HOURS PRN
Status: DISCONTINUED | OUTPATIENT
Start: 2024-01-20 | End: 2024-01-20 | Stop reason: HOSPADM

## 2024-01-20 RX ORDER — GLUCAGON 1 MG
1 KIT INJECTION
Status: DISCONTINUED | OUTPATIENT
Start: 2024-01-20 | End: 2024-01-20 | Stop reason: HOSPADM

## 2024-01-20 RX ORDER — IBUPROFEN 200 MG
24 TABLET ORAL
Status: DISCONTINUED | OUTPATIENT
Start: 2024-01-20 | End: 2024-01-20 | Stop reason: HOSPADM

## 2024-01-20 RX ORDER — HYDROCODONE BITARTRATE AND ACETAMINOPHEN 5; 325 MG/1; MG/1
1 TABLET ORAL EVERY 6 HOURS PRN
Status: DISCONTINUED | OUTPATIENT
Start: 2024-01-20 | End: 2024-01-20 | Stop reason: HOSPADM

## 2024-01-20 RX ORDER — LANOLIN ALCOHOL/MO/W.PET/CERES
800 CREAM (GRAM) TOPICAL
Status: DISCONTINUED | OUTPATIENT
Start: 2024-01-20 | End: 2024-01-20 | Stop reason: HOSPADM

## 2024-01-20 RX ORDER — TALC
6 POWDER (GRAM) TOPICAL NIGHTLY PRN
Status: DISCONTINUED | OUTPATIENT
Start: 2024-01-20 | End: 2024-01-20 | Stop reason: HOSPADM

## 2024-01-20 RX ORDER — HYDROCODONE BITARTRATE AND ACETAMINOPHEN 10; 325 MG/1; MG/1
1 TABLET ORAL EVERY 6 HOURS PRN
Status: DISCONTINUED | OUTPATIENT
Start: 2024-01-20 | End: 2024-01-20 | Stop reason: HOSPADM

## 2024-01-20 RX ORDER — IBUPROFEN 200 MG
16 TABLET ORAL
Status: DISCONTINUED | OUTPATIENT
Start: 2024-01-20 | End: 2024-01-20 | Stop reason: HOSPADM

## 2024-01-20 RX ORDER — ALUMINUM HYDROXIDE, MAGNESIUM HYDROXIDE, AND SIMETHICONE 1200; 120; 1200 MG/30ML; MG/30ML; MG/30ML
30 SUSPENSION ORAL 4 TIMES DAILY PRN
Status: DISCONTINUED | OUTPATIENT
Start: 2024-01-20 | End: 2024-01-20 | Stop reason: HOSPADM

## 2024-01-20 RX ORDER — LEVETIRACETAM 5 MG/ML
500 INJECTION INTRAVASCULAR EVERY 12 HOURS
Status: DISCONTINUED | OUTPATIENT
Start: 2024-01-20 | End: 2024-01-20 | Stop reason: HOSPADM

## 2024-01-20 RX ORDER — SODIUM CHLORIDE, SODIUM LACTATE, POTASSIUM CHLORIDE, CALCIUM CHLORIDE 600; 310; 30; 20 MG/100ML; MG/100ML; MG/100ML; MG/100ML
INJECTION, SOLUTION INTRAVENOUS CONTINUOUS
Status: DISCONTINUED | OUTPATIENT
Start: 2024-01-20 | End: 2024-01-20 | Stop reason: HOSPADM

## 2024-01-20 RX ORDER — NALOXONE HCL 0.4 MG/ML
0.02 VIAL (ML) INJECTION
Status: DISCONTINUED | OUTPATIENT
Start: 2024-01-20 | End: 2024-01-20 | Stop reason: HOSPADM

## 2024-01-20 RX ADMIN — SODIUM CHLORIDE, POTASSIUM CHLORIDE, SODIUM LACTATE AND CALCIUM CHLORIDE: 600; 310; 30; 20 INJECTION, SOLUTION INTRAVENOUS at 03:01

## 2024-01-20 RX ADMIN — IOHEXOL 100 ML: 350 INJECTION, SOLUTION INTRAVENOUS at 07:01

## 2024-01-20 NOTE — ASSESSMENT & PLAN NOTE
Recurrent hx prior surgery  CT impressive, report reviewed  Symptoms mostly resolved at this point  Declined surgery and NGT  - IVF  - NPO  - prn pain meds and antiemetics  - gen surg consult; f/u HIDA scan

## 2024-01-20 NOTE — PLAN OF CARE
01/20/24 1603   GAONA Message   Medicare Outpatient and Observation Notification regarding financial responsibility Given to patient/caregiver;Explained to patient/caregiver;Signed/date by patient/caregiver   Date GAONA was signed 01/20/24   Time GAONA was signed [id number]

## 2024-01-20 NOTE — PHARMACY MED REC
"              .        Admission Medication History     The home medication history was taken by Felipa Obando.    You may go to "Admission" then "Reconcile Home Medications" tabs to review and/or act upon these items.     The home medication list has been updated by the Pharmacy department.   Please read ALL comments highlighted in yellow.   Please address this information as you see fit.    Feel free to contact us if you have any questions or require assistance.        Medications listed below were obtained from: Patient/family and Analytic software- [employer]  No current facility-administered medications on file prior to encounter.     Current Outpatient Medications on File Prior to Encounter   Medication Sig Dispense Refill    acetaminophen 325 mg Cap Take 650 mg by mouth every 6 (six) hours as needed.      azelastine (ASTELIN) 137 mcg (0.1 %) nasal spray 1 spray by Nasal route 2 (two) times daily.      carbidopa-levodopa (RYTARY) 23.75-95 mg CpSR Take 3 capsules by mouth 3 (three) times daily.      diclofenac sodium (VOLTAREN) 1 % Gel Apply 4 g topically 4 (four) times daily.      docusate sodium (COLACE) 100 MG capsule Take 1 capsule (100 mg total) by mouth once daily. (Patient taking differently: Take 100 mg by mouth 3 (three) times daily as needed for Constipation.) 90 capsule 0    food supplemt, lactose-reduced (ENSURE ORIGINAL) Liqd Take 237 mLs by mouth 2 (two) times a day.      glucose 4 GM chewable tablet Take 4 tablets (16 g total) by mouth as needed for Low blood sugar. 20 tablet 1    ivermectin (SOOLANTRA) 1 % Crea Apply 1 % topically once.      levETIRAcetam (KEPPRA) 500 MG Tab Take 1 tablet (500 mg total) by mouth 2 (two) times daily. 60 tablet 5    levothyroxine (SYNTHROID) 137 MCG Tab tablet Take 0.5 tablets (68.5 mcg total) by mouth before breakfast. 45 tablet 2    lycopene/lutein/fruit extracts (FRUIT AND VEGETABLE DAILY ORAL) Take 1 Dose by mouth once daily. Patient takes 2 fruit and 1 " vegetable balance of nature vitamins in the morning and evening      polyethylene glycol (GLYCOLAX) 17 gram PwPk Take 17 g by mouth once daily. 100 each 0    salicylic acid 3 % Sham Shampoo scalp 2-3 times a week (Patient taking differently: Apply 1 application  topically As instructed. Shampoo scalp 2-3 times a week) 236 mL PRN    simethicone (MYLICON) 125 mg Cap capsule Take 1 capsule (125 mg total) by mouth 4 (four) times daily as needed for Flatulence. 30 each 0    traZODone (DESYREL) 50 MG tablet Take 1 tablet (50 mg total) by mouth nightly as needed for Insomnia. 90 tablet 3    [DISCONTINUED] acetaminophen (TYLENOL) 325 MG tablet Take 2 tablets (650 mg total) by mouth every 6 (six) hours as needed for Pain.  0    [DISCONTINUED] diclofenac sodium (VOLTAREN) 1 % Gel Apply 4 g topically 2 (two) times daily. 50 g 1       Felipa Obando  EXT [id number]

## 2024-01-20 NOTE — SUBJECTIVE & OBJECTIVE
Past Medical History:   Diagnosis Date    Allergy     Diverticulosis     Gluten enteropathy     Gluten intolerance     Hernia     Hypothyroidism     PD (Parkinson's disease) 9/16/2015    Right tremor type    Peptic ulcer disease     Right shoulder pain     Cirtisone injection 3/26/15 - Dr. Tolbert    Ulcer        Past Surgical History:   Procedure Laterality Date    ADENOIDECTOMY      COLONOSCOPY  03/01/2012    Dr. Teresa, repeat in 10 years for screening    COLONOSCOPY N/A 2/21/2018    Procedure: COLONOSCOPY;  Surgeon: Radha Deng MD;  Location: [facility];  Service: Endoscopy;  Laterality: N/A;    CORRECTION OF HAMMER TOE Left 9/16/2020    Procedure: CORRECTION, HAMMER TOE;  Surgeon: William Sprague MD;  Location: [facility] OR;  Service: Orthopedics;  Laterality: Left;    COSMETIC SURGERY      Broken nose    ESOPHAGOGASTRODUODENOSCOPY N/A 12/8/2021    Procedure: EGD (ESOPHAGOGASTRODUODENOSCOPY);  Surgeon: Benji Valentino III, MD;  Location: [facility];  Service: Endoscopy;  Laterality: N/A;    FRACTURE SURGERY  left wrist    With pin    HEMORRHOID SURGERY      HERNIA REPAIR Left 04/09/2015    Dr Lo; left inguinal    INTRALUMINAL GASTROINTESTINAL TRACT IMAGING VIA CAPSULE N/A 7/10/2018    Procedure: IMAGING, GI TRACT, INTRALUMINAL, VIA CAPSULE;  Surgeon: Radha Deng MD;  Location: [facility] ENDO;  Service: Endoscopy;  Laterality: N/A;    LYSIS OF ADHESIONS  9/29/2020    Procedure: LYSIS, ADHESIONS;  Surgeon: Eagle Gonzalez MD;  Location: [facility];  Service: General;;    RHINOPLASTY TIP      TONSILLECTOMY      UPPER GASTROINTESTINAL ENDOSCOPY  05/21/2015    Dr. Gomez       Review of patient's allergies indicates:   Allergen Reactions    Gluten Diarrhea       No current facility-administered medications on file prior to encounter.     Current Outpatient Medications on File Prior to Encounter   Medication Sig    acetaminophen 325 mg Cap Take 650 mg by mouth every 6 (six) hours as needed.    azelastine  (ASTELIN) 137 mcg (0.1 %) nasal spray 1 spray by Nasal route 2 (two) times daily.    carbidopa-levodopa (RYTARY) 23.75-95 mg CpSR Take 3 capsules by mouth 3 (three) times daily.    diclofenac sodium (VOLTAREN) 1 % Gel Apply 4 g topically 4 (four) times daily.    docusate sodium (COLACE) 100 MG capsule Take 1 capsule (100 mg total) by mouth once daily. (Patient taking differently: Take 100 mg by mouth 3 (three) times daily as needed for Constipation.)    food supplemt, lactose-reduced (ENSURE ORIGINAL) Liqd Take 237 mLs by mouth 2 (two) times a day.    glucose 4 GM chewable tablet Take 4 tablets (16 g total) by mouth as needed for Low blood sugar.    ivermectin (SOOLANTRA) 1 % Crea Apply 1 % topically once.    levETIRAcetam (KEPPRA) 500 MG Tab Take 1 tablet (500 mg total) by mouth 2 (two) times daily.    levothyroxine (SYNTHROID) 137 MCG Tab tablet Take 0.5 tablets (68.5 mcg total) by mouth before breakfast.    lycopene/lutein/fruit extracts (FRUIT AND VEGETABLE DAILY ORAL) Take 1 Dose by mouth once daily. Patient takes 2 fruit and 1 vegetable balance of nature vitamins in the morning and evening    polyethylene glycol (GLYCOLAX) 17 gram PwPk Take 17 g by mouth once daily.    salicylic acid 3 % Sham Shampoo scalp 2-3 times a week (Patient taking differently: Apply 1 application  topically As instructed. Shampoo scalp 2-3 times a week)    simethicone (MYLICON) 125 mg Cap capsule Take 1 capsule (125 mg total) by mouth 4 (four) times daily as needed for Flatulence.    traZODone (DESYREL) 50 MG tablet Take 1 tablet (50 mg total) by mouth nightly as needed for Insomnia.    [DISCONTINUED] acetaminophen (TYLENOL) 325 MG tablet Take 2 tablets (650 mg total) by mouth every 6 (six) hours as needed for Pain.    [DISCONTINUED] diclofenac sodium (VOLTAREN) 1 % Gel Apply 4 g topically 2 (two) times daily.     Family History       Problem Relation (Age of Onset)    Alcohol abuse Brother    Diabetes Mother, Son    Early death  Brother    Heart disease Brother    No Known Problems Father    Obesity Sister          Tobacco Use    Smoking status: Never    Smokeless tobacco: Never   Substance and Sexual Activity    Alcohol use: Yes     Alcohol/week: 11.7 standard drinks of alcohol     Types: 14 Standard drinks or equivalent per week     Comment: 2 glasses wine per dinner    Drug use: No    Sexual activity: Never     Review of Systems   Constitutional:  Negative for chills and fever.   Respiratory:  Negative for shortness of breath.    Cardiovascular:  Negative for chest pain.   Gastrointestinal:  Negative for abdominal pain, constipation, diarrhea, nausea and vomiting.     Objective:     Vital Signs (Most Recent):  Temp: 97.9 °F (36.6 °C) (01/20/24 0421)  Pulse: 68 (01/20/24 0630)  Resp: 17 (01/20/24 0530)  BP: (!) 148/74 (01/20/24 0630)  SpO2: 100 % (01/20/24 0630) Vital Signs (24h Range):  Temp:  [97.9 °F (36.6 °C)] 97.9 °F (36.6 °C)  Pulse:  [67-76] 68  Resp:  [17-20] 17  SpO2:  [99 %-100 %] 100 %  BP: (108-148)/(66-76) 148/74     Weight: 54.4 kg (120 lb)  Body mass index is 21.26 kg/m².     Physical Exam  Vitals reviewed.   Constitutional:       General: She is not in acute distress.     Appearance: She is not ill-appearing.      Comments: Thin, elderly, no distress   HENT:      Head: Normocephalic and atraumatic.   Cardiovascular:      Rate and Rhythm: Normal rate and regular rhythm.   Pulmonary:      Effort: Pulmonary effort is normal. No respiratory distress.      Breath sounds: Normal breath sounds.   Abdominal:      General: Bowel sounds are normal. There is no distension.      Palpations: Abdomen is soft.      Tenderness: There is no abdominal tenderness. There is no guarding. Negative signs include Mitchell's sign.   Skin:     General: Skin is warm and dry.   Neurological:      Mental Status: She is alert and oriented to person, place, and time. Mental status is at baseline.      Comments: Mild BUE tremors   Psychiatric:         Mood  and Affect: Affect normal.         Behavior: Behavior normal.         Thought Content: Thought content normal.                Significant Labs: All pertinent labs within the past 24 hours have been reviewed.    Significant Imaging: I have reviewed all pertinent imaging results/findings within the past 24 hours.    Admission on 01/20/2024   Component Date Value Ref Range Status    WBC 01/20/2024 6.74  3.90 - 12.70 K/uL Final    RBC 01/20/2024 4.74  4.00 - 5.40 M/uL Final    Hemoglobin 01/20/2024 14.8  12.0 - 16.0 g/dL Final    Hematocrit 01/20/2024 43.3  37.0 - 48.5 % Final    MCV 01/20/2024 91  82 - 98 fL Final    MCH 01/20/2024 31.2 (H)  27.0 - 31.0 pg Final    MCHC 01/20/2024 34.2  32.0 - 36.0 g/dL Final    RDW 01/20/2024 12.7  11.5 - 14.5 % Final    Platelets 01/20/2024 168  150 - 450 K/uL Final    MPV 01/20/2024 9.8  9.2 - 12.9 fL Final    Immature Granulocytes 01/20/2024 0.3  0.0 - 0.5 % Final    Gran # (ANC) 01/20/2024 5.0  1.8 - 7.7 K/uL Final    Immature Grans (Abs) 01/20/2024 0.02  0.00 - 0.04 K/uL Final    Comment: Mild elevation in immature granulocytes is non specific and   can be seen in a variety of conditions including stress response,   acute inflammation, trauma and pregnancy. Correlation with other   laboratory and clinical findings is essential.      Lymph # 01/20/2024 1.4  1.0 - 4.8 K/uL Final    Mono # 01/20/2024 0.3  0.3 - 1.0 K/uL Final    Eos # 01/20/2024 0.0  0.0 - 0.5 K/uL Final    Baso # 01/20/2024 0.02  0.00 - 0.20 K/uL Final    nRBC 01/20/2024 0  0 /100 WBC Final    Gran % 01/20/2024 74.5 (H)  38.0 - 73.0 % Final    Lymph % 01/20/2024 20.2  18.0 - 48.0 % Final    Mono % 01/20/2024 4.6  4.0 - 15.0 % Final    Eosinophil % 01/20/2024 0.1  0.0 - 8.0 % Final    Basophil % 01/20/2024 0.3  0.0 - 1.9 % Final    Differential Method 01/20/2024 Automated   Final    Sodium 01/20/2024 138  136 - 145 mmol/L Final    Potassium 01/20/2024 4.2  3.5 - 5.1 mmol/L Final    Chloride 01/20/2024 103  95 - 110  mmol/L Final    CO2 01/20/2024 27  23 - 29 mmol/L Final    Glucose 01/20/2024 98  70 - 110 mg/dL Final    BUN 01/20/2024 12  8 - 23 mg/dL Final    Creatinine 01/20/2024 0.4 (L)  0.5 - 1.4 mg/dL Final    Calcium 01/20/2024 9.6  8.7 - 10.5 mg/dL Final    Total Protein 01/20/2024 7.4  6.0 - 8.4 g/dL Final    Albumin 01/20/2024 4.4  3.5 - 5.2 g/dL Final    Total Bilirubin 01/20/2024 0.5  0.1 - 1.0 mg/dL Final    Comment: For infants and newborns, interpretation of results should be based  on gestational age, weight and in agreement with clinical  observations.    Premature Infant recommended reference ranges:  Up to 24 hours.............<8.0 mg/dL  Up to 48 hours............<12.0 mg/dL  3-5 days..................<15.0 mg/dL  6-29 days.................<15.0 mg/dL      Alkaline Phosphatase 01/20/2024 81  55 - 135 U/L Final    AST 01/20/2024 17  10 - 40 U/L Final    ALT 01/20/2024 9 (L)  10 - 44 U/L Final    eGFR 01/20/2024 >60.0  >60 mL/min/1.73 m^2 Final    Anion Gap 01/20/2024 8  8 - 16 mmol/L Final    Lipase 01/20/2024 3 (L)  4 - 60 U/L Final    Specimen UA 01/20/2024 Urine, Clean Catch   Final    Color, UA 01/20/2024 Yellow  Yellow, Straw, Crystal Final    Appearance, UA 01/20/2024 Clear  Clear Final    pH, UA 01/20/2024 7.0  5.0 - 8.0 Final    Specific Gravity, UA 01/20/2024 >1.030 (A)  1.005 - 1.030 Final    Protein, UA 01/20/2024 Trace (A)  Negative Final    Comment: Recommend a 24 hour urine protein or a urine   protein/creatinine ratio if globulin induced proteinuria is  clinically suspected.      Glucose, UA 01/20/2024 Negative  Negative Final    Ketones, UA 01/20/2024 Negative  Negative Final    Bilirubin (UA) 01/20/2024 Negative  Negative Final    Occult Blood UA 01/20/2024 Trace (A)  Negative Final    Nitrite, UA 01/20/2024 Negative  Negative Final    Urobilinogen, UA 01/20/2024 Negative  Negative EU/dL Final    Leukocytes, UA 01/20/2024 2+ (A)  Negative Final    Influenza A, Molecular 01/20/2024 Negative   Negative Final    Influenza B, Molecular 01/20/2024 Negative  Negative Final    Flu A & B Source 01/20/2024 Nasal swab   Final    SARS-CoV-2 RNA, Amplification, Qual 01/20/2024 Negative  Negative Final    Comment: This test utilizes isothermal nucleic acid amplification technology   to   detect the SARS-CoV-2 RdRp nucleic acid segment. The analytical   sensitivity   (limit of detection) is 500 copies/swab.     A POSITIVE result is indicative of the presence of SARS-CoV-2 RNA;   clinical   correlation with patient history and other diagnostic information is   necessary to determine patient infection status.    A NEGATIVE result means that SARS-CoV-2 nucleic acids are not present   above   the limit of detection. A NEGATIVE result should be treated as   presumptive.   It does not rule out the possibility of COVID-19 and should not be   the sole   basis for treatment decisions. If COVID-19 is strongly suspected   based on   clinical and exposure history, re-testing using an alternate   molecular assay   should be considered.     This test is only for use under the Food and Drug Administration s   Emergency   Use Authorization (EUA).     Commercial kits are provided by [employer]. Per[employer]anc                           e   characteristics of the EUA have been independently verified by   [facility]  _________________________________________________________________   The authorized Fact Sheet for Healthcare Providers and the authorized   Fact   Sheet for Patients of the ID NOW COVID-19 are available on the FDA   website:   https://www.fda.gov/media/483383/download   https://www.fda.gov/media/228328/download      RBC, UA 01/20/2024 3  0 - 4 /hpf Final    WBC, UA 01/20/2024 37 (H)  0 - 5 /hpf Final    Bacteria 01/20/2024 Negative  None-Occ /hpf Final    Squam Epithel, UA 01/20/2024 2  /hpf Final    Hyaline Casts, UA 01/20/2024 3 (A)  0-1/lpf /lpf Final    Microscopic Comment 01/20/2024 SEE  COMMENT   Final    Comment: Other formed elements not mentioned in the report are not   present in the microscopic examination.            Imaging Results              NM Hepatobiliary Scan (HIDA) (In process)                      CT Abdomen Pelvis With IV Contrast NO Oral Contrast (Final result)  Result time 01/20/24 07:49:28      Final result by Molina Sands MD (01/20/24 07:49:28)                   Narrative:    CT of the abdomen and pelvis with contrast,  1/20/2024 7:06 AM CST    HISTORY:Abdominal abscess/infection suspected; Bowel obstruction suspected.    COMPARISON: 9/13/2023    Technique: Helical imaging of the abdomen and pelvis is performed with IV but no oral contrast agent administration. As per radiology departmental policy, no delayed imaging is performed on this examination. This decreases sensitivity and specificity, particularly for evaluation of the mucosa of the urinary tract. Reconstructed sagittal and coronal images evaluated.    Dose Lowering Techniques:  All CT scans at this facility utilize dose modulation, iterative reconstruction, and/or weight based dosing when appropriate to reduce radiation dose to as low as reasonably achievable.    FINDINGS:    ABDOMEN:    LUNG BASES: There is some scarring in both posterior lung bases, unchanged.    STOMACH: There is no gastric abnormality and no findings of hiatal hernia    LIVER: There is no evidence of liver mass, cyst, or other abnormality. Liver span is normal.    GALLBLADDER: The gallbladder is significantly distended with a transverse diameter of 6.6 cm and a length of 14 cm.    BILIARY TREE: No evidence of intrahepatic or extra hepatic biliary dilatation.    SPLEEN: Normal size and attenuation    PANCREAS: Normal with no evidence of mass or surrounding inflammatory edema.    ADRENALS: There is no evidence of adrenal mass.    KIDNEYS: No findings of renal mass, cyst, renal stone, or hydronephrosis.  No delays obtained    VESSELS: The aorta  is patent with no evidence of aneurysm or dissection. Celiac artery and superior mesenteric artery are patent. Portal and hepatic veins and IVC are patent.    SMALL BOWEL: There is a generalized pattern of air and fluid throughout small bowel without definitive transition point. Some distal ileal loops appear less prominent. There is multiple loops of small bowel which are located anterior to the cecum and ascending colon with this finding associated with 2 full rotations of the mesentery subtending that bowel. Within the mesentery, the venous structures show narrowing with some mild edema.    LARGE BOWEL: Extensive stool and gas seen throughout a markedly elongated and ectatic colon. Some clips are noted related to bowel surgery in the right para midline mid abdomen.    APPENDIX:  Not visualized    RETROPERITONEUM: There is no evidence of mass or adenopathy.    ABDOMINAL WALL: There is no evidence of abdominal hernia, mass, or cystic structure    PERITONEAL CAVITY: There is no evidence of fluid or other peritoneal abnormality.      PELVIS:    UTERUS AND OVARIES: The uterus is normal in size. The ovaries are normal with no evidence of ovarian mass or cyst.    BLADDER: No evidence of mass, calculus, or wall thickening.    INGUINAL REGIONS: There is no evidence of inguinal hernia or pathologic adenopathy    BONES: No bony lesions identified.      IMPRESSION:    1.  The stomach and proximal small bowel are not distended but there are multiple loops of moderately distended proximal and mid small bowel noted. No distinct transition is seen but some distal nondilated small bowel is evident. These findings are associated with the interval development of mesenteric torsion with 2 full rotations of small bowel mesentery evident which are associated with mesenteric venous narrowing and some mesenteric edema. This overall pattern is suspicious for an internal hernia producing partial outlet obstruction.  2.  The colon is  ectatic elongated with a large volume of stool seen throughout the colon.  3.  The gallbladder is markedly distended with a transverse diameter of nearly 7 cm in length of 14 cm.      Electronically signed by:  Molina Sands MD  01/20/2024 07:49 AM [facility] Workstation: [id number]

## 2024-01-20 NOTE — PLAN OF CARE
01/20/24 1719   Final Note   Assessment Type Final Discharge Note   Anticipated Discharge Disposition Home     Patient cleared for dc, no needs.

## 2024-01-20 NOTE — PLAN OF CARE
[facility] - Emergency Dept  Initial Discharge Assessment       Primary Care Provider: Nba Petty MD    Admission Diagnosis: Recurrent intestinal obstruction [K56.609]    Admission Date: 1/20/2024  Expected Discharge Date:     Transition of Care Barriers: None    Payor: MEDICARE / Plan: MEDICARE PART A & B / Product Type: Government /     Extended Emergency Contact Information  Primary Emergency Contact: Je Tiwari  Address: [address] [place] [address] [place]  Mobile Phone: [phone]  Relation: Son  Preferred language: English   needed? No  Secondary Emergency Contact: Micaela Tiwari  Address: [address] [place] [address] [place]  Mobile Phone: [phone]  Relation: Relative  Preferred language: English   needed? No    Discharge Plan A: Home with family  Discharge Plan B: Home with family      [facility] [address] [facility].  [address] [place] [address]  Phone: [phone] Fax: [phone]    [facility]  [address] [place] [address]  Phone: [phone] Fax: [phone]    CM completed discharge assessment with patient's daughter-in-law at bedside.. Pt AAOx4s. Pt lives son and daughter-in-law. Demographics, PCP, and insurance verified. No home health. No dialysis. DME - electric wheelchair and walker. Pt receives assistance with ADL's from daughter-in-law and son. Je Tiwari (son) has POA.  Pt to discharge home via family transport. Pt has no other needs to be addressed at this time.      Initial Assessment (most recent)       Adult Discharge Assessment - 01/20/24 1604          Discharge Assessment    Assessment Type Discharge Planning Assessment     Confirmed/corrected address, phone number and insurance Yes     Confirmed Demographics Correct on Facesheet     Source of Information family     If unable to respond/provide information was  family/caregiver contacted? Yes   Micaela Tiwari (daughter-in-law) at bedside    Contact Name/Number [phone]     When was your last doctors appointment? --   unsure    Does patient/caregiver understand observation status Yes     Communicated IAIN with patient/caregiver Date not available/Unable to determine     Reason For Admission Recurrent Intestional Obstruction     People in Home child(malka), adult;other relative(s)     Do you expect to return to your current living situation? Yes     Do you have help at home or someone to help you manage your care at home? Yes     Who are your caregiver(s) and their phone number(s)? Son and daughter-in-law [phone]     Prior to hospitilization cognitive status: Unable to Assess     Current cognitive status: Unable to Assess     Walking or Climbing Stairs Difficulty no     Dressing/Bathing Difficulty no     Home Layout Able to live on 1st floor     Equipment Currently Used at Home walker, rolling;wheelchair     Readmission within 30 days? No     Patient currently being followed by outpatient case management? No     Do you currently have service(s) that help you manage your care at home? No     Do you take prescription medications? Yes     Do you have prescription coverage? Yes     Coverage Medicare,      Do you have any problems affording any of your prescribed medications? No     Is the patient taking medications as prescribed? yes     Who is going to help you get home at discharge? Family     How do you get to doctors appointments? family or friend will provide     Are you on dialysis? No     Do you take coumadin? No     Discharge Plan A Home with family     Discharge Plan B Home with family     DME Needed Upon Discharge  none     Discharge Plan discussed with: Adult children     Transition of Care Barriers None

## 2024-01-20 NOTE — HOSPITAL COURSE
Admitted with nausea, vomiting, abdominal pain with concern for intestinal obstruction per CT scan. Has history of recurrent intestinal obstruction. CT concern for mesenteric torsion and gallbladder distension as well. Asymptomatic at time of admission with generally normal labs and vitals. She declined NGT and surgical intervention. HIDA scan negative for acute cholecystitis. General surgery was consulted. Initially was placed NPO. Given her lack of persistent symptoms, it was felt acceptable to have her follow up outpatient with PCP and general surgery. She tolerated a bland diet prior to discharge.

## 2024-01-20 NOTE — ED PROVIDER NOTES
Encounter Date: 1/20/2024       History     Chief Complaint   Patient presents with    Abdominal Pain    Vomiting     Pt has hx of bowel obstruction.  Pt daughter says pt stomach soft on palpation, but wanted to rule out obstruction     78-year-old female with history of recurrent intestinal obstruction presents now for nausea and vomiting ongoing for a few hours concerning daughter for intestinal obstruction.  She does have some crampy abdominal pain, intermittent, coming and going on its own.  The patient denies fevers or chills.  She denies dysuria hematuria urgency or frequency.      Review of patient's allergies indicates:   Allergen Reactions    Gluten Diarrhea     Past Medical History:   Diagnosis Date    Allergy     Diverticulosis     Gluten enteropathy     Gluten intolerance     Hernia     Hypothyroidism     PD (Parkinson's disease) 9/16/2015    Right tremor type    Peptic ulcer disease     Right shoulder pain     Cirtisone injection 3/26/15 - Dr. Tolbert    Ulcer      Past Surgical History:   Procedure Laterality Date    ADENOIDECTOMY      COLONOSCOPY  03/01/2012    Dr. Teresa, repeat in 10 years for screening    COLONOSCOPY N/A 2/21/2018    Procedure: COLONOSCOPY;  Surgeon: Radha Deng MD;  Location: [facility];  Service: Endoscopy;  Laterality: N/A;    CORRECTION OF HAMMER TOE Left 9/16/2020    Procedure: CORRECTION, HAMMER TOE;  Surgeon: William Sprague MD;  Location: [facility] OR;  Service: Orthopedics;  Laterality: Left;    COSMETIC SURGERY      Broken nose    ESOPHAGOGASTRODUODENOSCOPY N/A 12/8/2021    Procedure: EGD (ESOPHAGOGASTRODUODENOSCOPY);  Surgeon: Benji Valentino III, MD;  Location: [facility] ENDO;  Service: Endoscopy;  Laterality: N/A;    FRACTURE SURGERY  left wrist    With pin    HEMORRHOID SURGERY      HERNIA REPAIR Left 04/09/2015    Dr Lo; left inguinal    INTRALUMINAL GASTROINTESTINAL TRACT IMAGING VIA CAPSULE N/A 7/10/2018    Procedure: IMAGING, GI TRACT, INTRALUMINAL, VIA  CAPSULE;  Surgeon: Radha Deng MD;  Location: [facility];  Service: Endoscopy;  Laterality: N/A;    LYSIS OF ADHESIONS  9/29/2020    Procedure: LYSIS, ADHESIONS;  Surgeon: Eagle Gonzalez MD;  Location: [facility] OR;  Service: General;;    RHINOPLASTY TIP      TONSILLECTOMY      UPPER GASTROINTESTINAL ENDOSCOPY  05/21/2015    Dr. Gomez     Family History   Problem Relation Age of Onset    Diabetes Mother     Diabetes Son     Obesity Sister     Alcohol abuse Brother     Heart disease Brother     No Known Problems Father     Early death Brother         self    Breast cancer Neg Hx     Colon cancer Neg Hx     Ovarian cancer Neg Hx     Colon polyps Neg Hx     Crohn's disease Neg Hx     Ulcerative colitis Neg Hx     Celiac disease Neg Hx      Social History     Tobacco Use    Smoking status: Never    Smokeless tobacco: Never   Substance Use Topics    Alcohol use: Yes     Alcohol/week: 11.7 standard drinks of alcohol     Types: 14 Standard drinks or equivalent per week     Comment: 2 glasses wine per dinner    Drug use: No     Review of Systems   Gastrointestinal:  Positive for abdominal pain, nausea and vomiting.       Physical Exam     Initial Vitals [01/20/24 0421]   BP Pulse Resp Temp SpO2   108/76 76 20 97.9 °F (36.6 °C) 99 %      MAP       --         Physical Exam    Nursing note and vitals reviewed.  Constitutional: She appears well-developed and well-nourished. She is not diaphoretic.   HENT:   Head: Normocephalic and atraumatic.   Mouth/Throat: Oropharynx is clear and moist.   Eyes: EOM are normal. Pupils are equal, round, and reactive to light. Right eye exhibits no discharge. Left eye exhibits no discharge.   Neck: No tracheal deviation present.   Normal range of motion.  Cardiovascular:  Normal rate, regular rhythm and intact distal pulses.           Pulmonary/Chest: No respiratory distress. She has no wheezes. She exhibits no tenderness.   Abdominal: Abdomen is soft. She exhibits no distension and no  mass. There is abdominal tenderness. There is no rebound and no guarding.   Musculoskeletal:         General: No tenderness or edema. Normal range of motion.      Cervical back: Normal range of motion.     Neurological: She is alert and oriented to person, place, and time. She has normal strength. No cranial nerve deficit or sensory deficit. GCS eye subscore is 4. GCS verbal subscore is 5. GCS motor subscore is 6.   Skin: Skin is warm and dry. No rash noted.   Psychiatric: She has a normal mood and affect. Her behavior is normal. Thought content normal.         ED Course   Procedures  Labs Reviewed   CBC W/ AUTO DIFFERENTIAL - Abnormal; Notable for the following components:       Result Value    MCH 31.2 (*)     Gran % 74.5 (*)     All other components within normal limits   COMPREHENSIVE METABOLIC PANEL - Abnormal; Notable for the following components:    Creatinine 0.4 (*)     ALT 9 (*)     All other components within normal limits   LIPASE - Abnormal; Notable for the following components:    Lipase 3 (*)     All other components within normal limits   INFLUENZA A AND B ANTIGEN    Narrative:     Specimen Source->Nasopharyngeal Swab   SARS-COV-2 RNA AMPLIFICATION, QUAL   URINALYSIS, REFLEX TO URINE CULTURE   POCT CREATININE          Imaging Results              CT Abdomen Pelvis With IV Contrast NO Oral Contrast (Final result)  Result time 01/20/24 07:49:28      Final result by Molina Sands MD (01/20/24 07:49:28)                   Narrative:    CT of the abdomen and pelvis with contrast,  1/20/2024 7:06 AM CST    HISTORY:Abdominal abscess/infection suspected; Bowel obstruction suspected.    COMPARISON: 9/13/2023    Technique: Helical imaging of the abdomen and pelvis is performed with IV but no oral contrast agent administration. As per radiology departmental policy, no delayed imaging is performed on this examination. This decreases sensitivity and specificity, particularly for evaluation of the mucosa of  the urinary tract. Reconstructed sagittal and coronal images evaluated.    Dose Lowering Techniques:  All CT scans at this facility utilize dose modulation, iterative reconstruction, and/or weight based dosing when appropriate to reduce radiation dose to as low as reasonably achievable.    FINDINGS:    ABDOMEN:    LUNG BASES: There is some scarring in both posterior lung bases, unchanged.    STOMACH: There is no gastric abnormality and no findings of hiatal hernia    LIVER: There is no evidence of liver mass, cyst, or other abnormality. Liver span is normal.    GALLBLADDER: The gallbladder is significantly distended with a transverse diameter of 6.6 cm and a length of 14 cm.    BILIARY TREE: No evidence of intrahepatic or extra hepatic biliary dilatation.    SPLEEN: Normal size and attenuation    PANCREAS: Normal with no evidence of mass or surrounding inflammatory edema.    ADRENALS: There is no evidence of adrenal mass.    KIDNEYS: No findings of renal mass, cyst, renal stone, or hydronephrosis.  No delays obtained    VESSELS: The aorta is patent with no evidence of aneurysm or dissection. Celiac artery and superior mesenteric artery are patent. Portal and hepatic veins and IVC are patent.    SMALL BOWEL: There is a generalized pattern of air and fluid throughout small bowel without definitive transition point. Some distal ileal loops appear less prominent. There is multiple loops of small bowel which are located anterior to the cecum and ascending colon with this finding associated with 2 full rotations of the mesentery subtending that bowel. Within the mesentery, the venous structures show narrowing with some mild edema.    LARGE BOWEL: Extensive stool and gas seen throughout a markedly elongated and ectatic colon. Some clips are noted related to bowel surgery in the right para midline mid abdomen.    APPENDIX:  Not visualized    RETROPERITONEUM: There is no evidence of mass or adenopathy.    ABDOMINAL WALL:  There is no evidence of abdominal hernia, mass, or cystic structure    PERITONEAL CAVITY: There is no evidence of fluid or other peritoneal abnormality.      PELVIS:    UTERUS AND OVARIES: The uterus is normal in size. The ovaries are normal with no evidence of ovarian mass or cyst.    BLADDER: No evidence of mass, calculus, or wall thickening.    INGUINAL REGIONS: There is no evidence of inguinal hernia or pathologic adenopathy    BONES: No bony lesions identified.      IMPRESSION:    1.  The stomach and proximal small bowel are not distended but there are multiple loops of moderately distended proximal and mid small bowel noted. No distinct transition is seen but some distal nondilated small bowel is evident. These findings are associated with the interval development of mesenteric torsion with 2 full rotations of small bowel mesentery evident which are associated with mesenteric venous narrowing and some mesenteric edema. This overall pattern is suspicious for an internal hernia producing partial outlet obstruction.  2.  The colon is ectatic elongated with a large volume of stool seen throughout the colon.  3.  The gallbladder is markedly distended with a transverse diameter of nearly 7 cm in length of 14 cm.      Electronically signed by:  Molina Sands MD  01/20/2024 07:49 AM CST Workstation: [id number]                                     Medications   iohexoL (OMNIPAQUE 350) injection 100 mL (100 mLs Intravenous Given 1/20/24 0707)     Medical Decision Making  Amount and/or Complexity of Data Reviewed  Labs: ordered.  Radiology: ordered.    Risk  Prescription drug management.  Decision regarding hospitalization.               ED Course as of 01/20/24 0806   Sat Jan 20, 2024   0805 Sodium: 138 [AP]   0805 Potassium: 4.2 [AP]   0805 Chloride: 103 [AP]   0805 CO2: 27 [AP]   0805 Glucose: 98 [AP]   0805 Creatinine(!): 0.4 [AP]   0805 Calcium: 9.6 [AP]   0805 PROTEIN TOTAL: 7.4 [AP]   0805 Albumin: 4.4 [AP]   0805  Anion Gap: 8 [AP]   0805 eGFR: >60.0 [AP]   0805 ALT(!): 9 [AP]   0805 AST: 17 [AP]   0806 ALP: 81 [AP]   0806 WBC: 6.74 [AP]   0806 Hemoglobin: 14.8 [AP]   0806 Hematocrit: 43.3 [AP]   0806 Platelet Count: 168 [AP]   0806 Lipase(!): 3  Patient was abdomen is soft.  Her CT scan shows evidence of obstruction and concerning findings from maybe internal hernia.  Discussed the case with  who will evaluate the patient.  He agrees with NG tube.  Family is apprehensive about NG tube and is considering this. [AP]      ED Course User Index  [AP] Josr Soliman MD                           Clinical Impression:  Final diagnoses:  [K56.609] Small bowel obstruction (Primary)          ED Disposition Condition    Admit Stable                Josr Soliman MD  01/20/24 0806

## 2024-01-20 NOTE — H&P
[facility] - Emergency Dept  Hospital Medicine  History & Physical    Patient Name: Ariana Tiwari  MRN: [id number]  Patient Class: OP- Observation  Admission Date: 1/20/2024  Attending Physician: Adolfo Mcgrath MD   Primary Care Provider: Nba Petty MD         Patient information was obtained from patient, relative(s), past medical records, and ER records.     Subjective:     Principal Problem:Recurrent intestinal obstruction    Chief Complaint:   Chief Complaint   Patient presents with    Abdominal Pain    Vomiting     Pt has hx of bowel obstruction.  Pt daughter says pt stomach soft on palpation, but wanted to rule out obstruction        HPI: 78F with PMH parkinson's, seizure disorder, hypothyroid, gluten intolerance, recurrent intestinal obstruction with hx abdominal surgery, and anemia presents due to nausea, vomiting, and abdomina cramping this morning. Symptoms improved while in ER to near baseline. Daughter-in-law present who helps with history. CT abd/pelv is impressive with areas of distended small bowel, concern for mesenteric torsion, and a distended gallbladder. Vitals and labs largely unremarkable. General surgery is evaluating the patient as well. She did decline NGT and surgical intervention. Admitted to hospital medicine service for observation and further evaluation.     Past Medical History:   Diagnosis Date    Allergy     Diverticulosis     Gluten enteropathy     Gluten intolerance     Hernia     Hypothyroidism     PD (Parkinson's disease) 9/16/2015    Right tremor type    Peptic ulcer disease     Right shoulder pain     Cirtisone injection 3/26/15 - Dr. Tolbert    Ulcer        Past Surgical History:   Procedure Laterality Date    ADENOIDECTOMY      COLONOSCOPY  03/01/2012    Dr. Teresa, repeat in 10 years for screening    COLONOSCOPY N/A 2/21/2018    Procedure: COLONOSCOPY;  Surgeon: Radha Deng MD;  Location: [facility];  Service: Endoscopy;  Laterality: N/A;     CORRECTION OF HAMMER TOE Left 9/16/2020    Procedure: CORRECTION, HAMMER TOE;  Surgeon: William Sprague MD;  Location: [facility] OR;  Service: Orthopedics;  Laterality: Left;    COSMETIC SURGERY      Broken nose    ESOPHAGOGASTRODUODENOSCOPY N/A 12/8/2021    Procedure: EGD (ESOPHAGOGASTRODUODENOSCOPY);  Surgeon: Benji Valentino III, MD;  Location: [facility] ENDO;  Service: Endoscopy;  Laterality: N/A;    FRACTURE SURGERY  left wrist    With pin    HEMORRHOID SURGERY      HERNIA REPAIR Left 04/09/2015    Dr Lo; left inguinal    INTRALUMINAL GASTROINTESTINAL TRACT IMAGING VIA CAPSULE N/A 7/10/2018    Procedure: IMAGING, GI TRACT, INTRALUMINAL, VIA CAPSULE;  Surgeon: Radha Deng MD;  Location: [facility] ENDO;  Service: Endoscopy;  Laterality: N/A;    LYSIS OF ADHESIONS  9/29/2020    Procedure: LYSIS, ADHESIONS;  Surgeon: Eagle Gonzalez MD;  Location: [facility] OR;  Service: General;;    RHINOPLASTY TIP      TONSILLECTOMY      UPPER GASTROINTESTINAL ENDOSCOPY  05/21/2015    Dr. Gomez       Review of patient's allergies indicates:   Allergen Reactions    Gluten Diarrhea       No current facility-administered medications on file prior to encounter.     Current Outpatient Medications on File Prior to Encounter   Medication Sig    acetaminophen 325 mg Cap Take 650 mg by mouth every 6 (six) hours as needed.    azelastine (ASTELIN) 137 mcg (0.1 %) nasal spray 1 spray by Nasal route 2 (two) times daily.    carbidopa-levodopa (RYTARY) 23.75-95 mg CpSR Take 3 capsules by mouth 3 (three) times daily.    diclofenac sodium (VOLTAREN) 1 % Gel Apply 4 g topically 4 (four) times daily.    docusate sodium (COLACE) 100 MG capsule Take 1 capsule (100 mg total) by mouth once daily. (Patient taking differently: Take 100 mg by mouth 3 (three) times daily as needed for Constipation.)    food supplemt, lactose-reduced (ENSURE ORIGINAL) Liqd Take 237 mLs by mouth 2 (two) times a day.    glucose 4 GM chewable tablet Take 4 tablets (16 g total)  by mouth as needed for Low blood sugar.    ivermectin (SOOLANTRA) 1 % Crea Apply 1 % topically once.    levETIRAcetam (KEPPRA) 500 MG Tab Take 1 tablet (500 mg total) by mouth 2 (two) times daily.    levothyroxine (SYNTHROID) 137 MCG Tab tablet Take 0.5 tablets (68.5 mcg total) by mouth before breakfast.    lycopene/lutein/fruit extracts (FRUIT AND VEGETABLE DAILY ORAL) Take 1 Dose by mouth once daily. Patient takes 2 fruit and 1 vegetable balance of nature vitamins in the morning and evening    polyethylene glycol (GLYCOLAX) 17 gram PwPk Take 17 g by mouth once daily.    salicylic acid 3 % Sham Shampoo scalp 2-3 times a week (Patient taking differently: Apply 1 application  topically As instructed. Shampoo scalp 2-3 times a week)    simethicone (MYLICON) 125 mg Cap capsule Take 1 capsule (125 mg total) by mouth 4 (four) times daily as needed for Flatulence.    traZODone (DESYREL) 50 MG tablet Take 1 tablet (50 mg total) by mouth nightly as needed for Insomnia.    [DISCONTINUED] acetaminophen (TYLENOL) 325 MG tablet Take 2 tablets (650 mg total) by mouth every 6 (six) hours as needed for Pain.    [DISCONTINUED] diclofenac sodium (VOLTAREN) 1 % Gel Apply 4 g topically 2 (two) times daily.     Family History       Problem Relation (Age of Onset)    Alcohol abuse Brother    Diabetes Mother, Son    Early death Brother    Heart disease Brother    No Known Problems Father    Obesity Sister          Tobacco Use    Smoking status: Never    Smokeless tobacco: Never   Substance and Sexual Activity    Alcohol use: Yes     Alcohol/week: 11.7 standard drinks of alcohol     Types: 14 Standard drinks or equivalent per week     Comment: 2 glasses wine per dinner    Drug use: No    Sexual activity: Never     Review of Systems   Constitutional:  Negative for chills and fever.   Respiratory:  Negative for shortness of breath.    Cardiovascular:  Negative for chest pain.   Gastrointestinal:  Negative for abdominal pain, constipation,  diarrhea, nausea and vomiting.     Objective:     Vital Signs (Most Recent):  Temp: 97.9 °F (36.6 °C) (01/20/24 0421)  Pulse: 68 (01/20/24 0630)  Resp: 17 (01/20/24 0530)  BP: (!) 148/74 (01/20/24 0630)  SpO2: 100 % (01/20/24 0630) Vital Signs (24h Range):  Temp:  [97.9 °F (36.6 °C)] 97.9 °F (36.6 °C)  Pulse:  [67-76] 68  Resp:  [17-20] 17  SpO2:  [99 %-100 %] 100 %  BP: (108-148)/(66-76) 148/74     Weight: 54.4 kg (120 lb)  Body mass index is 21.26 kg/m².     Physical Exam  Vitals reviewed.   Constitutional:       General: She is not in acute distress.     Appearance: She is not ill-appearing.      Comments: Thin, elderly, no distress   HENT:      Head: Normocephalic and atraumatic.   Cardiovascular:      Rate and Rhythm: Normal rate and regular rhythm.   Pulmonary:      Effort: Pulmonary effort is normal. No respiratory distress.      Breath sounds: Normal breath sounds.   Abdominal:      General: Bowel sounds are normal. There is no distension.      Palpations: Abdomen is soft.      Tenderness: There is no abdominal tenderness. There is no guarding. Negative signs include Mitchell's sign.   Skin:     General: Skin is warm and dry.   Neurological:      Mental Status: She is alert and oriented to person, place, and time. Mental status is at baseline.      Comments: Mild BUE tremors   Psychiatric:         Mood and Affect: Affect normal.         Behavior: Behavior normal.         Thought Content: Thought content normal.                Significant Labs: All pertinent labs within the past 24 hours have been reviewed.    Significant Imaging: I have reviewed all pertinent imaging results/findings within the past 24 hours.    Admission on 01/20/2024   Component Date Value Ref Range Status    WBC 01/20/2024 6.74  3.90 - 12.70 K/uL Final    RBC 01/20/2024 4.74  4.00 - 5.40 M/uL Final    Hemoglobin 01/20/2024 14.8  12.0 - 16.0 g/dL Final    Hematocrit 01/20/2024 43.3  37.0 - 48.5 % Final    MCV 01/20/2024 91  82 - 98 fL Final     MCH 01/20/2024 31.2 (H)  27.0 - 31.0 pg Final    MCHC 01/20/2024 34.2  32.0 - 36.0 g/dL Final    RDW 01/20/2024 12.7  11.5 - 14.5 % Final    Platelets 01/20/2024 168  150 - 450 K/uL Final    MPV 01/20/2024 9.8  9.2 - 12.9 fL Final    Immature Granulocytes 01/20/2024 0.3  0.0 - 0.5 % Final    Gran # (ANC) 01/20/2024 5.0  1.8 - 7.7 K/uL Final    Immature Grans (Abs) 01/20/2024 0.02  0.00 - 0.04 K/uL Final    Comment: Mild elevation in immature granulocytes is non specific and   can be seen in a variety of conditions including stress response,   acute inflammation, trauma and pregnancy. Correlation with other   laboratory and clinical findings is essential.      Lymph # 01/20/2024 1.4  1.0 - 4.8 K/uL Final    Mono # 01/20/2024 0.3  0.3 - 1.0 K/uL Final    Eos # 01/20/2024 0.0  0.0 - 0.5 K/uL Final    Baso # 01/20/2024 0.02  0.00 - 0.20 K/uL Final    nRBC 01/20/2024 0  0 /100 WBC Final    Gran % 01/20/2024 74.5 (H)  38.0 - 73.0 % Final    Lymph % 01/20/2024 20.2  18.0 - 48.0 % Final    Mono % 01/20/2024 4.6  4.0 - 15.0 % Final    Eosinophil % 01/20/2024 0.1  0.0 - 8.0 % Final    Basophil % 01/20/2024 0.3  0.0 - 1.9 % Final    Differential Method 01/20/2024 Automated   Final    Sodium 01/20/2024 138  136 - 145 mmol/L Final    Potassium 01/20/2024 4.2  3.5 - 5.1 mmol/L Final    Chloride 01/20/2024 103  95 - 110 mmol/L Final    CO2 01/20/2024 27  23 - 29 mmol/L Final    Glucose 01/20/2024 98  70 - 110 mg/dL Final    BUN 01/20/2024 12  8 - 23 mg/dL Final    Creatinine 01/20/2024 0.4 (L)  0.5 - 1.4 mg/dL Final    Calcium 01/20/2024 9.6  8.7 - 10.5 mg/dL Final    Total Protein 01/20/2024 7.4  6.0 - 8.4 g/dL Final    Albumin 01/20/2024 4.4  3.5 - 5.2 g/dL Final    Total Bilirubin 01/20/2024 0.5  0.1 - 1.0 mg/dL Final    Comment: For infants and newborns, interpretation of results should be based  on gestational age, weight and in agreement with clinical  observations.    Premature Infant recommended reference ranges:  Up to  24 hours.............<8.0 mg/dL  Up to 48 hours............<12.0 mg/dL  3-5 days..................<15.0 mg/dL  6-29 days.................<15.0 mg/dL      Alkaline Phosphatase 01/20/2024 81  55 - 135 U/L Final    AST 01/20/2024 17  10 - 40 U/L Final    ALT 01/20/2024 9 (L)  10 - 44 U/L Final    eGFR 01/20/2024 >60.0  >60 mL/min/1.73 m^2 Final    Anion Gap 01/20/2024 8  8 - 16 mmol/L Final    Lipase 01/20/2024 3 (L)  4 - 60 U/L Final    Specimen UA 01/20/2024 Urine, Clean Catch   Final    Color, UA 01/20/2024 Yellow  Yellow, Straw, Crystal Final    Appearance, UA 01/20/2024 Clear  Clear Final    pH, UA 01/20/2024 7.0  5.0 - 8.0 Final    Specific Gravity, UA 01/20/2024 >1.030 (A)  1.005 - 1.030 Final    Protein, UA 01/20/2024 Trace (A)  Negative Final    Comment: Recommend a 24 hour urine protein or a urine   protein/creatinine ratio if globulin induced proteinuria is  clinically suspected.      Glucose, UA 01/20/2024 Negative  Negative Final    Ketones, UA 01/20/2024 Negative  Negative Final    Bilirubin (UA) 01/20/2024 Negative  Negative Final    Occult Blood UA 01/20/2024 Trace (A)  Negative Final    Nitrite, UA 01/20/2024 Negative  Negative Final    Urobilinogen, UA 01/20/2024 Negative  Negative EU/dL Final    Leukocytes, UA 01/20/2024 2+ (A)  Negative Final    Influenza A, Molecular 01/20/2024 Negative  Negative Final    Influenza B, Molecular 01/20/2024 Negative  Negative Final    Flu A & B Source 01/20/2024 Nasal swab   Final    SARS-CoV-2 RNA, Amplification, Qual 01/20/2024 Negative  Negative Final    Comment: This test utilizes isothermal nucleic acid amplification technology   to   detect the SARS-CoV-2 RdRp nucleic acid segment. The analytical   sensitivity   (limit of detection) is 500 copies/swab.     A POSITIVE result is indicative of the presence of SARS-CoV-2 RNA;   clinical   correlation with patient history and other diagnostic information is   necessary to determine patient infection status.    A  NEGATIVE result means that SARS-CoV-2 nucleic acids are not present   above   the limit of detection. A NEGATIVE result should be treated as   presumptive.   It does not rule out the possibility of COVID-19 and should not be   the sole   basis for treatment decisions. If COVID-19 is strongly suspected   based on   clinical and exposure history, re-testing using an alternate   molecular assay   should be considered.     This test is only for use under the Food and Drug Administration s   Emergency   Use Authorization (EUA).     Commercial kits are provided by [facility]. Performanc                           e   characteristics of the EUA have been independently verified by   [facility]  _________________________________________________________________   The authorized Fact Sheet for Healthcare Providers and the authorized   Fact   Sheet for Patients of the ID NOW COVID-19 are available on the FDA   website:   https://www.fda.gov/media/907931/download   https://www.fda.gov/media/170338/download      RBC, UA 01/20/2024 3  0 - 4 /hpf Final    WBC, UA 01/20/2024 37 (H)  0 - 5 /hpf Final    Bacteria 01/20/2024 Negative  None-Occ /hpf Final    Squam Epithel, UA 01/20/2024 2  /hpf Final    Hyaline Casts, UA 01/20/2024 3 (A)  0-1/lpf /lpf Final    Microscopic Comment 01/20/2024 SEE COMMENT   Final    Comment: Other formed elements not mentioned in the report are not   present in the microscopic examination.            Imaging Results              NM Hepatobiliary Scan (HIDA) (In process)                      CT Abdomen Pelvis With IV Contrast NO Oral Contrast (Final result)  Result time 01/20/24 07:49:28      Final result by Molina Sands MD (01/20/24 07:49:28)                   Narrative:    CT of the abdomen and pelvis with contrast,  1/20/2024 7:06 AM CST    HISTORY:Abdominal abscess/infection suspected; Bowel obstruction suspected.    COMPARISON: 9/13/2023    Technique: Helical  imaging of the abdomen and pelvis is performed with IV but no oral contrast agent administration. As per radiology departmental policy, no delayed imaging is performed on this examination. This decreases sensitivity and specificity, particularly for evaluation of the mucosa of the urinary tract. Reconstructed sagittal and coronal images evaluated.    Dose Lowering Techniques:  All CT scans at this facility utilize dose modulation, iterative reconstruction, and/or weight based dosing when appropriate to reduce radiation dose to as low as reasonably achievable.    FINDINGS:    ABDOMEN:    LUNG BASES: There is some scarring in both posterior lung bases, unchanged.    STOMACH: There is no gastric abnormality and no findings of hiatal hernia    LIVER: There is no evidence of liver mass, cyst, or other abnormality. Liver span is normal.    GALLBLADDER: The gallbladder is significantly distended with a transverse diameter of 6.6 cm and a length of 14 cm.    BILIARY TREE: No evidence of intrahepatic or extra hepatic biliary dilatation.    SPLEEN: Normal size and attenuation    PANCREAS: Normal with no evidence of mass or surrounding inflammatory edema.    ADRENALS: There is no evidence of adrenal mass.    KIDNEYS: No findings of renal mass, cyst, renal stone, or hydronephrosis.  No delays obtained    VESSELS: The aorta is patent with no evidence of aneurysm or dissection. Celiac artery and superior mesenteric artery are patent. Portal and hepatic veins and IVC are patent.    SMALL BOWEL: There is a generalized pattern of air and fluid throughout small bowel without definitive transition point. Some distal ileal loops appear less prominent. There is multiple loops of small bowel which are located anterior to the cecum and ascending colon with this finding associated with 2 full rotations of the mesentery subtending that bowel. Within the mesentery, the venous structures show narrowing with some mild edema.    LARGE BOWEL:  Extensive stool and gas seen throughout a markedly elongated and ectatic colon. Some clips are noted related to bowel surgery in the right para midline mid abdomen.    APPENDIX:  Not visualized    RETROPERITONEUM: There is no evidence of mass or adenopathy.    ABDOMINAL WALL: There is no evidence of abdominal hernia, mass, or cystic structure    PERITONEAL CAVITY: There is no evidence of fluid or other peritoneal abnormality.      PELVIS:    UTERUS AND OVARIES: The uterus is normal in size. The ovaries are normal with no evidence of ovarian mass or cyst.    BLADDER: No evidence of mass, calculus, or wall thickening.    INGUINAL REGIONS: There is no evidence of inguinal hernia or pathologic adenopathy    BONES: No bony lesions identified.      IMPRESSION:    1.  The stomach and proximal small bowel are not distended but there are multiple loops of moderately distended proximal and mid small bowel noted. No distinct transition is seen but some distal nondilated small bowel is evident. These findings are associated with the interval development of mesenteric torsion with 2 full rotations of small bowel mesentery evident which are associated with mesenteric venous narrowing and some mesenteric edema. This overall pattern is suspicious for an internal hernia producing partial outlet obstruction.  2.  The colon is ectatic elongated with a large volume of stool seen throughout the colon.  3.  The gallbladder is markedly distended with a transverse diameter of nearly 7 cm in length of 14 cm.      Electronically signed by:  Molina Sands MD  01/20/2024 07:49 AM CST Workstation: [id number]                                    Assessment/Plan:     * Recurrent intestinal obstruction  Recurrent hx prior surgery  CT impressive, report reviewed  Symptoms mostly resolved at this point  Declined surgery and NGT  - IVF  - NPO  - prn pain meds and antiemetics  - gen surg consult; f/u HIDA scan    Seizure  - continue iv keppra until ok  for diet    PD (Parkinson's disease)  Chronic  - continue home carbidopa-levidopa once ok for diet    Hypothyroidism  - continue levothyroxine    Gluten enteropathy  Noted hx      VTE Risk Mitigation (From admission, onward)           Ordered     IP VTE HIGH RISK PATIENT  Once         01/20/24 1012     Place sequential compression device  Until discontinued         01/20/24 1012                       On 01/20/2024, patient should be placed in hospital observation services under my care.             Adolfo Mcgrath MD  Department of Hospital Medicine  [facility] - Emergency Dept

## 2024-01-20 NOTE — CONSULTS
[facility] - Emergency Dept  General Surgery  Consult Note    Inpatient consult to General surgery  Consult performed by: Sterling Vigil III, MD  Consult ordered by: Adolfo Mcgrath MD  Reason for consult: SBO        Subjective:     Chief Complaint/Reason for Admission:  Abdominal pain, small-bowel obstruction    History of Present Illness: 78 year old female with history of parkinson's disease. She was admitted today with suspicion for a recurrent Small bowel obstruction.  She has history of multiple admissions for small-bowel obstruction.  She has had two separate laparotomies.  She had laparotomy and small-bowel resection for perforated small bowel diverticulum in January 2020.  She had repeat laparotomy in September 2024 pneumoperitoneum but no perforation appreciated.  She was admitted for SBO in September of last year. CT showed diffusely dilated small bowel with transition point in the mid pelvis and possible swirling of the mesentery.  Surgery was again offered at that time but her symptoms improved very quickly after admission.  Very hesitant to consent to another operation.  And she resolved and was discharged without surgery.  She had been feeling well up to last night where she had increase in epigastric abdominal pain associated with 2 episodes of nausea and vomiting after she ate last night.  She reports that she has continued to pass flatus through the night into today.  She had bowel movement yesterday.  She reports no symptoms currently.  No abdominal pain, no nausea.  Her baseline abdominal distention seems at baseline today.  CT scan showed similar findings from her previous CT scans with mid small bowel dilatation with evidence of mesenteric swirling.  Swirling may even be more pronounced today.  Additional new findings include a distended gallbladder measuring 7 x 14 cm.       No current facility-administered medications on file prior to encounter.     Current Outpatient  Medications on File Prior to Encounter   Medication Sig    acetaminophen 325 mg Cap Take 650 mg by mouth every 6 (six) hours as needed.    azelastine (ASTELIN) 137 mcg (0.1 %) nasal spray 1 spray by Nasal route 2 (two) times daily.    carbidopa-levodopa (RYTARY) 23.75-95 mg CpSR Take 3 capsules by mouth 3 (three) times daily.    diclofenac sodium (VOLTAREN) 1 % Gel Apply 4 g topically 4 (four) times daily.    docusate sodium (COLACE) 100 MG capsule Take 1 capsule (100 mg total) by mouth once daily. (Patient taking differently: Take 100 mg by mouth 3 (three) times daily as needed for Constipation.)    food supplemt, lactose-reduced (ENSURE ORIGINAL) Liqd Take 237 mLs by mouth 2 (two) times a day.    glucose 4 GM chewable tablet Take 4 tablets (16 g total) by mouth as needed for Low blood sugar.    ivermectin (SOOLANTRA) 1 % Crea Apply 1 % topically once.    levETIRAcetam (KEPPRA) 500 MG Tab Take 1 tablet (500 mg total) by mouth 2 (two) times daily.    levothyroxine (SYNTHROID) 137 MCG Tab tablet Take 0.5 tablets (68.5 mcg total) by mouth before breakfast.    lycopene/lutein/fruit extracts (FRUIT AND VEGETABLE DAILY ORAL) Take 1 Dose by mouth once daily. Patient takes 2 fruit and 1 vegetable balance of nature vitamins in the morning and evening    polyethylene glycol (GLYCOLAX) 17 gram PwPk Take 17 g by mouth once daily.    salicylic acid 3 % Sham Shampoo scalp 2-3 times a week (Patient taking differently: Apply 1 application  topically As instructed. Shampoo scalp 2-3 times a week)    simethicone (MYLICON) 125 mg Cap capsule Take 1 capsule (125 mg total) by mouth 4 (four) times daily as needed for Flatulence.    traZODone (DESYREL) 50 MG tablet Take 1 tablet (50 mg total) by mouth nightly as needed for Insomnia.    [DISCONTINUED] acetaminophen (TYLENOL) 325 MG tablet Take 2 tablets (650 mg total) by mouth every 6 (six) hours as needed for Pain.    [DISCONTINUED] diclofenac sodium (VOLTAREN) 1 % Gel Apply 4 g  topically 2 (two) times daily.       Review of patient's allergies indicates:   Allergen Reactions    Gluten Diarrhea       Past Medical History:   Diagnosis Date    Allergy     Diverticulosis     Gluten enteropathy     Gluten intolerance     Hernia     Hypothyroidism     PD (Parkinson's disease) 9/16/2015    Right tremor type    Peptic ulcer disease     Right shoulder pain     Cirtisone injection 3/26/15 - Dr. Tolbert    Ulcer      Past Surgical History:   Procedure Laterality Date    ADENOIDECTOMY      COLONOSCOPY  03/01/2012    Dr. Teresa, repeat in 10 years for screening    COLONOSCOPY N/A 2/21/2018    Procedure: COLONOSCOPY;  Surgeon: Radha Deng MD;  Location: [facility];  Service: Endoscopy;  Laterality: N/A;    CORRECTION OF HAMMER TOE Left 9/16/2020    Procedure: CORRECTION, HAMMER TOE;  Surgeon: William Sprague MD;  [place] [facility] [place];  Service: Orthopedics;  Laterality: Left;    COSMETIC SURGERY      Broken nose    ESOPHAGOGASTRODUODENOSCOPY N/A 12/8/2021    Procedure: EGD (ESOPHAGOGASTRODUODENOSCOPY);  Surgeon: Benji Valentino III, MD;  Location: [facility];  Service: Endoscopy;  Laterality: N/A;    FRACTURE SURGERY  left wrist    With pin    HEMORRHOID SURGERY      HERNIA REPAIR Left 04/09/2015    Dr Lo; left inguinal    INTRALUMINAL GASTROINTESTINAL TRACT IMAGING VIA CAPSULE N/A 7/10/2018    Procedure: IMAGING, GI TRACT, INTRALUMINAL, VIA CAPSULE;  Surgeon: Radha Deng MD;  Location: [facility];  Service: Endoscopy;  Laterality: N/A;    LYSIS OF ADHESIONS  9/29/2020    Procedure: LYSIS, ADHESIONS;  Surgeon: Eagle Gonzalez MD;  Location: [facility];  Service: General;;    RHINOPLASTY TIP      TONSILLECTOMY      UPPER GASTROINTESTINAL ENDOSCOPY  05/21/2015    Dr. Gomez     Family History       Problem Relation (Age of Onset)    Alcohol abuse Brother    Diabetes Mother, Son    Early death Brother    Heart disease Brother    No Known Problems Father    Obesity Sister          Tobacco  Use    Smoking status: Never    Smokeless tobacco: Never   Substance and Sexual Activity    Alcohol use: Yes     Alcohol/week: 11.7 standard drinks of alcohol     Types: 14 Standard drinks or equivalent per week     Comment: 2 glasses wine per dinner    Drug use: No    Sexual activity: Never     Review of Systems   Constitutional:  Negative for appetite change, chills, fever and unexpected weight change.   HENT:  Negative for hearing loss, rhinorrhea, sore throat and voice change.    Eyes:  Negative for photophobia and visual disturbance.   Respiratory:  Negative for cough, choking and shortness of breath.    Cardiovascular:  Negative for chest pain, palpitations and leg swelling.   Gastrointestinal:  Negative for abdominal pain, blood in stool, constipation, diarrhea, nausea and vomiting.   Endocrine: Negative for cold intolerance, heat intolerance, polydipsia and polyuria.   Musculoskeletal:  Negative for arthralgias, back pain, joint swelling and neck stiffness.   Skin:  Negative for color change, pallor and rash.   Neurological:  Negative for dizziness, seizures, syncope and headaches.   Hematological:  Negative for adenopathy. Does not bruise/bleed easily.   Psychiatric/Behavioral:  Negative for agitation, behavioral problems and confusion.      Objective:     Vital Signs (Most Recent):  Temp: 97.9 °F (36.6 °C) (01/20/24 0421)  Pulse: 68 (01/20/24 0630)  Resp: 17 (01/20/24 0530)  BP: (!) 148/74 (01/20/24 0630)  SpO2: 100 % (01/20/24 0630) Vital Signs (24h Range):  Temp:  [97.9 °F (36.6 °C)] 97.9 °F (36.6 °C)  Pulse:  [67-76] 68  Resp:  [17-20] 17  SpO2:  [99 %-100 %] 100 %  BP: (108-148)/(66-76) 148/74     Weight: 54.4 kg (120 lb)  Body mass index is 21.26 kg/m².    No intake or output data in the 24 hours ending 01/20/24 1238    Physical Exam  Constitutional:       General: She is awake. She is not in acute distress.     Appearance: She is not ill-appearing or toxic-appearing.      Comments: Frail. Elderly  female in NAD    Pulmonary:      Effort: No tachypnea, bradypnea or respiratory distress.   Abdominal:      General: There is distension.      Palpations: Abdomen is soft.      Tenderness: There is no abdominal tenderness. There is no guarding or rebound.   Neurological:      Mental Status: She is alert and oriented to person, place, and time.   Psychiatric:         Behavior: Behavior is cooperative.         Significant Labs:  CBC:   Recent Labs   Lab 01/20/24  0606   WBC 6.74   RBC 4.74   HGB 14.8   HCT 43.3      MCV 91   MCH 31.2*   MCHC 34.2     CMP:   Recent Labs   Lab 01/20/24  0606   GLU 98   CALCIUM 9.6   ALBUMIN 4.4   PROT 7.4      K 4.2   CO2 27      BUN 12   CREATININE 0.4*   ALKPHOS 81   ALT 9*   AST 17   BILITOT 0.5       Significant Diagnostics:  I have reviewed all pertinent imaging results/findings within the past 24 hours.    FINDINGS:     ABDOMEN:     LUNG BASES: There is some scarring in both posterior lung bases, unchanged.     STOMACH: There is no gastric abnormality and no findings of hiatal hernia     LIVER: There is no evidence of liver mass, cyst, or other abnormality. Liver span is normal.     GALLBLADDER: The gallbladder is significantly distended with a transverse diameter of 6.6 cm and a length of 14 cm.     BILIARY TREE: No evidence of intrahepatic or extra hepatic biliary dilatation.     SPLEEN: Normal size and attenuation     PANCREAS: Normal with no evidence of mass or surrounding inflammatory edema.     ADRENALS: There is no evidence of adrenal mass.     KIDNEYS: No findings of renal mass, cyst, renal stone, or hydronephrosis.  No delays obtained     VESSELS: The aorta is patent with no evidence of aneurysm or dissection. Celiac artery and superior mesenteric artery are patent. Portal and hepatic veins and IVC are patent.     SMALL BOWEL: There is a generalized pattern of air and fluid throughout small bowel without definitive transition point. Some distal ileal  loops appear less prominent. There is multiple loops of small bowel which are located anterior to the cecum and ascending colon with this finding associated with 2 full rotations of the mesentery subtending that bowel. Within the mesentery, the venous structures show narrowing with some mild edema.     LARGE BOWEL: Extensive stool and gas seen throughout a markedly elongated and ectatic colon. Some clips are noted related to bowel surgery in the right para midline mid abdomen.     APPENDIX:  Not visualized     RETROPERITONEUM: There is no evidence of mass or adenopathy.     ABDOMINAL WALL: There is no evidence of abdominal hernia, mass, or cystic structure     PERITONEAL CAVITY: There is no evidence of fluid or other peritoneal abnormality.        PELVIS:     UTERUS AND OVARIES: The uterus is normal in size. The ovaries are normal with no evidence of ovarian mass or cyst.     BLADDER: No evidence of mass, calculus, or wall thickening.     INGUINAL REGIONS: There is no evidence of inguinal hernia or pathologic adenopathy     BONES: No bony lesions identified.        IMPRESSION:     1.  The stomach and proximal small bowel are not distended but there are multiple loops of moderately distended proximal and mid small bowel noted. No distinct transition is seen but some distal nondilated small bowel is evident. These findings are associated with the interval development of mesenteric torsion with 2 full rotations of small bowel mesentery evident which are associated with mesenteric venous narrowing and some mesenteric edema. This overall pattern is suspicious for an internal hernia producing partial outlet obstruction.  2.  The colon is ectatic elongated with a large volume of stool seen throughout the colon.  3.  The gallbladder is markedly distended with a transverse diameter of nearly 7 cm in length of 14 cm.    Assessment/Plan:     Active Diagnoses:    Diagnosis Date Noted POA    PRINCIPAL PROBLEM:  Recurrent  intestinal obstruction [K56.609] 02/03/2020 Yes    Seizure [R56.9] 01/30/2023 Yes    PD (Parkinson's disease) [G20.A1] 09/16/2015 Yes    Hypothyroidism [E03.9]  Yes    Gluten enteropathy [K90.41] 04/11/2014 Yes      Problems Resolved During this Admission:   -patient with an impressive CT once again.  Similar to previous CT scans but swirling is more pronounced today.  There is also additional findings of a distended gallbladder.  Patient's symptoms actually more consistent with gallbladder issue with the epigastric abdominal pain and vomiting after she ate last night.  She states that she is not having obstructive symptoms this morning.  States she is feeling back to normal.  She has not eaten since last night.  Recommendation at this point would be for further gallbladder evaluation this morning.  No indication for emergent surgery for the bowel obstruction least on exam and by symptoms.  I did discuss with her and her family that the CT is pretty impressive in that I would offer surgery based on appearance.  Patient very hesitant to agree to surgery without any significant symptoms.  Will follow-up after HIDA.    Thank you for your consult.     Sterling Vigil III, MD  General Surgery  [facility] - Emergency Dept

## 2024-01-20 NOTE — DISCHARGE SUMMARY
[facility] - Emergency Dept  Hospital Medicine  Discharge Summary      Patient Name: Ariana Tiwari  MRN: [id number]  JAN: [id number]  Patient Class: OP- Observation  Admission Date: 1/20/2024  Hospital Length of Stay: 0 days  Discharge Date and Time: 1/20/2024  5:48 PM  Attending Physician: Adolfo Mcgrath MD   Discharging Provider: Adolfo Mcgrath MD  Primary Care Provider: Nba Petty MD    Primary Care Team: Networked reference to record PCT     HPI:   78F with PMH parkinson's, seizure disorder, hypothyroid, gluten intolerance, recurrent intestinal obstruction with hx abdominal surgery, and anemia presents due to nausea, vomiting, and abdomina cramping this morning. Symptoms improved while in ER to near baseline. Daughter-in-law present who helps with history. CT abd/pelv is impressive with areas of distended small bowel, concern for mesenteric torsion, and a distended gallbladder. Vitals and labs largely unremarkable. General surgery is evaluating the patient as well. She did decline NGT and surgical intervention. Admitted to hospital medicine service for observation and further evaluation.     * No surgery found *      Hospital Course:   Admitted with nausea, vomiting, abdominal pain with concern for intestinal obstruction per CT scan. Has history of recurrent intestinal obstruction. CT concern for mesenteric torsion and gallbladder distension as well. Asymptomatic at time of admission with generally normal labs and vitals. She declined NGT and surgical intervention. HIDA scan negative for acute cholecystitis. General surgery was consulted. Initially was placed NPO. Given her lack of persistent symptoms, it was felt acceptable to have her follow up outpatient with PCP and general surgery. She tolerated a bland diet prior to discharge.     Goals of Care Treatment Preferences:  Code Status: Full Code      Consults:   Consults (From admission, onward)          Status Ordering Provider      Inpatient consult to General surgery  Once        Provider:  Sterling Vigil III, MD    Completed GERARD VILLAREAL new Assessment & Plan notes have been filed under this hospital service since the last note was generated.  Service: Hospital Medicine    Final Active Diagnoses:    Diagnosis Date Noted POA    PRINCIPAL PROBLEM:  Recurrent intestinal obstruction [K56.609] 02/03/2020 Yes    Seizure [R56.9] 01/30/2023 Yes    PD (Parkinson's disease) [G20.A1] 09/16/2015 Yes    Hypothyroidism [E03.9]  Yes    Gluten enteropathy [K90.41] 04/11/2014 Yes      Problems Resolved During this Admission:       Discharged Condition: good    Disposition: Home or Self Care    Follow Up:   Follow-up Information       Nba Petty MD. Schedule an appointment as soon as possible for a visit in 1 week(s).    Specialty: Family Medicine  Contact information:  [address]  [address]  [place] [address]  [phone]               Sterling Vigil III, MD. Schedule an appointment as soon as possible for a visit in 1 week(s).    Specialties: General Surgery, Surgery  Contact information:  [address]  [address]  [place] [address]  [phone]                           Patient Instructions:      Diet Adult Regular     Notify your health care provider if you experience any of the following:  temperature >100.4     Notify your health care provider if you experience any of the following:  persistent nausea and vomiting or diarrhea     Notify your health care provider if you experience any of the following:  severe uncontrolled pain     Notify your health care provider if you experience any of the following:  redness, tenderness, or signs of infection (pain, swelling, redness, odor or green/yellow discharge around incision site)     Notify your health care provider if you experience any of the following:  difficulty breathing or increased cough     Notify your health care provider if you experience any of the following:   severe persistent headache     Notify your health care provider if you experience any of the following:  worsening rash     Notify your health care provider if you experience any of the following:  persistent dizziness, light-headedness, or visual disturbances     Notify your health care provider if you experience any of the following:  increased confusion or weakness     Activity as tolerated       Significant Diagnostic Studies:     Admission on 01/20/2024, Discharged on 01/20/2024   Component Date Value Ref Range Status    WBC 01/20/2024 6.74  3.90 - 12.70 K/uL Final    RBC 01/20/2024 4.74  4.00 - 5.40 M/uL Final    Hemoglobin 01/20/2024 14.8  12.0 - 16.0 g/dL Final    Hematocrit 01/20/2024 43.3  37.0 - 48.5 % Final    MCV 01/20/2024 91  82 - 98 fL Final    MCH 01/20/2024 31.2 (H)  27.0 - 31.0 pg Final    MCHC 01/20/2024 34.2  32.0 - 36.0 g/dL Final    RDW 01/20/2024 12.7  11.5 - 14.5 % Final    Platelets 01/20/2024 168  150 - 450 K/uL Final    MPV 01/20/2024 9.8  9.2 - 12.9 fL Final    Immature Granulocytes 01/20/2024 0.3  0.0 - 0.5 % Final    Gran # (ANC) 01/20/2024 5.0  1.8 - 7.7 K/uL Final    Immature Grans (Abs) 01/20/2024 0.02  0.00 - 0.04 K/uL Final    Comment: Mild elevation in immature granulocytes is non specific and   can be seen in a variety of conditions including stress response,   acute inflammation, trauma and pregnancy. Correlation with other   laboratory and clinical findings is essential.      Lymph # 01/20/2024 1.4  1.0 - 4.8 K/uL Final    Mono # 01/20/2024 0.3  0.3 - 1.0 K/uL Final    Eos # 01/20/2024 0.0  0.0 - 0.5 K/uL Final    Baso # 01/20/2024 0.02  0.00 - 0.20 K/uL Final    nRBC 01/20/2024 0  0 /100 WBC Final    Gran % 01/20/2024 74.5 (H)  38.0 - 73.0 % Final    Lymph % 01/20/2024 20.2  18.0 - 48.0 % Final    Mono % 01/20/2024 4.6  4.0 - 15.0 % Final    Eosinophil % 01/20/2024 0.1  0.0 - 8.0 % Final    Basophil % 01/20/2024 0.3  0.0 - 1.9 % Final    Differential Method 01/20/2024  Automated   Final    Sodium 01/20/2024 138  136 - 145 mmol/L Final    Potassium 01/20/2024 4.2  3.5 - 5.1 mmol/L Final    Chloride 01/20/2024 103  95 - 110 mmol/L Final    CO2 01/20/2024 27  23 - 29 mmol/L Final    Glucose 01/20/2024 98  70 - 110 mg/dL Final    BUN 01/20/2024 12  8 - 23 mg/dL Final    Creatinine 01/20/2024 0.4 (L)  0.5 - 1.4 mg/dL Final    Calcium 01/20/2024 9.6  8.7 - 10.5 mg/dL Final    Total Protein 01/20/2024 7.4  6.0 - 8.4 g/dL Final    Albumin 01/20/2024 4.4  3.5 - 5.2 g/dL Final    Total Bilirubin 01/20/2024 0.5  0.1 - 1.0 mg/dL Final    Comment: For infants and newborns, interpretation of results should be based  on gestational age, weight and in agreement with clinical  observations.    Premature Infant recommended reference ranges:  Up to 24 hours.............<8.0 mg/dL  Up to 48 hours............<12.0 mg/dL  3-5 days..................<15.0 mg/dL  6-29 days.................<15.0 mg/dL      Alkaline Phosphatase 01/20/2024 81  55 - 135 U/L Final    AST 01/20/2024 17  10 - 40 U/L Final    ALT 01/20/2024 9 (L)  10 - 44 U/L Final    eGFR 01/20/2024 >60.0  >60 mL/min/1.73 m^2 Final    Anion Gap 01/20/2024 8  8 - 16 mmol/L Final    Lipase 01/20/2024 3 (L)  4 - 60 U/L Final    Specimen UA 01/20/2024 Urine, Clean Catch   Final    Color, UA 01/20/2024 Yellow  Yellow, Straw, Crystal Final    Appearance, UA 01/20/2024 Clear  Clear Final    pH, UA 01/20/2024 7.0  5.0 - 8.0 Final    Specific Gravity, UA 01/20/2024 >1.030 (A)  1.005 - 1.030 Final    Protein, UA 01/20/2024 Trace (A)  Negative Final    Comment: Recommend a 24 hour urine protein or a urine   protein/creatinine ratio if globulin induced proteinuria is  clinically suspected.      Glucose, UA 01/20/2024 Negative  Negative Final    Ketones, UA 01/20/2024 Negative  Negative Final    Bilirubin (UA) 01/20/2024 Negative  Negative Final    Occult Blood UA 01/20/2024 Trace (A)  Negative Final    Nitrite, UA 01/20/2024 Negative  Negative Final     Urobilinogen, UA 01/20/2024 Negative  Negative EU/dL Final    Leukocytes, UA 01/20/2024 2+ (A)  Negative Final    Influenza A, Molecular 01/20/2024 Negative  Negative Final    Influenza B, Molecular 01/20/2024 Negative  Negative Final    Flu A & B Source 01/20/2024 Nasal swab   Final    SARS-CoV-2 RNA, Amplification, Qual 01/20/2024 Negative  Negative Final    Comment: This test utilizes isothermal nucleic acid amplification technology   to   detect the SARS-CoV-2 RdRp nucleic acid segment. The analytical   sensitivity   (limit of detection) is 500 copies/swab.     A POSITIVE result is indicative of the presence of SARS-CoV-2 RNA;   clinical   correlation with patient history and other diagnostic information is   necessary to determine patient infection status.    A NEGATIVE result means that SARS-CoV-2 nucleic acids are not present   above   the limit of detection. A NEGATIVE result should be treated as   presumptive.   It does not rule out the possibility of COVID-19 and should not be   the sole   basis for treatment decisions. If COVID-19 is strongly suspected   based on   clinical and exposure history, re-testing using an alternate   molecular assay   should be considered.     This test is only for use under the Food and Drug Administration s   Emergency   Use Authorization (EUA).     Commercial kits are provided by [employer]. [employer]                           e   characteristics of the EUA have been independently verified by   Replaced by [facility]  _________________________________________________________________   The authorized Fact Sheet for Healthcare Providers and the authorized   Fact   Sheet for Patients of the ID NOW COVID-19 are available on the FDA   website:   https://www.fda.gov/media/295379/download   https://www.fda.gov/media/488967/download      RBC, UA 01/20/2024 3  0 - 4 /hpf Final    WBC, UA 01/20/2024 37 (H)  0 - 5 /hpf Final    Bacteria 01/20/2024 Negative   None-Occ /hpf Final    Squam Epithel, UA 01/20/2024 2  /hpf Final    Hyaline Casts, UA 01/20/2024 3 (A)  0-1/lpf /lpf Final    Microscopic Comment 01/20/2024 SEE COMMENT   Final    Comment: Other formed elements not mentioned in the report are not   present in the microscopic examination.       Urine Culture, Routine 01/20/2024 No growth to date   Preliminary     Imaging Results              NM Hepatobiliary Scan (HIDA) (Final result)  Result time 01/20/24 14:58:52      Final result by Demetrius Suh MD (01/20/24 14:58:52)                   Narrative:    DISCLAIMER: The study was originally performed 1/20/2024 11:56 AM CST but finalized by the performing site, for review, dictated on 1/20/2024 2:58 PM CST.    CLINICAL HISTORY:  78 years (1945) Female RUQ abdominal pain, US nondiagnostic Dose: 8.9 mCi Mebrofenin; Pt has nausea, vomiting and abdominal pain; Per CT pt has distended GB    TECHNIQUE:  NM HEPATOBILIARY WITHOUT PHARM. 4 images obtained.  Following the uneventful intravenous administration of 8.9 mCi Tc-99m mebrofenin, dynamic images of the right upper quadrant were acquired in the anterior projection for 60 seconds per frame for 1 hour.    COMPARISON:  None available.    FINDINGS:  There is brisk tracer uptake by the liver.    Radioactive bile could be seen entering the duodenum by approximately 30 minutes.    The gallbladder was visualized.    IMPRESSION:  No evidence of acute cholecystitis.    Electronically signed by:  Demetrius Suh MD  01/20/2024 02:58 PM CST Workstation: [id number]                                     CT Abdomen Pelvis With IV Contrast NO Oral Contrast (Final result)  Result time 01/20/24 07:49:28      Final result by Molina Sands MD (01/20/24 07:49:28)                   Narrative:    CT of the abdomen and pelvis with contrast,  1/20/2024 7:06 AM CST    HISTORY:Abdominal abscess/infection suspected; Bowel obstruction suspected.    COMPARISON: 9/13/2023    Technique:  Helical imaging of the abdomen and pelvis is performed with IV but no oral contrast agent administration. As per radiology departmental policy, no delayed imaging is performed on this examination. This decreases sensitivity and specificity, particularly for evaluation of the mucosa of the urinary tract. Reconstructed sagittal and coronal images evaluated.    Dose Lowering Techniques:  All CT scans at this facility utilize dose modulation, iterative reconstruction, and/or weight based dosing when appropriate to reduce radiation dose to as low as reasonably achievable.    FINDINGS:    ABDOMEN:    LUNG BASES: There is some scarring in both posterior lung bases, unchanged.    STOMACH: There is no gastric abnormality and no findings of hiatal hernia    LIVER: There is no evidence of liver mass, cyst, or other abnormality. Liver span is normal.    GALLBLADDER: The gallbladder is significantly distended with a transverse diameter of 6.6 cm and a length of 14 cm.    BILIARY TREE: No evidence of intrahepatic or extra hepatic biliary dilatation.    SPLEEN: Normal size and attenuation    PANCREAS: Normal with no evidence of mass or surrounding inflammatory edema.    ADRENALS: There is no evidence of adrenal mass.    KIDNEYS: No findings of renal mass, cyst, renal stone, or hydronephrosis.  No delays obtained    VESSELS: The aorta is patent with no evidence of aneurysm or dissection. Celiac artery and superior mesenteric artery are patent. Portal and hepatic veins and IVC are patent.    SMALL BOWEL: There is a generalized pattern of air and fluid throughout small bowel without definitive transition point. Some distal ileal loops appear less prominent. There is multiple loops of small bowel which are located anterior to the cecum and ascending colon with this finding associated with 2 full rotations of the mesentery subtending that bowel. Within the mesentery, the venous structures show narrowing with some mild edema.    LARGE  BOWEL: Extensive stool and gas seen throughout a markedly elongated and ectatic colon. Some clips are noted related to bowel surgery in the right para midline mid abdomen.    APPENDIX:  Not visualized    RETROPERITONEUM: There is no evidence of mass or adenopathy.    ABDOMINAL WALL: There is no evidence of abdominal hernia, mass, or cystic structure    PERITONEAL CAVITY: There is no evidence of fluid or other peritoneal abnormality.      PELVIS:    UTERUS AND OVARIES: The uterus is normal in size. The ovaries are normal with no evidence of ovarian mass or cyst.    BLADDER: No evidence of mass, calculus, or wall thickening.    INGUINAL REGIONS: There is no evidence of inguinal hernia or pathologic adenopathy    BONES: No bony lesions identified.      IMPRESSION:    1.  The stomach and proximal small bowel are not distended but there are multiple loops of moderately distended proximal and mid small bowel noted. No distinct transition is seen but some distal nondilated small bowel is evident. These findings are associated with the interval development of mesenteric torsion with 2 full rotations of small bowel mesentery evident which are associated with mesenteric venous narrowing and some mesenteric edema. This overall pattern is suspicious for an internal hernia producing partial outlet obstruction.  2.  The colon is ectatic elongated with a large volume of stool seen throughout the colon.  3.  The gallbladder is markedly distended with a transverse diameter of nearly 7 cm in length of 14 cm.      Electronically signed by:  Molina Sands MD  01/20/2024 07:49 AM CST Workstation: [id number]                                      Pending Diagnostic Studies:       None           Medications:  Reconciled Home Medications:      Medication List        CHANGE how you take these medications      salicylic acid 3 % Sham  Shampoo scalp 2-3 times a week  What changed:   how much to take  how to take this  when to take  this  reasons to take this            CONTINUE taking these medications      acetaminophen 325 mg Cap  Take 650 mg by mouth every 6 (six) hours as needed.     azelastine 137 mcg (0.1 %) nasal spray  Commonly known as: ASTELIN  1 spray by Nasal route 2 (two) times daily.     diclofenac sodium 1 % Gel  Commonly known as: VOLTAREN  Apply 4 g topically 4 (four) times daily.     docusate sodium 100 MG capsule  Commonly known as: COLACE  Take 1 capsule (100 mg total) by mouth once daily.     ENSURE ORIGINAL Liqd  Generic drug: food supplemt, lactose-reduced  Take 237 mLs by mouth 2 (two) times a day.     FRUIT AND VEGETABLE DAILY ORAL  Take 1 Dose by mouth once daily. Patient takes 2 fruit and 1 vegetable balance of nature vitamins in the morning and evening     glucose 4 GM chewable tablet  Take 4 tablets (16 g total) by mouth as needed for Low blood sugar.     ivermectin 1 % Crea  Commonly known as: SOOLANTRA  Apply 1 % topically once.     levETIRAcetam 500 MG Tab  Commonly known as: KEPPRA  Take 1 tablet (500 mg total) by mouth 2 (two) times daily.     levothyroxine 137 MCG Tab tablet  Commonly known as: SYNTHROID  Take 0.5 tablets (68.5 mcg total) by mouth before breakfast.     polyethylene glycol 17 gram Pwpk  Commonly known as: GLYCOLAX  Take 17 g by mouth once daily.     RYTARY 23.75-95 mg Cpsr  Generic drug: carbidopa-levodopa  Take 3 capsules by mouth 3 (three) times daily.     simethicone 125 mg Cap capsule  Commonly known as: MYLICON  Take 1 capsule (125 mg total) by mouth 4 (four) times daily as needed for Flatulence.     traZODone 50 MG tablet  Commonly known as: DESYREL  Take 1 tablet (50 mg total) by mouth nightly as needed for Insomnia.              Indwelling Lines/Drains at time of discharge:   Lines/Drains/Airways       None                   Time spent on the discharge of patient: 37 minutes         Adolfo Mcgrath MD  Department of Hospital Medicine  [facility] - Emergency Dept

## 2024-01-20 NOTE — HPI
78F with PMH parkinson's, seizure disorder, hypothyroid, gluten intolerance, recurrent intestinal obstruction with hx abdominal surgery, and anemia presents due to nausea, vomiting, and abdomina cramping this morning. Symptoms improved while in ER to near baseline. Daughter-in-law present who helps with history. CT abd/pelv is impressive with areas of distended small bowel, concern for mesenteric torsion, and a distended gallbladder. Vitals and labs largely unremarkable. General surgery is evaluating the patient as well. She did decline NGT and surgical intervention. Admitted to hospital medicine service for observation and further evaluation.

## 2024-01-20 NOTE — ED NOTES
Pt refused NG tube. Reasons for ng tube explained, Pt acknowledges understanding while still refusing NG tube. Benefits explained. Pt denies NG tube. MD notified

## 2024-01-22 DIAGNOSIS — E03.9 ACQUIRED HYPOTHYROIDISM: ICD-10-CM

## 2024-01-22 LAB — BACTERIA UR CULT: NO GROWTH

## 2024-01-23 ENCOUNTER — PATIENT OUTREACH (OUTPATIENT)
Dept: ADMINISTRATIVE | Facility: CLINIC | Age: 79
End: 2024-01-23
Payer: MEDICARE

## 2024-01-23 RX ORDER — LEVOTHYROXINE SODIUM 137 UG/1
TABLET ORAL
Qty: 45 TABLET | Refills: 2 | Status: SHIPPED | OUTPATIENT
Start: 2024-01-23

## 2024-01-23 NOTE — PROGRESS NOTES
C3 nurse spoke with Ariana Tiwari's daughter, Micaela for a TCC post hospital discharge follow up call. The patient has a scheduled HOSFU appointment with Nba Petty MD on 01/25/24 @ 0900.

## 2024-01-25 ENCOUNTER — OFFICE VISIT (OUTPATIENT)
Dept: FAMILY MEDICINE | Facility: CLINIC | Age: 79
End: 2024-01-25
Attending: FAMILY MEDICINE
Payer: MEDICARE

## 2024-01-25 VITALS
TEMPERATURE: 98 F | HEART RATE: 78 BPM | HEIGHT: 63 IN | DIASTOLIC BLOOD PRESSURE: 70 MMHG | WEIGHT: 117.31 LBS | BODY MASS INDEX: 20.79 KG/M2 | OXYGEN SATURATION: 94 % | SYSTOLIC BLOOD PRESSURE: 138 MMHG

## 2024-01-25 DIAGNOSIS — G20.B2 PARKINSON'S DISEASE WITH DYSKINESIA AND FLUCTUATING MANIFESTATIONS: ICD-10-CM

## 2024-01-25 DIAGNOSIS — K56.609 SMALL BOWEL OBSTRUCTION: Primary | ICD-10-CM

## 2024-01-25 DIAGNOSIS — I70.0 AORTIC ATHEROSCLEROSIS: ICD-10-CM

## 2024-01-25 DIAGNOSIS — K45.8 INTERNAL HERNIA: ICD-10-CM

## 2024-01-25 PROCEDURE — 99213 OFFICE O/P EST LOW 20 MIN: CPT | Mod: PBBFAC,PN | Performed by: FAMILY MEDICINE

## 2024-01-25 PROCEDURE — 99213 OFFICE O/P EST LOW 20 MIN: CPT | Mod: S$PBB,,, | Performed by: FAMILY MEDICINE

## 2024-01-25 PROCEDURE — 99999 PR PBB SHADOW E&M-EST. PATIENT-LVL III: CPT | Mod: PBBFAC,,, | Performed by: FAMILY MEDICINE

## 2024-01-25 NOTE — PROGRESS NOTES
Subjective:       Patient ID: Ariana Tiwari is a 78 y.o. female.    Chief Complaint: Follow-up (ER)    78-year-old female with a history of Parkinson's disease, metabolic encephalopathy, seizure disorder, aortic atherosclerosis erosive, anemia, hypothyroidism, prediabetes, gluten enteropathy, chronic diarrhea, pancreatic cyst, recurrent intestinal obstruction and elevated liver functions with deconditioning comes in for an ER follow-up at [facility] January 20 through 24.  She presented with nausea and vomiting for several hours compatible bowl with a recurrent intestinal obstruction and was having crampy abdominal pain but no fever or chills and no urinary symptoms or hematuria.  CT scan showed a mesenteric torsion with a probable internal hernia somewhat better seen than on a previous admission for similar complaints.  The patient declined to have an NG-tube placed and was evaluated by Dr. olivo who recommended surgery to correct the problem.  She also had a distended gallbladder and a HIDA scan was done which actually showed good gallbladder functioning.  By that time the patient's symptoms had resolved spontaneously and she declined surgery and was discharged.  She has had no further symptoms since discharge from the emergency room/observation care.  She is having some problems with her right wrist that she says started when her son was assisting her to walk and lifting at the wrist where she has some arthritis problems.  She did not have any trauma and it is gradually getting better with good relief from Voltaren gel.    Past Medical History:  No date: Allergy  No date: Diverticulosis  No date: Gluten enteropathy  No date: Gluten intolerance  No date: Hernia  No date: Hypothyroidism  9/16/2015: PD (Parkinson's disease)      Comment:  Right tremor type  No date: Peptic ulcer disease  No date: Right shoulder pain      Comment:  Cirtisone injection 3/26/15 - Dr. Tolbert  No date: Ulcer    Past Surgical History:  No  date: ADENOIDECTOMY  03/01/2012: COLONOSCOPY      Comment:  Dr. Teresa, repeat in 10 years for screening  2/21/2018: COLONOSCOPY; N/A      Comment:  Procedure: COLONOSCOPY;  Surgeon: Radha Deng MD;               Location: [facility] ENDO;  Service: Endoscopy;  Laterality:                N/A;  9/16/2020: CORRECTION OF HAMMER TOE; Left      Comment:  Procedure: CORRECTION, HAMMER TOE;  Surgeon: William Sprague MD;  Location: [facility] OR;  Service: Orthopedics;                 Laterality: Left;  No date: COSMETIC SURGERY      Comment:  Broken nose  12/8/2021: ESOPHAGOGASTRODUODENOSCOPY; N/A      Comment:  Procedure: EGD (ESOPHAGOGASTRODUODENOSCOPY);  Surgeon:                Benji Valentino III, MD;  Location: [facility];                 Service: Endoscopy;  Laterality: N/A;  left wrist: FRACTURE SURGERY      Comment:  With pin  No date: HEMORRHOID SURGERY  04/09/2015: HERNIA REPAIR; Left      Comment:  Dr Lo; left inguinal  7/10/2018: INTRALUMINAL GASTROINTESTINAL TRACT IMAGING VIA CAPSULE; N/  A      Comment:  Procedure: IMAGING, GI TRACT, INTRALUMINAL, VIA CAPSULE;               Surgeon: Radha Deng MD;  Location: [facility] ENDO;                 Service: Endoscopy;  Laterality: N/A;  9/29/2020: LYSIS OF ADHESIONS      Comment:  Procedure: LYSIS, ADHESIONS;  Surgeon: Eagle Gonzalez MD;  Location: [facility] OR;  Service: General;;  No date: RHINOPLASTY TIP  No date: TONSILLECTOMY  05/21/2015: UPPER GASTROINTESTINAL ENDOSCOPY      Comment:  Dr. Gomez    Current Outpatient Medications on File Prior to Visit:  acetaminophen 325 mg Cap, Take 650 mg by mouth every 6 (six) hours as needed., Disp: , Rfl:   azelastine (ASTELIN) 137 mcg (0.1 %) nasal spray, 1 spray by Nasal route 2 (two) times daily., Disp: , Rfl:   carbidopa-levodopa (RYTARY) 23.75-95 mg CpSR, Take 3 capsules by mouth 3 (three) times daily., Disp: , Rfl:   diclofenac sodium (VOLTAREN) 1 % Gel, Apply 4 g topically 4 (four)  times daily., Disp: , Rfl:   docusate sodium (COLACE) 100 MG capsule, Take 1 capsule (100 mg total) by mouth once daily. (Patient taking differently: Take 100 mg by mouth 3 (three) times daily as needed for Constipation.), Disp: 90 capsule, Rfl: 0  food supplemt, lactose-reduced (ENSURE ORIGINAL) Liqd, Take 237 mLs by mouth 2 (two) times a day., Disp: , Rfl:   glucose 4 GM chewable tablet, Take 4 tablets (16 g total) by mouth as needed for Low blood sugar., Disp: 20 tablet, Rfl: 1  ivermectin (SOOLANTRA) 1 % Crea, Apply 1 % topically once., Disp: , Rfl:   levETIRAcetam (KEPPRA) 500 MG Tab, Take 1 tablet (500 mg total) by mouth 2 (two) times daily., Disp: 60 tablet, Rfl: 5  levothyroxine (SYNTHROID) 137 MCG Tab tablet, TAKE 1/2 (ONE-HALF) TABLET BY MOUTH BEFORE BREAKFAST, Disp: 45 tablet, Rfl: 2  lycopene/lutein/fruit extracts (FRUIT AND VEGETABLE DAILY ORAL), Take 1 Dose by mouth once daily. Patient takes 2 fruit and 1 vegetable balance of nature vitamins in the morning and evening, Disp: , Rfl:   polyethylene glycol (GLYCOLAX) 17 gram PwPk, Take 17 g by mouth once daily., Disp: 100 each, Rfl: 0  simethicone (MYLICON) 125 mg Cap capsule, Take 1 capsule (125 mg total) by mouth 4 (four) times daily as needed for Flatulence., Disp: 30 each, Rfl: 0  traZODone (DESYREL) 50 MG tablet, Take 1 tablet (50 mg total) by mouth nightly as needed for Insomnia., Disp: 90 tablet, Rfl: 3  salicylic acid 3 % Sham, Shampoo scalp 2-3 times a week (Patient not taking: Reported on 1/23/2024), Disp: 236 mL, Rfl: PRN    No current facility-administered medications on file prior to visit.            Review of Systems   Constitutional:  Negative for chills, diaphoresis and fever.   HENT:  Positive for congestion and postnasal drip.    Respiratory:  Negative for chest tightness and shortness of breath.    Gastrointestinal:  Positive for abdominal distention and constipation. Negative for abdominal pain, blood in stool, diarrhea, nausea and  vomiting.   Genitourinary:  Negative for dysuria, frequency and hematuria.   Musculoskeletal:  Positive for arthralgias and joint swelling.       Objective:      Physical Exam  Vitals and nursing note reviewed.   Constitutional:       Comments: Good blood pressure control  Normal pulse with regular rhythm   Normal weight with a BMI of 20.8 she is up 3.4 lb from her last visit with me November 14, 2023   Cardiovascular:      Rate and Rhythm: Normal rate and regular rhythm.      Heart sounds: Normal heart sounds.   Pulmonary:      Effort: Pulmonary effort is normal.      Breath sounds: Normal breath sounds.   Abdominal:      General: Abdomen is protuberant. Bowel sounds are normal. There is distension (Mild). There is no abdominal bruit. There are no signs of injury.      Palpations: Abdomen is soft. There is no shifting dullness, fluid wave, hepatomegaly, splenomegaly, mass or pulsatile mass.      Tenderness: There is no abdominal tenderness. Negative signs include Mitchell's sign and McBurney's sign.   Musculoskeletal:      Right wrist: Swelling (Mild swelling without heat or erythema), deformity (Mild bony enlargement of the wrist) and tenderness present. No effusion, bony tenderness, snuff box tenderness or crepitus. Decreased range of motion (Generally stiff with diminished flexion and extension but within baseline levels).         Assessment:       1. Small bowel obstruction    2. Internal hernia    3. Parkinson's disease with dyskinesia and fluctuating manifestations    4. Aortic atherosclerosis    5. BMI 20.0-20.9, adult        Plan:       1. Small bowel obstruction  Acute episode resolved.  Likely to recur.  Patient has declined surgery and refuses to allow NG tubes when symptoms flare    2. Internal hernia  Suspected from CT scan results    3. Parkinson's disease with dyskinesia and fluctuating manifestations  Stable, actually does not have a shuffling gait but does have a fairly pronounced tremor and  stiffness in general    4. Aortic atherosclerosis  Noted on previous CT scan last year, asymptomatic    5. BMI 20.0-20.9, adult  Fairly stable      I spent 29 minutes on this encounter.  This time includes face-to-face time, orders, chart review, test review, and documentation.

## 2024-01-31 DIAGNOSIS — Z78.0 MENOPAUSE: ICD-10-CM

## 2024-02-27 ENCOUNTER — HOSPITAL ENCOUNTER (EMERGENCY)
Facility: HOSPITAL | Age: 79
Discharge: HOME | End: 2024-02-27
Attending: EMERGENCY MEDICINE
Payer: MEDICARE

## 2024-02-27 VITALS
HEART RATE: 84 BPM | BODY MASS INDEX: 20.73 KG/M2 | OXYGEN SATURATION: 100 % | HEIGHT: 63 IN | DIASTOLIC BLOOD PRESSURE: 88 MMHG | WEIGHT: 117 LBS | SYSTOLIC BLOOD PRESSURE: 148 MMHG | TEMPERATURE: 99 F | RESPIRATION RATE: 16 BRPM

## 2024-02-27 DIAGNOSIS — S01.81XA FACIAL LACERATION, INITIAL ENCOUNTER: ICD-10-CM

## 2024-02-27 DIAGNOSIS — S00.81XA ABRASION OF FACE, INITIAL ENCOUNTER: ICD-10-CM

## 2024-02-27 DIAGNOSIS — R42 DIZZINESS: ICD-10-CM

## 2024-02-27 DIAGNOSIS — S81.811A NONINFECTED SKIN TEAR OF LOWER EXTREMITY, RIGHT, INITIAL ENCOUNTER: ICD-10-CM
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
## 2024-02-27 LAB
ALBUMIN SERPL BCP-MCNC: 4.5 G/DL (ref 3.5–5.2)
ALP SERPL-CCNC: 68 U/L (ref 55–135)
ALT SERPL W/O P-5'-P-CCNC: 9 U/L (ref 10–44)
ANION GAP SERPL CALC-SCNC: 9 MMOL/L (ref 8–16)
AST SERPL-CCNC: 18 U/L (ref 10–40)
BILIRUB SERPL-MCNC: 0.3 MG/DL (ref 0.1–1)
BUN SERPL-MCNC: 15 MG/DL (ref 8–23)
CALCIUM SERPL-MCNC: 9.3 MG/DL (ref 8.7–10.5)
CHLORIDE SERPL-SCNC: 101 MMOL/L (ref 95–110)
CO2 SERPL-SCNC: 26 MMOL/L (ref 23–29)
CREAT SERPL-MCNC: 0.4 MG/DL (ref 0.5–1.4)
EST. GFR  (NO RACE VARIABLE): >60 ML/MIN/1.73 M^2
GLUCOSE SERPL-MCNC: 88 MG/DL (ref 70–110)
POTASSIUM SERPL-SCNC: 3.9 MMOL/L (ref 3.5–5.1)
PROT SERPL-MCNC: 7.6 G/DL (ref 6–8.4)
SODIUM SERPL-SCNC: 136 MMOL/L (ref 136–145)

## 2024-02-27 PROCEDURE — 85025 COMPLETE CBC W/AUTO DIFF WBC: CPT | Performed by: EMERGENCY MEDICINE

## 2024-02-27 PROCEDURE — 93005 ELECTROCARDIOGRAM TRACING: CPT | Mod: 59 | Performed by: GENERAL PRACTICE

## 2024-02-27 PROCEDURE — 99285 EMERGENCY DEPT VISIT HI MDM: CPT | Mod: 25

## 2024-02-27 PROCEDURE — 25000003 PHARM REV CODE 250: Performed by: EMERGENCY MEDICINE

## 2024-02-27 PROCEDURE — 93010 ELECTROCARDIOGRAM REPORT: CPT | Mod: ,,, | Performed by: GENERAL PRACTICE

## 2024-02-27 PROCEDURE — 12011 RPR F/E/E/N/L/M 2.5 CM/<: CPT

## 2024-02-27 PROCEDURE — 80053 COMPREHEN METABOLIC PANEL: CPT | Performed by: EMERGENCY MEDICINE

## 2024-02-27 RX ORDER — LIDOCAINE HYDROCHLORIDE AND EPINEPHRINE 10; 10 MG/ML; UG/ML
10 INJECTION, SOLUTION INFILTRATION; PERINEURAL ONCE
Status: COMPLETED | OUTPATIENT
Start: 2024-02-27 | End: 2024-02-27

## 2024-02-27 RX ORDER — ACETAMINOPHEN 500 MG
1000 TABLET ORAL
Status: COMPLETED | OUTPATIENT
Start: 2024-02-27 | End: 2024-02-27

## 2024-02-27 RX ADMIN — LIDOCAINE HYDROCHLORIDE,EPINEPHRINE BITARTRATE 10 ML: 10; .01 INJECTION, SOLUTION INFILTRATION; PERINEURAL at 06:02

## 2024-02-27 RX ADMIN — BACITRACIN ZINC AND POLYMYXIN B SULFATE: at 06:02

## 2024-02-27 RX ADMIN — ACETAMINOPHEN 1000 MG: 500 TABLET ORAL at 06:02

## 2024-02-28 LAB
BASOPHILS # BLD AUTO: 0.06 K/UL (ref 0–0.2)
BASOPHILS NFR BLD: 1.1 % (ref 0–1.9)
DIFFERENTIAL METHOD BLD: ABNORMAL
EOSINOPHIL # BLD AUTO: 0.1 K/UL (ref 0–0.5)
EOSINOPHIL NFR BLD: 1.6 % (ref 0–8)
ERYTHROCYTE [DISTWIDTH] IN BLOOD BY AUTOMATED COUNT: 12.8 % (ref 11.5–14.5)
HCT VFR BLD AUTO: 42.4 % (ref 37–48.5)
HGB BLD-MCNC: 14.3 G/DL (ref 12–16)
IMM GRANULOCYTES # BLD AUTO: 0.02 K/UL (ref 0–0.04)
IMM GRANULOCYTES NFR BLD AUTO: 0.4 % (ref 0–0.5)
LYMPHOCYTES # BLD AUTO: 1.4 K/UL (ref 1–4.8)
LYMPHOCYTES NFR BLD: 25.5 % (ref 18–48)
MCH RBC QN AUTO: 31.4 PG (ref 27–31)
MCHC RBC AUTO-ENTMCNC: 33.7 G/DL (ref 32–36)
MCV RBC AUTO: 93 FL (ref 82–98)
MONOCYTES # BLD AUTO: 0.4 K/UL (ref 0.3–1)
MONOCYTES NFR BLD: 6.4 % (ref 4–15)
NEUTROPHILS # BLD AUTO: 3.6 K/UL (ref 1.8–7.7)
NEUTROPHILS NFR BLD: 65 % (ref 38–73)
NRBC BLD-RTO: 0 /100 WBC
PLATELET # BLD AUTO: 153 K/UL (ref 150–450)
PMV BLD AUTO: 10 FL (ref 9.2–12.9)
RBC # BLD AUTO: 4.55 M/UL (ref 4–5.4)
WBC # BLD AUTO: 5.5 K/UL (ref 3.9–12.7)

## 2024-02-28 NOTE — ED PROVIDER NOTES
Encounter Date: 2/27/2024       History     Chief Complaint   Patient presents with    Fall     Pt states hit head with fall from walker on incline, speeding. Denies loc. Blood thinners.     78-year-old female presenting with head and facial injuries after a fall just prior to arrival.  She was walking with her walker, lost balance, and fell forward hitting her face on the pavement.  She denies loss of consciousness and denies any syncopal symptoms causing the fall however she did have some dizziness after the fall.  She also has a skin tear on her right lower leg and left arm but denies any significant extremity or hip pain.    The history is provided by the patient.     Review of patient's allergies indicates:   Allergen Reactions    Gluten Diarrhea     Past Medical History:   Diagnosis Date    Allergy     Diverticulosis     Gluten enteropathy     Gluten intolerance     Hernia     Hypothyroidism     PD (Parkinson's disease) 9/16/2015    Right tremor type    Peptic ulcer disease     Right shoulder pain     Cirtisone injection 3/26/15 - Dr. Tolbert    Ulcer      Past Surgical History:   Procedure Laterality Date    ADENOIDECTOMY      COLONOSCOPY  03/01/2012    Dr. Teresa, repeat in 10 years for screening    COLONOSCOPY N/A 2/21/2018    Procedure: COLONOSCOPY;  Surgeon: Radha Deng MD;  Location: [facility];  Service: Endoscopy;  Laterality: N/A;    CORRECTION OF HAMMER TOE Left 9/16/2020    Procedure: CORRECTION, HAMMER TOE;  Surgeon: William Sprague MD;  Location: [facility] OR;  Service: Orthopedics;  Laterality: Left;    COSMETIC SURGERY      Broken nose    ESOPHAGOGASTRODUODENOSCOPY N/A 12/8/2021    Procedure: EGD (ESOPHAGOGASTRODUODENOSCOPY);  Surgeon: Benji Valentino III, MD;  Location: [facility] ENDO;  Service: Endoscopy;  Laterality: N/A;    FRACTURE SURGERY  left wrist    With pin    HEMORRHOID SURGERY      HERNIA REPAIR Left 04/09/2015    Dr Lo; left inguinal    INTRALUMINAL GASTROINTESTINAL TRACT  IMAGING VIA CAPSULE N/A 7/10/2018    Procedure: IMAGING, GI TRACT, INTRALUMINAL, VIA CAPSULE;  Surgeon: Radha Deng MD;  Location: [facility] ENDO;  Service: Endoscopy;  Laterality: N/A;    LYSIS OF ADHESIONS  9/29/2020    Procedure: LYSIS, ADHESIONS;  Surgeon: Eagle Gonzalez MD;  Location: [facility] OR;  Service: General;;    RHINOPLASTY TIP      TONSILLECTOMY      UPPER GASTROINTESTINAL ENDOSCOPY  05/21/2015    Dr. Gomez     Family History   Problem Relation Age of Onset    Diabetes Mother     Diabetes Son     Obesity Sister     Alcohol abuse Brother     Heart disease Brother     No Known Problems Father     Early death Brother         self    Breast cancer Neg Hx     Colon cancer Neg Hx     Ovarian cancer Neg Hx     Colon polyps Neg Hx     Crohn's disease Neg Hx     Ulcerative colitis Neg Hx     Celiac disease Neg Hx      Social History     Tobacco Use    Smoking status: Never    Smokeless tobacco: Never   Substance Use Topics    Alcohol use: Yes     Alcohol/week: 11.7 standard drinks of alcohol     Types: 14 Standard drinks or equivalent per week     Comment: 2 glasses wine per dinner    Drug use: No     Review of Systems   Skin:  Positive for wound.   All other systems reviewed and are negative.      Physical Exam     Initial Vitals   BP Pulse Resp Temp SpO2   02/27/24 1651 02/27/24 1651 02/27/24 1651 02/27/24 1651 02/27/24 1653   (!) 160/79 93 19 99.5 °F (37.5 °C) 100 %      MAP       --                Physical Exam    Nursing note and vitals reviewed.  Constitutional: She appears well-developed and well-nourished. She is not diaphoretic. No distress.   HENT:   Head: Normocephalic.   Forehead, left maxillary, nasal abrasions.  2 cm left forehead laceration.   Eyes: Conjunctivae are normal.   Neck: Neck supple.   Normal range of motion.  Cardiovascular:  Normal rate.           Pulmonary/Chest: No respiratory distress.   Abdominal: She exhibits no distension.   Musculoskeletal:         General: No edema.       Cervical back: Normal range of motion and neck supple.     Neurological: She is alert and oriented to person, place, and time. She has normal strength. No cranial nerve deficit or sensory deficit.   Skin: Skin is warm and dry.   Psychiatric: She has a normal mood and affect.         ED Course   Lac Repair    Date/Time: 2/27/2024 10:05 PM    Performed by: Antelmo Trinidad MD  Authorized by: Antelmo Trinidad MD    Consent:     Consent obtained:  Verbal    Consent given by:  Patient    Risks discussed:  Infection and pain  Universal protocol:     Patient identity confirmed:  Verbally with patient  Anesthesia:     Anesthesia method:  Local infiltration    Local anesthetic:  Lidocaine 1% WITH epi  Laceration details:     Location:  Face    Face location:  Forehead    Length (cm):  2  Exploration:     Hemostasis achieved with:  Direct pressure    Wound exploration: entire depth of wound visualized    Treatment:     Area cleansed with:  Povidone-iodine    Amount of cleaning:  Standard  Skin repair:     Repair method:  Sutures    Suture size:  5-0    Suture material:  Nylon    Suture technique:  Simple interrupted    Number of sutures:  3  Approximation:     Approximation:  Close  Repair type:     Repair type:  Simple  Post-procedure details:     Dressing:  Sterile dressing    Procedure completion:  Tolerated well, no immediate complications    Labs Reviewed   CBC W/ AUTO DIFFERENTIAL - Abnormal; Notable for the following components:       Result Value    MCH 31.4 (*)     nRBC ---- (*)     All other components within normal limits   COMPREHENSIVE METABOLIC PANEL - Abnormal; Notable for the following components:    Creatinine 0.4 (*)     ALT 9 (*)     All other components within normal limits          Imaging Results              CT Maxillofacial Without Contrast (Final result)  Result time 02/27/24 17:37:51      Final result by Joseph Shipley MD (02/27/24 17:37:51)                   Narrative:    CMS MANDATED QUALITY  DATA - CT RADIATION - 436    One or more of the following dose-optimizing techniques was utilized for this exam: automated exposure control, adjustment of the mA and/or kV according to patient size, and/or use of iterative reconstruction technique.    HISTORY: Facial trauma.    TECHNIQUE: Serial axial images were obtained through the facial bones without intravenous contrast. Coronal and sagittal reconstructions were performed. Patient received 667 mGy-cm radiation exposure from this exam.    COMPARISON: No previous study for comparison.    FINDINGS: Mild left frontal scalp soft tissue swelling is present. Although. Nasal bone fracture is present unchanged from previous CT dated August 8, 2024] zygomatic arch appear intact bilaterally. No evidence of acute facial bone fracture is seen. Paranasal sinuses appear well aerated.    IMPRESSION:  1. No evidence of acute facial bone fracture.    Electronically signed by:  Joseph Shipley MD  02/27/2024 05:37 PM CST Workstation: [id number]                                     CT Cervical Spine Without Contrast (Final result)  Result time 02/27/24 17:30:12      Final result by Joseph Shipley MD (02/27/24 17:30:12)                   Narrative:    CMS MANDATED QUALITY DATA - CT RADIATION - 436    One or more of the following dose-optimizing techniques was utilized for this exam: automated exposure control, adjustment of the mA and/or kV according to patient size, and/or use of iterative reconstruction technique.        HISTORY: Head trauma.    TECHNIQUE: Serial axial images were obtained from the skull base through the upper thoracic spine without intravenous contrast. Coronal and sagittal reconstructions were performed. Patient received 667 mGy-cm of radiation exposure from this exam.    COMPARISON: Comparison to previous study dated August 8, 2023.    FINDINGS: Degenerative changes are noted throughout the cervical spine with disc space narrowing at all levels. Mild  degenerative changes of the endplates are present. No evidence of acute cervical spine fracture or listhesis is seen. No prevertebral soft tissue swelling is noted. The odontoid process appears intact.    IMPRESSION:  1. Degenerative changes throughout the cervical spine.  2. No evidence of acute cervical spine fracture or listhesis.      Electronically signed by:  Joseph Shipley MD  02/27/2024 05:30 PM [facility] Workstation: [id number]                                     CT Head Without Contrast (Final result)  Result time 02/27/24 17:26:04      Final result by Joseph Shipley MD (02/27/24 17:26:04)                   Narrative:    CMS MANDATED QUALITY DATA - CT RADIATION - 436    One or more of the following dose-optimizing techniques was utilized for this exam: automated exposure control, adjustment of the mA and/or kV according to patient size, and/or use of iterative reconstruction technique.      HISTORY:  Head trauma. Moderate to severe.    TECHNIQUE:  CT images were obtained from the skull base to the vertex without the administration of intravenous contrast. Coronal and sagittal reconstructions were performed. The patient received approximate 667 mGy-cm of radiation exposure from this exam.    COMPARISON: Comparison to previous study dated January 30, 2023.    FINDINGS:  Diffuse atrophy is present. Ventricles are symmetrical in appearance.  The basal cisterns are patent.  No mass effect or midline shift is seen.  No evidence of acute intracranial hemorrhage, mass, or acute infarct is seen.  The gray/white matter differentiation is preserved.  There are no intra- or extra-axial fluid collections identified.  Paranasal sinuses and mastoid air cells appear well aerated. Mild left frontal scalp soft tissue swelling and minimal soft tissue emphysema is noted.  Visualized portions of the orbits and osseous structures are unremarkable.    IMPRESSION:    1. No acute intracranial injury seen.  2. Left frontal scalp soft  tissue swelling.  3. Diffuse atrophy.          Electronically signed by:  Joseph Shipley MD  02/27/2024 05:26 PM [facility] Workstation: [id number]                                     Medications   LIDOcaine-EPINEPHrine 1%-1:100,000 injection 10 mL (10 mLs Intradermal Given 2/27/24 1833)   acetaminophen tablet 1,000 mg (1,000 mg Oral Given 2/27/24 1834)   bacitracin zinc-polymyxin B ointment ( Topical (Top) Given 2/27/24 1855)     Medical Decision Making  Patient has no focal neurologic deficits.  CT head negative for intracranial hemorrhage.  CT face and C-spine negative for acute fracture.  Patient's facial laceration was repaired with suture.  Antibiotic ointment applied.  Wounds dressed.  Discharged home with return precautions.    Amount and/or Complexity of Data Reviewed  Labs: ordered.  Radiology: ordered.    Risk  OTC drugs.  Prescription drug management.                                      Clinical Impression:  Final diagnoses:  [R42] Dizziness  [W19.XXXA] Fall, initial encounter (Primary)  [S00.81XA] Abrasion of face, initial encounter  [S01.81XA] Facial laceration, initial encounter  [S81.811A] Noninfected skin tear of lower extremity, right, initial encounter          ED Disposition Condition    Discharge Stable          ED Prescriptions    None       Follow-up Information       Follow up With Specialties Details Why Contact Info    Nba Petty MD Family Medicine In 1 week For suture removal [address] [facility] [address]  [place] [address]  [phone]               Antelmo Trinidad MD  02/27/24 [id number]

## 2024-02-28 NOTE — DISCHARGE INSTRUCTIONS
Return to the ER for uncontrolled bleeding, worsening weakness or confusion, or for any other concerns.  Wash the wounds daily with soap and water, treat with antibiotic ointment and gauze bandages.

## 2024-03-06 ENCOUNTER — HOSPITAL ENCOUNTER (OUTPATIENT)
Dept: RADIOLOGY | Facility: HOSPITAL | Age: 79
Discharge: HOME | End: 2024-03-06
Attending: FAMILY MEDICINE
Payer: MEDICARE

## 2024-03-06 ENCOUNTER — OFFICE VISIT (OUTPATIENT)
Dept: FAMILY MEDICINE | Facility: CLINIC | Age: 79
End: 2024-03-06
Attending: FAMILY MEDICINE
Payer: MEDICARE

## 2024-03-06 VITALS
HEIGHT: 63 IN | DIASTOLIC BLOOD PRESSURE: 62 MMHG | OXYGEN SATURATION: 95 % | SYSTOLIC BLOOD PRESSURE: 104 MMHG | BODY MASS INDEX: 21.17 KG/M2 | TEMPERATURE: 98 F | WEIGHT: 119.5 LBS | HEART RATE: 67 BPM

## 2024-03-06 DIAGNOSIS — S01.81XA LACERATION OF MULTIPLE SITES OF FACE: ICD-10-CM

## 2024-03-06 DIAGNOSIS — S60.041A CONTUSION OF RIGHT RING FINGER WITHOUT DAMAGE TO NAIL, INITIAL ENCOUNTER: ICD-10-CM

## 2024-03-06 DIAGNOSIS — S60.041A CONTUSION OF RIGHT RING FINGER WITHOUT DAMAGE TO NAIL, INITIAL ENCOUNTER: Primary | ICD-10-CM

## 2024-03-06 DIAGNOSIS — S61.512A LACERATION OF LEFT WRIST, INITIAL ENCOUNTER: ICD-10-CM

## 2024-03-06 PROCEDURE — 99215 OFFICE O/P EST HI 40 MIN: CPT | Mod: PBBFAC,PN | Performed by: FAMILY MEDICINE

## 2024-03-06 PROCEDURE — 99213 OFFICE O/P EST LOW 20 MIN: CPT | Mod: S$PBB,,, | Performed by: FAMILY MEDICINE

## 2024-03-06 PROCEDURE — 90471 IMMUNIZATION ADMIN: CPT | Mod: PBBFAC,PN

## 2024-03-06 PROCEDURE — 73120 X-RAY EXAM OF HAND: CPT | Mod: 26,RT,, | Performed by: RADIOLOGY

## 2024-03-06 PROCEDURE — 99999PBSHW TDAP VACCINE GREATER THAN OR EQUAL TO 7YO IM: Mod: PBBFAC,,,

## 2024-03-06 PROCEDURE — 99999 PR PBB SHADOW E&M-EST. PATIENT-LVL V: CPT | Mod: PBBFAC,,, | Performed by: FAMILY MEDICINE

## 2024-03-06 PROCEDURE — 73120 X-RAY EXAM OF HAND: CPT | Mod: TC,RT

## 2024-03-06 NOTE — PROGRESS NOTES
Subjective:       Patient ID: Ariana Tiwari is a 78 y.o. female.    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  She had been trying to climb some steps to enter a building but was unable to do so and turned back into the driveway which had a slope down towards the roadway.  As she turned towards the downhill she began to lose her balance on the slope and actually ran about 15 paces before her walker caught on an edge in the paving and stopped abruptly pulling away to the left side and she fell onto her face on the pavement.  She had some shallow abrasion/laceration from the right midline across the left forehead and left cheek with another abrasion on her left wrist and right lower leg.  In the emergency room she had a CT of the head without contrast that showed some soft tissue swelling in the frontal scalp area but no intracranial injury and diffuse atrophy that was pre-existing.  CT of the spine showed no acute fracture or listhesis but some diffuse degenerative joint disease throughout the cervical spine that was, again, pre-existing.  A CT of the maxillofacial area did not show any fractures.  Not visible at that time but showing up later was a bruise over the right 4th finger middle phalanx that she now complains is painful and painful with movement and she is concerned about a fracture from jamming the finger.  The patient was checked for immunizations and has no record of a TD or Tdap in epic or in the links website.  Her last immunizations were many years ago when she moved to [facility] for a six-month stay.  She did not receive a TD or Tdap in the emergency room.    Past Medical History:  No date: Allergy  No date: Diverticulosis  No date: Gluten enteropathy  No date: Gluten intolerance  No date: Hernia  No date:  Hypothyroidism  9/16/2015: PD (Parkinson's disease)      Comment:  Right tremor type  No date: Peptic ulcer disease  No date: Right shoulder pain      Comment:  Cirtisone injection 3/26/15 - Dr. Tolbert  No date: Ulcer    Past Surgical History:  No date: ADENOIDECTOMY  03/01/2012: COLONOSCOPY      Comment:  Dr. Teresa, repeat in 10 years for screening  2/21/2018: COLONOSCOPY; N/A      Comment:  Procedure: COLONOSCOPY;  Surgeon: Radha Deng MD;               Location: [facility];  Service: Endoscopy;  Laterality:                N/A;  9/16/2020: CORRECTION OF HAMMER TOE; Left      Comment:  Procedure: CORRECTION, HAMMER TOE;  Surgeon: William Sprague MD;  Location: [facility] OR;  Service: Orthopedics;                 Laterality: Left;  No date: COSMETIC SURGERY      Comment:  Broken nose  12/8/2021: ESOPHAGOGASTRODUODENOSCOPY; N/A      Comment:  Procedure: EGD (ESOPHAGOGASTRODUODENOSCOPY);  Surgeon:                Benji Valentino III, MD;  Location: [facility];                 Service: Endoscopy;  Laterality: N/A;  left wrist: FRACTURE SURGERY      Comment:  With pin  No date: HEMORRHOID SURGERY  04/09/2015: HERNIA REPAIR; Left      Comment:  Dr Lo; left inguinal  7/10/2018: INTRALUMINAL GASTROINTESTINAL TRACT IMAGING VIA CAPSULE; N/  A      Comment:  Procedure: IMAGING, GI TRACT, INTRALUMINAL, VIA CAPSULE;               Surgeon: Radha Deng MD;  Location: [facility];                 Service: Endoscopy;  Laterality: N/A;  9/29/2020: LYSIS OF ADHESIONS      Comment:  Procedure: LYSIS, ADHESIONS;  Surgeon: Eagle Gonzalez MD;  Location: [facility];  Service: General;;  No date: RHINOPLASTY TIP  No date: TONSILLECTOMY  05/21/2015: UPPER GASTROINTESTINAL ENDOSCOPY      Comment:  Dr. Gomez    Current Outpatient Medications on File Prior to Visit:  acetaminophen 325 mg Cap, Take 650 mg by mouth every 6 (six) hours as needed., Disp: , Rfl:   azelastine (ASTELIN) 137 mcg (0.1 %)  nasal spray, 1 spray by Nasal route 2 (two) times daily., Disp: , Rfl:   carbidopa-levodopa (RYTARY) 23.75-95 mg CpSR, Take 3 capsules by mouth 3 (three) times daily., Disp: , Rfl:   diclofenac sodium (VOLTAREN) 1 % Gel, Apply 4 g topically 4 (four) times daily., Disp: , Rfl:   docusate sodium (COLACE) 100 MG capsule, Take 1 capsule (100 mg total) by mouth once daily. (Patient taking differently: Take 100 mg by mouth 3 (three) times daily as needed for Constipation.), Disp: 90 capsule, Rfl: 0  food supplemt, lactose-reduced (ENSURE ORIGINAL) Liqd, Take 237 mLs by mouth 2 (two) times a day., Disp: , Rfl:   glucose 4 GM chewable tablet, Take 4 tablets (16 g total) by mouth as needed for Low blood sugar., Disp: 20 tablet, Rfl: 1  levETIRAcetam (KEPPRA) 500 MG Tab, Take 1 tablet (500 mg total) by mouth 2 (two) times daily., Disp: 60 tablet, Rfl: 5  levothyroxine (SYNTHROID) 137 MCG Tab tablet, TAKE 1/2 (ONE-HALF) TABLET BY MOUTH BEFORE BREAKFAST, Disp: 45 tablet, Rfl: 2  lycopene/lutein/fruit extracts (FRUIT AND VEGETABLE DAILY ORAL), Take 1 Dose by mouth once daily. Patient takes 2 fruit and 1 vegetable balance of nature vitamins in the morning and evening, Disp: , Rfl:   polyethylene glycol (GLYCOLAX) 17 gram PwPk, Take 17 g by mouth once daily., Disp: 100 each, Rfl: 0  simethicone (MYLICON) 125 mg Cap capsule, Take 1 capsule (125 mg total) by mouth 4 (four) times daily as needed for Flatulence., Disp: 30 each, Rfl: 0  traZODone (DESYREL) 50 MG tablet, Take 1 tablet (50 mg total) by mouth nightly as needed for Insomnia., Disp: 90 tablet, Rfl: 3  ivermectin (SOOLANTRA) 1 % Crea, Apply 1 % topically once., Disp: , Rfl:   salicylic acid 3 % Sham, Shampoo scalp 2-3 times a week (Patient not taking: Reported on 1/23/2024), Disp: 236 mL, Rfl: PRN    No current facility-administered medications on file prior to visit.              Review of Systems   Musculoskeletal:  Positive for gait problem.   Skin:  Positive for wound.        Objective:      Physical Exam  Vitals and nursing note reviewed.   Constitutional:       General: She is not in acute distress.     Appearance: Normal appearance. She is normal weight. She is not ill-appearing, toxic-appearing or diaphoretic.      Comments: Good blood pressure control   Normal pulse with regular rhythm  Normal weight with a BMI of 21.2 she is up 2.3 lb from her January 25, 2024 visit   Cardiovascular:      Rate and Rhythm: Normal rate and regular rhythm.   Pulmonary:      Effort: Pulmonary effort is normal.      Breath sounds: Normal breath sounds.   Skin:            Comments: Three nylon sutures removed from the left forehead laceration   Neurological:      General: No focal deficit present.      Mental Status: She is alert and oriented to person, place, and time. Mental status is at baseline.   Psychiatric:         Mood and Affect: Mood normal.         Behavior: Behavior normal.         Thought Content: Thought content normal.         Judgment: Judgment normal.         Assessment:       1. Contusion of right ring finger without damage to nail, initial encounter    2. Laceration of left wrist, initial encounter    3. Laceration of multiple sites of face        Plan:       1. Contusion of right ring finger without damage to nail, initial encounter  Check for potential fracture  - X-Ray Hand 2 View Right; Future    2. Laceration of left wrist, initial encounter  Cleaned and re bandaged  - (In Office Administered) Tdap Vaccine    3. Laceration of multiple sites of face  Sutures removed, cleaned and re bandaged, Tdap administered  - (In Office Administered) Tdap Vaccine

## 2024-03-17 LAB
OHS QRS DURATION: 78 MS
OHS QTC CALCULATION: 454 MS

## 2024-05-06 NOTE — TELEPHONE ENCOUNTER
Refill Routing Note   Medication(s) are not appropriate for processing by [facility] for the following reason(s):        Outside of protocol    [facility] action(s):  Route               Appointments  past 12m or future 3m with PCP    Date Provider   Last Visit   3/6/2024 Nba Petty MD   Next Visit   5/14/2024 Nba Petty MD   ED visits in past 90 days: 1        Note composed:8:31 AM 05/06/2024

## 2024-05-08 NOTE — TELEPHONE ENCOUNTER
Discharge note 2/10/2023     Seizure  Patient had episode where she was foaming by the mouth and clenching her jaw.  It was uncertain whether this was related to Parkinson's versus a new seizure.  She was seen by neurology and loaded with Keppra. She is now maintained on oral Keppra.  This will be continued.  Monitor for any breakthrough seizures.  Ativan available p.r.n.. Daughter thinks Protonix was a culprit drug.  Will follow up with neurology    Unable to find any neurology follow up

## 2024-05-08 NOTE — TELEPHONE ENCOUNTER
I do not know how I ended up being in charge of this refill, I do not even know why she is on it.  It apparently was started in a hospital visit perhaps by Dr. Polanco or Dr. Oakley in the [place] neurology clinic.  If it was for an acute head injury and swelling she may not even need it.

## 2024-05-08 NOTE — TELEPHONE ENCOUNTER
I am not comfortable with refilling this indefinitely without her completing a neurology evaluation.  In addition, after December I will no longer be here to refill it.  I can give her a temporary refill but she has to follow-up with neurology

## 2024-05-09 RX ORDER — LEVETIRACETAM 500 MG/1
500 TABLET ORAL 2 TIMES DAILY
Qty: 60 TABLET | Refills: 0 | Status: SHIPPED | OUTPATIENT
Start: 2024-05-09

## 2024-05-09 NOTE — TELEPHONE ENCOUNTER
Spoke with Pretty daughter in law   Patient has an appt with Dr Oakley on 5/16/2024    She does have enough medication until her appt.   They will discuss the medication refills at that time

## 2024-05-14 ENCOUNTER — OFFICE VISIT (OUTPATIENT)
Dept: FAMILY MEDICINE | Facility: CLINIC | Age: 79
End: 2024-05-14
Payer: MEDICARE

## 2024-05-14 ENCOUNTER — OFFICE VISIT (OUTPATIENT)
Dept: FAMILY MEDICINE | Facility: CLINIC | Age: 79
End: 2024-05-14
Attending: FAMILY MEDICINE
Payer: MEDICARE

## 2024-05-14 VITALS
HEIGHT: 63 IN | BODY MASS INDEX: 22.19 KG/M2 | WEIGHT: 125.25 LBS | OXYGEN SATURATION: 94 % | SYSTOLIC BLOOD PRESSURE: 130 MMHG | DIASTOLIC BLOOD PRESSURE: 66 MMHG | TEMPERATURE: 98 F | HEART RATE: 68 BPM

## 2024-05-14 VITALS
OXYGEN SATURATION: 94 % | HEIGHT: 63 IN | WEIGHT: 125.25 LBS | BODY MASS INDEX: 22.19 KG/M2 | TEMPERATURE: 98 F | HEART RATE: 68 BPM | SYSTOLIC BLOOD PRESSURE: 130 MMHG | DIASTOLIC BLOOD PRESSURE: 66 MMHG

## 2024-05-14 DIAGNOSIS — D64.9 ANEMIA, UNSPECIFIED TYPE: ICD-10-CM

## 2024-05-14 DIAGNOSIS — K90.41 GLUTEN ENTEROPATHY: ICD-10-CM

## 2024-05-14 DIAGNOSIS — K56.609: ICD-10-CM

## 2024-05-14 DIAGNOSIS — E03.9 ACQUIRED HYPOTHYROIDISM: ICD-10-CM

## 2024-05-14 DIAGNOSIS — R26.9 ABNORMALITY OF GAIT AND MOBILITY: ICD-10-CM

## 2024-05-14 DIAGNOSIS — K20.80 ESOPHAGITIS, LOS ANGELES GRADE D: ICD-10-CM

## 2024-05-14 DIAGNOSIS — R73.9 HYPERGLYCEMIA: ICD-10-CM

## 2024-05-14 DIAGNOSIS — R56.9 SEIZURE: ICD-10-CM

## 2024-05-14 DIAGNOSIS — G20.B2 PARKINSON'S DISEASE WITH DYSKINESIA AND FLUCTUATING MANIFESTATIONS: Primary | ICD-10-CM

## 2024-05-14 DIAGNOSIS — G20.B2 PARKINSON'S DISEASE WITH DYSKINESIA AND FLUCTUATING MANIFESTATIONS: ICD-10-CM

## 2024-05-14 DIAGNOSIS — Z00.00 ENCOUNTER FOR PREVENTIVE HEALTH EXAMINATION: Primary | ICD-10-CM

## 2024-05-14 DIAGNOSIS — I70.0 AORTIC ATHEROSCLEROSIS: ICD-10-CM

## 2024-05-14 PROCEDURE — 99214 OFFICE O/P EST MOD 30 MIN: CPT | Mod: PBBFAC,27,PN | Performed by: FAMILY MEDICINE

## 2024-05-14 PROCEDURE — 99999 PR PBB SHADOW E&M-EST. PATIENT-LVL IV: CPT | Mod: PBBFAC,,, | Performed by: PHYSICIAN ASSISTANT

## 2024-05-14 PROCEDURE — 99214 OFFICE O/P EST MOD 30 MIN: CPT | Mod: PBBFAC,25,PN | Performed by: PHYSICIAN ASSISTANT

## 2024-05-14 PROCEDURE — 99999 PR PBB SHADOW E&M-EST. PATIENT-LVL IV: CPT | Mod: PBBFAC,,, | Performed by: FAMILY MEDICINE

## 2024-05-14 PROCEDURE — G0439 PPPS, SUBSEQ VISIT: HCPCS | Mod: ,,, | Performed by: PHYSICIAN ASSISTANT

## 2024-05-14 PROCEDURE — 99212 OFFICE O/P EST SF 10 MIN: CPT | Mod: S$PBB,,, | Performed by: FAMILY MEDICINE

## 2024-05-14 NOTE — PROGRESS NOTES
"  Ariana Tiwari presented for an initial Medicare AWV today. The following components were reviewed and updated:    Medical history  Family History  Social history  Allergies and Current Medications  Health Risk Assessment  Health Maintenance  Care Team    **See Completed Assessments for Annual Wellness visit with in the encounter summary    The following assessments were completed:  Depression Screening  Cognitive function Screening  Timed Get Up Test  Whisper Test      Opioid documentation:      Patient does not have a current opioid prescription.          Vitals:    05/14/24 1018   BP: 130/66   BP Location: Right arm   Patient Position: Sitting   BP Method: Medium (Manual)   Pulse: 68   Temp: 97.5 °F (36.4 °C)   TempSrc: Oral   SpO2: (!) 94%   Weight: 56.8 kg (125 lb 3.5 oz)   Height: 5' 3" (1.6 m)     Body mass index is 22.18 kg/m².       Physical Exam  Constitutional:       Appearance: Normal appearance.   HENT:      Head: Normocephalic and atraumatic.   Pulmonary:      Effort: Pulmonary effort is normal.   Neurological:      Mental Status: She is alert and oriented to person, place, and time. Mental status is at baseline.      Coordination: Coordination abnormal.   Psychiatric:         Mood and Affect: Mood normal.         Behavior: Behavior normal.         Thought Content: Thought content normal.         Judgment: Judgment normal.            Diagnoses and health risks identified today and associated recommendations/orders:  Ariana was seen today for health risk assessment.    Diagnoses and all orders for this visit:    Encounter for preventive health examination    Seizure  Comments:  Stable, followed by Neurology    Parkinson's disease with dyskinesia and fluctuating manifestations  Comments:  Stable, followed by Neurology    Aortic atherosclerosis  Comments:  Stable, continue to monitor    Anemia, unspecified type  Comments:  Stable, continue to monitor    Esophagitis, [place] grade D  Comments:  Stable, " continue regimen    Hyperglycemia  Comments:  Stable, continue regimen    Acquired hypothyroidism  Comments:  Stable, continue to monitor    Recurrent intestinal obstruction  Comments:  Stable, continue medication regimen    Abnormality of gait and mobility  Comments:  Encouraged patient to use walking devices if needed         Provided Ariana with a 5-10 year written screening schedule and personal prevention plan. Recommendations were developed using the USPSTF age appropriate recommendations. Education, counseling, and referrals were provided as needed.  After Visit Summary printed and given to patient which includes a list of additional screenings\tests needed.    No follow-ups on file.      SOFIYA Pan offered to discuss advanced care planning, including how to pick a person who would make decisions for you if you were unable to make them for yourself, called a health care power of , and what kind of decisions you might make such as use of life sustaining treatments such as ventilators and tube feeding when faced with a life limiting illness recorded on a living will that they will need to know. (How you want to be cared for as you near the end of your natural life)     X  Patient has advanced directives written and agrees to provide copies to the institution.

## 2024-05-14 NOTE — PROGRESS NOTES
Subjective:       Patient ID: Ariana Tiwari is a 79 y.o. female.    Chief Complaint: Follow-up (6 month)    79-year-old female who was running five K races in her 60s and subsequently was found to develop Parkinson's disease when she began falling unexpectedly comes in for a follow-up.  She has additional history of seizure disorder on Keppra 500 mg b.i.d. in his on carbidopa levodopa for the Parkinson's.  She has a history of gluten enteropathy controlled with diet, hypothyroidism on Synthroid taking 1/2 of a 137 mcg daily, a pancreatic cyst, recurrent episodes of intestinal obstruction, chronic left shoulder pain, hyperglycemia without diabetes, and multiple other medical conditions.  She appears to be stable.  She is eating well was some difficulty swallowing dry foods.  Her bowels are moving fairly regularly although she still has some abdominal distention which is not relieved by simethicone.  She has some urinary urgency but is not taking medication for that due to the likelihood of aggravating her intestinal obstruction and producing constipation.  Overall she feels well but feeling the loss of her mobility and the satisfaction running with her dog.    Past Medical History:  05/16/2024: Age-related osteoporosis without current pathological   fracture  No date: Allergy  No date: Diverticulosis  No date: Gluten enteropathy  No date: Gluten intolerance  No date: Hernia  No date: Hypothyroidism  09/16/2015: PD (Parkinson's disease)      Comment:  Right tremor type  No date: Peptic ulcer disease  No date: Right shoulder pain      Comment:  Cirtisone injection 3/26/15 - Dr. Tolbert  09/17/2019: SBO (small bowel obstruction)  No date: Seizures  No date: Squamous cell carcinoma of skin  No date: Ulcer    Past Surgical History:  No date: ADENOIDECTOMY  03/01/2012: COLONOSCOPY      Comment:  Dr. Teresa, repeat in 10 years for screening  2/21/2018: COLONOSCOPY; N/A      Comment:  Procedure: COLONOSCOPY;  Surgeon:  Radha Deng MD;               Location: [facility] ENDO;  Service: Endoscopy;  Laterality:                N/A;  9/16/2020: CORRECTION OF HAMMER TOE; Left      Comment:  Procedure: CORRECTION, HAMMER TOE;  Surgeon: William Sprague MD;  Location: [facility] OR;  Service: Orthopedics;                 Laterality: Left;  No date: COSMETIC SURGERY      Comment:  Broken nose  12/8/2021: ESOPHAGOGASTRODUODENOSCOPY; N/A      Comment:  Procedure: EGD (ESOPHAGOGASTRODUODENOSCOPY);  Surgeon:                Benji Valentino III, MD;  Location: [facility] ENDO;                 Service: Endoscopy;  Laterality: N/A;  left wrist: FRACTURE SURGERY      Comment:  With pin  No date: HEMORRHOID SURGERY  04/09/2015: HERNIA REPAIR; Left      Comment:  Dr Lo; left inguinal  7/10/2018: INTRALUMINAL GASTROINTESTINAL TRACT IMAGING VIA CAPSULE; N/  A      Comment:  Procedure: IMAGING, GI TRACT, INTRALUMINAL, VIA CAPSULE;               Surgeon: Radha Deng MD;  Location: [facility] ENDO;                 Service: Endoscopy;  Laterality: N/A;  9/29/2020: LYSIS OF ADHESIONS      Comment:  Procedure: LYSIS, ADHESIONS;  Surgeon: Eagle Gonzalez MD;  Location: [facility] OR;  Service: General;;  No date: RHINOPLASTY TIP  No date: TONSILLECTOMY  05/21/2015: UPPER GASTROINTESTINAL ENDOSCOPY      Comment:  Dr. Gomez    Current Outpatient Medications on File Prior to Visit:  acetaminophen 325 mg Cap, Take 650 mg by mouth every 6 (six) hours as needed., Disp: , Rfl:   azelastine (ASTELIN) 137 mcg (0.1 %) nasal spray, 1 spray by Nasal route 2 (two) times daily., Disp: , Rfl:   carbidopa-levodopa (RYTARY) 23.75-95 mg CpSR, Take 3 capsules by mouth 3 (three) times daily., Disp: , Rfl:   diclofenac sodium (VOLTAREN) 1 % Gel, Apply 4 g topically 4 (four) times daily., Disp: , Rfl:   docusate sodium (COLACE) 100 MG capsule, Take 1 capsule (100 mg total) by mouth once daily. (Patient taking differently: Take 100 mg by mouth 3 (three)  times daily as needed for Constipation.), Disp: 90 capsule, Rfl: 0  food supplemt, lactose-reduced (ENSURE ORIGINAL) Liqd, Take 237 mLs by mouth 2 (two) times a day., Disp: , Rfl:   ivermectin (SOOLANTRA) 1 % Crea, Apply 1 % topically once., Disp: , Rfl:   levETIRAcetam (KEPPRA) 500 MG Tab, Take 1 tablet by mouth twice daily, Disp: 60 tablet, Rfl: 0  levothyroxine (SYNTHROID) 137 MCG Tab tablet, TAKE 1/2 (ONE-HALF) TABLET BY MOUTH BEFORE BREAKFAST, Disp: 45 tablet, Rfl: 2  lycopene/lutein/fruit extracts (FRUIT AND VEGETABLE DAILY ORAL), Take 1 Dose by mouth once daily. Patient takes 2 fruit and 1 vegetable balance of nature vitamins in the morning and evening, Disp: , Rfl:   polyethylene glycol (GLYCOLAX) 17 gram PwPk, Take 17 g by mouth once daily., Disp: 100 each, Rfl: 0  simethicone (MYLICON) 125 mg Cap capsule, Take 1 capsule (125 mg total) by mouth 4 (four) times daily as needed for Flatulence., Disp: 30 each, Rfl: 0  traZODone (DESYREL) 50 MG tablet, Take 1 tablet (50 mg total) by mouth nightly as needed for Insomnia., Disp: 90 tablet, Rfl: 3  glucose 4 GM chewable tablet, Take 4 tablets (16 g total) by mouth as needed for Low blood sugar. (Patient not taking: Reported on 5/14/2024), Disp: 20 tablet, Rfl: 1  [DISCONTINUED] salicylic acid 3 % Sham, Shampoo scalp 2-3 times a week, Disp: 236 mL, Rfl: PRN    No current facility-administered medications on file prior to visit.          Review of Systems   Constitutional:  Negative for chills and fever.   HENT:  Negative for congestion.    Cardiovascular:  Negative for chest pain (She had a left upper chest pain a few days ago that was probably pectoral muscle and shoulder related no longer present).   Gastrointestinal:  Positive for abdominal distention. Negative for abdominal pain, blood in stool, constipation, diarrhea and nausea.   Musculoskeletal:  Positive for gait problem. Negative for arthralgias and myalgias.   Neurological:  Positive for tremors. Negative  for dizziness and light-headedness.   Psychiatric/Behavioral:  Positive for dysphoric mood.        Objective:      Physical Exam  Vitals and nursing note reviewed.   Constitutional:       General: She is not in acute distress.     Appearance: Normal appearance. She is normal weight. She is not ill-appearing, toxic-appearing or diaphoretic.      Comments: Good blood pressure control   Normal pulse with regular rhythm   Normal weight with a BMI of 22.2 she is up 6 lb from her March 6, 2024 visit   Cardiovascular:      Rate and Rhythm: Normal rate and regular rhythm.      Heart sounds: Normal heart sounds. No murmur heard.     No friction rub. No gallop.   Pulmonary:      Effort: Pulmonary effort is normal. No respiratory distress.      Breath sounds: Normal breath sounds. No stridor. No wheezing, rhonchi or rales.   Abdominal:      General: Bowel sounds are normal. There is distension (With increased tympany, active bowel sounds).      Palpations: There is no mass.      Tenderness: There is no abdominal tenderness. There is no guarding or rebound.      Hernia: No hernia is present.   Neurological:      Mental Status: She is alert.      Comments: Prominent tremor both hands         Assessment:       1. Parkinson's disease with dyskinesia and fluctuating manifestations    2. Seizure    3. Acquired hypothyroidism    4. Gluten enteropathy    5. BMI 22.0-22.9, adult        Plan:       1. Parkinson's disease with dyskinesia and fluctuating manifestations  Stable, followed by neurology    2. Seizure  Seizure-free      3. Acquired hypothyroidism  Lab Results   Component Value Date    TSH 2.245 11/14/2023     Stable    4. Gluten enteropathy  Controlled on diet    5. BMI 22.0-22.9, adult  Good weight for age    Bone density/DEXA scan ordered January 23, 2024 will be scheduled

## 2024-05-14 NOTE — PATIENT INSTRUCTIONS
Counseling and Referral of Other Preventative  (Italic type indicates deductible and co-insurance are waived)    Patient Name: Ariana Tiwari  Today's Date: 5/14/2024    Health Maintenance       Date Due Completion Date    DEXA Scan 12/22/2023 12/22/2020    Shingles Vaccine (1 of 2) 05/14/2025 (Originally 3/28/1995) ---    Pneumococcal Vaccines (Age 65+) (1 of 1 - PCV) 05/14/2025 (Originally 3/28/2009) ---    Influenza Vaccine (Season Ended) 09/01/2024 10/5/2017    Lipid Panel 09/16/2028 9/16/2023    TETANUS VACCINE 03/06/2034 3/6/2024        No orders of the defined types were placed in this encounter.    The following information is provided to all patients.  This information is to help you find resources for any of the problems found today that may be affecting your health:                  Living healthy guide: www.[facility].[place].[facility]      Understanding Diabetes: www.diabetes.org      Eating healthy: www.cdc.gov/healthyweight      CDC home safety checklist: www.cdc.gov/steadi/patient.html      Agency on Aging: www.goea.[place]      Alcoholics anonymous (AA): www.aa.org      Physical Activity: www.coy.nih.gov/rg5wfwn      Tobacco use: www.quitwithusla.org

## 2024-05-15 ENCOUNTER — HOSPITAL ENCOUNTER (OUTPATIENT)
Dept: RADIOLOGY | Facility: CLINIC | Age: 79
Discharge: HOME | End: 2024-05-15
Attending: FAMILY MEDICINE
Payer: MEDICARE

## 2024-05-15 DIAGNOSIS — Z78.0 MENOPAUSE: ICD-10-CM

## 2024-05-15 PROCEDURE — 77080 DXA BONE DENSITY AXIAL: CPT | Mod: 26,,, | Performed by: RADIOLOGY

## 2024-05-15 PROCEDURE — 77080 DXA BONE DENSITY AXIAL: CPT | Mod: TC,PO

## 2024-05-16 ENCOUNTER — TELEPHONE (OUTPATIENT)
Dept: FAMILY MEDICINE | Facility: CLINIC | Age: 79
End: 2024-05-16
Payer: MEDICARE

## 2024-05-16 DIAGNOSIS — M81.0 AGE-RELATED OSTEOPOROSIS WITHOUT CURRENT PATHOLOGICAL FRACTURE: Primary | ICD-10-CM

## 2024-05-16 NOTE — TELEPHONE ENCOUNTER
Therapy plan has been completed and the orders for Prolia are in.  She needs an updated calcium level and a vitamin-D level.  Orders are in for those.  I do not know how the infusion center becomes aware of the need to give the patient the Prolia, does epic direct it to them automatically or do we need to notify them?  She will also need a clearance from her dentist to give the Prolia if she has had any dental work in the last three months or plans any in the next three months.

## 2024-05-16 NOTE — TELEPHONE ENCOUNTER
----- Message from Nba Petty MD sent at 5/15/2024  4:28 PM CDT -----  Her bone density test shows that both the spine and the hip are losing density still with the risk of any major bone fracture at 18% and the risk of a hip fracture at 6-1/2%.  Experts recommend the use of medication if the any bone fracture risk is greater than 20% or if the hip fracture risk is greater than 3%.  She has already been tried and failed on alendronate/Fosamax and a ibandronate/Boniva.  Prolia is an alternative to the oral medications that can be used when they have failed.  It is injected similar to an insulin injection once every six months.  Would she be interested?     Patient notified by e-mail

## 2024-05-17 NOTE — TELEPHONE ENCOUNTER
Daughter in [place] notified by phone that orders has been placed.  The only dental work she has had is her regular teeth cleaning.  Advised the infusion center will contact them to schedule.  Once she is scheduled for the injection, we will call her back to schedule the blood work.

## 2024-06-11 ENCOUNTER — PATIENT MESSAGE (OUTPATIENT)
Dept: FAMILY MEDICINE | Facility: CLINIC | Age: 79
End: 2024-06-11
Payer: MEDICARE

## 2024-06-19 ENCOUNTER — LAB VISIT (OUTPATIENT)
Dept: LAB | Facility: HOSPITAL | Age: 79
End: 2024-06-19
Attending: FAMILY MEDICINE
Payer: MEDICARE

## 2024-06-19 DIAGNOSIS — M81.0 AGE-RELATED OSTEOPOROSIS WITHOUT CURRENT PATHOLOGICAL FRACTURE: ICD-10-CM

## 2024-06-19 LAB
25(OH)D3+25(OH)D2 SERPL-MCNC: 18 NG/ML (ref 30–96)
CALCIUM SERPL-MCNC: 9 MG/DL (ref 8.7–10.5)

## 2024-06-19 PROCEDURE — 36415 COLL VENOUS BLD VENIPUNCTURE: CPT | Performed by: FAMILY MEDICINE

## 2024-06-19 PROCEDURE — 82310 ASSAY OF CALCIUM: CPT | Performed by: FAMILY MEDICINE

## 2024-06-19 PROCEDURE — 82306 VITAMIN D 25 HYDROXY: CPT | Performed by: FAMILY MEDICINE

## 2024-06-25 ENCOUNTER — INFUSION (OUTPATIENT)
Dept: INFUSION THERAPY | Facility: HOSPITAL | Age: 79
End: 2024-06-25
Attending: FAMILY MEDICINE
Payer: MEDICARE

## 2024-06-25 VITALS
SYSTOLIC BLOOD PRESSURE: 128 MMHG | OXYGEN SATURATION: 96 % | HEIGHT: 63 IN | BODY MASS INDEX: 22.15 KG/M2 | DIASTOLIC BLOOD PRESSURE: 69 MMHG | WEIGHT: 125 LBS | TEMPERATURE: 97 F | RESPIRATION RATE: 18 BRPM | HEART RATE: 72 BPM

## 2024-06-25 DIAGNOSIS — M81.0 AGE-RELATED OSTEOPOROSIS WITHOUT CURRENT PATHOLOGICAL FRACTURE: Primary | ICD-10-CM

## 2024-06-25 PROCEDURE — 63600175 PHARM REV CODE 636 W HCPCS: Mod: JZ,JG | Performed by: FAMILY MEDICINE

## 2024-06-25 PROCEDURE — 96372 THER/PROPH/DIAG INJ SC/IM: CPT

## 2024-06-25 RX ADMIN — DENOSUMAB 60 MG: 60 INJECTION SUBCUTANEOUS at 03:06

## 2024-06-28 ENCOUNTER — TELEPHONE (OUTPATIENT)
Dept: FAMILY MEDICINE | Facility: CLINIC | Age: 79
End: 2024-06-28
Payer: MEDICARE

## 2024-06-28 ENCOUNTER — OFFICE VISIT (OUTPATIENT)
Dept: FAMILY MEDICINE | Facility: CLINIC | Age: 79
End: 2024-06-28
Payer: MEDICARE

## 2024-06-28 ENCOUNTER — HOSPITAL ENCOUNTER (OUTPATIENT)
Facility: HOSPITAL | Age: 79
Discharge: HOME | End: 2024-06-29
Attending: EMERGENCY MEDICINE | Admitting: INTERNAL MEDICINE
Payer: MEDICARE

## 2024-06-28 VITALS
TEMPERATURE: 98 F | OXYGEN SATURATION: 97 % | BODY MASS INDEX: 21.91 KG/M2 | SYSTOLIC BLOOD PRESSURE: 108 MMHG | WEIGHT: 123.69 LBS | DIASTOLIC BLOOD PRESSURE: 65 MMHG | HEART RATE: 71 BPM

## 2024-06-28 DIAGNOSIS — R53.83 FATIGUE: ICD-10-CM

## 2024-06-28 DIAGNOSIS — R31.9 HEMATURIA, UNSPECIFIED TYPE: Primary | ICD-10-CM

## 2024-06-28 DIAGNOSIS — K56.609: ICD-10-CM

## 2024-06-28 DIAGNOSIS — R07.9 CHEST PAIN: ICD-10-CM

## 2024-06-28 DIAGNOSIS — R53.1 WEAKNESS: ICD-10-CM

## 2024-06-28 DIAGNOSIS — K56.609 SBO (SMALL BOWEL OBSTRUCTION): Primary | ICD-10-CM

## 2024-06-28 DIAGNOSIS — R53.83 FATIGUE, UNSPECIFIED TYPE: ICD-10-CM

## 2024-06-28 DIAGNOSIS — R19.7 DIARRHEA, UNSPECIFIED TYPE: ICD-10-CM

## 2024-06-28 LAB
ALBUMIN SERPL BCP-MCNC: 4.6 G/DL (ref 3.5–5.2)
ALP SERPL-CCNC: 77 U/L (ref 55–135)
ALT SERPL W/O P-5'-P-CCNC: 12 U/L (ref 10–44)
ANION GAP SERPL CALC-SCNC: 8 MMOL/L (ref 8–16)
AST SERPL-CCNC: 20 U/L (ref 10–40)
BASOPHILS # BLD AUTO: 0.05 K/UL (ref 0–0.2)
BASOPHILS NFR BLD: 0.7 % (ref 0–1.9)
BILIRUB SERPL-MCNC: 0.5 MG/DL (ref 0.1–1)
BILIRUB UR QL STRIP: NEGATIVE
BUN SERPL-MCNC: 14 MG/DL (ref 8–23)
CALCIUM SERPL-MCNC: 8.7 MG/DL (ref 8.7–10.5)
CHLORIDE SERPL-SCNC: 106 MMOL/L (ref 95–110)
CLARITY UR: CLEAR
CO2 SERPL-SCNC: 24 MMOL/L (ref 23–29)
COLOR UR: YELLOW
CREAT SERPL-MCNC: 0.3 MG/DL (ref 0.5–1.4)
DIFFERENTIAL METHOD BLD: ABNORMAL
EOSINOPHIL # BLD AUTO: 0 K/UL (ref 0–0.5)
EOSINOPHIL NFR BLD: 0.3 % (ref 0–8)
ERYTHROCYTE [DISTWIDTH] IN BLOOD BY AUTOMATED COUNT: 12.7 % (ref 11.5–14.5)
EST. GFR  (NO RACE VARIABLE): >60 ML/MIN/1.73 M^2
GLUCOSE SERPL-MCNC: 80 MG/DL (ref 70–110)
GLUCOSE UR QL STRIP: NEGATIVE
HCT VFR BLD AUTO: 44.3 % (ref 37–48.5)
HGB BLD-MCNC: 14.9 G/DL (ref 12–16)
HGB UR QL STRIP: ABNORMAL
IMM GRANULOCYTES # BLD AUTO: 0.02 K/UL (ref 0–0.04)
IMM GRANULOCYTES NFR BLD AUTO: 0.3 % (ref 0–0.5)
KETONES UR QL STRIP: NEGATIVE
LEUKOCYTE ESTERASE UR QL STRIP: ABNORMAL
LIPASE SERPL-CCNC: 6 U/L (ref 4–60)
LYMPHOCYTES # BLD AUTO: 1.3 K/UL (ref 1–4.8)
LYMPHOCYTES NFR BLD: 17.6 % (ref 18–48)
MAGNESIUM SERPL-MCNC: 2 MG/DL (ref 1.6–2.6)
MCH RBC QN AUTO: 31.2 PG (ref 27–31)
MCHC RBC AUTO-ENTMCNC: 33.6 G/DL (ref 32–36)
MCV RBC AUTO: 93 FL (ref 82–98)
MICROSCOPIC COMMENT: ABNORMAL
MONOCYTES # BLD AUTO: 0.5 K/UL (ref 0.3–1)
MONOCYTES NFR BLD: 6.9 % (ref 4–15)
NEUTROPHILS # BLD AUTO: 5.4 K/UL (ref 1.8–7.7)
NEUTROPHILS NFR BLD: 74.2 % (ref 38–73)
NITRITE UR QL STRIP: NEGATIVE
NRBC BLD-RTO: 0 /100 WBC
PH UR STRIP: 6 [PH] (ref 5–8)
PLATELET # BLD AUTO: 174 K/UL (ref 150–450)
PMV BLD AUTO: 9.9 FL (ref 9.2–12.9)
POTASSIUM SERPL-SCNC: 3.6 MMOL/L (ref 3.5–5.1)
PROT SERPL-MCNC: 7.2 G/DL (ref 6–8.4)
PROT UR QL STRIP: NEGATIVE
RBC # BLD AUTO: 4.77 M/UL (ref 4–5.4)
RBC #/AREA URNS HPF: 2 /HPF (ref 0–4)
SODIUM SERPL-SCNC: 138 MMOL/L (ref 136–145)
SP GR UR STRIP: 1 (ref 1–1.03)
SQUAMOUS #/AREA URNS HPF: 1 /HPF
TROPONIN I SERPL HS-MCNC: 14.7 PG/ML (ref 0–14.9)
URN SPEC COLLECT METH UR: ABNORMAL
UROBILINOGEN UR STRIP-ACNC: NEGATIVE EU/DL
WBC # BLD AUTO: 7.28 K/UL (ref 3.9–12.7)
WBC #/AREA URNS HPF: 6 /HPF (ref 0–5)

## 2024-06-28 PROCEDURE — 80053 COMPREHEN METABOLIC PANEL: CPT | Performed by: NURSE PRACTITIONER

## 2024-06-28 PROCEDURE — 83735 ASSAY OF MAGNESIUM: CPT | Performed by: NURSE PRACTITIONER

## 2024-06-28 PROCEDURE — 96361 HYDRATE IV INFUSION ADD-ON: CPT

## 2024-06-28 PROCEDURE — 83690 ASSAY OF LIPASE: CPT | Performed by: NURSE PRACTITIONER

## 2024-06-28 PROCEDURE — 93010 ELECTROCARDIOGRAM REPORT: CPT | Mod: ,,, | Performed by: INTERNAL MEDICINE

## 2024-06-28 PROCEDURE — G0378 HOSPITAL OBSERVATION PER HR: HCPCS

## 2024-06-28 PROCEDURE — 99213 OFFICE O/P EST LOW 20 MIN: CPT | Mod: PBBFAC,PN

## 2024-06-28 PROCEDURE — 99999 PR PBB SHADOW E&M-EST. PATIENT-LVL III: CPT | Mod: PBBFAC,,,

## 2024-06-28 PROCEDURE — 25500020 PHARM REV CODE 255: Performed by: EMERGENCY MEDICINE

## 2024-06-28 PROCEDURE — 93005 ELECTROCARDIOGRAM TRACING: CPT | Performed by: INTERNAL MEDICINE

## 2024-06-28 PROCEDURE — 99285 EMERGENCY DEPT VISIT HI MDM: CPT | Mod: 25

## 2024-06-28 PROCEDURE — 63600175 PHARM REV CODE 636 W HCPCS: Performed by: INTERNAL MEDICINE

## 2024-06-28 PROCEDURE — 25000003 PHARM REV CODE 250: Performed by: NURSE PRACTITIONER

## 2024-06-28 PROCEDURE — 81001 URINALYSIS AUTO W/SCOPE: CPT | Performed by: NURSE PRACTITIONER

## 2024-06-28 PROCEDURE — 25000003 PHARM REV CODE 250: Performed by: EMERGENCY MEDICINE

## 2024-06-28 PROCEDURE — 96367 TX/PROPH/DG ADDL SEQ IV INF: CPT

## 2024-06-28 PROCEDURE — 96365 THER/PROPH/DIAG IV INF INIT: CPT

## 2024-06-28 PROCEDURE — 25000003 PHARM REV CODE 250: Performed by: INTERNAL MEDICINE

## 2024-06-28 PROCEDURE — 84484 ASSAY OF TROPONIN QUANT: CPT | Performed by: NURSE PRACTITIONER

## 2024-06-28 PROCEDURE — 85025 COMPLETE CBC W/AUTO DIFF WBC: CPT | Performed by: NURSE PRACTITIONER

## 2024-06-28 PROCEDURE — 63600175 PHARM REV CODE 636 W HCPCS: Performed by: NURSE PRACTITIONER

## 2024-06-28 PROCEDURE — 87086 URINE CULTURE/COLONY COUNT: CPT | Performed by: NURSE PRACTITIONER

## 2024-06-28 RX ORDER — LEVETIRACETAM 5 MG/ML
500 INJECTION INTRAVASCULAR EVERY 12 HOURS
Status: DISCONTINUED | OUTPATIENT
Start: 2024-06-28 | End: 2024-06-29 | Stop reason: HOSPADM

## 2024-06-28 RX ORDER — MORPHINE SULFATE ORAL SOLUTION 10 MG/5ML
2.5 SOLUTION ORAL EVERY 6 HOURS PRN
Status: DISCONTINUED | OUTPATIENT
Start: 2024-06-28 | End: 2024-06-28

## 2024-06-28 RX ORDER — ACETAMINOPHEN 500 MG
TABLET ORAL DAILY
COMMUNITY

## 2024-06-28 RX ORDER — SODIUM CHLORIDE 9 MG/ML
INJECTION, SOLUTION INTRAVENOUS CONTINUOUS
Status: DISCONTINUED | OUTPATIENT
Start: 2024-06-28 | End: 2024-06-28

## 2024-06-28 RX ORDER — SODIUM CHLORIDE 9 MG/ML
INJECTION, SOLUTION INTRAVENOUS CONTINUOUS
Status: DISCONTINUED | OUTPATIENT
Start: 2024-06-28 | End: 2024-06-29 | Stop reason: HOSPADM

## 2024-06-28 RX ORDER — PANTOPRAZOLE SODIUM 40 MG/10ML
40 INJECTION, POWDER, LYOPHILIZED, FOR SOLUTION INTRAVENOUS DAILY
Status: DISCONTINUED | OUTPATIENT
Start: 2024-06-29 | End: 2024-06-29 | Stop reason: HOSPADM

## 2024-06-28 RX ORDER — MORPHINE SULFATE ORAL SOLUTION 10 MG/5ML
5 SOLUTION ORAL EVERY 6 HOURS PRN
Status: DISCONTINUED | OUTPATIENT
Start: 2024-06-28 | End: 2024-06-28

## 2024-06-28 RX ORDER — SODIUM,POTASSIUM PHOSPHATES 280-250MG
2 POWDER IN PACKET (EA) ORAL
Status: DISCONTINUED | OUTPATIENT
Start: 2024-06-28 | End: 2024-06-29 | Stop reason: HOSPADM

## 2024-06-28 RX ORDER — ONDANSETRON HYDROCHLORIDE 2 MG/ML
4 INJECTION, SOLUTION INTRAVENOUS EVERY 6 HOURS PRN
Status: DISCONTINUED | OUTPATIENT
Start: 2024-06-28 | End: 2024-06-29 | Stop reason: HOSPADM

## 2024-06-28 RX ORDER — LANOLIN ALCOHOL/MO/W.PET/CERES
800 CREAM (GRAM) TOPICAL
Status: DISCONTINUED | OUTPATIENT
Start: 2024-06-28 | End: 2024-06-29 | Stop reason: HOSPADM

## 2024-06-28 RX ORDER — SODIUM CHLORIDE 0.9 % (FLUSH) 0.9 %
2 SYRINGE (ML) INJECTION
Status: DISCONTINUED | OUTPATIENT
Start: 2024-06-28 | End: 2024-06-29 | Stop reason: HOSPADM

## 2024-06-28 RX ORDER — ACETAMINOPHEN 325 MG/1
650 TABLET ORAL EVERY 4 HOURS PRN
Status: DISCONTINUED | OUTPATIENT
Start: 2024-06-28 | End: 2024-06-29 | Stop reason: HOSPADM

## 2024-06-28 RX ORDER — CYANOCOBALAMIN (VITAMIN B-12) 250 MCG
250 TABLET ORAL DAILY
COMMUNITY

## 2024-06-28 RX ORDER — IBUPROFEN 200 MG
24 TABLET ORAL
Status: DISCONTINUED | OUTPATIENT
Start: 2024-06-28 | End: 2024-06-28

## 2024-06-28 RX ORDER — NALOXONE HCL 0.4 MG/ML
0.02 VIAL (ML) INJECTION
Status: DISCONTINUED | OUTPATIENT
Start: 2024-06-28 | End: 2024-06-29 | Stop reason: HOSPADM

## 2024-06-28 RX ORDER — GLUCAGON 1 MG
1 KIT INJECTION
Status: DISCONTINUED | OUTPATIENT
Start: 2024-06-28 | End: 2024-06-28

## 2024-06-28 RX ORDER — HYDROMORPHONE HYDROCHLORIDE 1 MG/ML
0.5 INJECTION, SOLUTION INTRAMUSCULAR; INTRAVENOUS; SUBCUTANEOUS EVERY 6 HOURS PRN
Status: DISCONTINUED | OUTPATIENT
Start: 2024-06-28 | End: 2024-06-29 | Stop reason: HOSPADM

## 2024-06-28 RX ORDER — IBUPROFEN 200 MG
16 TABLET ORAL
Status: DISCONTINUED | OUTPATIENT
Start: 2024-06-28 | End: 2024-06-28

## 2024-06-28 RX ORDER — IPRATROPIUM BROMIDE AND ALBUTEROL SULFATE 2.5; .5 MG/3ML; MG/3ML
3 SOLUTION RESPIRATORY (INHALATION) EVERY 6 HOURS PRN
Status: DISCONTINUED | OUTPATIENT
Start: 2024-06-28 | End: 2024-06-29 | Stop reason: HOSPADM

## 2024-06-28 RX ORDER — DEXTROMETHORPHAN POLISTIREX 30 MG/5 ML
1 SUSPENSION, EXTENDED RELEASE 12 HR ORAL ONCE
Status: DISCONTINUED | OUTPATIENT
Start: 2024-06-29 | End: 2024-06-28

## 2024-06-28 RX ORDER — TALC
9 POWDER (GRAM) TOPICAL NIGHTLY PRN
Status: DISCONTINUED | OUTPATIENT
Start: 2024-06-28 | End: 2024-06-29 | Stop reason: HOSPADM

## 2024-06-28 RX ADMIN — SODIUM CHLORIDE: 9 INJECTION, SOLUTION INTRAVENOUS at 08:06

## 2024-06-28 RX ADMIN — SODIUM CHLORIDE: 9 INJECTION, SOLUTION INTRAVENOUS at 09:06

## 2024-06-28 RX ADMIN — PIPERACILLIN SODIUM AND TAZOBACTAM SODIUM 3.38 G: 3; .375 INJECTION, POWDER, LYOPHILIZED, FOR SOLUTION INTRAVENOUS at 10:06

## 2024-06-28 RX ADMIN — IOHEXOL 100 ML: 350 INJECTION, SOLUTION INTRAVENOUS at 06:06

## 2024-06-28 RX ADMIN — LEVETIRACETAM 500 MG: 5 INJECTION INTRAVENOUS at 09:06

## 2024-06-28 RX ADMIN — SODIUM CHLORIDE 500 ML: 9 INJECTION, SOLUTION INTRAVENOUS at 03:06

## 2024-06-28 NOTE — TELEPHONE ENCOUNTER
----- Message from Laila Barragan sent at 6/28/2024  9:48 AM CDT -----  Regarding: Same day appt request  Contact: pt's daughter in law Pretty  Type:  Same Day Appointment Request    Caller is requesting a same day appointment.  Caller declined first available appointment listed below.      Name of Caller:pt's daughter in law Pretty    When is the first available appointment?july    Symptoms: possible reaction to proleia shot    Would the PT rather a call back or a response via [employer]? Call back    Best Call Back Number:[phone]    Additional Information: pt had a proleia shot on 6/25.   This morning pt has diareah, terrible headache and dark red tint urine.

## 2024-06-28 NOTE — PROGRESS NOTES
SUBJECTIVE:      Patient ID: Ariana Tiwari is a 79 y.o. female.    Chief Complaint: Headache, Dizziness, and Diarrhea (Started last night)    Ariana is a 79 female, who is a patient of Dr. Petty. She presents today for complaints of diarrhea, headaches, and blood in urine.  This patient is not known to me.  Past medical history:  Multiple iliopsoas, seizures, Parkinson's, anemia, hypothyroidism.     Daughter-in-law is here today, who is her caretaker.  She reports that patient started having loose stools last night.  She had 1 loose stool last night and a total of 3 this morning.  Two of them which has been incontinence episodes.  The last time that she took her to the bathroom she noted that her urine was very orange.  Daughter-in-law states that patient typically does have some memory issues however she has been more confused today, increased fatigue, and weakness.  Reports that she has a poor appetite and not drinking much fluids, just 7 to 8 oz this morning.  Patient noted with abdominal distention.  Journal on patient endorse that patient typically has some bloating however she does have increased bloating.  Patient reports a headache that started this morning.  Denies nausea, vomiting, blood in stool, dysuria, fever, chills, chest pain, or chest tightness.  No urine collected in clinic.  Patient unable to use a cup.  However sending patient to ER for further evaluation.             Review of patient's allergies indicates:   Allergen Reactions    Gluten Diarrhea      Past Medical History:   Diagnosis Date    Age-related osteoporosis without current pathological fracture 05/16/2024    Allergy     Diverticulosis     Gluten enteropathy     Gluten intolerance     Hernia     Hypothyroidism     PD (Parkinson's disease) 09/16/2015    Right tremor type    Peptic ulcer disease     Right shoulder pain     Cirtisone injection 3/26/15 - Dr. Tolbert    SBO (small bowel obstruction) 09/17/2019    Seizures      Squamous cell carcinoma of skin     Ulcer      Past Surgical History:   Procedure Laterality Date    ADENOIDECTOMY      COLONOSCOPY  03/01/2012    Dr. Teresa, repeat in 10 years for screening    COLONOSCOPY N/A 2/21/2018    Procedure: COLONOSCOPY;  Surgeon: Radha Deng MD;  Location: [facility];  Service: Endoscopy;  Laterality: N/A;    CORRECTION OF HAMMER TOE Left 9/16/2020    Procedure: CORRECTION, HAMMER TOE;  Surgeon: William Sprague MD;  Location: [facility] OR;  Service: Orthopedics;  Laterality: Left;    COSMETIC SURGERY      Broken nose    ESOPHAGOGASTRODUODENOSCOPY N/A 12/8/2021    Procedure: EGD (ESOPHAGOGASTRODUODENOSCOPY);  Surgeon: Benji Valentino III, MD;  Location: [facility];  Service: Endoscopy;  Laterality: N/A;    FRACTURE SURGERY  left wrist    With pin    HEMORRHOID SURGERY      HERNIA REPAIR Left 04/09/2015    Dr Lo; left inguinal    INTRALUMINAL GASTROINTESTINAL TRACT IMAGING VIA CAPSULE N/A 7/10/2018    Procedure: IMAGING, GI TRACT, INTRALUMINAL, VIA CAPSULE;  Surgeon: Radha Deng MD;  Location: [facility];  Service: Endoscopy;  Laterality: N/A;    LYSIS OF ADHESIONS  9/29/2020    Procedure: LYSIS, ADHESIONS;  Surgeon: Eagle Gonzalez MD;  Location: [facility];  Service: General;;    RHINOPLASTY TIP      TONSILLECTOMY      UPPER GASTROINTESTINAL ENDOSCOPY  05/21/2015    Dr. Gomez     Current Outpatient Medications   Medication Sig Dispense Refill    acetaminophen 325 mg Cap Take 650 mg by mouth every 6 (six) hours as needed.      alendronate-vitamin D3 (FOSAMAX PLUS D) 70 mg- 5,600 unit per tablet Take 1 tablet by mouth every 7 days.      azelastine (ASTELIN) 137 mcg (0.1 %) nasal spray 1 spray by Nasal route 2 (two) times daily.      carbidopa-levodopa (RYTARY) 23.75-95 mg CpSR Take 3 capsules by mouth 3 (three) times daily.      diclofenac sodium (VOLTAREN) 1 % Gel Apply 4 g topically 4 (four) times daily.      docusate sodium (COLACE) 100 MG capsule Take 1 capsule (100 mg  total) by mouth once daily. (Patient taking differently: Take 100 mg by mouth 3 (three) times daily as needed for Constipation.) 90 capsule 0    food supplemt, lactose-reduced (ENSURE ORIGINAL) Liqd Take 237 mLs by mouth 2 (two) times a day.      ivermectin (SOOLANTRA) 1 % Crea Apply 1 % topically once.      levETIRAcetam (KEPPRA) 500 MG Tab Take 1 tablet by mouth twice daily 60 tablet 0    levothyroxine (SYNTHROID) 137 MCG Tab tablet TAKE 1/2 (ONE-HALF) TABLET BY MOUTH BEFORE BREAKFAST 45 tablet 2    lycopene/lutein/fruit extracts (FRUIT AND VEGETABLE DAILY ORAL) Take 1 Dose by mouth once daily. Patient takes 2 fruit and 1 vegetable balance of nature vitamins in the morning and evening      polyethylene glycol (GLYCOLAX) 17 gram PwPk Take 17 g by mouth once daily. 100 each 0    simethicone (MYLICON) 125 mg Cap capsule Take 1 capsule (125 mg total) by mouth 4 (four) times daily as needed for Flatulence. 30 each 0    glucose 4 GM chewable tablet Take 4 tablets (16 g total) by mouth as needed for Low blood sugar. (Patient not taking: Reported on 5/14/2024) 20 tablet 1    traZODone (DESYREL) 50 MG tablet Take 1 tablet (50 mg total) by mouth nightly as needed for Insomnia. 90 tablet 3     No current facility-administered medications for this visit.     Family History   Problem Relation Name Age of Onset    Diabetes Mother      Diabetes Son 3     Obesity Sister 2     Alcohol abuse Brother 2     Heart disease Brother 2     No Known Problems Father      Early death Brother 1         self    Breast cancer Neg Hx      Colon cancer Neg Hx      Ovarian cancer Neg Hx      Colon polyps Neg Hx      Crohn's disease Neg Hx      Ulcerative colitis Neg Hx      Celiac disease Neg Hx        Social History     Socioeconomic History    Marital status:    Tobacco Use    Smoking status: Never    Smokeless tobacco: Never   Substance and Sexual Activity    Alcohol use: Yes     Alcohol/week: 11.7 standard drinks of alcohol     Types:  14 Standard drinks or equivalent per week     Comment: 2 glasses wine per dinner    Drug use: No    Sexual activity: Never     Social Determinants of Health     Financial Resource Strain: Low Risk  (5/14/2024)    Overall Financial Resource Strain (CARDIA)     Difficulty of Paying Living Expenses: Not hard at all   Food Insecurity: No Food Insecurity (5/14/2024)    Hunger Vital Sign     Worried About Running Out of Food in the Last Year: Never true     Ran Out of Food in the Last Year: Never true   Transportation Needs: No Transportation Needs (5/14/2024)    PRAPARE - Transportation     Lack of Transportation (Medical): No     Lack of Transportation (Non-Medical): No   Physical Activity: Inactive (5/14/2024)    Exercise Vital Sign     Days of Exercise per Week: 0 days     Minutes of Exercise per Session: 0 min   Stress: No Stress Concern Present (5/14/2024)    [employer] - Occupational Stress Questionnaire     Feeling of Stress : Only a little   Housing Stability: Low Risk  (2/7/2023)    Housing Stability Vital Sign     Unable to Pay for Housing in the Last Year: No     Number of Places Lived in the Last Year: 1     Unstable Housing in the Last Year: No       Review of Systems   Constitutional:  Positive for fatigue. Negative for fever and unexpected weight change.   HENT:  Negative for nasal congestion, trouble swallowing and voice change.    Eyes:  Negative for visual disturbance.   Respiratory:  Negative for chest tightness, shortness of breath and wheezing.    Cardiovascular:  Negative for chest pain and palpitations.   Gastrointestinal:  Positive for abdominal distention, change in bowel habit and diarrhea. Negative for abdominal pain, nausea and vomiting.   Genitourinary:  Positive for bladder incontinence, frequency and hematuria. Negative for dysuria and flank pain.   Musculoskeletal:  Positive for back pain. Negative for arthralgias.   Integumentary:  Negative for rash and  wound.   Neurological:  Positive for weakness and headaches. Negative for dizziness, seizures and syncope.   Psychiatric/Behavioral:  Negative for dysphoric mood and sleep disturbance.       OBJECTIVE:      Vitals:    06/28/24 1143   BP: 108/65   BP Location: Right arm   Patient Position: Sitting   BP Method: Small (Automatic)   Pulse: 71   Temp: 98 °F (36.7 °C)   TempSrc: Oral   SpO2: 97%   Weight: 56.1 kg (123 lb 11.2 oz)     Physical Exam  Constitutional:       General: She is not in acute distress.     Appearance: Normal appearance. She is ill-appearing.   HENT:      Head: Normocephalic and atraumatic.      Mouth/Throat:      Mouth: Mucous membranes are moist.      Pharynx: Oropharynx is clear.   Cardiovascular:      Rate and Rhythm: Normal rate and regular rhythm.      Pulses: Normal pulses.   Pulmonary:      Effort: Pulmonary effort is normal.   Abdominal:      General: There is distension.      Tenderness: There is no right CVA tenderness, left CVA tenderness, guarding or rebound.   Musculoskeletal:      Cervical back: Normal range of motion. No tenderness.      Right lower leg: No edema.      Left lower leg: No edema.   Lymphadenopathy:      Cervical: No cervical adenopathy.   Neurological:      Mental Status: She is alert and oriented to person, place, and time. Mental status is at baseline.      Coordination: Coordination abnormal.   Psychiatric:         Mood and Affect: Mood normal.         Behavior: Behavior normal.         Thought Content: Thought content normal.         Judgment: Judgment normal.        Assessment:       1. Hematuria, unspecified type    2. Diarrhea, unspecified type    3. Weakness    4. Fatigue, unspecified type        Plan:       Hematuria, unspecified type  -     Refer to Emergency Dept.    Diarrhea, unspecified type  -     Refer to Emergency Dept.    Weakness  -     Refer to Emergency Dept.    Fatigue, unspecified type  -     Refer to Emergency Dept.    -report was given to  MARIE Mckeon in the ER  Patient sent to emergency room for further evaluation.  Concerned about dehydration, acute kidney injury, possible UTI or ileus.  Due to patient having abdominal bloating, diarrhea, increased confusion, increased weakness, fatigue, report of hematuria-could be concentrated urine.  Blood pressure in office 108/65 heart rate 71.    No follow-ups on file.      6/28/2024 KAMALA Almendarez, FNP    This note was created using [employer] voice recognition software that occasionally misinterprets phrases or words.

## 2024-06-28 NOTE — ED PROVIDER NOTES
"Encounter Date: 6/28/2024       History     Chief Complaint   Patient presents with    Weakness     Presents to ED with weakness, fatigue x "a few days", takes a "shot for her bones", unsure if this is a side effect.   Also c/o "major headache" since this morning, as well as diarrhea since last night. Also c/o back pain x "a couple days" , also c/o "dark urine" since this morning.      79-year-old female with a past medical history hypothyroidism, peptic ulcer disease, small-bowel obstruction, and seizures presents for generalized weakness.  The patient's family member reports that her weakness started a few days ago and is getting progressively worse.  She reports it is associated with a headache, back pain, dark urine, diarrhea, and confusion.  They deny any associated fever, cough, congestion, chest pain, shortness of breath.  The patient has had decreased p.o. intake as well.  There are no alleviating factors.  The patient's family member also reports that the patient had a recent injection 3 days ago to make her bones stronger.      Review of patient's allergies indicates:   Allergen Reactions    Gluten Diarrhea     Past Medical History:   Diagnosis Date    Age-related osteoporosis without current pathological fracture 05/16/2024    Allergy     Diverticulosis     Gluten enteropathy     Gluten intolerance     Hernia     Hypothyroidism     PD (Parkinson's disease) 09/16/2015    Right tremor type    Peptic ulcer disease     Right shoulder pain     Cirtisone injection 3/26/15 - Dr. Tolbert    SBO (small bowel obstruction) 09/17/2019    Seizures     Squamous cell carcinoma of skin     Ulcer      Past Surgical History:   Procedure Laterality Date    ADENOIDECTOMY      COLONOSCOPY  03/01/2012    Dr. Teresa, repeat in 10 years for screening    COLONOSCOPY N/A 2/21/2018    Procedure: COLONOSCOPY;  Surgeon: Radha Deng MD;  Location: [facility];  Service: Endoscopy;  Laterality: N/A;    CORRECTION OF HAMMER TOE " Left 9/16/2020    Procedure: CORRECTION, HAMMER TOE;  Surgeon: William Sprague MD;  Location: [facility] OR;  Service: Orthopedics;  Laterality: Left;    COSMETIC SURGERY      Broken nose    ESOPHAGOGASTRODUODENOSCOPY N/A 12/8/2021    Procedure: EGD (ESOPHAGOGASTRODUODENOSCOPY);  Surgeon: Benji Valentino III, MD;  Location: [facility] ENDO;  Service: Endoscopy;  Laterality: N/A;    FRACTURE SURGERY  left wrist    With pin    HEMORRHOID SURGERY      HERNIA REPAIR Left 04/09/2015    Dr Lo; left inguinal    INTRALUMINAL GASTROINTESTINAL TRACT IMAGING VIA CAPSULE N/A 7/10/2018    Procedure: IMAGING, GI TRACT, INTRALUMINAL, VIA CAPSULE;  Surgeon: Radha Deng MD;  Location: [facility] ENDO;  Service: Endoscopy;  Laterality: N/A;    LYSIS OF ADHESIONS  9/29/2020    Procedure: LYSIS, ADHESIONS;  Surgeon: Eagle Gonzalez MD;  Location: [facility] OR;  Service: General;;    RHINOPLASTY TIP      TONSILLECTOMY      UPPER GASTROINTESTINAL ENDOSCOPY  05/21/2015    Dr. Gomez     Family History   Problem Relation Name Age of Onset    Diabetes Mother      Diabetes Son 3     Obesity Sister 2     Alcohol abuse Brother 2     Heart disease Brother 2     No Known Problems Father      Early death Brother 1         self    Breast cancer Neg Hx      Colon cancer Neg Hx      Ovarian cancer Neg Hx      Colon polyps Neg Hx      Crohn's disease Neg Hx      Ulcerative colitis Neg Hx      Celiac disease Neg Hx       Social History     Tobacco Use    Smoking status: Never    Smokeless tobacco: Never   Substance Use Topics    Alcohol use: Yes     Alcohol/week: 11.7 standard drinks of alcohol     Types: 14 Standard drinks or equivalent per week     Comment: 2 glasses wine per dinner    Drug use: No     Review of Systems   Constitutional:  Positive for fatigue. Negative for chills and fever.   HENT:  Negative for congestion.    Respiratory:  Negative for cough and shortness of breath.    Cardiovascular:  Negative for chest pain.   Gastrointestinal:   Positive for diarrhea. Negative for abdominal pain, nausea and vomiting.   Genitourinary:  Negative for dysuria.   Musculoskeletal:  Positive for back pain. Negative for gait problem.   Skin:  Negative for color change.   Neurological:  Positive for headaches. Negative for dizziness and numbness.   Psychiatric/Behavioral:  Positive for confusion. Negative for agitation.        Physical Exam     Initial Vitals [06/28/24 1225]   BP Pulse Resp Temp SpO2   (!) 109/55 71 20 97.9 °F (36.6 °C) 96 %      MAP       --         Physical Exam    Nursing note and vitals reviewed.  Constitutional: She appears well-developed.   Generalized weakness noted.   HENT:   Head: Atraumatic.   Eyes: EOM are normal. Pupils are equal, round, and reactive to light.   Neck:   Normal range of motion.  Cardiovascular:  Normal rate and regular rhythm.           Pulmonary/Chest: Breath sounds normal.   Abdominal: Abdomen is soft. Bowel sounds are normal. She exhibits no distension. There is no abdominal tenderness. There is no rebound and no guarding.   Musculoskeletal:         General: Normal range of motion.      Right shoulder: Normal.      Left shoulder: Normal.      Cervical back: Normal range of motion.     Neurological: She is alert and oriented to person, place, and time.   Skin: Skin is warm and dry.   Psychiatric: She has a normal mood and affect.         ED Course   Procedures  Labs Reviewed   CBC W/ AUTO DIFFERENTIAL - Abnormal; Notable for the following components:       Result Value    MCH 31.2 (*)     Gran % 74.2 (*)     Lymph % 17.6 (*)     All other components within normal limits   COMPREHENSIVE METABOLIC PANEL - Abnormal; Notable for the following components:    Creatinine 0.3 (*)     All other components within normal limits   URINALYSIS, REFLEX TO URINE CULTURE - Abnormal; Notable for the following components:    Occult Blood UA 1+ (*)     Leukocytes, UA 1+ (*)     All other components within normal limits    Narrative:      Specimen Source->Urine   URINALYSIS MICROSCOPIC - Abnormal; Notable for the following components:    WBC, UA 6 (*)     All other components within normal limits    Narrative:     Specimen Source->Urine   CULTURE, URINE   LIPASE   MAGNESIUM   TROPONIN I HIGH SENSITIVITY        ECG Results              EKG 12-lead (In process)        Collection Time Result Time QRS Duration OHS QTC Calculation    06/28/24 12:45:20 06/28/24 13:51:51 82 440                     In process by Interface, Lab In [facility] (06/28/24 13:51:59)                   Narrative:    Test Reason : R53.83,    Vent. Rate : 066 BPM     Atrial Rate : 066 BPM     P-R Int : 182 ms          QRS Dur : 082 ms      QT Int : 420 ms       P-R-T Axes : 026 -39 086 degrees     QTc Int : 440 ms    Normal sinus rhythm  Left axis deviation  Nonspecific T wave abnormality  Abnormal ECG  When compared with ECG of 27-FEB-2024 17:28,  No significant change was found    Referred By: [email or web address]   SELF           Confirmed By:                                   Imaging Results              CT Thoracic Spine Without Contrast (Final result)  Result time 06/28/24 18:46:58      Final result by Live Iqbal MD (06/28/24 18:46:58)                   Impression:      Mild anterior wedge compression deformity of the T7 vertebral body, likely chronic corresponding to abnormality on prior thoracic spine radiograph series of 10/18/2021.  Additional minimal superior endplate height loss of the T5 and T8 vertebral bodies favored to be chronic in etiology.    Mild diffuse heterogeneity of the visualized osseous structures which may relate to underlying osteopenia.  Correlation with patient history advised.    Mild degenerative changes of the thoracic spine without evidence of significant bony spinal canal stenosis.    Additional incidental findings as above.      Electronically signed by: Live Iqbal MD  Date:    06/28/2024  Time:    18:46               Narrative:    EXAMINATION:  CT  THORACIC SPINE WITHOUT CONTRAST    CLINICAL HISTORY:  Mid-back pain, compression fracture suspected;    TECHNIQUE:  2 mm axial images were acquired of the thoracic spine without IV contrast.  Sagittal coronal reformatted images were reviewed.    COMPARISON:  Thoracic spine radiograph series 10/18/2021.    FINDINGS:  There is a mild anterior wedge compression fracture deformity of the T7 vertebral body, likely chronic and corresponding to the abnormality seen on prior thoracic spine radiograph series of 10/18/2021.  There are minimal superior endplate deformities of the T5 and T8 vertebral bodies with slight height loss, favored to be chronic.    There is mild exaggeration of the normal thoracic kyphosis.  There is a mild rightward curvature of the thoracic spine.  There is mild diffuse heterogeneity of the visualized osseous structures which may relate to underlying osteopenia.  Clinical correlation is advised.  Presumed osseous hemangioma noted within the T10 vertebral body and the L2 vertebral body.  There is slight retropulsion of the superior cortex of the T7 vertebral body without significant acquired spinal canal stenosis appreciated.  Intervertebral disc space heights are relatively mild maintained with mild degenerative discogenic change present.  There is no evidence of significant bony spinal canal stenosis throughout the thoracic spine.    The visualized lungs demonstrate scattered bandlike opacities at the lung bases suggesting atelectasis or scarring.  There is no significant pleural fluid.  There is no pneumothorax.  The heart is mildly enlarged.  There is 3 vessel coronary artery calcification.    There is a punctate nonobstructing left renal calculus.  Please refer to separate dictated abdominal CT report for intra-abdominal findings.                                        CT Abdomen Pelvis With IV Contrast NO Oral Contrast (Final result)  Result time 06/28/24 18:48:01      Final result by Farida  Live ZULETA MD (06/28/24 18:48:01)                   Impression:      Redemonstration of mildly dilated fluid-filled loops of small bowel throughout the abdomen and pelvis, similar to prior CT examination of January 2024.  Additionally, there is nonspecific focal swirling/twisting of the mesentery with possible transition point identified within the right paramidline abdomen.  Findings are concerning for possible small bowel obstruction.  Superimposed component of acute enteritis not excluded.  No evidence of free intraperitoneal air portal venous gas.    Mild bladder wall thickening which may relate to nondistention, although correlation with urinalysis to exclude cystitis/infection advised.    Bibasilar atelectasis or scarring.    Moderate aortic atherosclerosis.  Coronary artery calcification.    Additional findings as above.    This report was flagged in Epic as abnormal.      Electronically signed by: Live Iqbal MD  Date:    06/28/2024  Time:    18:48               Narrative:    EXAMINATION:  CT ABDOMEN PELVIS WITH IV CONTRAST    CLINICAL HISTORY:  Abdominal abscess/infection suspected;    TECHNIQUE:  Low dose axial images, sagittal and coronal reformations were obtained from the lung bases to the pubic symphysis following the IV administration of 100 mL of Omnipaque 350 .  Oral contrast was not given.    COMPARISON:  CT 01/20/2024    FINDINGS:  There are bandlike opacities at the lung bases suggesting atelectasis or scarring, similar to prior examination.  No significant pleural fluid present.  There is prominent coronary artery calcification.  There is no significant pericardial effusion.  There is aortic annular calcification.    The liver is not significantly enlarged.  There are two small subcentimeter enhancing foci within the liver, nonspecific, although likely reflecting small flash filling hemangiomas.  No radiopaque calculi appreciated within the gallbladder lumen.  There is no intra-or  extrahepatic biliary ductal dilatation.    The stomach appears within normal limits.  There is a subcentimeter hypodensity within the spleen, too small to accurately characterize.  No peripancreatic inflammatory change to suggest acute pancreatitis.  There is stable nonspecific diffuse thickening of the left adrenal gland.  The right adrenal gland is unremarkable.    The kidneys are normal in size and location and enhance symmetrically.  There is no evidence of hydronephrosis. There is mild nonspecific urinary bladder wall thickening.  No significant free fluid in the pelvis.    The abdominal aorta is normal in course and caliber with moderate atherosclerotic calcification along its course.    There are distended fluid-filled loops of small bowel throughout the abdomen and pelvis.  There is a possible transition point identified within the the right paramidline lower abdomen (for example coronal series 6, image 54).  There is scattered liquid stool throughout the right colon.  There are scattered colonic diverticula without evidence of acute diverticulitis.  The appendix is not definitively visualized.  There is no free intraperitoneal air or portal venous gas.  There is focal swirling/twisting of the mesentery again identified within the right paramidline abdomen, similar to prior examination.  There is no significant volume of ascites.  There are scattered small mesenteric and retroperitoneal lymph nodes.  There is diastasis of the anterior abdominal wall musculature.    There is diffuse osteopenia.  There are degenerative change of the visualized osseous structures without acute fracture appreciated.  The extraperitoneal soft tissues are unremarkable.                                       Medications   levETIRAcetam in NaCl (iso-os) IVPB 500 mg (has no administration in time range)   sodium chloride 0.9% flush 2 mL (has no administration in time range)   albuterol-ipratropium 2.5 mg-0.5 mg/3 mL nebulizer solution  3 mL (has no administration in time range)   melatonin tablet 9 mg (has no administration in time range)   ondansetron injection 4 mg (has no administration in time range)   naloxone 0.4 mg/mL injection 0.02 mg (has no administration in time range)   acetaminophen tablet 650 mg (has no administration in time range)   0.9%  NaCl infusion (has no administration in time range)   piperacillin-tazobactam 3.375 g in dextrose 5 % 100 mL IVPB (ready to mix) (has no administration in time range)   pantoprazole injection 40 mg (has no administration in time range)   HYDROmorphone injection 0.5 mg (has no administration in time range)   potassium bicarbonate disintegrating tablet 50 mEq (has no administration in time range)   potassium bicarbonate disintegrating tablet 35 mEq (has no administration in time range)   potassium bicarbonate disintegrating tablet 60 mEq (has no administration in time range)   magnesium oxide tablet 800 mg (has no administration in time range)   magnesium oxide tablet 800 mg (has no administration in time range)   potassium, sodium phosphates 280-160-250 mg packet 2 packet (has no administration in time range)   potassium, sodium phosphates 280-160-250 mg packet 2 packet (has no administration in time range)   potassium, sodium phosphates 280-160-250 mg packet 2 packet (has no administration in time range)   sodium chloride 0.9% bolus 500 mL 500 mL (0 mLs Intravenous Stopped 6/28/24 1641)   iohexoL (OMNIPAQUE 350) injection 100 mL (100 mLs Intravenous Given 6/28/24 1802)     Medical Decision Making  79-year-old female presented with multiple complaints.    Initial differential diagnosis included but not limited to dehydration, UTI, and enteritis.    Amount and/or Complexity of Data Reviewed  Labs: ordered.  Radiology: ordered.    Risk  Prescription drug management.  Risk Details: The patient was emergently evaluated in the emergency department, her evaluation was significant for an elderly female  with a benign abdominal exam noted.  The patient's labs showed no acute abnormalities.  The patient was treated here with IV fluids.  The patient's CT scan was significant for a small bowel obstruction, with possible enteritis per Radiology.  I will admit her to the hospitalist service for further care.  The case was discussed with the APC on call for the hospitalist service.  She has accepted the patient for admission.                                      Clinical Impression:  Final diagnoses:  [R53.83] Fatigue  [K56.609] SBO (small bowel obstruction) (Primary)          ED Disposition Condition    Observation                 Ranulfo Brown MD  06/28/24 0269

## 2024-06-28 NOTE — FIRST PROVIDER EVALUATION
" Emergency Department TeleTriage Encounter Note      CHIEF COMPLAINT    Chief Complaint   Patient presents with    Weakness     Presents to ED with weakness, fatigue x "a few days", takes a "shot for her bones", unsure if this is a side effect.   Also c/o "major headache" since this morning, as well as diarrhea since last night. Also c/o back pain x "a couple days" , also c/o "dark urine" since this morning.        VITAL SIGNS   Initial Vitals [06/28/24 1225]   BP Pulse Resp Temp SpO2   (!) 109/55 71 20 97.9 °F (36.6 °C) 96 %      MAP       --            ALLERGIES    Review of patient's allergies indicates:   Allergen Reactions    Gluten Diarrhea       PROVIDER TRIAGE NOTE  This is a teletriage evaluation of a 79 y.o. female presenting to the ED with c/o fatigue, weakness, diarrhea, and back pain. Also reports headache and dark urine. Limited physical exam via telehealth: The patient is awake, alert, answering questions appropriately and is not in respiratory distress. Initial orders will be placed and care will be transferred to an alternate provider when patient is roomed for a full evaluation. Any additional orders and the final disposition will be determined by that provider.         ORDERS  Labs Reviewed   CBC W/ AUTO DIFFERENTIAL   COMPREHENSIVE METABOLIC PANEL   LIPASE   URINALYSIS, REFLEX TO URINE CULTURE   MAGNESIUM   TROPONIN I HIGH SENSITIVITY   POCT GLUCOSE MONITORING CONTINUOUS       ED Orders (720h ago, onward)      Start Ordered     Status Ordering Provider    06/28/24 1234 06/28/24 1233  Vital signs  Every 2 hours         Ordered AB COLILNS    06/28/24 1234 06/28/24 1233  Cardiac Monitoring - Adult  Continuous        Comments: Notify Physician If:    Ordered AB COLLINS    06/28/24 1234 06/28/24 1233  Pulse Oximetry Continuous  Continuous         Ordered AB COLLINS    06/28/24 1234 06/28/24 1233  POCT glucose  Once         Ordered AB COLLINS    06/28/24 1233 06/28/24 1233  Diet " NPO  Diet effective now         Ordered KARINA, AB P.    06/28/24 1233 06/28/24 1233  Insert peripheral IV  Once         Ordered COLLINS, AB P.    06/28/24 1233 06/28/24 1233  CBC W/ AUTO DIFFERENTIAL  STAT         Ordered KARINA, AB P.    06/28/24 1233 06/28/24 1233  Comp. Metabolic Panel  STAT         Ordered COLLINS, AB P.    06/28/24 1233 06/28/24 1233  Lipase  STAT         Ordered COLLINS, AB P.    06/28/24 1233 06/28/24 1233  Urinalysis, Reflex to Urine Culture Urine, Clean Catch  STAT         Ordered COLLINS, AB P.    06/28/24 1233 06/28/24 1233  EKG 12-lead  Once         Ordered KARINA, AB PEARCE.    06/28/24 1233 06/28/24 1233  Magnesium  STAT         Ordered KARINA, AB PEARCE.    06/28/24 1233 06/28/24 1233  Troponin I High Sensitivity  Once         Ordered AB COLLINS              Virtual Visit Note: The provider triage portion of this emergency department evaluation and documentation was performed via [employer], a HIPAA-compliant telemedicine application, in concert with a tele-presenter in the room. A face to face patient evaluation with one of my colleagues will occur once the patient is placed in an emergency department room.      DISCLAIMER: This note was prepared with [employer] voice recognition transcription software. Garbled syntax, mangled pronouns, and other bizarre constructions may be attributed to that software system.

## 2024-06-29 VITALS
SYSTOLIC BLOOD PRESSURE: 116 MMHG | HEART RATE: 60 BPM | DIASTOLIC BLOOD PRESSURE: 59 MMHG | OXYGEN SATURATION: 98 % | WEIGHT: 123 LBS | HEIGHT: 63 IN | TEMPERATURE: 97 F | BODY MASS INDEX: 21.79 KG/M2 | RESPIRATION RATE: 18 BRPM

## 2024-06-29 LAB
ALBUMIN SERPL BCP-MCNC: 3.8 G/DL (ref 3.5–5.2)
ALP SERPL-CCNC: 63 U/L (ref 55–135)
ALT SERPL W/O P-5'-P-CCNC: 11 U/L (ref 10–44)
ANION GAP SERPL CALC-SCNC: 6 MMOL/L (ref 8–16)
AST SERPL-CCNC: 15 U/L (ref 10–40)
BILIRUB SERPL-MCNC: 0.6 MG/DL (ref 0.1–1)
BUN SERPL-MCNC: 9 MG/DL (ref 8–23)
CALCIUM SERPL-MCNC: 7.9 MG/DL (ref 8.7–10.5)
CHLORIDE SERPL-SCNC: 112 MMOL/L (ref 95–110)
CO2 SERPL-SCNC: 24 MMOL/L (ref 23–29)
CREAT SERPL-MCNC: 0.4 MG/DL (ref 0.5–1.4)
ERYTHROCYTE [DISTWIDTH] IN BLOOD BY AUTOMATED COUNT: 13.2 % (ref 11.5–14.5)
EST. GFR  (NO RACE VARIABLE): >60 ML/MIN/1.73 M^2
GLUCOSE SERPL-MCNC: 76 MG/DL (ref 70–110)
HCT VFR BLD AUTO: 39.9 % (ref 37–48.5)
HGB BLD-MCNC: 13.4 G/DL (ref 12–16)
LACTATE SERPL-SCNC: 0.4 MMOL/L (ref 0.5–1.9)
MAGNESIUM SERPL-MCNC: 1.9 MG/DL (ref 1.6–2.6)
MCH RBC QN AUTO: 31.5 PG (ref 27–31)
MCHC RBC AUTO-ENTMCNC: 33.6 G/DL (ref 32–36)
MCV RBC AUTO: 94 FL (ref 82–98)
OHS QRS DURATION: 80 MS
OHS QTC CALCULATION: 457 MS
PLATELET # BLD AUTO: 128 K/UL (ref 150–450)
PMV BLD AUTO: 10.6 FL (ref 9.2–12.9)
POTASSIUM SERPL-SCNC: 3.1 MMOL/L (ref 3.5–5.1)
PROT SERPL-MCNC: 6 G/DL (ref 6–8.4)
RBC # BLD AUTO: 4.26 M/UL (ref 4–5.4)
SODIUM SERPL-SCNC: 142 MMOL/L (ref 136–145)
WBC # BLD AUTO: 4.46 K/UL (ref 3.9–12.7)

## 2024-06-29 PROCEDURE — 63600175 PHARM REV CODE 636 W HCPCS: Performed by: NURSE PRACTITIONER

## 2024-06-29 PROCEDURE — 83605 ASSAY OF LACTIC ACID: CPT | Performed by: INTERNAL MEDICINE

## 2024-06-29 PROCEDURE — 99900035 HC TECH TIME PER 15 MIN (STAT)

## 2024-06-29 PROCEDURE — 83735 ASSAY OF MAGNESIUM: CPT | Performed by: INTERNAL MEDICINE

## 2024-06-29 PROCEDURE — 25000003 PHARM REV CODE 250: Performed by: INTERNAL MEDICINE

## 2024-06-29 PROCEDURE — 99900031 HC PATIENT EDUCATION (STAT)

## 2024-06-29 PROCEDURE — 96375 TX/PRO/DX INJ NEW DRUG ADDON: CPT

## 2024-06-29 PROCEDURE — 94761 N-INVAS EAR/PLS OXIMETRY MLT: CPT

## 2024-06-29 PROCEDURE — 85027 COMPLETE CBC AUTOMATED: CPT | Performed by: INTERNAL MEDICINE

## 2024-06-29 PROCEDURE — 36415 COLL VENOUS BLD VENIPUNCTURE: CPT | Performed by: INTERNAL MEDICINE

## 2024-06-29 PROCEDURE — 94799 UNLISTED PULMONARY SVC/PX: CPT

## 2024-06-29 PROCEDURE — G0378 HOSPITAL OBSERVATION PER HR: HCPCS

## 2024-06-29 PROCEDURE — 63600175 PHARM REV CODE 636 W HCPCS: Performed by: INTERNAL MEDICINE

## 2024-06-29 PROCEDURE — 96366 THER/PROPH/DIAG IV INF ADDON: CPT

## 2024-06-29 PROCEDURE — 80053 COMPREHEN METABOLIC PANEL: CPT | Performed by: INTERNAL MEDICINE

## 2024-06-29 PROCEDURE — C9113 INJ PANTOPRAZOLE SODIUM, VIA: HCPCS | Performed by: INTERNAL MEDICINE

## 2024-06-29 RX ADMIN — POTASSIUM BICARBONATE 35 MEQ: 391 TABLET, EFFERVESCENT ORAL at 01:06

## 2024-06-29 RX ADMIN — POTASSIUM BICARBONATE 35 MEQ: 391 TABLET, EFFERVESCENT ORAL at 10:06

## 2024-06-29 RX ADMIN — LEVETIRACETAM 500 MG: 5 INJECTION INTRAVENOUS at 09:06

## 2024-06-29 RX ADMIN — PIPERACILLIN SODIUM AND TAZOBACTAM SODIUM 3.38 G: 3; .375 INJECTION, POWDER, LYOPHILIZED, FOR SOLUTION INTRAVENOUS at 05:06

## 2024-06-29 RX ADMIN — PANTOPRAZOLE SODIUM 40 MG: 40 INJECTION, POWDER, FOR SOLUTION INTRAVENOUS at 10:06

## 2024-06-29 NOTE — CONSULTS
Counts include 234 beds at the [facility]  General Surgery  Consult Note    Patient Name: Ariana Tiwari  MRN: [id number]  Code Status: Full Code  Admission Date: 6/28/2024  Hospital Length of Stay: 0 days  Attending Physician: Alfa Colmenares MD  Primary Care Provider: Nba Petty MD    Patient information was obtained from patient and ER records.     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  She had an episode of small bowel diverticulitis in the past requiring small bowel resection.  Since that time she has had multiple scan showing dilated small bowel.  She returns today feeling very fatigued after having multiple bouts of diarrhea.  She denies any abdominal pain.  She has not had any nausea or vomiting.    No current facility-administered medications on file prior to encounter.     Current Outpatient Medications on File Prior to Encounter   Medication Sig    acetaminophen 325 mg Cap Take 650 mg by mouth every 6 (six) hours as needed.    carbidopa-levodopa (RYTARY) 23.75-95 mg CpSR Take 3 capsules by mouth 3 (three) times daily.    cholecalciferol, vitamin D3, (VITAMIN D3) 50 mcg (2,000 unit) Cap capsule Take by mouth once daily.    cyanocobalamin (VITAMIN B-12) 250 MCG tablet Take 250 mcg by mouth once daily.    diclofenac sodium (VOLTAREN) 1 % Gel Apply 4 g topically 4 (four) times daily.    docusate sodium (COLACE) 100 MG capsule Take 1 capsule (100 mg total) by mouth once daily. (Patient taking differently: Take 100 mg by mouth 3 (three) times daily as needed for Constipation.)    food supplemt, lactose-reduced (ENSURE ORIGINAL) Liqd Take 237 mLs by mouth 2 (two)  times a day.    glucose 4 GM chewable tablet Take 4 tablets (16 g total) by mouth as needed for Low blood sugar.    ivermectin (SOOLANTRA) 1 % Crea Apply 1 % topically once.    levothyroxine (SYNTHROID) 137 MCG Tab tablet TAKE 1/2 (ONE-HALF) TABLET BY MOUTH BEFORE BREAKFAST    lycopene/lutein/fruit extracts (FRUIT AND VEGETABLE DAILY ORAL) Take 1 Dose by mouth once daily. Patient takes 2 fruit and 1 vegetable balance of nature vitamins in the morning and evening    polyethylene glycol (GLYCOLAX) 17 gram PwPk Take 17 g by mouth once daily.    simethicone (MYLICON) 125 mg Cap capsule Take 1 capsule (125 mg total) by mouth 4 (four) times daily as needed for Flatulence.    traZODone (DESYREL) 50 MG tablet Take 1 tablet (50 mg total) by mouth nightly as needed for Insomnia.    alendronate-vitamin D3 (FOSAMAX PLUS D) 70 mg- 5,600 unit per tablet Take 1 tablet by mouth every 7 days.    azelastine (ASTELIN) 137 mcg (0.1 %) nasal spray 1 spray by Nasal route 2 (two) times daily.    denosumab (PROLIA SUBQ) Inject into the skin every 6 (six) months.    levETIRAcetam (KEPPRA) 500 MG Tab Take 1 tablet by mouth twice daily       Review of patient's allergies indicates:   Allergen Reactions    Gluten Diarrhea       Past Medical History:   Diagnosis Date    Age-related osteoporosis without current pathological fracture 05/16/2024    Allergy     Diverticulosis     Gluten enteropathy     Gluten intolerance     Hernia     Hypothyroidism     PD (Parkinson's disease) 09/16/2015    Right tremor type    Peptic ulcer disease     Right shoulder pain     Cirtisone injection 3/26/15 - Dr. Tolbert    SBO (small bowel obstruction) 09/17/2019    Seizures     Squamous cell carcinoma of skin     Ulcer      Past Surgical History:   Procedure Laterality Date    ADENOIDECTOMY      COLONOSCOPY  03/01/2012    Dr. Teresa, repeat in 10 years for screening    COLONOSCOPY N/A 2/21/2018    Procedure: COLONOSCOPY;  Surgeon: Radha Deng MD;  Location:  [facility];  Service: Endoscopy;  Laterality: N/A;    CORRECTION OF HAMMER TOE Left 9/16/2020    Procedure: CORRECTION, HAMMER TOE;  Surgeon: William Sprague MD;  Location: [facility] OR;  Service: Orthopedics;  Laterality: Left;    COSMETIC SURGERY      Broken nose    ESOPHAGOGASTRODUODENOSCOPY N/A 12/8/2021    Procedure: EGD (ESOPHAGOGASTRODUODENOSCOPY);  Surgeon: Benji Valentino III, MD;  Location[facility];  Service: Endoscopy;  Laterality: N/A;    FRACTURE SURGERY  left wrist    With pin    HEMORRHOID SURGERY      HERNIA REPAIR Left 04/09/2015    Dr Lo; left inguinal    INTRALUMINAL GASTROINTESTINAL TRACT IMAGING VIA CAPSULE N/A 7/10/2018    Procedure: IMAGING, GI TRACT, INTRALUMINAL, VIA CAPSULE;  Surgeon: Radha Deng MD;  Location: [facility];  Service: Endoscopy;  Laterality: N/A;    LYSIS OF ADHESIONS  9/29/2020    Procedure: LYSIS, ADHESIONS;  Surgeon: Eagle Gonzalez MD;  Location: [facility];  Service: General;;    RHINOPLASTY TIP      TONSILLECTOMY      UPPER GASTROINTESTINAL ENDOSCOPY  05/21/2015    Dr. Gomez     Family History       Problem Relation (Age of Onset)    Alcohol abuse Brother    Diabetes Mother, Son    Early death Brother    Heart disease Brother    No Known Problems Father    Obesity Sister          Tobacco Use    Smoking status: Never    Smokeless tobacco: Never   Substance and Sexual Activity    Alcohol use: Yes     Alcohol/week: 11.7 standard drinks of alcohol     Types: 14 Standard drinks or equivalent per week     Comment: 2 glasses wine per dinner    Drug use: No    Sexual activity: Never     Review of Systems   Constitutional:  Positive for fatigue.   Gastrointestinal:  Positive for abdominal distention and diarrhea.   Musculoskeletal:  Positive for back pain.   Neurological:  Positive for headaches.   Psychiatric/Behavioral:  Positive for confusion.    All other systems reviewed and are negative.    Objective:     Vital Signs (Most Recent):  Temp: 98.3 °F (36.8 °C)  (06/29/24 0747)  Pulse: 66 (06/29/24 0752)  Resp: 18 (06/29/24 0752)  BP: 109/64 (06/29/24 0747)  SpO2: 95 % (06/29/24 0752) Vital Signs (24h Range):  Temp:  [97.2 °F (36.2 °C)-98.3 °F (36.8 °C)] 98.3 °F (36.8 °C)  Pulse:  [58-71] 66  Resp:  [15-20] 18  SpO2:  [94 %-100 %] 95 %  BP: (104-139)/(51-68) 109/64     Weight: 55.8 kg (123 lb 0.3 oz)  Body mass index is 21.79 kg/m².     Physical Exam  Vitals reviewed.   Constitutional:       General: She is not in acute distress.     Appearance: Normal appearance. She is not toxic-appearing.   HENT:      Head: Normocephalic and atraumatic.      Mouth/Throat:      Mouth: Mucous membranes are moist.      Pharynx: Oropharynx is clear.   Eyes:      Extraocular Movements: Extraocular movements intact.      Pupils: Pupils are equal, round, and reactive to light.   Cardiovascular:      Rate and Rhythm: Normal rate and regular rhythm.      Pulses: Normal pulses.      Heart sounds: Normal heart sounds.   Pulmonary:      Effort: Pulmonary effort is normal.      Breath sounds: Normal breath sounds.   Abdominal:      General: Bowel sounds are normal. There is no distension.      Palpations: Abdomen is soft.      Tenderness: There is no abdominal tenderness.   Musculoskeletal:         General: No swelling. Normal range of motion.      Cervical back: Normal range of motion and neck supple.   Skin:     General: Skin is warm and dry.      Capillary Refill: Capillary refill takes less than 2 seconds.   Neurological:      General: No focal deficit present.      Mental Status: She is alert and oriented to person, place, and time. Mental status is at baseline.   Psychiatric:         Mood and Affect: Mood normal.         Behavior: Behavior normal.         Judgment: Judgment normal.            I have reviewed all pertinent lab results within the past 24 hours.  CBC:   Recent Labs   Lab 06/29/24  0531   WBC 4.46   RBC 4.26   HGB 13.4   HCT 39.9   *   MCV 94   MCH 31.5*   MCHC 33.6     CMP:    Recent Labs   Lab 06/29/24  0531   GLU 76   CALCIUM 7.9*   ALBUMIN 3.8   PROT 6.0      K 3.1*   CO2 24   *   BUN 9   CREATININE 0.4*   ALKPHOS 63   ALT 11   AST 15   BILITOT 0.6       Significant Diagnostics:  I have reviewed all pertinent imaging results/findings within the past 24 hours.  CT: I have reviewed all pertinent results/findings within the past 24 hours and my personal findings are:  Previously seen dilated loops of small bowel air-filled.    Assessment/Plan:     * Recurrent intestinal obstruction  Patient was not clinically obstructed.  She is having bowel movements.  Suspect she is having dehydration from multiple bouts of diarrhea.  Okay for diet at this time.  CT findings are chronic and are not new.  Her abdomen is completely benign on exam.  Spoke with primary team   Diet ordered.      VTE Risk Mitigation (From admission, onward)           Ordered     Reason for No Pharmacological VTE Prophylaxis  Once        Question:  Reasons:  Answer:  Patient is Ambulatory    06/28/24 1937     IP VTE HIGH RISK PATIENT  Once         06/28/24 1937     Place sequential compression device  Until discontinued         06/28/24 1937                    Thank you for your consult. I will follow-up with patient. Please contact us if you have any additional questions.    Sandra Mcarthur MD  General Surgery  [facility]

## 2024-06-29 NOTE — NURSING
Nurses Note -- 4 Eyes      6/28/2024   10:24 PM      Skin assessed during: Admit      [x] No Altered Skin Integrity Present    []Prevention Measures Documented      [] Yes- Altered Skin Integrity Present or Discovered   [] LDA Added if Not in Epic (Describe Wound)   [] New Altered Skin Integrity was Present on Admit and Documented in LDA   [] Wound Image Taken    Wound Care Consulted? No    Attending Nurse:  Crystal Coulter RN/Staff Member:  Ananya Mcclure RN

## 2024-06-29 NOTE — HPI
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  She had an episode of small bowel diverticulitis in the past requiring small bowel resection.  Since that time she has had multiple scan showing dilated small bowel.  She returns today feeling very fatigued after having multiple bouts of diarrhea.  She denies any abdominal pain.  She has not had any nausea or vomiting.

## 2024-06-29 NOTE — CARE UPDATE
06/29/24 0752   Patient Assessment/Suction   Level of Consciousness (AVPU) alert   Respiratory Effort Unlabored   Expansion/Accessory Muscles/Retractions expansion symmetric;no retractions;no use of accessory muscles   Rhythm/Pattern, Respiratory depth regular;pattern regular;unlabored   PRE-TX-O2   Device (Oxygen Therapy) room air   SpO2 95 %   Pulse Oximetry Type Intermittent   $ Pulse Oximetry - Multiple Charge Pulse Oximetry - Multiple   Pulse 66   Resp 18   Aerosol Therapy   $ Aerosol Therapy Charges PRN treatment not required   Education   $ Education Bronchodilator;15 min

## 2024-06-29 NOTE — ASSESSMENT & PLAN NOTE
Recurrent hx prior surgery  CT impressive, report reviewed with redemonstration  Declined surgery and NGT  IVF  NPO  prn pain meds and antiemetics  Family request enema in a.m. as has helped in past

## 2024-06-29 NOTE — H&P
"[facility] - Emergency Dept  Hospital Medicine  History & Physical    Patient Name: Ariana Tiwari  MRN: [id number]  Patient Class: OP- Observation  Admission Date: 6/28/2024  Attending Physician: Shaan Finney MD   Primary Care Provider: Nba Petty MD         Patient information was obtained from patient, relative(s), and ER records.     Subjective:     Principal Problem:Recurrent intestinal obstruction    Chief Complaint:   Chief Complaint   Patient presents with    Weakness     Presents to ED with weakness, fatigue x "a few days", takes a "shot for her bones", unsure if this is a side effect.   Also c/o "major headache" since this morning, as well as diarrhea since last night. Also c/o back pain x "a couple days" , also c/o "dark urine" since this morning.         HPI: 78 y/o female w/ pMHx of parkinson's, seizure disorder, hypothyroid, gluten intolerance, recurrent intestinal obstruction, and anemia who presented to the ED with c/o HA, back pain and abdominal discomfort x 3 days. Patient reports that she takes a medication for her bones that seems to worsen her headaches and abdominal symptoms.  Vitals and labs largely unremarkable. Patient declined NGT and surgical intervention. Admitted to hospital medicine service for observation and further evaluation.     Past Medical History:   Diagnosis Date    Age-related osteoporosis without current pathological fracture 05/16/2024    Allergy     Diverticulosis     Gluten enteropathy     Gluten intolerance     Hernia     Hypothyroidism     PD (Parkinson's disease) 09/16/2015    Right tremor type    Peptic ulcer disease     Right shoulder pain     Cirtisone injection 3/26/15 - Dr. Tolbert    SBO (small bowel obstruction) 09/17/2019    Seizures     Squamous cell carcinoma of skin     Ulcer        Past Surgical History:   Procedure Laterality Date    ADENOIDECTOMY      COLONOSCOPY  03/01/2012    Dr. Teresa, repeat in 10 years for screening    " COLONOSCOPY N/A 2/21/2018    Procedure: COLONOSCOPY;  Surgeon: Radha Deng MD;  Location: [facility] ENDO;  Service: Endoscopy;  Laterality: N/A;    CORRECTION OF HAMMER TOE Left 9/16/2020    Procedure: CORRECTION, HAMMER TOE;  Surgeon: William Sprague MD;  Location: [facility] OR;  Service: Orthopedics;  Laterality: Left;    COSMETIC SURGERY      Broken nose    ESOPHAGOGASTRODUODENOSCOPY N/A 12/8/2021    Procedure: EGD (ESOPHAGOGASTRODUODENOSCOPY);  Surgeon: Benji Valentino III, MD;  Location: [facility] ENDO;  Service: Endoscopy;  Laterality: N/A;    FRACTURE SURGERY  left wrist    With pin    HEMORRHOID SURGERY      HERNIA REPAIR Left 04/09/2015    Dr Lo; left inguinal    INTRALUMINAL GASTROINTESTINAL TRACT IMAGING VIA CAPSULE N/A 7/10/2018    Procedure: IMAGING, GI TRACT, INTRALUMINAL, VIA CAPSULE;  Surgeon: Radha Deng MD;  Location: [facility] ENDO;  Service: Endoscopy;  Laterality: N/A;    LYSIS OF ADHESIONS  9/29/2020    Procedure: LYSIS, ADHESIONS;  Surgeon: Eagle Gonzalez MD;  Location: [facility] OR;  Service: General;;    RHINOPLASTY TIP      TONSILLECTOMY      UPPER GASTROINTESTINAL ENDOSCOPY  05/21/2015    Dr. Gomez       Review of patient's allergies indicates:   Allergen Reactions    Gluten Diarrhea       No current facility-administered medications on file prior to encounter.     Current Outpatient Medications on File Prior to Encounter   Medication Sig    acetaminophen 325 mg Cap Take 650 mg by mouth every 6 (six) hours as needed.    alendronate-vitamin D3 (FOSAMAX PLUS D) 70 mg- 5,600 unit per tablet Take 1 tablet by mouth every 7 days.    azelastine (ASTELIN) 137 mcg (0.1 %) nasal spray 1 spray by Nasal route 2 (two) times daily.    carbidopa-levodopa (RYTARY) 23.75-95 mg CpSR Take 3 capsules by mouth 3 (three) times daily.    diclofenac sodium (VOLTAREN) 1 % Gel Apply 4 g topically 4 (four) times daily.    docusate sodium (COLACE) 100 MG capsule Take 1 capsule (100 mg total) by mouth once daily.  (Patient taking differently: Take 100 mg by mouth 3 (three) times daily as needed for Constipation.)    food supplemt, lactose-reduced (ENSURE ORIGINAL) Liqd Take 237 mLs by mouth 2 (two) times a day.    glucose 4 GM chewable tablet Take 4 tablets (16 g total) by mouth as needed for Low blood sugar. (Patient not taking: Reported on 5/14/2024)    ivermectin (SOOLANTRA) 1 % Crea Apply 1 % topically once.    levETIRAcetam (KEPPRA) 500 MG Tab Take 1 tablet by mouth twice daily    levothyroxine (SYNTHROID) 137 MCG Tab tablet TAKE 1/2 (ONE-HALF) TABLET BY MOUTH BEFORE BREAKFAST    lycopene/lutein/fruit extracts (FRUIT AND VEGETABLE DAILY ORAL) Take 1 Dose by mouth once daily. Patient takes 2 fruit and 1 vegetable balance of nature vitamins in the morning and evening    polyethylene glycol (GLYCOLAX) 17 gram PwPk Take 17 g by mouth once daily.    simethicone (MYLICON) 125 mg Cap capsule Take 1 capsule (125 mg total) by mouth 4 (four) times daily as needed for Flatulence.    traZODone (DESYREL) 50 MG tablet Take 1 tablet (50 mg total) by mouth nightly as needed for Insomnia.     Family History       Problem Relation (Age of Onset)    Alcohol abuse Brother    Diabetes Mother, Son    Early death Brother    Heart disease Brother    No Known Problems Father    Obesity Sister          Tobacco Use    Smoking status: Never    Smokeless tobacco: Never   Substance and Sexual Activity    Alcohol use: Yes     Alcohol/week: 11.7 standard drinks of alcohol     Types: 14 Standard drinks or equivalent per week     Comment: 2 glasses wine per dinner    Drug use: No    Sexual activity: Never     Review of Systems   Constitutional:  Positive for fatigue.   Gastrointestinal:  Positive for abdominal distention and diarrhea.   Musculoskeletal:  Positive for back pain.   Neurological:  Positive for headaches.   Psychiatric/Behavioral:  Positive for confusion.      Objective:     Vital Signs (Most Recent):  Temp: 97.9 °F (36.6 °C) (06/28/24  1225)  Pulse: 63 (06/28/24 1825)  Resp: 18 (06/28/24 1825)  BP: (!) 111/56 (06/28/24 1825)  SpO2: 98 % (06/28/24 1825) Vital Signs (24h Range):  Temp:  [97.9 °F (36.6 °C)-98 °F (36.7 °C)] 97.9 °F (36.6 °C)  Pulse:  [58-71] 63  Resp:  [15-20] 18  SpO2:  [96 %-100 %] 98 %  BP: (108-139)/(55-65) 111/56     Weight: 56.7 kg (125 lb)  Body mass index is 22.14 kg/m².     Physical Exam  Vitals reviewed.   Constitutional:       General: She is not in acute distress.     Appearance: Normal appearance. She is not toxic-appearing.   HENT:      Head: Normocephalic and atraumatic.      Mouth/Throat:      Mouth: Mucous membranes are moist.      Pharynx: Oropharynx is clear.   Eyes:      Extraocular Movements: Extraocular movements intact.      Pupils: Pupils are equal, round, and reactive to light.   Cardiovascular:      Rate and Rhythm: Normal rate and regular rhythm.      Pulses: Normal pulses.      Heart sounds: Normal heart sounds.   Pulmonary:      Effort: Pulmonary effort is normal.      Breath sounds: Normal breath sounds.   Abdominal:      General: Bowel sounds are normal. There is no distension.      Palpations: Abdomen is soft.      Tenderness: There is no abdominal tenderness.   Musculoskeletal:         General: No swelling. Normal range of motion.      Cervical back: Normal range of motion and neck supple.   Skin:     General: Skin is warm and dry.      Capillary Refill: Capillary refill takes less than 2 seconds.   Neurological:      General: No focal deficit present.      Mental Status: She is alert and oriented to person, place, and time. Mental status is at baseline.   Psychiatric:         Mood and Affect: Mood normal.         Behavior: Behavior normal.         Judgment: Judgment normal.              CRANIAL NERVES     CN III, IV, VI   Pupils are equal, round, and reactive to light.       Significant Labs: All pertinent labs within the past 24 hours have been reviewed.  Recent Lab Results         06/28/24  7921    06/28/24  1305        Albumin   4.6       ALP   77       ALT   12       Anion Gap   8       Appearance, UA Clear         AST   20       Baso #   0.05       Basophil %   0.7       Bilirubin (UA) Negative         BILIRUBIN TOTAL   0.5  Comment: For infants and newborns, interpretation of results should be based  on gestational age, weight and in agreement with clinical  observations.    Premature Infant recommended reference ranges:  Up to 24 hours.............<8.0 mg/dL  Up to 48 hours............<12.0 mg/dL  3-5 days..................<15.0 mg/dL  6-29 days.................<15.0 mg/dL         BUN   14       Calcium   8.7       Chloride   106       CO2   24       Color, UA Yellow         Creatinine   0.3       Differential Method   Automated       eGFR   >60.0       Eos #   0.0       Eos %   0.3       Glucose   80       Glucose, UA Negative         Gran # (ANC)   5.4       Gran %   74.2       Hematocrit   44.3       Hemoglobin   14.9       Immature Grans (Abs)   0.02  Comment: Mild elevation in immature granulocytes is non specific and   can be seen in a variety of conditions including stress response,   acute inflammation, trauma and pregnancy. Correlation with other   laboratory and clinical findings is essential.         Immature Granulocytes   0.3       Ketones, UA Negative         Leukocyte Esterase, UA 1+         Lipase   6       Lymph #   1.3       Lymph %   17.6       Magnesium    2.0       MCH   31.2       MCHC   33.6       MCV   93       Microscopic Comment SEE COMMENT  Comment: Other formed elements not mentioned in the report are not   present in the microscopic examination.            Mono #   0.5       Mono %   6.9       MPV   9.9       NITRITE UA Negative         nRBC   0       Blood, UA 1+         pH, UA 6.0         Platelet Count   174       Potassium   3.6       PROTEIN TOTAL   7.2       Protein, UA Negative  Comment: Recommend a 24 hour urine protein or a urine   protein/creatinine ratio if globulin  induced proteinuria is  clinically suspected.           RBC   4.77       RBC, UA 2         RDW   12.7       Sodium   138       Spec Grav UA 1.005         Specimen UA Urine, Clean Catch         Squam Epithel, UA 1         Troponin I High Sensitivity   14.7  Comment: Troponin results differ between methods. Do not use   results between Troponin methods interchangeably.    Alkaline Phospatase levels above 400 U/L may   cause false positive results.    Access hsTnI should not be used for patients taking   Asfotase yang (Strensiq).         UROBILINOGEN UA Negative         WBC, UA 6         WBC   7.28               Significant Imaging: I have reviewed all pertinent imaging results/findings within the past 24 hours.    CT Abdomen Pelvis With IV Contrast NO Oral Contrast  Narrative: EXAMINATION:  CT ABDOMEN PELVIS WITH IV CONTRAST    CLINICAL HISTORY:  Abdominal abscess/infection suspected;    TECHNIQUE:  Low dose axial images, sagittal and coronal reformations were obtained from the lung bases to the pubic symphysis following the IV administration of 100 mL of Omnipaque 350 .  Oral contrast was not given.    COMPARISON:  CT 01/20/2024    FINDINGS:  There are bandlike opacities at the lung bases suggesting atelectasis or scarring, similar to prior examination.  No significant pleural fluid present.  There is prominent coronary artery calcification.  There is no significant pericardial effusion.  There is aortic annular calcification.    The liver is not significantly enlarged.  There are two small subcentimeter enhancing foci within the liver, nonspecific, although likely reflecting small flash filling hemangiomas.  No radiopaque calculi appreciated within the gallbladder lumen.  There is no intra-or extrahepatic biliary ductal dilatation.    The stomach appears within normal limits.  There is a subcentimeter hypodensity within the spleen, too small to accurately characterize.  No peripancreatic inflammatory change to  suggest acute pancreatitis.  There is stable nonspecific diffuse thickening of the left adrenal gland.  The right adrenal gland is unremarkable.    The kidneys are normal in size and location and enhance symmetrically.  There is no evidence of hydronephrosis. There is mild nonspecific urinary bladder wall thickening.  No significant free fluid in the pelvis.    The abdominal aorta is normal in course and caliber with moderate atherosclerotic calcification along its course.    There are distended fluid-filled loops of small bowel throughout the abdomen and pelvis.  There is a possible transition point identified within the the right paramidline lower abdomen (for example coronal series 6, image 54).  There is scattered liquid stool throughout the right colon.  There are scattered colonic diverticula without evidence of acute diverticulitis.  The appendix is not definitively visualized.  There is no free intraperitoneal air or portal venous gas.  There is focal swirling/twisting of the mesentery again identified within the right paramidline abdomen, similar to prior examination.  There is no significant volume of ascites.  There are scattered small mesenteric and retroperitoneal lymph nodes.  There is diastasis of the anterior abdominal wall musculature.    There is diffuse osteopenia.  There are degenerative change of the visualized osseous structures without acute fracture appreciated.  The extraperitoneal soft tissues are unremarkable.  Impression: Redemonstration of mildly dilated fluid-filled loops of small bowel throughout the abdomen and pelvis, similar to prior CT examination of January 2024.  Additionally, there is nonspecific focal swirling/twisting of the mesentery with possible transition point identified within the right paramidline abdomen.  Findings are concerning for possible small bowel obstruction.  Superimposed component of acute enteritis not excluded.  No evidence of free intraperitoneal air  portal venous gas.    Mild bladder wall thickening which may relate to nondistention, although correlation with urinalysis to exclude cystitis/infection advised.    Bibasilar atelectasis or scarring.    Moderate aortic atherosclerosis.  Coronary artery calcification.    Additional findings as above.    This report was flagged in Epic as abnormal.    Electronically signed by: Live Iqbal MD  Date:    06/28/2024  Time:    18:48  CT Thoracic Spine Without Contrast  Narrative: EXAMINATION:  CT THORACIC SPINE WITHOUT CONTRAST    CLINICAL HISTORY:  Mid-back pain, compression fracture suspected;    TECHNIQUE:  2 mm axial images were acquired of the thoracic spine without IV contrast.  Sagittal coronal reformatted images were reviewed.    COMPARISON:  Thoracic spine radiograph series 10/18/2021.    FINDINGS:  There is a mild anterior wedge compression fracture deformity of the T7 vertebral body, likely chronic and corresponding to the abnormality seen on prior thoracic spine radiograph series of 10/18/2021.  There are minimal superior endplate deformities of the T5 and T8 vertebral bodies with slight height loss, favored to be chronic.    There is mild exaggeration of the normal thoracic kyphosis.  There is a mild rightward curvature of the thoracic spine.  There is mild diffuse heterogeneity of the visualized osseous structures which may relate to underlying osteopenia.  Clinical correlation is advised.  Presumed osseous hemangioma noted within the T10 vertebral body and the L2 vertebral body.  There is slight retropulsion of the superior cortex of the T7 vertebral body without significant acquired spinal canal stenosis appreciated.  Intervertebral disc space heights are relatively mild maintained with mild degenerative discogenic change present.  There is no evidence of significant bony spinal canal stenosis throughout the thoracic spine.    The visualized lungs demonstrate scattered bandlike opacities at the lung  bases suggesting atelectasis or scarring.  There is no significant pleural fluid.  There is no pneumothorax.  The heart is mildly enlarged.  There is 3 vessel coronary artery calcification.    There is a punctate nonobstructing left renal calculus.  Please refer to separate dictated abdominal CT report for intra-abdominal findings.  Impression: Mild anterior wedge compression deformity of the T7 vertebral body, likely chronic corresponding to abnormality on prior thoracic spine radiograph series of 10/18/2021.  Additional minimal superior endplate height loss of the T5 and T8 vertebral bodies favored to be chronic in etiology.    Mild diffuse heterogeneity of the visualized osseous structures which may relate to underlying osteopenia.  Correlation with patient history advised.    Mild degenerative changes of the thoracic spine without evidence of significant bony spinal canal stenosis.    Additional incidental findings as above.    Electronically signed by: Live Iqbal MD  Date:    06/28/2024  Time:    18:46      Assessment/Plan:     * Recurrent intestinal obstruction  Recurrent hx prior surgery  CT report reviewed with re-demonstration of dilated fluid-filled loops of bowel; comparable to January 2024 scan  Declined surgery and NGT  IVF  NPO  prn pain meds and antiemetics  Family request enema in a.m. as has helped in past      Age-related osteoporosis without current pathological fracture  Patient received her first injection which she is unsure if worsened symptoms  Admission with similar symptoms prior to injection 1/24      Seizure  Change Keppra to IV while NPO      PD (Parkinson's disease)  Resume patient home carbidopa-levidopa when taking po      Hypothyroidism  Patient has chronic hypothyroidism. TFTs reviewed-   Lab Results   Component Value Date    TSH 2.245 11/14/2023   . Will continue chronic levothyroxine and adjust for and clinical changes.      Gluten enteropathy  Noted        VTE Risk  Mitigation (From admission, onward)           Ordered     Reason for No Pharmacological VTE Prophylaxis  Once        Question:  Reasons:  Answer:  Patient is Ambulatory    06/28/24 1937     IP VTE HIGH RISK PATIENT  Once         06/28/24 1937     Place sequential compression device  Until discontinued         06/28/24 1937                         On 06/28/2024, patient should be placed in hospital observation services under my care in collaboration with Reg.           Donna Valdivia NP  Department of Hospital Medicine  [facility] - Emergency Dept

## 2024-06-29 NOTE — ASSESSMENT & PLAN NOTE
Patient received her first injection which she is unsure if worsened symptoms  Admission with similar symptoms prior to injection 1/24

## 2024-06-29 NOTE — PLAN OF CARE
[facility] - Emergency Dept  Initial Discharge Assessment       Primary Care Provider: Nba Petty MD    Admission Diagnosis: SBO (small bowel obstruction) [K56.609]    Admission Date: 6/28/2024  Expected Discharge Date:     Transition of Care Barriers: None     met with Pt at bedside to complete discharge assessment. Pt AAOx4s. Demographics, PCP, and insurance verified. No home health. No dialysis. Pt reports max assist to complete ADLs. Pt verbalized plan to discharge home via family transport. Pt has no other needs to be addressed at this time.     Payor: MEDICARE / Plan: MEDICARE PART A & B / Product Type: Government /     Extended Emergency Contact Information  Primary Emergency Contact: Je Tiwari  Address: [address] [place] [address] [place]  Mobile Phone: [phone]  Relation: Son  Preferred language: English   needed? No  Secondary Emergency Contact: Micaela Tiwari  Address: [address] [place] [address] [place]  Mobile Phone: [phone]  Relation: Relative  Preferred language: English   needed? No    Discharge Plan A: Home with family  Discharge Plan B: Home      [id number] - [place], [place] - [address]  [address]  [facility] [address]  Phone: [phone] Fax: [phone]    [facility]  [address]  [facility] [address]  Phone: [phone] Fax: [phone]      Initial Assessment (most recent)       Adult Discharge Assessment - 06/28/24 2100          Discharge Assessment    Assessment Type Discharge Planning Assessment     Confirmed/corrected address, phone number and insurance Yes     Confirmed Demographics Correct on Facesheet     Source of Information patient;family     When was your last doctors appointment? 05/14/24     Does patient/caregiver understand observation status Yes     Communicated IAIN with patient/caregiver Date not  available/Unable to determine     Reason For Admission Recurrent intestinal obstruction     People in Home child(malka), adult     Facility Arrived From: Home     Do you expect to return to your current living situation? Yes     Do you have help at home or someone to help you manage your care at home? Yes     Who are your caregiver(s) and their phone number(s)? Daughter in Law: Micaela Tiwari [phone]     Prior to hospitilization cognitive status: Alert/Oriented     Current cognitive status: Alert/Oriented     Walking or Climbing Stairs Difficulty yes     Walking or Climbing Stairs ambulation difficulty, assistance 1 person;stair climbing difficulty, assistance 1 person;transferring difficulty, assistance 1 person     Dressing/Bathing Difficulty yes     Dressing/Bathing bathing difficulty, assistance 1 person;dressing difficulty, assistance 1 person     Home Accessibility wheelchair accessible     Home Layout Able to live on 1st floor     Equipment Currently Used at Home walker, rolling;power chair;grab bar     Readmission within 30 days? No     Patient currently being followed by outpatient case management? No     Who is going to help you get home at discharge? Daughter in Law: Micaela Tiwari [phone]     How do you get to doctors appointments? family or friend will provide     Are you on dialysis? No     Do you take coumadin? No     Discharge Plan A Home with family     Discharge Plan B Home     DME Needed Upon Discharge  none     Discharge Plan discussed with: Patient;Caregiver     Name(s) and Number(s) Daughter in Law: Micaela Tiwari [phone]     Transition of Care Barriers None

## 2024-06-29 NOTE — SUBJECTIVE & OBJECTIVE
Past Medical History:   Diagnosis Date    Age-related osteoporosis without current pathological fracture 05/16/2024    Allergy     Diverticulosis     Gluten enteropathy     Gluten intolerance     Hernia     Hypothyroidism     PD (Parkinson's disease) 09/16/2015    Right tremor type    Peptic ulcer disease     Right shoulder pain     Cirtisone injection 3/26/15 - Dr. Tolbert    SBO (small bowel obstruction) 09/17/2019    Seizures     Squamous cell carcinoma of skin     Ulcer        Past Surgical History:   Procedure Laterality Date    ADENOIDECTOMY      COLONOSCOPY  03/01/2012    Dr. Teresa, repeat in 10 years for screening    COLONOSCOPY N/A 2/21/2018    Procedure: COLONOSCOPY;  Surgeon: Radha Deng MD;  Location: [facility];  Service: Endoscopy;  Laterality: N/A;    CORRECTION OF HAMMER TOE Left 9/16/2020    Procedure: CORRECTION, HAMMER TOE;  Surgeon: William Sprague MD;  Location: [facility];  Service: Orthopedics;  Laterality: Left;    COSMETIC SURGERY      Broken nose    ESOPHAGOGASTRODUODENOSCOPY N/A 12/8/2021    Procedure: EGD (ESOPHAGOGASTRODUODENOSCOPY);  Surgeon: Benji Valentino III, MD;  Location: [facility];  Service: Endoscopy;  Laterality: N/A;    FRACTURE SURGERY  left wrist    With pin    HEMORRHOID SURGERY      HERNIA REPAIR Left 04/09/2015    Dr Lo; left inguinal    INTRALUMINAL GASTROINTESTINAL TRACT IMAGING VIA CAPSULE N/A 7/10/2018    Procedure: IMAGING, GI TRACT, INTRALUMINAL, VIA CAPSULE;  Surgeon: Radha Deng MD;  Location: [facility] ENDO;  Service: Endoscopy;  Laterality: N/A;    LYSIS OF ADHESIONS  9/29/2020    Procedure: LYSIS, ADHESIONS;  Surgeon: Eagle Gonzalez MD;  Location: [facility];  Service: General;;    RHINOPLASTY TIP      TONSILLECTOMY      UPPER GASTROINTESTINAL ENDOSCOPY  05/21/2015    Dr. Gomez       Review of patient's allergies indicates:   Allergen Reactions    Gluten Diarrhea       No current facility-administered medications on file prior to encounter.      Current Outpatient Medications on File Prior to Encounter   Medication Sig    acetaminophen 325 mg Cap Take 650 mg by mouth every 6 (six) hours as needed.    alendronate-vitamin D3 (FOSAMAX PLUS D) 70 mg- 5,600 unit per tablet Take 1 tablet by mouth every 7 days.    azelastine (ASTELIN) 137 mcg (0.1 %) nasal spray 1 spray by Nasal route 2 (two) times daily.    carbidopa-levodopa (RYTARY) 23.75-95 mg CpSR Take 3 capsules by mouth 3 (three) times daily.    diclofenac sodium (VOLTAREN) 1 % Gel Apply 4 g topically 4 (four) times daily.    docusate sodium (COLACE) 100 MG capsule Take 1 capsule (100 mg total) by mouth once daily. (Patient taking differently: Take 100 mg by mouth 3 (three) times daily as needed for Constipation.)    food supplemt, lactose-reduced (ENSURE ORIGINAL) Liqd Take 237 mLs by mouth 2 (two) times a day.    glucose 4 GM chewable tablet Take 4 tablets (16 g total) by mouth as needed for Low blood sugar. (Patient not taking: Reported on 5/14/2024)    ivermectin (SOOLANTRA) 1 % Crea Apply 1 % topically once.    levETIRAcetam (KEPPRA) 500 MG Tab Take 1 tablet by mouth twice daily    levothyroxine (SYNTHROID) 137 MCG Tab tablet TAKE 1/2 (ONE-HALF) TABLET BY MOUTH BEFORE BREAKFAST    lycopene/lutein/fruit extracts (FRUIT AND VEGETABLE DAILY ORAL) Take 1 Dose by mouth once daily. Patient takes 2 fruit and 1 vegetable balance of nature vitamins in the morning and evening    polyethylene glycol (GLYCOLAX) 17 gram PwPk Take 17 g by mouth once daily.    simethicone (MYLICON) 125 mg Cap capsule Take 1 capsule (125 mg total) by mouth 4 (four) times daily as needed for Flatulence.    traZODone (DESYREL) 50 MG tablet Take 1 tablet (50 mg total) by mouth nightly as needed for Insomnia.     Family History       Problem Relation (Age of Onset)    Alcohol abuse Brother    Diabetes Mother, Son    Early death Brother    Heart disease Brother    No Known Problems Father    Obesity Sister          Tobacco Use     Smoking status: Never    Smokeless tobacco: Never   Substance and Sexual Activity    Alcohol use: Yes     Alcohol/week: 11.7 standard drinks of alcohol     Types: 14 Standard drinks or equivalent per week     Comment: 2 glasses wine per dinner    Drug use: No    Sexual activity: Never     Review of Systems   Constitutional:  Positive for fatigue.   Gastrointestinal:  Positive for abdominal distention and diarrhea.   Musculoskeletal:  Positive for back pain.   Neurological:  Positive for headaches.   Psychiatric/Behavioral:  Positive for confusion.      Objective:     Vital Signs (Most Recent):  Temp: 97.9 °F (36.6 °C) (06/28/24 1225)  Pulse: 63 (06/28/24 1825)  Resp: 18 (06/28/24 1825)  BP: (!) 111/56 (06/28/24 1825)  SpO2: 98 % (06/28/24 1825) Vital Signs (24h Range):  Temp:  [97.9 °F (36.6 °C)-98 °F (36.7 °C)] 97.9 °F (36.6 °C)  Pulse:  [58-71] 63  Resp:  [15-20] 18  SpO2:  [96 %-100 %] 98 %  BP: (108-139)/(55-65) 111/56     Weight: 56.7 kg (125 lb)  Body mass index is 22.14 kg/m².     Physical Exam  Vitals reviewed.   Constitutional:       General: She is not in acute distress.     Appearance: Normal appearance. She is not toxic-appearing.   HENT:      Head: Normocephalic and atraumatic.      Mouth/Throat:      Mouth: Mucous membranes are moist.      Pharynx: Oropharynx is clear.   Eyes:      Extraocular Movements: Extraocular movements intact.      Pupils: Pupils are equal, round, and reactive to light.   Cardiovascular:      Rate and Rhythm: Normal rate and regular rhythm.      Pulses: Normal pulses.      Heart sounds: Normal heart sounds.   Pulmonary:      Effort: Pulmonary effort is normal.      Breath sounds: Normal breath sounds.   Abdominal:      General: Bowel sounds are normal. There is no distension.      Palpations: Abdomen is soft.      Tenderness: There is no abdominal tenderness.   Musculoskeletal:         General: No swelling. Normal range of motion.      Cervical back: Normal range of motion and  neck supple.   Skin:     General: Skin is warm and dry.      Capillary Refill: Capillary refill takes less than 2 seconds.   Neurological:      General: No focal deficit present.      Mental Status: She is alert and oriented to person, place, and time. Mental status is at baseline.   Psychiatric:         Mood and Affect: Mood normal.         Behavior: Behavior normal.         Judgment: Judgment normal.              CRANIAL NERVES     CN III, IV, VI   Pupils are equal, round, and reactive to light.       Significant Labs: All pertinent labs within the past 24 hours have been reviewed.  Recent Lab Results         06/28/24  1508   06/28/24  1305        Albumin   4.6       ALP   77       ALT   12       Anion Gap   8       Appearance, UA Clear         AST   20       Baso #   0.05       Basophil %   0.7       Bilirubin (UA) Negative         BILIRUBIN TOTAL   0.5  Comment: For infants and newborns, interpretation of results should be based  on gestational age, weight and in agreement with clinical  observations.    Premature Infant recommended reference ranges:  Up to 24 hours.............<8.0 mg/dL  Up to 48 hours............<12.0 mg/dL  3-5 days..................<15.0 mg/dL  6-29 days.................<15.0 mg/dL         BUN   14       Calcium   8.7       Chloride   106       CO2   24       Color, UA Yellow         Creatinine   0.3       Differential Method   Automated       eGFR   >60.0       Eos #   0.0       Eos %   0.3       Glucose   80       Glucose, UA Negative         Gran # (ANC)   5.4       Gran %   74.2       Hematocrit   44.3       Hemoglobin   14.9       Immature Grans (Abs)   0.02  Comment: Mild elevation in immature granulocytes is non specific and   can be seen in a variety of conditions including stress response,   acute inflammation, trauma and pregnancy. Correlation with other   laboratory and clinical findings is essential.         Immature Granulocytes   0.3       Ketones, UA Negative          Leukocyte Esterase, UA 1+         Lipase   6       Lymph #   1.3       Lymph %   17.6       Magnesium    2.0       MCH   31.2       MCHC   33.6       MCV   93       Microscopic Comment SEE COMMENT  Comment: Other formed elements not mentioned in the report are not   present in the microscopic examination.            Mono #   0.5       Mono %   6.9       MPV   9.9       NITRITE UA Negative         nRBC   0       Blood, UA 1+         pH, UA 6.0         Platelet Count   174       Potassium   3.6       PROTEIN TOTAL   7.2       Protein, UA Negative  Comment: Recommend a 24 hour urine protein or a urine   protein/creatinine ratio if globulin induced proteinuria is  clinically suspected.           RBC   4.77       RBC, UA 2         RDW   12.7       Sodium   138       Spec Grav UA 1.005         Specimen UA Urine, Clean Catch         Squam Epithel, UA 1         Troponin I High Sensitivity   14.7  Comment: Troponin results differ between methods. Do not use   results between Troponin methods interchangeably.    Alkaline Phospatase levels above 400 U/L may   cause false positive results.    Access hsTnI should not be used for patients taking   Asfotase yang (Strensiq).         UROBILINOGEN UA Negative         WBC, UA 6         WBC   7.28               Significant Imaging: I have reviewed all pertinent imaging results/findings within the past 24 hours.    CT Abdomen Pelvis With IV Contrast NO Oral Contrast  Narrative: EXAMINATION:  CT ABDOMEN PELVIS WITH IV CONTRAST    CLINICAL HISTORY:  Abdominal abscess/infection suspected;    TECHNIQUE:  Low dose axial images, sagittal and coronal reformations were obtained from the lung bases to the pubic symphysis following the IV administration of 100 mL of Omnipaque 350 .  Oral contrast was not given.    COMPARISON:  CT 01/20/2024    FINDINGS:  There are bandlike opacities at the lung bases suggesting atelectasis or scarring, similar to prior examination.  No significant pleural fluid  present.  There is prominent coronary artery calcification.  There is no significant pericardial effusion.  There is aortic annular calcification.    The liver is not significantly enlarged.  There are two small subcentimeter enhancing foci within the liver, nonspecific, although likely reflecting small flash filling hemangiomas.  No radiopaque calculi appreciated within the gallbladder lumen.  There is no intra-or extrahepatic biliary ductal dilatation.    The stomach appears within normal limits.  There is a subcentimeter hypodensity within the spleen, too small to accurately characterize.  No peripancreatic inflammatory change to suggest acute pancreatitis.  There is stable nonspecific diffuse thickening of the left adrenal gland.  The right adrenal gland is unremarkable.    The kidneys are normal in size and location and enhance symmetrically.  There is no evidence of hydronephrosis. There is mild nonspecific urinary bladder wall thickening.  No significant free fluid in the pelvis.    The abdominal aorta is normal in course and caliber with moderate atherosclerotic calcification along its course.    There are distended fluid-filled loops of small bowel throughout the abdomen and pelvis.  There is a possible transition point identified within the the right paramidline lower abdomen (for example coronal series 6, image 54).  There is scattered liquid stool throughout the right colon.  There are scattered colonic diverticula without evidence of acute diverticulitis.  The appendix is not definitively visualized.  There is no free intraperitoneal air or portal venous gas.  There is focal swirling/twisting of the mesentery again identified within the right paramidline abdomen, similar to prior examination.  There is no significant volume of ascites.  There are scattered small mesenteric and retroperitoneal lymph nodes.  There is diastasis of the anterior abdominal wall musculature.    There is diffuse osteopenia.   There are degenerative change of the visualized osseous structures without acute fracture appreciated.  The extraperitoneal soft tissues are unremarkable.  Impression: Redemonstration of mildly dilated fluid-filled loops of small bowel throughout the abdomen and pelvis, similar to prior CT examination of January 2024.  Additionally, there is nonspecific focal swirling/twisting of the mesentery with possible transition point identified within the right paramidline abdomen.  Findings are concerning for possible small bowel obstruction.  Superimposed component of acute enteritis not excluded.  No evidence of free intraperitoneal air portal venous gas.    Mild bladder wall thickening which may relate to nondistention, although correlation with urinalysis to exclude cystitis/infection advised.    Bibasilar atelectasis or scarring.    Moderate aortic atherosclerosis.  Coronary artery calcification.    Additional findings as above.    This report was flagged in Epic as abnormal.    Electronically signed by: Live Iqbal MD  Date:    06/28/2024  Time:    18:48  CT Thoracic Spine Without Contrast  Narrative: EXAMINATION:  CT THORACIC SPINE WITHOUT CONTRAST    CLINICAL HISTORY:  Mid-back pain, compression fracture suspected;    TECHNIQUE:  2 mm axial images were acquired of the thoracic spine without IV contrast.  Sagittal coronal reformatted images were reviewed.    COMPARISON:  Thoracic spine radiograph series 10/18/2021.    FINDINGS:  There is a mild anterior wedge compression fracture deformity of the T7 vertebral body, likely chronic and corresponding to the abnormality seen on prior thoracic spine radiograph series of 10/18/2021.  There are minimal superior endplate deformities of the T5 and T8 vertebral bodies with slight height loss, favored to be chronic.    There is mild exaggeration of the normal thoracic kyphosis.  There is a mild rightward curvature of the thoracic spine.  There is mild diffuse heterogeneity  of the visualized osseous structures which may relate to underlying osteopenia.  Clinical correlation is advised.  Presumed osseous hemangioma noted within the T10 vertebral body and the L2 vertebral body.  There is slight retropulsion of the superior cortex of the T7 vertebral body without significant acquired spinal canal stenosis appreciated.  Intervertebral disc space heights are relatively mild maintained with mild degenerative discogenic change present.  There is no evidence of significant bony spinal canal stenosis throughout the thoracic spine.    The visualized lungs demonstrate scattered bandlike opacities at the lung bases suggesting atelectasis or scarring.  There is no significant pleural fluid.  There is no pneumothorax.  The heart is mildly enlarged.  There is 3 vessel coronary artery calcification.    There is a punctate nonobstructing left renal calculus.  Please refer to separate dictated abdominal CT report for intra-abdominal findings.  Impression: Mild anterior wedge compression deformity of the T7 vertebral body, likely chronic corresponding to abnormality on prior thoracic spine radiograph series of 10/18/2021.  Additional minimal superior endplate height loss of the T5 and T8 vertebral bodies favored to be chronic in etiology.    Mild diffuse heterogeneity of the visualized osseous structures which may relate to underlying osteopenia.  Correlation with patient history advised.    Mild degenerative changes of the thoracic spine without evidence of significant bony spinal canal stenosis.    Additional incidental findings as above.    Electronically signed by: Live Iqbal MD  Date:    06/28/2024  Time:    18:46

## 2024-06-29 NOTE — SUBJECTIVE & OBJECTIVE
No current facility-administered medications on file prior to encounter.     Current Outpatient Medications on File Prior to Encounter   Medication Sig    acetaminophen 325 mg Cap Take 650 mg by mouth every 6 (six) hours as needed.    carbidopa-levodopa (RYTARY) 23.75-95 mg CpSR Take 3 capsules by mouth 3 (three) times daily.    cholecalciferol, vitamin D3, (VITAMIN D3) 50 mcg (2,000 unit) Cap capsule Take by mouth once daily.    cyanocobalamin (VITAMIN B-12) 250 MCG tablet Take 250 mcg by mouth once daily.    diclofenac sodium (VOLTAREN) 1 % Gel Apply 4 g topically 4 (four) times daily.    docusate sodium (COLACE) 100 MG capsule Take 1 capsule (100 mg total) by mouth once daily. (Patient taking differently: Take 100 mg by mouth 3 (three) times daily as needed for Constipation.)    food supplemt, lactose-reduced (ENSURE ORIGINAL) Liqd Take 237 mLs by mouth 2 (two) times a day.    glucose 4 GM chewable tablet Take 4 tablets (16 g total) by mouth as needed for Low blood sugar.    ivermectin (SOOLANTRA) 1 % Crea Apply 1 % topically once.    levothyroxine (SYNTHROID) 137 MCG Tab tablet TAKE 1/2 (ONE-HALF) TABLET BY MOUTH BEFORE BREAKFAST    lycopene/lutein/fruit extracts (FRUIT AND VEGETABLE DAILY ORAL) Take 1 Dose by mouth once daily. Patient takes 2 fruit and 1 vegetable balance of nature vitamins in the morning and evening    polyethylene glycol (GLYCOLAX) 17 gram PwPk Take 17 g by mouth once daily.    simethicone (MYLICON) 125 mg Cap capsule Take 1 capsule (125 mg total) by mouth 4 (four) times daily as needed for Flatulence.    traZODone (DESYREL) 50 MG tablet Take 1 tablet (50 mg total) by mouth nightly as needed for Insomnia.    alendronate-vitamin D3 (FOSAMAX PLUS D) 70 mg- 5,600 unit per tablet Take 1 tablet by mouth every 7 days.    azelastine (ASTELIN) 137 mcg (0.1 %) nasal spray 1 spray by Nasal route 2 (two) times daily.    denosumab (PROLIA SUBQ) Inject into the skin every 6 (six) months.    levETIRAcetam  (KEPPRA) 500 MG Tab Take 1 tablet by mouth twice daily       Review of patient's allergies indicates:   Allergen Reactions    Gluten Diarrhea       Past Medical History:   Diagnosis Date    Age-related osteoporosis without current pathological fracture 05/16/2024    Allergy     Diverticulosis     Gluten enteropathy     Gluten intolerance     Hernia     Hypothyroidism     PD (Parkinson's disease) 09/16/2015    Right tremor type    Peptic ulcer disease     Right shoulder pain     Cirtisone injection 3/26/15 - Dr. Tolbert    SBO (small bowel obstruction) 09/17/2019    Seizures     Squamous cell carcinoma of skin     Ulcer      Past Surgical History:   Procedure Laterality Date    ADENOIDECTOMY      COLONOSCOPY  03/01/2012    Dr. Teresa, repeat in 10 years for screening    COLONOSCOPY N/A 2/21/2018    Procedure: COLONOSCOPY;  Surgeon: Radha Deng MD;  Location: [facility];  Service: Endoscopy;  Laterality: N/A;    CORRECTION OF HAMMER TOE Left 9/16/2020    Procedure: CORRECTION, HAMMER TOE;  Surgeon: William Sprague MD;  Location: [facility] OR;  Service: Orthopedics;  Laterality: Left;    COSMETIC SURGERY      Broken nose    ESOPHAGOGASTRODUODENOSCOPY N/A 12/8/2021    Procedure: EGD (ESOPHAGOGASTRODUODENOSCOPY);  Surgeon: Benji Valentino III, MD;  Location: [facility] ENDO;  Service: Endoscopy;  Laterality: N/A;    FRACTURE SURGERY  left wrist    With pin    HEMORRHOID SURGERY      HERNIA REPAIR Left 04/09/2015    Dr Lo; left inguinal    INTRALUMINAL GASTROINTESTINAL TRACT IMAGING VIA CAPSULE N/A 7/10/2018    Procedure: IMAGING, GI TRACT, INTRALUMINAL, VIA CAPSULE;  Surgeon: Radha Deng MD;  Location: [facility];  Service: Endoscopy;  Laterality: N/A;    LYSIS OF ADHESIONS  9/29/2020    Procedure: LYSIS, ADHESIONS;  Surgeon: Eagle Gonzalez MD;  Location: [facility] OR;  Service: General;;    RHINOPLASTY TIP      TONSILLECTOMY      UPPER GASTROINTESTINAL ENDOSCOPY  05/21/2015    Dr. Gomez     Family History        Problem Relation (Age of Onset)    Alcohol abuse Brother    Diabetes Mother, Son    Early death Brother    Heart disease Brother    No Known Problems Father    Obesity Sister          Tobacco Use    Smoking status: Never    Smokeless tobacco: Never   Substance and Sexual Activity    Alcohol use: Yes     Alcohol/week: 11.7 standard drinks of alcohol     Types: 14 Standard drinks or equivalent per week     Comment: 2 glasses wine per dinner    Drug use: No    Sexual activity: Never     Review of Systems   Constitutional:  Positive for fatigue.   Gastrointestinal:  Positive for abdominal distention and diarrhea.   Musculoskeletal:  Positive for back pain.   Neurological:  Positive for headaches.   Psychiatric/Behavioral:  Positive for confusion.    All other systems reviewed and are negative.    Objective:     Vital Signs (Most Recent):  Temp: 98.3 °F (36.8 °C) (06/29/24 0747)  Pulse: 66 (06/29/24 0752)  Resp: 18 (06/29/24 0752)  BP: 109/64 (06/29/24 0747)  SpO2: 95 % (06/29/24 0752) Vital Signs (24h Range):  Temp:  [97.2 °F (36.2 °C)-98.3 °F (36.8 °C)] 98.3 °F (36.8 °C)  Pulse:  [58-71] 66  Resp:  [15-20] 18  SpO2:  [94 %-100 %] 95 %  BP: (104-139)/(51-68) 109/64     Weight: 55.8 kg (123 lb 0.3 oz)  Body mass index is 21.79 kg/m².     Physical Exam  Vitals reviewed.   Constitutional:       General: She is not in acute distress.     Appearance: Normal appearance. She is not toxic-appearing.   HENT:      Head: Normocephalic and atraumatic.      Mouth/Throat:      Mouth: Mucous membranes are moist.      Pharynx: Oropharynx is clear.   Eyes:      Extraocular Movements: Extraocular movements intact.      Pupils: Pupils are equal, round, and reactive to light.   Cardiovascular:      Rate and Rhythm: Normal rate and regular rhythm.      Pulses: Normal pulses.      Heart sounds: Normal heart sounds.   Pulmonary:      Effort: Pulmonary effort is normal.      Breath sounds: Normal breath sounds.   Abdominal:      General: Bowel  sounds are normal. There is no distension.      Palpations: Abdomen is soft.      Tenderness: There is no abdominal tenderness.   Musculoskeletal:         General: No swelling. Normal range of motion.      Cervical back: Normal range of motion and neck supple.   Skin:     General: Skin is warm and dry.      Capillary Refill: Capillary refill takes less than 2 seconds.   Neurological:      General: No focal deficit present.      Mental Status: She is alert and oriented to person, place, and time. Mental status is at baseline.   Psychiatric:         Mood and Affect: Mood normal.         Behavior: Behavior normal.         Judgment: Judgment normal.            I have reviewed all pertinent lab results within the past 24 hours.  CBC:   Recent Labs   Lab 06/29/24  0531   WBC 4.46   RBC 4.26   HGB 13.4   HCT 39.9   *   MCV 94   MCH 31.5*   MCHC 33.6     CMP:   Recent Labs   Lab 06/29/24  0531   GLU 76   CALCIUM 7.9*   ALBUMIN 3.8   PROT 6.0      K 3.1*   CO2 24   *   BUN 9   CREATININE 0.4*   ALKPHOS 63   ALT 11   AST 15   BILITOT 0.6       Significant Diagnostics:  I have reviewed all pertinent imaging results/findings within the past 24 hours.  CT: I have reviewed all pertinent results/findings within the past 24 hours and my personal findings are:  Previously seen dilated loops of small bowel air-filled.

## 2024-06-29 NOTE — ASSESSMENT & PLAN NOTE
Patient has chronic hypothyroidism. TFTs reviewed-   Lab Results   Component Value Date    TSH 2.245 11/14/2023   . Will continue chronic levothyroxine and adjust for and clinical changes.

## 2024-06-29 NOTE — ASSESSMENT & PLAN NOTE
Patient was not clinically obstructed.  She is having bowel movements.  Suspect she is having dehydration from multiple bouts of diarrhea.  Okay for diet at this time.  CT findings are chronic and are not new.  Her abdomen is completely benign on exam.  Spoke with primary team   Diet ordered.

## 2024-06-29 NOTE — ASSESSMENT & PLAN NOTE
Recurrent hx prior surgery  CT report reviewed with re-demonstration of dilated fluid-filled loops of bowel; comparable to January 2024 scan  Declined surgery and NGT  IVF  NPO  prn pain meds and antiemetics  Family request enema in a.m. as has helped in past

## 2024-06-29 NOTE — HPI
78 y/o female w/ pMHx of parkinson's, seizure disorder, hypothyroid, gluten intolerance, recurrent intestinal obstruction, and anemia who presented to the ED with c/o HA, back pain and abdominal discomfort x 3 days. Patient reports that she takes a medication for her bones that seems to worsen her headaches and abdominal symptoms.  Vitals and labs largely unremarkable. Patient declined NGT and surgical intervention. Admitted to hospital medicine service for observation and further evaluation.

## 2024-06-29 NOTE — CONSULTS
[facility]  Adult Nutrition   Consult Note (Initial Assessment)     SUMMARY     Recommendations  1.) Advance PO diet as medically appropriate to gluten-free, low fiber diet.   2.) RD to add Ensure HP daily when diet advanced to provide 160 kcals, 16gm protein.   3.) If unable to advance diet within 48hr, suggest PPN (short-term nutrition).   Suggest Clinimix E @ 60mL/hr to provide 490 kcals, 61gm protein; GIR 0.9.   PPN to meet 35% EEN and 100% EPN.   4.) If long-term nutrition required, suggest custom TPN: suggest rate @70mL/hr: macronutrients: AA 67gm; Dextrose 266gm, 20% SMOF lipids (to infuse over 12 hours) to provide 1672 kcals, 67gm protein; GIR 3.3.   Begin TPN at half rate day 1, increasing rate on day 2.  TPN to meet 100% EEN and EPN.     Goals: 1.) Pt to receive nutrition within 48hr.  Nutrition Goal Status: new    Nutrition Diagnosis PES Statement: Inadequate oral intake related to insufficient means to consume energy as evidenced by NPO.      Dietitian Rounds Brief  Consult received for decreased oral intake. Identified at risk d/t MST 3. Pt presented to the ED with c/o HA, back pain and abdominal discomfort x 3 days. CT report reviewed with re-demonstration of dilated fluid-filled loops of bowel. No NGT or surgery at this time as pt refused. Dtr to bedside reports pt with good appetite up until yesterday morning. She reports pt with little intake and c/o diarrhea. This morning dtr reports pt requesting grits. Dtr also reports pt consumes Ensure Orginal daily. Informed dtr that we only have Ensure HP and she was agreeable to that if diet advanced (vanilla only). Noted intolerance to gluten and other various foods. Food preferences obtained and placed with kitchen staff. LBM 6/28. Dtr denies any recent wt changes. Wt reviewed. UBW 50.3kg (9/2023), CBW 55.8kg. NFPE completed with mild, age appropriate wasting. No malnutriton identified.    Nutrition Related Social Determinants of Health:  "SDOH: Adequate food in home environment    Malnutrition Assessment    Orbital Region (Subcutaneous Fat Loss): mild depletion  Upper Arm Region (Subcutaneous Fat Loss): well nourished  Thoracic and Lumbar Region: well nourished   Yarsanism Region (Muscle Loss): mild depletion  Clavicle Bone Region (Muscle Loss): mild depletion  Clavicle and Acromion Bone Region (Muscle Loss): mild depletion  Scapular Bone Region (Muscle Loss): well nourished  Dorsal Hand (Muscle Loss): well nourished  Patellar Region (Muscle Loss): well nourished  Anterior Thigh Region (Muscle Loss): well nourished  Posterior Calf Region (Muscle Loss): well nourished      Diet order:   Current Diet Order: NPO      Evaluation of Received Nutrient/Fluid Intake  Energy Calories Required: not meeting needs  Protein Required: not meeting needs  Fluid Required: meeting needs  Tolerance: other (see comments) (NPO)     % Intake of Estimated Energy Needs: 0%  % Meal Intake: NPO      Intake/Output Summary (Last 24 hours) at 2024 0928  Last data filed at 2024 0251  Gross per 24 hour   Intake 739.58 ml   Output --   Net 739.58 ml      Anthropometrics  Temp: 98.3 °F (36.8 °C)  Height Method: Stated  Height: 5' 3" (160 cm)  Height (inches): 63 in  Weight Method: Bed Scale  Weight: 55.8 kg (123 lb 0.3 oz)  Weight (lb): 123.02 lb  Ideal Body Weight (IBW), Female: 115 lb  % Ideal Body Weight, Female (lb): 106.97 %  BMI (Calculated): 21.8  BMI Grade: 18.5-24.9 - normal  Usual Body Weight (UBW), k.3 kg (2023)  % Usual Body Weight: 111.17       Estimated/Assessed Needs  Weight Used For Calorie Calculations: 55.8 kg (123 lb 0.3 oz)  Energy Calorie Requirements (kcal): 1609-4819  Energy Need Method: Kcal/kg (25-30)  Protein Requirements: 56-67 (1.0-1.2)  Weight Used For Protein Calculations: 55.8 kg (123 lb 0.3 oz)  Fluid Requirements (mL): 2837-8967     RDA Method (mL): 1395       Reason for Assessment  Reason For Assessment: consult, identified at risk by " screening criteria (MST3)  Diagnosis: gastrointestinal disease  Relevant Medical History: parkinson's, seizure disorder, hypothyroid, gluten intolerance, recurrent intestinal obstruction, and anemia  Nutrition Discharge Planning: Pending hospital course    Nutrition/Diet History  Patient Reported Diet/Restrictions/Preferences: gluten-free  Food Preferences: NO beans, raw vegetables, brown rice, casings, grisely meats  Spiritual, Cultural Beliefs, Buddhist Practices, Values that Affect Care: no  Food Allergies: wheat  Factors Affecting Nutritional Intake: abdominal distension, abdominal pain, altered gastrointestinal function, NPO  Nutrition Support Formula Prior to Admit: Ensure  Nutrtion Support Frequency Prior to Admit: once daily    Nutrition Risk Screen  Nutrition Risk Screen: reduced oral intake over the last month     MST Score: 3  Have you recently lost weight without trying?: Unsure  Weight loss score: 2  Have you been eating poorly because of a decreased appetite?: Yes  Appetite score: 1     Weight History:  Wt Readings from Last 10 Encounters:   06/28/24 55.8 kg (123 lb 0.3 oz)   06/28/24 56.1 kg (123 lb 11.2 oz)   06/25/24 56.7 kg (125 lb)   05/14/24 56.8 kg (125 lb 3.5 oz)   05/14/24 56.8 kg (125 lb 3.5 oz)   03/06/24 54.2 kg (119 lb 7.8 oz)   02/27/24 53.1 kg (117 lb)   01/25/24 53.2 kg (117 lb 4.6 oz)   01/20/24 54.4 kg (120 lb)   11/14/23 51.7 kg (113 lb 15.7 oz)      Lab/Procedures/Meds: Pertinent Labs/Meds Reviewed    Medications:Pertinent Medications Reviewed  Scheduled Meds:   levETIRAcetam (Keppra) IV (PEDS and ADULTS)  500 mg Intravenous Q12H    pantoprazole  40 mg Intravenous Daily    piperacillin-tazobactam (Zosyn) IV (PEDS and ADULTS) (extended infusion is not appropriate)  3.375 g Intravenous Q8H     Continuous Infusions:   0.9% NaCl   Intravenous Continuous 125 mL/hr at 06/28/24 2143 New Bag at 06/28/24 2143     PRN Meds:.  Current Facility-Administered Medications:     acetaminophen, 650  mg, Oral, Q4H PRN    albuterol-ipratropium, 3 mL, Nebulization, Q6H PRN    HYDROmorphone, 0.5 mg, Intravenous, Q6H PRN    magnesium oxide, 800 mg, Oral, PRN    magnesium oxide, 800 mg, Oral, PRN    melatonin, 9 mg, Oral, Nightly PRN    naloxone, 0.02 mg, Intravenous, PRN    ondansetron, 4 mg, Intravenous, Q6H PRN    potassium bicarbonate, 35 mEq, Oral, PRN    potassium bicarbonate, 50 mEq, Oral, PRN    potassium bicarbonate, 60 mEq, Oral, PRN    potassium, sodium phosphates, 2 packet, Oral, PRN    potassium, sodium phosphates, 2 packet, Oral, PRN    potassium, sodium phosphates, 2 packet, Oral, PRN    sodium chloride 0.9%, 2 mL, Intravenous, PRN    Labs: Pertinent Labs Reviewed  Clinical Chemistry:  Recent Labs   Lab 06/28/24  1305 06/29/24  0531    142   K 3.6 3.1*    112*   CO2 24 24   GLU 80 76   BUN 14 9   CREATININE 0.3* 0.4*   CALCIUM 8.7 7.9*   PROT 7.2 6.0   ALBUMIN 4.6 3.8   BILITOT 0.5 0.6   ALKPHOS 77 63   AST 20 15   ALT 12 11   ANIONGAP 8 6*   MG 2.0 1.9   LIPASE 6  --      CBC:   Recent Labs   Lab 06/29/24  0531   WBC 4.46   RBC 4.26   HGB 13.4   HCT 39.9   *   MCV 94   MCH 31.5*   MCHC 33.6     Monitor and Evaluation  Food and Nutrient Intake: energy intake  Food and Nutrient Adminstration: diet order, enteral and parenteral nutrition administration  Knowledge/Beliefs/Attitudes: food and nutrition knowledge/skill  Physical Activity and Function: nutrition-related ADLs and IADLs  Anthropometric Measurements: weight, weight change  Biochemical Data, Medical Tests and Procedures: electrolyte and renal panel, gastrointestinal profile, glucose/endocrine profile  Nutrition-Focused Physical Findings: overall appearance     Nutrition Risk  Level of Risk/Frequency of Follow-up:  (2x/week)     Nutrition Follow-Up  RD Follow-up?: Yes      Tomasa Batista, ANSON 06/29/2024 9:28 AM

## 2024-06-29 NOTE — PLAN OF CARE
Discharge orders and chart reviewed with no further post-acute discharge needs identified at this time.   Pt will discharge home. Daughter notified and will transport Pt home.  At this time, patient is cleared for discharge from Case Management standpoint.         06/29/24 1325   Final Note   Assessment Type Final Discharge Note   Anticipated Discharge Disposition Home   What phone number can be called within the next 1-3 days to see how you are doing after discharge? [phone]   Post-Acute Status   Discharge Delays None known at this time

## 2024-06-29 NOTE — PLAN OF CARE
Problem: Skin Injury Risk Increased  Goal: Skin Health and Integrity  Intervention: Promote and Optimize Oral Intake  Flowsheets (Taken 6/29/2024 0930)  Oral Nutrition Promotion: other (see comments)  Nutrition Interventions: other (see comments)     Problem: Oral Intake Inadequate  Goal: Improved Oral Intake  Outcome: Progressing  Intervention: Promote and Optimize Oral Intake  Flowsheets (Taken 6/29/2024 0930)  Oral Nutrition Promotion: other (see comments)  Nutrition Interventions: other (see comments)     Recommendations  1.) Advance PO diet as medically appropriate to gluten-free, low fiber diet.   2.) RD to add Ensure HP daily when diet advanced to provide 160 kcals, 16gm protein.   3.) If unable to advance diet within 48hr, suggest PPN (short-term nutrition).   Suggest Clinimix E @ 60mL/hr to provide 490 kcals, 61gm protein; GIR 0.9.   PPN to meet 35% EEN and 100% EPN.   4.) If long-term nutrition required, suggest custom TPN: suggest rate @70mL/hr: macronutrients: AA 67gm; Dextrose 266gm, 20% SMOF lipids (to infuse over 12 hours) to provide 1672 kcals, 67gm protein; GIR 3.3.   Begin TPN at half rate day 1, increasing rate on day 2.  TPN to meet 100% EEN and EPN.      Goals: 1.) Pt to receive nutrition within 48hr.  Nutrition Goal Status: new

## 2024-07-01 LAB
BACTERIA UR CULT: NO GROWTH
OHS QRS DURATION: 82 MS
OHS QTC CALCULATION: 440 MS

## 2024-07-01 NOTE — DISCHARGE SUMMARY
On license of [facility]  Discharge Summary      Patient Name: Ariana Tiwari  MRN: [id number]  JAN: [id number]  Patient Class: OP- Observation  Admission Date: 6/28/2024  Hospital Length of Stay: 0 days  Discharge Date and Time: 6/29/2024  2:30 PM  Attending Physician: No att. providers found   Discharging Provider: Alfa Colmenares MD  Primary Care Provider: Nba Petty MD    Primary Care Team: Networked reference to record PCT     HPI:   78 y/o female w/ pMHx of parkinson's, seizure disorder, hypothyroid, gluten intolerance, recurrent intestinal obstruction, and anemia who presented to the ED with c/o HA, back pain and abdominal discomfort x 3 days. Patient reports that she takes a medication for her bones that seems to worsen her headaches and abdominal symptoms.  Vitals and labs largely unremarkable. Patient declined NGT and surgical intervention. Admitted to hospital medicine service for observation and further evaluation.     * No surgery found *      Hospital Course:   Patient was admitted for concerns of SBO. Patient was admitted and was evaluated by General Surgery. Since Patient was having Bms and her abdominal pain had resolved Patient was discharged and asked to follow up with her PCP     Goals of Care Treatment Preferences:  Code Status: Full Code      Consults:   Consults (From admission, onward)          Status Ordering Provider     Inpatient consult to Registered Dietitian/Nutritionist  Once        Provider:  (Not yet assigned)    Completed GRICELDA FUENTES     Inpatient consult to General Surgery  Once        Provider:  Sandra Mcarthur MD    Completed GRICELDA FUENTES            GI  * Recurrent intestinal obstruction  Recurrent hx prior surgery  CT report reviewed with re-demonstration of dilated fluid-filled loops of bowel; comparable to January 2024 scan  Declined surgery and NGT  IVF  NPO  prn pain meds and antiemetics  Family request enema in a.m. as has helped in  past        Final Active Diagnoses:    Diagnosis Date Noted POA    PRINCIPAL PROBLEM:  Recurrent intestinal obstruction [K56.609] 02/03/2020 Yes    Age-related osteoporosis without current pathological fracture [M81.0] 05/16/2024 Yes    Seizure [R56.9] 01/30/2023 Yes    PD (Parkinson's disease) [G20.A1] 09/16/2015 Yes    Hypothyroidism [E03.9]  Yes    Gluten enteropathy [K90.41] 04/11/2014 Yes      Problems Resolved During this Admission:       Discharged Condition: good    Disposition: Home or Self Care    Follow Up:    Patient Instructions:   No discharge procedures on file.    Significant Diagnostic Studies: Labs: CMP   Recent Labs   Lab 06/29/24  0531      K 3.1*   *   CO2 24   GLU 76   BUN 9   CREATININE 0.4*   CALCIUM 7.9*   PROT 6.0   ALBUMIN 3.8   BILITOT 0.6   ALKPHOS 63   AST 15   ALT 11   ANIONGAP 6*    and CBC   Recent Labs   Lab 06/29/24  0531   WBC 4.46   HGB 13.4   HCT 39.9   *       Pending Diagnostic Studies:       None           Medications:  Reconciled Home Medications:      Medication List        CHANGE how you take these medications      docusate sodium 100 MG capsule  Commonly known as: COLACE  Take 1 capsule (100 mg total) by mouth once daily.  What changed:   when to take this  reasons to take this            CONTINUE taking these medications      acetaminophen 325 mg Cap  Take 650 mg by mouth every 6 (six) hours as needed.     alendronate-vitamin D3 70 mg- 5,600 unit per tablet  Commonly known as: FOSAMAX PLUS D  Take 1 tablet by mouth every 7 days.     azelastine 137 mcg (0.1 %) nasal spray  Commonly known as: ASTELIN  1 spray by Nasal route 2 (two) times daily.     cyanocobalamin 250 MCG tablet  Commonly known as: VITAMIN B-12  Take 250 mcg by mouth once daily.     diclofenac sodium 1 % Gel  Commonly known as: VOLTAREN  Apply 4 g topically 4 (four) times daily.     ENSURE ORIGINAL Liqd  Generic drug: food supplemt, lactose-reduced  Take 237 mLs by mouth 2 (two) times a  day.     FRUIT AND VEGETABLE DAILY ORAL  Take 1 Dose by mouth once daily. Patient takes 2 fruit and 1 vegetable balance of nature vitamins in the morning and evening     glucose 4 GM chewable tablet  Take 4 tablets (16 g total) by mouth as needed for Low blood sugar.     ivermectin 1 % Crea  Commonly known as: SOOLANTRA  Apply 1 % topically once.     levETIRAcetam 500 MG Tab  Commonly known as: KEPPRA  Take 1 tablet by mouth twice daily     levothyroxine 137 MCG Tab tablet  Commonly known as: SYNTHROID  TAKE 1/2 (ONE-HALF) TABLET BY MOUTH BEFORE BREAKFAST     polyethylene glycol 17 gram Pwpk  Commonly known as: GLYCOLAX  Take 17 g by mouth once daily.     PROLIA SUBQ  Inject into the skin every 6 (six) months.     RYTARY 23.75-95 mg Cpsr  Generic drug: carbidopa-levodopa  Take 3 capsules by mouth 3 (three) times daily.     simethicone 125 mg Cap capsule  Commonly known as: MYLICON  Take 1 capsule (125 mg total) by mouth 4 (four) times daily as needed for Flatulence.     traZODone 50 MG tablet  Commonly known as: DESYREL  Take 1 tablet (50 mg total) by mouth nightly as needed for Insomnia.     VITAMIN D3 50 mcg (2,000 unit) Cap capsule  Generic drug: cholecalciferol (vitamin D3)  Take by mouth once daily.              Indwelling Lines/Drains at time of discharge:   Lines/Drains/Airways       None                   Time spent on the discharge of patient: 35 minutes         Alfa Colmenares MD  Department of Hospital Medicine  [facility]

## 2024-07-01 NOTE — HOSPITAL COURSE
Patient was admitted for concerns of SBO. Patient was admitted and was evaluated by General Surgery. Since Patient was having Bms and her abdominal pain had resolved Patient was discharged and asked to follow up with her PCP

## 2024-07-03 DIAGNOSIS — Z71.89 COMPLEX CARE COORDINATION: ICD-10-CM

## 2024-07-13 NOTE — TELEPHONE ENCOUNTER
No care due was identified.  Health [facility] Embedded Care Due Messages. Reference number: [id number].   7/13/2024 6:14:04 AM CDT

## 2024-07-15 RX ORDER — TRAZODONE HYDROCHLORIDE 50 MG/1
50 TABLET ORAL NIGHTLY
Qty: 90 TABLET | Refills: 1 | Status: SHIPPED | OUTPATIENT
Start: 2024-07-15

## 2024-09-14 ENCOUNTER — HOSPITAL ENCOUNTER (INPATIENT)
Facility: HOSPITAL | Age: 79
LOS: 2 days | Discharge: HOME HEALTH | DRG: 390 | End: 2024-09-17
Attending: EMERGENCY MEDICINE | Admitting: STUDENT IN AN ORGANIZED HEALTH CARE EDUCATION/TRAINING PROGRAM
Payer: MEDICARE

## 2024-09-14 DIAGNOSIS — K56.609 SMALL BOWEL OBSTRUCTION: ICD-10-CM

## 2024-09-14 DIAGNOSIS — R07.9 CHEST PAIN: ICD-10-CM

## 2024-09-14 DIAGNOSIS — R10.84 GENERALIZED ABDOMINAL PAIN: ICD-10-CM

## 2024-09-14 DIAGNOSIS — K56.7 ILEUS: Primary | ICD-10-CM

## 2024-09-14 LAB
ALBUMIN SERPL BCP-MCNC: 4.6 G/DL (ref 3.5–5.2)
ALP SERPL-CCNC: 98 U/L (ref 55–135)
ALT SERPL W/O P-5'-P-CCNC: 19 U/L (ref 10–44)
ANION GAP SERPL CALC-SCNC: 8 MMOL/L (ref 8–16)
AST SERPL-CCNC: 17 U/L (ref 10–40)
BASOPHILS # BLD AUTO: 0.04 K/UL (ref 0–0.2)
BASOPHILS NFR BLD: 0.7 % (ref 0–1.9)
BILIRUB SERPL-MCNC: 0.5 MG/DL (ref 0.1–1)
BNP SERPL-MCNC: 52 PG/ML (ref 0–99)
BUN SERPL-MCNC: 10 MG/DL (ref 8–23)
CALCIUM SERPL-MCNC: 9.2 MG/DL (ref 8.7–10.5)
CHLORIDE SERPL-SCNC: 104 MMOL/L (ref 95–110)
CO2 SERPL-SCNC: 30 MMOL/L (ref 23–29)
CREAT SERPL-MCNC: 0.4 MG/DL (ref 0.5–1.4)
DIFFERENTIAL METHOD BLD: ABNORMAL
EOSINOPHIL # BLD AUTO: 0.1 K/UL (ref 0–0.5)
EOSINOPHIL NFR BLD: 1.3 % (ref 0–8)
ERYTHROCYTE [DISTWIDTH] IN BLOOD BY AUTOMATED COUNT: 12.9 % (ref 11.5–14.5)
EST. GFR  (NO RACE VARIABLE): >60 ML/MIN/1.73 M^2
GLUCOSE SERPL-MCNC: 105 MG/DL (ref 70–110)
HCT VFR BLD AUTO: 45 % (ref 37–48.5)
HGB BLD-MCNC: 15.3 G/DL (ref 12–16)
IMM GRANULOCYTES # BLD AUTO: 0.01 K/UL (ref 0–0.04)
IMM GRANULOCYTES NFR BLD AUTO: 0.2 % (ref 0–0.5)
LACTATE SERPL-SCNC: 1.6 MMOL/L (ref 0.5–1.9)
LIPASE SERPL-CCNC: 16 U/L (ref 4–60)
LYMPHOCYTES # BLD AUTO: 1.2 K/UL (ref 1–4.8)
LYMPHOCYTES NFR BLD: 19.8 % (ref 18–48)
MCH RBC QN AUTO: 31.9 PG (ref 27–31)
MCHC RBC AUTO-ENTMCNC: 34 G/DL (ref 32–36)
MCV RBC AUTO: 94 FL (ref 82–98)
MONOCYTES # BLD AUTO: 0.4 K/UL (ref 0.3–1)
MONOCYTES NFR BLD: 6.5 % (ref 4–15)
NEUTROPHILS # BLD AUTO: 4.4 K/UL (ref 1.8–7.7)
NEUTROPHILS NFR BLD: 71.5 % (ref 38–73)
NRBC BLD-RTO: 0 /100 WBC
PLATELET # BLD AUTO: 188 K/UL (ref 150–450)
PMV BLD AUTO: 9.7 FL (ref 9.2–12.9)
POTASSIUM SERPL-SCNC: 3.6 MMOL/L (ref 3.5–5.1)
PROT SERPL-MCNC: 7.4 G/DL (ref 6–8.4)
RBC # BLD AUTO: 4.8 M/UL (ref 4–5.4)
SODIUM SERPL-SCNC: 142 MMOL/L (ref 136–145)
TROPONIN I SERPL HS-MCNC: 4.8 PG/ML (ref 0–14.9)
WBC # BLD AUTO: 6.12 K/UL (ref 3.9–12.7)

## 2024-09-14 PROCEDURE — 83690 ASSAY OF LIPASE: CPT | Performed by: EMERGENCY MEDICINE

## 2024-09-14 PROCEDURE — 80053 COMPREHEN METABOLIC PANEL: CPT | Performed by: EMERGENCY MEDICINE

## 2024-09-14 PROCEDURE — 84484 ASSAY OF TROPONIN QUANT: CPT | Performed by: EMERGENCY MEDICINE

## 2024-09-14 PROCEDURE — 96374 THER/PROPH/DIAG INJ IV PUSH: CPT

## 2024-09-14 PROCEDURE — 83036 HEMOGLOBIN GLYCOSYLATED A1C: CPT | Performed by: EMERGENCY MEDICINE

## 2024-09-14 PROCEDURE — 83605 ASSAY OF LACTIC ACID: CPT | Performed by: EMERGENCY MEDICINE

## 2024-09-14 PROCEDURE — 99285 EMERGENCY DEPT VISIT HI MDM: CPT | Mod: 25

## 2024-09-14 PROCEDURE — 25000003 PHARM REV CODE 250: Performed by: EMERGENCY MEDICINE

## 2024-09-14 PROCEDURE — 96361 HYDRATE IV INFUSION ADD-ON: CPT

## 2024-09-14 PROCEDURE — 85025 COMPLETE CBC W/AUTO DIFF WBC: CPT | Performed by: EMERGENCY MEDICINE

## 2024-09-14 PROCEDURE — 83880 ASSAY OF NATRIURETIC PEPTIDE: CPT | Performed by: EMERGENCY MEDICINE

## 2024-09-14 PROCEDURE — 25500020 PHARM REV CODE 255: Performed by: EMERGENCY MEDICINE

## 2024-09-14 PROCEDURE — 63600175 PHARM REV CODE 636 W HCPCS: Performed by: EMERGENCY MEDICINE

## 2024-09-14 RX ORDER — MORPHINE SULFATE 4 MG/ML
4 INJECTION, SOLUTION INTRAMUSCULAR; INTRAVENOUS
Status: COMPLETED | OUTPATIENT
Start: 2024-09-15 | End: 2024-09-15

## 2024-09-14 RX ORDER — ONDANSETRON HYDROCHLORIDE 2 MG/ML
4 INJECTION, SOLUTION INTRAVENOUS
Status: COMPLETED | OUTPATIENT
Start: 2024-09-15 | End: 2024-09-14

## 2024-09-14 RX ADMIN — ONDANSETRON 4 MG: 2 INJECTION INTRAMUSCULAR; INTRAVENOUS at 11:09

## 2024-09-14 RX ADMIN — SODIUM CHLORIDE 500 ML: 9 INJECTION, SOLUTION INTRAVENOUS at 10:09

## 2024-09-14 RX ADMIN — IOHEXOL 100 ML: 350 INJECTION, SOLUTION INTRAVENOUS at 11:09

## 2024-09-15 PROBLEM — K56.609 SMALL BOWEL OBSTRUCTION: Status: ACTIVE | Noted: 2024-09-15

## 2024-09-15 PROBLEM — Z71.89 ACP (ADVANCE CARE PLANNING): Status: ACTIVE | Noted: 2024-09-15

## 2024-09-15 LAB
ALBUMIN SERPL BCP-MCNC: 3.8 G/DL (ref 3.5–5.2)
ALP SERPL-CCNC: 76 U/L (ref 55–135)
ALT SERPL W/O P-5'-P-CCNC: 5 U/L (ref 10–44)
ANION GAP SERPL CALC-SCNC: 7 MMOL/L (ref 8–16)
AST SERPL-CCNC: 13 U/L (ref 10–40)
BASOPHILS # BLD AUTO: 0.02 K/UL (ref 0–0.2)
BASOPHILS NFR BLD: 0.4 % (ref 0–1.9)
BILIRUB SERPL-MCNC: 0.4 MG/DL (ref 0.1–1)
BILIRUB UR QL STRIP: NEGATIVE
BUN SERPL-MCNC: 11 MG/DL (ref 8–23)
CALCIUM SERPL-MCNC: 8.4 MG/DL (ref 8.7–10.5)
CHLORIDE SERPL-SCNC: 108 MMOL/L (ref 95–110)
CLARITY UR: CLEAR
CO2 SERPL-SCNC: 26 MMOL/L (ref 23–29)
COLOR UR: YELLOW
CREAT SERPL-MCNC: 0.3 MG/DL (ref 0.5–1.4)
DIFFERENTIAL METHOD BLD: ABNORMAL
EOSINOPHIL # BLD AUTO: 0 K/UL (ref 0–0.5)
EOSINOPHIL NFR BLD: 0.2 % (ref 0–8)
ERYTHROCYTE [DISTWIDTH] IN BLOOD BY AUTOMATED COUNT: 12.9 % (ref 11.5–14.5)
EST. GFR  (NO RACE VARIABLE): >60 ML/MIN/1.73 M^2
ESTIMATED AVG GLUCOSE: 100 MG/DL (ref 68–131)
GLUCOSE SERPL-MCNC: 105 MG/DL (ref 70–110)
GLUCOSE UR QL STRIP: NEGATIVE
HBA1C MFR BLD: 5.1 % (ref 4.5–6.2)
HCT VFR BLD AUTO: 39.8 % (ref 37–48.5)
HGB BLD-MCNC: 13.1 G/DL (ref 12–16)
HGB UR QL STRIP: NEGATIVE
IMM GRANULOCYTES # BLD AUTO: 0.02 K/UL (ref 0–0.04)
IMM GRANULOCYTES NFR BLD AUTO: 0.4 % (ref 0–0.5)
KETONES UR QL STRIP: NEGATIVE
LACTATE SERPL-SCNC: 0.6 MMOL/L (ref 0.5–1.9)
LEUKOCYTE ESTERASE UR QL STRIP: NEGATIVE
LYMPHOCYTES # BLD AUTO: 1.2 K/UL (ref 1–4.8)
LYMPHOCYTES NFR BLD: 22.5 % (ref 18–48)
MAGNESIUM SERPL-MCNC: 1.8 MG/DL (ref 1.6–2.6)
MCH RBC QN AUTO: 31.3 PG (ref 27–31)
MCHC RBC AUTO-ENTMCNC: 32.9 G/DL (ref 32–36)
MCV RBC AUTO: 95 FL (ref 82–98)
MONOCYTES # BLD AUTO: 0.4 K/UL (ref 0.3–1)
MONOCYTES NFR BLD: 6.7 % (ref 4–15)
NEUTROPHILS # BLD AUTO: 3.8 K/UL (ref 1.8–7.7)
NEUTROPHILS NFR BLD: 69.8 % (ref 38–73)
NITRITE UR QL STRIP: NEGATIVE
NRBC BLD-RTO: 0 /100 WBC
PH UR STRIP: 8 [PH] (ref 5–8)
PHOSPHATE SERPL-MCNC: 3.7 MG/DL (ref 2.7–4.5)
PLATELET # BLD AUTO: 168 K/UL (ref 150–450)
PMV BLD AUTO: 10.1 FL (ref 9.2–12.9)
POCT GLUCOSE: 106 MG/DL (ref 70–110)
POCT GLUCOSE: 90 MG/DL (ref 70–110)
POCT GLUCOSE: 92 MG/DL (ref 70–110)
POCT GLUCOSE: 95 MG/DL (ref 70–110)
POTASSIUM SERPL-SCNC: 3.6 MMOL/L (ref 3.5–5.1)
PROT SERPL-MCNC: 6.2 G/DL (ref 6–8.4)
PROT UR QL STRIP: ABNORMAL
RBC # BLD AUTO: 4.19 M/UL (ref 4–5.4)
SODIUM SERPL-SCNC: 141 MMOL/L (ref 136–145)
SP GR UR STRIP: >1.03 (ref 1–1.03)
TSH SERPL DL<=0.005 MIU/L-ACNC: 2.52 UIU/ML (ref 0.34–5.6)
URN SPEC COLLECT METH UR: ABNORMAL
UROBILINOGEN UR STRIP-ACNC: ABNORMAL EU/DL
WBC # BLD AUTO: 5.38 K/UL (ref 3.9–12.7)

## 2024-09-15 PROCEDURE — 83735 ASSAY OF MAGNESIUM: CPT | Performed by: STUDENT IN AN ORGANIZED HEALTH CARE EDUCATION/TRAINING PROGRAM

## 2024-09-15 PROCEDURE — 83605 ASSAY OF LACTIC ACID: CPT | Performed by: STUDENT IN AN ORGANIZED HEALTH CARE EDUCATION/TRAINING PROGRAM

## 2024-09-15 PROCEDURE — 96376 TX/PRO/DX INJ SAME DRUG ADON: CPT

## 2024-09-15 PROCEDURE — 36415 COLL VENOUS BLD VENIPUNCTURE: CPT | Performed by: EMERGENCY MEDICINE

## 2024-09-15 PROCEDURE — 36415 COLL VENOUS BLD VENIPUNCTURE: CPT | Performed by: STUDENT IN AN ORGANIZED HEALTH CARE EDUCATION/TRAINING PROGRAM

## 2024-09-15 PROCEDURE — 25000003 PHARM REV CODE 250: Performed by: STUDENT IN AN ORGANIZED HEALTH CARE EDUCATION/TRAINING PROGRAM

## 2024-09-15 PROCEDURE — 84100 ASSAY OF PHOSPHORUS: CPT | Performed by: STUDENT IN AN ORGANIZED HEALTH CARE EDUCATION/TRAINING PROGRAM

## 2024-09-15 PROCEDURE — 80053 COMPREHEN METABOLIC PANEL: CPT | Performed by: STUDENT IN AN ORGANIZED HEALTH CARE EDUCATION/TRAINING PROGRAM

## 2024-09-15 PROCEDURE — 85025 COMPLETE CBC W/AUTO DIFF WBC: CPT | Performed by: STUDENT IN AN ORGANIZED HEALTH CARE EDUCATION/TRAINING PROGRAM

## 2024-09-15 PROCEDURE — 63600175 PHARM REV CODE 636 W HCPCS: Performed by: EMERGENCY MEDICINE

## 2024-09-15 PROCEDURE — 81003 URINALYSIS AUTO W/O SCOPE: CPT | Performed by: EMERGENCY MEDICINE

## 2024-09-15 PROCEDURE — 82962 GLUCOSE BLOOD TEST: CPT

## 2024-09-15 PROCEDURE — 84443 ASSAY THYROID STIM HORMONE: CPT | Performed by: STUDENT IN AN ORGANIZED HEALTH CARE EDUCATION/TRAINING PROGRAM

## 2024-09-15 PROCEDURE — 96375 TX/PRO/DX INJ NEW DRUG ADDON: CPT

## 2024-09-15 PROCEDURE — 99223 1ST HOSP IP/OBS HIGH 75: CPT | Mod: ,,, | Performed by: STUDENT IN AN ORGANIZED HEALTH CARE EDUCATION/TRAINING PROGRAM

## 2024-09-15 PROCEDURE — 25000003 PHARM REV CODE 250: Performed by: FAMILY MEDICINE

## 2024-09-15 PROCEDURE — 63600175 PHARM REV CODE 636 W HCPCS: Performed by: STUDENT IN AN ORGANIZED HEALTH CARE EDUCATION/TRAINING PROGRAM

## 2024-09-15 PROCEDURE — 12000002 HC ACUTE/MED SURGE SEMI-PRIVATE ROOM

## 2024-09-15 RX ORDER — TRAZODONE HYDROCHLORIDE 50 MG/1
50 TABLET ORAL NIGHTLY
Status: DISCONTINUED | OUTPATIENT
Start: 2024-09-15 | End: 2024-09-17 | Stop reason: HOSPADM

## 2024-09-15 RX ORDER — LEVETIRACETAM 5 MG/ML
500 INJECTION INTRAVASCULAR EVERY 12 HOURS
Status: DISCONTINUED | OUTPATIENT
Start: 2024-09-15 | End: 2024-09-17 | Stop reason: HOSPADM

## 2024-09-15 RX ORDER — NALOXONE HCL 0.4 MG/ML
0.02 VIAL (ML) INJECTION
Status: DISCONTINUED | OUTPATIENT
Start: 2024-09-15 | End: 2024-09-17 | Stop reason: HOSPADM

## 2024-09-15 RX ORDER — LEVETIRACETAM 500 MG/1
500 TABLET ORAL 2 TIMES DAILY
Status: DISCONTINUED | OUTPATIENT
Start: 2024-09-15 | End: 2024-09-15

## 2024-09-15 RX ORDER — SODIUM CHLORIDE, SODIUM LACTATE, POTASSIUM CHLORIDE, CALCIUM CHLORIDE 600; 310; 30; 20 MG/100ML; MG/100ML; MG/100ML; MG/100ML
INJECTION, SOLUTION INTRAVENOUS CONTINUOUS
Status: ACTIVE | OUTPATIENT
Start: 2024-09-15 | End: 2024-09-17

## 2024-09-15 RX ORDER — IBUPROFEN 200 MG
16 TABLET ORAL
Status: DISCONTINUED | OUTPATIENT
Start: 2024-09-15 | End: 2024-09-17 | Stop reason: HOSPADM

## 2024-09-15 RX ORDER — GLUCAGON 1 MG
1 KIT INJECTION
Status: DISCONTINUED | OUTPATIENT
Start: 2024-09-15 | End: 2024-09-17 | Stop reason: HOSPADM

## 2024-09-15 RX ORDER — CHOLECALCIFEROL (VITAMIN D3) 10 MCG
1200 TABLET ORAL DAILY
Status: DISCONTINUED | OUTPATIENT
Start: 2024-09-15 | End: 2024-09-17 | Stop reason: HOSPADM

## 2024-09-15 RX ORDER — SODIUM CHLORIDE 0.9 % (FLUSH) 0.9 %
10 SYRINGE (ML) INJECTION EVERY 12 HOURS PRN
Status: DISCONTINUED | OUTPATIENT
Start: 2024-09-15 | End: 2024-09-17 | Stop reason: HOSPADM

## 2024-09-15 RX ORDER — HYDROMORPHONE HYDROCHLORIDE 1 MG/ML
0.2 INJECTION, SOLUTION INTRAMUSCULAR; INTRAVENOUS; SUBCUTANEOUS EVERY 6 HOURS PRN
Status: ACTIVE | OUTPATIENT
Start: 2024-09-15 | End: 2024-09-16

## 2024-09-15 RX ORDER — ENOXAPARIN SODIUM 100 MG/ML
40 INJECTION SUBCUTANEOUS EVERY 24 HOURS
Status: DISCONTINUED | OUTPATIENT
Start: 2024-09-15 | End: 2024-09-17 | Stop reason: HOSPADM

## 2024-09-15 RX ORDER — CHOLECALCIFEROL (VITAMIN D3) 10 MCG
1200 TABLET ORAL DAILY
Status: DISCONTINUED | OUTPATIENT
Start: 2024-09-15 | End: 2024-09-15

## 2024-09-15 RX ORDER — ONDANSETRON HYDROCHLORIDE 2 MG/ML
4 INJECTION, SOLUTION INTRAVENOUS EVERY 8 HOURS PRN
Status: DISCONTINUED | OUTPATIENT
Start: 2024-09-15 | End: 2024-09-17 | Stop reason: HOSPADM

## 2024-09-15 RX ORDER — ONDANSETRON HYDROCHLORIDE 2 MG/ML
4 INJECTION, SOLUTION INTRAVENOUS
Status: COMPLETED | OUTPATIENT
Start: 2024-09-15 | End: 2024-09-15

## 2024-09-15 RX ORDER — LANOLIN ALCOHOL/MO/W.PET/CERES
1000 CREAM (GRAM) TOPICAL DAILY
Status: DISCONTINUED | OUTPATIENT
Start: 2024-09-15 | End: 2024-09-17 | Stop reason: HOSPADM

## 2024-09-15 RX ORDER — LEVOTHYROXINE SODIUM ANHYDROUS 100 UG/5ML
45 INJECTION, POWDER, LYOPHILIZED, FOR SOLUTION INTRAVENOUS DAILY
Status: DISCONTINUED | OUTPATIENT
Start: 2024-09-15 | End: 2024-09-17

## 2024-09-15 RX ORDER — IBUPROFEN 200 MG
24 TABLET ORAL
Status: DISCONTINUED | OUTPATIENT
Start: 2024-09-15 | End: 2024-09-17 | Stop reason: HOSPADM

## 2024-09-15 RX ORDER — AZELASTINE 1 MG/ML
1 SPRAY, METERED NASAL 2 TIMES DAILY
Status: DISCONTINUED | OUTPATIENT
Start: 2024-09-15 | End: 2024-09-17 | Stop reason: HOSPADM

## 2024-09-15 RX ADMIN — AZELASTINE HYDROCHLORIDE 137 MCG: 137 SPRAY, METERED NASAL at 08:09

## 2024-09-15 RX ADMIN — MORPHINE SULFATE 4 MG: 4 INJECTION, SOLUTION INTRAMUSCULAR; INTRAVENOUS at 12:09

## 2024-09-15 RX ADMIN — CHOLECALCIFEROL (VITAMIN D3) 10 MCG (400 UNIT) TABLET 1200 UNITS: at 02:09

## 2024-09-15 RX ADMIN — LEVODOPA AND CARBIDOPA 3 CAPSULE: 95; 23.75 CAPSULE, EXTENDED RELEASE ORAL at 08:09

## 2024-09-15 RX ADMIN — LEVETIRACETAM INJECTION 500 MG: 5 INJECTION INTRAVENOUS at 08:09

## 2024-09-15 RX ADMIN — SODIUM CHLORIDE, POTASSIUM CHLORIDE, SODIUM LACTATE AND CALCIUM CHLORIDE: 600; 310; 30; 20 INJECTION, SOLUTION INTRAVENOUS at 04:09

## 2024-09-15 RX ADMIN — LEVETIRACETAM INJECTION 500 MG: 5 INJECTION INTRAVENOUS at 09:09

## 2024-09-15 RX ADMIN — LEVOTHYROXINE SODIUM ANHYDROUS 45 MCG: 100 INJECTION, POWDER, LYOPHILIZED, FOR SOLUTION INTRAVENOUS at 08:09

## 2024-09-15 RX ADMIN — LEVODOPA AND CARBIDOPA 3 CAPSULE: 95; 23.75 CAPSULE, EXTENDED RELEASE ORAL at 02:09

## 2024-09-15 RX ADMIN — ONDANSETRON 4 MG: 2 INJECTION INTRAMUSCULAR; INTRAVENOUS at 01:09

## 2024-09-15 RX ADMIN — SODIUM CHLORIDE, POTASSIUM CHLORIDE, SODIUM LACTATE AND CALCIUM CHLORIDE: 600; 310; 30; 20 INJECTION, SOLUTION INTRAVENOUS at 03:09

## 2024-09-15 NOTE — PLAN OF CARE
[facility]  Initial Discharge Assessment       Primary Care Provider: Nba Petty MD    Admission Diagnosis: Small bowel obstruction [K56.609]    Admission Date: 9/14/2024  Expected Discharge Date:     Transition of Care Barriers: None    Payor: MEDICARE / Plan: MEDICARE PART A & B / Product Type: Government /     Extended Emergency Contact Information  Primary Emergency Contact: TeJe  Address: [address] [place] [address] [place]  Mobile Phone: [phone]  Relation: Son  Preferred language: English   needed? No  Secondary Emergency Contact: Micaela Tiwari  Address: [address] [place] [address] [place]  Mobile Phone: [phone]  Relation: Relative  Preferred language: English   needed? No    Discharge Plan A: Home  Discharge Plan B: Home with family      [facility] [address]  [address] [address]  Phone: [phone] Fax: [phone]    [facility]  [address] [place] [address]  Phone: [phone] Fax: [phone]    Assessment completed at bedside with patient and her daughter in law.  Patient lives with her Son Je, who is her NOK.  Patient gets transported by son and dil whenever she needs to.  Patient has rollator, handrails, liftchairs, adjustable bed, electric wheelchair, bp cuff.   Patient is established with Nba Petty.  Anticipating discharge home with family.       Initial Assessment (most recent)       Adult Discharge Assessment - 09/15/24 1050          Discharge Assessment    Assessment Type Discharge Planning Assessment     Confirmed/corrected address, phone number and insurance Yes     Confirmed Demographics Correct on Facesheet     Source of Information patient     When was your last doctors appointment? 06/28/24     Reason For Admission small bowel obstruction     People in Home child(malka), adult      Facility Arrived From: home     Do you expect to return to your current living situation? Yes     Do you have help at home or someone to help you manage your care at home? Yes     Who are your caregiver(s) and their phone number(s)? Je Tiwari- [phone]     Prior to hospitilization cognitive status: Alert/Oriented     Current cognitive status: Alert/Oriented     Walking or Climbing Stairs Difficulty yes     Walking or Climbing Stairs ambulation difficulty, requires equipment     Dressing/Bathing Difficulty yes     Dressing/Bathing bathing difficulty, requires equipment     Home Accessibility wheelchair accessible     Equipment Currently Used at Home rollator;power chair;grab bar;other (see comments)   lift chairs, adjustable bed.    Readmission within 30 days? No     Patient currently being followed by outpatient case management? No     Do you currently have service(s) that help you manage your care at home? No     Do you take prescription medications? Yes     Do you have prescription coverage? Yes     [place]     Do you have any problems affording any of your prescribed medications? No     Is the patient taking medications as prescribed? yes     How do you get to doctors appointments? family or friend will provide     Are you on dialysis? No     Do you take coumadin? No     Discharge Plan A Home     Discharge Plan B Home with family     DME Needed Upon Discharge  none     Discharge Plan discussed with: Adult children     Transition of Care Barriers None        Physical Activity    On average, how many days per week do you engage in moderate to strenuous exercise (like a brisk walk)? 0 days        Financial Resource Strain    How hard is it for you to pay for the very basics like food, housing, medical care, and heating? Not hard at all        Housing Stability    In the last 12 months, was there a time when you were not able to pay the mortgage or rent on time? No     At any time in the past 12 months,  were you homeless or living in a shelter (including now)? No        Transportation Needs    Has the lack of transportation kept you from medical appointments, meetings, work or from getting things needed for daily living? No        Food Insecurity    Within the past 12 months, you worried that your food would run out before you got the money to buy more. Never true     Within the past 12 months, the food you bought just didn't last and you didn't have money to get more. Never true        Stress    Do you feel stress - tense, restless, nervous, or anxious, or unable to sleep at night because your mind is troubled all the time - these days? Not at all        Social Isolation    How often do you feel lonely or isolated from those around you?  Never        Alcohol Use    Q1: How often do you have a drink containing alcohol? Never     Q2: How many drinks containing alcohol do you have on a typical day when you are drinking? Patient does not drink     Q3: How often do you have six or more drinks on one occasion? Never        Utilities    In the past 12 months has the electric, gas, oil, or water company threatened to shut off services in your home? No        Health Literacy    How often do you need to have someone help you when you read instructions, pamphlets, or other written material from your doctor or pharmacy? Never

## 2024-09-15 NOTE — NURSING
Nurses Note -- 4 Eyes      9/15/2024   6:13 AM      Skin assessed during: Admit      [] No Altered Skin Integrity Present    []Prevention Measures Documented      [x] Yes- Altered Skin Integrity Present or Discovered   [x] LDA Added if Not in Epic (Describe Wound)   [x] New Altered Skin Integrity was Present on Admit and Documented in LDA   [x] Wound Image Taken    Wound Care Consulted? No    Attending Nurse:  Nehemias Coulter RN/Staff Member:   [id number]

## 2024-09-15 NOTE — H&P
[facility]  History & Physical    Patient Name: Ariana Tiwari  MRN: [id number]  Patient Class: IP- Inpatient  Admission Date: 9/14/2024  Attending Physician: Aleja Flynn MD  Primary Care Provider: Nba Petty MD         Patient information was obtained from patient, ER records, and daughter .     Subjective:     Principal Problem:Small bowel obstruction    Chief Complaint:   Chief Complaint   Patient presents with    Abdominal Pain    Diarrhea    Emesis     Patient c/o abdominal pain, diarrhea, and vomiting. She states that this is how she felt when she had an obstruction        HPI: 79-year-old woman with past medical history of Parkinson disease, seizure, hypothyroidism and small-bowel obstruction that presents to the ED for evaluation of 3 days history of progressive abdominal pain, abdominal distention and nausea.  Patient discloses splitting out for those no vomiting, diarrhea, fever, or chills.  On presentation to the ED patient was afebrile, hemodynamically stable, maintaining oxygen saturation on room air.  The lab was unremarkable.  CT abdomen pelvis showed significant swirling of the mesentery contributing to small-bowel obstruction and vascular congestive changes with mesenteric edema and luminal narrowing of the SMA concerning for compromise of the vascular pedicle, distended stomach, proximal loops of small bowel as well as stage renal with transition point involving the proximal jejunum.  In the ED patient was given IV morphine and Zofran along with 500 cc of normal saline.  Hospital medicine consulted for admission.    Past Medical History:   Diagnosis Date    ACP (advance care planning) 9/15/2024    Age-related osteoporosis without current pathological fracture 05/16/2024    Allergy     Diverticulosis     Gluten enteropathy     Gluten intolerance     Hernia     Hypothyroidism     PD (Parkinson's disease) 09/16/2015    Right tremor type    Peptic ulcer  disease     Right shoulder pain     Cirtisone injection 3/26/15 - Dr. Tolbert    SBO (small bowel obstruction) 09/17/2019    Seizures     Squamous cell carcinoma of skin     Ulcer        Past Surgical History:   Procedure Laterality Date    ADENOIDECTOMY      COLONOSCOPY  03/01/2012    Dr. Teresa, repeat in 10 years for screening    COLONOSCOPY N/A 2/21/2018    Procedure: COLONOSCOPY;  Surgeon: Radha Deng MD;  Location: [facility] ENDO;  Service: Endoscopy;  Laterality: N/A;    CORRECTION OF HAMMER TOE Left 9/16/2020    Procedure: CORRECTION, HAMMER TOE;  Surgeon: William Sprague MD;  Location: [facility] OR;  Service: Orthopedics;  Laterality: Left;    COSMETIC SURGERY      Broken nose    ESOPHAGOGASTRODUODENOSCOPY N/A 12/8/2021    Procedure: EGD (ESOPHAGOGASTRODUODENOSCOPY);  Surgeon: Benji Valentino III, MD;  Location: [facility];  Service: Endoscopy;  Laterality: N/A;    FRACTURE SURGERY  left wrist    With pin    HEMORRHOID SURGERY      HERNIA REPAIR Left 04/09/2015    Dr Lo; left inguinal    INTRALUMINAL GASTROINTESTINAL TRACT IMAGING VIA CAPSULE N/A 7/10/2018    Procedure: IMAGING, GI TRACT, INTRALUMINAL, VIA CAPSULE;  Surgeon: Radha Deng MD;  Location: [facility] ENDO;  Service: Endoscopy;  Laterality: N/A;    LYSIS OF ADHESIONS  9/29/2020    Procedure: LYSIS, ADHESIONS;  Surgeon: Eagle Gonzalez MD;  Location: [facility];  Service: General;;    RHINOPLASTY TIP      TONSILLECTOMY      UPPER GASTROINTESTINAL ENDOSCOPY  05/21/2015    Dr. Gomez       Review of patient's allergies indicates:   Allergen Reactions    Bactrim [sulfamethoxazole-trimethoprim] Shortness Of Breath    Gluten Diarrhea    Phenergan [promethazine] Hallucinations       No current facility-administered medications on file prior to encounter.     Current Outpatient Medications on File Prior to Encounter   Medication Sig    acetaminophen 325 mg Cap Take 650 mg by mouth every 6 (six) hours as needed.    azelastine (ASTELIN) 137 mcg (0.1  %) nasal spray 1 spray by Nasal route 2 (two) times daily.    carbidopa-levodopa (RYTARY) 23.75-95 mg CpSR Take 3 capsules by mouth 3 (three) times daily. (Patient taking differently: Take 4 capsules by mouth 3 (three) times daily.)    cholecalciferol, vitamin D3, (VITAMIN D3) 50 mcg (2,000 unit) Cap capsule Take by mouth once daily.    docusate sodium (COLACE) 100 MG capsule Take 1 capsule (100 mg total) by mouth once daily.    levETIRAcetam (KEPPRA) 500 MG Tab Take 1 tablet by mouth twice daily    levothyroxine (SYNTHROID) 137 MCG Tab tablet TAKE 1/2 (ONE-HALF) TABLET BY MOUTH BEFORE BREAKFAST    lycopene/lutein/fruit extracts (FRUIT AND VEGETABLE DAILY ORAL) Take 1 Dose by mouth once daily. Patient takes 2 fruit and 1 vegetable balance of nature vitamins in the morning and evening    polyethylene glycol (GLYCOLAX) 17 gram PwPk Take 17 g by mouth once daily.    simethicone (MYLICON) 125 mg Cap capsule Take 1 capsule (125 mg total) by mouth 4 (four) times daily as needed for Flatulence.    alendronate-vitamin D3 (FOSAMAX PLUS D) 70 mg- 5,600 unit per tablet Take 1 tablet by mouth every 7 days.    cyanocobalamin (VITAMIN B-12) 250 MCG tablet Take 250 mcg by mouth once daily.    denosumab (PROLIA SUBQ) Inject into the skin every 6 (six) months.    diclofenac sodium (VOLTAREN) 1 % Gel Apply 4 g topically 4 (four) times daily.    food supplemt, lactose-reduced (ENSURE ORIGINAL) Liqd Take 237 mLs by mouth 2 (two) times a day.    glucose 4 GM chewable tablet Take 4 tablets (16 g total) by mouth as needed for Low blood sugar.    ivermectin (SOOLANTRA) 1 % Crea Apply 1 % topically once.    traZODone (DESYREL) 50 MG tablet TAKE 1 TABLET BY MOUTH NIGHTLY AS NEEDED FOR INSOMNIA     Family History       Problem Relation (Age of Onset)    Alcohol abuse Brother    Diabetes Mother, Son    Early death Brother    Heart disease Brother    No Known Problems Father    Obesity Sister          Tobacco Use    Smoking status: Never     Smokeless tobacco: Never   Substance and Sexual Activity    Alcohol use: Yes     Alcohol/week: 11.7 standard drinks of alcohol     Types: 14 Standard drinks or equivalent per week     Comment: 2 glasses wine per dinner    Drug use: No    Sexual activity: Never     Review of Systems   Constitutional:  Negative for chills, fatigue and fever.   Respiratory:  Negative for cough, shortness of breath and wheezing.    Cardiovascular:  Negative for chest pain.   Gastrointestinal:  Positive for abdominal pain and nausea. Negative for constipation and vomiting.   Genitourinary:  Negative for dysuria.   Skin:  Negative for color change.   Neurological:  Positive for tremors.   Psychiatric/Behavioral:  Negative for agitation and confusion.      Objective:     Vital Signs (Most Recent):  Temp: 98.1 °F (36.7 °C) (09/15/24 0345)  Pulse: 78 (09/15/24 0345)  Resp: 18 (09/15/24 0345)  BP: 129/61 (09/15/24 0345)  SpO2: (!) 93 % (09/15/24 0345) Vital Signs (24h Range):  Temp:  [98.1 °F (36.7 °C)-98.2 °F (36.8 °C)] 98.1 °F (36.7 °C)  Pulse:  [78-83] 78  Resp:  [18-20] 18  SpO2:  [93 %-97 %] 93 %  BP: (129-170)/(61-92) 129/61     Weight: 59.8 kg (131 lb 13.4 oz)  Body mass index is 22.99 kg/m².     Physical Exam  Vitals reviewed.   Constitutional:       General: She is not in acute distress.     Appearance: She is not ill-appearing.   HENT:      Head: Normocephalic and atraumatic.   Eyes:      Conjunctiva/sclera: Conjunctivae normal.   Cardiovascular:      Rate and Rhythm: Normal rate and regular rhythm.      Pulses: Normal pulses.      Heart sounds: No murmur heard.  Pulmonary:      Effort: Pulmonary effort is normal. No respiratory distress.      Breath sounds: No wheezing or rhonchi.   Abdominal:      General: There is distension.      Tenderness: There is no abdominal tenderness. There is no guarding or rebound.      Comments: Increase bowel sounds   Musculoskeletal:      Right lower leg: No edema.      Left lower leg: No edema.    Neurological:      General: No focal deficit present.      Mental Status: She is oriented to person, place, and time.   Psychiatric:         Mood and Affect: Mood normal.         Behavior: Behavior normal.                Significant Labs: All pertinent labs within the past 24 hours have been reviewed.  Recent Lab Results         09/15/24  0138   09/14/24  2202        Albumin   4.6       ALP   98       ALT   19       Anion Gap   8       Appearance, UA Clear         AST   17       Baso #   0.04       Basophil %   0.7       Bilirubin (UA) Negative         BILIRUBIN TOTAL   0.5  Comment: For infants and newborns, interpretation of results should be based  on gestational age, weight and in agreement with clinical  observations.    Premature Infant recommended reference ranges:  Up to 24 hours.............<8.0 mg/dL  Up to 48 hours............<12.0 mg/dL  3-5 days..................<15.0 mg/dL  6-29 days.................<15.0 mg/dL         BNP   52  Comment: Values of less than 100 pg/ml are consistent with non-CHF populations.       BUN   10       Calcium   9.2       Chloride   104       CO2   30       Color, UA Yellow         Creatinine   0.4       Differential Method   Automated       eGFR   >60.0       Eos #   0.1       Eos %   1.3       Glucose   105       Glucose, UA Negative         Gran # (ANC)   4.4       Gran %   71.5       Hematocrit   45.0       Hemoglobin   15.3       Immature Grans (Abs)   0.01  Comment: Mild elevation in immature granulocytes is non specific and   can be seen in a variety of conditions including stress response,   acute inflammation, trauma and pregnancy. Correlation with other   laboratory and clinical findings is essential.         Immature Granulocytes   0.2       Ketones, UA Negative         Lactic Acid Level   1.6  Comment: Falsely low lactic acid results can be found in samples   containing >=13.0 mg/dL total bilirubin and/or >=3.5 mg/dL   direct bilirubin.         Leukocyte  Esterase, UA Negative         Lipase   16       Lymph #   1.2       Lymph %   19.8       MCH   31.9       MCHC   34.0       MCV   94       Mono #   0.4       Mono %   6.5       MPV   9.7       NITRITE UA Negative         nRBC   0       Blood, UA Negative         pH, UA 8.0         Platelet Count   188       Potassium   3.6       PROTEIN TOTAL   7.4       Protein, UA Trace  Comment: Recommend a 24 hour urine protein or a urine   protein/creatinine ratio if globulin induced proteinuria is  clinically suspected.           RBC   4.80       RDW   12.9       Sodium   142       Spec Grav UA >1.030         Specimen UA Urine, Clean Catch         Troponin I High Sensitivity   4.8  Comment: Troponin results differ between methods. Do not use   results between Troponin methods interchangeably.    Alkaline Phospatase levels above 400 U/L may   cause false positive results.    Access hsTnI should not be used for patients taking   Asfotase yang (Strensiq).         UROBILINOGEN UA 2.0-3.0         WBC   6.12               Significant Imaging: I have reviewed all pertinent imaging results/findings within the past 24 hours.    Narrative & Impression  EXAMINATION:  CT ABDOMEN PELVIS WITH IV CONTRAST     CLINICAL HISTORY:  Bowel obstruction suspected;     TECHNIQUE:  Low dose axial images, sagittal and coronal reformations were obtained from the lung bases to the pubic symphysis following the IV administration of 100 mL of Omnipaque 350 .  Oral contrast was not administered.     COMPARISON:  06/28/2024, 01/27/2023 CT abdomen pelvis     FINDINGS:  Abdomen:     - Lower thorax:Bibasilar scarring/atelectasis is again noted more so in the dependent lower lobes.  There is no acute pleural effusion.  Imaged heart is not significantly enlarged.     -     - Liver: There is decreased density of the liver compatible with hepatic steatosis.  There are 2 less than 1 cm hyper dense/enhancing foci 1 in the left lobe of the liver 1 in the right lobe  which appear stable possibly reflecting hemangiomas similar to the prior examinations.     - Gallbladder: There is no CT evidence of cholelithiasis or cholecystitis.     - Bile Ducts: No evidence of intra or extra hepatic biliary ductal dilation.     - Spleen: Negative.     - Kidneys: Renal cortices enhance symmetrically and homogeneously.  There is no nephrolithiasis or hydronephrosis.     - Adrenals: There is an enlarged left adrenal gland similar prior exams without discrete focal nodule.     - Pancreas: No mass or peripancreatic fat stranding.     - Retroperitoneum:  No significant adenopathy.     - Vascular: There is mild to moderate atherosclerosis in the aorta and iliac arteries.     - Abdominal wall:  Unremarkable.     Bowel: There is relative prominence of the stomach with air-fluid level.  There is significant counter-clockwise swirling of the mesentery greater than 360 degree rotation noted around the SMA axis with luminal narrowing is of the 3rd portion of the duodenum and the also proximal jejunum secondary to the swirling.  There is associated small bowel obstruction of the proximal small bowel loops.  There is also SMA luminal narrowing and mesenteric edematous/hazy changes concerning for vascular compromise around the pedicle.  Congestive changes of mesenteric veins  also noted.  Dilated loops of small bowel are also noted more distally.  There is enhancement of the bowel wall and there is no evidence of bowel pneumatosis or portal venous air at this time noting concern for.  Colon is unremarkable as imaged.  Appendix is not conspicuous.  There is no concerning finding for appendicitis.     Pelvis:     Bladder is unremarkable as imaged.  Uterus and adnexa are unremarkable.  No free fluid is seen.     Bones:  No acute osseous abnormality and no suspicious lytic or blastic lesion.  The spine appears aligned with mild spondylosis noted without compression fracture.  Degenerative changes of the hips  bilaterally.     Impression:     Significant swirling of the mesentery contributing to small-bowel obstruction and vascular congestive changes with mesenteric edema and luminal narrowing of the SMA concerning for compromise of the vascular pedicle.  Stomach and proximal loops of small bowel as well as jejunum are distended with transition point involving the proximal jejunum.     There is enhancement of bowel walls presently with no evidence of pneumatosis, portal venous air, significant free fluid in the abdomen or other acute findings involving the abdomen.     Two and hand sing less than 1 cm lesions in the liver may represent hemangiomas inter stable since prior examination.  Decreased density liver suggests hepatic steatosis.     Please see above discussion for additional incidental nonacute findings.     This report was flagged in Epic as abnormal.     Ordering physician, Dr. Nash and the care team were notified via secure chat at the time of interpretation.        Electronically signed by:Sun Smith  Date:                                            09/15/2024  Time:                                           01:03           Exam Ended: 09/14/24 23:32 CDT Last Resulted: 09/15/24 01:03 CDT           Assessment/Plan:     * Small bowel obstruction  CT abdomen pelvis showed significant swirling of the mesentery contributing to small-bowel obstruction and vascular congestive changes with mesenteric edema and luminal narrowing of the SMA concerning for compromise of the vascular pedicle, distended stomach, proximal loops of small bowel as well as stage renal with transition point involving the proximal jejunum.  Keep NPO  Analgesic as needed for pain  IV fluid  Monitor lactic acid  General surgery consult    ACP (advance care planning)  Discussed with patient and daughter at bedside.  Patient is DNI DNR      Seizure  Switch p.o. Keppra to IV Keppra while patient is NPO  Seizure prophylaxis      PD  (Parkinson's disease)  Continue home dose of carbidopa levodopa      Hypothyroidism  Check TSH  Switch p.o. levothyroxine to IV while patient is NPO        VTE Risk Mitigation (From admission, onward)           Ordered     enoxaparin injection 40 mg  Daily         09/15/24 0253     IP VTE HIGH RISK PATIENT  Once         09/15/24 0253     Place sequential compression device  Until discontinued         09/15/24 0253                                    Aleja Flynn MD  Department of Hospital Medicine  [facility]

## 2024-09-15 NOTE — ED PROVIDER NOTES
Encounter Date: 9/14/2024       History     Chief Complaint   Patient presents with    Abdominal Pain    Diarrhea    Emesis     Patient c/o abdominal pain, diarrhea, and vomiting. She states that this is how she felt when she had an obstruction     79-year-old female with past medical history significant of peptic ulcer disease, SBO, seizures, Parkinson's disease presents secondary to abdominal pain.  Patient has had small-bowel obstructions in the past and states it feels as if she is having it again.  She reports diarrhea and nausea but no vomiting.  Patient is otherwise stable and has no other complaints.      Review of patient's allergies indicates:   Allergen Reactions    Bactrim [sulfamethoxazole-trimethoprim] Shortness Of Breath    Gluten Diarrhea    Phenergan [promethazine] Hallucinations     Past Medical History:   Diagnosis Date    Age-related osteoporosis without current pathological fracture 05/16/2024    Allergy     Diverticulosis     Gluten enteropathy     Gluten intolerance     Hernia     Hypothyroidism     PD (Parkinson's disease) 09/16/2015    Right tremor type    Peptic ulcer disease     Right shoulder pain     Cirtisone injection 3/26/15 - Dr. Tolbert    SBO (small bowel obstruction) 09/17/2019    Seizures     Squamous cell carcinoma of skin     Ulcer      Past Surgical History:   Procedure Laterality Date    ADENOIDECTOMY      COLONOSCOPY  03/01/2012    Dr. Teresa, repeat in 10 years for screening    COLONOSCOPY N/A 2/21/2018    Procedure: COLONOSCOPY;  Surgeon: Radha Deng MD;  Location: [facility];  Service: Endoscopy;  Laterality: N/A;    CORRECTION OF HAMMER TOE Left 9/16/2020    Procedure: CORRECTION, HAMMER TOE;  Surgeon: William Sprague MD;  Location: [facility] OR;  Service: Orthopedics;  Laterality: Left;    COSMETIC SURGERY      Broken nose    ESOPHAGOGASTRODUODENOSCOPY N/A 12/8/2021    Procedure: EGD (ESOPHAGOGASTRODUODENOSCOPY);  Surgeon: Benji Valentino III, MD;  Location:  [facility] ENDO;  Service: Endoscopy;  Laterality: N/A;    FRACTURE SURGERY  left wrist    With pin    HEMORRHOID SURGERY      HERNIA REPAIR Left 04/09/2015    Dr Lo; left inguinal    INTRALUMINAL GASTROINTESTINAL TRACT IMAGING VIA CAPSULE N/A 7/10/2018    Procedure: IMAGING, GI TRACT, INTRALUMINAL, VIA CAPSULE;  Surgeon: Radha Deng MD;  Location: [facility] ENDO;  Service: Endoscopy;  Laterality: N/A;    LYSIS OF ADHESIONS  9/29/2020    Procedure: LYSIS, ADHESIONS;  Surgeon: Eagle Gonzalez MD;  Location: [facility] OR;  Service: General;;    RHINOPLASTY TIP      TONSILLECTOMY      UPPER GASTROINTESTINAL ENDOSCOPY  05/21/2015    Dr. Gomez     Family History   Problem Relation Name Age of Onset    Diabetes Mother      Diabetes Son 3     Obesity Sister 2     Alcohol abuse Brother 2     Heart disease Brother 2     No Known Problems Father      Early death Brother 1         self    Breast cancer Neg Hx      Colon cancer Neg Hx      Ovarian cancer Neg Hx      Colon polyps Neg Hx      Crohn's disease Neg Hx      Ulcerative colitis Neg Hx      Celiac disease Neg Hx       Social History     Tobacco Use    Smoking status: Never    Smokeless tobacco: Never   Substance Use Topics    Alcohol use: Yes     Alcohol/week: 11.7 standard drinks of alcohol     Types: 14 Standard drinks or equivalent per week     Comment: 2 glasses wine per dinner    Drug use: No     Review of Systems   Gastrointestinal:  Positive for abdominal pain, diarrhea and nausea.   All other systems reviewed and are negative.      Physical Exam     Initial Vitals [09/14/24 2141]   BP Pulse Resp Temp SpO2   (!) 170/92 83 20 98.2 °F (36.8 °C) 97 %      MAP       --         Physical Exam    Nursing note and vitals reviewed.  Constitutional: She appears well-developed and well-nourished. She appears distressed.   HENT:   Head: Normocephalic and atraumatic.   Mouth/Throat: Oropharynx is clear and moist.   Eyes: Conjunctivae and EOM are normal. Pupils are equal, round,  and reactive to light.   Neck: No tracheal deviation present. No JVD present.   Normal range of motion.  Cardiovascular:  Normal rate, regular rhythm, normal heart sounds and intact distal pulses.     Exam reveals no gallop and no friction rub.       No murmur heard.  Pulmonary/Chest: Breath sounds normal. No respiratory distress. She has no wheezes. She exhibits no tenderness.   Abdominal: Abdomen is soft. Bowel sounds are normal. She exhibits distension. There is abdominal tenderness. There is no rebound and no guarding.   Musculoskeletal:         General: No tenderness or edema. Normal range of motion.      Cervical back: Normal range of motion.     Neurological: She is alert and oriented to person, place, and time. She has normal strength. No cranial nerve deficit or sensory deficit.   Skin: Skin is warm and dry. Capillary refill takes less than 2 seconds. No erythema.   Psychiatric: She has a normal mood and affect.         ED Course   Procedures  Labs Reviewed   CBC W/ AUTO DIFFERENTIAL - Abnormal       Result Value    WBC 6.12      RBC 4.80      Hemoglobin 15.3      Hematocrit 45.0      MCV 94      MCH 31.9 (*)     MCHC 34.0      RDW 12.9      Platelets 188      MPV 9.7      Immature Granulocytes 0.2      Gran # (ANC) 4.4      Immature Grans (Abs) 0.01      Lymph # 1.2      Mono # 0.4      Eos # 0.1      Baso # 0.04      nRBC 0      Gran % 71.5      Lymph % 19.8      Mono % 6.5      Eosinophil % 1.3      Basophil % 0.7      Differential Method Automated     COMPREHENSIVE METABOLIC PANEL - Abnormal    Sodium 142      Potassium 3.6      Chloride 104      CO2 30 (*)     Glucose 105      BUN 10      Creatinine 0.4 (*)     Calcium 9.2      Total Protein 7.4      Albumin 4.6      Total Bilirubin 0.5      Alkaline Phosphatase 98      AST 17      ALT 19      eGFR >60.0      Anion Gap 8     LIPASE    Lipase 16     LACTIC ACID, PLASMA    Lactate (Lactic Acid) 1.6     TROPONIN I HIGH SENSITIVITY    Troponin I High  Sensitivity 4.8     B-TYPE NATRIURETIC PEPTIDE    BNP 52     URINALYSIS, REFLEX TO URINE CULTURE          Imaging Results               CT Abdomen Pelvis With IV Contrast NO Oral Contrast (Final result)  Result time 09/15/24 01:03:01      Final result by Sun Smith MD (09/15/24 01:03:01)                   Impression:      Significant swirling of the mesentery contributing to small-bowel obstruction and vascular congestive changes with mesenteric edema and luminal narrowing of the SMA concerning for compromise of the vascular pedicle.  Stomach and proximal loops of small bowel as well as jejunum are distended with transition point involving the proximal jejunum.    There is enhancement of bowel walls presently with no evidence of pneumatosis, portal venous air, significant free fluid in the abdomen or other acute findings involving the abdomen.    Two and hand sing less than 1 cm lesions in the liver may represent hemangiomas inter stable since prior examination.  Decreased density liver suggests hepatic steatosis.    Please see above discussion for additional incidental nonacute findings.    This report was flagged in Epic as abnormal.    Ordering physician, Dr. Nash and the care team were notified via secure chat at the time of interpretation.      Electronically signed by: Sun Smith  Date:    09/15/2024  Time:    01:03               Narrative:    EXAMINATION:  CT ABDOMEN PELVIS WITH IV CONTRAST    CLINICAL HISTORY:  Bowel obstruction suspected;    TECHNIQUE:  Low dose axial images, sagittal and coronal reformations were obtained from the lung bases to the pubic symphysis following the IV administration of 100 mL of Omnipaque 350 .  Oral contrast was not administered.    COMPARISON:  06/28/2024, 01/27/2023 CT abdomen pelvis    FINDINGS:  Abdomen:    - Lower thorax:Bibasilar scarring/atelectasis is again noted more so in the dependent lower lobes.  There is no acute pleural effusion.  Imaged heart  is not significantly enlarged.    -    - Liver: There is decreased density of the liver compatible with hepatic steatosis.  There are 2 less than 1 cm hyper dense/enhancing foci 1 in the left lobe of the liver 1 in the right lobe which appear stable possibly reflecting hemangiomas similar to the prior examinations.    - Gallbladder: There is no CT evidence of cholelithiasis or cholecystitis.    - Bile Ducts: No evidence of intra or extra hepatic biliary ductal dilation.    - Spleen: Negative.    - Kidneys: Renal cortices enhance symmetrically and homogeneously.  There is no nephrolithiasis or hydronephrosis.    - Adrenals: There is an enlarged left adrenal gland similar prior exams without discrete focal nodule.    - Pancreas: No mass or peripancreatic fat stranding.    - Retroperitoneum:  No significant adenopathy.    - Vascular: There is mild to moderate atherosclerosis in the aorta and iliac arteries.    - Abdominal wall:  Unremarkable.    Bowel: There is relative prominence of the stomach with air-fluid level.  There is significant counter-clockwise swirling of the mesentery greater than 360 degree rotation noted around the SMA axis with luminal narrowing is of the 3rd portion of the duodenum and the also proximal jejunum secondary to the swirling.  There is associated small bowel obstruction of the proximal small bowel loops.  There is also SMA luminal narrowing and mesenteric edematous/hazy changes concerning for vascular compromise around the pedicle.  Congestive changes of mesenteric veins  also noted.  Dilated loops of small bowel are also noted more distally.  There is enhancement of the bowel wall and there is no evidence of bowel pneumatosis or portal venous air at this time noting concern for.  Colon is unremarkable as imaged.  Appendix is not conspicuous.  There is no concerning finding for appendicitis.    Pelvis:    Bladder is unremarkable as imaged.  Uterus and adnexa are unremarkable.  No free  fluid is seen.    Bones:  No acute osseous abnormality and no suspicious lytic or blastic lesion.  The spine appears aligned with mild spondylosis noted without compression fracture.  Degenerative changes of the hips bilaterally.                                       Medications   sodium chloride 0.9% bolus 500 mL 500 mL (0 mLs Intravenous Stopped 9/14/24 2329)   iohexoL (OMNIPAQUE 350) injection 100 mL (100 mLs Intravenous Given 9/14/24 2327)   morphine injection 4 mg (4 mg Intravenous Given 9/15/24 0000)   ondansetron injection 4 mg (4 mg Intravenous Given 9/14/24 2357)     Medical Decision Making  79-year-old female initial assessment in mild to moderate distress secondary to abdominal pain.  Patient is alert and oriented x3, neurologically and neurovascular intact.  She is nontoxic-appearing and vitals stable at this time.      Differential diagnosis: Constipation, SBO, UTI, diverticulitis    Amount and/or Complexity of Data Reviewed  Labs: ordered. Decision-making details documented in ED Course.  Radiology: ordered. Decision-making details documented in ED Course.    Risk  Prescription drug management.  Risk Details: Patient has been reassessed noted to have no acute changes in her condition.  CT scan does show concern for small-bowel obstruction.  Patient given adequate pain control and be consult to hospitalist for admission.  Patient has remained stable while in the ED and Ms. Tiwari and daughter are aware of the plan and in agreement with admission.    Laboratory results and radiology imaging results reviewed.  Based on the patient's history, physical, and workup here in the emergency department I believe the patient requires admission for a diagnosis of SBO.  I discussed the patient's case with the on-call hospitalist who has agreed to evaluate the patient for admission.  In addition, I discussed the results with the patient and they have verbalized understanding of the results, plan, and need for  admission.    ________________________  RCarmen Nash MD   Emergency Medicine                                        Clinical Impression:  Final diagnoses:  [R10.84] Generalized abdominal pain (Primary)  [K56.609] Small bowel obstruction                 Terrell Nash MD  09/15/24 0122

## 2024-09-15 NOTE — SUBJECTIVE & OBJECTIVE
Past Medical History:   Diagnosis Date    ACP (advance care planning) 9/15/2024    Age-related osteoporosis without current pathological fracture 05/16/2024    Allergy     Diverticulosis     Gluten enteropathy     Gluten intolerance     Hernia     Hypothyroidism     PD (Parkinson's disease) 09/16/2015    Right tremor type    Peptic ulcer disease     Right shoulder pain     Cirtisone injection 3/26/15 - Dr. Tolbert    SBO (small bowel obstruction) 09/17/2019    Seizures     Squamous cell carcinoma of skin     Ulcer        Past Surgical History:   Procedure Laterality Date    ADENOIDECTOMY      COLONOSCOPY  03/01/2012    Dr. Teresa, repeat in 10 years for screening    COLONOSCOPY N/A 2/21/2018    Procedure: COLONOSCOPY;  Surgeon: Radha Deng MD;  Location: [facility];  Service: Endoscopy;  Laterality: N/A;    CORRECTION OF HAMMER TOE Left 9/16/2020    Procedure: CORRECTION, HAMMER TOE;  Surgeon: William Sprague MD;  Location: [facility] OR;  Service: Orthopedics;  Laterality: Left;    COSMETIC SURGERY      Broken nose    ESOPHAGOGASTRODUODENOSCOPY N/A 12/8/2021    Procedure: EGD (ESOPHAGOGASTRODUODENOSCOPY);  Surgeon: Benji Valentino III, MD;  Location: [facility];  Service: Endoscopy;  Laterality: N/A;    FRACTURE SURGERY  left wrist    With pin    HEMORRHOID SURGERY      HERNIA REPAIR Left 04/09/2015    Dr Lo; left inguinal    INTRALUMINAL GASTROINTESTINAL TRACT IMAGING VIA CAPSULE N/A 7/10/2018    Procedure: IMAGING, GI TRACT, INTRALUMINAL, VIA CAPSULE;  Surgeon: Radha Deng MD;  Location: [facility] ENDO;  Service: Endoscopy;  Laterality: N/A;    LYSIS OF ADHESIONS  9/29/2020    Procedure: LYSIS, ADHESIONS;  Surgeon: Eagle Gonzalez MD;  Location: [facility] OR;  Service: General;;    RHINOPLASTY TIP      TONSILLECTOMY      UPPER GASTROINTESTINAL ENDOSCOPY  05/21/2015    Dr. Gomez       Review of patient's allergies indicates:   Allergen Reactions    Bactrim [sulfamethoxazole-trimethoprim] Shortness Of  Breath    Gluten Diarrhea    Phenergan [promethazine] Hallucinations       No current facility-administered medications on file prior to encounter.     Current Outpatient Medications on File Prior to Encounter   Medication Sig    acetaminophen 325 mg Cap Take 650 mg by mouth every 6 (six) hours as needed.    azelastine (ASTELIN) 137 mcg (0.1 %) nasal spray 1 spray by Nasal route 2 (two) times daily.    carbidopa-levodopa (RYTARY) 23.75-95 mg CpSR Take 3 capsules by mouth 3 (three) times daily. (Patient taking differently: Take 4 capsules by mouth 3 (three) times daily.)    cholecalciferol, vitamin D3, (VITAMIN D3) 50 mcg (2,000 unit) Cap capsule Take by mouth once daily.    docusate sodium (COLACE) 100 MG capsule Take 1 capsule (100 mg total) by mouth once daily.    levETIRAcetam (KEPPRA) 500 MG Tab Take 1 tablet by mouth twice daily    levothyroxine (SYNTHROID) 137 MCG Tab tablet TAKE 1/2 (ONE-HALF) TABLET BY MOUTH BEFORE BREAKFAST    lycopene/lutein/fruit extracts (FRUIT AND VEGETABLE DAILY ORAL) Take 1 Dose by mouth once daily. Patient takes 2 fruit and 1 vegetable balance of nature vitamins in the morning and evening    polyethylene glycol (GLYCOLAX) 17 gram PwPk Take 17 g by mouth once daily.    simethicone (MYLICON) 125 mg Cap capsule Take 1 capsule (125 mg total) by mouth 4 (four) times daily as needed for Flatulence.    alendronate-vitamin D3 (FOSAMAX PLUS D) 70 mg- 5,600 unit per tablet Take 1 tablet by mouth every 7 days.    cyanocobalamin (VITAMIN B-12) 250 MCG tablet Take 250 mcg by mouth once daily.    denosumab (PROLIA SUBQ) Inject into the skin every 6 (six) months.    diclofenac sodium (VOLTAREN) 1 % Gel Apply 4 g topically 4 (four) times daily.    food supplemt, lactose-reduced (ENSURE ORIGINAL) Liqd Take 237 mLs by mouth 2 (two) times a day.    glucose 4 GM chewable tablet Take 4 tablets (16 g total) by mouth as needed for Low blood sugar.    ivermectin (SOOLANTRA) 1 % Crea Apply 1 % topically  once.    traZODone (DESYREL) 50 MG tablet TAKE 1 TABLET BY MOUTH NIGHTLY AS NEEDED FOR INSOMNIA     Family History       Problem Relation (Age of Onset)    Alcohol abuse Brother    Diabetes Mother, Son    Early death Brother    Heart disease Brother    No Known Problems Father    Obesity Sister          Tobacco Use    Smoking status: Never    Smokeless tobacco: Never   Substance and Sexual Activity    Alcohol use: Yes     Alcohol/week: 11.7 standard drinks of alcohol     Types: 14 Standard drinks or equivalent per week     Comment: 2 glasses wine per dinner    Drug use: No    Sexual activity: Never     Review of Systems   Constitutional:  Negative for chills, fatigue and fever.   Respiratory:  Negative for cough, shortness of breath and wheezing.    Cardiovascular:  Negative for chest pain.   Gastrointestinal:  Positive for abdominal pain and nausea. Negative for constipation and vomiting.   Genitourinary:  Negative for dysuria.   Skin:  Negative for color change.   Neurological:  Positive for tremors.   Psychiatric/Behavioral:  Negative for agitation and confusion.      Objective:     Vital Signs (Most Recent):  Temp: 98.1 °F (36.7 °C) (09/15/24 0345)  Pulse: 78 (09/15/24 0345)  Resp: 18 (09/15/24 0345)  BP: 129/61 (09/15/24 0345)  SpO2: (!) 93 % (09/15/24 0345) Vital Signs (24h Range):  Temp:  [98.1 °F (36.7 °C)-98.2 °F (36.8 °C)] 98.1 °F (36.7 °C)  Pulse:  [78-83] 78  Resp:  [18-20] 18  SpO2:  [93 %-97 %] 93 %  BP: (129-170)/(61-92) 129/61     Weight: 59.8 kg (131 lb 13.4 oz)  Body mass index is 22.99 kg/m².     Physical Exam  Vitals reviewed.   Constitutional:       General: She is not in acute distress.     Appearance: She is not ill-appearing.   HENT:      Head: Normocephalic and atraumatic.   Eyes:      Conjunctiva/sclera: Conjunctivae normal.   Cardiovascular:      Rate and Rhythm: Normal rate and regular rhythm.      Pulses: Normal pulses.      Heart sounds: No murmur heard.  Pulmonary:      Effort:  Pulmonary effort is normal. No respiratory distress.      Breath sounds: No wheezing or rhonchi.   Abdominal:      General: There is distension.      Tenderness: There is no abdominal tenderness. There is no guarding or rebound.      Comments: Increase bowel sounds   Musculoskeletal:      Right lower leg: No edema.      Left lower leg: No edema.   Neurological:      General: No focal deficit present.      Mental Status: She is oriented to person, place, and time.   Psychiatric:         Mood and Affect: Mood normal.         Behavior: Behavior normal.                Significant Labs: All pertinent labs within the past 24 hours have been reviewed.  Recent Lab Results         09/15/24  0138   09/14/24  2202        Albumin   4.6       ALP   98       ALT   19       Anion Gap   8       Appearance, UA Clear         AST   17       Baso #   0.04       Basophil %   0.7       Bilirubin (UA) Negative         BILIRUBIN TOTAL   0.5  Comment: For infants and newborns, interpretation of results should be based  on gestational age, weight and in agreement with clinical  observations.    Premature Infant recommended reference ranges:  Up to 24 hours.............<8.0 mg/dL  Up to 48 hours............<12.0 mg/dL  3-5 days..................<15.0 mg/dL  6-29 days.................<15.0 mg/dL         BNP   52  Comment: Values of less than 100 pg/ml are consistent with non-CHF populations.       BUN   10       Calcium   9.2       Chloride   104       CO2   30       Color, UA Yellow         Creatinine   0.4       Differential Method   Automated       eGFR   >60.0       Eos #   0.1       Eos %   1.3       Glucose   105       Glucose, UA Negative         Gran # (ANC)   4.4       Gran %   71.5       Hematocrit   45.0       Hemoglobin   15.3       Immature Grans (Abs)   0.01  Comment: Mild elevation in immature granulocytes is non specific and   can be seen in a variety of conditions including stress response,   acute inflammation, trauma and  pregnancy. Correlation with other   laboratory and clinical findings is essential.         Immature Granulocytes   0.2       Ketones, UA Negative         Lactic Acid Level   1.6  Comment: Falsely low lactic acid results can be found in samples   containing >=13.0 mg/dL total bilirubin and/or >=3.5 mg/dL   direct bilirubin.         Leukocyte Esterase, UA Negative         Lipase   16       Lymph #   1.2       Lymph %   19.8       MCH   31.9       MCHC   34.0       MCV   94       Mono #   0.4       Mono %   6.5       MPV   9.7       NITRITE UA Negative         nRBC   0       Blood, UA Negative         pH, UA 8.0         Platelet Count   188       Potassium   3.6       PROTEIN TOTAL   7.4       Protein, UA Trace  Comment: Recommend a 24 hour urine protein or a urine   protein/creatinine ratio if globulin induced proteinuria is  clinically suspected.           RBC   4.80       RDW   12.9       Sodium   142       Spec Grav UA >1.030         Specimen UA Urine, Clean Catch         Troponin I High Sensitivity   4.8  Comment: Troponin results differ between methods. Do not use   results between Troponin methods interchangeably.    Alkaline Phospatase levels above 400 U/L may   cause false positive results.    Access hsTnI should not be used for patients taking   Asfotase yang (Strensiq).         UROBILINOGEN UA 2.0-3.0         WBC   6.12               Significant Imaging: I have reviewed all pertinent imaging results/findings within the past 24 hours.    Narrative & Impression  EXAMINATION:  CT ABDOMEN PELVIS WITH IV CONTRAST     CLINICAL HISTORY:  Bowel obstruction suspected;     TECHNIQUE:  Low dose axial images, sagittal and coronal reformations were obtained from the lung bases to the pubic symphysis following the IV administration of 100 mL of Omnipaque 350 .  Oral contrast was not administered.     COMPARISON:  06/28/2024, 01/27/2023 CT abdomen pelvis     FINDINGS:  Abdomen:     - Lower thorax:Bibasilar  scarring/atelectasis is again noted more so in the dependent lower lobes.  There is no acute pleural effusion.  Imaged heart is not significantly enlarged.     -     - Liver: There is decreased density of the liver compatible with hepatic steatosis.  There are 2 less than 1 cm hyper dense/enhancing foci 1 in the left lobe of the liver 1 in the right lobe which appear stable possibly reflecting hemangiomas similar to the prior examinations.     - Gallbladder: There is no CT evidence of cholelithiasis or cholecystitis.     - Bile Ducts: No evidence of intra or extra hepatic biliary ductal dilation.     - Spleen: Negative.     - Kidneys: Renal cortices enhance symmetrically and homogeneously.  There is no nephrolithiasis or hydronephrosis.     - Adrenals: There is an enlarged left adrenal gland similar prior exams without discrete focal nodule.     - Pancreas: No mass or peripancreatic fat stranding.     - Retroperitoneum:  No significant adenopathy.     - Vascular: There is mild to moderate atherosclerosis in the aorta and iliac arteries.     - Abdominal wall:  Unremarkable.     Bowel: There is relative prominence of the stomach with air-fluid level.  There is significant counter-clockwise swirling of the mesentery greater than 360 degree rotation noted around the SMA axis with luminal narrowing is of the 3rd portion of the duodenum and the also proximal jejunum secondary to the swirling.  There is associated small bowel obstruction of the proximal small bowel loops.  There is also SMA luminal narrowing and mesenteric edematous/hazy changes concerning for vascular compromise around the pedicle.  Congestive changes of mesenteric veins  also noted.  Dilated loops of small bowel are also noted more distally.  There is enhancement of the bowel wall and there is no evidence of bowel pneumatosis or portal venous air at this time noting concern for.  Colon is unremarkable as imaged.  Appendix is not conspicuous.  There is no  concerning finding for appendicitis.     Pelvis:     Bladder is unremarkable as imaged.  Uterus and adnexa are unremarkable.  No free fluid is seen.     Bones:  No acute osseous abnormality and no suspicious lytic or blastic lesion.  The spine appears aligned with mild spondylosis noted without compression fracture.  Degenerative changes of the hips bilaterally.     Impression:     Significant swirling of the mesentery contributing to small-bowel obstruction and vascular congestive changes with mesenteric edema and luminal narrowing of the SMA concerning for compromise of the vascular pedicle.  Stomach and proximal loops of small bowel as well as jejunum are distended with transition point involving the proximal jejunum.     There is enhancement of bowel walls presently with no evidence of pneumatosis, portal venous air, significant free fluid in the abdomen or other acute findings involving the abdomen.     Two and hand sing less than 1 cm lesions in the liver may represent hemangiomas inter stable since prior examination.  Decreased density liver suggests hepatic steatosis.     Please see above discussion for additional incidental nonacute findings.     This report was flagged in Epic as abnormal.     Ordering physician, Dr. Nash and the care team were notified via secure chat at the time of interpretation.        Electronically signed by:Sun Smith  Date:                                            09/15/2024  Time:                                           01:03           Exam Ended: 09/14/24 23:32 CDT Last Resulted: 09/15/24 01:03 CDT

## 2024-09-15 NOTE — CARE UPDATE
Patient had history of multiple bowel obstructions with hospitalizations.  Reviewed labs and imaging.  Surgery saw patient and recommended conservative management. Advance diet as tolerated. May get Gastrografin challenge possibly tomorrow pending clinical assessment.  NPO Sips with meds. Continue IV fluid for now.

## 2024-09-15 NOTE — ASSESSMENT & PLAN NOTE
CT abdomen pelvis showed significant swirling of the mesentery contributing to small-bowel obstruction and vascular congestive changes with mesenteric edema and luminal narrowing of the SMA concerning for compromise of the vascular pedicle, distended stomach, proximal loops of small bowel as well as stage renal with transition point involving the proximal jejunum.  Keep NPO  Analgesic as needed for pain  IV fluid  Monitor lactic acid  General surgery consult

## 2024-09-15 NOTE — HPI
79-year-old woman with past medical history of Parkinson disease, seizure, hypothyroidism and small-bowel obstruction that presents to the ED for evaluation of 3 days history of progressive abdominal pain, abdominal distention and nausea.  Patient discloses splitting out for those no vomiting, diarrhea, fever, or chills.  On presentation to the ED patient was afebrile, hemodynamically stable, maintaining oxygen saturation on room air.  The lab was unremarkable.  CT abdomen pelvis showed significant swirling of the mesentery contributing to small-bowel obstruction and vascular congestive changes with mesenteric edema and luminal narrowing of the SMA concerning for compromise of the vascular pedicle, distended stomach, proximal loops of small bowel as well as stage renal with transition point involving the proximal jejunum.  In the ED patient was given IV morphine and Zofran along with 500 cc of normal saline.  Hospital medicine consulted for admission.

## 2024-09-15 NOTE — HOSPITAL COURSE
79 y.o female with history of parkinson, multiple bowel obstructions with hospitalizations came in with nausea and admitted for recurrent bowl obstruction.  Reviewed labs and imaging.  Surgery saw patient and recommended conservative management. Advance diet as tolerated. May get Gastrografin challenge possibly tomorrow pending clinical assessment.  NPO Sips with meds. Continue IV fluid for now.

## 2024-09-15 NOTE — CONSULTS
[facility]  General Surgery  Consult Note    Inpatient consult to General Surgery  Consult performed by: Martin Last MD  Consult ordered by: Aleja Flynn MD        Subjective:     Chief Complaint/Reason for Admission:  Possible bowel obstruction    History of Present Illness:  This is an 79-year-old female with multiple prior abdominal operations including adhesiolysis for small bowel obstruction.  She presented with 4-5 days of abdominal pain and intermittent nausea.  Patient has a history of Parkinson's.  She is a DNR/DNI.  I was consulted for possible bowel obstruction as well as swirling of the mesentery proximally.  Currently, patient states her symptoms have resolved.  She feels well.  She has been having diarrhea up until today.    No current facility-administered medications on file prior to encounter.     Current Outpatient Medications on File Prior to Encounter   Medication Sig    acetaminophen 325 mg Cap Take 650 mg by mouth every 6 (six) hours as needed.    azelastine (ASTELIN) 137 mcg (0.1 %) nasal spray 1 spray by Nasal route 2 (two) times daily.    carbidopa-levodopa (RYTARY) 23.75-95 mg CpSR Take 3 capsules by mouth 3 (three) times daily. (Patient taking differently: Take 4 capsules by mouth 3 (three) times daily.)    cholecalciferol, vitamin D3, (VITAMIN D3) 50 mcg (2,000 unit) Cap capsule Take by mouth once daily.    docusate sodium (COLACE) 100 MG capsule Take 1 capsule (100 mg total) by mouth once daily.    levETIRAcetam (KEPPRA) 500 MG Tab Take 1 tablet by mouth twice daily    levothyroxine (SYNTHROID) 137 MCG Tab tablet TAKE 1/2 (ONE-HALF) TABLET BY MOUTH BEFORE BREAKFAST    lycopene/lutein/fruit extracts (FRUIT AND VEGETABLE DAILY ORAL) Take 1 Dose by mouth once daily. Patient takes 2 fruit and 1 vegetable balance of nature vitamins in the morning and evening    polyethylene glycol (GLYCOLAX) 17 gram PwPk Take 17 g by mouth once daily.    simethicone (MYLICON) 125 mg  Cap capsule Take 1 capsule (125 mg total) by mouth 4 (four) times daily as needed for Flatulence.    alendronate-vitamin D3 (FOSAMAX PLUS D) 70 mg- 5,600 unit per tablet Take 1 tablet by mouth every 7 days.    cyanocobalamin (VITAMIN B-12) 250 MCG tablet Take 250 mcg by mouth once daily.    denosumab (PROLIA SUBQ) Inject into the skin every 6 (six) months.    diclofenac sodium (VOLTAREN) 1 % Gel Apply 4 g topically 4 (four) times daily.    food supplemt, lactose-reduced (ENSURE ORIGINAL) Liqd Take 237 mLs by mouth 2 (two) times a day.    glucose 4 GM chewable tablet Take 4 tablets (16 g total) by mouth as needed for Low blood sugar.    ivermectin (SOOLANTRA) 1 % Crea Apply 1 % topically once.    traZODone (DESYREL) 50 MG tablet TAKE 1 TABLET BY MOUTH NIGHTLY AS NEEDED FOR INSOMNIA       Review of patient's allergies indicates:   Allergen Reactions    Bactrim [sulfamethoxazole-trimethoprim] Shortness Of Breath    Gluten Diarrhea    Phenergan [promethazine] Hallucinations       Past Medical History:   Diagnosis Date    ACP (advance care planning) 9/15/2024    Age-related osteoporosis without current pathological fracture 05/16/2024    Allergy     Diverticulosis     Gluten enteropathy     Gluten intolerance     Hernia     Hypothyroidism     PD (Parkinson's disease) 09/16/2015    Right tremor type    Peptic ulcer disease     Right shoulder pain     Cirtisone injection 3/26/15 - Dr. Tolbert    SBO (small bowel obstruction) 09/17/2019    Seizures     Squamous cell carcinoma of skin     Ulcer      Past Surgical History:   Procedure Laterality Date    ADENOIDECTOMY      COLONOSCOPY  03/01/2012    Dr. Teresa, repeat in 10 years for screening    COLONOSCOPY N/A 2/21/2018    Procedure: COLONOSCOPY;  Surgeon: Radha Deng MD;  Location: [facility];  Service: Endoscopy;  Laterality: N/A;    CORRECTION OF HAMMER TOE Left 9/16/2020    Procedure: CORRECTION, HAMMER TOE;  Surgeon: William Sprague MD;  Location: [facility] OR;   Service: Orthopedics;  Laterality: Left;    COSMETIC SURGERY      Broken nose    ESOPHAGOGASTRODUODENOSCOPY N/A 12/8/2021    Procedure: EGD (ESOPHAGOGASTRODUODENOSCOPY);  Surgeon: Benji Valentino III, MD;  Location: [facility] ENDO;  Service: Endoscopy;  Laterality: N/A;    FRACTURE SURGERY  left wrist    With pin    HEMORRHOID SURGERY      HERNIA REPAIR Left 04/09/2015    Dr Lo; left inguinal    INTRALUMINAL GASTROINTESTINAL TRACT IMAGING VIA CAPSULE N/A 7/10/2018    Procedure: IMAGING, GI TRACT, INTRALUMINAL, VIA CAPSULE;  Surgeon: Radha Deng MD;  Location: [facility] ENDO;  Service: Endoscopy;  Laterality: N/A;    LYSIS OF ADHESIONS  9/29/2020    Procedure: LYSIS, ADHESIONS;  Surgeon: Eagle Gonzalez MD;  Location: [facility] OR;  Service: General;;    RHINOPLASTY TIP      TONSILLECTOMY      UPPER GASTROINTESTINAL ENDOSCOPY  05/21/2015    Dr. Gomez     Family History       Problem Relation (Age of Onset)    Alcohol abuse Brother    Diabetes Mother, Son    Early death Brother    Heart disease Brother    No Known Problems Father    Obesity Sister          Tobacco Use    Smoking status: Never    Smokeless tobacco: Never   Substance and Sexual Activity    Alcohol use: Yes     Alcohol/week: 11.7 standard drinks of alcohol     Types: 14 Standard drinks or equivalent per week     Comment: 2 glasses wine per dinner    Drug use: No    Sexual activity: Never     Review of Systems   Constitutional:  Negative for fever.   HENT: Negative.     Eyes: Negative.    Respiratory: Negative.     Cardiovascular: Negative.    Gastrointestinal:  Positive for diarrhea. Negative for abdominal pain and nausea.   Endocrine: Negative.    Genitourinary: Negative.    Musculoskeletal: Negative.    Skin: Negative.    Allergic/Immunologic: Negative.    Neurological: Negative.    Hematological: Negative.    Psychiatric/Behavioral: Negative.       Objective:     Vital Signs (Most Recent):  Temp: 97.5 °F (36.4 °C) (09/15/24 1114)  Pulse: 63 (09/15/24  "1114)  Resp: 18 (09/15/24 1114)  BP: (!) 112/56 (09/15/24 1114)  SpO2: (!) 94 % (09/15/24 1114) Vital Signs (24h Range):  Temp:  [97.5 °F (36.4 °C)-98.2 °F (36.8 °C)] 97.5 °F (36.4 °C)  Pulse:  [63-83] 63  Resp:  [18-20] 18  SpO2:  [93 %-97 %] 94 %  BP: (112-170)/(56-92) 112/56     Weight: 59.8 kg (131 lb 13.4 oz)  Body mass index is 22.99 kg/m².      Intake/Output Summary (Last 24 hours) at 9/15/2024 1412  Last data filed at 9/15/2024 1231  Gross per 24 hour   Intake 92.5 ml   Output --   Net 92.5 ml       Physical Exam  Constitutional:       General: She is not in acute distress.     Appearance: Normal appearance. She is not ill-appearing, toxic-appearing or diaphoretic.   HENT:      Head: Normocephalic.      Nose: Nose normal.   Eyes:      Conjunctiva/sclera: Conjunctivae normal.   Cardiovascular:      Rate and Rhythm: Normal rate and regular rhythm.   Pulmonary:      Effort: Pulmonary effort is normal.   Abdominal:      Palpations: Abdomen is soft.      Comments: Well healed midline incision.  Mild distention.  Abdomen is soft and nontender.   Musculoskeletal:         General: Normal range of motion.      Cervical back: Normal range of motion.   Skin:     General: Skin is warm.   Neurological:      General: No focal deficit present.      Mental Status: She is alert.   Psychiatric:         Mood and Affect: Mood normal.         Significant Labs:  CBC:   Recent Labs   Lab 09/15/24  0635   WBC 5.38   RBC 4.19   HGB 13.1   HCT 39.8      MCV 95   MCH 31.3*   MCHC 32.9     CMP:   Recent Labs   Lab 09/15/24  0635      CALCIUM 8.4*   ALBUMIN 3.8   PROT 6.2      K 3.6   CO2 26      BUN 11   CREATININE 0.3*   ALKPHOS 76   ALT 5*   AST 13   BILITOT 0.4     Coagulation: No results for input(s): "PT", "INR", "APTT" in the last 48 hours.  Lactic Acid:   Recent Labs   Lab 09/15/24  0635   LACTATE 0.6       Significant Diagnostics:  CT scan was reviewed.  There is dilation of the stomach with air and " fluid.  Proximal dilation of the small bowel.  Large segment of swirling of the mesentery.    Assessment/Plan:     Active Diagnoses:    Diagnosis Date Noted POA    PRINCIPAL PROBLEM:  Small bowel obstruction [K56.609] 09/15/2024 Yes    ACP (advance care planning) [Z71.89] 09/15/2024 Not Applicable    Seizure [R56.9] 01/30/2023 Yes    PD (Parkinson's disease) [G20.A1] 09/16/2015 Yes    Hypothyroidism [E03.9]  Yes      Problems Resolved During this Admission:     79-year-old female with prior abdominal surgeries who was told she has extensive scarring intra-abdominally, Parkinson's disease, DNR/DNI.  Admitted for nausea and diarrhea with possible bowel obstruction and swirling of the mesentery proximally seen on CT imaging.  This morning, symptoms have resolved.  She has no abdominal pain currently.      Risks versus benefits of proceeding to the OR for exploration versus more conservative management with observation, observation were discussed.  Patient and family elected to observe for now given improvement in symptoms.  Low suspicion for gut ischemia given normal white blood cell count, lactate, and lack of exam findings currently.    Okay for small sips of liquids today.  If patient stays stable, we will start clear liquids tomorrow.  We will consider Gastrografin challenge possibly tomorrow pending clinical assessment.      Thank you for your consult. I will follow-up with patient. Please contact us if you have any additional questions.    Martin Last MD  General Surgery  [facility]

## 2024-09-16 LAB
ALBUMIN SERPL BCP-MCNC: 3.6 G/DL (ref 3.5–5.2)
ALP SERPL-CCNC: 74 U/L (ref 55–135)
ALT SERPL W/O P-5'-P-CCNC: 7 U/L (ref 10–44)
ANION GAP SERPL CALC-SCNC: 7 MMOL/L (ref 8–16)
AST SERPL-CCNC: 13 U/L (ref 10–40)
BASOPHILS # BLD AUTO: 0.02 K/UL (ref 0–0.2)
BASOPHILS NFR BLD: 0.4 % (ref 0–1.9)
BILIRUB SERPL-MCNC: 0.5 MG/DL (ref 0.1–1)
BUN SERPL-MCNC: 7 MG/DL (ref 8–23)
CALCIUM SERPL-MCNC: 8.5 MG/DL (ref 8.7–10.5)
CHLORIDE SERPL-SCNC: 107 MMOL/L (ref 95–110)
CO2 SERPL-SCNC: 27 MMOL/L (ref 23–29)
CREAT SERPL-MCNC: 0.3 MG/DL (ref 0.5–1.4)
DIFFERENTIAL METHOD BLD: ABNORMAL
EOSINOPHIL # BLD AUTO: 0.1 K/UL (ref 0–0.5)
EOSINOPHIL NFR BLD: 2.2 % (ref 0–8)
ERYTHROCYTE [DISTWIDTH] IN BLOOD BY AUTOMATED COUNT: 12.8 % (ref 11.5–14.5)
EST. GFR  (NO RACE VARIABLE): >60 ML/MIN/1.73 M^2
GLUCOSE SERPL-MCNC: 89 MG/DL (ref 70–110)
HCT VFR BLD AUTO: 39.7 % (ref 37–48.5)
HGB BLD-MCNC: 13.2 G/DL (ref 12–16)
IMM GRANULOCYTES # BLD AUTO: 0.02 K/UL (ref 0–0.04)
IMM GRANULOCYTES NFR BLD AUTO: 0.4 % (ref 0–0.5)
LYMPHOCYTES # BLD AUTO: 1.6 K/UL (ref 1–4.8)
LYMPHOCYTES NFR BLD: 35.2 % (ref 18–48)
MAGNESIUM SERPL-MCNC: 1.8 MG/DL (ref 1.6–2.6)
MCH RBC QN AUTO: 31.1 PG (ref 27–31)
MCHC RBC AUTO-ENTMCNC: 33.2 G/DL (ref 32–36)
MCV RBC AUTO: 93 FL (ref 82–98)
MONOCYTES # BLD AUTO: 0.4 K/UL (ref 0.3–1)
MONOCYTES NFR BLD: 9.4 % (ref 4–15)
NEUTROPHILS # BLD AUTO: 2.4 K/UL (ref 1.8–7.7)
NEUTROPHILS NFR BLD: 52.4 % (ref 38–73)
NRBC BLD-RTO: 0 /100 WBC
PHOSPHATE SERPL-MCNC: 3.4 MG/DL (ref 2.7–4.5)
PLATELET # BLD AUTO: 159 K/UL (ref 150–450)
PMV BLD AUTO: 10 FL (ref 9.2–12.9)
POCT GLUCOSE: 116 MG/DL (ref 70–110)
POCT GLUCOSE: 83 MG/DL (ref 70–110)
POCT GLUCOSE: 89 MG/DL (ref 70–110)
POCT GLUCOSE: 90 MG/DL (ref 70–110)
POCT GLUCOSE: 93 MG/DL (ref 70–110)
POTASSIUM SERPL-SCNC: 3.5 MMOL/L (ref 3.5–5.1)
PROT SERPL-MCNC: 6 G/DL (ref 6–8.4)
RBC # BLD AUTO: 4.25 M/UL (ref 4–5.4)
SODIUM SERPL-SCNC: 141 MMOL/L (ref 136–145)
WBC # BLD AUTO: 4.57 K/UL (ref 3.9–12.7)

## 2024-09-16 PROCEDURE — 12000002 HC ACUTE/MED SURGE SEMI-PRIVATE ROOM

## 2024-09-16 PROCEDURE — 25000003 PHARM REV CODE 250: Performed by: INTERNAL MEDICINE

## 2024-09-16 PROCEDURE — 83735 ASSAY OF MAGNESIUM: CPT | Performed by: STUDENT IN AN ORGANIZED HEALTH CARE EDUCATION/TRAINING PROGRAM

## 2024-09-16 PROCEDURE — 63600175 PHARM REV CODE 636 W HCPCS: Performed by: STUDENT IN AN ORGANIZED HEALTH CARE EDUCATION/TRAINING PROGRAM

## 2024-09-16 PROCEDURE — 85025 COMPLETE CBC W/AUTO DIFF WBC: CPT | Performed by: STUDENT IN AN ORGANIZED HEALTH CARE EDUCATION/TRAINING PROGRAM

## 2024-09-16 PROCEDURE — 84100 ASSAY OF PHOSPHORUS: CPT | Performed by: STUDENT IN AN ORGANIZED HEALTH CARE EDUCATION/TRAINING PROGRAM

## 2024-09-16 PROCEDURE — 36415 COLL VENOUS BLD VENIPUNCTURE: CPT | Performed by: STUDENT IN AN ORGANIZED HEALTH CARE EDUCATION/TRAINING PROGRAM

## 2024-09-16 PROCEDURE — 97161 PT EVAL LOW COMPLEX 20 MIN: CPT

## 2024-09-16 PROCEDURE — 97116 GAIT TRAINING THERAPY: CPT

## 2024-09-16 PROCEDURE — 97165 OT EVAL LOW COMPLEX 30 MIN: CPT

## 2024-09-16 PROCEDURE — 25000003 PHARM REV CODE 250: Performed by: STUDENT IN AN ORGANIZED HEALTH CARE EDUCATION/TRAINING PROGRAM

## 2024-09-16 PROCEDURE — 80053 COMPREHEN METABOLIC PANEL: CPT | Performed by: STUDENT IN AN ORGANIZED HEALTH CARE EDUCATION/TRAINING PROGRAM

## 2024-09-16 PROCEDURE — 63600175 PHARM REV CODE 636 W HCPCS: Performed by: INTERNAL MEDICINE

## 2024-09-16 PROCEDURE — 99232 SBSQ HOSP IP/OBS MODERATE 35: CPT | Mod: ,,, | Performed by: STUDENT IN AN ORGANIZED HEALTH CARE EDUCATION/TRAINING PROGRAM

## 2024-09-16 PROCEDURE — 25000003 PHARM REV CODE 250: Performed by: FAMILY MEDICINE

## 2024-09-16 RX ORDER — ACETAMINOPHEN 325 MG/1
650 TABLET ORAL ONCE
Status: COMPLETED | OUTPATIENT
Start: 2024-09-16 | End: 2024-09-16

## 2024-09-16 RX ORDER — PANTOPRAZOLE SODIUM 40 MG/10ML
40 INJECTION, POWDER, LYOPHILIZED, FOR SOLUTION INTRAVENOUS DAILY
Status: DISCONTINUED | OUTPATIENT
Start: 2024-09-16 | End: 2024-09-17 | Stop reason: HOSPADM

## 2024-09-16 RX ADMIN — AZELASTINE HYDROCHLORIDE 137 MCG: 137 SPRAY, METERED NASAL at 09:09

## 2024-09-16 RX ADMIN — LEVETIRACETAM INJECTION 500 MG: 5 INJECTION INTRAVENOUS at 09:09

## 2024-09-16 RX ADMIN — ACETAMINOPHEN 650 MG: 325 TABLET ORAL at 09:09

## 2024-09-16 RX ADMIN — LEVETIRACETAM INJECTION 500 MG: 5 INJECTION INTRAVENOUS at 08:09

## 2024-09-16 RX ADMIN — CYANOCOBALAMIN TAB 1000 MCG 1000 MCG: 1000 TAB at 09:09

## 2024-09-16 RX ADMIN — CHOLECALCIFEROL (VITAMIN D3) 10 MCG (400 UNIT) TABLET 1200 UNITS: at 09:09

## 2024-09-16 RX ADMIN — PANTOPRAZOLE SODIUM 40 MG: 40 INJECTION, POWDER, FOR SOLUTION INTRAVENOUS at 10:09

## 2024-09-16 RX ADMIN — LEVODOPA AND CARBIDOPA 3 CAPSULE: 95; 23.75 CAPSULE, EXTENDED RELEASE ORAL at 03:09

## 2024-09-16 RX ADMIN — AZELASTINE HYDROCHLORIDE 137 MCG: 137 SPRAY, METERED NASAL at 10:09

## 2024-09-16 RX ADMIN — SODIUM CHLORIDE, POTASSIUM CHLORIDE, SODIUM LACTATE AND CALCIUM CHLORIDE: 600; 310; 30; 20 INJECTION, SOLUTION INTRAVENOUS at 05:09

## 2024-09-16 RX ADMIN — LEVODOPA AND CARBIDOPA 3 CAPSULE: 95; 23.75 CAPSULE, EXTENDED RELEASE ORAL at 09:09

## 2024-09-16 RX ADMIN — LEVOTHYROXINE SODIUM ANHYDROUS 45 MCG: 100 INJECTION, POWDER, LYOPHILIZED, FOR SOLUTION INTRAVENOUS at 08:09

## 2024-09-16 NOTE — ASSESSMENT & PLAN NOTE
CT abdomen pelvis showed significant swirling of the mesentery contributing to small-bowel obstruction and vascular congestive changes with mesenteric edema and luminal narrowing of the SMA concerning for compromise of the vascular pedicle, distended stomach, proximal loops of small bowel as well as stage renal with transition point involving the proximal jejunum.  Advanced to full liquid diet if okay with Dr. Last.  Analgesic as needed for pain  IV fluid  Follow General surgery recommendations.

## 2024-09-16 NOTE — PT/OT/SLP EVAL
Occupational Therapy   Evaluation and Discharge Note    Name: Ariana Tiwari  MRN: [id number]  Admitting Diagnosis: Small bowel obstruction  Recent Surgery: * No surgery found *      Recommendations:     Discharge Recommendations: No Therapy Indicated  Discharge Equipment Recommendations: none  Barriers to discharge:  None    Assessment:     Ariana Tiwari is a 79 y.o. female with a medical diagnosis of Small bowel obstruction. At this time, patient is functioning at their prior level of function and does not require further acute OT services.     Plan:     During this hospitalization, patient does not require further acute OT services.  Please re-consult if situation changes.    Plan of Care Reviewed with: patient, son    Subjective     Chief Complaint: None  Patient/Family Comments/goals: To go home.    Occupational Profile:  Living Environment: lives with son and DIL in 1 story, 10 steps to enter.  Previous level of function: DIL assists with bathing/dressing, Son/DIL assists with ambulation occasionally. Patient able to use rollator to ambulate in the home. Son/DIL assist with household management and transportation.  Roles and Routines: limited homemaker  Equipment Used at home: rollator, lift device, grab bar, power chair (adjustable bed)  Assistance upon Discharge: Son and DIL    Pain/Comfort:  Pain Rating 1: 0/10  Pain Rating Post-Intervention 1: 0/10    Patients cultural, spiritual, Tenriism conflicts given the current situation: no    Objective:     Communicated with: nurse prior to session.  Patient found HOB elevated with telemetry, peripheral IV upon OT entry to room.    General Precautions: Standard, fall  Orthopedic Precautions: N/A  Braces: N/A  Respiratory Status: Room air     Occupational Performance:    Bed Mobility:    Patient completed Scooting/Bridging with minimum assistance  Patient completed Supine to Sit with minimum assistance  Patient completed Sit to Supine with minimum  assistance  Performed unsupported sitting EOB with contact guard assistance.    Functional Mobility/Transfers:  Patient completed Sit <> Stand Transfer with minimum assistance  with  hand-held assist and rolling walker     Activities of Daily Living:  Lower Body Dressing: maximal assistance to don/doff socks sitting EOB.    Cognitive/Visual Perceptual:  Cognitive/Psychosocial Skills:     -       Oriented to: Person   -       Follows Commands/attention:Follows one-step commands  -       Communication: clear/fluent  -       Memory: Impaired STM and Poor immediate recall  -       Safety awareness/insight to disability: impaired   -       Mood/Affect/Coping skills/emotional control: Cooperative and Pleasant  Visual/Perceptual:      -Intact Acuity    Physical Exam:  Balance:    -       Sitting: Contact Guard, Standing: Minimal Assist  Upper Extremity Range of Motion:     -       Right Upper Extremity: WFL  -       Left Upper Extremity: WFL  Upper Extremity Strength:    -       Right Upper Extremity: WFL  -       Left Upper Extremity: WFL   Strength:    -       Right Upper Extremity: WFL  -       Left Upper Extremity: WFL  Fine Motor Coordination:    -       Intact    AMPAC 6 Click ADL:  AMPAC Total Score: 14    Treatment & Education:  Patient currently at functional baseline. Son and DIL provide assistance as needed at home.     Patient left HOB elevated with all lines intact and call button in reach    GOALS:   Multidisciplinary Problems       Occupational Therapy Goals       Not on file                    History:     Past Medical History:   Diagnosis Date    ACP (advance care planning) 9/15/2024    Age-related osteoporosis without current pathological fracture 05/16/2024    Allergy     Diverticulosis     Gluten enteropathy     Gluten intolerance     Hernia     Hypothyroidism     PD (Parkinson's disease) 09/16/2015    Right tremor type    Peptic ulcer disease     Right shoulder pain     Cirtisone injection 3/26/15  - Dr. Tolbert    SBO (small bowel obstruction) 09/17/2019    Seizures     Squamous cell carcinoma of skin     Ulcer          Past Surgical History:   Procedure Laterality Date    ADENOIDECTOMY      COLONOSCOPY  03/01/2012    Dr. Teresa, repeat in 10 years for screening    COLONOSCOPY N/A 2/21/2018    Procedure: COLONOSCOPY;  Surgeon: Radha Deng MD;  Location: [facility];  Service: Endoscopy;  Laterality: N/A;    CORRECTION OF HAMMER TOE Left 9/16/2020    Procedure: CORRECTION, HAMMER TOE;  Surgeon: William Sprague MD;  Location: [facility];  Service: Orthopedics;  Laterality: Left;    COSMETIC SURGERY      Broken nose    ESOPHAGOGASTRODUODENOSCOPY N/A 12/8/2021    Procedure: EGD (ESOPHAGOGASTRODUODENOSCOPY);  Surgeon: Benji Valentino III, MD;  Location: [facility];  Service: Endoscopy;  Laterality: N/A;    FRACTURE SURGERY  left wrist    With pin    HEMORRHOID SURGERY      HERNIA REPAIR Left 04/09/2015    Dr Lo; left inguinal    INTRALUMINAL GASTROINTESTINAL TRACT IMAGING VIA CAPSULE N/A 7/10/2018    Procedure: IMAGING, GI TRACT, INTRALUMINAL, VIA CAPSULE;  Surgeon: Radha Deng MD;  Location: [facility];  Service: Endoscopy;  Laterality: N/A;    LYSIS OF ADHESIONS  9/29/2020    Procedure: LYSIS, ADHESIONS;  Surgeon: Eagle Gonzalez MD;  Location: [facility];  Service: General;;    RHINOPLASTY TIP      TONSILLECTOMY      UPPER GASTROINTESTINAL ENDOSCOPY  05/21/2015    Dr. Gomez       Time Tracking:     OT Date of Treatment: 09/16/24  OT Start Time: 1025  OT Stop Time: 1035  OT Total Time (min): 10 min    Billable Minutes:Evaluation 10    9/16/2024

## 2024-09-16 NOTE — PROGRESS NOTES
[facility]  Progress Note    Patient Name: Ariana Tiwari  MRN: [id number]  Patient Class: IP- Inpatient   Admission Date: 9/14/2024  Length of Stay: 1 days  Attending Physician: Sean Caldera MD  Primary Care Provider: Nba Petty MD        Subjective:     Principal Problem:Small bowel obstruction        HPI:  79-year-old woman with past medical history of Parkinson disease, seizure, hypothyroidism and small-bowel obstruction that presents to the ED for evaluation of 3 days history of progressive abdominal pain, abdominal distention and nausea.  Patient discloses splitting out for those no vomiting, diarrhea, fever, or chills.  On presentation to the ED patient was afebrile, hemodynamically stable, maintaining oxygen saturation on room air.  The lab was unremarkable.  CT abdomen pelvis showed significant swirling of the mesentery contributing to small-bowel obstruction and vascular congestive changes with mesenteric edema and luminal narrowing of the SMA concerning for compromise of the vascular pedicle, distended stomach, proximal loops of small bowel as well as stage renal with transition point involving the proximal jejunum.  In the ED patient was given IV morphine and Zofran along with 500 cc of normal saline.  Hospital medicine consulted for admission.    Overview/Hospital Course:  79 y.o female with history of parkinson, multiple bowel obstructions with hospitalizations came in with nausea and admitted for recurrent bowl obstruction.  Reviewed labs and imaging.  Surgery saw patient and recommended conservative management. Advance diet as tolerated. May get Gastrografin challenge possibly tomorrow pending clinical assessment.  NPO Sips with meds. Continue IV fluid for now.     Interval History:  Pleasant female, in no acute distress.  Reports frequent flatus.  Is scant bowel movement last evening.  Tolerating clear liquids.  General surgery following.  Patient's  daughters present at bedside.  Patient is afebrile.    Review of Systems   Constitutional:  Negative for chills, fatigue and fever.   Respiratory:  Negative for cough, shortness of breath and wheezing.    Cardiovascular:  Negative for chest pain.   Gastrointestinal:  Positive for abdominal pain and nausea. Negative for constipation and vomiting.   Genitourinary:  Negative for dysuria.   Skin:  Negative for color change.   Neurological:  Positive for tremors.   Psychiatric/Behavioral:  Negative for agitation and confusion.      Objective:     Vital Signs (Most Recent):  Temp: 98 °F (36.7 °C) (09/16/24 0446)  Pulse: 100 (09/16/24 0446)  Resp: 18 (09/16/24 0446)  BP: (!) 110/56 (09/16/24 0446)  SpO2: 95 % (09/16/24 0446) Vital Signs (24h Range):  Temp:  [97.5 °F (36.4 °C)-98 °F (36.7 °C)] 98 °F (36.7 °C)  Pulse:  [] 100  Resp:  [18] 18  SpO2:  [94 %-97 %] 95 %  BP: (110-139)/(55-64) 110/56     Weight: 59.8 kg (131 lb 13.4 oz)  Body mass index is 22.99 kg/m².    Intake/Output Summary (Last 24 hours) at 9/16/2024 0840  Last data filed at 9/16/2024 0601  Gross per 24 hour   Intake 975 ml   Output 450 ml   Net 525 ml         Physical Exam  Vitals reviewed.   Constitutional:       General: She is not in acute distress.     Appearance: She is not ill-appearing.   HENT:      Head: Normocephalic and atraumatic.   Eyes:      Conjunctiva/sclera: Conjunctivae normal.   Cardiovascular:      Rate and Rhythm: Normal rate and regular rhythm.      Pulses: Normal pulses.      Heart sounds: No murmur heard.  Pulmonary:      Effort: Pulmonary effort is normal. No respiratory distress.      Breath sounds: No wheezing or rhonchi.   Abdominal:      Tenderness: There is no abdominal tenderness. There is no guarding or rebound.      Comments: Increase bowel sounds   Musculoskeletal:      Right lower leg: No edema.      Left lower leg: No edema.   Neurological:      General: No focal deficit present.      Mental Status: She is oriented  to person, place, and time.   Psychiatric:         Mood and Affect: Mood normal.         Behavior: Behavior normal.             Significant Labs: All pertinent labs within the past 24 hours have been reviewed.  CBC:   Recent Labs   Lab 09/14/24  2202 09/15/24  0635 09/16/24  0525   WBC 6.12 5.38 4.57   HGB 15.3 13.1 13.2   HCT 45.0 39.8 39.7    168 159     CMP:   Recent Labs   Lab 09/14/24  2202 09/15/24  0635 09/16/24  0525    141 141   K 3.6 3.6 3.5    108 107   CO2 30* 26 27    105 89   BUN 10 11 7*   CREATININE 0.4* 0.3* 0.3*   CALCIUM 9.2 8.4* 8.5*   PROT 7.4 6.2 6.0   ALBUMIN 4.6 3.8 3.6   BILITOT 0.5 0.4 0.5   ALKPHOS 98 76 74   AST 17 13 13   ALT 19 5* 7*   ANIONGAP 8 7* 7*     Lactic Acid:   Recent Labs   Lab 09/14/24  2202 09/15/24  0635   LACTATE 1.6 0.6     Lipase:   Recent Labs   Lab 09/14/24 2202   LIPASE 16     Urine Studies:   Recent Labs   Lab 09/15/24  0138   COLORU Yellow   APPEARANCEUA Clear   PHUR 8.0   SPECGRAV >1.030*   PROTEINUA Trace*   GLUCUA Negative   KETONESU Negative   BILIRUBINUA Negative   OCCULTUA Negative   NITRITE Negative   UROBILINOGEN 2.0-3.0*   LEUKOCYTESUR Negative     Microbiology Results (last 7 days)       ** No results found for the last 168 hours. **          Significant Imaging:   CT abdomen and pelvis with IV contrast:  Significant swirling of the mesentery contributing to small-bowel obstruction and vascular congestive changes with mesenteric edema and luminal narrowing of the SMA concerning for compromise of the vascular pedicle.  Stomach and proximal loops of small bowel as well as jejunum are distended with transition point involving the proximal jejunum.  There is enhancement of bowel walls presently with no evidence of pneumatosis, portal venous air, significant free fluid in the abdomen or other acute findings involving the abdomen.  Two and hand sing less than 1 cm lesions in the liver may represent hemangiomas inter stable since prior  examination.  Decreased density liver suggests hepatic steatosis.  Please see above discussion for additional incidental nonacute findings.    KUB:  1.  Interval removal of the enteric tube.  2.  Slightly improved bowel gas pattern suggestive of ileus versus partial low-grade bowel obstruction.    Assessment/Plan:      * Small bowel obstruction  CT abdomen pelvis showed significant swirling of the mesentery contributing to small-bowel obstruction and vascular congestive changes with mesenteric edema and luminal narrowing of the SMA concerning for compromise of the vascular pedicle, distended stomach, proximal loops of small bowel as well as stage renal with transition point involving the proximal jejunum.  Advanced to full liquid diet if okay with Dr. Last.  Analgesic as needed for pain  IV fluid  Follow General surgery recommendations.    ACP (advance care planning)  Discussed with patient and daughter at bedside.  Patient is DNI DNR      Seizure  Switch p.o. Keppra to IV Keppra while patient is NPO  Seizure prophylaxis      PD (Parkinson's disease)  Continue home dose of carbidopa levodopa      Hypothyroidism  TSH WNL.  Switch p.o. levothyroxine to IV while patient is NPO        VTE Risk Mitigation (From admission, onward)           Ordered     enoxaparin injection 40 mg  Daily         09/15/24 0253     IP VTE HIGH RISK PATIENT  Once         09/15/24 0253     Place sequential compression device  Until discontinued         09/15/24 0253                    Discharge Planning   IAIN: 9/18/2024     Code Status: DNR   Is the patient medically ready for discharge?:     Reason for patient still in hospital (select all that apply): Patient trending condition and Consult recommendations  Discharge Plan A: Home                  Sean Caldera MD  Department of Hospital Medicine   [facility]

## 2024-09-16 NOTE — PT/OT/SLP EVAL
Physical Therapy Evaluation    Patient Name:  Ariana Tiwari   MRN:  [id number]    Recommendations:     Discharge Recommendations: Low Intensity Therapy   Discharge Equipment Recommendations: none   Barriers to discharge:  medical status    Assessment:     Ariana Tiwari is a 79 y.o. female admitted with a medical diagnosis of Small bowel obstruction.  She presents with the following impairments/functional limitations: weakness, impaired endurance, impaired self care skills, impaired functional mobility, gait instability, impaired balance, decreased lower extremity function, decreased safety awareness, impaired cardiopulmonary response to activity.    Pt found in bed with HOB elevated. Just completed xray. Son at bedside and an active participant in session. Pt requested assist to bathroom. Son assisted with bed mobility and transfers. Pt ambulated 15 ft with son's HHA to bathroom with CGA. (+) urination. After toileting, pt ambulated 120 ft with RW and CGA. Occ min A due to impaired step intermittently. Mod vi for uprward gaze throughout ambulation.    Rehab Prognosis: Fair; patient would benefit from acute skilled PT services to address these deficits and reach maximum level of function.    Recent Surgery: * No surgery found *      Plan:     During this hospitalization, patient to be seen 5 x/week to address the identified rehab impairments via gait training, therapeutic activities, therapeutic exercises, neuromuscular re-education and progress toward the following goals:    Plan of Care Expires:  10/16/24    Subjective     Chief Complaint: none stated  Patient/Family Comments/goals: get better  Pain/Comfort:  Pain Rating 1: 0/10    Patients cultural, spiritual, Christianity conflicts given the current situation: no    Living Environment:  Pt lives with her son and DIL in a one story home with 1 RODNEY in one entrance and 8 at other entrance. Son or DIL always assist with stairs.   Prior to admission, patients level  of function was supervision rollator.  Equipment used at home: rollator.  DME owned (not currently used): none.  Upon discharge, patient will have assistance from son.    Objective:     Communicated with RN prior to session.  Patient found HOB elevated with peripheral IV, telemetry  upon PT entry to room.    General Precautions: Standard, fall  Orthopedic Precautions:N/A   Braces: N/A  Respiratory Status: Room air    Exams:  RLE ROM: WFL  RLE Strength: WFL  LLE ROM: WFL  LLE Strength: WFL    Functional Mobility:  Bed Mobility:     Supine to Sit: minimum assistance  Transfers:     Sit to Stand:  contact guard assistance with hand-held assist and rolling walker  Gait: 15 ft with HHA of son and CGA. 120 ft with RW and CGA occ min  A      AM-PAC 6 CLICK MOBILITY  Total Score:18       Treatment & Education:  Pt educated on POC, discharge recommendation, need for assist with mobility, importance of time OOB, optimal gait pattern, upright posture, upward gaze, use of call bell to seek assistance as needed and fall prevention      Patient left up in chair with all lines intact, call button in reach, chair alarm on, and son present.    GOALS:   Multidisciplinary Problems       Physical Therapy Goals          Problem: Physical Therapy    Goal Priority Disciplines Outcome Goal Variances Interventions   Physical Therapy Goal     PT, PT/OT Progressing     Description: Goals to be met by: 10/16/24     Patient will increase functional independence with mobility by performin. Supine to sit with Supervision  2. Sit to stand transfer with Supervision  3. Bed to chair transfer with Supervision using Rolling Walker  4. Gait  x 150 feet with Supervision using Rolling Walker.                              History:     Past Medical History:   Diagnosis Date    ACP (advance care planning) 9/15/2024    Age-related osteoporosis without current pathological fracture 2024    Allergy     Diverticulosis     Gluten enteropathy      Gluten intolerance     Hernia     Hypothyroidism     PD (Parkinson's disease) 09/16/2015    Right tremor type    Peptic ulcer disease     Right shoulder pain     Cirtisone injection 3/26/15 - Dr. Tolbert    SBO (small bowel obstruction) 09/17/2019    Seizures     Squamous cell carcinoma of skin     Ulcer        Past Surgical History:   Procedure Laterality Date    ADENOIDECTOMY      COLONOSCOPY  03/01/2012    Dr. Teresa, repeat in 10 years for screening    COLONOSCOPY N/A 2/21/2018    Procedure: COLONOSCOPY;  Surgeon: Radha Deng MD;  Location: [facility];  Service: Endoscopy;  Laterality: N/A;    CORRECTION OF HAMMER TOE Left 9/16/2020    Procedure: CORRECTION, HAMMER TOE;  Surgeon: William Sprague MD;  Location: [facility] OR;  Service: Orthopedics;  Laterality: Left;    COSMETIC SURGERY      Broken nose    ESOPHAGOGASTRODUODENOSCOPY N/A 12/8/2021    Procedure: EGD (ESOPHAGOGASTRODUODENOSCOPY);  Surgeon: Benji Valentino III, MD;  Location: [facility];  Service: Endoscopy;  Laterality: N/A;    FRACTURE SURGERY  left wrist    With pin    HEMORRHOID SURGERY      HERNIA REPAIR Left 04/09/2015    Dr Lo; left inguinal    INTRALUMINAL GASTROINTESTINAL TRACT IMAGING VIA CAPSULE N/A 7/10/2018    Procedure: IMAGING, GI TRACT, INTRALUMINAL, VIA CAPSULE;  Surgeon: Radha Deng MD;  Location: [facility];  Service: Endoscopy;  Laterality: N/A;    LYSIS OF ADHESIONS  9/29/2020    Procedure: LYSIS, ADHESIONS;  Surgeon: Eagle Gonzalez MD;  Location: [facility];  Service: General;;    RHINOPLASTY TIP      TONSILLECTOMY      UPPER GASTROINTESTINAL ENDOSCOPY  05/21/2015    Dr. Gomez       Time Tracking:     PT Received On: 09/16/24  PT Start Time: 0902     PT Stop Time: 0918  PT Total Time (min): 16 min     Billable Minutes: Evaluation 8 and Gait Training 8      09/16/2024

## 2024-09-16 NOTE — ANESTHESIA PREPROCEDURE EVALUATION
09/29/2020  Ariana Tiwari is a 75 y.o., female.    Anesthesia Evaluation    I have reviewed the Patient Summary Reports.    I have reviewed the Nursing Notes.    I have reviewed the Medications.     Review of Systems  Anesthesia Hx:  No problems with previous Anesthesia Denies Hx of Anesthetic complications  Neg history of prior surgery. Denies Family Hx of Anesthesia complications.   Denies Personal Hx of Anesthesia complications.   Social:  Alcohol Use, Non-Smoker    Hematology/Oncology:  Hematology Normal   Oncology Normal     EENT/Dental:EENT/Dental Normal   Cardiovascular:  Cardiovascular Normal     Pulmonary:  Pulmonary Normal    Renal/:  Renal/ Normal     Hepatic/GI:   PUD, Diverticulosis   Musculoskeletal:  Musculoskeletal Normal    Neurological:  Neurology Normal Parkinson's dz   Endocrine:   Hypothyroidism    Dermatological:  Skin Normal    Psych:  Psychiatric Normal           Patient Active Problem List   Diagnosis    Gluten enteropathy    Hypothyroidism    Breast lump on right side at 1 o'clock position    Lump of skin of back    Incarcerated inguinal hernia, unilateral    PD (Parkinson's disease)    Chronic diarrhea    Diverticulosis    Hemorrhoids    Pancreas cyst    Chronic left shoulder pain    Abnormal CT scan    Diarrhea    Abdominal pain    Perforated diverticulum of small intestine    Perforated bowel    Anemia    Adenoma    Hammer toe of left foot    Perforated viscus    Perforated abdominal viscus       Past Surgical History:   Procedure Laterality Date    ADENOIDECTOMY      COLONOSCOPY  03/01/2012    Dr. Teresa, repeat in 10 years for screening    COLONOSCOPY N/A 2/21/2018    Procedure: COLONOSCOPY;  Surgeon: Radha Deng MD;  Location: [facility];  Service: Endoscopy;  Laterality: N/A;    CORRECTION OF HAMMER TOE Left 9/16/2020    Procedure:  CORRECTION, HAMMER TOE;  Surgeon: William Sprague MD;  Location[facility] OR;  Service: Orthopedics;  Laterality: Left;    COSMETIC SURGERY      Broken nose    FRACTURE SURGERY  left wrist    With pin    HEMORRHOID SURGERY      HERNIA REPAIR Left 04/09/2015    Dr Lo; left inguinal    INTRALUMINAL GASTROINTESTINAL TRACT IMAGING VIA CAPSULE N/A 7/10/2018    Procedure: IMAGING, GI TRACT, INTRALUMINAL, VIA CAPSULE;  Surgeon: Radha Deng MD;  [facility]: [facility] ENDO;  Service: Endoscopy;  Laterality: N/A;    RHINOPLASTY TIP      TONSILLECTOMY      UPPER GASTROINTESTINAL ENDOSCOPY  05/21/2015    Dr. Gomez        Tobacco Use:  The patient  reports that she has never smoked. She has never used smokeless tobacco.     Results for orders placed or performed during the hospital encounter of 09/29/20   EKG 12-lead    Collection Time: 09/29/20  9:59 AM    Narrative    Test Reason : R41.82,    Vent. Rate : 070 BPM     Atrial Rate : 070 BPM     P-R Int : 136 ms          QRS Dur : 084 ms      QT Int : 404 ms       P-R-T Axes : 025 -27 077 degrees     QTc Int : 436 ms    Normal sinus rhythm  Normal ECG  When compared with ECG of 11-JAN-2020 09:56,  Criteria for Septal infarct are no longer Present  T wave inversion no longer evident in Lateral leads    Referred By: [email or web address]   SELF           Confirmed By:              Lab Results   Component Value Date    WBC 9.52 09/29/2020    HGB 14.4 09/29/2020    HCT 42.4 09/29/2020    MCV 92 09/29/2020     09/29/2020     BMP  Lab Results   Component Value Date     09/29/2020    K 3.3 (L) 09/29/2020     09/29/2020    CO2 24 09/29/2020    BUN 17 09/29/2020    CREATININE 0.4 (L) 09/29/2020    CALCIUM 9.3 09/29/2020    ANIONGAP 14 09/29/2020     09/29/2020    GLU 80 07/28/2020    GLU 91 02/04/2020       No results found for this or any previous visit.        Physical Exam  General:  Well nourished    Airway/Jaw/Neck:  Airway Findings: Mouth Opening: Normal  Tongue: Normal  General Airway Assessment: Adult  Mallampati: II  Improves to II with phonation.  TM Distance: Normal, at least 6 cm  Jaw/Neck Findings:  Neck ROM: Normal ROM      Dental:  Dental Findings:    Chest/Lungs:  Chest/Lungs Findings: Clear to auscultation, Decreased Breath Sounds Bilateral     Heart/Vascular:  Heart Findings: Rate: Normal  Rhythm: Regular Rhythm  Sounds: Normal        Mental Status:  Mental Status Findings:  Cooperative, Alert and Oriented         Anesthesia Plan  Type of Anesthesia, risks & benefits discussed:  Anesthesia Type:  general  Patient's Preference:   Intra-op Monitoring Plan: standard ASA monitors  Intra-op Monitoring Plan Comments:   Post Op Pain Control Plan: multimodal analgesia  Post Op Pain Control Plan Comments:   Induction:   IV and rapid sequence  Beta Blocker:  Patient is not currently on a Beta-Blocker (No further documentation required).       Informed Consent: Patient understands risks and agrees with Anesthesia plan.  Questions answered. Anesthesia consent signed with patient.  ASA Score: 3  emergent   Day of Surgery Review of History & Physical:    H&P update referred to the surgeon.     Anesthesia Plan Notes: Ofirmev 1 gm IV  Zofran, Pepcid  Ketamine        Ready For Surgery From Anesthesia Perspective.        [facility]

## 2024-09-16 NOTE — PROGRESS NOTES
Patient seen and examined.  No complaints.      Vitals are stable new line afebrile   Abdomen soft, nontender     Lab stable     KUB shows improvement     Overall, patient seems to be doing well.  No symptoms.    Okay to start full liquids.  If she tolerates, okay to advance to bland diet tomorrow and likely DC tomorrow afternoon

## 2024-09-16 NOTE — SUBJECTIVE & OBJECTIVE
Interval History:  Pleasant female, in no acute distress.  Reports frequent flatus.  Is scant bowel movement last evening.  Tolerating clear liquids.  General surgery following.  Patient's daughters present at bedside.  Patient is afebrile.    Review of Systems   Constitutional:  Negative for chills, fatigue and fever.   Respiratory:  Negative for cough, shortness of breath and wheezing.    Cardiovascular:  Negative for chest pain.   Gastrointestinal:  Positive for abdominal pain and nausea. Negative for constipation and vomiting.   Genitourinary:  Negative for dysuria.   Skin:  Negative for color change.   Neurological:  Positive for tremors.   Psychiatric/Behavioral:  Negative for agitation and confusion.      Objective:     Vital Signs (Most Recent):  Temp: 98 °F (36.7 °C) (09/16/24 0446)  Pulse: 100 (09/16/24 0446)  Resp: 18 (09/16/24 0446)  BP: (!) 110/56 (09/16/24 0446)  SpO2: 95 % (09/16/24 0446) Vital Signs (24h Range):  Temp:  [97.5 °F (36.4 °C)-98 °F (36.7 °C)] 98 °F (36.7 °C)  Pulse:  [] 100  Resp:  [18] 18  SpO2:  [94 %-97 %] 95 %  BP: (110-139)/(55-64) 110/56     Weight: 59.8 kg (131 lb 13.4 oz)  Body mass index is 22.99 kg/m².    Intake/Output Summary (Last 24 hours) at 9/16/2024 0840  Last data filed at 9/16/2024 0601  Gross per 24 hour   Intake 975 ml   Output 450 ml   Net 525 ml         Physical Exam  Vitals reviewed.   Constitutional:       General: She is not in acute distress.     Appearance: She is not ill-appearing.   HENT:      Head: Normocephalic and atraumatic.   Eyes:      Conjunctiva/sclera: Conjunctivae normal.   Cardiovascular:      Rate and Rhythm: Normal rate and regular rhythm.      Pulses: Normal pulses.      Heart sounds: No murmur heard.  Pulmonary:      Effort: Pulmonary effort is normal. No respiratory distress.      Breath sounds: No wheezing or rhonchi.   Abdominal:      Tenderness: There is no abdominal tenderness. There is no guarding or rebound.      Comments: Increase  bowel sounds   Musculoskeletal:      Right lower leg: No edema.      Left lower leg: No edema.   Neurological:      General: No focal deficit present.      Mental Status: She is oriented to person, place, and time.   Psychiatric:         Mood and Affect: Mood normal.         Behavior: Behavior normal.             Significant Labs: All pertinent labs within the past 24 hours have been reviewed.  CBC:   Recent Labs   Lab 09/14/24  2202 09/15/24  0635 09/16/24  0525   WBC 6.12 5.38 4.57   HGB 15.3 13.1 13.2   HCT 45.0 39.8 39.7    168 159     CMP:   Recent Labs   Lab 09/14/24  2202 09/15/24  0635 09/16/24  0525    141 141   K 3.6 3.6 3.5    108 107   CO2 30* 26 27    105 89   BUN 10 11 7*   CREATININE 0.4* 0.3* 0.3*   CALCIUM 9.2 8.4* 8.5*   PROT 7.4 6.2 6.0   ALBUMIN 4.6 3.8 3.6   BILITOT 0.5 0.4 0.5   ALKPHOS 98 76 74   AST 17 13 13   ALT 19 5* 7*   ANIONGAP 8 7* 7*     Lactic Acid:   Recent Labs   Lab 09/14/24  2202 09/15/24  0635   LACTATE 1.6 0.6     Lipase:   Recent Labs   Lab 09/14/24 2202   LIPASE 16     Urine Studies:   Recent Labs   Lab 09/15/24  0138   COLORU Yellow   APPEARANCEUA Clear   PHUR 8.0   SPECGRAV >1.030*   PROTEINUA Trace*   GLUCUA Negative   KETONESU Negative   BILIRUBINUA Negative   OCCULTUA Negative   NITRITE Negative   UROBILINOGEN 2.0-3.0*   LEUKOCYTESUR Negative     Microbiology Results (last 7 days)       ** No results found for the last 168 hours. **          Significant Imaging:   CT abdomen and pelvis with IV contrast:  Significant swirling of the mesentery contributing to small-bowel obstruction and vascular congestive changes with mesenteric edema and luminal narrowing of the SMA concerning for compromise of the vascular pedicle.  Stomach and proximal loops of small bowel as well as jejunum are distended with transition point involving the proximal jejunum.  There is enhancement of bowel walls presently with no evidence of pneumatosis, portal venous air,  significant free fluid in the abdomen or other acute findings involving the abdomen.  Two and hand sing less than 1 cm lesions in the liver may represent hemangiomas inter stable since prior examination.  Decreased density liver suggests hepatic steatosis.  Please see above discussion for additional incidental nonacute findings.    KUB:  1.  Interval removal of the enteric tube.  2.  Slightly improved bowel gas pattern suggestive of ileus versus partial low-grade bowel obstruction.

## 2024-09-17 VITALS
TEMPERATURE: 98 F | OXYGEN SATURATION: 95 % | WEIGHT: 131.81 LBS | SYSTOLIC BLOOD PRESSURE: 109 MMHG | DIASTOLIC BLOOD PRESSURE: 65 MMHG | BODY MASS INDEX: 22.5 KG/M2 | RESPIRATION RATE: 17 BRPM | HEIGHT: 64 IN | HEART RATE: 61 BPM

## 2024-09-17 LAB
ALBUMIN SERPL BCP-MCNC: 3.7 G/DL (ref 3.5–5.2)
ALP SERPL-CCNC: 78 U/L (ref 55–135)
ALT SERPL W/O P-5'-P-CCNC: 15 U/L (ref 10–44)
ANION GAP SERPL CALC-SCNC: 9 MMOL/L (ref 8–16)
AST SERPL-CCNC: 19 U/L (ref 10–40)
BASOPHILS # BLD AUTO: 0.02 K/UL (ref 0–0.2)
BASOPHILS NFR BLD: 0.5 % (ref 0–1.9)
BILIRUB SERPL-MCNC: 0.6 MG/DL (ref 0.1–1)
BUN SERPL-MCNC: 9 MG/DL (ref 8–23)
CALCIUM SERPL-MCNC: 8.6 MG/DL (ref 8.7–10.5)
CHLORIDE SERPL-SCNC: 104 MMOL/L (ref 95–110)
CO2 SERPL-SCNC: 29 MMOL/L (ref 23–29)
CREAT SERPL-MCNC: 0.4 MG/DL (ref 0.5–1.4)
DIFFERENTIAL METHOD BLD: NORMAL
EOSINOPHIL # BLD AUTO: 0.1 K/UL (ref 0–0.5)
EOSINOPHIL NFR BLD: 2.2 % (ref 0–8)
ERYTHROCYTE [DISTWIDTH] IN BLOOD BY AUTOMATED COUNT: 12.6 % (ref 11.5–14.5)
EST. GFR  (NO RACE VARIABLE): >60 ML/MIN/1.73 M^2
GLUCOSE SERPL-MCNC: 85 MG/DL (ref 70–110)
HCT VFR BLD AUTO: 39.7 % (ref 37–48.5)
HGB BLD-MCNC: 13.3 G/DL (ref 12–16)
IMM GRANULOCYTES # BLD AUTO: 0.01 K/UL (ref 0–0.04)
IMM GRANULOCYTES NFR BLD AUTO: 0.2 % (ref 0–0.5)
LYMPHOCYTES # BLD AUTO: 1.4 K/UL (ref 1–4.8)
LYMPHOCYTES NFR BLD: 33.7 % (ref 18–48)
MAGNESIUM SERPL-MCNC: 1.8 MG/DL (ref 1.6–2.6)
MCH RBC QN AUTO: 31 PG (ref 27–31)
MCHC RBC AUTO-ENTMCNC: 33.5 G/DL (ref 32–36)
MCV RBC AUTO: 93 FL (ref 82–98)
MONOCYTES # BLD AUTO: 0.4 K/UL (ref 0.3–1)
MONOCYTES NFR BLD: 8.7 % (ref 4–15)
NEUTROPHILS # BLD AUTO: 2.3 K/UL (ref 1.8–7.7)
NEUTROPHILS NFR BLD: 54.7 % (ref 38–73)
NRBC BLD-RTO: 0 /100 WBC
PHOSPHATE SERPL-MCNC: 3.7 MG/DL (ref 2.7–4.5)
PLATELET # BLD AUTO: 152 K/UL (ref 150–450)
PMV BLD AUTO: 9.7 FL (ref 9.2–12.9)
POCT GLUCOSE: 84 MG/DL (ref 70–110)
POTASSIUM SERPL-SCNC: 3.3 MMOL/L (ref 3.5–5.1)
PROT SERPL-MCNC: 6 G/DL (ref 6–8.4)
RBC # BLD AUTO: 4.29 M/UL (ref 4–5.4)
SODIUM SERPL-SCNC: 142 MMOL/L (ref 136–145)
WBC # BLD AUTO: 4.13 K/UL (ref 3.9–12.7)

## 2024-09-17 PROCEDURE — 85025 COMPLETE CBC W/AUTO DIFF WBC: CPT | Performed by: STUDENT IN AN ORGANIZED HEALTH CARE EDUCATION/TRAINING PROGRAM

## 2024-09-17 PROCEDURE — 83735 ASSAY OF MAGNESIUM: CPT | Performed by: STUDENT IN AN ORGANIZED HEALTH CARE EDUCATION/TRAINING PROGRAM

## 2024-09-17 PROCEDURE — 36415 COLL VENOUS BLD VENIPUNCTURE: CPT | Performed by: STUDENT IN AN ORGANIZED HEALTH CARE EDUCATION/TRAINING PROGRAM

## 2024-09-17 PROCEDURE — 63600175 PHARM REV CODE 636 W HCPCS: Performed by: INTERNAL MEDICINE

## 2024-09-17 PROCEDURE — 25000003 PHARM REV CODE 250: Performed by: FAMILY MEDICINE

## 2024-09-17 PROCEDURE — 84100 ASSAY OF PHOSPHORUS: CPT | Performed by: STUDENT IN AN ORGANIZED HEALTH CARE EDUCATION/TRAINING PROGRAM

## 2024-09-17 PROCEDURE — 80053 COMPREHEN METABOLIC PANEL: CPT | Performed by: STUDENT IN AN ORGANIZED HEALTH CARE EDUCATION/TRAINING PROGRAM

## 2024-09-17 PROCEDURE — 25000003 PHARM REV CODE 250: Performed by: INTERNAL MEDICINE

## 2024-09-17 PROCEDURE — 25000003 PHARM REV CODE 250: Performed by: STUDENT IN AN ORGANIZED HEALTH CARE EDUCATION/TRAINING PROGRAM

## 2024-09-17 PROCEDURE — 63600175 PHARM REV CODE 636 W HCPCS: Performed by: STUDENT IN AN ORGANIZED HEALTH CARE EDUCATION/TRAINING PROGRAM

## 2024-09-17 RX ORDER — POTASSIUM CHLORIDE 20 MEQ/1
40 TABLET, EXTENDED RELEASE ORAL ONCE
Status: COMPLETED | OUTPATIENT
Start: 2024-09-17 | End: 2024-09-17

## 2024-09-17 RX ADMIN — LEVETIRACETAM INJECTION 500 MG: 5 INJECTION INTRAVENOUS at 09:09

## 2024-09-17 RX ADMIN — AZELASTINE HYDROCHLORIDE 137 MCG: 137 SPRAY, METERED NASAL at 09:09

## 2024-09-17 RX ADMIN — CYANOCOBALAMIN TAB 1000 MCG 1000 MCG: 1000 TAB at 09:09

## 2024-09-17 RX ADMIN — PANTOPRAZOLE SODIUM 40 MG: 40 INJECTION, POWDER, FOR SOLUTION INTRAVENOUS at 09:09

## 2024-09-17 RX ADMIN — LEVODOPA AND CARBIDOPA 3 CAPSULE: 95; 23.75 CAPSULE, EXTENDED RELEASE ORAL at 09:09

## 2024-09-17 RX ADMIN — CHOLECALCIFEROL (VITAMIN D3) 10 MCG (400 UNIT) TABLET 1200 UNITS: at 09:09

## 2024-09-17 RX ADMIN — POTASSIUM CHLORIDE 40 MEQ: 1500 TABLET, EXTENDED RELEASE ORAL at 09:09

## 2024-09-17 RX ADMIN — LEVOTHYROXINE SODIUM 62.5 MCG: 0.03 TABLET ORAL at 08:09

## 2024-09-17 NOTE — PLAN OF CARE
Patient is clear from CM standpoint pending patient tolerating lunch. Appointments added to AVS.  Daughter in law at bedside to transport once able to go home.  Gurpreet  to start care.      09/17/24 1207   Final Note   Assessment Type Final Discharge Note   Anticipated Discharge Disposition Home-[facility]   Hospital Resources/Appts/Education Provided Appointments scheduled and added to AVS

## 2024-09-17 NOTE — PLAN OF CARE
Pt choice obtained and scanned into media. Pt's preference [facility], [facility]     09/17/24 1056   Post-Acute Status   Post-Acute Authorization Home Health   Home Health Status Referrals Sent   Coverage MEDICARE - MEDICARE PART A & B -   Patient choice form signed by patient/caregiver List from [employer] Compare   Discharge Delays None known at this time   Discharge Plan   Discharge Plan A Home Health   Discharge Plan B Home Health

## 2024-09-17 NOTE — SUBJECTIVE & OBJECTIVE
Interval History:  Pleasant female, in no acute distress.  Reports frequent flatus.  Is scant bowel movement last evening.  Tolerating clear liquids.  General surgery following.  Patient's daughters present at bedside.  Patient is afebrile.    Review of Systems   Constitutional:  Negative for chills, fatigue and fever.   Respiratory:  Negative for cough, shortness of breath and wheezing.    Cardiovascular:  Negative for chest pain.   Gastrointestinal:  Positive for abdominal pain and nausea. Negative for constipation and vomiting.   Genitourinary:  Negative for dysuria.   Skin:  Negative for color change.   Neurological:  Positive for tremors.   Psychiatric/Behavioral:  Negative for agitation and confusion.      Objective:     Vital Signs (Most Recent):  Temp: 97.4 °F (36.3 °C) (09/17/24 0445)  Pulse: 74 (09/17/24 0445)  Resp: 16 (09/17/24 0445)  BP: (!) 120/57 (09/17/24 0445)  SpO2: 95 % (09/17/24 0445) Vital Signs (24h Range):  Temp:  [97.4 °F (36.3 °C)-98.3 °F (36.8 °C)] 97.4 °F (36.3 °C)  Pulse:  [60-74] 74  Resp:  [16] 16  SpO2:  [93 %-95 %] 95 %  BP: (106-121)/(54-62) 120/57     Weight: 59.8 kg (131 lb 13.4 oz)  Body mass index is 22.99 kg/m².    Intake/Output Summary (Last 24 hours) at 9/17/2024 0710  Last data filed at 9/17/2024 0542  Gross per 24 hour   Intake 680 ml   Output --   Net 680 ml         Physical Exam  Vitals reviewed.   Constitutional:       General: She is not in acute distress.     Appearance: She is not ill-appearing.   HENT:      Head: Normocephalic and atraumatic.   Eyes:      Conjunctiva/sclera: Conjunctivae normal.   Cardiovascular:      Rate and Rhythm: Normal rate and regular rhythm.      Pulses: Normal pulses.      Heart sounds: No murmur heard.  Pulmonary:      Effort: Pulmonary effort is normal. No respiratory distress.      Breath sounds: No wheezing or rhonchi.   Abdominal:      Tenderness: There is no abdominal tenderness. There is no guarding or rebound.      Comments: Increase  "bowel sounds   Musculoskeletal:      Right lower leg: No edema.      Left lower leg: No edema.   Neurological:      General: No focal deficit present.      Mental Status: She is oriented to person, place, and time.   Psychiatric:         Mood and Affect: Mood normal.         Behavior: Behavior normal.             Significant Labs: All pertinent labs within the past 24 hours have been reviewed.  CBC:   Recent Labs   Lab 09/16/24  0525 09/17/24  0443   WBC 4.57 4.13   HGB 13.2 13.3   HCT 39.7 39.7    152     CMP:   Recent Labs   Lab 09/16/24  0525 09/17/24  0443    142   K 3.5 3.3*    104   CO2 27 29   GLU 89 85   BUN 7* 9   CREATININE 0.3* 0.4*   CALCIUM 8.5* 8.6*   PROT 6.0 6.0   ALBUMIN 3.6 3.7   BILITOT 0.5 0.6   ALKPHOS 74 78   AST 13 19   ALT 7* 15   ANIONGAP 7* 9     Lactic Acid:   No results for input(s): "LACTATE" in the last 48 hours.    Lipase:   No results for input(s): "LIPASE" in the last 48 hours.    Urine Studies:   No results for input(s): "COLORU", "APPEARANCEUA", "PHUR", "SPECGRAV", "PROTEINUA", "GLUCUA", "KETONESU", "BILIRUBINUA", "OCCULTUA", "NITRITE", "UROBILINOGEN", "LEUKOCYTESUR", "RBCUA", "WBCUA", "BACTERIA", "SQUAMEPITHEL", "HYALINECASTS" in the last 48 hours.    Invalid input(s): "WRIGHTSUR"    Microbiology Results (last 7 days)       ** No results found for the last 168 hours. **          Significant Imaging:   CT abdomen and pelvis with IV contrast:  Significant swirling of the mesentery contributing to small-bowel obstruction and vascular congestive changes with mesenteric edema and luminal narrowing of the SMA concerning for compromise of the vascular pedicle.  Stomach and proximal loops of small bowel as well as jejunum are distended with transition point involving the proximal jejunum.  There is enhancement of bowel walls presently with no evidence of pneumatosis, portal venous air, significant free fluid in the abdomen or other acute findings involving the " abdomen.  Two and hand sing less than 1 cm lesions in the liver may represent hemangiomas inter stable since prior examination.  Decreased density liver suggests hepatic steatosis.  Please see above discussion for additional incidental nonacute findings.    KUB:  1.  Interval removal of the enteric tube.  2.  Slightly improved bowel gas pattern suggestive of ileus versus partial low-grade bowel obstruction.    KUB:  No significant radiographic change when compared to September 16, with persistent gaseous distention of bowel loops throughout the abdomen and pelvis.

## 2024-09-17 NOTE — PT/OT/SLP PROGRESS
Physical Therapy      Patient Name:  Ariana Kramer Te   MRN:  [id number]    Patient not seen today secondary to Declined PT due to anticipated imminent discharge.

## 2024-09-17 NOTE — DISCHARGE SUMMARY
Count includes the [facility] Medicine  Discharge Summary      Patient Name: Ariana Tiwari  MRN: [id number]  JAN: [id number]  Patient Class: IP- Inpatient  Admission Date: 9/14/2024  Hospital Length of Stay: 2 days  Discharge Date and Time:  09/17/2024 10:46 AM  Attending Physician: Sean Caldera MD   Discharging Provider: Sean Caldera MD  Primary Care Provider: Nba Petty MD    Primary Care Team: Networked reference to record PCT     HPI:   79-year-old woman with past medical history of Parkinson disease, seizure, hypothyroidism and small-bowel obstruction that presents to the ED for evaluation of 3 days history of progressive abdominal pain, abdominal distention and nausea.  Patient discloses splitting out for those no vomiting, diarrhea, fever, or chills.  On presentation to the ED patient was afebrile, hemodynamically stable, maintaining oxygen saturation on room air.  The lab was unremarkable.  CT abdomen pelvis showed significant swirling of the mesentery contributing to small-bowel obstruction and vascular congestive changes with mesenteric edema and luminal narrowing of the SMA concerning for compromise of the vascular pedicle, distended stomach, proximal loops of small bowel as well as stage renal with transition point involving the proximal jejunum.  In the ED patient was given IV morphine and Zofran along with 500 cc of normal saline.  Hospital medicine consulted for admission.    * No surgery found *      Hospital Course:   79 y.o female with history of parkinson, multiple bowel obstructions with hospitalizations came in with nausea and admitted for recurrent bowl obstruction.  Reviewed labs and imaging.  During hospital stay patient was evaluated by general surgeon.  Conservative management provided.  Patient is diet was slowly advanced upon clinical improvement.  Patient is feeling much better and tolerating diet.  Home health and home physical therapy is being resumed for closer  monitoring and care.  Discharge plan of care reviewed with patient's daughter who voiced understanding.    Goals of Care Treatment Preferences:  Code Status: DNR      SDOH Screening:  The patient was screened for utility difficulties, food insecurity, transport difficulties, housing insecurity, and interpersonal safety and there were no concerns identified this admission.     Consults:   Consults (From admission, onward)          Status Ordering Provider     Inpatient consult to General Surgery  Once        Provider:  Martin Last MD    Completed FRIDA LIMON          CT abdomen and pelvis with IV contrast:  Significant swirling of the mesentery contributing to small-bowel obstruction and vascular congestive changes with mesenteric edema and luminal narrowing of the SMA concerning for compromise of the vascular pedicle.  Stomach and proximal loops of small bowel as well as jejunum are distended with transition point involving the proximal jejunum.  There is enhancement of bowel walls presently with no evidence of pneumatosis, portal venous air, significant free fluid in the abdomen or other acute findings involving the abdomen.  Two and hand sing less than 1 cm lesions in the liver may represent hemangiomas inter stable since prior examination.  Decreased density liver suggests hepatic steatosis.  Please see above discussion for additional incidental nonacute findings.     KUB:  1.  Interval removal of the enteric tube.  2.  Slightly improved bowel gas pattern suggestive of ileus versus partial low-grade bowel obstruction.    Final Active Diagnoses:    Diagnosis Date Noted POA    PRINCIPAL PROBLEM:  Small bowel obstruction [K56.609] 09/15/2024 Yes    ACP (advance care planning) [Z71.89] 09/15/2024 Not Applicable    Seizure [R56.9] 01/30/2023 Yes    PD (Parkinson's disease) [G20.A1] 09/16/2015 Yes    Hypothyroidism [E03.9]  Yes      Problems Resolved During this Admission:       Discharged Condition:  good    Disposition: Home or Self Care    Follow Up:   Follow-up Information       Nba Petty MD Follow up in 1 week(s).    Specialty: Family Medicine  Contact information:  [address]  [address]  [place] [address] [phone]               Martin Last MD Follow up.    Specialty: General Surgery  Why: As needed  Contact information:  [address]  [address]  [place] [address] [phone]                           Patient Instructions:      Ambulatory referral/consult to Home Health   Standing Status: Future   Referral Priority: Routine Referral Type: Home Health   Referral Reason: Specialty Services Required   Requested Specialty: Home Health Services   Number of Visits Requested: 1     Diet Adult Regular     Notify your health care provider if you experience any of the following:  temperature >100.4     Notify your health care provider if you experience any of the following:  persistent nausea and vomiting or diarrhea     Notify your health care provider if you experience any of the following:  severe uncontrolled pain     Notify your health care provider if you experience any of the following:  redness, tenderness, or signs of infection (pain, swelling, redness, odor or green/yellow discharge around incision site)     Notify your health care provider if you experience any of the following:  difficulty breathing or increased cough     Notify your health care provider if you experience any of the following:  severe persistent headache     Notify your health care provider if you experience any of the following:  worsening rash     Notify your health care provider if you experience any of the following:  persistent dizziness, light-headedness, or visual disturbances     Notify your health care provider if you experience any of the following:  increased confusion or weakness     Activity as tolerated   Order Comments: Up with assist, observe fall precautions.       Significant Diagnostic Studies:  Labs: CMP   Recent Labs   Lab 09/16/24  0525 09/17/24  0443    142   K 3.5 3.3*    104   CO2 27 29   GLU 89 85   BUN 7* 9   CREATININE 0.3* 0.4*   CALCIUM 8.5* 8.6*   PROT 6.0 6.0   ALBUMIN 3.6 3.7   BILITOT 0.5 0.6   ALKPHOS 74 78   AST 13 19   ALT 7* 15   ANIONGAP 7* 9    and CBC   Recent Labs   Lab 09/16/24  0525 09/17/24  0443   WBC 4.57 4.13   HGB 13.2 13.3   HCT 39.7 39.7    152       Pending Diagnostic Studies:       None           Medications:  Reconciled Home Medications:      Medication List        CHANGE how you take these medications      RYTARY 23.75-95 mg Cpsr  Generic drug: carbidopa-levodopa  Take 3 capsules by mouth 3 (three) times daily.  What changed: how much to take            CONTINUE taking these medications      acetaminophen 325 mg Cap  Take 650 mg by mouth every 6 (six) hours as needed.     alendronate-vitamin D3 70 mg- 5,600 unit per tablet  Commonly known as: FOSAMAX PLUS D  Take 1 tablet by mouth every 7 days.     azelastine 137 mcg (0.1 %) nasal spray  Commonly known as: ASTELIN  1 spray by Nasal route 2 (two) times daily.     cyanocobalamin 250 MCG tablet  Commonly known as: VITAMIN B-12  Take 250 mcg by mouth once daily.     diclofenac sodium 1 % Gel  Commonly known as: VOLTAREN  Apply 4 g topically 4 (four) times daily.     docusate sodium 100 MG capsule  Commonly known as: COLACE  Take 1 capsule (100 mg total) by mouth once daily.     ENSURE ORIGINAL Liqd  Generic drug: food supplemt, lactose-reduced  Take 237 mLs by mouth 2 (two) times a day.     FRUIT AND VEGETABLE DAILY ORAL  Take 1 Dose by mouth once daily. Patient takes 2 fruit and 1 vegetable balance of nature vitamins in the morning and evening     glucose 4 GM chewable tablet  Take 4 tablets (16 g total) by mouth as needed for Low blood sugar.     ivermectin 1 % Crea  Commonly known as: SOOLANTRA  Apply 1 % topically once.     levETIRAcetam 500 MG Tab  Commonly known as: KEPPRA  Take 1 tablet by mouth  twice daily     levothyroxine 137 MCG Tab tablet  Commonly known as: SYNTHROID  TAKE 1/2 (ONE-HALF) TABLET BY MOUTH BEFORE BREAKFAST     polyethylene glycol 17 gram Pwpk  Commonly known as: GLYCOLAX  Take 17 g by mouth once daily.     PROLIA SUBQ  Inject into the skin every 6 (six) months.     simethicone 125 mg Cap capsule  Commonly known as: MYLICON  Take 1 capsule (125 mg total) by mouth 4 (four) times daily as needed for Flatulence.     traZODone 50 MG tablet  Commonly known as: DESYREL  TAKE 1 TABLET BY MOUTH NIGHTLY AS NEEDED FOR INSOMNIA     VITAMIN D3 50 mcg (2,000 unit) Cap capsule  Generic drug: cholecalciferol (vitamin D3)  Take by mouth once daily.              Indwelling Lines/Drains at time of discharge:   Lines/Drains/Airways       None                   Time spent on the discharge of patient: 32 minutes         Sean Caldera MD  Department of Hospital Medicine  [facility]

## 2024-09-17 NOTE — NURSING
RN Navigator met with patient and dtr at bedside to discuss scheduling tcc/hospital follow up appt. upon discharge. Pt is A&Ox4 and sitting up in a chair.    Pt is agreeable to schedule hospital follow up appt at [facility]. RN Navigator informed pt of scheduled appointment date 9/25 and time:9:00 a.m., and patient reminded to bring portable home O2 if used, as well as all medication bottles to Discharge Clinic follow up appointment.  Discharge Clinic information handout, appointment card/letter provided to patient.  Pt. reminded to call [facility] Contact number, [phone], with any questions or if appointment needs to be rescheduled. Transportation: Family

## 2024-09-18 ENCOUNTER — PATIENT OUTREACH (OUTPATIENT)
Dept: ADMINISTRATIVE | Facility: CLINIC | Age: 79
End: 2024-09-18
Payer: MEDICARE

## 2024-09-18 PROCEDURE — G0180 MD CERTIFICATION HHA PATIENT: HCPCS | Mod: ,,, | Performed by: FAMILY MEDICINE

## 2024-09-18 NOTE — PROGRESS NOTES
C3 nurse attempted to contact Ariana Hernandez dtr for a TCC post hospital discharge follow up call. No answer. Left voicemail with callback information. The patient has a  [facility] (Primary Care) on 9/25/2024; Hospital follow up scheduled at 9:00 a.m.

## 2024-09-25 ENCOUNTER — OFFICE VISIT (OUTPATIENT)
Dept: PRIMARY CARE CLINIC | Facility: CLINIC | Age: 79
End: 2024-09-25
Payer: MEDICARE

## 2024-09-25 VITALS
HEART RATE: 73 BPM | TEMPERATURE: 98 F | WEIGHT: 123.81 LBS | SYSTOLIC BLOOD PRESSURE: 96 MMHG | HEIGHT: 63 IN | BODY MASS INDEX: 21.94 KG/M2 | DIASTOLIC BLOOD PRESSURE: 58 MMHG | OXYGEN SATURATION: 96 %

## 2024-09-25 DIAGNOSIS — G20.B2 PARKINSON'S DISEASE WITH DYSKINESIA AND FLUCTUATING MANIFESTATIONS: ICD-10-CM

## 2024-09-25 DIAGNOSIS — K56.609 SMALL BOWEL OBSTRUCTION: Primary | ICD-10-CM

## 2024-09-25 PROCEDURE — 99999 PR PBB SHADOW E&M-EST. PATIENT-LVL IV: CPT | Mod: PBBFAC,,, | Performed by: NURSE PRACTITIONER

## 2024-09-25 PROCEDURE — 99214 OFFICE O/P EST MOD 30 MIN: CPT | Mod: PBBFAC,PN | Performed by: NURSE PRACTITIONER

## 2024-09-25 NOTE — PROGRESS NOTES
Transitional Care Note  Subjective:       Patient ID: Ariana Tiwari is a 79 y.o. female.  Chief Complaint: Follow-up (Hosp f/u bowel obstruction )    Family and/or Caretaker present at visit?  Yes.  Diagnostic tests reviewed/disposition: No diagnosic tests pending after this hospitalization.  Disease/illness education: Ensure supplements, small meals throughout day.  Home health/community services discussion/referrals: Patient has home health and PT established at [place].   Establishment or re-establishment of referral orders for community resources: No other necessary community resources.   Discussion with other health care providers: No discussion with other health care providers necessary.   HPI  This 80 y/o female presents for hospital follow-up for small bowel obstruction. Daughter in Feeling better since hospital discharge. Reports no nausea/vomiting/abd pain. Has been having multiple bowel movements daily with soft formed stool with some episodes of diarrhea. Has been holding miralax and colace until diarrhea resolves. Patient has been tolerating gluten free diet well, drinks ensure supplement daily, and has frequent small meals. Reports Carbidopa-Levodopa dose recently increased on hospital discharge, tolerating well. Reports no seizures.  Review of Systems   Gastrointestinal:  Positive for diarrhea. Negative for abdominal distention, abdominal pain and nausea.   Musculoskeletal:  Positive for arthralgias.       Objective:      Physical Exam  Constitutional:       Appearance: Normal appearance.   Cardiovascular:      Rate and Rhythm: Normal rate and regular rhythm.      Pulses: Normal pulses.      Heart sounds: Normal heart sounds.   Pulmonary:      Effort: Pulmonary effort is normal.      Breath sounds: Normal breath sounds.   Abdominal:      General: Bowel sounds are normal. There is no distension.      Palpations: Abdomen is soft.      Tenderness: There is no abdominal tenderness.   Skin:     General:  Skin is warm and dry.   Neurological:      Mental Status: She is alert. Mental status is at baseline.   Psychiatric:         Mood and Affect: Mood normal.         Behavior: Behavior normal.         Assessment:       1. Small bowel obstruction    2. Parkinson's disease with dyskinesia and fluctuating manifestations        Plan:       SBO. Resolving. Having regular bowel movements. Tolerating diet well, no abd pain/nausea/abd distension. Resume Miralax and Colace when diarrhea resolves.   Parkinson's Disease. Stable. Continue current therapy.   Follow up with PCP routinely.  Patient agreeable to treatment plan.

## 2024-10-10 ENCOUNTER — EXTERNAL HOME HEALTH (OUTPATIENT)
Dept: HOME HEALTH SERVICES | Facility: HOSPITAL | Age: 79
End: 2024-10-10
Payer: MEDICARE

## 2024-10-10 DIAGNOSIS — E03.9 ACQUIRED HYPOTHYROIDISM: ICD-10-CM

## 2024-10-10 RX ORDER — LEVOTHYROXINE SODIUM 137 UG/1
TABLET ORAL
Qty: 45 TABLET | Refills: 2 | Status: SHIPPED | OUTPATIENT
Start: 2024-10-10

## 2024-10-10 NOTE — TELEPHONE ENCOUNTER
Refill Decision Note   Ariana Tiwari  is requesting a refill authorization.  Brief Assessment and Rationale for Refill:  Approve     Medication Therapy Plan:  ED visit for SBO and Ileus. No FOVS.  Approve 90 with 2      Comments:     Note composed:2:26 PM 10/10/2024             Appointments     Last Visit   5/14/2024 Nba Petty MD   Next Visit   Visit date not found Nba Petty MD

## 2024-10-10 NOTE — TELEPHONE ENCOUNTER
No care due was identified.  [facility] Embedded Care Due Messages. Reference number: [id number].   10/10/2024 6:12:26 AM CDT

## 2024-12-03 ENCOUNTER — OFFICE VISIT (OUTPATIENT)
Dept: FAMILY MEDICINE | Facility: CLINIC | Age: 79
End: 2024-12-03
Payer: MEDICARE

## 2024-12-03 ENCOUNTER — DOCUMENT SCAN (OUTPATIENT)
Dept: HOME HEALTH SERVICES | Facility: HOSPITAL | Age: 79
End: 2024-12-03
Payer: MEDICARE

## 2024-12-03 VITALS — OXYGEN SATURATION: 97 % | BODY MASS INDEX: 21.45 KG/M2 | WEIGHT: 121.06 LBS | HEART RATE: 78 BPM | HEIGHT: 63 IN

## 2024-12-03 DIAGNOSIS — R19.7 DIARRHEA, UNSPECIFIED TYPE: ICD-10-CM

## 2024-12-03 DIAGNOSIS — R05.9 COUGH, UNSPECIFIED TYPE: Primary | ICD-10-CM

## 2024-12-03 LAB
CTP QC/QA: YES
CTP QC/QA: YES
POC MOLECULAR INFLUENZA A AGN: NEGATIVE
POC MOLECULAR INFLUENZA B AGN: NEGATIVE
SARS-COV-2 RDRP RESP QL NAA+PROBE: NEGATIVE

## 2024-12-03 PROCEDURE — 99999PBSHW POCT INFLUENZA A/B MOLECULAR: Mod: PBBFAC,,,

## 2024-12-03 PROCEDURE — 99213 OFFICE O/P EST LOW 20 MIN: CPT | Mod: S$PBB,,, | Performed by: INTERNAL MEDICINE

## 2024-12-03 PROCEDURE — 99213 OFFICE O/P EST LOW 20 MIN: CPT | Mod: PBBFAC,PO | Performed by: INTERNAL MEDICINE

## 2024-12-03 PROCEDURE — 99999PBSHW: Mod: PBBFAC,,,

## 2024-12-03 PROCEDURE — 87502 INFLUENZA DNA AMP PROBE: CPT | Mod: PBBFAC,PO | Performed by: INTERNAL MEDICINE

## 2024-12-03 PROCEDURE — 87635 SARS-COV-2 COVID-19 AMP PRB: CPT | Mod: PBBFAC,PO | Performed by: INTERNAL MEDICINE

## 2024-12-03 PROCEDURE — 99999 PR PBB SHADOW E&M-EST. PATIENT-LVL III: CPT | Mod: PBBFAC,,, | Performed by: INTERNAL MEDICINE

## 2024-12-03 NOTE — PROGRESS NOTES
Patient ID: Ariana Tiwari is a 79 y.o. female.    Chief Complaint: Cough (Lasting for about 5 days ), Nasal Congestion, Diarrhea, Dizziness, and Fatigue     Assessment and Plan     1. Cough, unspecified type  - POCT Influenza A/B Molecular  - POCT COVID-19 Rapid Screening; Future  - POCT COVID-19 Rapid Screening    2. Diarrhea, unspecified type  - POCT Influenza A/B Molecular  - POCT COVID-19 Rapid Screening; Future  - POCT COVID-19 Rapid Screening       Assessment & Plan    IMPRESSION:  - Suspected viral syndrome based on progression of symptoms and clinical presentation  - Low blood pressure (96/58) noted as concerning, potentially related to carbidopa/levodopa use  - Considering COVID-19 and influenza, though outside typical window for treatment  - If COVID-19 positive, considering molnupiravir due to fewer drug interactions compared to Paxlovid  - If influenza positive, may consider Tamiflu despite being outside typical treatment window due to patient's age and Parkinson's diagnosis    PLAN SUMMARY:  - COVID-19 and influenza tests performed  - Discontinue cold medicine (generic DayQuil and NyQuil)  - Continue Tylenol as needed for headache  - Temporarily eliminate milk products, including eggnog  - Follow bland diet, including bananas and gluten-free options if tolerated  - Continue Ensure for nutritional support  - Maintain oral hydration with Pedialyte or watered-down Gatorade  - Maintain salt intake for blood pressure support  - Contact office if symptoms worsen or new concerns arise    PATIENT EDUCATION:  - Explained viral gastroenteritis can affect cells that process milk products (brush border enzymes), potentially causing brisk diarrhea  - Emphasized the importance of oral hydration, especially with ongoing diarrhea    ACTION ITEMS/LIFESTYLE:  - Ariana to maintain oral hydration, especially if diarrhea becomes more problematic  - Recommend Pedialyte or watered-down Gatorade for hydration  - Ariana  to cut out milk products, including eggnog, temporarily  - Ariana to continue Ensure for nutritional support despite milk content  - Ariana to maintain salt intake to help with blood pressure  - Recommend following a bland diet, including bananas and gluten-free options if tolerated        No follow-ups on file.   HPI     History of Present Illness    CHIEF COMPLAINT:  Ariana presents with symptoms of a viral illness, including sore throat, headache, and diarrhea, which started about five days ago.    HPI:  Ariana's symptoms began 5 days ago, with definite onset by the Friday after Thanksgiving. Initial symptoms included sore throat and headache. Ariana had one episode of mucus emesis on one morning and developed very liquid diarrhea today. This morning, the patient also had a mild epistaxis, a new symptom. Ariana's daughter reports ongoing deterioration of the patient's condition.    Ariana has been maintaining oral intake. Ariana's daughter reports administering Tylenol for headache relief. Yesterday, the patient started taking generic DayQuil and NyQuil pills for symptom management, beginning around 5 PM. Ariana missed her nasal spray medication this morning but has otherwise adhered to her regular medication regimen, including carbidopa/levodopa.    Ariana's grandchildren recently had similar symptoms, with one having diarrhea and the other emesis. Ariana has a history of chronic intermittent diarrhea, making it unclear if the current diarrhea is related to the viral illness or her chronic condition.    Ariana has a known gluten allergy but denies recent gluten exposure. Ariana has been consuming eggnog due to the holiday season and regularly drinks Ensure.    Ariana denies a recorded fever, though she suspects she might have had one. Ariana denies hematochezia.      ROS:  General: -fever  ENT: +nasal congestion, +sore throat  Gastrointestinal: +diarrhea  Neurological: +headache         Review of Systems    I  personally reviewed past medical, family and social history.     Objective    Vitals:    12/03/24 1138   Pulse: 78      Wt Readings from Last 3 Encounters:   12/03/24 1138 54.9 kg (121 lb 0.5 oz)   09/25/24 0927 56.1 kg (123 lb 12.6 oz)   09/15/24 0345 59.8 kg (131 lb 13.4 oz)   09/14/24 2141 56.7 kg (125 lb)      Body mass index is 21.44 kg/m².     Physical Exam    Vitals: Blood pressure: 96/58. Heart rate: 78.  Cardiovascular: Slight murmur.        Reference     Active Problem List with Overview Notes    Diagnosis Date Noted    Small bowel obstruction 09/15/2024    ACP (advance care planning) 09/15/2024    Age-related osteoporosis without current pathological fracture 05/16/2024    Ileus 09/13/2023    Aortic atherosclerosis 05/10/2023     Seen on CT abdomen/pelvis 1/27/23      Seizure 01/30/2023    Physical deconditioning 12/16/2021    Encephalopathy, metabolic 12/16/2021    Esophagitis, [place] grade D 12/16/2021    Elevated liver enzymes 12/15/2021    Hyperglycemia 12/04/2021    Hammer toe of left foot 08/25/2020    Recurrent intestinal obstruction 02/03/2020    Anemia 02/03/2020    Adenoma 02/03/2020    Chronic left shoulder pain 05/10/2018    Pancreas cyst 04/12/2018    Chronic diarrhea 02/21/2018    Hemorrhoids 02/21/2018    PD (Parkinson's disease) 09/16/2015     Right tremor type      Breast lump on right side at 1 o'clock position 03/11/2015    Lump of skin of back 03/11/2015    Hypothyroidism     Gluten enteropathy 04/11/2014        Diabetes Medications               glucose 4 GM chewable tablet Take 4 tablets (16 g total) by mouth as needed for Low blood sugar.                 Medication List with Changes/Refills   Current Medications    ACETAMINOPHEN 325 MG CAP    Take 650 mg by mouth every 6 (six) hours as needed.    ALENDRONATE-VITAMIN D3 (FOSAMAX PLUS D) 70 MG- 5,600 UNIT PER TABLET    Take 1 tablet by mouth every 7 days.    AZELASTINE (ASTELIN) 137 MCG (0.1 %) NASAL SPRAY    1 spray by Nasal  route 2 (two) times daily.    CARBIDOPA-LEVODOPA (RYTARY) 23.75-95 MG CPSR    Take 3 capsules by mouth 3 (three) times daily.    CHOLECALCIFEROL, VITAMIN D3, (VITAMIN D3) 50 MCG (2,000 UNIT) CAP CAPSULE    Take by mouth once daily.    CYANOCOBALAMIN (VITAMIN B-12) 250 MCG TABLET    Take 250 mcg by mouth once daily.    DENOSUMAB (PROLIA SUBQ)    Inject into the skin every 6 (six) months.    DICLOFENAC SODIUM (VOLTAREN) 1 % GEL    Apply 4 g topically 4 (four) times daily.    DOCUSATE SODIUM (COLACE) 100 MG CAPSULE    Take 1 capsule (100 mg total) by mouth once daily.    FOOD SUPPLEMT, LACTOSE-REDUCED (ENSURE ORIGINAL) LIQD    Take 237 mLs by mouth 2 (two) times a day.    GLUCOSE 4 GM CHEWABLE TABLET    Take 4 tablets (16 g total) by mouth as needed for Low blood sugar.    IVERMECTIN (SOOLANTRA) 1 % CREA    Apply 1 % topically once.    LEVETIRACETAM (KEPPRA) 500 MG TAB    Take 1 tablet by mouth twice daily    LEVOTHYROXINE (SYNTHROID) 137 MCG TAB TABLET    TAKE 1/2 (ONE-HALF) TABLET BY MOUTH BEFORE BREAKFAST    LYCOPENE/LUTEIN/FRUIT EXTRACTS (FRUIT AND VEGETABLE DAILY ORAL)    Take 1 Dose by mouth once daily. Patient takes 2 fruit and 1 vegetable balance of nature vitamins in the morning and evening    POLYETHYLENE GLYCOL (GLYCOLAX) 17 GRAM PWPK    Take 17 g by mouth once daily.    SIMETHICONE (MYLICON) 125 MG CAP CAPSULE    Take 1 capsule (125 mg total) by mouth 4 (four) times daily as needed for Flatulence.    TRAZODONE (DESYREL) 50 MG TABLET    TAKE 1 TABLET BY MOUTH NIGHTLY AS NEEDED FOR INSOMNIA         This note was generated with the assistance of ambient listening technology. Verbal consent was obtained by the patient and accompanying visitor(s) for the recording of patient appointment to facilitate this note. I attest to having reviewed and edited the generated note for accuracy, though some syntax or spelling errors may persist. Please contact the author of this note for any clarification.

## 2024-12-30 ENCOUNTER — TELEPHONE (OUTPATIENT)
Dept: FAMILY MEDICINE | Facility: CLINIC | Age: 79
End: 2024-12-30
Payer: MEDICARE

## 2024-12-30 DIAGNOSIS — M81.0 AGE-RELATED OSTEOPOROSIS WITHOUT CURRENT PATHOLOGICAL FRACTURE: Primary | ICD-10-CM

## 2024-12-30 NOTE — TELEPHONE ENCOUNTER
----- Message from Jennifer sent at 12/24/2024  9:02 AM CST -----  Please review patient's Appointment Desk for an upcoming PROLIA INJECTION appointment.       The following are needed for this appointment.   Reminding to please contact patient, if needed:      ORDER.  Current / active in the Epic Therapy Plan, signed within a year.  LABS. Serum Calcium within 6 weeks of treatment.    DEXA SCAN within the last 2 years   DENTAL PRECAUTION CONFIRMED WITH PATIENT. No recent invasive dental procedures within the last month (tooth extraction, etc). A patient will need to bring a Dental Clearance signed by a dental provider if there was any recent dental procedure within the last month.   FOLLOW-UP APPOINTMENT within the last 12 months with the diagnosis mentioned in the visit notes.         Any item unavailable by the day prior to the scheduled appointment will cause the appointment to be CANCELLED.        To reschedule appointments, please contact [facility] INFUSION SCHEDULING POOL or call [phone].       Thank you,  [facility], Infusion Department

## 2024-12-30 NOTE — TELEPHONE ENCOUNTER
"Her last calcium was low but when corrected for her low albumin it was normal.  It is too old to use for this so I put in an order for both a total and ionized calcium level that has to be done before her treatment on January 8.  Her last appointment was in June and had osteoporosis without fracture in the visit note.  Her last dexa is up to date and the last order for the prolia was in June also and up to date.  She needs to get clearance from her dentist for the test.  Her last vitamin d was low but they did not request a repeat.    "Jennifer" not identified and epic will not allow message routed to a department ([facility] cancer infusion center) so I could not route this back to the sender to inform them.  How many "Jennifer"s do you think there are?  "

## 2025-01-01 ENCOUNTER — RESULTS FOLLOW-UP (OUTPATIENT)
Dept: UROLOGY | Facility: CLINIC | Age: 80
End: 2025-01-01

## 2025-01-06 ENCOUNTER — LAB VISIT (OUTPATIENT)
Dept: LAB | Facility: HOSPITAL | Age: 80
End: 2025-01-06
Attending: FAMILY MEDICINE
Payer: MEDICARE

## 2025-01-06 DIAGNOSIS — M81.0 AGE-RELATED OSTEOPOROSIS WITHOUT CURRENT PATHOLOGICAL FRACTURE: ICD-10-CM

## 2025-01-06 LAB
CA-I BLDV-SCNC: 1.17 MMOL/L (ref 1.06–1.42)
CALCIUM SERPL-MCNC: 8.9 MG/DL (ref 8.7–10.5)

## 2025-01-06 PROCEDURE — 82310 ASSAY OF CALCIUM: CPT | Performed by: FAMILY MEDICINE

## 2025-01-06 PROCEDURE — 82330 ASSAY OF CALCIUM: CPT | Performed by: FAMILY MEDICINE

## 2025-01-06 PROCEDURE — 36415 COLL VENOUS BLD VENIPUNCTURE: CPT | Performed by: FAMILY MEDICINE

## 2025-01-08 ENCOUNTER — INFUSION (OUTPATIENT)
Dept: INFUSION THERAPY | Facility: HOSPITAL | Age: 80
End: 2025-01-08
Attending: FAMILY MEDICINE
Payer: MEDICARE

## 2025-01-08 VITALS
BODY MASS INDEX: 21.55 KG/M2 | HEIGHT: 63 IN | WEIGHT: 121.63 LBS | DIASTOLIC BLOOD PRESSURE: 60 MMHG | OXYGEN SATURATION: 99 % | SYSTOLIC BLOOD PRESSURE: 100 MMHG | TEMPERATURE: 98 F | HEART RATE: 70 BPM | RESPIRATION RATE: 16 BRPM

## 2025-01-08 DIAGNOSIS — M81.0 AGE-RELATED OSTEOPOROSIS WITHOUT CURRENT PATHOLOGICAL FRACTURE: Primary | ICD-10-CM

## 2025-01-08 PROCEDURE — 63600175 PHARM REV CODE 636 W HCPCS: Mod: JZ,TB | Performed by: FAMILY MEDICINE

## 2025-01-08 PROCEDURE — 96372 THER/PROPH/DIAG INJ SC/IM: CPT

## 2025-01-08 RX ADMIN — DENOSUMAB 60 MG: 60 INJECTION SUBCUTANEOUS at 11:01

## 2025-01-08 NOTE — PLAN OF CARE
Problem: Fatigue  Goal: Improved Activity Tolerance  Outcome: Progressing  Intervention: Promote Improved Energy  Flowsheets (Taken 1/8/2025 1111)  Fatigue Management:   frequent rest breaks encouraged   fatigue-related activity identified  Sleep/Rest Enhancement: regular sleep/rest pattern promoted  Environmental Support: rest periods encouraged

## 2025-02-28 ENCOUNTER — HOSPITAL ENCOUNTER (INPATIENT)
Facility: HOSPITAL | Age: 80
LOS: 6 days | Discharge: NURSING, REHAB OR LONG-TERM CARE | DRG: 522 | End: 2025-03-06
Attending: HOSPITALIST | Admitting: HOSPITALIST
Payer: MEDICARE

## 2025-02-28 ENCOUNTER — ANESTHESIA EVENT (OUTPATIENT)
Dept: SURGERY | Facility: HOSPITAL | Age: 80
End: 2025-02-28
Payer: MEDICARE

## 2025-02-28 DIAGNOSIS — R07.9 CHEST PAIN: ICD-10-CM

## 2025-02-28 DIAGNOSIS — S72.002A CLOSED DISPLACED FRACTURE OF LEFT FEMORAL NECK: ICD-10-CM

## 2025-02-28 DIAGNOSIS — S72.002A CLOSED LEFT HIP FRACTURE: Primary | ICD-10-CM

## 2025-02-28 DIAGNOSIS — Z01.818 PREOP TESTING: ICD-10-CM
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
## 2025-02-28 DIAGNOSIS — R10.32 LEFT GROIN PAIN: ICD-10-CM

## 2025-02-28 PROBLEM — E11.9 DIABETES: Status: ACTIVE | Noted: 2025-02-28

## 2025-02-28 PROBLEM — E11.9 DIABETES: Status: RESOLVED | Noted: 2025-02-28 | Resolved: 2025-02-28

## 2025-02-28 LAB
ALBUMIN SERPL BCP-MCNC: 4 G/DL (ref 3.5–5.2)
ALP SERPL-CCNC: 74 U/L (ref 40–150)
ALT SERPL W/O P-5'-P-CCNC: 34 U/L (ref 10–44)
ANION GAP SERPL CALC-SCNC: 12 MMOL/L (ref 8–16)
AST SERPL-CCNC: 25 U/L (ref 10–40)
BASOPHILS # BLD AUTO: 0.02 K/UL (ref 0–0.2)
BASOPHILS NFR BLD: 0.4 % (ref 0–1.9)
BILIRUB SERPL-MCNC: 0.4 MG/DL (ref 0.1–1)
BUN SERPL-MCNC: 12 MG/DL (ref 8–23)
CALCIUM SERPL-MCNC: 8.4 MG/DL (ref 8.7–10.5)
CHLORIDE SERPL-SCNC: 105 MMOL/L (ref 95–110)
CO2 SERPL-SCNC: 23 MMOL/L (ref 23–29)
CREAT SERPL-MCNC: 0.5 MG/DL (ref 0.5–1.4)
DIFFERENTIAL METHOD BLD: ABNORMAL
EOSINOPHIL # BLD AUTO: 0.1 K/UL (ref 0–0.5)
EOSINOPHIL NFR BLD: 1.4 % (ref 0–8)
ERYTHROCYTE [DISTWIDTH] IN BLOOD BY AUTOMATED COUNT: 12.7 % (ref 11.5–14.5)
EST. GFR  (NO RACE VARIABLE): >60 ML/MIN/1.73 M^2
GLUCOSE SERPL-MCNC: 71 MG/DL (ref 70–110)
HCT VFR BLD AUTO: 41.9 % (ref 37–48.5)
HGB BLD-MCNC: 13.9 G/DL (ref 12–16)
IMM GRANULOCYTES # BLD AUTO: 0.04 K/UL (ref 0–0.04)
IMM GRANULOCYTES NFR BLD AUTO: 0.8 % (ref 0–0.5)
LYMPHOCYTES # BLD AUTO: 1.1 K/UL (ref 1–4.8)
LYMPHOCYTES NFR BLD: 20.9 % (ref 18–48)
MAGNESIUM SERPL-MCNC: 1.6 MG/DL (ref 1.6–2.6)
MCH RBC QN AUTO: 30.3 PG (ref 27–31)
MCHC RBC AUTO-ENTMCNC: 33.2 G/DL (ref 32–36)
MCV RBC AUTO: 92 FL (ref 82–98)
MONOCYTES # BLD AUTO: 0.3 K/UL (ref 0.3–1)
MONOCYTES NFR BLD: 6.6 % (ref 4–15)
NEUTROPHILS # BLD AUTO: 3.6 K/UL (ref 1.8–7.7)
NEUTROPHILS NFR BLD: 69.9 % (ref 38–73)
NRBC BLD-RTO: 0 /100 WBC
PLATELET # BLD AUTO: 146 K/UL (ref 150–450)
PMV BLD AUTO: 9.7 FL (ref 9.2–12.9)
POTASSIUM SERPL-SCNC: 3.6 MMOL/L (ref 3.5–5.1)
PROT SERPL-MCNC: 6.9 G/DL (ref 6–8.4)
RBC # BLD AUTO: 4.58 M/UL (ref 4–5.4)
SODIUM SERPL-SCNC: 140 MMOL/L (ref 136–145)
TROPONIN I SERPL DL<=0.01 NG/ML-MCNC: 0.01 NG/ML (ref 0–0.03)
WBC # BLD AUTO: 5.16 K/UL (ref 3.9–12.7)

## 2025-02-28 PROCEDURE — 25000003 PHARM REV CODE 250

## 2025-02-28 PROCEDURE — 11000001 HC ACUTE MED/SURG PRIVATE ROOM

## 2025-02-28 PROCEDURE — 93005 ELECTROCARDIOGRAM TRACING: CPT

## 2025-02-28 PROCEDURE — 83735 ASSAY OF MAGNESIUM: CPT | Performed by: PHYSICIAN ASSISTANT

## 2025-02-28 PROCEDURE — 25000003 PHARM REV CODE 250: Performed by: NURSE PRACTITIONER

## 2025-02-28 PROCEDURE — 80053 COMPREHEN METABOLIC PANEL: CPT | Performed by: PHYSICIAN ASSISTANT

## 2025-02-28 PROCEDURE — 93010 ELECTROCARDIOGRAM REPORT: CPT | Mod: ,,, | Performed by: GENERAL PRACTICE

## 2025-02-28 PROCEDURE — 36415 COLL VENOUS BLD VENIPUNCTURE: CPT | Performed by: PHYSICIAN ASSISTANT

## 2025-02-28 PROCEDURE — 84484 ASSAY OF TROPONIN QUANT: CPT | Performed by: PHYSICIAN ASSISTANT

## 2025-02-28 PROCEDURE — 85025 COMPLETE CBC W/AUTO DIFF WBC: CPT | Performed by: PHYSICIAN ASSISTANT

## 2025-02-28 RX ORDER — ENOXAPARIN SODIUM 100 MG/ML
40 INJECTION SUBCUTANEOUS EVERY 24 HOURS
Status: DISCONTINUED | OUTPATIENT
Start: 2025-03-01 | End: 2025-03-06 | Stop reason: HOSPADM

## 2025-02-28 RX ORDER — METHOCARBAMOL 500 MG/1
500 TABLET, FILM COATED ORAL 4 TIMES DAILY PRN
Status: DISCONTINUED | OUTPATIENT
Start: 2025-02-28 | End: 2025-03-06 | Stop reason: HOSPADM

## 2025-02-28 RX ORDER — HYDROCODONE BITARTRATE AND ACETAMINOPHEN 5; 325 MG/1; MG/1
1 TABLET ORAL EVERY 6 HOURS PRN
Refills: 0 | Status: DISCONTINUED | OUTPATIENT
Start: 2025-02-28 | End: 2025-03-06 | Stop reason: HOSPADM

## 2025-02-28 RX ORDER — IBUPROFEN 200 MG
24 TABLET ORAL
Status: DISCONTINUED | OUTPATIENT
Start: 2025-02-28 | End: 2025-03-06 | Stop reason: HOSPADM

## 2025-02-28 RX ORDER — SODIUM,POTASSIUM PHOSPHATES 280-250MG
2 POWDER IN PACKET (EA) ORAL
Status: DISCONTINUED | OUTPATIENT
Start: 2025-02-28 | End: 2025-03-06 | Stop reason: HOSPADM

## 2025-02-28 RX ORDER — IBUPROFEN 200 MG
16 TABLET ORAL
Status: DISCONTINUED | OUTPATIENT
Start: 2025-02-28 | End: 2025-02-28

## 2025-02-28 RX ORDER — LANOLIN ALCOHOL/MO/W.PET/CERES
800 CREAM (GRAM) TOPICAL
Status: DISCONTINUED | OUTPATIENT
Start: 2025-02-28 | End: 2025-03-06 | Stop reason: HOSPADM

## 2025-02-28 RX ORDER — LEVETIRACETAM 500 MG/1
500 TABLET ORAL 2 TIMES DAILY
Status: DISCONTINUED | OUTPATIENT
Start: 2025-02-28 | End: 2025-03-06 | Stop reason: HOSPADM

## 2025-02-28 RX ORDER — SODIUM CHLORIDE 0.9 % (FLUSH) 0.9 %
10 SYRINGE (ML) INJECTION EVERY 12 HOURS PRN
Status: DISCONTINUED | OUTPATIENT
Start: 2025-02-28 | End: 2025-03-06 | Stop reason: HOSPADM

## 2025-02-28 RX ORDER — TRAZODONE HYDROCHLORIDE 50 MG/1
50 TABLET ORAL NIGHTLY
Status: DISCONTINUED | OUTPATIENT
Start: 2025-02-28 | End: 2025-03-06 | Stop reason: HOSPADM

## 2025-02-28 RX ORDER — INSULIN ASPART 100 [IU]/ML
0-10 INJECTION, SOLUTION INTRAVENOUS; SUBCUTANEOUS
Status: DISCONTINUED | OUTPATIENT
Start: 2025-02-28 | End: 2025-02-28

## 2025-02-28 RX ORDER — CYANOCOBALAMIN (VITAMIN B-12) 250 MCG
250 TABLET ORAL DAILY
Status: DISCONTINUED | OUTPATIENT
Start: 2025-02-28 | End: 2025-03-06 | Stop reason: HOSPADM

## 2025-02-28 RX ORDER — NALOXONE HCL 0.4 MG/ML
0.02 VIAL (ML) INJECTION
Status: DISCONTINUED | OUTPATIENT
Start: 2025-02-28 | End: 2025-03-06 | Stop reason: HOSPADM

## 2025-02-28 RX ORDER — ONDANSETRON HYDROCHLORIDE 2 MG/ML
4 INJECTION, SOLUTION INTRAVENOUS EVERY 6 HOURS PRN
Status: DISCONTINUED | OUTPATIENT
Start: 2025-02-28 | End: 2025-03-06 | Stop reason: HOSPADM

## 2025-02-28 RX ORDER — ACETAMINOPHEN 325 MG/1
650 TABLET ORAL EVERY 4 HOURS PRN
Status: DISCONTINUED | OUTPATIENT
Start: 2025-02-28 | End: 2025-03-06 | Stop reason: HOSPADM

## 2025-02-28 RX ORDER — POLYETHYLENE GLYCOL 3350 17 G/17G
17 POWDER, FOR SOLUTION ORAL DAILY
Status: DISCONTINUED | OUTPATIENT
Start: 2025-02-28 | End: 2025-03-03

## 2025-02-28 RX ORDER — GLUCAGON 1 MG
1 KIT INJECTION
Status: DISCONTINUED | OUTPATIENT
Start: 2025-02-28 | End: 2025-03-06 | Stop reason: HOSPADM

## 2025-02-28 RX ORDER — CHOLECALCIFEROL (VITAMIN D3) 25 MCG
2000 TABLET ORAL DAILY
Status: DISCONTINUED | OUTPATIENT
Start: 2025-03-01 | End: 2025-03-06 | Stop reason: HOSPADM

## 2025-02-28 RX ORDER — DICLOFENAC SODIUM 10 MG/G
2 GEL TOPICAL 2 TIMES DAILY
Status: DISCONTINUED | OUTPATIENT
Start: 2025-02-28 | End: 2025-03-06 | Stop reason: HOSPADM

## 2025-02-28 RX ADMIN — HYDROCODONE BITARTRATE AND ACETAMINOPHEN 1 TABLET: 5; 325 TABLET ORAL at 07:02

## 2025-02-28 RX ADMIN — METHOCARBAMOL 500 MG: 500 TABLET ORAL at 05:02

## 2025-02-28 RX ADMIN — ACETAMINOPHEN 650 MG: 325 TABLET ORAL at 08:02

## 2025-02-28 RX ADMIN — LEVETIRACETAM 500 MG: 500 TABLET, FILM COATED ORAL at 10:02

## 2025-02-28 RX ADMIN — DICLOFENAC SODIUM 2 G: 10 GEL TOPICAL at 10:02

## 2025-02-28 RX ADMIN — TRAZODONE HYDROCHLORIDE 50 MG: 50 TABLET ORAL at 10:02

## 2025-02-28 RX ADMIN — HYDROCODONE BITARTRATE AND ACETAMINOPHEN 1 TABLET: 5; 325 TABLET ORAL at 01:02

## 2025-02-28 RX ADMIN — CYANOCOBALAMIN TAB 250 MCG 250 MCG: 250 TAB at 04:02

## 2025-02-28 NOTE — HPI
Ariana Tiwari 79 y.o.female with a past medical history significant for hypothyroidism, Parkinson's, hernia, seizures, and stomach ulcers.  Patient presented to the emergency department status post witnessed fall at home.  Patient stated that she was attempting to get her walker in a chair was in the way and she fell to the ground.  Patient denies loss of consciousness or head trauma.  Patient denies fever, chills nausea, vomiting.  There are no alleviating factors and pain is aggravated by movement.  Patient denies any recent travel or sick visits.  Patient denies use of blood thinners. Patient denies smoking or use of alcohol.  Patient lives at home with her son and daughter-in-law.  Emergency department workup included:  CBC unremarkable, CMP unremarkable, Mg-1.9.  X-ray left hip revealed mildly displaced transverse fracture of the left femoral neck.  Orthopedic surgeon was consulted.  Patient will be admitted to Hospital Medicine for management and evaluation.

## 2025-02-28 NOTE — H&P
[facility], later [facility]  History & Physical    Patient Name: Ariana Tiwari  MRN: [id number]  Patient Class: IP- Inpatient  Admission Date: 2/28/2025  Attending Physician: Leila Moses MD   Primary Care Provider: Nba Petty MD         Patient information was obtained from patient, past medical records, and ER records.     Subjective:     Principal Problem:Closed left hip fracture    Chief Complaint:   Chief Complaint   Patient presents with    Fall    Hip Pain     Left  , unable to bear weight         HPI: Ariana Tiwari 79 y.o.female with a past medical history significant for hypothyroidism, Parkinson's, hernia, seizures, and stomach ulcers.  Patient presented to the emergency department status post witnessed fall at home.  Patient stated that she was attempting to get her walker in a chair was in the way and she fell to the ground.  Patient denies loss of consciousness or head trauma.  Patient denies fever, chills nausea, vomiting.  There are no alleviating factors and pain is aggravated by movement.  Patient denies any recent travel or sick visits.  Patient denies use of blood thinners. Patient denies smoking or use of alcohol.  Patient lives at home with her son and daughter-in-law.  Emergency department workup included:  CBC unremarkable, CMP unremarkable, Mg-1.9.  X-ray left hip revealed mildly displaced transverse fracture of the left femoral neck.  Orthopedic surgeon was consulted.  Patient will be admitted to Hospital Medicine for management and evaluation.       No new subjective & objective note has been filed under this hospital service since the last note was generated.    Assessment/Plan:     * Closed left hip fracture  Orthopedic surgery consult  P.r.n. analgesics  PT/OT  NPO      Diabetes  Patient's FSGs are controlled on current medication regimen.  Last A1c reviewed-   Lab Results   Component Value Date    HGBA1C 5.1 09/14/2024     Most recent fingerstick  "glucose reviewed- No results for input(s): "POCTGLUCOSE" in the last 24 hours.  Current correctional scale  Medium  Maintain anti-hyperglycemic dose as follows-   Antihyperglycemics (From admission, onward)      Start     Stop Route Frequency Ordered    02/28/25 1658  insulin aspart U-100 pen 0-10 Units         -- SubQ Before meals & nightly PRN 02/28/25 1558          Hold Oral hypoglycemics while patient is in the hospital.    Seizure  Chronic.  Noted.    Continue home medications      Recurrent intestinal obstruction  Chronic.  Noted.  No acute problem      PD (Parkinson's disease)  Chronic.  Noted.    Continue home medications      Hypothyroidism  Patient has chronic hypothyroidism. TFTs reviewed-   Lab Results   Component Value Date    TSH 2.518 09/15/2024   . Will continue chronic levothyroxine and adjust for and clinical changes.          VTE Risk Mitigation (From admission, onward)           Ordered     enoxaparin injection 40 mg  Daily         02/28/25 1308     IP VTE HIGH RISK PATIENT  Once         02/28/25 1308     Place sequential compression device  Until discontinued         02/28/25 1308                                    Gadiel Duval NP  Department of Hospital Medicine  [facility] - Med/Surg          "

## 2025-02-28 NOTE — SUBJECTIVE & OBJECTIVE
Past Medical History:   Diagnosis Date    ACP (advance care planning) 9/15/2024    Age-related osteoporosis without current pathological fracture 05/16/2024    Allergy     Diverticulosis     Gluten enteropathy     Gluten intolerance     Hernia     Hypothyroidism     PD (Parkinson's disease) 09/16/2015    Right tremor type    Peptic ulcer disease     Right shoulder pain     Cirtisone injection 3/26/15 - Dr. Tolbert    SBO (small bowel obstruction) 09/17/2019    Seizures     Squamous cell carcinoma of skin     Ulcer        Past Surgical History:   Procedure Laterality Date    ADENOIDECTOMY      COLONOSCOPY  03/01/2012    Dr. Teresa, repeat in 10 years for screening    COLONOSCOPY N/A 2/21/2018    Procedure: COLONOSCOPY;  Surgeon: Radha Deng MD;  Location: [facility];  Service: Endoscopy;  Laterality: N/A;    CORRECTION OF HAMMER TOE Left 9/16/2020    Procedure: CORRECTION, HAMMER TOE;  Surgeon: William Sprague MD;  Location: [facility] OR;  Service: Orthopedics;  Laterality: Left;    COSMETIC SURGERY      Broken nose    ESOPHAGOGASTRODUODENOSCOPY N/A 12/8/2021    Procedure: EGD (ESOPHAGOGASTRODUODENOSCOPY);  Surgeon: Benji Valentino III, MD;  Location: [facility];  Service: Endoscopy;  Laterality: N/A;    FRACTURE SURGERY  left wrist    With pin    HEMORRHOID SURGERY      HERNIA REPAIR Left 04/09/2015    Dr Lo; left inguinal    INTRALUMINAL GASTROINTESTINAL TRACT IMAGING VIA CAPSULE N/A 7/10/2018    Procedure: IMAGING, GI TRACT, INTRALUMINAL, VIA CAPSULE;  Surgeon: Radha Deng MD;  Location: [facility] ENDO;  Service: Endoscopy;  Laterality: N/A;    LYSIS OF ADHESIONS  9/29/2020    Procedure: LYSIS, ADHESIONS;  Surgeon: Eagle Gonzalez MD;  Location: [facility] OR;  Service: General;;    RHINOPLASTY TIP      TONSILLECTOMY      UPPER GASTROINTESTINAL ENDOSCOPY  05/21/2015    Dr. Gomez       Review of patient's allergies indicates:   Allergen Reactions    Bactrim [sulfamethoxazole-trimethoprim] Shortness Of  Breath    Gluten Diarrhea    Phenergan [promethazine] Hallucinations       No current facility-administered medications on file prior to encounter.     Current Outpatient Medications on File Prior to Encounter   Medication Sig    carbidopa-levodopa (RYTARY) 23.75-95 mg CpSR Take 3 capsules by mouth 3 (three) times daily.    denosumab (PROLIA SUBQ) Inject into the skin every 6 (six) months.    docusate sodium (COLACE) 100 MG capsule Take 1 capsule (100 mg total) by mouth once daily.    glucose 4 GM chewable tablet Take 4 tablets (16 g total) by mouth as needed for Low blood sugar.    levETIRAcetam (KEPPRA) 500 MG Tab Take 1 tablet by mouth twice daily    levothyroxine (SYNTHROID) 137 MCG Tab tablet TAKE 1/2 (ONE-HALF) TABLET BY MOUTH BEFORE BREAKFAST    polyethylene glycol (GLYCOLAX) 17 gram PwPk Take 17 g by mouth once daily.    simethicone (MYLICON) 125 mg Cap capsule Take 1 capsule (125 mg total) by mouth 4 (four) times daily as needed for Flatulence.    acetaminophen 325 mg Cap Take 650 mg by mouth every 6 (six) hours as needed.    alendronate-vitamin D3 (FOSAMAX PLUS D) 70 mg- 5,600 unit per tablet Take 1 tablet by mouth every 7 days.    azelastine (ASTELIN) 137 mcg (0.1 %) nasal spray 1 spray by Nasal route 2 (two) times daily.    cholecalciferol, vitamin D3, (VITAMIN D3) 50 mcg (2,000 unit) Cap capsule Take by mouth once daily.    cyanocobalamin (VITAMIN B-12) 250 MCG tablet Take 250 mcg by mouth once daily.    diclofenac sodium (VOLTAREN) 1 % Gel Apply 4 g topically 4 (four) times daily.    food supplemt, lactose-reduced (ENSURE ORIGINAL) Liqd Take 237 mLs by mouth 2 (two) times a day.    ivermectin (SOOLANTRA) 1 % Crea Apply 1 % topically once. Apply to face    lycopene/lutein/fruit extracts (FRUIT AND VEGETABLE DAILY ORAL) Take 1 Dose by mouth once daily. Patient takes 2 fruit and 1 vegetable balance of nature vitamins in the morning and evening    traZODone (DESYREL) 50 MG tablet TAKE 1 TABLET BY MOUTH  NIGHTLY AS NEEDED FOR INSOMNIA     Family History       Problem Relation (Age of Onset)    Alcohol abuse Brother    Diabetes Mother, Son    Early death Brother    Heart disease Brother    No Known Problems Father    Obesity Sister          Tobacco Use    Smoking status: Never    Smokeless tobacco: Never   Substance and Sexual Activity    Alcohol use: Yes     Alcohol/week: 11.7 standard drinks of alcohol     Types: 14 Standard drinks or equivalent per week     Comment: 2 glasses wine per dinner    Drug use: No    Sexual activity: Not Currently     Review of Systems   Constitutional:  Positive for activity change. Negative for appetite change, chills, fatigue and fever.   Respiratory: Negative.     Cardiovascular: Negative.    Gastrointestinal: Negative.    Genitourinary: Negative.    Musculoskeletal:  Positive for arthralgias and gait problem.   Neurological:  Positive for weakness (Generalized). Negative for dizziness, tremors, seizures, syncope, facial asymmetry, speech difficulty, light-headedness, numbness and headaches.   Psychiatric/Behavioral: Negative.       Objective:     Vital Signs (Most Recent):  Temp: 97.6 °F (36.4 °C) (02/28/25 1115)  Pulse: 68 (02/28/25 1200)  Resp: 18 (02/28/25 1115)  BP: (!) 109/55 (02/28/25 1200)  SpO2: 96 % (02/28/25 1200) Vital Signs (24h Range):  Temp:  [97.6 °F (36.4 °C)] 97.6 °F (36.4 °C)  Pulse:  [68-98] 68  Resp:  [18] 18  SpO2:  [93 %-96 %] 96 %  BP: (109-118)/(55-64) 109/55     Weight: 54.9 kg (121 lb)  Body mass index is 21.43 kg/m².     Physical Exam  Vitals and nursing note reviewed.   Constitutional:       Appearance: She is ill-appearing (chronically).   Cardiovascular:      Rate and Rhythm: Normal rate.      Pulses: Normal pulses.      Heart sounds: Normal heart sounds.   Pulmonary:      Effort: Pulmonary effort is normal. No respiratory distress.      Breath sounds: Normal breath sounds.   Abdominal:      General: Bowel sounds are normal. There is distension  (baseline extended 2/2 hernia).   Musculoskeletal:         General: Tenderness (left hip) present.      Right lower leg: No edema.      Left lower leg: No edema.   Skin:     General: Skin is warm and dry.   Neurological:      Mental Status: She is alert and oriented to person, place, and time.      Gait: Gait abnormal.   Psychiatric:         Mood and Affect: Mood normal.         Behavior: Behavior normal.         Thought Content: Thought content normal.         Judgment: Judgment normal.              Significant Labs: All pertinent labs within the past 24 hours have been reviewed.  BMP:   Recent Labs   Lab 02/28/25  1140   GLU 71      K 3.6      CO2 23   BUN 12   CREATININE 0.5   CALCIUM 8.4*   MG 1.6     CMP:   Recent Labs   Lab 02/28/25  1140      K 3.6      CO2 23   GLU 71   BUN 12   CREATININE 0.5   CALCIUM 8.4*   PROT 6.9   ALBUMIN 4.0   BILITOT 0.4   ALKPHOS 74   AST 25   ALT 34   ANIONGAP 12     Magnesium:   Recent Labs   Lab 02/28/25  1140   MG 1.6       Significant Imaging: I have reviewed all pertinent imaging results/findings within the past 24 hours.  Imaging Results              X-Ray Chest 1 View (In process)                       X-Ray Hip 2 or 3 views Left with Pelvis when performed (Final result)  Result time 02/28/25 12:09:11      Final result by Jadiel Martinez Jr., MD (02/28/25 12:09:11)                   Impression:      Mildly displaced transverse fracture of the left femoral neck.    This report was flagged in Epic as abnormal.      Electronically signed by: Jadiel Martinez MD  Date:    02/28/2025  Time:    12:09               Narrative:    EXAMINATION:  XR HIP WITH PELVIS WHEN PERFORMED 2 OR 3 VIEWS LEFT    CLINICAL HISTORY:  Left lower quadrant pain    TECHNIQUE:  AP view of the pelvis and frog leg lateral view of the left hip were performed.    COMPARISON:  None    FINDINGS:  There is transverse fracture of the left femoral neck with very mild shift of the femur  in relation to the femoral head.  The femoral head remains in the acetabulum.  The bones of the pelvis and right hip are intact..

## 2025-02-28 NOTE — ASSESSMENT & PLAN NOTE
"Patient's FSGs are controlled on current medication regimen.  Last A1c reviewed-   Lab Results   Component Value Date    HGBA1C 5.1 09/14/2024     Most recent fingerstick glucose reviewed- No results for input(s): "POCTGLUCOSE" in the last 24 hours.  Current correctional scale  Medium  Maintain anti-hyperglycemic dose as follows-   Antihyperglycemics (From admission, onward)      Start     Stop Route Frequency Ordered    02/28/25 1658  insulin aspart U-100 pen 0-10 Units         -- SubQ Before meals & nightly PRN 02/28/25 1558          Hold Oral hypoglycemics while patient is in the hospital.  "

## 2025-02-28 NOTE — ASSESSMENT & PLAN NOTE
Patient has chronic hypothyroidism. TFTs reviewed-   Lab Results   Component Value Date    TSH 2.518 09/15/2024   . Will continue chronic levothyroxine and adjust for and clinical changes.

## 2025-02-28 NOTE — PLAN OF CARE
Renan Wadsworth MD   Fellow  [facility] - Med/Surg  Initial Discharge Assessment       Primary Care Provider: Nba Petty MD    Admission Diagnosis: Fall [W19.XXXA]    Admission Date: 2/28/2025  Expected Discharge Date:     Transition of Care Barriers: None    Payor: MEDICARE / Plan: MEDICARE PART A & B / Product Type: Government /     Extended Emergency Contact Information  Primary Emergency Contact: Je Tiwari  Address: [address] [place] [address] [place]  Mobile Phone: [phone]  Relation: Son  Preferred language: English   needed? No  Secondary Emergency Contact: Micaela Tiwari  Address: [address] [place] [address] [place]  Mobile Phone: [phone]  Relation: Relative  Preferred language: English   needed? No    Discharge Plan A: Home with family     DC assessment completed with ESTEFANIA Hinojosa at bedside. Verified information on facesheet as correct. Pt lives at listed address with Son and ESTEFANIA. They take total care of her, Reports NOK as.Son and Daughter in law  Je and Micaela Te.   PCP is DR Franko Crowley she will be a NP since Dr Petty retired has appt on Apr 3 2025.- reports last apt was with Dr Petty Sept 2024.Pharmacy is [facility] on [address]. Denies hh/hd/outpt services. DME- has all needed w/c [employer] . Reports she is taken care of by her family. ESTEFANIA drives her to apts. Reports Daughter in law Micaela  Will provide transportation home upon DC. Reports taking home medications as prescribed and can currently afford them. Verified insurance on file. Denies recent inpt stay in last 30 days.  DC plan is return to home.     [id number] [address] - [address]  [address]  [place] [address]  Phone: [phone] Fax: [phone]    [facility]  [address] [place] [address]  Phone: [phone] Fax: [phone]      Initial Assessment (most recent)       Adult Discharge  "  Assessment - 02/28/25 1526          Discharge Assessment    Assessment Type Discharge Planning Assessment     Confirmed/corrected address, phone number and insurance Yes     Confirmed Demographics Correct on Facesheet     Source of Information family   ESTEFANIA Hinojosa    When was your last doctors appointment? 09/25/24     Reason For Admission Fall     People in Home child(malka), adult   She lives with Son &DIL    Do you expect to return to your current living situation? Yes     Do you have help at home or someone to help you manage your care at home? Yes     Who are your caregiver(s) and their phone number(s)? ESTEFANIA Hooker Te  [phone]     Prior to hospitilization cognitive status: Alert/Oriented;Not Oriented to Time     Current cognitive status: Alert/Oriented;Not Oriented to Time     Walking or Climbing Stairs Difficulty yes     Walking or Climbing Stairs stair climbing difficulty, requires equipment     Dressing/Bathing Difficulty yes     Dressing/Bathing bathing difficulty, assistance 1 person     Do you have any problems with: Errands/Grocery;Needs other help     Specify other help Family takes care of her     Home Accessibility wheelchair accessible     Equipment Currently Used at Home wheelchair;grab bar   Lift chairs walk in tub, Adjustable bed    Readmission within 30 days? No     Patient currently being followed by outpatient case management? No     Do you currently have service(s) that help you manage your care at home? No     Do you take prescription medications? Yes     Do you have prescription coverage? Yes     Do you have any problems affording any of your prescribed medications? No     Is the patient taking medications as prescribed? yes     Who is going to help you get home at discharge? Son & DIL     Are you on dialysis? No     Do you take coumadin? No     Discharge Plan A Home with family     DME Needed Upon Discharge  none     Discharge Plan discussed with: Adult children     Transition of Care  Barriers None

## 2025-02-28 NOTE — PROGRESS NOTES
Formerly [facility] Med/Surg  History & Physical  Orthopedics    SUBJECTIVE:     Chief Complaint/Reason for Admission:  Left hip pain    History of Present Illness:    Patient is a 79 y.o. female who presents with history of a fall at home resulting in inability to walk on the left. This occurred today.  Patient does have a history of Parkinson's disease and does ambulate with a walker    Patient Active Problem List    Diagnosis Date Noted    Closed left hip fracture 02/28/2025    Small bowel obstruction 09/15/2024    ACP (advance care planning) 09/15/2024    Age-related osteoporosis without current pathological fracture 05/16/2024    Ileus 09/13/2023    Aortic atherosclerosis 05/10/2023    Seizure 01/30/2023    Physical deconditioning 12/16/2021    Encephalopathy, metabolic 12/16/2021    Esophagitis, Boody grade D 12/16/2021    Elevated liver enzymes 12/15/2021    Hyperglycemia 12/04/2021    Hammer toe of left foot 08/25/2020    Recurrent intestinal obstruction 02/03/2020    Anemia 02/03/2020    Adenoma 02/03/2020    Chronic left shoulder pain 05/10/2018    Pancreas cyst 04/12/2018    Chronic diarrhea 02/21/2018    Hemorrhoids 02/21/2018    PD (Parkinson's disease) 09/16/2015    Breast lump on right side at 1 o'clock position 03/11/2015    Lump of skin of back 03/11/2015    Hypothyroidism     Gluten enteropathy 04/11/2014       Prescriptions Prior to Admission[1]    Review of patient's allergies indicates:   Allergen Reactions    Bactrim [sulfamethoxazole-trimethoprim] Shortness Of Breath    Gluten Diarrhea    Phenergan [promethazine] Hallucinations       Past Medical History:   Diagnosis Date    ACP (advance care planning) 9/15/2024    Age-related osteoporosis without current pathological fracture 05/16/2024    Allergy     Diabetes 2/28/2025    Diverticulosis     Gluten enteropathy     Gluten intolerance     Hernia     Hypothyroidism     PD (Parkinson's disease) 09/16/2015    Right tremor type     Peptic ulcer disease     Right shoulder pain     Cirtisone injection 3/26/15 - Dr. Tolbert    SBO (small bowel obstruction) 09/17/2019    Seizures     Squamous cell carcinoma of skin     Ulcer      Past Surgical History:   Procedure Laterality Date    ADENOIDECTOMY      COLONOSCOPY  03/01/2012    Dr. Teresa, repeat in 10 years for screening    COLONOSCOPY N/A 2/21/2018    Procedure: COLONOSCOPY;  Surgeon: Radha Deng MD;  Location: [facility] ENDO;  Service: Endoscopy;  Laterality: N/A;    CORRECTION OF HAMMER TOE Left 9/16/2020    Procedure: CORRECTION, HAMMER TOE;  Surgeon: William Sprague MD;  Location: [facility] OR;  Service: Orthopedics;  Laterality: Left;    COSMETIC SURGERY      Broken nose    ESOPHAGOGASTRODUODENOSCOPY N/A 12/8/2021    Procedure: EGD (ESOPHAGOGASTRODUODENOSCOPY);  Surgeon: Benji Valentino III, MD;  Location: [facility];  Service: Endoscopy;  Laterality: N/A;    FRACTURE SURGERY  left wrist    With pin    HEMORRHOID SURGERY      HERNIA REPAIR Left 04/09/2015    Dr Lo; left inguinal    INTRALUMINAL GASTROINTESTINAL TRACT IMAGING VIA CAPSULE N/A 7/10/2018    Procedure: IMAGING, GI TRACT, INTRALUMINAL, VIA CAPSULE;  Surgeon: Radha Deng MD;  Location: [facility] ENDO;  Service: Endoscopy;  Laterality: N/A;    LYSIS OF ADHESIONS  9/29/2020    Procedure: LYSIS, ADHESIONS;  Surgeon: Eagle Gonzalez MD;  Location: [facility];  Service: General;;    RHINOPLASTY TIP      TONSILLECTOMY      UPPER GASTROINTESTINAL ENDOSCOPY  05/21/2015    Dr. Gomez     Social History     Tobacco Use    Smoking status: Never    Smokeless tobacco: Never   Substance Use Topics    Alcohol use: Yes     Alcohol/week: 11.7 standard drinks of alcohol     Types: 14 Standard drinks or equivalent per week     Comment: 2 glasses wine per dinner        Review of Systems:  Pertinent items are noted in HPI.    OBJECTIVE:     Vital signs in last 24 hours:  Temp:  [97.6 °F (36.4 °C)-99 °F (37.2 °C)] 99 °F (37.2 °C)  Pulse:  [65-98]  "81  Resp:  [16-18] 16  SpO2:  [92 %-96 %] 92 %  BP: (101-128)/(55-81) 123/57      BP (!) 123/57 (BP Location: Right arm, Patient Position: Lying)   Pulse 81   Temp 99 °F (37.2 °C) (Axillary)   Resp 16   Ht 5' 3" (1.6 m)   Wt 54.9 kg (121 lb)   LMP  (LMP Unknown)   SpO2 (!) 92%   Breastfeeding No   BMI 21.43 kg/m²     General Appearance:    Alert, cooperative, no distress, appears stated age   Head:    Normocephalic, without obvious abnormality, atraumatic   Eyes:    PERRL, conjunctiva/corneas clear, EOM's intact, fundi     benign, both eyes   Ears:    Normal TM's and external ear canals, both ears   Nose:   Nares normal, septum midline, mucosa normal, no drainage    or sinus tenderness   Throat:   Lips, mucosa, and tongue normal; teeth and gums normal   Neck:   Supple, symmetrical, trachea midline, no adenopathy;     thyroid:  no enlargement/tenderness/nodules; no carotid    bruit or JVD   Back:     Symmetric, no curvature, ROM normal, no CVA tenderness   Lungs:     Clear to auscultation bilaterally, respirations unlabored   Chest Wall:    No tenderness or deformity    Heart:    Regular rate and rhythm, S1 and S2 normal, no murmur, rub   or gallop   Breast Exam:    No tenderness, masses, or nipple abnormality   Abdomen:     Soft, non-tender, bowel sounds active all four quadrants,     no masses, no organomegaly   Genitalia:    Normal female without lesion, discharge or tenderness   Rectal:    Normal tone, no masses or tenderness;    guaiac negative stool   Extremities:   Extremities normal, atraumatic, no cyanosis or edema   Pulses:   2+ and symmetric all extremities   Skin:   Skin color, texture, turgor normal, no rashes or lesions   Lymph nodes:   Cervical, supraclavicular, and axillary nodes normal   Neurologic:   CNII-XII intact, normal strength, sensation and reflexes     throughout       Imaging Review:  X-rays of the left hip show angulated, oblique femoral neck fracture.    ASSESSMENT/PLAN:     left " femoral neck  hip fracture  Patient's displacement is concerning for nonunion for fracture fixation and with that in mind we will plan for yuriy versus total arthroplasty   NPO at midnight   Patient is scheduled for 7:00 a.m. start tomorrow   Pain control today   Hold DVT prophylaxis   Okay to eat today   Discussed case with daughter who signed the surgical consent  Please text or call with any questions  Jaswinder Nguyen MD  Orthopedic Surgeon  [facility]  [phone]      Will proceed with operative fixation.  Medical consultation for post-operative medical care.  I communicated with the PCP that the patient may be at risk for osteoporosis given the nature of the fracture: yes       [1]   Medications Prior to Admission   Medication Sig Dispense Refill Last Dose/Taking    acetaminophen 325 mg Cap Take 650 mg by mouth every 6 (six) hours as needed (pain).   Past Week    azelastine (ASTELIN) 137 mcg (0.1 %) nasal spray 1 spray by Nasal route 2 (two) times daily.   Past Week    carbidopa-levodopa (RYTARY) 23.75-95 mg CpSR Take 3 capsules by mouth 3 (three) times daily.   2/28/2025 at  8:00 AM    cholecalciferol, vitamin D3, (VITAMIN D3) 50 mcg (2,000 unit) Cap capsule Take 2,000 Units by mouth once daily.   2/28/2025 at  8:00 AM    cyanocobalamin (VITAMIN B-12) 250 MCG tablet Take 250 mcg by mouth every evening.   2/27/2025 at 10:00 PM    denosumab (PROLIA) 60 mg/mL Syrg Inject 60 mg into the skin every 6 (six) months.   1/8/2025    diclofenac sodium (VOLTAREN) 1 % Gel Apply 4 g topically 4 (four) times daily. Apply to both knees, right hand and right wrist   2/27/2025 at 10:00 PM    docusate sodium (COLACE) 100 MG capsule Take 1 capsule (100 mg total) by mouth once daily. 90 capsule 0 2/27/2025 at 10:00 PM    food supplemt, lactose-reduced (ENSURE ORIGINAL) Liqd Take 237 mLs by mouth 2 (two) times a day.   2/28/2025 at  9:00 AM    glucose 4 GM chewable tablet Take 4 tablets (16 g total) by mouth as needed  for Low blood sugar. 20 tablet 1 Past Week    ivermectin (SOOLANTRA) 1 % Crea Apply 1 % topically once daily. Apply to face   Past Week    levETIRAcetam (KEPPRA) 500 MG Tab Take 1 tablet by mouth twice daily 60 tablet 0 2/27/2025 at 10:00 PM    levothyroxine (SYNTHROID) 137 MCG Tab tablet TAKE 1/2 (ONE-HALF) TABLET BY MOUTH BEFORE BREAKFAST (Patient taking differently: Take 68.5 mcg by mouth before breakfast.) 45 tablet 2 2/28/2025 at  7:00 AM    lycopene/lutein/fruit extracts (FRUIT AND VEGETABLE DAILY ORAL) Take 2 tablets by mouth 2 (two) times a day. Patient takes 2 fruit and 2 vegetable balance of nature vitamins in the morning and evening   2/28/2025 Morning    simethicone (MYLICON) 125 mg Cap capsule Take 1 capsule (125 mg total) by mouth 4 (four) times daily as needed for Flatulence. 30 each 0 2/28/2025 at  8:00 AM    polyethylene glycol (GLYCOLAX) 17 gram PwPk Take 17 g by mouth once daily. (Patient not taking: Reported on 2/28/2025) 100 each 0 Not Taking    traZODone (DESYREL) 50 MG tablet TAKE 1 TABLET BY MOUTH NIGHTLY AS NEEDED FOR INSOMNIA (Patient not taking: Reported on 2/28/2025) 90 tablet 1 Not Taking

## 2025-02-28 NOTE — ED PROVIDER NOTES
Encounter Date: 2/28/2025       History     Chief Complaint   Patient presents with    Fall    Hip Pain     Left  , unable to bear weight      Ariana Tiwari is a 79 y.o. female presenting for evaluation of left groin pain that began this morning after tripping and falling.  She was using her walker at home, but a chair was in the way, so she let go of walker to move the chair, then tripped and fell.  She did not hit her head or lose consciousness.  No history of cardiac issues and no anticoagulants.  No numbness, tingling or weakness.  No chest pain, shortness of breath or lightheadedness.   She has a past medical history of ACP (advance care planning) (9/15/2024), Age-related osteoporosis without current pathological fracture (05/16/2024), Allergy, Diverticulosis, Gluten enteropathy, Gluten intolerance, Hernia, Hypothyroidism, PD (Parkinson's disease) (09/16/2015), Peptic ulcer disease, Right shoulder pain, SBO (small bowel obstruction) (09/17/2019), Seizures, Squamous cell carcinoma of skin, and Ulcer.      The history is provided by the patient and a relative.     Review of patient's allergies indicates:   Allergen Reactions    Bactrim [sulfamethoxazole-trimethoprim] Shortness Of Breath    Gluten Diarrhea    Phenergan [promethazine] Hallucinations     Past Medical History:   Diagnosis Date    ACP (advance care planning) 9/15/2024    Age-related osteoporosis without current pathological fracture 05/16/2024    Allergy     Diverticulosis     Gluten enteropathy     Gluten intolerance     Hernia     Hypothyroidism     PD (Parkinson's disease) 09/16/2015    Right tremor type    Peptic ulcer disease     Right shoulder pain     Cirtisone injection 3/26/15 - Dr. Tolbert    SBO (small bowel obstruction) 09/17/2019    Seizures     Squamous cell carcinoma of skin     Ulcer      Past Surgical History:   Procedure Laterality Date    ADENOIDECTOMY      COLONOSCOPY  03/01/2012    Dr. Teresa, repeat in 10 years for screening     COLONOSCOPY N/A 2/21/2018    Procedure: COLONOSCOPY;  Surgeon: Radha Deng MD;  Location: [facility] ENDO;  Service: Endoscopy;  Laterality: N/A;    CORRECTION OF HAMMER TOE Left 9/16/2020    Procedure: CORRECTION, HAMMER TOE;  Surgeon: William Sprague MD;  Location: [facility] OR;  Service: Orthopedics;  Laterality: Left;    COSMETIC SURGERY      Broken nose    ESOPHAGOGASTRODUODENOSCOPY N/A 12/8/2021    Procedure: EGD (ESOPHAGOGASTRODUODENOSCOPY);  Surgeon: Benji Valentino III, MD;  Location: [facility];  Service: Endoscopy;  Laterality: N/A;    FRACTURE SURGERY  left wrist    With pin    HEMORRHOID SURGERY      HERNIA REPAIR Left 04/09/2015    Dr Lo; left inguinal    INTRALUMINAL GASTROINTESTINAL TRACT IMAGING VIA CAPSULE N/A 7/10/2018    Procedure: IMAGING, GI TRACT, INTRALUMINAL, VIA CAPSULE;  Surgeon: Radha Deng MD;  Location: [facility] ENDO;  Service: Endoscopy;  Laterality: N/A;    LYSIS OF ADHESIONS  9/29/2020    Procedure: LYSIS, ADHESIONS;  Surgeon: Eagle Gonzalez MD;  Location: [facility] OR;  Service: General;;    RHINOPLASTY TIP      TONSILLECTOMY      UPPER GASTROINTESTINAL ENDOSCOPY  05/21/2015    Dr. Gomez     Family History   Problem Relation Name Age of Onset    Diabetes Mother      Diabetes Son 3     Obesity Sister 2     Alcohol abuse Brother 2     Heart disease Brother 2     No Known Problems Father      Early death Brother 1         self    Breast cancer Neg Hx      Colon cancer Neg Hx      Ovarian cancer Neg Hx      Colon polyps Neg Hx      Crohn's disease Neg Hx      Ulcerative colitis Neg Hx      Celiac disease Neg Hx       Social History[1]  Review of Systems   Constitutional:  Negative for chills and fever.   HENT:  Negative for facial swelling and trouble swallowing.    Respiratory:  Negative for cough, chest tightness, shortness of breath and wheezing.    Cardiovascular:  Negative for chest pain and palpitations.   Gastrointestinal:  Negative for abdominal pain, diarrhea, nausea  and vomiting.   Genitourinary:  Negative for dysuria and hematuria.   Musculoskeletal:  Positive for arthralgias and myalgias. Negative for back pain, joint swelling, neck pain and neck stiffness.   Skin:  Negative for color change, pallor, rash and wound.   Neurological:  Positive for tremors. Negative for dizziness, syncope, weakness, light-headedness, numbness and headaches.   Hematological:  Does not bruise/bleed easily.   Psychiatric/Behavioral:  The patient is not nervous/anxious.        Physical Exam     Initial Vitals [02/28/25 1115]   BP Pulse Resp Temp SpO2   118/64 98 18 97.6 °F (36.4 °C) (!) 93 %      MAP       --         Physical Exam    Nursing note and vitals reviewed.  Constitutional: She is not diaphoretic. No distress.   Elderly, but well appearing.     HENT:   Head: Normocephalic and atraumatic.   Nose: Nose normal. Mouth/Throat: Oropharynx is clear and moist.   Eyes: Conjunctivae are normal.   Neck: Neck supple.   Normal range of motion.  Cardiovascular:  Normal rate, regular rhythm, normal heart sounds and intact distal pulses.           Pulmonary/Chest: Breath sounds normal. No respiratory distress. She has no wheezes. She has no rhonchi. She has no rales.   Abdominal: Abdomen is soft. She exhibits no distension. There is no abdominal tenderness.   Musculoskeletal:         General: No tenderness or edema. Normal range of motion.      Cervical back: Normal range of motion and neck supple.     Neurological: She is alert and oriented to person, place, and time. She has normal strength. No sensory deficit.   Bilateral upper extremity tremors.    Skin: Skin is warm and dry. No rash and no abscess noted. No erythema.   Psychiatric: She has a normal mood and affect.         ED Course   Procedures  Labs Reviewed   CBC W/ AUTO DIFFERENTIAL - Abnormal       Result Value    WBC 5.16      RBC 4.58      Hemoglobin 13.9      Hematocrit 41.9      MCV 92      MCH 30.3      MCHC 33.2      RDW 12.7       Platelets 146 (*)     MPV 9.7      Immature Granulocytes 0.8 (*)     Gran # (ANC) 3.6      Immature Grans (Abs) 0.04      Lymph # 1.1      Mono # 0.3      Eos # 0.1      Baso # 0.02      nRBC 0      Gran % 69.9      Lymph % 20.9      Mono % 6.6      Eosinophil % 1.4      Basophil % 0.4      Differential Method Automated     COMPREHENSIVE METABOLIC PANEL - Abnormal    Sodium 140      Potassium 3.6      Chloride 105      CO2 23      Glucose 71      BUN 12      Creatinine 0.5      Calcium 8.4 (*)     Total Protein 6.9      Albumin 4.0      Total Bilirubin 0.4      Alkaline Phosphatase 74      AST 25      ALT 34      eGFR >60      Anion Gap 12     MAGNESIUM    Magnesium 1.6     TROPONIN I          Imaging Results              X-Ray Chest 1 View (In process)                       X-Ray Hip 2 or 3 views Left with Pelvis when performed (Final result)  Result time 02/28/25 12:09:11      Final result by Jadiel Martinez Jr., MD (02/28/25 12:09:11)                   Impression:      Mildly displaced transverse fracture of the left femoral neck.    This report was flagged in Epic as abnormal.      Electronically signed by: Jadiel Martinez MD  Date:    02/28/2025  Time:    12:09               Narrative:    EXAMINATION:  XR HIP WITH PELVIS WHEN PERFORMED 2 OR 3 VIEWS LEFT    CLINICAL HISTORY:  Left lower quadrant pain    TECHNIQUE:  AP view of the pelvis and frog leg lateral view of the left hip were performed.    COMPARISON:  None    FINDINGS:  There is transverse fracture of the left femoral neck with very mild shift of the femur in relation to the femoral head.  The femoral head remains in the acetabulum.  The bones of the pelvis and right hip are intact..                                       Medications   sodium chloride 0.9% flush 10 mL (has no administration in time range)   naloxone 0.4 mg/mL injection 0.02 mg (has no administration in time range)   glucose chewable tablet 16 g (has no administration in time range)    glucose chewable tablet 24 g (has no administration in time range)   dextrose 50% injection 12.5 g (has no administration in time range)   dextrose 50% injection 25 g (has no administration in time range)   glucagon (human recombinant) injection 1 mg (has no administration in time range)   enoxaparin injection 40 mg (has no administration in time range)   potassium bicarbonate disintegrating tablet 50 mEq (has no administration in time range)   potassium bicarbonate disintegrating tablet 35 mEq (has no administration in time range)   potassium bicarbonate disintegrating tablet 60 mEq (has no administration in time range)   magnesium oxide tablet 800 mg (has no administration in time range)   magnesium oxide tablet 800 mg (has no administration in time range)   potassium, sodium phosphates 280-160-250 mg packet 2 packet (has no administration in time range)   potassium, sodium phosphates 280-160-250 mg packet 2 packet (has no administration in time range)   potassium, sodium phosphates 280-160-250 mg packet 2 packet (has no administration in time range)   acetaminophen tablet 650 mg (has no administration in time range)   ondansetron injection 4 mg (has no administration in time range)   HYDROcodone-acetaminophen 5-325 mg per tablet 1 tablet (has no administration in time range)   dextrose 10% bolus 125 mL 125 mL (has no administration in time range)   dextrose 10% bolus 250 mL 250 mL (has no administration in time range)     Medical Decision Making  Differential Diagnosis:   Hip Fracture   Pelvic Fracture   Dehydration   Muscle Strain/Contusion     Pt emergently evaluated here in the ED.    X-rays of left hip do show evidence for femoral neck fracture.  Labs stable without leukocytosis.  Normal H&H at 13 and 41.  Normal electrolytes, renal function and LFTs.  Case is discussed with on-call Orthopedic surgery, and he plans to do surgery later today.  Patient is not anticoagulated.  We will be admitted to Hospital  Medicine.  Some concern for possible atrial flutter on pre-op EKG, but bedside US shows no evidence for obvious flutter and we feel the EKG and cardiac monitor are picking up on her bilateral upper extremity tremors.  Hospital medicine agrees to admission. Patient and daughter voice understanding and are agreeable to the plan.     Amount and/or Complexity of Data Reviewed  Labs: ordered. Decision-making details documented in ED Course.  Radiology: ordered. Decision-making details documented in ED Course.    Risk  Decision regarding hospitalization.  Emergency major surgery.               ED Course as of 02/28/25 1317   Fri Feb 28, 2025   1229 Patient last ate at 10 AM.  Will keep NPO and admit to hospital medicine.  Ortho on-call will evaluate patient today.  [HS]   1254 EKG shows evidence for possible atrial flutter; however, she has no history of atrial flutter and is asymptomatic without chest pain or SOB.  She does have a history of Parkinson's with persistent bilateral upper extremity tremors.  Dr. Guillaume performed bedside cardiac US to show no obvious atrial flutter.   [HS]      ED Course User Index  [HS] Delicia Young PA-C                           Clinical Impression:  Final diagnoses:  [W19.XXXA] Fall  [R10.32] Left groin pain  [S72.002A] Closed left hip fracture (Primary)  [Z01.818] Preop testing          ED Disposition Condition    Admit                   [1]   Social History  Tobacco Use    Smoking status: Never    Smokeless tobacco: Never   Substance Use Topics    Alcohol use: Yes     Alcohol/week: 11.7 standard drinks of alcohol     Types: 14 Standard drinks or equivalent per week     Comment: 2 glasses wine per dinner    Drug use: No        Delicia Young PA-C  02/28/25 1317

## 2025-02-28 NOTE — ED NOTES
Offered Purewick external urinary device for incontinence control.  Denied.  States that she will have it placed at a later time.  Family at bedside and is agreeable with plan.

## 2025-02-28 NOTE — H&P
[facility] Medicine  History & Physical    Patient Name: Ariana Tiwari  MRN: [id number]  Patient Class: IP- Inpatient  Admission Date: 2/28/2025  Attending Physician: Leila Moses MD   Primary Care Provider: Nba Petty MD         Patient information was obtained from patient, past medical records, ER records, and daughter-in-law .     Subjective:     Principal Problem:Closed left hip fracture    Chief Complaint:   Chief Complaint   Patient presents with    Fall    Hip Pain     Left  , unable to bear weight         HPI: Ariana Tiwari 79 y.o.female with a past medical history significant for hypothyroidism, Parkinson's, hernia, seizures, and stomach ulcers.  Patient presented to the emergency department status post witnessed fall at home.  Patient stated that she was attempting to get her walker in a chair was in the way and she fell to the ground.  Patient denies loss of consciousness or head trauma.  Patient denies fever, chills nausea, vomiting.  There are no alleviating factors and pain is aggravated by movement.  Patient denies any recent travel or sick visits.  Patient denies use of blood thinners. Patient denies smoking or use of alcohol.  Patient lives at home with her son and daughter-in-law.  Emergency department workup included:  CBC unremarkable, CMP unremarkable, Mg-1.9.  X-ray left hip revealed mildly displaced transverse fracture of the left femoral neck.  Orthopedic surgeon was consulted.  Patient will be admitted to Hospital Medicine for management and evaluation.       Past Medical History:   Diagnosis Date    ACP (advance care planning) 9/15/2024    Age-related osteoporosis without current pathological fracture 05/16/2024    Allergy     Diverticulosis     Gluten enteropathy     Gluten intolerance     Hernia     Hypothyroidism     PD (Parkinson's disease) 09/16/2015    Right tremor type    Peptic ulcer disease     Right shoulder pain     Cirtisone injection  3/26/15 - Dr. Tolbert    SBO (small bowel obstruction) 09/17/2019    Seizures     Squamous cell carcinoma of skin     Ulcer        Past Surgical History:   Procedure Laterality Date    ADENOIDECTOMY      COLONOSCOPY  03/01/2012    Dr. Teresa, repeat in 10 years for screening    COLONOSCOPY N/A 2/21/2018    Procedure: COLONOSCOPY;  Surgeon: Radha Deng MD;  Location: [facility];  Service: Endoscopy;  Laterality: N/A;    CORRECTION OF HAMMER TOE Left 9/16/2020    Procedure: CORRECTION, HAMMER TOE;  Surgeon: William Sprague MD;  Location: [facility] OR;  Service: Orthopedics;  Laterality: Left;    COSMETIC SURGERY      Broken nose    ESOPHAGOGASTRODUODENOSCOPY N/A 12/8/2021    Procedure: EGD (ESOPHAGOGASTRODUODENOSCOPY);  Surgeon: Benji Valentino III, MD;  Location: [facility];  Service: Endoscopy;  Laterality: N/A;    FRACTURE SURGERY  left wrist    With pin    HEMORRHOID SURGERY      HERNIA REPAIR Left 04/09/2015    Dr Lo; left inguinal    INTRALUMINAL GASTROINTESTINAL TRACT IMAGING VIA CAPSULE N/A 7/10/2018    Procedure: IMAGING, GI TRACT, INTRALUMINAL, VIA CAPSULE;  Surgeon: Radha Deng MD;  Location: [facility] ENDO;  Service: Endoscopy;  Laterality: N/A;    LYSIS OF ADHESIONS  9/29/2020    Procedure: LYSIS, ADHESIONS;  Surgeon: Eagle Gonzalez MD;  Location: [facility];  Service: General;;    RHINOPLASTY TIP      TONSILLECTOMY      UPPER GASTROINTESTINAL ENDOSCOPY  05/21/2015    Dr. Gomez       Review of patient's allergies indicates:   Allergen Reactions    Bactrim [sulfamethoxazole-trimethoprim] Shortness Of Breath    Gluten Diarrhea    Phenergan [promethazine] Hallucinations       No current facility-administered medications on file prior to encounter.     Current Outpatient Medications on File Prior to Encounter   Medication Sig    carbidopa-levodopa (RYTARY) 23.75-95 mg CpSR Take 3 capsules by mouth 3 (three) times daily.    denosumab (PROLIA SUBQ) Inject into the skin every 6 (six) months.     docusate sodium (COLACE) 100 MG capsule Take 1 capsule (100 mg total) by mouth once daily.    glucose 4 GM chewable tablet Take 4 tablets (16 g total) by mouth as needed for Low blood sugar.    levETIRAcetam (KEPPRA) 500 MG Tab Take 1 tablet by mouth twice daily    levothyroxine (SYNTHROID) 137 MCG Tab tablet TAKE 1/2 (ONE-HALF) TABLET BY MOUTH BEFORE BREAKFAST    polyethylene glycol (GLYCOLAX) 17 gram PwPk Take 17 g by mouth once daily.    simethicone (MYLICON) 125 mg Cap capsule Take 1 capsule (125 mg total) by mouth 4 (four) times daily as needed for Flatulence.    acetaminophen 325 mg Cap Take 650 mg by mouth every 6 (six) hours as needed.    alendronate-vitamin D3 (FOSAMAX PLUS D) 70 mg- 5,600 unit per tablet Take 1 tablet by mouth every 7 days.    azelastine (ASTELIN) 137 mcg (0.1 %) nasal spray 1 spray by Nasal route 2 (two) times daily.    cholecalciferol, vitamin D3, (VITAMIN D3) 50 mcg (2,000 unit) Cap capsule Take by mouth once daily.    cyanocobalamin (VITAMIN B-12) 250 MCG tablet Take 250 mcg by mouth once daily.    diclofenac sodium (VOLTAREN) 1 % Gel Apply 4 g topically 4 (four) times daily.    food supplemt, lactose-reduced (ENSURE ORIGINAL) Liqd Take 237 mLs by mouth 2 (two) times a day.    ivermectin (SOOLANTRA) 1 % Crea Apply 1 % topically once. Apply to face    lycopene/lutein/fruit extracts (FRUIT AND VEGETABLE DAILY ORAL) Take 1 Dose by mouth once daily. Patient takes 2 fruit and 1 vegetable balance of nature vitamins in the morning and evening    traZODone (DESYREL) 50 MG tablet TAKE 1 TABLET BY MOUTH NIGHTLY AS NEEDED FOR INSOMNIA     Family History       Problem Relation (Age of Onset)    Alcohol abuse Brother    Diabetes Mother, Son    Early death Brother    Heart disease Brother    No Known Problems Father    Obesity Sister          Tobacco Use    Smoking status: Never    Smokeless tobacco: Never   Substance and Sexual Activity    Alcohol use: Yes     Alcohol/week: 11.7 standard drinks of  alcohol     Types: 14 Standard drinks or equivalent per week     Comment: 2 glasses wine per dinner    Drug use: No    Sexual activity: Not Currently     Review of Systems   Constitutional:  Positive for activity change. Negative for appetite change, chills, fatigue and fever.   Respiratory: Negative.     Cardiovascular: Negative.    Gastrointestinal: Negative.    Genitourinary: Negative.    Musculoskeletal:  Positive for arthralgias and gait problem.   Neurological:  Positive for weakness (Generalized). Negative for dizziness, tremors, seizures, syncope, facial asymmetry, speech difficulty, light-headedness, numbness and headaches.   Psychiatric/Behavioral: Negative.       Objective:     Vital Signs (Most Recent):  Temp: 97.6 °F (36.4 °C) (02/28/25 1115)  Pulse: 68 (02/28/25 1200)  Resp: 18 (02/28/25 1115)  BP: (!) 109/55 (02/28/25 1200)  SpO2: 96 % (02/28/25 1200) Vital Signs (24h Range):  Temp:  [97.6 °F (36.4 °C)] 97.6 °F (36.4 °C)  Pulse:  [68-98] 68  Resp:  [18] 18  SpO2:  [93 %-96 %] 96 %  BP: (109-118)/(55-64) 109/55     Weight: 54.9 kg (121 lb)  Body mass index is 21.43 kg/m².     Physical Exam  Vitals and nursing note reviewed.   Constitutional:       Appearance: She is ill-appearing (chronically).   Cardiovascular:      Rate and Rhythm: Normal rate.      Pulses: Normal pulses.      Heart sounds: Normal heart sounds.   Pulmonary:      Effort: Pulmonary effort is normal. No respiratory distress.      Breath sounds: Normal breath sounds.   Abdominal:      General: Bowel sounds are normal. There is distension (baseline extended 2/2 hernia).   Musculoskeletal:         General: Tenderness (left hip) present.      Right lower leg: No edema.      Left lower leg: No edema.   Skin:     General: Skin is warm and dry.   Neurological:      Mental Status: She is alert and oriented to person, place, and time.      Gait: Gait abnormal.   Psychiatric:         Mood and Affect: Mood normal.         Behavior: Behavior  normal.         Thought Content: Thought content normal.         Judgment: Judgment normal.              Significant Labs: All pertinent labs within the past 24 hours have been reviewed.  BMP:   Recent Labs   Lab 02/28/25  1140   GLU 71      K 3.6      CO2 23   BUN 12   CREATININE 0.5   CALCIUM 8.4*   MG 1.6     CMP:   Recent Labs   Lab 02/28/25  1140      K 3.6      CO2 23   GLU 71   BUN 12   CREATININE 0.5   CALCIUM 8.4*   PROT 6.9   ALBUMIN 4.0   BILITOT 0.4   ALKPHOS 74   AST 25   ALT 34   ANIONGAP 12     Magnesium:   Recent Labs   Lab 02/28/25  1140   MG 1.6       Significant Imaging: I have reviewed all pertinent imaging results/findings within the past 24 hours.  Imaging Results              X-Ray Chest 1 View (In process)                       X-Ray Hip 2 or 3 views Left with Pelvis when performed (Final result)  Result time 02/28/25 12:09:11      Final result by Jadiel Martinez Jr., MD (02/28/25 12:09:11)                   Impression:      Mildly displaced transverse fracture of the left femoral neck.    This report was flagged in Epic as abnormal.      Electronically signed by: Jadiel Martinez MD  Date:    02/28/2025  Time:    12:09               Narrative:    EXAMINATION:  XR HIP WITH PELVIS WHEN PERFORMED 2 OR 3 VIEWS LEFT    CLINICAL HISTORY:  Left lower quadrant pain    TECHNIQUE:  AP view of the pelvis and frog leg lateral view of the left hip were performed.    COMPARISON:  None    FINDINGS:  There is transverse fracture of the left femoral neck with very mild shift of the femur in relation to the femoral head.  The femoral head remains in the acetabulum.  The bones of the pelvis and right hip are intact..                                     Assessment/Plan:     Closed left hip fracture  Orthopedic surgery consult  P.r.n. analgesics  PT/OT  NPO      Seizure  Chronic.  Noted.    Continue home medications      Recurrent intestinal obstruction  Chronic.  Noted.  No acute  problem      PD (Parkinson's disease)  Chronic.  Noted.    Continue home medications      Hypothyroidism  Patient has chronic hypothyroidism. TFTs reviewed-   Lab Results   Component Value Date    TSH 2.518 09/15/2024   . Will continue chronic levothyroxine and adjust for and clinical changes.          VTE Risk Mitigation (From admission, onward)           Ordered     enoxaparin injection 40 mg  Daily         02/28/25 1308     IP VTE HIGH RISK PATIENT  Once         02/28/25 1308     Place sequential compression device  Until discontinued         02/28/25 1308                                    Gadiel Duval NP  Department of Hospital Medicine  [facility]

## 2025-02-28 NOTE — PHARMACY MED REC
"              .        Admission Medication History     The home medication history was taken by Felipa Obando.    You may go to "Admission" then "Reconcile Home Medications" tabs to review and/or act upon these items.     The home medication list has been updated by the Pharmacy department.   Please read ALL comments highlighted in yellow.   Please address this information as you see fit.    Feel free to contact us if you have any questions or require assistance.      The medications listed below were removed from the home medication list. Please reorder if appropriate:  Patient reports no longer taking the following medication(s):  Miralax 17 g        Medications listed below were obtained from: Patient/family and Analytic software- [employer]  Medications Ordered Prior to Encounter[1]    Potential issues to be addressed PRIOR TO DISCHARGE  Patient reported not taking the following medications: (Trazodone). These medications remain on the home medication list. Please address accordingly.     Felipa Obando  EXT 1922           [1]   No current facility-administered medications on file prior to encounter.     Current Outpatient Medications on File Prior to Encounter   Medication Sig Dispense Refill    acetaminophen 325 mg Cap Take 650 mg by mouth every 6 (six) hours as needed (pain).      azelastine (ASTELIN) 137 mcg (0.1 %) nasal spray 1 spray by Nasal route 2 (two) times daily.      carbidopa-levodopa (RYTARY) 23.75-95 mg CpSR Take 3 capsules by mouth 3 (three) times daily.      cholecalciferol, vitamin D3, (VITAMIN D3) 50 mcg (2,000 unit) Cap capsule Take 2,000 Units by mouth once daily.      cyanocobalamin (VITAMIN B-12) 250 MCG tablet Take 250 mcg by mouth every evening.      denosumab (PROLIA) 60 mg/mL Syrg Inject 60 mg into the skin every 6 (six) months.      diclofenac sodium (VOLTAREN) 1 % Gel Apply 4 g topically 4 (four) times daily. Apply to both knees, right hand and right wrist      docusate sodium " (COLACE) 100 MG capsule Take 1 capsule (100 mg total) by mouth once daily. 90 capsule 0    food supplemt, lactose-reduced (ENSURE ORIGINAL) Liqd Take 237 mLs by mouth 2 (two) times a day.      glucose 4 GM chewable tablet Take 4 tablets (16 g total) by mouth as needed for Low blood sugar. 20 tablet 1    ivermectin (SOOLANTRA) 1 % Crea Apply 1 % topically once daily. Apply to face      levETIRAcetam (KEPPRA) 500 MG Tab Take 1 tablet by mouth twice daily 60 tablet 0    levothyroxine (SYNTHROID) 137 MCG Tab tablet TAKE 1/2 (ONE-HALF) TABLET BY MOUTH BEFORE BREAKFAST (Patient taking differently: Take 68.5 mcg by mouth before breakfast.) 45 tablet 2    lycopene/lutein/fruit extracts (FRUIT AND VEGETABLE DAILY ORAL) Take 2 tablets by mouth 2 (two) times a day. Patient takes 2 fruit and 2 vegetable balance of nature vitamins in the morning and evening      simethicone (MYLICON) 125 mg Cap capsule Take 1 capsule (125 mg total) by mouth 4 (four) times daily as needed for Flatulence. 30 each 0    polyethylene glycol (GLYCOLAX) 17 gram PwPk Take 17 g by mouth once daily. (Patient not taking: Reported on 2/28/2025) 100 each 0    traZODone (DESYREL) 50 MG tablet TAKE 1 TABLET BY MOUTH NIGHTLY AS NEEDED FOR INSOMNIA (Patient not taking: Reported on 2/28/2025) 90 tablet 1    [DISCONTINUED] alendronate-vitamin D3 (FOSAMAX PLUS D) 70 mg- 5,600 unit per tablet Take 1 tablet by mouth every 7 days.

## 2025-03-01 ENCOUNTER — ANESTHESIA (OUTPATIENT)
Dept: SURGERY | Facility: HOSPITAL | Age: 80
End: 2025-03-01
Payer: MEDICARE

## 2025-03-01 PROBLEM — Z78.9 GLUTEN FREE DIET: Status: ACTIVE | Noted: 2025-03-01

## 2025-03-01 LAB
ALBUMIN SERPL BCP-MCNC: 3.7 G/DL (ref 3.5–5.2)
ALP SERPL-CCNC: 75 U/L (ref 40–150)
ALT SERPL W/O P-5'-P-CCNC: 29 U/L (ref 10–44)
ANION GAP SERPL CALC-SCNC: 9 MMOL/L (ref 8–16)
AST SERPL-CCNC: 17 U/L (ref 10–40)
BASOPHILS # BLD AUTO: 0.02 K/UL (ref 0–0.2)
BASOPHILS NFR BLD: 0.3 % (ref 0–1.9)
BILIRUB SERPL-MCNC: 0.8 MG/DL (ref 0.1–1)
BUN SERPL-MCNC: 14 MG/DL (ref 8–23)
CALCIUM SERPL-MCNC: 8.5 MG/DL (ref 8.7–10.5)
CHLORIDE SERPL-SCNC: 106 MMOL/L (ref 95–110)
CO2 SERPL-SCNC: 23 MMOL/L (ref 23–29)
CREAT SERPL-MCNC: 0.5 MG/DL (ref 0.5–1.4)
DIFFERENTIAL METHOD BLD: ABNORMAL
EOSINOPHIL # BLD AUTO: 0.2 K/UL (ref 0–0.5)
EOSINOPHIL NFR BLD: 2.6 % (ref 0–8)
ERYTHROCYTE [DISTWIDTH] IN BLOOD BY AUTOMATED COUNT: 12.6 % (ref 11.5–14.5)
EST. GFR  (NO RACE VARIABLE): >60 ML/MIN/1.73 M^2
GLUCOSE SERPL-MCNC: 98 MG/DL (ref 70–110)
HCT VFR BLD AUTO: 41.8 % (ref 37–48.5)
HGB BLD-MCNC: 14.1 G/DL (ref 12–16)
IMM GRANULOCYTES # BLD AUTO: 0.03 K/UL (ref 0–0.04)
IMM GRANULOCYTES NFR BLD AUTO: 0.5 % (ref 0–0.5)
LYMPHOCYTES # BLD AUTO: 1.1 K/UL (ref 1–4.8)
LYMPHOCYTES NFR BLD: 16.2 % (ref 18–48)
MAGNESIUM SERPL-MCNC: 1.7 MG/DL (ref 1.6–2.6)
MCH RBC QN AUTO: 30.9 PG (ref 27–31)
MCHC RBC AUTO-ENTMCNC: 33.7 G/DL (ref 32–36)
MCV RBC AUTO: 92 FL (ref 82–98)
MONOCYTES # BLD AUTO: 0.3 K/UL (ref 0.3–1)
MONOCYTES NFR BLD: 4.6 % (ref 4–15)
NEUTROPHILS # BLD AUTO: 4.9 K/UL (ref 1.8–7.7)
NEUTROPHILS NFR BLD: 75.8 % (ref 38–73)
NRBC BLD-RTO: 0 /100 WBC
OHS QRS DURATION: 90 MS
OHS QTC CALCULATION: 463 MS
PHOSPHATE SERPL-MCNC: 3.4 MG/DL (ref 2.7–4.5)
PLATELET # BLD AUTO: 127 K/UL (ref 150–450)
PMV BLD AUTO: 10.3 FL (ref 9.2–12.9)
POTASSIUM SERPL-SCNC: 3.2 MMOL/L (ref 3.5–5.1)
PROT SERPL-MCNC: 6.6 G/DL (ref 6–8.4)
RBC # BLD AUTO: 4.56 M/UL (ref 4–5.4)
SODIUM SERPL-SCNC: 138 MMOL/L (ref 136–145)
WBC # BLD AUTO: 6.47 K/UL (ref 3.9–12.7)

## 2025-03-01 PROCEDURE — 25000003 PHARM REV CODE 250: Performed by: HOSPITALIST

## 2025-03-01 PROCEDURE — 85025 COMPLETE CBC W/AUTO DIFF WBC: CPT

## 2025-03-01 PROCEDURE — 36000711: Performed by: STUDENT IN AN ORGANIZED HEALTH CARE EDUCATION/TRAINING PROGRAM

## 2025-03-01 PROCEDURE — 25000003 PHARM REV CODE 250

## 2025-03-01 PROCEDURE — 63600175 PHARM REV CODE 636 W HCPCS: Performed by: ANESTHESIOLOGY

## 2025-03-01 PROCEDURE — 99900035 HC TECH TIME PER 15 MIN (STAT)

## 2025-03-01 PROCEDURE — 83735 ASSAY OF MAGNESIUM: CPT

## 2025-03-01 PROCEDURE — 27201423 OPTIME MED/SURG SUP & DEVICES STERILE SUPPLY: Performed by: STUDENT IN AN ORGANIZED HEALTH CARE EDUCATION/TRAINING PROGRAM

## 2025-03-01 PROCEDURE — 37000009 HC ANESTHESIA EA ADD 15 MINS: Performed by: STUDENT IN AN ORGANIZED HEALTH CARE EDUCATION/TRAINING PROGRAM

## 2025-03-01 PROCEDURE — 80053 COMPREHEN METABOLIC PANEL: CPT

## 2025-03-01 PROCEDURE — 63600175 PHARM REV CODE 636 W HCPCS: Performed by: NURSE ANESTHETIST, CERTIFIED REGISTERED

## 2025-03-01 PROCEDURE — P9045 ALBUMIN (HUMAN), 5%, 250 ML: HCPCS | Mod: JZ,TB | Performed by: NURSE ANESTHETIST, CERTIFIED REGISTERED

## 2025-03-01 PROCEDURE — 27000221 HC OXYGEN, UP TO 24 HOURS

## 2025-03-01 PROCEDURE — 71000033 HC RECOVERY, INTIAL HOUR: Performed by: STUDENT IN AN ORGANIZED HEALTH CARE EDUCATION/TRAINING PROGRAM

## 2025-03-01 PROCEDURE — 94761 N-INVAS EAR/PLS OXIMETRY MLT: CPT

## 2025-03-01 PROCEDURE — 25000003 PHARM REV CODE 250: Performed by: NURSE ANESTHETIST, CERTIFIED REGISTERED

## 2025-03-01 PROCEDURE — 0SRB02Z REPLACEMENT OF LEFT HIP JOINT WITH METAL ON POLYETHYLENE SYNTHETIC SUBSTITUTE, OPEN APPROACH: ICD-10-PCS | Performed by: STUDENT IN AN ORGANIZED HEALTH CARE EDUCATION/TRAINING PROGRAM

## 2025-03-01 PROCEDURE — 36000710: Performed by: STUDENT IN AN ORGANIZED HEALTH CARE EDUCATION/TRAINING PROGRAM

## 2025-03-01 PROCEDURE — C1776 JOINT DEVICE (IMPLANTABLE): HCPCS | Performed by: STUDENT IN AN ORGANIZED HEALTH CARE EDUCATION/TRAINING PROGRAM

## 2025-03-01 PROCEDURE — 71000039 HC RECOVERY, EACH ADD'L HOUR: Performed by: STUDENT IN AN ORGANIZED HEALTH CARE EDUCATION/TRAINING PROGRAM

## 2025-03-01 PROCEDURE — 36415 COLL VENOUS BLD VENIPUNCTURE: CPT

## 2025-03-01 PROCEDURE — 94799 UNLISTED PULMONARY SVC/PX: CPT

## 2025-03-01 PROCEDURE — 11000001 HC ACUTE MED/SURG PRIVATE ROOM

## 2025-03-01 PROCEDURE — 63600175 PHARM REV CODE 636 W HCPCS

## 2025-03-01 PROCEDURE — 37000008 HC ANESTHESIA 1ST 15 MINUTES: Performed by: STUDENT IN AN ORGANIZED HEALTH CARE EDUCATION/TRAINING PROGRAM

## 2025-03-01 PROCEDURE — 84100 ASSAY OF PHOSPHORUS: CPT

## 2025-03-01 DEVICE — HEAD BIOLOX FEM +0X28MM: Type: IMPLANTABLE DEVICE | Site: HIP | Status: FUNCTIONAL

## 2025-03-01 DEVICE — IMPLANTABLE DEVICE: Type: IMPLANTABLE DEVICE | Site: HIP | Status: FUNCTIONAL

## 2025-03-01 DEVICE — IMPLANTABLE DEVICE
Type: IMPLANTABLE DEVICE | Site: HIP | Status: FUNCTIONAL
Brand: G7® DUAL MOBILITY ACETABULAR SYSTEM

## 2025-03-01 DEVICE — IMPLANTABLE DEVICE
Type: IMPLANTABLE DEVICE | Site: HIP | Status: FUNCTIONAL
Brand: G7® ACETABULAR SYSTEM

## 2025-03-01 RX ORDER — ACETAMINOPHEN 10 MG/ML
INJECTION, SOLUTION INTRAVENOUS
Status: DISCONTINUED | OUTPATIENT
Start: 2025-03-01 | End: 2025-03-01

## 2025-03-01 RX ORDER — POTASSIUM CHLORIDE 750 MG/1
30 TABLET, EXTENDED RELEASE ORAL ONCE
Status: DISCONTINUED | OUTPATIENT
Start: 2025-03-01 | End: 2025-03-01

## 2025-03-01 RX ORDER — POTASSIUM CHLORIDE 20 MEQ/1
40 TABLET, EXTENDED RELEASE ORAL ONCE
Status: COMPLETED | OUTPATIENT
Start: 2025-03-01 | End: 2025-03-01

## 2025-03-01 RX ORDER — FENTANYL CITRATE 50 UG/ML
INJECTION, SOLUTION INTRAMUSCULAR; INTRAVENOUS
Status: DISCONTINUED | OUTPATIENT
Start: 2025-03-01 | End: 2025-03-01

## 2025-03-01 RX ORDER — POTASSIUM CHLORIDE 7.45 MG/ML
10 INJECTION INTRAVENOUS
Status: DISCONTINUED | OUTPATIENT
Start: 2025-03-01 | End: 2025-03-01

## 2025-03-01 RX ORDER — PROPOFOL 10 MG/ML
VIAL (ML) INTRAVENOUS
Status: DISCONTINUED | OUTPATIENT
Start: 2025-03-01 | End: 2025-03-01

## 2025-03-01 RX ORDER — LIDOCAINE HYDROCHLORIDE 20 MG/ML
INJECTION INTRAVENOUS
Status: DISCONTINUED | OUTPATIENT
Start: 2025-03-01 | End: 2025-03-01

## 2025-03-01 RX ORDER — HYDROMORPHONE HYDROCHLORIDE 2 MG/ML
0.2 INJECTION, SOLUTION INTRAMUSCULAR; INTRAVENOUS; SUBCUTANEOUS EVERY 5 MIN PRN
Status: DISCONTINUED | OUTPATIENT
Start: 2025-03-01 | End: 2025-03-02

## 2025-03-01 RX ORDER — SUCCINYLCHOLINE CHLORIDE 20 MG/ML
INJECTION INTRAMUSCULAR; INTRAVENOUS
Status: DISCONTINUED | OUTPATIENT
Start: 2025-03-01 | End: 2025-03-01

## 2025-03-01 RX ORDER — FENTANYL CITRATE 50 UG/ML
25 INJECTION, SOLUTION INTRAMUSCULAR; INTRAVENOUS EVERY 5 MIN PRN
Status: DISCONTINUED | OUTPATIENT
Start: 2025-03-01 | End: 2025-03-02

## 2025-03-01 RX ORDER — ONDANSETRON HYDROCHLORIDE 2 MG/ML
INJECTION, SOLUTION INTRAVENOUS
Status: DISCONTINUED | OUTPATIENT
Start: 2025-03-01 | End: 2025-03-01

## 2025-03-01 RX ORDER — PHENYLEPHRINE HYDROCHLORIDE 10 MG/ML
INJECTION INTRAVENOUS
Status: DISCONTINUED | OUTPATIENT
Start: 2025-03-01 | End: 2025-03-01

## 2025-03-01 RX ORDER — OXYCODONE HYDROCHLORIDE 5 MG/1
5 TABLET ORAL
Status: DISCONTINUED | OUTPATIENT
Start: 2025-03-01 | End: 2025-03-02

## 2025-03-01 RX ORDER — ALBUMIN HUMAN 50 G/1000ML
SOLUTION INTRAVENOUS
Status: DISCONTINUED | OUTPATIENT
Start: 2025-03-01 | End: 2025-03-01

## 2025-03-01 RX ORDER — DEXAMETHASONE SODIUM PHOSPHATE 4 MG/ML
INJECTION, SOLUTION INTRA-ARTICULAR; INTRALESIONAL; INTRAMUSCULAR; INTRAVENOUS; SOFT TISSUE
Status: DISCONTINUED | OUTPATIENT
Start: 2025-03-01 | End: 2025-03-01

## 2025-03-01 RX ORDER — ROCURONIUM BROMIDE 10 MG/ML
INJECTION, SOLUTION INTRAVENOUS
Status: DISCONTINUED | OUTPATIENT
Start: 2025-03-01 | End: 2025-03-01

## 2025-03-01 RX ORDER — CALCIUM CHLORIDE DIHYDRATE 100 MG/ML
INJECTION, SOLUTION INTRAVENOUS
Status: DISCONTINUED | OUTPATIENT
Start: 2025-03-01 | End: 2025-03-01

## 2025-03-01 RX ORDER — GLUCAGON 1 MG
1 KIT INJECTION
Status: DISCONTINUED | OUTPATIENT
Start: 2025-03-01 | End: 2025-03-02

## 2025-03-01 RX ORDER — TRANEXAMIC ACID 100 MG/ML
INJECTION, SOLUTION INTRAVENOUS
Status: DISCONTINUED | OUTPATIENT
Start: 2025-03-01 | End: 2025-03-01

## 2025-03-01 RX ADMIN — LEVETIRACETAM 500 MG: 500 TABLET, FILM COATED ORAL at 08:03

## 2025-03-01 RX ADMIN — SUGAMMADEX 100 MG: 100 INJECTION, SOLUTION INTRAVENOUS at 08:03

## 2025-03-01 RX ADMIN — CEFAZOLIN 2 G: 1 INJECTION, POWDER, FOR SOLUTION INTRAVENOUS at 07:03

## 2025-03-01 RX ADMIN — CYANOCOBALAMIN TAB 250 MCG 250 MCG: 250 TAB at 10:03

## 2025-03-01 RX ADMIN — POTASSIUM CHLORIDE 40 MEQ: 1500 TABLET, EXTENDED RELEASE ORAL at 10:03

## 2025-03-01 RX ADMIN — TRAZODONE HYDROCHLORIDE 50 MG: 50 TABLET ORAL at 08:03

## 2025-03-01 RX ADMIN — METHOCARBAMOL 500 MG: 500 TABLET ORAL at 10:03

## 2025-03-01 RX ADMIN — FENTANYL CITRATE 50 MCG: 50 INJECTION, SOLUTION INTRAMUSCULAR; INTRAVENOUS at 07:03

## 2025-03-01 RX ADMIN — SUCCINYLCHOLINE CHLORIDE 100 MG: 20 INJECTION, SOLUTION INTRAMUSCULAR; INTRAVENOUS at 07:03

## 2025-03-01 RX ADMIN — CALCIUM CHLORIDE 0.5 G: 100 INJECTION, SOLUTION INTRAVENOUS at 08:03

## 2025-03-01 RX ADMIN — PROPOFOL 90 MG: 10 INJECTION, EMULSION INTRAVENOUS at 07:03

## 2025-03-01 RX ADMIN — ONDANSETRON 8 MG: 2 INJECTION INTRAMUSCULAR; INTRAVENOUS at 07:03

## 2025-03-01 RX ADMIN — ONDANSETRON 4 MG: 2 INJECTION INTRAMUSCULAR; INTRAVENOUS at 10:03

## 2025-03-01 RX ADMIN — SODIUM CHLORIDE, SODIUM GLUCONATE, SODIUM ACETATE, POTASSIUM CHLORIDE, MAGNESIUM CHLORIDE, SODIUM PHOSPHATE, DIBASIC, AND POTASSIUM PHOSPHATE: .53; .5; .37; .037; .03; .012; .00082 INJECTION, SOLUTION INTRAVENOUS at 06:03

## 2025-03-01 RX ADMIN — DICLOFENAC SODIUM 2 G: 10 GEL TOPICAL at 08:03

## 2025-03-01 RX ADMIN — ALBUMIN (HUMAN) 250 ML: 12.5 SOLUTION INTRAVENOUS at 07:03

## 2025-03-01 RX ADMIN — FENTANYL CITRATE 25 MCG: 50 INJECTION, SOLUTION INTRAMUSCULAR; INTRAVENOUS at 08:03

## 2025-03-01 RX ADMIN — ROCURONIUM BROMIDE 30 MG: 10 INJECTION, SOLUTION INTRAVENOUS at 07:03

## 2025-03-01 RX ADMIN — HYDROCODONE BITARTRATE AND ACETAMINOPHEN 1 TABLET: 5; 325 TABLET ORAL at 08:03

## 2025-03-01 RX ADMIN — LIDOCAINE HYDROCHLORIDE 50 MG: 20 INJECTION, SOLUTION INTRAVENOUS at 07:03

## 2025-03-01 RX ADMIN — Medication 2000 UNITS: at 10:03

## 2025-03-01 RX ADMIN — POTASSIUM CHLORIDE 40 MEQ: 1500 TABLET, EXTENDED RELEASE ORAL at 01:03

## 2025-03-01 RX ADMIN — CALCIUM CHLORIDE 0.5 G: 100 INJECTION, SOLUTION INTRAVENOUS at 07:03

## 2025-03-01 RX ADMIN — PHENYLEPHRINE HYDROCHLORIDE 100 MCG: 10 INJECTION INTRAVENOUS at 07:03

## 2025-03-01 RX ADMIN — ROCURONIUM BROMIDE 5 MG: 10 INJECTION, SOLUTION INTRAVENOUS at 07:03

## 2025-03-01 RX ADMIN — METHOCARBAMOL 500 MG: 500 TABLET ORAL at 04:03

## 2025-03-01 RX ADMIN — TRANEXAMIC ACID 530 MG: 100 INJECTION, SOLUTION INTRAVENOUS at 07:03

## 2025-03-01 RX ADMIN — DICLOFENAC SODIUM 2 G: 10 GEL TOPICAL at 09:03

## 2025-03-01 RX ADMIN — HYDROCODONE BITARTRATE AND ACETAMINOPHEN 1 TABLET: 5; 325 TABLET ORAL at 01:03

## 2025-03-01 RX ADMIN — DEXAMETHASONE SODIUM PHOSPHATE 4 MG: 4 INJECTION, SOLUTION INTRA-ARTICULAR; INTRALESIONAL; INTRAMUSCULAR; INTRAVENOUS; SOFT TISSUE at 07:03

## 2025-03-01 RX ADMIN — ACETAMINOPHEN 650 MG: 10 INJECTION INTRAVENOUS at 07:03

## 2025-03-01 RX ADMIN — LEVETIRACETAM 500 MG: 500 TABLET, FILM COATED ORAL at 09:03

## 2025-03-01 RX ADMIN — ENOXAPARIN SODIUM 40 MG: 40 INJECTION SUBCUTANEOUS at 04:03

## 2025-03-01 NOTE — ANESTHESIA PROCEDURE NOTES
Intubation    Date/Time: 3/1/2025 7:15 AM    Performed by: Fallon Calhoun CRNA  Authorized by: Josette Martinez MD    Intubation:     Induction:  Intravenous    Intubated:  Postinduction    Mask Ventilation:  Easy mask    Attempts:  1    Attempted By:  CRNA    Method of Intubation:  Video laryngoscopy    Blade:  Gauthier 3    Laryngeal View Grade: Grade I - full view of cords      Difficult Airway Encountered?: No      Complications:  None    Airway Device:  Oral endotracheal tube    Airway Device Size:  7.0    Style/Cuff Inflation:  Cuffed (inflated to minimal occlusive pressure)    Inflation Amount (mL):  5    Tube secured:  21    Secured at:  The lips    Placement Verified By:  Capnometry and Revisualization with laryngoscopy    Complicating Factors:  None    Findings Post-Intubation:  BS equal bilateral and atraumatic/condition of teeth unchanged

## 2025-03-01 NOTE — SUBJECTIVE & OBJECTIVE
Interval History:   Patient seen and examined postoperatively.  Vital signs stable.  Patient requiring supplemental oxygen.  Family at bedside.  Review of Systems   Constitutional:  Positive for activity change. Negative for appetite change, chills, fatigue and fever.   Respiratory: Negative.     Cardiovascular: Negative.    Gastrointestinal: Negative.    Genitourinary: Negative.    Musculoskeletal:  Positive for arthralgias (Improved since surgery) and gait problem.   Neurological:  Positive for weakness (Generalized). Negative for dizziness, tremors, seizures, syncope, facial asymmetry, speech difficulty, light-headedness, numbness and headaches.   Psychiatric/Behavioral: Negative.       Objective:     Vital Signs (Most Recent):  Temp: 98.6 °F (37 °C) (03/01/25 0931)  Pulse: 65 (03/01/25 0931)  Resp: 12 (03/01/25 0931)  BP: (!) 123/59 (03/01/25 0931)  SpO2: (!) 94 % (03/01/25 0931) Vital Signs (24h Range):  Temp:  [97.6 °F (36.4 °C)-99 °F (37.2 °C)] 98.6 °F (37 °C)  Pulse:  [62-98] 65  Resp:  [12-20] 12  SpO2:  [88 %-99 %] 94 %  BP: ()/(55-81) 123/59     Weight: 52.8 kg (116 lb 6.5 oz)  Body mass index is 20.62 kg/m².    Intake/Output Summary (Last 24 hours) at 3/1/2025 1029  Last data filed at 3/1/2025 0825  Gross per 24 hour   Intake 1285 ml   Output 1200 ml   Net 85 ml         Physical Exam  Vitals and nursing note reviewed.   Constitutional:       Appearance: She is ill-appearing (chronically).   Eyes:      Comments: Eye redness chronic   Cardiovascular:      Rate and Rhythm: Normal rate.      Pulses: Normal pulses.      Heart sounds: Normal heart sounds.   Pulmonary:      Effort: Pulmonary effort is normal. No respiratory distress.      Breath sounds: Normal breath sounds.   Abdominal:      General: Bowel sounds are normal. There is distension (baseline extended 2/2 hernia).   Musculoskeletal:         General: Tenderness (left hip) present.      Right lower leg: No edema.      Left lower leg: No edema.    Skin:     General: Skin is warm and dry.   Neurological:      Mental Status: She is alert and oriented to person, place, and time.      Gait: Gait abnormal.   Psychiatric:         Mood and Affect: Mood normal.         Behavior: Behavior normal.         Thought Content: Thought content normal.         Judgment: Judgment normal.             Significant Labs: All pertinent labs within the past 24 hours have been reviewed.  CBC:   Recent Labs   Lab 02/28/25  1140 03/01/25  0439   WBC 5.16 6.47   HGB 13.9 14.1   HCT 41.9 41.8   * 127*     CMP:   Recent Labs   Lab 02/28/25  1140 03/01/25  0439    138   K 3.6 3.2*    106   CO2 23 23   GLU 71 98   BUN 12 14   CREATININE 0.5 0.5   CALCIUM 8.4* 8.5*   PROT 6.9 6.6   ALBUMIN 4.0 3.7   BILITOT 0.4 0.8   ALKPHOS 74 75   AST 25 17   ALT 34 29   ANIONGAP 12 9     Magnesium:   Recent Labs   Lab 02/28/25  1140 03/01/25  0439   MG 1.6 1.7       Significant Imaging: I have reviewed all pertinent imaging results/findings within the past 24 hours.  Imaging Results              X-Ray Chest 1 View (Final result)  Result time 02/28/25 14:41:09      Final result by Jadiel Martinez Jr., MD (02/28/25 14:41:09)                   Impression:      No acute abnormality.      Electronically signed by: Jadiel Martinez MD  Date:    02/28/2025  Time:    14:41               Narrative:    EXAMINATION:  XR CHEST 1 VIEW    CLINICAL HISTORY:  Encounter for other preprocedural examination    TECHNIQUE:  Single frontal view of the chest was performed.    COMPARISON:  Chest x-ray of September 13, 2023    FINDINGS:  The lungs are clear, with normal appearance of pulmonary vasculature and no pleural effusion or pneumothorax.    The cardiac silhouette is normal in size. The hilar and mediastinal contours are unremarkable.    Bones are intact.                                        X-Ray Hip 2 or 3 views Left with Pelvis when performed (Final result)  Result time 02/28/25 12:09:11       Final result by Jadiel Martinez Jr., MD (02/28/25 12:09:11)                   Impression:      Mildly displaced transverse fracture of the left femoral neck.    This report was flagged in Epic as abnormal.      Electronically signed by: Jadiel Martinez MD  Date:    02/28/2025  Time:    12:09               Narrative:    EXAMINATION:  XR HIP WITH PELVIS WHEN PERFORMED 2 OR 3 VIEWS LEFT    CLINICAL HISTORY:  Left lower quadrant pain    TECHNIQUE:  AP view of the pelvis and frog leg lateral view of the left hip were performed.    COMPARISON:  None    FINDINGS:  There is transverse fracture of the left femoral neck with very mild shift of the femur in relation to the femoral head.  The femoral head remains in the acetabulum.  The bones of the pelvis and right hip are intact..

## 2025-03-01 NOTE — TRANSFER OF CARE
"Anesthesia Transfer of Care Note    Patient: Ariana Tiwari    Procedure(s) Performed: Procedure(s) (LRB):  ARTHROPLASTY, HIP (Left)    Patient location: PACU    Anesthesia Type: general    Transport from OR: Transported from OR on 6-10 L/min O2 by face mask with adequate spontaneous ventilation    Post pain: adequate analgesia    Post assessment: no apparent anesthetic complications    Post vital signs: stable    Level of consciousness: sedated    Nausea/Vomiting: no nausea/vomiting    Complications: none    Transfer of care protocol was followed      Last vitals: Visit Vitals  BP (!) 98/55 (Patient Position: Lying)   Pulse 66   Temp 36.6 °C (97.8 °F) (Oral)   Resp 18   Ht 5' 3" (1.6 m)   Wt 52.8 kg (116 lb 6.5 oz)   LMP  (LMP Unknown)   SpO2 (!) 94%   Breastfeeding No   BMI 20.62 kg/m²     "

## 2025-03-01 NOTE — NURSING
Informed Italia Iraheta of patient's O2 sat of 88% since she has gone to sleep, also informed her of blood pressure of 99/57, O2 2L nc applied to patient.

## 2025-03-01 NOTE — PLAN OF CARE
Plan of care reviewed with patient. Patient verbalized complete understanding. Pain has been managed with pain meds. Family remains at bedside. Pressure injury prevention applied and maintained. All fall precautions maintained. Bed in the lowest position, wheels locked. Call light within reach. Side rails ups x2. Slip resistant socks maintained.    Problem: Adult Inpatient Plan of Care  Goal: Plan of Care Review  Outcome: Progressing  Goal: Patient-Specific Goal (Individualized)  Outcome: Progressing  Goal: Absence of Hospital-Acquired Illness or Injury  Outcome: Progressing  Goal: Optimal Comfort and Wellbeing  Outcome: Progressing  Goal: Readiness for Transition of Care  Outcome: Progressing     Problem: Wound  Goal: Improved Oral Intake  Outcome: Progressing     Problem: Wound  Goal: Optimal Pain Control and Function  Outcome: Progressing

## 2025-03-01 NOTE — PROGRESS NOTES
[facility] Medicine  Progress Note    Patient Name: Ariana Tiwari  MRN: [id number]  Patient Class: IP- Inpatient   Admission Date: 2/28/2025  Length of Stay: 1 days  Attending Physician: Leila Moses MD  Primary Care Provider: Nba Petty MD        Subjective     Principal Problem:Closed left hip fracture        HPI:  Ariana Tiwari 79 y.o.female with a past medical history significant for hypothyroidism, Parkinson's, hernia, seizures, and stomach ulcers.  Patient presented to the emergency department status post witnessed fall at home.  Patient stated that she was attempting to get her walker in a chair was in the way and she fell to the ground.  Patient denies loss of consciousness or head trauma.  Patient denies fever, chills nausea, vomiting.  There are no alleviating factors and pain is aggravated by movement.  Patient denies any recent travel or sick visits.  Patient denies use of blood thinners. Patient denies smoking or use of alcohol.  Patient lives at home with her son and daughter-in-law.  Emergency department workup included:  CBC unremarkable, CMP unremarkable, Mg-1.9.  X-ray left hip revealed mildly displaced transverse fracture of the left femoral neck.  Orthopedic surgeon was consulted.  Patient will be admitted to Hospital Medicine for management and evaluation.       Overview/Hospital Course:  No notes on file    Interval History:   Patient seen and examined postoperatively.  Vital signs stable.  Patient requiring supplemental oxygen.  Family at bedside.  Review of Systems   Constitutional:  Positive for activity change. Negative for appetite change, chills, fatigue and fever.   Respiratory: Negative.     Cardiovascular: Negative.    Gastrointestinal: Negative.    Genitourinary: Negative.    Musculoskeletal:  Positive for arthralgias (Improved since surgery) and gait problem.   Neurological:  Positive for weakness (Generalized). Negative for dizziness,  tremors, seizures, syncope, facial asymmetry, speech difficulty, light-headedness, numbness and headaches.   Psychiatric/Behavioral: Negative.       Objective:     Vital Signs (Most Recent):  Temp: 98.6 °F (37 °C) (03/01/25 0931)  Pulse: 65 (03/01/25 0931)  Resp: 12 (03/01/25 0931)  BP: (!) 123/59 (03/01/25 0931)  SpO2: (!) 94 % (03/01/25 0931) Vital Signs (24h Range):  Temp:  [97.6 °F (36.4 °C)-99 °F (37.2 °C)] 98.6 °F (37 °C)  Pulse:  [62-98] 65  Resp:  [12-20] 12  SpO2:  [88 %-99 %] 94 %  BP: ()/(55-81) 123/59     Weight: 52.8 kg (116 lb 6.5 oz)  Body mass index is 20.62 kg/m².    Intake/Output Summary (Last 24 hours) at 3/1/2025 1029  Last data filed at 3/1/2025 0825  Gross per 24 hour   Intake 1285 ml   Output 1200 ml   Net 85 ml         Physical Exam  Vitals and nursing note reviewed.   Constitutional:       Appearance: She is ill-appearing (chronically).   Eyes:      Comments: Eye redness chronic   Cardiovascular:      Rate and Rhythm: Normal rate.      Pulses: Normal pulses.      Heart sounds: Normal heart sounds.   Pulmonary:      Effort: Pulmonary effort is normal. No respiratory distress.      Breath sounds: Normal breath sounds.   Abdominal:      General: Bowel sounds are normal. There is distension (baseline extended 2/2 hernia).   Musculoskeletal:         General: Tenderness (left hip) present.      Right lower leg: No edema.      Left lower leg: No edema.   Skin:     General: Skin is warm and dry.   Neurological:      Mental Status: She is alert and oriented to person, place, and time.      Gait: Gait abnormal.   Psychiatric:         Mood and Affect: Mood normal.         Behavior: Behavior normal.         Thought Content: Thought content normal.         Judgment: Judgment normal.             Significant Labs: All pertinent labs within the past 24 hours have been reviewed.  CBC:   Recent Labs   Lab 02/28/25  1140 03/01/25  0439   WBC 5.16 6.47   HGB 13.9 14.1   HCT 41.9 41.8   * 127*      CMP:   Recent Labs   Lab 02/28/25  1140 03/01/25  0439    138   K 3.6 3.2*    106   CO2 23 23   GLU 71 98   BUN 12 14   CREATININE 0.5 0.5   CALCIUM 8.4* 8.5*   PROT 6.9 6.6   ALBUMIN 4.0 3.7   BILITOT 0.4 0.8   ALKPHOS 74 75   AST 25 17   ALT 34 29   ANIONGAP 12 9     Magnesium:   Recent Labs   Lab 02/28/25  1140 03/01/25  0439   MG 1.6 1.7       Significant Imaging: I have reviewed all pertinent imaging results/findings within the past 24 hours.  Imaging Results              X-Ray Chest 1 View (Final result)  Result time 02/28/25 14:41:09      Final result by Jadiel Martinez Jr., MD (02/28/25 14:41:09)                   Impression:      No acute abnormality.      Electronically signed by: Jadiel Martinez MD  Date:    02/28/2025  Time:    14:41               Narrative:    EXAMINATION:  XR CHEST 1 VIEW    CLINICAL HISTORY:  Encounter for other preprocedural examination    TECHNIQUE:  Single frontal view of the chest was performed.    COMPARISON:  Chest x-ray of September 13, 2023    FINDINGS:  The lungs are clear, with normal appearance of pulmonary vasculature and no pleural effusion or pneumothorax.    The cardiac silhouette is normal in size. The hilar and mediastinal contours are unremarkable.    Bones are intact.                                        X-Ray Hip 2 or 3 views Left with Pelvis when performed (Final result)  Result time 02/28/25 12:09:11      Final result by Jadiel Martinez Jr., MD (02/28/25 12:09:11)                   Impression:      Mildly displaced transverse fracture of the left femoral neck.    This report was flagged in Epic as abnormal.      Electronically signed by: Jadiel Martinez MD  Date:    02/28/2025  Time:    12:09               Narrative:    EXAMINATION:  XR HIP WITH PELVIS WHEN PERFORMED 2 OR 3 VIEWS LEFT    CLINICAL HISTORY:  Left lower quadrant pain    TECHNIQUE:  AP view of the pelvis and frog leg lateral view of the left hip were  performed.    COMPARISON:  None    FINDINGS:  There is transverse fracture of the left femoral neck with very mild shift of the femur in relation to the femoral head.  The femoral head remains in the acetabulum.  The bones of the pelvis and right hip are intact..                                       Assessment and Plan     * Closed left hip fracture  Orthopedic surgery consult  P.r.n. analgesics  PT/OT  Status post left hip repair      Gluten free diet  Noted      Seizure  Chronic.  Noted.    Continue home medications      Recurrent intestinal obstruction  Chronic.  Noted.  No acute problem      PD (Parkinson's disease)  Chronic.  Noted.    Continue home medications      Hypothyroidism  Patient has chronic hypothyroidism. TFTs reviewed-   Lab Results   Component Value Date    TSH 2.518 09/15/2024   . Will continue chronic levothyroxine and adjust for and clinical changes.          VTE Risk Mitigation (From admission, onward)           Ordered     enoxaparin injection 40 mg  Daily         02/28/25 1308     IP VTE HIGH RISK PATIENT  Once         02/28/25 1308     Place sequential compression device  Until discontinued         02/28/25 1308                    Discharge Planning   IAIN:      Code Status: Full Code   Medical Readiness for Discharge Date:   Discharge Plan A: Home with family                        Gadiel Duval NP  Department of Hospital Medicine   [facility]/Surg

## 2025-03-01 NOTE — ANESTHESIA PREPROCEDURE EVALUATION
03/01/2025  Ariana Tiwari is a 79 y.o., female.      Pre-op Assessment          Review of Systems  Hepatic/GI:   PUD,           Peptic Ulcer Disease          Neurological:       Seizures           Seizure Disorder                          Endocrine:  Diabetes Hypothyroidism   Diabetes                   Hypothyroidism              Physical Exam  General: Well nourished        Anesthesia Plan  Type of Anesthesia, risks & benefits discussed:    Anesthesia Type: Gen ETT  Intra-op Monitoring Plan: Standard ASA Monitors  Post Op Pain Control Plan: multimodal analgesia and IV/PO Opioids PRN  Induction:  IV  Airway Plan: Video  Informed Consent: Informed consent signed with the Patient and all parties understand the risks and agree with anesthesia plan.  All questions answered.   ASA Score: 3  Day of Surgery Review of History & Physical: H&P Update referred to the surgeon/provider.    Ready For Surgery From Anesthesia Perspective.     .

## 2025-03-01 NOTE — PT/OT/SLP PROGRESS
Occupational Therapy      Patient Name:  Ariana Kramer Te   MRN:  [id number]    Patient not seen today secondary to Other (Comment) (Hip fracture with surgery today. Will require new OT orders post op).    3/1/2025

## 2025-03-01 NOTE — NURSING
Pt arrived to floor via bed, NAD noted. Bedside report received from Nicole PACU RN. Dressing site to left hip clean dry and intact. Abduction pillow in place.  ICE packs to site. Daughter-in-law at bedside. Matthew jha RN updated. Pt Alert and awake answering questions appropriately. Pt on 4 Liters oxy mask sats 95-96%.

## 2025-03-01 NOTE — PROCEDURES
[facility] Med/Surg  Total Hip Arthroplasty   Procedure Note    SUMMARY     Date of Procedure: 3/1/2025     Procedure: Procedure(s) (LRB):  ARTHROPLASTY, HIP (Left)     Surgeons and Role:     * Jaswinder Nguyen MD - Primary    Assisting Surgeon: NATALYA Singletary    Indications: Severe osteoarthritis left hip with symptoms limiting function.    Pre-Operative Diagnosis: femoral neck fracture left hip    Post-Operative Diagnosis: osteoarthritis left hip    Anesthesia: General    Technical Procedures Used: Posterior hip     Description of the Findings of the Procedure:     The patient was seen in the Holding Room. The risks, benefits, complications, treatment options, and expected outcomes were discussed with the patient. The risks and potential complications of their problem and purposed treatment include but are not limited to infection, nerve injury, vascular injury, nonunion of the syndesmosis, persistent pain, potential skin necrosis, deep vein thrombosis, possible pulmonary embolus, complications of the anesthetics and failure of the implant.  The patient concurred with the proposed plan, giving informed consent.  The site of surgery properly noted/marked. The patient was taken to Operating Room # 7, identified as Ariana Tiwari and the procedure verified as left Total Hip Arthroplasty. A Time Out was held and the above information confirmed.    The patient was brought to the operating room, placed on the operating table in a supine position. Following the successful induction of anesthesia the patient was placed in the left lateral decubitus position. The left hip was scrubbed, prepped and draped in the usual sterile fashion.      A standard posterolateral style hip incision was carried down to the deep fascia. The short rotators were elevated with the capsule in a single sleeve and the hip was totally dislocated. Findings included severe end-stage osteoarthritic changes to the femoral  head, acetabulum with fraying of the labrum.          The femoral neck was osteotimized 1 cm above the trochanter in an oblique fashion, and marginal osteophytes on the femoral neck were removed with the aid of a rongeur and osteotome. Attention was then turned to the acetabulum, which was peripherally freed of soft tissue and then reamed up to 47 mm diameter. No cystic changes were noted within the cleaned acetabular subchondral bone bed. A 48 mm Liliana G7 style cup was then introduced in 40 degrees of abduction and 25 degrees of anteversion. This was firmly impacted.  The final dual mobility liner was then placed. The femur was opened with an awl, and then broached distally to a size 3 Liliana Avenir. The calcar was milled. Trial reduction of the +0 dual mobility  x 0 mm head was undertaken. The sharp test was negative. The hip was stable in 40 degrees flexion and 30 degrees of internal rotation. In extension there no shook in the hip. The patient's components were in good position.       With this stability, the trial components were removed.  a size 3 avenir stem with 0 dual mobility head .The hip was taken through a range of motion and found to be stable. The wound was copiously irrigated, closed in layers. The skin was reapposed with 3-0 stratafix. A compression dressing was applied.    Instrument, sponge, and needle counts were correct prior to wound closure and at the conclusion of the case.     Significant Surgical Tasks Conducted by the Assistant(s), if Applicable:  The 1st assist was vital for the case including helping with dislocation, reduction, placement of implants properly, closure and patient preparation.  This case would have been increasingly more difficult without the 1st assist    Complications: None; patient tolerated the procedure well.        Estimated Blood Loss (EBL): less than 100 mL           Drains: 0    Implants: Liliana G7 48 cup, dual mobility liner, Size 3 Avenir Std stem with 0 dual  mobility head    Specimens: none           Condition:stable    Disposition: PACU - hemodynamically stable.    Attestation: I was present and scrubbed for the entire procedure.

## 2025-03-02 LAB
ALBUMIN SERPL BCP-MCNC: 3.7 G/DL (ref 3.5–5.2)
ALP SERPL-CCNC: 66 U/L (ref 40–150)
ALT SERPL W/O P-5'-P-CCNC: 21 U/L (ref 10–44)
ANION GAP SERPL CALC-SCNC: 10 MMOL/L (ref 8–16)
AST SERPL-CCNC: 21 U/L (ref 10–40)
BASOPHILS # BLD AUTO: 0.02 K/UL (ref 0–0.2)
BASOPHILS NFR BLD: 0.3 % (ref 0–1.9)
BILIRUB SERPL-MCNC: 0.8 MG/DL (ref 0.1–1)
BUN SERPL-MCNC: 14 MG/DL (ref 8–23)
CALCIUM SERPL-MCNC: 8.4 MG/DL (ref 8.7–10.5)
CHLORIDE SERPL-SCNC: 105 MMOL/L (ref 95–110)
CO2 SERPL-SCNC: 21 MMOL/L (ref 23–29)
CREAT SERPL-MCNC: 0.5 MG/DL (ref 0.5–1.4)
DIFFERENTIAL METHOD BLD: ABNORMAL
EOSINOPHIL # BLD AUTO: 0.1 K/UL (ref 0–0.5)
EOSINOPHIL NFR BLD: 1.8 % (ref 0–8)
ERYTHROCYTE [DISTWIDTH] IN BLOOD BY AUTOMATED COUNT: 12.8 % (ref 11.5–14.5)
EST. GFR  (NO RACE VARIABLE): >60 ML/MIN/1.73 M^2
GLUCOSE SERPL-MCNC: 116 MG/DL (ref 70–110)
HCT VFR BLD AUTO: 33.7 % (ref 37–48.5)
HGB BLD-MCNC: 11.1 G/DL (ref 12–16)
IMM GRANULOCYTES # BLD AUTO: 0.03 K/UL (ref 0–0.04)
IMM GRANULOCYTES NFR BLD AUTO: 0.5 % (ref 0–0.5)
LYMPHOCYTES # BLD AUTO: 0.8 K/UL (ref 1–4.8)
LYMPHOCYTES NFR BLD: 11.6 % (ref 18–48)
MAGNESIUM SERPL-MCNC: 1.7 MG/DL (ref 1.6–2.6)
MCH RBC QN AUTO: 30.4 PG (ref 27–31)
MCHC RBC AUTO-ENTMCNC: 32.9 G/DL (ref 32–36)
MCV RBC AUTO: 92 FL (ref 82–98)
MONOCYTES # BLD AUTO: 0.5 K/UL (ref 0.3–1)
MONOCYTES NFR BLD: 6.9 % (ref 4–15)
NEUTROPHILS # BLD AUTO: 5.2 K/UL (ref 1.8–7.7)
NEUTROPHILS NFR BLD: 78.9 % (ref 38–73)
NRBC BLD-RTO: 0 /100 WBC
PHOSPHATE SERPL-MCNC: 3.6 MG/DL (ref 2.7–4.5)
PLATELET # BLD AUTO: 116 K/UL (ref 150–450)
PMV BLD AUTO: 10.6 FL (ref 9.2–12.9)
POTASSIUM SERPL-SCNC: 4 MMOL/L (ref 3.5–5.1)
PROT SERPL-MCNC: 6.3 G/DL (ref 6–8.4)
RBC # BLD AUTO: 3.65 M/UL (ref 4–5.4)
SODIUM SERPL-SCNC: 136 MMOL/L (ref 136–145)
WBC # BLD AUTO: 6.63 K/UL (ref 3.9–12.7)

## 2025-03-02 PROCEDURE — 25000003 PHARM REV CODE 250

## 2025-03-02 PROCEDURE — 97162 PT EVAL MOD COMPLEX 30 MIN: CPT

## 2025-03-02 PROCEDURE — 83735 ASSAY OF MAGNESIUM: CPT

## 2025-03-02 PROCEDURE — 11000001 HC ACUTE MED/SURG PRIVATE ROOM

## 2025-03-02 PROCEDURE — 99900035 HC TECH TIME PER 15 MIN (STAT)

## 2025-03-02 PROCEDURE — 97530 THERAPEUTIC ACTIVITIES: CPT

## 2025-03-02 PROCEDURE — 84100 ASSAY OF PHOSPHORUS: CPT

## 2025-03-02 PROCEDURE — 27000221 HC OXYGEN, UP TO 24 HOURS

## 2025-03-02 PROCEDURE — 85025 COMPLETE CBC W/AUTO DIFF WBC: CPT

## 2025-03-02 PROCEDURE — 63600175 PHARM REV CODE 636 W HCPCS

## 2025-03-02 PROCEDURE — 36415 COLL VENOUS BLD VENIPUNCTURE: CPT

## 2025-03-02 PROCEDURE — 94761 N-INVAS EAR/PLS OXIMETRY MLT: CPT

## 2025-03-02 PROCEDURE — 94799 UNLISTED PULMONARY SVC/PX: CPT | Mod: XB

## 2025-03-02 PROCEDURE — 80053 COMPREHEN METABOLIC PANEL: CPT

## 2025-03-02 RX ADMIN — HYDROCODONE BITARTRATE AND ACETAMINOPHEN 1 TABLET: 5; 325 TABLET ORAL at 03:03

## 2025-03-02 RX ADMIN — METHOCARBAMOL 500 MG: 500 TABLET ORAL at 07:03

## 2025-03-02 RX ADMIN — LEVOTHYROXINE SODIUM 62.5 MCG: 25 TABLET ORAL at 06:03

## 2025-03-02 RX ADMIN — Medication 800 MG: at 03:03

## 2025-03-02 RX ADMIN — METHOCARBAMOL 500 MG: 500 TABLET ORAL at 04:03

## 2025-03-02 RX ADMIN — LEVETIRACETAM 500 MG: 500 TABLET, FILM COATED ORAL at 07:03

## 2025-03-02 RX ADMIN — DICLOFENAC SODIUM 2 G: 10 GEL TOPICAL at 07:03

## 2025-03-02 RX ADMIN — DICLOFENAC SODIUM 2 G: 10 GEL TOPICAL at 10:03

## 2025-03-02 RX ADMIN — HYDROCODONE BITARTRATE AND ACETAMINOPHEN 1 TABLET: 5; 325 TABLET ORAL at 10:03

## 2025-03-02 RX ADMIN — HYDROCODONE BITARTRATE AND ACETAMINOPHEN 1 TABLET: 5; 325 TABLET ORAL at 09:03

## 2025-03-02 RX ADMIN — CYANOCOBALAMIN TAB 250 MCG 250 MCG: 250 TAB at 07:03

## 2025-03-02 RX ADMIN — ENOXAPARIN SODIUM 40 MG: 40 INJECTION SUBCUTANEOUS at 05:03

## 2025-03-02 RX ADMIN — LEVETIRACETAM 500 MG: 500 TABLET, FILM COATED ORAL at 09:03

## 2025-03-02 RX ADMIN — Medication 2000 UNITS: at 07:03

## 2025-03-02 RX ADMIN — POLYETHYLENE GLYCOL (3350) 17 G: 17 POWDER, FOR SOLUTION ORAL at 07:03

## 2025-03-02 NOTE — SUBJECTIVE & OBJECTIVE
Interval History: NAD noted and VSS. Patient reports tiredness but otherwise doing well. Denies pain. She is wearing an oxygen mask w/ reports that she desatted overnight. Daughter at bedside.    Review of Systems   Constitutional:  Positive for fatigue. Negative for chills and fever.   HENT:  Negative for congestion and trouble swallowing.    Eyes:  Negative for visual disturbance.   Respiratory:  Negative for shortness of breath.    Cardiovascular:  Negative for chest pain.   Gastrointestinal:  Negative for abdominal pain, nausea and vomiting.   Genitourinary:  Negative for dysuria.   Musculoskeletal:  Positive for arthralgias and gait problem.   Skin:         Left hip surgical incision   Neurological:  Negative for dizziness, speech difficulty and light-headedness.   Psychiatric/Behavioral:  Negative for agitation and confusion. The patient is not nervous/anxious.      Objective:     Vital Signs (Most Recent):  Temp: 97.7 °F (36.5 °C) (03/02/25 1119)  Pulse: 74 (03/02/25 1328)  Resp: 16 (03/02/25 1520)  BP: (!) 112/56 (03/02/25 1119)  SpO2: 95 % (03/02/25 1328) Vital Signs (24h Range):  Temp:  [97.6 °F (36.4 °C)-98.2 °F (36.8 °C)] 97.7 °F (36.5 °C)  Pulse:  [70-89] 74  Resp:  [16-18] 16  SpO2:  [90 %-98 %] 95 %  BP: (106-125)/(54-62) 112/56     Weight: 52.8 kg (116 lb 6.5 oz)  Body mass index is 20.62 kg/m².    Intake/Output Summary (Last 24 hours) at 3/2/2025 6396  Last data filed at 3/2/2025 0321  Gross per 24 hour   Intake --   Output 950 ml   Net -950 ml         Physical Exam  Constitutional:       General: She is not in acute distress.     Appearance: She is ill-appearing.      Comments: groggy   HENT:      Head: Normocephalic and atraumatic.      Mouth/Throat:      Mouth: Mucous membranes are moist.   Eyes:      Extraocular Movements: Extraocular movements intact.      Pupils: Pupils are equal, round, and reactive to light.   Cardiovascular:      Rate and Rhythm: Normal rate and regular rhythm.      Pulses:  Normal pulses.      Heart sounds: Normal heart sounds.   Pulmonary:      Effort: Pulmonary effort is normal. No respiratory distress.      Breath sounds: Normal breath sounds. No wheezing, rhonchi or rales.   Abdominal:      General: Bowel sounds are normal. There is no distension.      Palpations: Abdomen is soft.      Tenderness: There is no abdominal tenderness. There is no guarding or rebound.   Musculoskeletal:      Cervical back: No rigidity.      Left hip: Tenderness present.      Right lower leg: No edema.      Left lower leg: No edema.   Skin:     General: Skin is warm.      Comments: Left hip surgical incision - dressing CDI, no signs of infection or irritation   Neurological:      Mental Status: She is alert and oriented to person, place, and time.      Motor: Tremor present.   Psychiatric:         Mood and Affect: Mood normal.         Behavior: Behavior normal.         Thought Content: Thought content normal.         Judgment: Judgment normal.             Significant Labs: All pertinent labs within the past 24 hours have been reviewed.    Significant Imaging: I have reviewed all pertinent imaging results/findings within the past 24 hours.

## 2025-03-02 NOTE — PROGRESS NOTES
[facility] Medicine  Progress Note    Patient Name: Ariana Tiwari  MRN: [id number]  Patient Class: IP- Inpatient   Admission Date: 2/28/2025  Length of Stay: 2 days  Attending Physician: Leila Moses MD  Primary Care Provider: Nba Petty MD        Subjective     Principal Problem:Closed left hip fracture        HPI:  Ariana Tiwari 79 y.o.female with a past medical history significant for hypothyroidism, Parkinson's, hernia, seizures, and stomach ulcers.  Patient presented to the emergency department status post witnessed fall at home.  Patient stated that she was attempting to get her walker in a chair was in the way and she fell to the ground.  Patient denies loss of consciousness or head trauma.  Patient denies fever, chills nausea, vomiting.  There are no alleviating factors and pain is aggravated by movement.  Patient denies any recent travel or sick visits.  Patient denies use of blood thinners. Patient denies smoking or use of alcohol.  Patient lives at home with her son and daughter-in-law.  Emergency department workup included:  CBC unremarkable, CMP unremarkable, Mg-1.9.  X-ray left hip revealed mildly displaced transverse fracture of the left femoral neck.  Orthopedic surgeon was consulted.  Patient will be admitted to Hospital Medicine for management and evaluation.       Overview/Hospital Course:  Ariana Tiwari is an 79 year old female w/ PMH hypothyroidism, Parkinson's, hernia, seizures, and stomach ulcers. She was admitted to the hospital for a left femoral neck displace fracture s/p mechanical fall. Orthopedic surgery was consulted and she underwent a hip arthroplasty 3/1. PT/OT were consulted. Case management consulted to assist with discharge planning.    Interval History: NAD noted and VSS. Patient reports tiredness but otherwise doing well. Denies pain. She is wearing an oxygen mask w/ reports that she desatted overnight. Daughter at  bedside.    Review of Systems   Constitutional:  Positive for fatigue. Negative for chills and fever.   HENT:  Negative for congestion and trouble swallowing.    Eyes:  Negative for visual disturbance.   Respiratory:  Negative for shortness of breath.    Cardiovascular:  Negative for chest pain.   Gastrointestinal:  Negative for abdominal pain, nausea and vomiting.   Genitourinary:  Negative for dysuria.   Musculoskeletal:  Positive for arthralgias and gait problem.   Skin:         Left hip surgical incision   Neurological:  Negative for dizziness, speech difficulty and light-headedness.   Psychiatric/Behavioral:  Negative for agitation and confusion. The patient is not nervous/anxious.      Objective:     Vital Signs (Most Recent):  Temp: 97.7 °F (36.5 °C) (03/02/25 1119)  Pulse: 74 (03/02/25 1328)  Resp: 16 (03/02/25 1520)  BP: (!) 112/56 (03/02/25 1119)  SpO2: 95 % (03/02/25 1328) Vital Signs (24h Range):  Temp:  [97.6 °F (36.4 °C)-98.2 °F (36.8 °C)] 97.7 °F (36.5 °C)  Pulse:  [70-89] 74  Resp:  [16-18] 16  SpO2:  [90 %-98 %] 95 %  BP: (106-125)/(54-62) 112/56     Weight: 52.8 kg (116 lb 6.5 oz)  Body mass index is 20.62 kg/m².    Intake/Output Summary (Last 24 hours) at 3/2/2025 1540  Last data filed at 3/2/2025 0321  Gross per 24 hour   Intake --   Output 950 ml   Net -950 ml         Physical Exam  Constitutional:       General: She is not in acute distress.     Appearance: She is ill-appearing.      Comments: groggy   HENT:      Head: Normocephalic and atraumatic.      Mouth/Throat:      Mouth: Mucous membranes are moist.   Eyes:      Extraocular Movements: Extraocular movements intact.      Pupils: Pupils are equal, round, and reactive to light.   Cardiovascular:      Rate and Rhythm: Normal rate and regular rhythm.      Pulses: Normal pulses.      Heart sounds: Normal heart sounds.   Pulmonary:      Effort: Pulmonary effort is normal. No respiratory distress.      Breath sounds: Normal breath sounds. No  wheezing, rhonchi or rales.   Abdominal:      General: Bowel sounds are normal. There is no distension.      Palpations: Abdomen is soft.      Tenderness: There is no abdominal tenderness. There is no guarding or rebound.   Musculoskeletal:      Cervical back: No rigidity.      Left hip: Tenderness present.      Right lower leg: No edema.      Left lower leg: No edema.   Skin:     General: Skin is warm.      Comments: Left hip surgical incision - dressing CDI, no signs of infection or irritation   Neurological:      Mental Status: She is alert and oriented to person, place, and time.      Motor: Tremor present.   Psychiatric:         Mood and Affect: Mood normal.         Behavior: Behavior normal.         Thought Content: Thought content normal.         Judgment: Judgment normal.             Significant Labs: All pertinent labs within the past 24 hours have been reviewed.    Significant Imaging: I have reviewed all pertinent imaging results/findings within the past 24 hours.    Assessment and Plan     * Closed left hip fracture  Orthopedic surgery consult  P.r.n. analgesics  PT/OT  Status post left hip repair      Gluten free diet  Noted      Seizure  Chronic.  Noted.    Continue home medications      Recurrent intestinal obstruction  Chronic.  Noted.  No acute problem      PD (Parkinson's disease)  Chronic.  Noted.    Continue home medications      Hypothyroidism  Patient has chronic hypothyroidism. TFTs reviewed-   Lab Results   Component Value Date    TSH 2.518 09/15/2024   . Will continue chronic levothyroxine and adjust for and clinical changes.          VTE Risk Mitigation (From admission, onward)           Ordered     enoxaparin injection 40 mg  Daily         02/28/25 1308     IP VTE HIGH RISK PATIENT  Once         02/28/25 1308     Place sequential compression device  Until discontinued         02/28/25 1308                    Discharge Planning   IAIN:      Code Status: Full Code   Medical Readiness for  Discharge Date:   Discharge Plan A: Home with family                Please place Justification for DME        Katie Elias NP  Department of Hospital Medicine   [facility]/Surg

## 2025-03-02 NOTE — PT/OT/SLP PROGRESS
Occupational Therapy      Patient Name:  Ariana Kramer Te   MRN:  [id number]    Therapist unavailable. Will follow up tomorrow.    3/2/2025

## 2025-03-02 NOTE — PLAN OF CARE
Problem: Adult Inpatient Plan of Care  Goal: Plan of Care Review  Outcome: Progressing  Goal: Patient-Specific Goal (Individualized)  Outcome: Progressing  Goal: Absence of Hospital-Acquired Illness or Injury  Outcome: Progressing  Goal: Optimal Comfort and Wellbeing  Outcome: Progressing  Goal: Readiness for Transition of Care  Outcome: Progressing     Problem: Wound  Goal: Optimal Coping  Outcome: Progressing  Goal: Optimal Functional Ability  Outcome: Progressing  Goal: Absence of Infection Signs and Symptoms  Outcome: Progressing  Goal: Improved Oral Intake  Outcome: Progressing  Goal: Optimal Pain Control and Function  Outcome: Progressing  Goal: Skin Health and Integrity  Outcome: Progressing  Goal: Optimal Wound Healing  Outcome: Progressing     Problem: Skin Injury Risk Increased  Goal: Skin Health and Integrity  Outcome: Progressing   Plan of care reviewed with patient/family. All fall precautions maintained. Bed in lowest position, call light within reach, slip resistant socks maintained. Bed alarm on.

## 2025-03-02 NOTE — HOSPITAL COURSE
Ariana Tiwari is an 79 year old female w/ PMH hypothyroidism, Parkinson's, hernia, seizures, and stomach ulcers. She was admitted to the hospital for a left femoral neck displace fracture s/p mechanical fall. Orthopedic surgery was consulted and she underwent a hip arthroplasty 3/1. PT/OT were consulted. Case management consulted to assist with discharge planning.  Patient had oxygen requirement after surgery secondary to atelectasis which improved with incentive spirometry use.  Patient was discharged to [facility].  Patient seen and examined on day of discharge and deemed suitable for discharge to rehab.  Aspirin 81 b.i.d. for DVT prophylaxis for the next 30 days.  Follow up with ortho in 2 weeks

## 2025-03-02 NOTE — PT/OT/SLP PROGRESS
Physical Therapy      Patient Name:  Ariana Kramer Te   MRN:  [id number]    Patient not seen today secondary to Other (Comment) Sx day today. Will follow-up 3/2/25. Will require new PT order post-op.

## 2025-03-02 NOTE — PT/OT/SLP EVAL
Physical Therapy Evaluation    Patient Name:  Ariana Tiwari   MRN:  [id number]    Recommendations:     Discharge Recommendations: High Intensity Therapy   Discharge Equipment Recommendations: none   Barriers to discharge: Decreased caregiver support    Assessment:     Ariana Tiwari is a 79 y.o. female admitted with a medical diagnosis of Closed left hip fracture.  She presents with the following impairments/functional limitations: weakness, impaired endurance, impaired functional mobility, gait instability, impaired balance, decreased lower extremity function, decreased upper extremity function, pain, decreased ROM, impaired cardiopulmonary response to activity, orthopedic precautions .    Pt alert/interactive. Hx of Parkinson's Disease with mask facie, tremors/rigidity. Pt with abductor pillow- pt and daughter reviewed on L posterior hip precautions and WBAT. Pt seen for thera ex in supine. Max assist x2 to sit EOB with extra time. Bed to chair stand pivot assist x2. OOB chair.  Pt was ambulatory household distance with triangular RW and home with daughter  HIT.    Rehab Prognosis: Fair; patient would benefit from acute skilled PT services to address these deficits and reach maximum level of function.    Recent Surgery: Procedure(s) (LRB):  ARTHROPLASTY, HIP (Left) 1 Day Post-Op    Plan:     During this hospitalization, patient to be seen daily to address the identified rehab impairments via gait training, therapeutic activities, therapeutic exercises and progress toward the following goals:    Plan of Care Expires:  03/30/25    Subjective     Chief Complaint: pain LH with movements- daughter at bedside encouraging pt for max participation   Patient/Family Comments/goals: none  Pain/Comfort:  Pain Rating 1:  (not rated)  Location - Side 1: Left  Location 1: hip  Pain Addressed 1: Reposition, Distraction, Cessation of Activity, Nurse notified    Patients cultural, spiritual, Gnosticist conflicts given the  current situation:      Living Environment:  Home with family  Prior to admission, patients level of function was amb with rollator.  Equipment used at home: rollator.  DME owned (not currently used): none.  Upon discharge, patient will have assistance from family.    Objective:     Communicated with nurse Matthew prior to session.  Patient found HOB elevated with hip abduction pillow, PureWick, SCD, oxygen, bed alarm  upon PT entry to room.    General Precautions: Standard, fall, seizure  Orthopedic Precautions:LLE weight bearing as tolerated, LLE posterior precautions   Braces: N/A  Respiratory Status:  oxymask    Exams:  Postural Exam:  Patient presented with the following abnormalities:    -       Rounded shoulders  -       Forward head  -       BMI 20.62  RLE ROM: WFL except rigidity  RLE Strength: Deficits: 3/5  LLE ROM: Deficits: within L posterior hip precautions  LLE Strength: Deficits: 3-/5    Functional Mobility:  Bed Mobility:     Scooting: maximal assistance  Supine to Sit: maximal assistance  Transfers:     Sit to Stand:  maximal assistance with no AD  Bed to Chair: maximal assistance with  no AD  using  Stand Pivot      AM-PAC 6 CLICK MOBILITY  Total Score:10       Treatment & Education:  Patient was educated on the importance of OOB activity and functional mobility to negate negative effects of prolonged bed rest during hospitalization, safe transfers and ambulation, and D/C planning   Thera ex in supine including isometrics  OOB chair with tray table in front- assist for lunch      Patient left up in chair with all lines intact, call button in reach, chair alarm on, and daughter present.    GOALS:   Multidisciplinary Problems       Physical Therapy Goals          Problem: Physical Therapy    Goal Priority Disciplines Outcome Interventions   Physical Therapy Goal     PT, PT/OT Progressing    Description: Goals to be met by: 03-     Patient will increase functional independence with mobility by  performin. Supine to sit with Moderate Assistance  2. Sit to stand transfer with Moderate Assistance  3. Bed to chair transfer with Moderate Assistance using Rolling Walker  4. Gait  x 10 feet with Moderate Assistance using Rolling Walker.   5. Lower extremity exercise program x20 reps    Post L RITA 2025/WBAT with posterior precautions/abd pillow                         DME Justifications:  No DME recommended requiring DME justifications    History:     Past Medical History:   Diagnosis Date    ACP (advance care planning) 9/15/2024    Age-related osteoporosis without current pathological fracture 2024    Allergy     Diabetes 2025    Diverticulosis     Gluten enteropathy     Gluten intolerance     Hernia     Hypothyroidism     PD (Parkinson's disease) 2015    Right tremor type    Peptic ulcer disease     Right shoulder pain     Cirtisone injection 3/26/15 - Dr. Tolbert    SBO (small bowel obstruction) 2019    Seizures     Squamous cell carcinoma of skin     Ulcer        Past Surgical History:   Procedure Laterality Date    ADENOIDECTOMY      COLONOSCOPY  2012    Dr. Teresa, repeat in 10 years for screening    COLONOSCOPY N/A 2018    Procedure: COLONOSCOPY;  Surgeon: Radha Deng MD;  Location: [facility];  Service: Endoscopy;  Laterality: N/A;    CORRECTION OF HAMMER TOE Left 2020    Procedure: CORRECTION, HAMMER TOE;  Surgeon: William Sprague MD;  Location: [facility];  Service: Orthopedics;  Laterality: Left;    COSMETIC SURGERY      Broken nose    ESOPHAGOGASTRODUODENOSCOPY N/A 2021    Procedure: EGD (ESOPHAGOGASTRODUODENOSCOPY);  Surgeon: Benji Valentino III, MD;  Location: [facility];  Service: Endoscopy;  Laterality: N/A;    FRACTURE SURGERY  left wrist    With pin    HEMORRHOID SURGERY      HERNIA REPAIR Left 2015    Dr Lo; left inguinal    INTRALUMINAL GASTROINTESTINAL TRACT IMAGING VIA CAPSULE N/A 7/10/2018    Procedure: IMAGING, GI  TRACT, INTRALUMINAL, VIA CAPSULE;  Surgeon: Radha Deng MD;  Location: [facility];  Service: Endoscopy;  Laterality: N/A;    LYSIS OF ADHESIONS  9/29/2020    Procedure: LYSIS, ADHESIONS;  Surgeon: Eagle Gonzalez MD;  Location: [facility];  Service: General;;    RHINOPLASTY TIP      TONSILLECTOMY      UPPER GASTROINTESTINAL ENDOSCOPY  05/21/2015    Dr. Gomez       Time Tracking:     PT Received On: 03/02/25  PT Start Time: 1124     PT Stop Time: 1152  PT Total Time (min): 28 min     Billable Minutes: Evaluation 10 and Therapeutic Activity 18      03/02/2025

## 2025-03-02 NOTE — PT/OT/SLP PROGRESS
Physical Therapy Treatment    Patient Name:  Ariana Tiwari   MRN:  [id number]    Recommendations:     Discharge Recommendations: High Intensity Therapy  Discharge Equipment Recommendations: none  Barriers to discharge: Decreased caregiver support    Assessment:     Ariana Tiwari is a 79 y.o. female admitted with a medical diagnosis of Closed left hip fracture.  She presents with the following impairments/functional limitations: weakness, impaired endurance, impaired functional mobility, gait instability, impaired balance, decreased lower extremity function, decreased upper extremity function, pain, decreased ROM, impaired cardiopulmonary response to activity, orthopedic precautions .    Second visit- pt was able to feed self- slow with tremors. Pt requiring max assist x2 to transfer towards standing and return back to bed. Repositioned with SCD and abd pillow back on.  HIT.    Rehab Prognosis: Fair; patient would benefit from acute skilled PT services to address these deficits and reach maximum level of function.    Recent Surgery: Procedure(s) (LRB):  ARTHROPLASTY, HIP (Left) 1 Day Post-Op    Plan:     During this hospitalization, patient to be seen daily to address the identified rehab impairments via gait training, therapeutic activities, therapeutic exercises and progress toward the following goals:    Plan of Care Expires:  03/30/25    Subjective   2 g daughters at bedside  Chief Complaint: fear of falling  Patient/Family Comments/goals: get well  Pain/Comfort:  Pain Rating 1:  (not rated)  Location - Side 1: Left  Location 1: hip  Pain Addressed 1: Reposition, Distraction, Cessation of Activity, Nurse notified      Objective:     Communicated with nurse Castro prior to session.  Patient found up in chair with hip abduction pillow, PureWick, SCD, oxygen, bed alarm upon PT entry to room.     General Precautions: Standard, fall, seizure  Orthopedic Precautions: LLE weight bearing as tolerated, LLE posterior  precautions  Braces: N/A  Respiratory Status: Room air     Functional Mobility:  Bed Mobility:     Scooting: maximal assistance  Sit to Supine: maximal assistance  Transfers:     Sit to Stand:  maximal assistance with no AD      AM-PAC 6 CLICK MOBILITY  Turning over in bed (including adjusting bedclothes, sheets and blankets)?: 2  Sitting down on and standing up from a chair with arms (e.g., wheelchair, bedside commode, etc.): 2  Moving from lying on back to sitting on the side of the bed?: 2  Moving to and from a bed to a chair (including a wheelchair)?: 2  Need to walk in hospital room?: 1  Climbing 3-5 steps with a railing?: 1  Basic Mobility Total Score: 10       Treatment & Education:  Patient was educated on the importance of OOB activity and functional mobility to negate negative effects of prolonged bed rest during hospitalization, safe transfers and ambulation, and D/C planning   Pt tolerated 1.5 hrs OOB  Max assist x2 for safe mobility  SCD and abd pillow back on    Patient left HOB elevated with all lines intact, call button in reach, bed alarm on, and G daughters present..    GOALS:   Multidisciplinary Problems       Physical Therapy Goals          Problem: Physical Therapy    Goal Priority Disciplines Outcome Interventions   Physical Therapy Goal     PT, PT/OT Progressing    Description: Goals to be met by: 2025     Patient will increase functional independence with mobility by performin. Supine to sit with Moderate Assistance  2. Sit to stand transfer with Moderate Assistance  3. Bed to chair transfer with Moderate Assistance using Rolling Walker  4. Gait  x 10 feet with Moderate Assistance using Rolling Walker.   5. Lower extremity exercise program x20 reps    Post L RITA 2025/WBAT with posterior precautions/abd pillow                         DME Justifications:  No DME recommended requiring DME justifications    Time Tracking:     PT Received On: 25  PT Start Time: 1349     PT  Stop Time: 1410  PT Total Time (min): 21 min     Billable Minutes: Therapeutic Activity 21    Treatment Type: Treatment  PT/PTA: PT     Number of PTA visits since last PT visit: 0     03/02/2025

## 2025-03-02 NOTE — ANESTHESIA POSTPROCEDURE EVALUATION
Anesthesia Post Evaluation    Patient: Ariana Tiwari    Procedure(s) Performed: Procedure(s) (LRB):  ARTHROPLASTY, HIP (Left)    Final Anesthesia Type: general      Patient location during evaluation: PACU  Patient participation: Yes- Able to Participate  Level of consciousness: awake and alert  Post-procedure vital signs: reviewed and stable  Pain management: adequate  Airway patency: patent    PONV status at discharge: No PONV  Anesthetic complications: no      Cardiovascular status: blood pressure returned to baseline  Respiratory status: unassisted and room air  Hydration status: euvolemic  Follow-up not needed.              Vitals Value Taken Time   /58 03/02/25 03:19   Temp 36.6 °C (97.8 °F) 03/02/25 03:19   Pulse 75 03/02/25 03:19   Resp 16 03/02/25 03:21   SpO2 94 % 03/02/25 03:19         Event Time   Out of Recovery 03/01/2025 09:20:00         Pain/Wade Score: Pain Rating Prior to Med Admin: 6 (3/2/2025  3:21 AM)  Pain Rating Post Med Admin: 2 (3/1/2025  9:46 PM)  Wade Score: 8 (3/1/2025  9:15 AM)

## 2025-03-02 NOTE — PROGRESS NOTES
Assessment/Plan:    Status Post left Total Hip Arthroplasty. Doing well postoperatively.     Continues current post-op course  WBAT  Posterior hip precautions  Dispo planning, likely rehab  DVT PPX    Please text or call with any questions  Jaswinder Nguyen MD  Orthopedic Surgeon  [facility]  [phone]       LOS: 2 days     Subjective:  Post-Operative Day: 1 Status Post left Total Hip Arthroplasty  Systemic or Specific Complaints:No Complaints    Objective:  Vital signs in last 24 hours:  Temp:  [97.6 °F (36.4 °C)-98.6 °F (37 °C)] 97.8 °F (36.6 °C)  Pulse:  [62-89] 75  Resp:  [12-20] 16  SpO2:  [89 %-99 %] 94 %  BP: (106-162)/(54-71) 125/58      General: alert, appears stated age, and cooperative   Wound: Wound clean and dry no evidence of infection.   Motion: Painless Range of Motion   DVT Exam: No evidence of DVT seen on physical exam.     Data Review  CBC:   Lab Results   Component Value Date    WBC 6.63 03/02/2025    RBC 3.65 (L) 03/02/2025    HGB 11.1 (L) 03/02/2025    HCT 33.7 (L) 03/02/2025    HCT 43 01/30/2023     (L) 03/02/2025

## 2025-03-02 NOTE — PLAN OF CARE
Problem: Physical Therapy  Goal: Physical Therapy Goal  Description: Goals to be met by: 2025     Patient will increase functional independence with mobility by performin. Supine to sit with Moderate Assistance  2. Sit to stand transfer with Moderate Assistance  3. Bed to chair transfer with Moderate Assistance using Rolling Walker  4. Gait  x 10 feet with Moderate Assistance using Rolling Walker.   5. Lower extremity exercise program x20 reps  Outcome: Progressing   PT eval and treat. pt post L RITA and requiring max assist for EOB/OOB chair. Hx of PD. LIT

## 2025-03-03 LAB
ALBUMIN SERPL BCP-MCNC: 3.5 G/DL (ref 3.5–5.2)
ALP SERPL-CCNC: 65 U/L (ref 40–150)
ALT SERPL W/O P-5'-P-CCNC: 16 U/L (ref 10–44)
ANION GAP SERPL CALC-SCNC: 10 MMOL/L (ref 8–16)
AST SERPL-CCNC: 20 U/L (ref 10–40)
BASOPHILS # BLD AUTO: 0.02 K/UL (ref 0–0.2)
BASOPHILS NFR BLD: 0.3 % (ref 0–1.9)
BILIRUB SERPL-MCNC: 0.8 MG/DL (ref 0.1–1)
BUN SERPL-MCNC: 13 MG/DL (ref 8–23)
CALCIUM SERPL-MCNC: 8 MG/DL (ref 8.7–10.5)
CHLORIDE SERPL-SCNC: 102 MMOL/L (ref 95–110)
CO2 SERPL-SCNC: 24 MMOL/L (ref 23–29)
CREAT SERPL-MCNC: 0.5 MG/DL (ref 0.5–1.4)
DIFFERENTIAL METHOD BLD: ABNORMAL
EOSINOPHIL # BLD AUTO: 0.2 K/UL (ref 0–0.5)
EOSINOPHIL NFR BLD: 3.1 % (ref 0–8)
ERYTHROCYTE [DISTWIDTH] IN BLOOD BY AUTOMATED COUNT: 12.7 % (ref 11.5–14.5)
EST. GFR  (NO RACE VARIABLE): >60 ML/MIN/1.73 M^2
GLUCOSE SERPL-MCNC: 92 MG/DL (ref 70–110)
HCT VFR BLD AUTO: 32.8 % (ref 37–48.5)
HGB BLD-MCNC: 11.1 G/DL (ref 12–16)
IMM GRANULOCYTES # BLD AUTO: 0.02 K/UL (ref 0–0.04)
IMM GRANULOCYTES NFR BLD AUTO: 0.3 % (ref 0–0.5)
LYMPHOCYTES # BLD AUTO: 1.1 K/UL (ref 1–4.8)
LYMPHOCYTES NFR BLD: 19.1 % (ref 18–48)
MAGNESIUM SERPL-MCNC: 1.9 MG/DL (ref 1.6–2.6)
MCH RBC QN AUTO: 31.2 PG (ref 27–31)
MCHC RBC AUTO-ENTMCNC: 33.8 G/DL (ref 32–36)
MCV RBC AUTO: 92 FL (ref 82–98)
MONOCYTES # BLD AUTO: 0.5 K/UL (ref 0.3–1)
MONOCYTES NFR BLD: 8.5 % (ref 4–15)
NEUTROPHILS # BLD AUTO: 4 K/UL (ref 1.8–7.7)
NEUTROPHILS NFR BLD: 68.7 % (ref 38–73)
NRBC BLD-RTO: 0 /100 WBC
PHOSPHATE SERPL-MCNC: 2.3 MG/DL (ref 2.7–4.5)
PLATELET # BLD AUTO: 119 K/UL (ref 150–450)
PMV BLD AUTO: 10.4 FL (ref 9.2–12.9)
POTASSIUM SERPL-SCNC: 3.5 MMOL/L (ref 3.5–5.1)
PROT SERPL-MCNC: 6.5 G/DL (ref 6–8.4)
RBC # BLD AUTO: 3.56 M/UL (ref 4–5.4)
SODIUM SERPL-SCNC: 136 MMOL/L (ref 136–145)
WBC # BLD AUTO: 5.76 K/UL (ref 3.9–12.7)

## 2025-03-03 PROCEDURE — 94761 N-INVAS EAR/PLS OXIMETRY MLT: CPT

## 2025-03-03 PROCEDURE — 85025 COMPLETE CBC W/AUTO DIFF WBC: CPT

## 2025-03-03 PROCEDURE — 27000221 HC OXYGEN, UP TO 24 HOURS

## 2025-03-03 PROCEDURE — 25000003 PHARM REV CODE 250

## 2025-03-03 PROCEDURE — 80053 COMPREHEN METABOLIC PANEL: CPT

## 2025-03-03 PROCEDURE — 83735 ASSAY OF MAGNESIUM: CPT

## 2025-03-03 PROCEDURE — 99900035 HC TECH TIME PER 15 MIN (STAT)

## 2025-03-03 PROCEDURE — 11000001 HC ACUTE MED/SURG PRIVATE ROOM

## 2025-03-03 PROCEDURE — 84100 ASSAY OF PHOSPHORUS: CPT

## 2025-03-03 PROCEDURE — 97530 THERAPEUTIC ACTIVITIES: CPT | Mod: CQ

## 2025-03-03 PROCEDURE — 63600175 PHARM REV CODE 636 W HCPCS

## 2025-03-03 PROCEDURE — 36415 COLL VENOUS BLD VENIPUNCTURE: CPT

## 2025-03-03 RX ORDER — POLYETHYLENE GLYCOL 3350 17 G/17G
17 POWDER, FOR SOLUTION ORAL 2 TIMES DAILY
Status: DISCONTINUED | OUTPATIENT
Start: 2025-03-03 | End: 2025-03-06 | Stop reason: HOSPADM

## 2025-03-03 RX ADMIN — HYDROCODONE BITARTRATE AND ACETAMINOPHEN 1 TABLET: 5; 325 TABLET ORAL at 09:03

## 2025-03-03 RX ADMIN — DICLOFENAC SODIUM 2 G: 10 GEL TOPICAL at 09:03

## 2025-03-03 RX ADMIN — ENOXAPARIN SODIUM 40 MG: 40 INJECTION SUBCUTANEOUS at 05:03

## 2025-03-03 RX ADMIN — POLYETHYLENE GLYCOL (3350) 17 G: 17 POWDER, FOR SOLUTION ORAL at 09:03

## 2025-03-03 RX ADMIN — Medication 2000 UNITS: at 09:03

## 2025-03-03 RX ADMIN — DICLOFENAC SODIUM 2 G: 10 GEL TOPICAL at 08:03

## 2025-03-03 RX ADMIN — POTASSIUM BICARBONATE 50 MEQ: 977.5 TABLET, EFFERVESCENT ORAL at 06:03

## 2025-03-03 RX ADMIN — TRAZODONE HYDROCHLORIDE 50 MG: 50 TABLET ORAL at 09:03

## 2025-03-03 RX ADMIN — HYPROMELLOSE 2910 1 DROP: 5 SOLUTION/ DROPS OPHTHALMIC at 08:03

## 2025-03-03 RX ADMIN — LEVETIRACETAM 500 MG: 500 TABLET, FILM COATED ORAL at 09:03

## 2025-03-03 RX ADMIN — LEVETIRACETAM 500 MG: 500 TABLET, FILM COATED ORAL at 08:03

## 2025-03-03 RX ADMIN — CYANOCOBALAMIN TAB 250 MCG 250 MCG: 250 TAB at 09:03

## 2025-03-03 RX ADMIN — HYPROMELLOSE 2910 1 DROP: 5 SOLUTION/ DROPS OPHTHALMIC at 03:03

## 2025-03-03 RX ADMIN — LEVOTHYROXINE SODIUM 62.5 MCG: 25 TABLET ORAL at 06:03

## 2025-03-03 RX ADMIN — POLYETHYLENE GLYCOL (3350) 17 G: 17 POWDER, FOR SOLUTION ORAL at 08:03

## 2025-03-03 RX ADMIN — POTASSIUM & SODIUM PHOSPHATES POWDER PACK 280-160-250 MG 2 PACKET: 280-160-250 PACK at 06:03

## 2025-03-03 NOTE — PLAN OF CARE
Problem: Physical Therapy  Goal: Physical Therapy Goal  Description: Goals to be met by: 2025     Patient will increase functional independence with mobility by performin. Supine to sit with Moderate Assistance  2. Sit to stand transfer with Moderate Assistance  3. Bed to chair transfer with Moderate Assistance using Rolling Walker  4. Gait  x 10 feet with Moderate Assistance using Rolling Walker.   5. Lower extremity exercise program x20 reps    Post L RITA 2025/WBAT with posterior precautions/abd pillow    Outcome: Progressing   Therapeutic activity to improve functional mobility : bed mobility , transfers, gait with rw and assistance for safety, LE exercises.

## 2025-03-03 NOTE — CARE UPDATE
03/02/25 2026   Patient Assessment/Suction   Level of Consciousness (AVPU) responds to voice   Respiratory Effort Shallow   Expansion/Accessory Muscles/Retractions no retractions;no use of accessory muscles   All Lung Fields Breath Sounds diminished   SABRINA Breath Sounds diminished   LLL Breath Sounds diminished   RUL Breath Sounds diminished   RML Breath Sounds diminished   RLL Breath Sounds diminished   PRE-TX-O2   Device (Oxygen Therapy) other (comment)  (oxymask)   $ Is the patient on Low Flow Oxygen? Yes   Flow (L/min) (Oxygen Therapy) 5  (increased from 4LPM)   SpO2 (!) 93 %   Pulse Oximetry Type Intermittent   $ Pulse Oximetry - Multiple Charge Pulse Oximetry - Multiple   Pulse 93   Resp 17   Incentive Spirometer   $ Incentive Spirometer Charges done with encouragement;proper technique demonstrated   Administration (IS) proper technique demonstrated   Number of Repetitions (IS) 5   Level Incentive Spirometer (mL) 500   Patient Tolerance (IS) fair;no adverse signs/symptoms present

## 2025-03-03 NOTE — PLAN OF CARE
Problem: Adult Inpatient Plan of Care  Goal: Plan of Care Review  Outcome: Progressing  Goal: Patient-Specific Goal (Individualized)  Outcome: Progressing  Goal: Absence of Hospital-Acquired Illness or Injury  Outcome: Progressing  Goal: Optimal Comfort and Wellbeing  Outcome: Progressing  Goal: Readiness for Transition of Care  Outcome: Progressing     Problem: Wound  Goal: Optimal Coping  Outcome: Progressing  Goal: Optimal Functional Ability  Outcome: Progressing  Goal: Absence of Infection Signs and Symptoms  Outcome: Progressing  Goal: Improved Oral Intake  Outcome: Progressing  Goal: Optimal Pain Control and Function  Outcome: Progressing  Goal: Skin Health and Integrity  Outcome: Progressing  Goal: Optimal Wound Healing  Outcome: Progressing     Problem: Skin Injury Risk Increased  Goal: Skin Health and Integrity  Outcome: Progressing   Plan of care reviewed with patient/daughter. All fall precautions maintained. Bed in lowest position, call light within reach, slip resistant socks maintained. Bed alarm on.

## 2025-03-03 NOTE — ASSESSMENT & PLAN NOTE
Orthopedic surgery consult  P.r.n. analgesics  PT/OT  Status post left hip repair  IPR placement

## 2025-03-03 NOTE — PLAN OF CARE
Met with pt and daughter in law at bedside to discuss PT/OT recommendations of rehab, explained rehab vs SNF and gave list of choices. Daughter in law would like to discuss with pt's son before making any decisions, states she will know by in the morning.      03/03/25 1229   Discharge Reassessment   Assessment Type Discharge Planning Reassessment   Did the patient's condition or plan change since previous assessment? Yes   Discharge Plan discussed with: Adult children;Patient   Communicated IAIN with patient/caregiver Yes   Discharge Plan A Rehab   Discharge Plan B Skilled Nursing Facility   DME Needed Upon Discharge  none   Transition of Care Barriers None   Why the patient remains in the hospital Requires continued medical care   Post-Acute Status   Post-Acute Authorization Placement   Post-Acute Placement Status Patient List Provided

## 2025-03-03 NOTE — PROGRESS NOTES
Former[facility], [facility] Medicine  Progress Note    Patient Name: Ariana Tiwari  MRN: [id number]  Patient Class: IP- Inpatient   Admission Date: 2/28/2025  Length of Stay: 3 days  Attending Physician: Suzie Tomas MD  Primary Care Provider: Nba Petty MD        Subjective     Principal Problem:Closed left hip fracture        HPI:  Ariana Tiwari 79 y.o.female with a past medical history significant for hypothyroidism, Parkinson's, hernia, seizures, and stomach ulcers.  Patient presented to the emergency department status post witnessed fall at home.  Patient stated that she was attempting to get her walker in a chair was in the way and she fell to the ground.  Patient denies loss of consciousness or head trauma.  Patient denies fever, chills nausea, vomiting.  There are no alleviating factors and pain is aggravated by movement.  Patient denies any recent travel or sick visits.  Patient denies use of blood thinners. Patient denies smoking or use of alcohol.  Patient lives at home with her son and daughter-in-law.  Emergency department workup included:  CBC unremarkable, CMP unremarkable, Mg-1.9.  X-ray left hip revealed mildly displaced transverse fracture of the left femoral neck.  Orthopedic surgeon was consulted.  Patient will be admitted to Hospital Medicine for management and evaluation.       Overview/Hospital Course:  Ariana Tiwari is an 79 year old female w/ PMH hypothyroidism, Parkinson's, hernia, seizures, and stomach ulcers. She was admitted to the hospital for a left femoral neck displace fracture s/p mechanical fall. Orthopedic surgery was consulted and she underwent a hip arthroplasty 3/1. PT/OT were consulted. Case management consulted to assist with discharge planning.    Interval History:   Patient seen and examined.  NAD.  Sitting up in chair after working with PT/OT.  Pending IPR placement  Review of Systems   Constitutional:  Positive for activity change.  Negative for appetite change, chills, fatigue and fever.   Respiratory: Negative.     Cardiovascular: Negative.    Gastrointestinal: Negative.    Genitourinary: Negative.    Musculoskeletal:  Positive for arthralgias (Improved since surgery) and gait problem.   Neurological:  Positive for weakness (Generalized). Negative for dizziness, tremors, seizures, syncope, facial asymmetry, speech difficulty, light-headedness, numbness and headaches.   Psychiatric/Behavioral: Negative.       Objective:     Vital Signs (Most Recent):  Temp: 97.7 °F (36.5 °C) (03/03/25 1111)  Pulse: 99 (03/03/25 1111)  Resp: 18 (03/03/25 1111)  BP: (!) 102/59 (03/03/25 1111)  SpO2: (!) 94 % (03/03/25 1111) Vital Signs (24h Range):  Temp:  [97.7 °F (36.5 °C)-99 °F (37.2 °C)] 97.7 °F (36.5 °C)  Pulse:  [78-99] 99  Resp:  [16-20] 18  SpO2:  [92 %-96 %] 94 %  BP: (102-119)/(55-72) 102/59     Weight: 52.8 kg (116 lb 6.5 oz)  Body mass index is 20.62 kg/m².    Intake/Output Summary (Last 24 hours) at 3/3/2025 1421  Last data filed at 3/3/2025 0533  Gross per 24 hour   Intake 240 ml   Output 750 ml   Net -510 ml         Physical Exam  Vitals and nursing note reviewed.   Constitutional:       Appearance: She is ill-appearing (chronically).   Cardiovascular:      Rate and Rhythm: Normal rate.      Pulses: Normal pulses.      Heart sounds: Normal heart sounds.   Pulmonary:      Effort: Pulmonary effort is normal. No respiratory distress.      Breath sounds: Normal breath sounds.   Abdominal:      General: Bowel sounds are normal. There is distension (baseline extended 2/2 hernia).   Musculoskeletal:         General: Tenderness (left hip) present.      Right lower leg: No edema.      Left lower leg: No edema.   Skin:     General: Skin is warm and dry.      Comments: Left hip surgical incision   Neurological:      Mental Status: She is alert and oriented to person, place, and time.      Gait: Gait abnormal.   Psychiatric:         Mood and Affect: Mood  normal.         Behavior: Behavior normal.         Thought Content: Thought content normal.         Judgment: Judgment normal.             Significant Labs: All pertinent labs within the past 24 hours have been reviewed.  CBC:   Recent Labs   Lab 03/02/25 0437 03/03/25 0441   WBC 6.63 5.76   HGB 11.1* 11.1*   HCT 33.7* 32.8*   * 119*     CMP:   Recent Labs   Lab 03/02/25 0437 03/03/25 0441    136   K 4.0 3.5    102   CO2 21* 24   * 92   BUN 14 13   CREATININE 0.5 0.5   CALCIUM 8.4* 8.0*   PROT 6.3 6.5   ALBUMIN 3.7 3.5   BILITOT 0.8 0.8   ALKPHOS 66 65   AST 21 20   ALT 21 16   ANIONGAP 10 10     Magnesium:   Recent Labs   Lab 03/02/25 0437 03/03/25 0441   MG 1.7 1.9       Significant Imaging: I have reviewed all pertinent imaging results/findings within the past 24 hours.  Imaging Results              X-Ray Chest 1 View (Final result)  Result time 02/28/25 14:41:09      Final result by Jadiel Martinez Jr., MD (02/28/25 14:41:09)                   Impression:      No acute abnormality.      Electronically signed by: Jadiel Martinez MD  Date:    02/28/2025  Time:    14:41               Narrative:    EXAMINATION:  XR CHEST 1 VIEW    CLINICAL HISTORY:  Encounter for other preprocedural examination    TECHNIQUE:  Single frontal view of the chest was performed.    COMPARISON:  Chest x-ray of September 13, 2023    FINDINGS:  The lungs are clear, with normal appearance of pulmonary vasculature and no pleural effusion or pneumothorax.    The cardiac silhouette is normal in size. The hilar and mediastinal contours are unremarkable.    Bones are intact.                                        X-Ray Hip 2 or 3 views Left with Pelvis when performed (Final result)  Result time 02/28/25 12:09:11      Final result by Jadiel Martinez Jr., MD (02/28/25 12:09:11)                   Impression:      Mildly displaced transverse fracture of the left femoral neck.    This report was flagged in Epic  as abnormal.      Electronically signed by: Jadiel Martinez MD  Date:    02/28/2025  Time:    12:09               Narrative:    EXAMINATION:  XR HIP WITH PELVIS WHEN PERFORMED 2 OR 3 VIEWS LEFT    CLINICAL HISTORY:  Left lower quadrant pain    TECHNIQUE:  AP view of the pelvis and frog leg lateral view of the left hip were performed.    COMPARISON:  None    FINDINGS:  There is transverse fracture of the left femoral neck with very mild shift of the femur in relation to the femoral head.  The femoral head remains in the acetabulum.  The bones of the pelvis and right hip are intact..                                       Assessment and Plan     * Closed left hip fracture  Orthopedic surgery consult  P.r.n. analgesics  PT/OT  Status post left hip repair  IPR placement      Gluten free diet  Noted      Seizure  Chronic.  Noted.    Continue home medications      Recurrent intestinal obstruction  Chronic.  Noted.  No acute problem      PD (Parkinson's disease)  Chronic.  Noted.    Continue home medications      Hypothyroidism  Patient has chronic hypothyroidism. TFTs reviewed-   Lab Results   Component Value Date    TSH 2.518 09/15/2024   . Will continue chronic levothyroxine and adjust for and clinical changes.          VTE Risk Mitigation (From admission, onward)           Ordered     enoxaparin injection 40 mg  Daily         02/28/25 1308     IP VTE HIGH RISK PATIENT  Once         02/28/25 1308     Place sequential compression device  Until discontinued         02/28/25 1308                    Discharge Planning   IAIN: 3/4/2025     Code Status: Full Code   Medical Readiness for Discharge Date:   Discharge Plan A: Rehab                Please place Justification for ASHER Duval NP  Department of Hospital Medicine   [facility]/Surg

## 2025-03-03 NOTE — PLAN OF CARE
Problem: Adult Inpatient Plan of Care  Goal: Plan of Care Review  Outcome: Progressing  Goal: Patient-Specific Goal (Individualized)  Outcome: Progressing  Goal: Absence of Hospital-Acquired Illness or Injury  Outcome: Progressing  Goal: Optimal Comfort and Wellbeing  Outcome: Progressing  Goal: Readiness for Transition of Care  Outcome: Progressing     Problem: Wound  Goal: Optimal Coping  Outcome: Progressing  Goal: Optimal Functional Ability  Outcome: Progressing  Goal: Absence of Infection Signs and Symptoms  Outcome: Progressing  Goal: Improved Oral Intake  Outcome: Progressing  Goal: Optimal Pain Control and Function  Outcome: Progressing  Goal: Skin Health and Integrity  Outcome: Progressing  Goal: Optimal Wound Healing  Outcome: Progressing     Problem: Skin Injury Risk Increased  Goal: Skin Health and Integrity  Outcome: Progressing     Problem: Fall Injury Risk  Goal: Absence of Fall and Fall-Related Injury  Outcome: Progressing     Problem: Fatigue  Goal: Improved Activity Tolerance  Outcome: Progressing     Problem: Pain Acute  Goal: Optimal Pain Control and Function  Outcome: Progressing   Telemetry--8640--SR. R wrist SL intact. Voiding via purewick--urine tea color. Sacral & B heels foam intact. L Hip inc with [place] drsg D/I. Up with walker & CGA x1. Sat in recliner for about 2 hours today. Passing gas. CXR done. Eye drops ordered for dry eyes. Nicole Norco 5mg for pain. Family supportive. Will monitor. Pleasant.

## 2025-03-03 NOTE — CONSULTS
[facility]/Surg  Wound Care    Patient Name:  Ariana Tiwari   MRN:  [id number]  Date: 3/3/2025  Diagnosis: Closed left hip fracture    History:     Past Medical History:   Diagnosis Date    ACP (advance care planning) 9/15/2024    Age-related osteoporosis without current pathological fracture 05/16/2024    Allergy     Diabetes 2/28/2025    Diverticulosis     Gluten enteropathy     Gluten intolerance     Hernia     Hypothyroidism     PD (Parkinson's disease) 09/16/2015    Right tremor type    Peptic ulcer disease     Right shoulder pain     Cirtisone injection 3/26/15 - Dr. Tolbert    SBO (small bowel obstruction) 09/17/2019    Seizures     Squamous cell carcinoma of skin     Ulcer        Social History[1]    Precautions:     Allergies as of 02/28/2025 - Reviewed 02/28/2025   Allergen Reaction Noted    Bactrim [sulfamethoxazole-trimethoprim] Shortness Of Breath 09/15/2024    Gluten Diarrhea 03/01/2012    Phenergan [promethazine] Hallucinations 09/15/2024       WOC Assessment Details/Treatment     Pt seen by Wound Care for consultation for Left elbow, left knee, left hip.  Skin assessment performed.  Bilateral heels with blanchable redness and mild bogginess.  Pt denies soreness or pain in heels.  Left knee noted with skin tear.  75% skin flap present.  Wound base with red granulation tissue no active drainage noted.  No s/s infection noted.  Left hand skin tear noted with red/maroon discoloration.  Skin intact.  No drainage noted.  No s/s infection.  Both wounds cleaned and applied Xerofoam dressing with foam dressing.  Left hip noted with surgical dressing in place with small breakthrough drainage noted.  Bi'ateral elbows intact, applied foam dressing for protection.  Sacrum/buttocks noted with non-blanchable redness/maroon.  Intact.  Moist.  Cleaned and applied Triad cream.  Pt was repositioned on right side with pillows for offloading.  Encourage frequent position changes and turning q2 hr.  Air  pump to mattress on Isoflex position for MICHAEL feature.       03/03/25 0950   WOCN Assessment   WOCN Total Time (mins) 50   Visit Date 03/03/25   Visit Time 0950   Consult Type New   WOCN Speciality Wound   Wound surgical;skin tear   Intervention assessed;changed;applied;chart review   Teaching on-going        Wound 03/01/25 0830 Incision Left lateral Hip   Date First Assessed/Time First Assessed: 03/01/25 0830   Present on Original Admission: No  Primary Wound Type: Incision  Side: Left  Orientation: lateral  Location: Hip  Closure Method: Sutures  Additional Comments: Silver dressing   Wound Image    Dressing Appearance Intact;Dried drainage   Drainage Amount Small   Drainage Characteristics/Odor No odor   Appearance Dressing in place, unable to visualize        Wound 03/03/25 0950 Skin Tear Left anterior Knee   Date First Assessed/Time First Assessed: 03/03/25 0950   Primary Wound Type: Skin Tear  Side: Left  Orientation: anterior  Location: Knee   Wound Image    Dressing Appearance Intact;Moist drainage   Drainage Amount Small   Drainage Characteristics/Odor Serosanguineous   Appearance Red;Maroon;Moist   Periwound Area Excoriated;Maroon;Moist;Redness   Wound Edges Irregular   Care Cleansed with:;Wound cleanser   Dressing Non-adherent;Foam  (Xerofoam, Foam dressing)   Periwound Care Cleansed with pH balanced cleanser;Dry periwound area maintained;Topical treatment applied        Wound 03/03/25 0950 Skin Tear Left posterior Hand   Date First Assessed/Time First Assessed: 03/03/25 0950   Primary Wound Type: Skin Tear  Side: Left  Orientation: posterior  Location: Hand   Wound Image    Dressing Appearance Open to air   Drainage Amount None   Drainage Characteristics/Odor No odor   Appearance Pink;Red;Maroon;Moist;Ecchymotic   Tissue loss description Partial thickness   Periwound Area Ecchymotic;Excoriated;Maroon;Moist;Redness   Wound Edges Irregular   Care Cleansed with:;Wound cleanser   Dressing  Non-adherent;Foam  (Xerofoam, foam dressing)        Wound 03/03/25 0950 Pressure Injury Sacral spine   Date First Assessed/Time First Assessed: 03/03/25 0950   Present on Original Admission: No  Primary Wound Type: Pressure Injury  Location: Sacral spine   Wound Image    Pressure Injury Stage 1   Dressing Appearance Open to air   Drainage Amount None   Drainage Characteristics/Odor No odor   Appearance Red;Maroon;Dry;Moist;Ecchymotic   Periwound Area Ecchymotic;Excoriated;Maroon;Moist;Redness   Wound Edges Undefined   Care Cleansed with:;Soap and water   Dressing Applied  (Triad cream)   Periwound Care Cleansed with pH balanced cleanser;Dry periwound area maintained;Moisture barrier applied       Recommendations made to primary team:  Sacrum/Buttocks--Stage 1 pressure injury--clean area with soap and water.  Pat dry.  Apply Triad cream BID and PRN.  Air pump to mattress for MICHAEL.  Turn and reposition in bed q2 hr.      Left Knee and Left Hand--Skin Tear--clean wounds with wound cleanser.  Apply Xerofoam to wounds.  Cover with Foam dressing.  Change dressing Weekly and PRN soilage.      Bilateral Heels and Bilateral Elbows--apply Heel foam dressings to heels and small foam dressing to elbows for protection.  Change weekly or PRN.    03/03/2025         [1]   Social History  Socioeconomic History    Marital status:    Tobacco Use    Smoking status: Never    Smokeless tobacco: Never   Substance and Sexual Activity    Alcohol use: Yes     Alcohol/week: 11.7 standard drinks of alcohol     Types: 14 Standard drinks or equivalent per week     Comment: 2 glasses wine per dinner    Drug use: No    Sexual activity: Not Currently     Social Drivers of Health     Financial Resource Strain: Patient Declined (2/28/2025)    Overall Financial Resource Strain (CARDIA)     Difficulty of Paying Living Expenses: Patient declined   Food Insecurity: Patient Declined (2/28/2025)    Hunger Vital Sign     Worried About Running Out of  Food in the Last Year: Patient declined     Ran Out of Food in the Last Year: Patient declined   Transportation Needs: No Transportation Needs (9/15/2024)    TRANSPORTATION NEEDS     Transportation : No   Physical Activity: Inactive (9/15/2024)    Exercise Vital Sign     Days of Exercise per Week: 0 days     Minutes of Exercise per Session: 0 min   Stress: Patient Declined (2/28/2025)    [employer] - Occupational Stress Questionnaire     Feeling of Stress : Patient declined   Housing Stability: Patient Declined (2/28/2025)    Housing Stability Vital Sign     Unable to Pay for Housing in the Last Year: Patient declined     Homeless in the Last Year: Patient declined

## 2025-03-03 NOTE — SUBJECTIVE & OBJECTIVE
Interval History:   Patient seen and examined.  NAD.  Sitting up in chair after working with PT/OT.  Pending IPR placement  Review of Systems   Constitutional:  Positive for activity change. Negative for appetite change, chills, fatigue and fever.   Respiratory: Negative.     Cardiovascular: Negative.    Gastrointestinal: Negative.    Genitourinary: Negative.    Musculoskeletal:  Positive for arthralgias (Improved since surgery) and gait problem.   Neurological:  Positive for weakness (Generalized). Negative for dizziness, tremors, seizures, syncope, facial asymmetry, speech difficulty, light-headedness, numbness and headaches.   Psychiatric/Behavioral: Negative.       Objective:     Vital Signs (Most Recent):  Temp: 97.7 °F (36.5 °C) (03/03/25 1111)  Pulse: 99 (03/03/25 1111)  Resp: 18 (03/03/25 1111)  BP: (!) 102/59 (03/03/25 1111)  SpO2: (!) 94 % (03/03/25 1111) Vital Signs (24h Range):  Temp:  [97.7 °F (36.5 °C)-99 °F (37.2 °C)] 97.7 °F (36.5 °C)  Pulse:  [78-99] 99  Resp:  [16-20] 18  SpO2:  [92 %-96 %] 94 %  BP: (102-119)/(55-72) 102/59     Weight: 52.8 kg (116 lb 6.5 oz)  Body mass index is 20.62 kg/m².    Intake/Output Summary (Last 24 hours) at 3/3/2025 1421  Last data filed at 3/3/2025 0533  Gross per 24 hour   Intake 240 ml   Output 750 ml   Net -510 ml         Physical Exam  Vitals and nursing note reviewed.   Constitutional:       Appearance: She is ill-appearing (chronically).   Cardiovascular:      Rate and Rhythm: Normal rate.      Pulses: Normal pulses.      Heart sounds: Normal heart sounds.   Pulmonary:      Effort: Pulmonary effort is normal. No respiratory distress.      Breath sounds: Normal breath sounds.   Abdominal:      General: Bowel sounds are normal. There is distension (baseline extended 2/2 hernia).   Musculoskeletal:         General: Tenderness (left hip) present.      Right lower leg: No edema.      Left lower leg: No edema.   Skin:     General: Skin is warm and dry.      Comments:  Left hip surgical incision   Neurological:      Mental Status: She is alert and oriented to person, place, and time.      Gait: Gait abnormal.   Psychiatric:         Mood and Affect: Mood normal.         Behavior: Behavior normal.         Thought Content: Thought content normal.         Judgment: Judgment normal.             Significant Labs: All pertinent labs within the past 24 hours have been reviewed.  CBC:   Recent Labs   Lab 03/02/25 0437 03/03/25 0441   WBC 6.63 5.76   HGB 11.1* 11.1*   HCT 33.7* 32.8*   * 119*     CMP:   Recent Labs   Lab 03/02/25 0437 03/03/25 0441    136   K 4.0 3.5    102   CO2 21* 24   * 92   BUN 14 13   CREATININE 0.5 0.5   CALCIUM 8.4* 8.0*   PROT 6.3 6.5   ALBUMIN 3.7 3.5   BILITOT 0.8 0.8   ALKPHOS 66 65   AST 21 20   ALT 21 16   ANIONGAP 10 10     Magnesium:   Recent Labs   Lab 03/02/25 0437 03/03/25 0441   MG 1.7 1.9       Significant Imaging: I have reviewed all pertinent imaging results/findings within the past 24 hours.  Imaging Results              X-Ray Chest 1 View (Final result)  Result time 02/28/25 14:41:09      Final result by Jadiel Martinez Jr., MD (02/28/25 14:41:09)                   Impression:      No acute abnormality.      Electronically signed by: Jadiel Martinez MD  Date:    02/28/2025  Time:    14:41               Narrative:    EXAMINATION:  XR CHEST 1 VIEW    CLINICAL HISTORY:  Encounter for other preprocedural examination    TECHNIQUE:  Single frontal view of the chest was performed.    COMPARISON:  Chest x-ray of September 13, 2023    FINDINGS:  The lungs are clear, with normal appearance of pulmonary vasculature and no pleural effusion or pneumothorax.    The cardiac silhouette is normal in size. The hilar and mediastinal contours are unremarkable.    Bones are intact.                                        X-Ray Hip 2 or 3 views Left with Pelvis when performed (Final result)  Result time 02/28/25 12:09:11      Final  result by Jadiel Martinez Jr., MD (02/28/25 12:09:11)                   Impression:      Mildly displaced transverse fracture of the left femoral neck.    This report was flagged in Epic as abnormal.      Electronically signed by: Jadeil Martinez MD  Date:    02/28/2025  Time:    12:09               Narrative:    EXAMINATION:  XR HIP WITH PELVIS WHEN PERFORMED 2 OR 3 VIEWS LEFT    CLINICAL HISTORY:  Left lower quadrant pain    TECHNIQUE:  AP view of the pelvis and frog leg lateral view of the left hip were performed.    COMPARISON:  None    FINDINGS:  There is transverse fracture of the left femoral neck with very mild shift of the femur in relation to the femoral head.  The femoral head remains in the acetabulum.  The bones of the pelvis and right hip are intact..

## 2025-03-03 NOTE — PT/OT/SLP PROGRESS
Physical Therapy Treatment    Patient Name:  Ariana Tiwari   MRN:  [id number]    Recommendations:     Discharge Recommendations: High Intensity Therapy  Discharge Equipment Recommendations: none  Barriers to discharge: None    Assessment:     Ariana Tiwari is a 79 y.o. female admitted with a medical diagnosis of Closed left hip fracture.  She presents with the following impairments/functional limitations: weakness, impaired endurance, impaired self care skills, impaired functional mobility, gait instability, impaired balance, decreased upper extremity function, decreased lower extremity function, pain, decreased ROM, impaired cardiopulmonary response to activity, orthopedic precautions .  2 attempts required , eating , daughter present,  1st attempt. Agreed at this time , found with ABD pillow.  Sup > sit with A x 2 , very slow increments due to pain with movement. Tremors noted with limited use B hands. Total A to weight shift and scoot forward to feet flat.  Nurse arrived with meds, patient with flexed neck.  Sit > stand with A x 2 ( 2 trials ) with rw , minimal weight LLE.  Performed squat pivot bed> chair  A x 2.  Reviewed hip precautions with patient and daughter throughout session. ABD pillow in place.     Rehab Prognosis: Fair; patient would benefit from acute skilled PT services to address these deficits and reach maximum level of function.    Recent Surgery: Procedure(s) (LRB):  ARTHROPLASTY, HIP (Left) 2 Days Post-Op    Plan:     During this hospitalization, patient to be seen daily to address the identified rehab impairments via gait training, therapeutic activities, therapeutic exercises and progress toward the following goals:    Plan of Care Expires:  03/30/25    Subjective     Chief Complaint: pain LLE  Patient/Family Comments/goals: none stated  Pain/Comfort:  Pain Rating 1: other (see comments) (did not rate)  Location - Side 1: Left  Location - Orientation 1: generalized  Location 1: hip  Pain  Addressed 1: Reposition, Nurse notified      Objective:     Communicated with nurse Coronel prior to session.  Patient found supine with bed alarm, oxygen, hip abduction pillow, SCD, PureWick upon PT entry to room.     General Precautions: Standard, fall, seizure  Orthopedic Precautions: LLE weight bearing as tolerated, LLE posterior precautions  Braces: N/A  Respiratory Status:  oxymask     Functional Mobility:  Bed Mobility:     Supine to Sit: maximal assistance and of 2 persons  Transfers:     Bed to Chair: total assistance and of 2 persons with  no AD  using  Squat Pivot      AM-PAC 6 CLICK MOBILITY          Treatment & Education:  Sup > sit with A x 2  very slowly.  Sit <> stand with rw and A x 2 ( 2 trials ) , minimal weight LLE due to pain .  Squat pivot bed > chair, total A.      Patient left up in chair with all lines intact, call button in reach, chair alarm on, nurse Berna notified, and daughter present..    GOALS:   Multidisciplinary Problems       Physical Therapy Goals          Problem: Physical Therapy    Goal Priority Disciplines Outcome Interventions   Physical Therapy Goal     PT, PT/OT Progressing    Description: Goals to be met by: 2025     Patient will increase functional independence with mobility by performin. Supine to sit with Moderate Assistance  2. Sit to stand transfer with Moderate Assistance  3. Bed to chair transfer with Moderate Assistance using Rolling Walker  4. Gait  x 10 feet with Moderate Assistance using Rolling Walker.   5. Lower extremity exercise program x20 reps    Post L RITA 2025/WBAT with posterior precautions/abd pillow                         DME Justifications:  No DME recommended requiring DME justifications    Time Tracking:     PT Received On: 25  PT Start Time: 0850     PT Stop Time: 0918  PT Total Time (min): 28 min     Billable Minutes: Therapeutic Activity 28min    Treatment Type: Treatment  PT/PTA: PTA     Number of PTA visits since last  PT visit: 1 03/03/2025

## 2025-03-03 NOTE — PT/OT/SLP PROGRESS
Occupational Therapy      Patient Name:  Ariana Kramer Te   MRN:  [id number]    Patient not seen today secondary to Other (Comment) (Spent 11:43-11:57 with pt/daughter looking at self feeding. Returned at 2:25pm to continue Eval and pt was sleeping/daughter wanted her to rest.). Will follow-up.    3/3/2025

## 2025-03-03 NOTE — RESPIRATORY THERAPY
03/03/25 0811   Patient Assessment/Suction   Level of Consciousness (AVPU) responds to voice   Respiratory Effort Unlabored;Shallow   Expansion/Accessory Muscles/Retractions expansion symmetric;no retractions;no use of accessory muscles   All Lung Fields Breath Sounds Anterior:;Lateral:;diminished   Rhythm/Pattern, Respiratory depth regular;pattern regular;unlabored   Cough Frequency infrequent   Cough Type weak;nonproductive   Skin Integrity   $ Wound Care Tech Time 15 min   Area Observed Left;Right;Behind ear;Cheek;Bridge of nose   Skin Appearance without discoloration   PRE-TX-O2   Device (Oxygen Therapy) other (comment)  (oxymask)   $ Is the patient on Low Flow Oxygen? Yes   Flow (L/min) (Oxygen Therapy) (S)  4  (weaned from 5L)   SpO2 96 %   Pulse Oximetry Type Intermittent   $ Pulse Oximetry - Multiple Charge Pulse Oximetry - Multiple   Pulse 79   Resp 16   Positioning HOB elevated 45 degrees   Incentive Spirometer   $ Incentive Spirometer Charges unable to perform   Administration (IS) unable to perform

## 2025-03-04 PROBLEM — R09.02 HYPOXIA: Status: ACTIVE | Noted: 2025-03-04

## 2025-03-04 LAB
ANION GAP SERPL CALC-SCNC: 11 MMOL/L (ref 8–16)
BASOPHILS # BLD AUTO: 0.01 K/UL (ref 0–0.2)
BASOPHILS NFR BLD: 0.3 % (ref 0–1.9)
BUN SERPL-MCNC: 12 MG/DL (ref 8–23)
CALCIUM SERPL-MCNC: 7.9 MG/DL (ref 8.7–10.5)
CHLORIDE SERPL-SCNC: 103 MMOL/L (ref 95–110)
CO2 SERPL-SCNC: 23 MMOL/L (ref 23–29)
CREAT SERPL-MCNC: 0.5 MG/DL (ref 0.5–1.4)
DIFFERENTIAL METHOD BLD: ABNORMAL
EOSINOPHIL # BLD AUTO: 0.1 K/UL (ref 0–0.5)
EOSINOPHIL NFR BLD: 2.6 % (ref 0–8)
ERYTHROCYTE [DISTWIDTH] IN BLOOD BY AUTOMATED COUNT: 12.7 % (ref 11.5–14.5)
EST. GFR  (NO RACE VARIABLE): >60 ML/MIN/1.73 M^2
GLUCOSE SERPL-MCNC: 178 MG/DL (ref 70–110)
HCT VFR BLD AUTO: 33 % (ref 37–48.5)
HGB BLD-MCNC: 11 G/DL (ref 12–16)
IMM GRANULOCYTES # BLD AUTO: 0.01 K/UL (ref 0–0.04)
IMM GRANULOCYTES NFR BLD AUTO: 0.3 % (ref 0–0.5)
LYMPHOCYTES # BLD AUTO: 0.7 K/UL (ref 1–4.8)
LYMPHOCYTES NFR BLD: 17.7 % (ref 18–48)
MCH RBC QN AUTO: 31.3 PG (ref 27–31)
MCHC RBC AUTO-ENTMCNC: 33.3 G/DL (ref 32–36)
MCV RBC AUTO: 94 FL (ref 82–98)
MONOCYTES # BLD AUTO: 0.4 K/UL (ref 0.3–1)
MONOCYTES NFR BLD: 9.3 % (ref 4–15)
NEUTROPHILS # BLD AUTO: 2.7 K/UL (ref 1.8–7.7)
NEUTROPHILS NFR BLD: 69.8 % (ref 38–73)
NRBC BLD-RTO: 0 /100 WBC
PLATELET # BLD AUTO: 146 K/UL (ref 150–450)
PMV BLD AUTO: 10.5 FL (ref 9.2–12.9)
POTASSIUM SERPL-SCNC: 3.5 MMOL/L (ref 3.5–5.1)
PROCALCITONIN SERPL IA-MCNC: 0.03 NG/ML (ref 0–0.5)
RBC # BLD AUTO: 3.52 M/UL (ref 4–5.4)
SODIUM SERPL-SCNC: 137 MMOL/L (ref 136–145)
WBC # BLD AUTO: 3.89 K/UL (ref 3.9–12.7)

## 2025-03-04 PROCEDURE — 11000001 HC ACUTE MED/SURG PRIVATE ROOM

## 2025-03-04 PROCEDURE — 97530 THERAPEUTIC ACTIVITIES: CPT | Mod: CQ

## 2025-03-04 PROCEDURE — 97535 SELF CARE MNGMENT TRAINING: CPT

## 2025-03-04 PROCEDURE — 84145 PROCALCITONIN (PCT): CPT | Performed by: NURSE PRACTITIONER

## 2025-03-04 PROCEDURE — 36415 COLL VENOUS BLD VENIPUNCTURE: CPT | Performed by: NURSE PRACTITIONER

## 2025-03-04 PROCEDURE — 99900031 HC PATIENT EDUCATION (STAT)

## 2025-03-04 PROCEDURE — 85025 COMPLETE CBC W/AUTO DIFF WBC: CPT | Performed by: NURSE PRACTITIONER

## 2025-03-04 PROCEDURE — 99900035 HC TECH TIME PER 15 MIN (STAT)

## 2025-03-04 PROCEDURE — 27000221 HC OXYGEN, UP TO 24 HOURS

## 2025-03-04 PROCEDURE — 25000003 PHARM REV CODE 250

## 2025-03-04 PROCEDURE — 94761 N-INVAS EAR/PLS OXIMETRY MLT: CPT

## 2025-03-04 PROCEDURE — 97165 OT EVAL LOW COMPLEX 30 MIN: CPT

## 2025-03-04 PROCEDURE — 80048 BASIC METABOLIC PNL TOTAL CA: CPT | Performed by: NURSE PRACTITIONER

## 2025-03-04 PROCEDURE — 94799 UNLISTED PULMONARY SVC/PX: CPT

## 2025-03-04 PROCEDURE — 63600175 PHARM REV CODE 636 W HCPCS

## 2025-03-04 RX ADMIN — HYDROCODONE BITARTRATE AND ACETAMINOPHEN 1 TABLET: 5; 325 TABLET ORAL at 03:03

## 2025-03-04 RX ADMIN — POLYETHYLENE GLYCOL (3350) 17 G: 17 POWDER, FOR SOLUTION ORAL at 08:03

## 2025-03-04 RX ADMIN — DICLOFENAC SODIUM 2 G: 10 GEL TOPICAL at 08:03

## 2025-03-04 RX ADMIN — POTASSIUM BICARBONATE 50 MEQ: 977.5 TABLET, EFFERVESCENT ORAL at 03:03

## 2025-03-04 RX ADMIN — CYANOCOBALAMIN TAB 250 MCG 250 MCG: 250 TAB at 09:03

## 2025-03-04 RX ADMIN — ENOXAPARIN SODIUM 40 MG: 40 INJECTION SUBCUTANEOUS at 05:03

## 2025-03-04 RX ADMIN — Medication 2000 UNITS: at 09:03

## 2025-03-04 RX ADMIN — LEVOTHYROXINE SODIUM 62.5 MCG: 25 TABLET ORAL at 06:03

## 2025-03-04 RX ADMIN — POLYETHYLENE GLYCOL (3350) 17 G: 17 POWDER, FOR SOLUTION ORAL at 09:03

## 2025-03-04 RX ADMIN — LEVETIRACETAM 500 MG: 500 TABLET, FILM COATED ORAL at 09:03

## 2025-03-04 RX ADMIN — DICLOFENAC SODIUM 2 G: 10 GEL TOPICAL at 09:03

## 2025-03-04 RX ADMIN — HYPROMELLOSE 2910 1 DROP: 5 SOLUTION/ DROPS OPHTHALMIC at 09:03

## 2025-03-04 RX ADMIN — LEVETIRACETAM 500 MG: 500 TABLET, FILM COATED ORAL at 08:03

## 2025-03-04 RX ADMIN — POTASSIUM & SODIUM PHOSPHATES POWDER PACK 280-160-250 MG 2 PACKET: 280-160-250 PACK at 09:03

## 2025-03-04 RX ADMIN — HYDROCODONE BITARTRATE AND ACETAMINOPHEN 1 TABLET: 5; 325 TABLET ORAL at 09:03

## 2025-03-04 NOTE — PT/OT/SLP PROGRESS
"Physical Therapy Treatment    Patient Name:  Ariana Tiwari   MRN:  [id number]    Recommendations:     Discharge Recommendations: High Intensity Therapy  Discharge Equipment Recommendations: none  Barriers to discharge:  Medical status    Assessment:     Ariana Tiwari is a 79 y.o. female admitted with a medical diagnosis of Closed left hip fracture.  She presents with the following impairments/functional limitations: weakness, impaired endurance, impaired self care skills, impaired functional mobility, gait instability, impaired balance, decreased lower extremity function, decreased safety awareness, pain, impaired cardiopulmonary response to activity, orthopedic precautions, decreased ROM . Pt seen with HOB elevated and agreeable to PT. Per pt and RN, the pt did not sleep well last night. She required max x2 person assist from supine to sit and increased time due to antalgic L LE. Once seated eob the pt displayed scoliotic posture with increased cervical flexion, requiring VI/TI for correction. Pt sitting balance improved with time eob and VI. Sit to stand t/f completed rw max x2 person assist with VI/TI for improved hip extension and postural correction. Pt was able to maintain static standing for ~30" prior to requiring a seated rest. Squat pivot completed to bed side chair max x2 person assist with VI for sufficient anterior weight shift over COG.     Rehab Prognosis: Fair; patient would benefit from acute skilled PT services to address these deficits and reach maximum level of function.    Recent Surgery: Procedure(s) (LRB):  ARTHROPLASTY, HIP (Left) 3 Days Post-Op    Plan:     During this hospitalization, patient to be seen daily to address the identified rehab impairments via gait training, therapeutic activities, therapeutic exercises and progress toward the following goals:    Plan of Care Expires:  03/30/25    Subjective     Chief Complaint: L hip pain  Patient/Family Comments/goals: Son requests pt to " stay another night in the hospital  Pain/Comfort:  Pain Rating 1: other (see comments) (Unsure on pain scale, but requests pain meds)  Location - Side 1: Left  Location - Orientation 1: generalized  Location 1: hip  Pain Addressed 1: Pre-medicate for activity, Nurse notified, Cessation of Activity  Pain Rating Post-Intervention 1: other (see comments)      Objective:     Communicated with RN prior to session.  Patient found HOB elevated with bed alarm, oxygen, PureWick, peripheral IV, telemetry, hip abduction pillow upon PT entry to room.     General Precautions: Standard, fall, seizure  Orthopedic Precautions: LLE weight bearing as tolerated, LLE posterior precautions  Braces: N/A  Respiratory Status:  Oxymask 2L     Functional Mobility:  Bed Mobility:     Supine to Sit: maximal assistance and of 2 persons  Transfers:     Sit to Stand:  maximal assistance and of 2 persons with rolling walker  Bed to Chair: maximal assistance and of 2 persons with  hand-held assist  using  Squat Pivot  Balance: Fair- sitting balance, improved to fair with VI/TI for head positioning / Poor static standing balance      AM-PAC 6 CLICK MOBILITY          Treatment & Education:  Pt was educated on the following: call light use, importance of OOB activity and functional mobility to negate the negative effects of prolonged bed rest during this hospitalization, safe transfers/ambulation and discharge planning recommendations/options.     Patient left up in chair with all lines intact, call button in reach, chair alarm on, and son present..    GOALS:   Multidisciplinary Problems       Physical Therapy Goals          Problem: Physical Therapy    Goal Priority Disciplines Outcome Interventions   Physical Therapy Goal     PT, PT/OT Progressing    Description: Goals to be met by: 2025     Patient will increase functional independence with mobility by performin. Supine to sit with Moderate Assistance  2. Sit to stand transfer with  Moderate Assistance  3. Bed to chair transfer with Moderate Assistance using Rolling Walker  4. Gait  x 10 feet with Moderate Assistance using Rolling Walker.   5. Lower extremity exercise program x20 reps    Post L RITA 03-/WBAT with posterior precautions/abd pillow                             Time Tracking:     PT Received On: 03/04/25  PT Start Time: 0919     PT Stop Time: 0957  PT Total Time (min): 38 min     Billable Minutes: Therapeutic Activity 38    Treatment Type: Treatment  PT/PTA: PTA     Number of PTA visits since last PT visit: 2     03/04/2025

## 2025-03-04 NOTE — PROGRESS NOTES
Assessment/Plan:    Status Post left Total Hip Arthroplasty. Doing well postoperatively.     Continues current post-op course  WBAT  Posterior hip precautions  Dispo planning, likely rehab  DVT PPX  F/U with me in 2 weeks    Please text or call with any questions  Jaswinder Nguyen MD  Orthopedic Surgeon  [facility]  [phone]       LOS: 4 days     Subjective:  Post-Operative Day: 3 Status Post left Total Hip Arthroplasty  Systemic or Specific Complaints:No Complaints    Objective:  Vital signs in last 24 hours:  Temp:  [97.6 °F (36.4 °C)-98.5 °F (36.9 °C)] 98.1 °F (36.7 °C)  Pulse:  [] 74  Resp:  [16-18] 16  SpO2:  [94 %-97 %] 97 %  BP: (102-130)/(58-75) 130/59      General: alert, appears stated age, and cooperative   Wound: Wound clean and dry no evidence of infection.   Motion: Painless Range of Motion   DVT Exam: No evidence of DVT seen on physical exam.     Data Review  CBC:   Lab Results   Component Value Date    WBC 5.76 03/03/2025    RBC 3.56 (L) 03/03/2025    HGB 11.1 (L) 03/03/2025    HCT 32.8 (L) 03/03/2025    HCT 43 01/30/2023     (L) 03/03/2025

## 2025-03-04 NOTE — PROGRESS NOTES
[facility]  Progress Note    Patient Name: Ariana Tiwari  MRN: [id number]  Patient Class: IP- Inpatient   Admission Date: 2/28/2025  Length of Stay: 4 days  Attending Physician: Suzie Tomas MD  Primary Care Provider: Nba Petty MD        Subjective     Principal Problem:Closed left hip fracture        HPI:  Ariana Tiwari 79 y.o.female with a past medical history significant for hypothyroidism, Parkinson's, hernia, seizures, and stomach ulcers.  Patient presented to the emergency department status post witnessed fall at home.  Patient stated that she was attempting to get her walker in a chair was in the way and she fell to the ground.  Patient denies loss of consciousness or head trauma.  Patient denies fever, chills nausea, vomiting.  There are no alleviating factors and pain is aggravated by movement.  Patient denies any recent travel or sick visits.  Patient denies use of blood thinners. Patient denies smoking or use of alcohol.  Patient lives at home with her son and daughter-in-law.  Emergency department workup included:  CBC unremarkable, CMP unremarkable, Mg-1.9.  X-ray left hip revealed mildly displaced transverse fracture of the left femoral neck.  Orthopedic surgeon was consulted.  Patient will be admitted to Hospital Medicine for management and evaluation.       Overview/Hospital Course:  Ariana Tiwari is an 79 year old female w/ PMH hypothyroidism, Parkinson's, hernia, seizures, and stomach ulcers. She was admitted to the hospital for a left femoral neck displace fracture s/p mechanical fall. Orthopedic surgery was consulted and she underwent a hip arthroplasty 3/1. PT/OT were consulted. Case management consulted to assist with discharge planning.    Interval History: NAD noted and VSS. Patient still requiring 3L via oxymask (mouth breather). SPO2 check on RA 87% at rest, 94% on 3L O2. RN to try weaning down through the day.    Review of  Systems   Constitutional:  Negative for chills and fever.   HENT:  Negative for congestion and trouble swallowing.    Eyes:  Negative for visual disturbance.   Respiratory:  Negative for shortness of breath.    Cardiovascular:  Negative for chest pain.   Gastrointestinal:  Negative for abdominal pain, nausea and vomiting.   Genitourinary:  Negative for dysuria.   Musculoskeletal:  Positive for arthralgias and gait problem.   Skin:         Left hip surgical incision   Neurological:  Negative for dizziness, speech difficulty and light-headedness.   Psychiatric/Behavioral:  Negative for agitation and confusion. The patient is not nervous/anxious.      Objective:     Vital Signs (Most Recent):  Temp: 98.1 °F (36.7 °C) (03/04/25 0725)  Pulse: 71 (03/04/25 0725)  Resp: 18 (03/04/25 0939)  BP: 104/60 (03/04/25 0745)  SpO2: 96 % (03/04/25 0725) Vital Signs (24h Range):  Temp:  [97.6 °F (36.4 °C)-98.5 °F (36.9 °C)] 98.1 °F (36.7 °C)  Pulse:  [] 71  Resp:  [16-18] 18  SpO2:  [94 %-97 %] 96 %  BP: ()/(52-75) 104/60     Weight: 52.8 kg (116 lb 6.5 oz)  Body mass index is 20.62 kg/m².    Intake/Output Summary (Last 24 hours) at 3/4/2025 1013  Last data filed at 3/4/2025 0559  Gross per 24 hour   Intake 840 ml   Output 1225 ml   Net -385 ml         Physical Exam  Constitutional:       General: She is not in acute distress.     Comments: Oxymask   HENT:      Head: Normocephalic and atraumatic.      Mouth/Throat:      Mouth: Mucous membranes are moist.   Eyes:      Extraocular Movements: Extraocular movements intact.      Pupils: Pupils are equal, round, and reactive to light.   Cardiovascular:      Rate and Rhythm: Normal rate and regular rhythm.      Pulses: Normal pulses.      Heart sounds: Normal heart sounds.   Pulmonary:      Effort: Pulmonary effort is normal. No respiratory distress.      Breath sounds: Examination of the right-lower field reveals decreased breath sounds. Examination of the left-lower field  reveals decreased breath sounds. Decreased breath sounds present. No wheezing, rhonchi or rales.   Abdominal:      General: Bowel sounds are normal. There is no distension.      Palpations: Abdomen is soft.      Tenderness: There is no abdominal tenderness. There is no guarding or rebound.   Musculoskeletal:      Cervical back: No rigidity.      Left hip: Tenderness present.      Right lower leg: No edema.      Left lower leg: No edema.   Skin:     General: Skin is warm.      Comments: Left hip surgical incision - dressing CDI, no signs of infection or irritation   Neurological:      Mental Status: She is alert and oriented to person, place, and time.      Motor: Tremor present.   Psychiatric:         Mood and Affect: Mood normal.         Behavior: Behavior normal.         Thought Content: Thought content normal.         Judgment: Judgment normal.             Significant Labs: All pertinent labs within the past 24 hours have been reviewed.    Significant Imaging: I have reviewed all pertinent imaging results/findings within the past 24 hours.    Assessment and Plan     * Closed left hip fracture  Orthopedic surgery consult  P.r.n. analgesics  PT/OT  Status post left hip repair  IPR placement      Hypoxia  SPO2 87% on RA, 94% on 3L oxymask   -Down from 4L oxymask a couple days ago  -Continue to wean  Asymptomatic  New right lung atelectasis noted on CXR 3/3  Afebrile, no productive cough, WBC has been WNL (no am labs) - will check procal but for now holding abx  Strongly encourage I.S. usage and OOB        Gluten free diet  Noted      Seizure  Chronic.  Noted.    Continue home medications      Recurrent intestinal obstruction  Chronic.  Noted.  No acute problem  LBM 3/4    PD (Parkinson's disease)  Chronic.  Noted.    Continue home medications      Hypothyroidism  Patient has chronic hypothyroidism. TFTs reviewed-   Lab Results   Component Value Date    TSH 2.518 09/15/2024   . Will continue chronic levothyroxine and  adjust for and clinical changes.          VTE Risk Mitigation (From admission, onward)           Ordered     enoxaparin injection 40 mg  Daily         02/28/25 1308     IP VTE HIGH RISK PATIENT  Once         02/28/25 1308     Place sequential compression device  Until discontinued         02/28/25 1308                    Discharge Planning   IAIN: 3/5/2025     Code Status: Full Code   Medical Readiness for Discharge Date:   Discharge Plan A: Rehab                Please place Justification for DME        Katie Elias NP  Department of Hospital Medicine   [facility]/Surg

## 2025-03-04 NOTE — SUBJECTIVE & OBJECTIVE
Interval History: NAD noted and VSS. Patient still requiring 3L via oxymask (mouth breather). SPO2 check on RA 87% at rest, 94% on 3L O2. RN to try weaning down through the day.    Review of Systems   Constitutional:  Negative for chills and fever.   HENT:  Negative for congestion and trouble swallowing.    Eyes:  Negative for visual disturbance.   Respiratory:  Negative for shortness of breath.    Cardiovascular:  Negative for chest pain.   Gastrointestinal:  Negative for abdominal pain, nausea and vomiting.   Genitourinary:  Negative for dysuria.   Musculoskeletal:  Positive for arthralgias and gait problem.   Skin:         Left hip surgical incision   Neurological:  Negative for dizziness, speech difficulty and light-headedness.   Psychiatric/Behavioral:  Negative for agitation and confusion. The patient is not nervous/anxious.      Objective:     Vital Signs (Most Recent):  Temp: 98.1 °F (36.7 °C) (03/04/25 0725)  Pulse: 71 (03/04/25 0725)  Resp: 18 (03/04/25 0939)  BP: 104/60 (03/04/25 0745)  SpO2: 96 % (03/04/25 0725) Vital Signs (24h Range):  Temp:  [97.6 °F (36.4 °C)-98.5 °F (36.9 °C)] 98.1 °F (36.7 °C)  Pulse:  [] 71  Resp:  [16-18] 18  SpO2:  [94 %-97 %] 96 %  BP: ()/(52-75) 104/60     Weight: 52.8 kg (116 lb 6.5 oz)  Body mass index is 20.62 kg/m².    Intake/Output Summary (Last 24 hours) at 3/4/2025 1013  Last data filed at 3/4/2025 0559  Gross per 24 hour   Intake 840 ml   Output 1225 ml   Net -385 ml         Physical Exam  Constitutional:       General: She is not in acute distress.     Comments: Oxymask   HENT:      Head: Normocephalic and atraumatic.      Mouth/Throat:      Mouth: Mucous membranes are moist.   Eyes:      Extraocular Movements: Extraocular movements intact.      Pupils: Pupils are equal, round, and reactive to light.   Cardiovascular:      Rate and Rhythm: Normal rate and regular rhythm.      Pulses: Normal pulses.      Heart sounds: Normal heart sounds.   Pulmonary:       Effort: Pulmonary effort is normal. No respiratory distress.      Breath sounds: Examination of the right-lower field reveals decreased breath sounds. Examination of the left-lower field reveals decreased breath sounds. Decreased breath sounds present. No wheezing, rhonchi or rales.   Abdominal:      General: Bowel sounds are normal. There is no distension.      Palpations: Abdomen is soft.      Tenderness: There is no abdominal tenderness. There is no guarding or rebound.   Musculoskeletal:      Cervical back: No rigidity.      Left hip: Tenderness present.      Right lower leg: No edema.      Left lower leg: No edema.   Skin:     General: Skin is warm.      Comments: Left hip surgical incision - dressing CDI, no signs of infection or irritation   Neurological:      Mental Status: She is alert and oriented to person, place, and time.      Motor: Tremor present.   Psychiatric:         Mood and Affect: Mood normal.         Behavior: Behavior normal.         Thought Content: Thought content normal.         Judgment: Judgment normal.             Significant Labs: All pertinent labs within the past 24 hours have been reviewed.    Significant Imaging: I have reviewed all pertinent imaging results/findings within the past 24 hours.

## 2025-03-04 NOTE — DISCHARGE INSTRUCTIONS
Sacrum/Buttocks--Stage 1 pressure injury--clean area with soap and water.  Pat dry.  Apply Triad cream BID and PRN.  Air pump to mattress for MICHAEL.  Turn and reposition in bed q2 hr.      Left Knee and Left Hand--Skin Tear--clean wounds with wound cleanser.  Apply Xerofoam to wounds.  Cover with Foam dressing.  Change dressing Weekly and PRN soilage.      Bilateral Heels and Bilateral Elbows--apply Heel foam dressings to heels and small foam dressing to elbows for protection.  Change weekly or PRN.    [facility]/Surg  Facility Transfer Orders        Admit to: Rehab    Diagnoses:   Active Hospital Problems    Diagnosis  POA    *Closed left hip fracture [S72.002A]  Yes    Hypoxia [R09.02]  No    Gluten free diet [Z78.9]  Yes    Seizure [R56.9]  Yes    Recurrent intestinal obstruction [K56.609]  Yes    PD (Parkinson's disease) [G20.A1]  Yes     Right tremor type      Hypothyroidism [E03.9]  Yes      Resolved Hospital Problems    Diagnosis Date Resolved POA    Diabetes [E11.9] 02/28/2025 Yes     Allergies:   Review of patient's allergies indicates:   Allergen Reactions    Bactrim [sulfamethoxazole-trimethoprim] Shortness Of Breath    Gluten Diarrhea    Phenergan [promethazine] Hallucinations       Code Status:   full    Vitals: Routine       Diet: regular diet gluten free    Activity: Activity as tolerated posterior hip precautions    Nursing Precautions: Aspiration , Fall, Seizure, and Pressure ulcer prevention    Bed/Surface: Low Air Loss    Consults: PT to evaluate and treat- 7 times a week, OT to evaluate and treat- 7 times a week, and Wound Care    Oxygen:  2 L nasal cannula oxygen p.r.n. to maintain sat greater than 92%    Dialysis: Patient is not on dialysis.     Labs:  CBC and CMP weekly x2  Pending Diagnostic Studies:       None          Imaging:  None    Miscellaneous Care:   Wound Care:  As above    IV Access:  None     Medications: Discontinue all previous medication orders, if any. See new  list below.  Current Discharge Medication List        START taking these medications    Details   aspirin (ECOTRIN) 81 MG EC tablet Take 1 tablet (81 mg total) by mouth 2 (two) times a day.  Qty: 60 tablet, Refills: 0           CONTINUE these medications which have NOT CHANGED    Details   acetaminophen 325 mg Cap Take 650 mg by mouth every 6 (six) hours as needed (pain).      azelastine (ASTELIN) 137 mcg (0.1 %) nasal spray 1 spray by Nasal route 2 (two) times daily.      carbidopa-levodopa (RYTARY) 23.75-95 mg CpSR Take 3 capsules by mouth 3 (three) times daily.      cholecalciferol, vitamin D3, (VITAMIN D3) 50 mcg (2,000 unit) Cap capsule Take 2,000 Units by mouth once daily.      cyanocobalamin (VITAMIN B-12) 250 MCG tablet Take 250 mcg by mouth every evening.      denosumab (PROLIA) 60 mg/mL Syrg Inject 60 mg into the skin every 6 (six) months.      diclofenac sodium (VOLTAREN) 1 % Gel Apply 4 g topically 4 (four) times daily. Apply to both knees, right hand and right wrist      docusate sodium (COLACE) 100 MG capsule Take 1 capsule (100 mg total) by mouth once daily.  Qty: 90 capsule, Refills: 0      food supplemt, lactose-reduced (ENSURE ORIGINAL) Liqd Take 237 mLs by mouth 2 (two) times a day.      glucose 4 GM chewable tablet Take 4 tablets (16 g total) by mouth as needed for Low blood sugar.  Qty: 20 tablet, Refills: 1      ivermectin (SOOLANTRA) 1 % Crea Apply 1 % topically once daily. Apply to face      levETIRAcetam (KEPPRA) 500 MG Tab Take 1 tablet by mouth twice daily  Qty: 60 tablet, Refills: 0      levothyroxine (SYNTHROID) 137 MCG Tab tablet TAKE 1/2 (ONE-HALF) TABLET BY MOUTH BEFORE BREAKFAST  Qty: 45 tablet, Refills: 2    Associated Diagnoses: Acquired hypothyroidism      lycopene/lutein/fruit extracts (FRUIT AND VEGETABLE DAILY ORAL) Take 2 tablets by mouth 2 (two) times a day. Patient takes 2 fruit and 2 vegetable balance of nature vitamins in the morning and evening      simethicone (MYLICON)  125 mg Cap capsule Take 1 capsule (125 mg total) by mouth 4 (four) times daily as needed for Flatulence.  Qty: 30 each, Refills: 0      polyethylene glycol (GLYCOLAX) 17 gram PwPk Take 17 g by mouth once daily.  Qty: 100 each, Refills: 0      traZODone (DESYREL) 50 MG tablet TAKE 1 TABLET BY MOUTH NIGHTLY AS NEEDED FOR INSOMNIA  Qty: 90 tablet, Refills: 1           Follow up:       Immunizations Administered as of 3/6/2025       Name Date Dose VIS Date Route Exp Date    COVID-19, MRNA, LN-S, PF (Pfizer) (Purple Cap) 1/28/2021 11:41 AM 0.3 mL 12/12/2020 Intramuscular 5/31/2021    Site: Left arm     Given By: Gem Chappell RN     : [employer]     Lot: [id number]     COVID-19, MRNA, LN-S, PF (Pfizer) (Purple Cap) 1/7/2021 11:56 AM 0.3 mL 12/12/2020 Intramuscular 4/30/2021    Site: Left deltoid     Given By: Melani Dailey LPN     : [employer]     Lot: [id number]     Comment: 2 pt identifer verified. medication and dosage verified.               Suzie Tomas MD

## 2025-03-04 NOTE — PT/OT/SLP EVAL
Occupational Therapy   Evaluation    Name: Ariana Tiwari  MRN: [id number]  Admitting Diagnosis: Closed left hip fracture  Recent Surgery: Procedure(s) (LRB):  ARTHROPLASTY, HIP (Left) 3 Days Post-Op    Recommendations:     Discharge Recommendations: High Intensity Therapy  Discharge Equipment Recommendations:  other (see comments) (TBD)  Barriers to discharge:       Assessment:     Ariana Tiwari is a 79 y.o. female with a medical diagnosis of Closed left hip fracture.  She presents with the following performance deficits affecting function: weakness, impaired endurance, impaired self care skills, impaired functional mobility, gait instability, impaired balance, decreased upper extremity function, decreased lower extremity function, decreased ROM, impaired coordination, impaired fine motor, impaired cardiopulmonary response to activity, orthopedic precautions.  Pt up in chair and agreeable to OT. Daughter present.    Rehab Prognosis: Good; patient would benefit from acute skilled OT services to address these deficits and reach maximum level of function.       Plan:     Patient to be seen 6 x/week to address the above listed problems via self-care/home management, therapeutic activities, therapeutic exercises  Plan of Care Expires: 04/01/25  Plan of Care Reviewed with: patient, daughter    Subjective     Chief Complaint: pain and weakness  Patient/Family Comments/goals: To get better    Occupational Profile:  Living Environment: Pt lives with son/DIL. Pt has a walk in tub with grab bar and standard toilet with grab bar.  Previous level of function: set up with feeding with R dominant UE. Daughter assists with brushing teeth for accuracy. Pt combed hair with set up. Daughter completed UB and LB dressing for the sake of time. Daughter performed toileting hygiene after BM. Pt was able to perform frontal toileting hygiene. Pt was having more difficulty with clothing management during toileting. Daughter reports  increased impairment R dominant UE. Increased time for all ADL's.  Roles and Routines: Mother/MIL, grandmother  Equipment Used at Home: rollator, grab bar, other (see comments) (Pt has a walk in tub with grab bar;)  Assistance upon Discharge: Family    Pain/Comfort:  Pain Rating 1:  (not rated)    Patients cultural, spiritual, Mandaen conflicts given the current situation:      Objective:     Communicated with: Nurse Coronel  prior to session.  Patient found up in chair with chair check, oxygen, peripheral IV, PureWick, telemetry upon OT entry to room.    General Precautions: Standard, fall, seizure  Orthopedic Precautions: LLE weight bearing as tolerated, LLE posterior precautions  Braces:    Respiratory Status:  Oxymask    Occupational Performance:      Functional Mobility/Transfers:   Total assist transfer bed to chair with PT observed by OT.    Activities of Daily Living:  Feeding:  moderate assistance    Grooming: maximal assistance    Bathing: maximal assistance    Upper Body Dressing: maximal assistance    Lower Body Dressing: total assistance    Toileting: total assistance      Cognitive/Visual Perceptual:  Pt alert and follows commands     Physical Exam:  Upper Extremity Strength:    -       Right Upper Extremity: R hand dominant. AROM R shoulder flexion approximately 45 degrees. PROM R shoulder flexion approximately 85 degrees. Pt demonstrates R elbow flex/ext WFL's. IV at R wrist-unable to fully assess R wrist AROM although pt is keeping it flexed. Daughter reports decline of RUE function. Pt demonstrates AROM digits, but with limitations. Tremor noted.    -       Left Upper Extremity: WFL except tremor noted    AMPAC 6 Click ADL:  AMPAC Total Score: 11    Treatment & Education:  OT provided education in role of OT. Patient verbalized understanding and participated in OT.  OT provided instruction in home safety and LLE posterior precautions with ADL/IADL including review of home set up and DME/AE.  Patient/daughter verbalized understanding. Further training indicated.  OT provided instruction in optimizing position including posture, RUE support, and modifications of food set up to provide for increased self feeding. OT provided education in use of universal feeding device with pt/daughter. Pt was able to bring food to mouth after cut. OT to monitor progress and will continue to make modifications as needed. Pt may also benefit from R wrist brace for increased support for better functional use of digits when IV is no longer there. OT provided education in BUE self ROM. Pt/daughter verbalized understanding.  OT provided education in calling for assist. Patient/daughter verbalized understanding.          Patient left up in chair with all lines intact, call button in reach, chair alarm on, and daughter present    GOALS:   Multidisciplinary Problems       Occupational Therapy Goals          Problem: Occupational Therapy    Goal Priority Disciplines Outcome Interventions   Occupational Therapy Goal     OT, PT/OT     Description: Goals to be met by: 4/1/25     Patient will increase functional independence with ADLs by performing:    Feeding with Minimal Assistance/SBA utilizing AE and adaptive techniques.  UE Dressing with Moderate Assistance.  LE Dressing with Maximum Assistance.  Grooming while seated with Minimal Assistance.  Toileting from toilet with Maximum Assistance for hygiene and clothing management.   Bathing from  shower chair/bench with Maximum Assistance.  Toilet transfer to toilet with Moderate Assistance.  Increased strength and functional activity tolerance for ADL's/IADL's                         DME Justifications:  No DME recommended requiring DME justifications    History:     Past Medical History:   Diagnosis Date    ACP (advance care planning) 9/15/2024    Age-related osteoporosis without current pathological fracture 05/16/2024    Allergy     Diabetes 2/28/2025    Diverticulosis     Gluten  enteropathy     Gluten intolerance     Hernia     Hypothyroidism     PD (Parkinson's disease) 09/16/2015    Right tremor type    Peptic ulcer disease     Right shoulder pain     Cirtisone injection 3/26/15 - Dr. Tolbert    SBO (small bowel obstruction) 09/17/2019    Seizures     Squamous cell carcinoma of skin     Ulcer          Past Surgical History:   Procedure Laterality Date    ADENOIDECTOMY      COLONOSCOPY  03/01/2012    Dr. Teresa, repeat in 10 years for screening    COLONOSCOPY N/A 2/21/2018    Procedure: COLONOSCOPY;  Surgeon: Radha Deng MD;  Location: [facility];  Service: Endoscopy;  Laterality: N/A;    CORRECTION OF HAMMER TOE Left 9/16/2020    Procedure: CORRECTION, HAMMER TOE;  Surgeon: William Sprague MD;  Location: [facility] OR;  Service: Orthopedics;  Laterality: Left;    COSMETIC SURGERY      Broken nose    ESOPHAGOGASTRODUODENOSCOPY N/A 12/8/2021    Procedure: EGD (ESOPHAGOGASTRODUODENOSCOPY);  Surgeon: Benji Valentino III, MD;  Location: [facility];  Service: Endoscopy;  Laterality: N/A;    FRACTURE SURGERY  left wrist    With pin    HEMORRHOID SURGERY      HERNIA REPAIR Left 04/09/2015    Dr Lo; left inguinal    HIP ARTHROPLASTY Left 3/1/2025    Procedure: ARTHROPLASTY, HIP;  Surgeon: Jaswinder Nguyen MD;  Location[facility];  Service: Orthopedics;  Laterality: Left;    INTRALUMINAL GASTROINTESTINAL TRACT IMAGING VIA CAPSULE N/A 7/10/2018    Procedure: IMAGING, GI TRACT, INTRALUMINAL, VIA CAPSULE;  Surgeon: Radha Deng MD;  Location: [facility];  Service: Endoscopy;  Laterality: N/A;    LYSIS OF ADHESIONS  9/29/2020    Procedure: LYSIS, ADHESIONS;  Surgeon: Eagle Gonzalez MD;  Location: [facility];  Service: General;;    RHINOPLASTY TIP      TONSILLECTOMY      UPPER GASTROINTESTINAL ENDOSCOPY  05/21/2015    Dr. Gomez       Time Tracking:     OT Date of Treatment: 03/04/25  OT Start Time: 1101  OT Stop Time: 1149  OT Total Time (min): 48 min    Billable Minutes:Evaluation  8  Self Care/Home Management 40    3/4/2025

## 2025-03-04 NOTE — PLAN OF CARE
Problem: Adult Inpatient Plan of Care  Goal: Plan of Care Review  3/4/2025 0034 by Hollie De Los Santos RN  Outcome: Progressing  3/3/2025 2323 by Hollie De Los Santos RN  Outcome: Progressing     Problem: Adult Inpatient Plan of Care  Goal: Optimal Comfort and Wellbeing  3/4/2025 0034 by Hollie De Los Santos RN  Outcome: Progressing  3/3/2025 2323 by Hollie De Los Santos RN  Outcome: Progressing     Problem: Wound  Goal: Optimal Coping  3/4/2025 0034 by Hollie De Los Santos RN  Outcome: Progressing  3/3/2025 2323 by Hollie De Los Santos RN  Outcome: Progressing     Problem: Wound  Goal: Skin Health and Integrity  3/4/2025 0034 by Hollie De Los Santos RN  Outcome: Progressing  3/3/2025 2323 by Hollie De Los Santos RN  Outcome: Progressing

## 2025-03-04 NOTE — PLAN OF CARE
Goals to be met by: 4/1/25     Patient will increase functional independence with ADLs by performing:    Feeding with Minimal Assistance/SBA utilizing AE and adaptive techniques.  UE Dressing with Moderate Assistance.  LE Dressing with Maximum Assistance.  Grooming while seated with Minimal Assistance.  Toileting from toilet with Maximum Assistance for hygiene and clothing management.   Bathing from  shower chair/bench with Maximum Assistance.  Toilet transfer to toilet with Moderate Assistance.  Increased strength and functional activity tolerance for ADL's/IADL's

## 2025-03-04 NOTE — ASSESSMENT & PLAN NOTE
SPO2 87% on RA, 94% on 3L oxymask   -Down from 4L oxymask a couple days ago  -Continue to wean  Asymptomatic  New right lung atelectasis noted on CXR 3/3  Afebrile, no productive cough, WBC has been WNL (no am labs) - will check procal but for now holding abx  Strongly encourage I.S. usage and OOB

## 2025-03-04 NOTE — PLAN OF CARE
NATHALY met with pt and son, Je and he would like [facility].      Jing at [facility] stated pt declined and more appropriate for SNF.  Met with pt's daughter in law and informed that rehab unable to accept pt and offered SNF.  Daughter in law had a SNF list but needs to discuss with pt's son.     03/04/25 1229   Post-Acute Status   Post-Acute Authorization Placement   Discharge Plan   Discharge Plan A Skilled Nursing Facility   Discharge Plan B Home Health

## 2025-03-04 NOTE — PROGRESS NOTES
Received call from pt's daughter in law and they prefer [facility] and [facility].  Referral sent to SNFs.

## 2025-03-04 NOTE — PLAN OF CARE
Problem: Adult Inpatient Plan of Care  Goal: Plan of Care Review  Outcome: Progressing  Goal: Patient-Specific Goal (Individualized)  Outcome: Progressing  Goal: Absence of Hospital-Acquired Illness or Injury  Outcome: Progressing  Goal: Optimal Comfort and Wellbeing  Outcome: Progressing  Goal: Readiness for Transition of Care  Outcome: Progressing     Problem: Wound  Goal: Optimal Coping  Outcome: Progressing  Goal: Optimal Functional Ability  Outcome: Progressing  Goal: Absence of Infection Signs and Symptoms  Outcome: Progressing  Goal: Improved Oral Intake  Outcome: Progressing  Goal: Optimal Pain Control and Function  Outcome: Progressing  Goal: Skin Health and Integrity  Outcome: Progressing  Goal: Optimal Wound Healing  Outcome: Progressing     Problem: Skin Injury Risk Increased  Goal: Skin Health and Integrity  Outcome: Progressing     Problem: Fall Injury Risk  Goal: Absence of Fall and Fall-Related Injury  Outcome: Progressing     Problem: Fatigue  Goal: Improved Activity Tolerance  Outcome: Progressing     Problem: Pain Acute  Goal: Optimal Pain Control and Function  Outcome: Progressing   2L O2/simple mask on. Telemetry--8640--SR. R wrist SL intact. L hip inc with gray foam island drsg D/I. Voiding via purewick--brenda color urine--PO encouraged. Sacral & B heel foam intact. L knee & L hand foam D/I. Sat in recliner for about 2-3 hours today--Up with walker & PT. Triad to buttocks. Nicole some diet. K=3.5--replacement given. Repositioned. Family supportive. Pleasant.

## 2025-03-04 NOTE — PLAN OF CARE
Problem: Adult Inpatient Plan of Care  Goal: Plan of Care Review  Outcome: Progressing     Problem: Adult Inpatient Plan of Care  Goal: Optimal Comfort and Wellbeing  Outcome: Progressing     Problem: Pain Acute  Goal: Optimal Pain Control and Function  Outcome: Progressing   PRN meds given per MD order.

## 2025-03-05 LAB
ANION GAP SERPL CALC-SCNC: 10 MMOL/L (ref 8–16)
BASOPHILS # BLD AUTO: 0.03 K/UL (ref 0–0.2)
BASOPHILS NFR BLD: 0.6 % (ref 0–1.9)
BUN SERPL-MCNC: 14 MG/DL (ref 8–23)
CALCIUM SERPL-MCNC: 7.4 MG/DL (ref 8.7–10.5)
CHLORIDE SERPL-SCNC: 100 MMOL/L (ref 95–110)
CO2 SERPL-SCNC: 23 MMOL/L (ref 23–29)
CREAT SERPL-MCNC: 0.5 MG/DL (ref 0.5–1.4)
DIFFERENTIAL METHOD BLD: ABNORMAL
EOSINOPHIL # BLD AUTO: 0.1 K/UL (ref 0–0.5)
EOSINOPHIL NFR BLD: 2.1 % (ref 0–8)
ERYTHROCYTE [DISTWIDTH] IN BLOOD BY AUTOMATED COUNT: 12.5 % (ref 11.5–14.5)
EST. GFR  (NO RACE VARIABLE): >60 ML/MIN/1.73 M^2
GLUCOSE SERPL-MCNC: 105 MG/DL (ref 70–110)
HCT VFR BLD AUTO: 32.1 % (ref 37–48.5)
HGB BLD-MCNC: 10.4 G/DL (ref 12–16)
IMM GRANULOCYTES # BLD AUTO: 0.02 K/UL (ref 0–0.04)
IMM GRANULOCYTES NFR BLD AUTO: 0.4 % (ref 0–0.5)
LYMPHOCYTES # BLD AUTO: 0.8 K/UL (ref 1–4.8)
LYMPHOCYTES NFR BLD: 17.4 % (ref 18–48)
MAGNESIUM SERPL-MCNC: 2 MG/DL (ref 1.6–2.6)
MCH RBC QN AUTO: 30.2 PG (ref 27–31)
MCHC RBC AUTO-ENTMCNC: 32.4 G/DL (ref 32–36)
MCV RBC AUTO: 93 FL (ref 82–98)
MONOCYTES # BLD AUTO: 0.5 K/UL (ref 0.3–1)
MONOCYTES NFR BLD: 9.8 % (ref 4–15)
NEUTROPHILS # BLD AUTO: 3.3 K/UL (ref 1.8–7.7)
NEUTROPHILS NFR BLD: 69.7 % (ref 38–73)
NRBC BLD-RTO: 0 /100 WBC
PHOSPHATE SERPL-MCNC: 2.3 MG/DL (ref 2.7–4.5)
PLATELET # BLD AUTO: 157 K/UL (ref 150–450)
PMV BLD AUTO: 10.3 FL (ref 9.2–12.9)
POTASSIUM SERPL-SCNC: 3.6 MMOL/L (ref 3.5–5.1)
RBC # BLD AUTO: 3.44 M/UL (ref 4–5.4)
SODIUM SERPL-SCNC: 133 MMOL/L (ref 136–145)
WBC # BLD AUTO: 4.7 K/UL (ref 3.9–12.7)

## 2025-03-05 PROCEDURE — 27000221 HC OXYGEN, UP TO 24 HOURS

## 2025-03-05 PROCEDURE — 94799 UNLISTED PULMONARY SVC/PX: CPT | Mod: XB

## 2025-03-05 PROCEDURE — 97530 THERAPEUTIC ACTIVITIES: CPT

## 2025-03-05 PROCEDURE — 36415 COLL VENOUS BLD VENIPUNCTURE: CPT | Performed by: NURSE PRACTITIONER

## 2025-03-05 PROCEDURE — 84100 ASSAY OF PHOSPHORUS: CPT | Performed by: NURSE PRACTITIONER

## 2025-03-05 PROCEDURE — 99900035 HC TECH TIME PER 15 MIN (STAT)

## 2025-03-05 PROCEDURE — 25000003 PHARM REV CODE 250: Performed by: HOSPITALIST

## 2025-03-05 PROCEDURE — 63600175 PHARM REV CODE 636 W HCPCS

## 2025-03-05 PROCEDURE — 94761 N-INVAS EAR/PLS OXIMETRY MLT: CPT

## 2025-03-05 PROCEDURE — 97110 THERAPEUTIC EXERCISES: CPT

## 2025-03-05 PROCEDURE — 97535 SELF CARE MNGMENT TRAINING: CPT

## 2025-03-05 PROCEDURE — 11000001 HC ACUTE MED/SURG PRIVATE ROOM

## 2025-03-05 PROCEDURE — 25000003 PHARM REV CODE 250

## 2025-03-05 PROCEDURE — 80048 BASIC METABOLIC PNL TOTAL CA: CPT | Performed by: NURSE PRACTITIONER

## 2025-03-05 PROCEDURE — 30200315 PPD INTRADERMAL TEST REV CODE 302: Performed by: HOSPITALIST

## 2025-03-05 PROCEDURE — 99900031 HC PATIENT EDUCATION (STAT)

## 2025-03-05 PROCEDURE — 83735 ASSAY OF MAGNESIUM: CPT | Performed by: NURSE PRACTITIONER

## 2025-03-05 PROCEDURE — 85025 COMPLETE CBC W/AUTO DIFF WBC: CPT | Performed by: NURSE PRACTITIONER

## 2025-03-05 PROCEDURE — 86580 TB INTRADERMAL TEST: CPT | Performed by: HOSPITALIST

## 2025-03-05 RX ORDER — GLYCERIN 1 G/1
1 SUPPOSITORY RECTAL DAILY PRN
Status: DISCONTINUED | OUTPATIENT
Start: 2025-03-05 | End: 2025-03-06 | Stop reason: HOSPADM

## 2025-03-05 RX ADMIN — LEVOTHYROXINE SODIUM 62.5 MCG: 25 TABLET ORAL at 06:03

## 2025-03-05 RX ADMIN — TUBERCULIN PURIFIED PROTEIN DERIVATIVE 5 UNITS: 5 INJECTION, SOLUTION INTRADERMAL at 11:03

## 2025-03-05 RX ADMIN — POLYETHYLENE GLYCOL (3350) 17 G: 17 POWDER, FOR SOLUTION ORAL at 09:03

## 2025-03-05 RX ADMIN — CYANOCOBALAMIN TAB 250 MCG 250 MCG: 250 TAB at 09:03

## 2025-03-05 RX ADMIN — POTASSIUM & SODIUM PHOSPHATES POWDER PACK 280-160-250 MG 2 PACKET: 280-160-250 PACK at 03:03

## 2025-03-05 RX ADMIN — ENOXAPARIN SODIUM 40 MG: 40 INJECTION SUBCUTANEOUS at 05:03

## 2025-03-05 RX ADMIN — LEVETIRACETAM 500 MG: 500 TABLET, FILM COATED ORAL at 09:03

## 2025-03-05 RX ADMIN — GLYCERIN 1 SUPPOSITORY: 2 SUPPOSITORY RECTAL at 11:03

## 2025-03-05 RX ADMIN — POTASSIUM & SODIUM PHOSPHATES POWDER PACK 280-160-250 MG 2 PACKET: 280-160-250 PACK at 09:03

## 2025-03-05 RX ADMIN — HYDROCODONE BITARTRATE AND ACETAMINOPHEN 1 TABLET: 5; 325 TABLET ORAL at 07:03

## 2025-03-05 RX ADMIN — Medication 2000 UNITS: at 09:03

## 2025-03-05 RX ADMIN — HYPROMELLOSE 2910 1 DROP: 5 SOLUTION/ DROPS OPHTHALMIC at 09:03

## 2025-03-05 RX ADMIN — DICLOFENAC SODIUM 2 G: 10 GEL TOPICAL at 09:03

## 2025-03-05 RX ADMIN — POTASSIUM BICARBONATE 50 MEQ: 977.5 TABLET, EFFERVESCENT ORAL at 09:03

## 2025-03-05 NOTE — PLAN OF CARE
Problem: Adult Inpatient Plan of Care  Goal: Plan of Care Review  Outcome: Progressing     Problem: Adult Inpatient Plan of Care  Goal: Absence of Hospital-Acquired Illness or Injury  Outcome: Progressing     Problem: Adult Inpatient Plan of Care  Goal: Optimal Comfort and Wellbeing  Outcome: Progressing     Problem: Wound  Goal: Absence of Infection Signs and Symptoms  Outcome: Progressing     Problem: Wound  Goal: Optimal Pain Control and Function  Outcome: Progressing     Problem: Fall Injury Risk  Goal: Absence of Fall and Fall-Related Injury  Outcome: Progressing

## 2025-03-05 NOTE — PT/OT/SLP PROGRESS
Physical Therapy Treatment    Patient Name:  Ariana Tiwari   MRN:  [id number]    Recommendations:     Discharge Recommendations: High Intensity Therapy vs KATHARINA  Discharge Equipment Recommendations: none  Barriers to discharge: Decreased caregiver support    Assessment:     Ariana Tiwari is a 79 y.o. female admitted with a medical diagnosis of Closed left hip fracture.  She presents with the following impairments/functional limitations: weakness, impaired endurance, impaired self care skills, impaired functional mobility, gait instability, impaired balance, decreased lower extremity function, decreased safety awareness, pain, impaired cardiopulmonary response to activity, orthopedic precautions, decreased ROM .    Pt seen supine in bed- sleepy/abductor pillow on. Daughter stated is awake at nights. Pt seen for thera ex with lots of cueing..EOB sitting assist x2. Stood and pivot transfer to chair and repositioned. More awake once up in chair. Late breakfast offered with daughter assisting with feeding.  KATHARINA vs HIT    Rehab Prognosis: Fair; patient would benefit from acute skilled PT services to address these deficits and reach maximum level of function.    Recent Surgery: Procedure(s) (LRB):  ARTHROPLASTY, HIP (Left) 4 Days Post-Op    Plan:     During this hospitalization, patient to be seen daily to address the identified rehab impairments via gait training, therapeutic activities, therapeutic exercises and progress toward the following goals:    Plan of Care Expires:  03/30/25    Subjective   Daughter stated that pt was functional prior to fall- ambulatory with RW and has power chair for outdoor activities  Stated that they go on cruises together with most recent one was January 2025  Chief Complaint: tired  Patient/Family Comments/goals: none  Pain/Comfort:  Pain Rating 1:  (not rated)  Location - Side 1: Left  Location 1: hip  Pain Addressed 1: Pre-medicate for activity, Reposition, Cessation of  Activity      Objective:     Communicated with nurse hayden prior to session.  Patient found HOB elevated with hip abduction pillow, oxygen, telemetry, PureWick, SCD, bed alarm upon PT entry to room.     General Precautions: Standard, fall, seizure  Orthopedic Precautions: LLE weight bearing as tolerated, LLE posterior precautions  Braces: N/A  Respiratory Status: Nasal cannula, flow 2 L/min     Functional Mobility:  Bed Mobility:     Scooting: maximal assistance  Supine to Sit: maximal assistance  Transfers:     Sit to Stand:  maximal assistance with no AD  Bed to Chair: maximal assistance with  no AD  using  Squat Pivot  Assist x2 for safe mobility. Pt with forward head and trunk posture      AM-PAC 6 CLICK MOBILITY  Turning over in bed (including adjusting bedclothes, sheets and blankets)?: 2  Sitting down on and standing up from a chair with arms (e.g., wheelchair, bedside commode, etc.): 2  Moving from lying on back to sitting on the side of the bed?: 2  Moving to and from a bed to a chair (including a wheelchair)?: 2  Need to walk in hospital room?: 1  Climbing 3-5 steps with a railing?: 1  Basic Mobility Total Score: 10       Treatment & Education:  Patient was educated on the importance of OOB activity and functional mobility to negate negative effects of prolonged bed rest during hospitalization, safe transfers and ambulation, and D/C planning   Thera ex in supine and seated EOB including isometrics  Pt repositioned in chair with all needs within reach  Tray table in front- more awake    Patient left up in chair with all lines intact, call button in reach, chair alarm on, and daughter present..    GOALS:   Multidisciplinary Problems       Physical Therapy Goals          Problem: Physical Therapy    Goal Priority Disciplines Outcome Interventions   Physical Therapy Goal     PT, PT/OT Progressing    Description: Goals to be met by: 03-     Patient will increase functional independence with mobility by  performin. Supine to sit with Moderate Assistance  2. Sit to stand transfer with Moderate Assistance  3. Bed to chair transfer with Moderate Assistance using Rolling Walker  4. Gait  x 10 feet with Moderate Assistance using Rolling Walker.   5. Lower extremity exercise program x20 reps    Post L RITA 2025/WBAT with posterior precautions/abd pillow                         DME Justifications:  No DME recommended requiring DME justifications    Time Tracking:     PT Received On: 25  PT Start Time: 901     PT Stop Time: 944  PT Total Time (min): 43 min     Billable Minutes: Therapeutic Activity 30 and Therapeutic Exercise 13    Treatment Type: Treatment  PT/PTA: PT     Number of PTA visits since last PT visit: 0     2025

## 2025-03-05 NOTE — PROGRESS NOTES
On license of [facility] Medicine  Progress Note    Patient Name: Ariana Tiwari  MRN: [id number]  Patient Class: IP- Inpatient   Admission Date: 2/28/2025  Length of Stay: 5 days  Attending Physician: Suzie Tomas MD  Primary Care Provider: Nba Petty MD        Subjective     Principal Problem:Closed left hip fracture        HPI:  Ariana Tiwari 79 y.o.female with a past medical history significant for hypothyroidism, Parkinson's, hernia, seizures, and stomach ulcers.  Patient presented to the emergency department status post witnessed fall at home.  Patient stated that she was attempting to get her walker in a chair was in the way and she fell to the ground.  Patient denies loss of consciousness or head trauma.  Patient denies fever, chills nausea, vomiting.  There are no alleviating factors and pain is aggravated by movement.  Patient denies any recent travel or sick visits.  Patient denies use of blood thinners. Patient denies smoking or use of alcohol.  Patient lives at home with her son and daughter-in-law.  Emergency department workup included:  CBC unremarkable, CMP unremarkable, Mg-1.9.  X-ray left hip revealed mildly displaced transverse fracture of the left femoral neck.  Orthopedic surgeon was consulted.  Patient will be admitted to Hospital Medicine for management and evaluation.       Overview/Hospital Course:  Ariana Tiwari is an 79 year old female w/ PMH hypothyroidism, Parkinson's, hernia, seizures, and stomach ulcers. She was admitted to the hospital for a left femoral neck displace fracture s/p mechanical fall. Orthopedic surgery was consulted and she underwent a hip arthroplasty 3/1. PT/OT were consulted. Case management consulted to assist with discharge planning.    Interval History:  Patient seen and examined.  Currently on 2 L nasal cannula.  Encouraged incentive spirometry use.  She is breathing comfortably.    Review of Systems   Constitutional:   Negative for chills and fever.   HENT:  Negative for congestion and trouble swallowing.    Eyes:  Negative for visual disturbance.   Respiratory:  Negative for shortness of breath.    Cardiovascular:  Negative for chest pain.   Gastrointestinal:  Negative for abdominal pain, nausea and vomiting.   Genitourinary:  Negative for dysuria.   Musculoskeletal:  Positive for arthralgias and gait problem.   Skin:         Left hip surgical incision   Neurological:  Negative for dizziness, speech difficulty and light-headedness.   Psychiatric/Behavioral:  Negative for agitation and confusion. The patient is not nervous/anxious.      Objective:     Vital Signs (Most Recent):  Temp: 97.7 °F (36.5 °C) (03/05/25 1139)  Pulse: 94 (03/05/25 1139)  Resp: 16 (03/05/25 1139)  BP: 107/60 (03/05/25 1139)  SpO2: 95 % (03/05/25 1139) Vital Signs (24h Range):  Temp:  [97.7 °F (36.5 °C)-99 °F (37.2 °C)] 97.7 °F (36.5 °C)  Pulse:  [] 94  Resp:  [16-20] 16  SpO2:  [93 %-96 %] 95 %  BP: (107-117)/(55-67) 107/60     Weight: 52.8 kg (116 lb 6.5 oz)  Body mass index is 20.62 kg/m².    Intake/Output Summary (Last 24 hours) at 3/5/2025 1157  Last data filed at 3/5/2025 0422  Gross per 24 hour   Intake 870 ml   Output 850 ml   Net 20 ml         Physical Exam  Constitutional:       General: She is not in acute distress.     Comments: Oxymask   HENT:      Head: Normocephalic and atraumatic.      Mouth/Throat:      Mouth: Mucous membranes are moist.   Eyes:      Extraocular Movements: Extraocular movements intact.      Pupils: Pupils are equal, round, and reactive to light.   Cardiovascular:      Rate and Rhythm: Normal rate and regular rhythm.      Pulses: Normal pulses.      Heart sounds: Normal heart sounds.   Pulmonary:      Effort: Pulmonary effort is normal. No respiratory distress.      Breath sounds: Examination of the right-lower field reveals decreased breath sounds. Examination of the left-lower field reveals decreased breath sounds.  Decreased breath sounds present. No wheezing, rhonchi or rales.   Abdominal:      General: Bowel sounds are normal. There is no distension.      Palpations: Abdomen is soft.      Tenderness: There is no abdominal tenderness. There is no guarding or rebound.   Musculoskeletal:      Cervical back: No rigidity.      Left hip: Tenderness present.      Right lower leg: No edema.      Left lower leg: No edema.   Skin:     General: Skin is warm.      Comments: Left hip surgical incision - dressing CDI, no signs of infection or irritation   Neurological:      Mental Status: She is alert and oriented to person, place, and time.      Motor: Tremor present.   Psychiatric:         Mood and Affect: Mood normal.         Behavior: Behavior normal.         Thought Content: Thought content normal.         Judgment: Judgment normal.             Significant Labs: All pertinent labs within the past 24 hours have been reviewed.    Significant Imaging: I have reviewed all pertinent imaging results/findings within the past 24 hours.    Assessment and Plan     Assessment & Plan  Closed left hip fracture  Orthopedic surgery following  P.r.n. analgesics  PT/OT  Status post left hip repair  Skilled nursing facility placement pending    Hypothyroidism  Patient has chronic hypothyroidism. TFTs reviewed-   Lab Results   Component Value Date    TSH 2.518 09/15/2024   . Will continue chronic levothyroxine and adjust for and clinical changes.      PD (Parkinson's disease)  Chronic.  Noted.    Continue home medications    Recurrent intestinal obstruction  Chronic.  Noted.  No acute problem  LBM 3/4  Seizure  Chronic.  Noted.    Continue home medications    Gluten free diet  Noted    Hypoxia  Continue to wean oxygen, currently on 2 L  Asymptomatic  New right lung atelectasis noted on CXR 3/3  Strongly encourage I.S. usage and OOB      VTE Risk Mitigation (From admission, onward)           Ordered     enoxaparin injection 40 mg  Daily         02/28/25  1308     IP VTE HIGH RISK PATIENT  Once         02/28/25 1308     Place sequential compression device  Until discontinued         02/28/25 1308                    Discharge Planning   IAIN: 3/6/2025     Code Status: Full Code   Medical Readiness for Discharge Date:   Discharge Plan A: Skilled Nursing Facility                Please place Justification for DME        Suzie Tomas MD  Department of Hospital Medicine   [facility]/Surg

## 2025-03-05 NOTE — PT/OT/SLP PROGRESS
Occupational Therapy   Treatment    Name: Ariana Tiwari  MRN: [id number]  Admitting Diagnosis:  Closed left hip fracture  4 Days Post-Op    Recommendations:     Discharge Recommendations: High Intensity Therapy  Discharge Equipment Recommendations:  other (see comments) (TBD)  Barriers to discharge:       Assessment:     Ariana Tiwari is a 79 y.o. female with a medical diagnosis of Closed left hip fracture.  She presents with the following performance deficits affecting function are weakness, impaired endurance, impaired self care skills, impaired functional mobility, gait instability, impaired balance, decreased upper extremity function, decreased lower extremity function, pain, decreased ROM, impaired coordination, impaired fine motor, impaired cardiopulmonary response to activity, orthopedic precautions. Pt agreed to OT. Daughter present.    Rehab Prognosis:  Good; patient would benefit from acute skilled OT services to address these deficits and reach maximum level of function.       Plan:     Patient to be seen 6 x/week to address the above listed problems via self-care/home management, therapeutic activities, therapeutic exercises  Plan of Care Expires: 04/01/25  Plan of Care Reviewed with: patient, daughter    Subjective     Chief Complaint: weakness  Patient/Family Comments/goals: To get better  Pain/Comfort:  Pain Rating 1:  (not rated)    Objective:     Communicated with: Nurse Coronel  prior to session.  Patient found up in chair with chair check, oxygen, PureWick, peripheral IV, telemetry upon OT entry to room.    General Precautions: Standard, fall, seizure    Orthopedic Precautions:LLE weight bearing as tolerated, LLE posterior precautions  Braces: N/A  Respiratory Status:  oxymask     Occupational Performance:         Functional Mobility/Transfers:  Patient completed Sit <> Stand Transfer with maximal assistance/total assistance  Patient completed transfer from bedside chair to [facility] with Max assist x  2. Required assistance with movement of legs. Unable to step on own.  Pt required Max assist x 2 with transfer from BSC to sitting EOB. Pt required assistance with movement of legs. Unable to step on own. Pt required Max assist x 2 with sit to supine.    Activities of Daily Living:  Toileting: total assistance for toileting clothes management, hygiene, and transfers. Pt was able to bear weight BLE's during clothing management including diaper change and hygiene after BM.      [facility] 6 Click ADL:      Treatment & Education:  OT provided instruction in AAROM bilateral shoulder flexion/extension and AROM bilateral . Pt required cues, assistance, and demonstration to complete 2x5 reps.  OT provided instruction in toileting/BSC transfers. See narrative.  OT provided education in calling for assist. Patient verbalized understanding.      Patient left HOB elevated with all lines intact, call button in reach, bed alarm on, and Daughter and Nurse Berna  present    GOALS:   Multidisciplinary Problems       Occupational Therapy Goals          Problem: Occupational Therapy    Goal Priority Disciplines Outcome Interventions   Occupational Therapy Goal     OT, PT/OT Progressing    Description: Goals to be met by: 4/1/25     Patient will increase functional independence with ADLs by performing:    Feeding with Minimal Assistance/SBA utilizing AE and adaptive techniques.  UE Dressing with Moderate Assistance.  LE Dressing with Maximum Assistance.  Grooming while seated with Minimal Assistance.  Toileting from toilet with Maximum Assistance for hygiene and clothing management.   Bathing from  shower chair/bench with Maximum Assistance.  Toilet transfer to toilet with Moderate Assistance.  Increased strength and functional activity tolerance for ADL's/IADL's                         DME Justifications:  No DME recommended requiring DME justifications    Time Tracking:     OT Date of Treatment: 03/05/25  OT Start Time: 1327  OT Stop  Time: 1414  OT Total Time (min): 47 min    Billable Minutes:Self Care/Home Management 47    OT/TAL: OT          3/5/2025

## 2025-03-05 NOTE — PLAN OF CARE
12:00pm  NATHALY left voice message for Lilly with [facility] [phone] to return call regarding acceptance.  NATHALY spoke to Brian [phone] with [facility] who stated he will have Lilly return call.    Per Cecilia with [facility], no bed available at this time.  N[facility]ALY [facility] with [facility] who stated no bed available at this time.     As courtesy, NATHALY sent patient to [facility] via [employer] for review.      NATHALY called locet in to [facility] [phone] and faxed pasrr to [phone] for state approval.  Fax confirmation received.    3:28pm NATHALY left voice message for Lilly to return call. NATHALY faxed patient information to [facility] [phone] for review.  Fax confirmation received.        03/05/25 1230   Post-Acute Status   Post-Acute Authorization Placement   Post-Acute Placement Status Pending post-acute provider review/more information requested

## 2025-03-05 NOTE — PLAN OF CARE
Problem: Adult Inpatient Plan of Care  Goal: Plan of Care Review  Outcome: Progressing  Goal: Patient-Specific Goal (Individualized)  Outcome: Progressing  Goal: Absence of Hospital-Acquired Illness or Injury  Outcome: Progressing  Goal: Optimal Comfort and Wellbeing  Outcome: Progressing  Goal: Readiness for Transition of Care  Outcome: Progressing     Problem: Wound  Goal: Optimal Coping  Outcome: Progressing  Goal: Optimal Functional Ability  Outcome: Progressing  Goal: Absence of Infection Signs and Symptoms  Outcome: Progressing  Goal: Improved Oral Intake  Outcome: Progressing  Goal: Optimal Pain Control and Function  Outcome: Progressing  Goal: Skin Health and Integrity  Outcome: Progressing  Goal: Optimal Wound Healing  Outcome: Progressing     Problem: Skin Injury Risk Increased  Goal: Skin Health and Integrity  Outcome: Progressing     Problem: Fall Injury Risk  Goal: Absence of Fall and Fall-Related Injury  Outcome: Progressing     Problem: Fatigue  Goal: Improved Activity Tolerance  Outcome: Progressing     Problem: Pain Acute  Goal: Optimal Pain Control and Function  Outcome: Progressing   Telemetry--8640--SR. R wrist SL intact. Foot pumps & ABD pillow in place when in bed. Sat in recliner for about 3-4 hours. Up with walker & CGA X2. L hip [place] foam drsg d/I. Foam to L hand, L knee, heels & elbows. Voiding well via purewick. Nicole Norco 5mg for pain. 2L O2/NC on. TB placed R medial FA. Glycerin supp given--had large soft BM. Diaper on. K=3.6 & phos=2.3--replacements given. Repositioned. Family supportive. Will monitor. Pleasant.

## 2025-03-05 NOTE — SUBJECTIVE & OBJECTIVE
Interval History:  Patient seen and examined.  Currently on 2 L nasal cannula.  Encouraged incentive spirometry use.  She is breathing comfortably.    Review of Systems   Constitutional:  Negative for chills and fever.   HENT:  Negative for congestion and trouble swallowing.    Eyes:  Negative for visual disturbance.   Respiratory:  Negative for shortness of breath.    Cardiovascular:  Negative for chest pain.   Gastrointestinal:  Negative for abdominal pain, nausea and vomiting.   Genitourinary:  Negative for dysuria.   Musculoskeletal:  Positive for arthralgias and gait problem.   Skin:         Left hip surgical incision   Neurological:  Negative for dizziness, speech difficulty and light-headedness.   Psychiatric/Behavioral:  Negative for agitation and confusion. The patient is not nervous/anxious.      Objective:     Vital Signs (Most Recent):  Temp: 97.7 °F (36.5 °C) (03/05/25 1139)  Pulse: 94 (03/05/25 1139)  Resp: 16 (03/05/25 1139)  BP: 107/60 (03/05/25 1139)  SpO2: 95 % (03/05/25 1139) Vital Signs (24h Range):  Temp:  [97.7 °F (36.5 °C)-99 °F (37.2 °C)] 97.7 °F (36.5 °C)  Pulse:  [] 94  Resp:  [16-20] 16  SpO2:  [93 %-96 %] 95 %  BP: (107-117)/(55-67) 107/60     Weight: 52.8 kg (116 lb 6.5 oz)  Body mass index is 20.62 kg/m².    Intake/Output Summary (Last 24 hours) at 3/5/2025 1157  Last data filed at 3/5/2025 0422  Gross per 24 hour   Intake 870 ml   Output 850 ml   Net 20 ml         Physical Exam  Constitutional:       General: She is not in acute distress.     Comments: Oxymask   HENT:      Head: Normocephalic and atraumatic.      Mouth/Throat:      Mouth: Mucous membranes are moist.   Eyes:      Extraocular Movements: Extraocular movements intact.      Pupils: Pupils are equal, round, and reactive to light.   Cardiovascular:      Rate and Rhythm: Normal rate and regular rhythm.      Pulses: Normal pulses.      Heart sounds: Normal heart sounds.   Pulmonary:      Effort: Pulmonary effort is  normal. No respiratory distress.      Breath sounds: Examination of the right-lower field reveals decreased breath sounds. Examination of the left-lower field reveals decreased breath sounds. Decreased breath sounds present. No wheezing, rhonchi or rales.   Abdominal:      General: Bowel sounds are normal. There is no distension.      Palpations: Abdomen is soft.      Tenderness: There is no abdominal tenderness. There is no guarding or rebound.   Musculoskeletal:      Cervical back: No rigidity.      Left hip: Tenderness present.      Right lower leg: No edema.      Left lower leg: No edema.   Skin:     General: Skin is warm.      Comments: Left hip surgical incision - dressing CDI, no signs of infection or irritation   Neurological:      Mental Status: She is alert and oriented to person, place, and time.      Motor: Tremor present.   Psychiatric:         Mood and Affect: Mood normal.         Behavior: Behavior normal.         Thought Content: Thought content normal.         Judgment: Judgment normal.             Significant Labs: All pertinent labs within the past 24 hours have been reviewed.    Significant Imaging: I have reviewed all pertinent imaging results/findings within the past 24 hours.

## 2025-03-05 NOTE — ASSESSMENT & PLAN NOTE
Continue to wean oxygen, currently on 2 L  Asymptomatic  New right lung atelectasis noted on CXR 3/3  Strongly encourage I.S. usage and OOB

## 2025-03-05 NOTE — CARE UPDATE
03/04/25 1957   Patient Assessment/Suction   Level of Consciousness (AVPU) alert   Respiratory Effort Normal;Unlabored   Expansion/Accessory Muscles/Retractions expansion symmetric;no retractions;no use of accessory muscles   All Lung Fields Breath Sounds Anterior:;Lateral:;diminished   PRE-TX-O2   Device (Oxygen Therapy) nasal cannula   $ Is the patient on Low Flow Oxygen? Yes   Flow (L/min) (Oxygen Therapy) 2   SpO2 95 %   Pulse Oximetry Type Intermittent   $ Pulse Oximetry - Multiple Charge Pulse Oximetry - Multiple   Pulse 84   Resp 19   Incentive Spirometer   $ Incentive Spirometer Charges done with encouragement   Incentive Spirometer Predicted Level (mL) 760   Administration (IS) mouthpiece utilized;instruction provided, follow-up   Number of Repetitions (IS) 3   Level Incentive Spirometer (mL) 500   Patient Tolerance (IS) poor;no adverse signs/symptoms present   Education   $ Education 15 min;Incentive Spirometry     We went from oxymask to nc w/h20

## 2025-03-05 NOTE — ASSESSMENT & PLAN NOTE
Orthopedic surgery following  P.r.n. analgesics  PT/OT  Status post left hip repair  Skilled nursing facility placement pending

## 2025-03-05 NOTE — PLAN OF CARE
Problem: Physical Therapy  Goal: Physical Therapy Goal  Description: Goals to be met by: 2025     Patient will increase functional independence with mobility by performin. Supine to sit with Moderate Assistance  2. Sit to stand transfer with Moderate Assistance  3. Bed to chair transfer with Moderate Assistance using Rolling Walker  4. Gait  x 10 feet with Moderate Assistance using Rolling Walker.   5. Lower extremity exercise program x20 reps    Post L RITA 2025/WBAT with posterior precautions/abd pillow    Outcome: Progressing   Pt  seen for thera ex in supine with cueing. Sleepy. Assist x2 to sit EOB/standing and pivot transfer to chair. HIT vs KATHARINA

## 2025-03-05 NOTE — PLAN OF CARE
Problem: Occupational Therapy  Goal: Occupational Therapy Goal  Description: Goals to be met by: 4/1/25     Patient will increase functional independence with ADLs by performing:    Feeding with Minimal Assistance/SBA utilizing AE and adaptive techniques.  UE Dressing with Moderate Assistance.  LE Dressing with Maximum Assistance.  Grooming while seated with Minimal Assistance.  Toileting from toilet with Maximum Assistance for hygiene and clothing management.   Bathing from  shower chair/bench with Maximum Assistance.  Toilet transfer to toilet with Moderate Assistance.  Increased strength and functional activity tolerance for ADL's/IADL's    Outcome: Progressing  Continue with POC

## 2025-03-06 VITALS
SYSTOLIC BLOOD PRESSURE: 117 MMHG | TEMPERATURE: 98 F | WEIGHT: 116.38 LBS | HEIGHT: 63 IN | OXYGEN SATURATION: 93 % | HEART RATE: 108 BPM | BODY MASS INDEX: 20.62 KG/M2 | RESPIRATION RATE: 14 BRPM | DIASTOLIC BLOOD PRESSURE: 56 MMHG

## 2025-03-06 LAB
ANION GAP SERPL CALC-SCNC: 10 MMOL/L (ref 8–16)
BASOPHILS # BLD AUTO: 0.03 K/UL (ref 0–0.2)
BASOPHILS NFR BLD: 0.6 % (ref 0–1.9)
BUN SERPL-MCNC: 13 MG/DL (ref 8–23)
CALCIUM SERPL-MCNC: 7.5 MG/DL (ref 8.7–10.5)
CHLORIDE SERPL-SCNC: 100 MMOL/L (ref 95–110)
CO2 SERPL-SCNC: 23 MMOL/L (ref 23–29)
CREAT SERPL-MCNC: 0.4 MG/DL (ref 0.5–1.4)
DIFFERENTIAL METHOD BLD: ABNORMAL
EOSINOPHIL # BLD AUTO: 0.1 K/UL (ref 0–0.5)
EOSINOPHIL NFR BLD: 1.8 % (ref 0–8)
ERYTHROCYTE [DISTWIDTH] IN BLOOD BY AUTOMATED COUNT: 12.4 % (ref 11.5–14.5)
EST. GFR  (NO RACE VARIABLE): >60 ML/MIN/1.73 M^2
GLUCOSE SERPL-MCNC: 123 MG/DL (ref 70–110)
HCT VFR BLD AUTO: 32.6 % (ref 37–48.5)
HGB BLD-MCNC: 10.8 G/DL (ref 12–16)
IMM GRANULOCYTES # BLD AUTO: 0.01 K/UL (ref 0–0.04)
IMM GRANULOCYTES NFR BLD AUTO: 0.2 % (ref 0–0.5)
LYMPHOCYTES # BLD AUTO: 0.9 K/UL (ref 1–4.8)
LYMPHOCYTES NFR BLD: 18.1 % (ref 18–48)
MAGNESIUM SERPL-MCNC: 1.9 MG/DL (ref 1.6–2.6)
MCH RBC QN AUTO: 30.5 PG (ref 27–31)
MCHC RBC AUTO-ENTMCNC: 33.1 G/DL (ref 32–36)
MCV RBC AUTO: 92 FL (ref 82–98)
MONOCYTES # BLD AUTO: 0.6 K/UL (ref 0.3–1)
MONOCYTES NFR BLD: 11.2 % (ref 4–15)
NEUTROPHILS # BLD AUTO: 3.4 K/UL (ref 1.8–7.7)
NEUTROPHILS NFR BLD: 68.1 % (ref 38–73)
NRBC BLD-RTO: 0 /100 WBC
PHOSPHATE SERPL-MCNC: 2.4 MG/DL (ref 2.7–4.5)
PLATELET # BLD AUTO: 198 K/UL (ref 150–450)
PMV BLD AUTO: 10.2 FL (ref 9.2–12.9)
POTASSIUM SERPL-SCNC: 3.2 MMOL/L (ref 3.5–5.1)
RBC # BLD AUTO: 3.54 M/UL (ref 4–5.4)
SODIUM SERPL-SCNC: 133 MMOL/L (ref 136–145)
WBC # BLD AUTO: 4.98 K/UL (ref 3.9–12.7)

## 2025-03-06 PROCEDURE — 36415 COLL VENOUS BLD VENIPUNCTURE: CPT | Performed by: HOSPITALIST

## 2025-03-06 PROCEDURE — 84100 ASSAY OF PHOSPHORUS: CPT | Performed by: HOSPITALIST

## 2025-03-06 PROCEDURE — 94799 UNLISTED PULMONARY SVC/PX: CPT

## 2025-03-06 PROCEDURE — 85025 COMPLETE CBC W/AUTO DIFF WBC: CPT | Performed by: HOSPITALIST

## 2025-03-06 PROCEDURE — 25000003 PHARM REV CODE 250

## 2025-03-06 PROCEDURE — 97530 THERAPEUTIC ACTIVITIES: CPT

## 2025-03-06 PROCEDURE — 83735 ASSAY OF MAGNESIUM: CPT | Performed by: HOSPITALIST

## 2025-03-06 PROCEDURE — 97535 SELF CARE MNGMENT TRAINING: CPT

## 2025-03-06 PROCEDURE — 80048 BASIC METABOLIC PNL TOTAL CA: CPT | Performed by: HOSPITALIST

## 2025-03-06 PROCEDURE — 97110 THERAPEUTIC EXERCISES: CPT

## 2025-03-06 PROCEDURE — 94761 N-INVAS EAR/PLS OXIMETRY MLT: CPT

## 2025-03-06 RX ORDER — ASPIRIN 81 MG/1
81 TABLET ORAL 2 TIMES DAILY
Qty: 60 TABLET | Refills: 0 | Status: ON HOLD | OUTPATIENT
Start: 2025-03-06 | End: 2025-04-05

## 2025-03-06 RX ADMIN — DICLOFENAC SODIUM 2 G: 10 GEL TOPICAL at 09:03

## 2025-03-06 RX ADMIN — POTASSIUM & SODIUM PHOSPHATES POWDER PACK 280-160-250 MG 2 PACKET: 280-160-250 PACK at 12:03

## 2025-03-06 RX ADMIN — POTASSIUM BICARBONATE 35 MEQ: 391 TABLET, EFFERVESCENT ORAL at 12:03

## 2025-03-06 RX ADMIN — HYDROCODONE BITARTRATE AND ACETAMINOPHEN 1 TABLET: 5; 325 TABLET ORAL at 07:03

## 2025-03-06 RX ADMIN — LEVETIRACETAM 500 MG: 500 TABLET, FILM COATED ORAL at 09:03

## 2025-03-06 RX ADMIN — CYANOCOBALAMIN TAB 250 MCG 250 MCG: 250 TAB at 09:03

## 2025-03-06 RX ADMIN — LEVOTHYROXINE SODIUM 62.5 MCG: 25 TABLET ORAL at 05:03

## 2025-03-06 RX ADMIN — Medication 2000 UNITS: at 09:03

## 2025-03-06 NOTE — PLAN OF CARE
Problem: Adult Inpatient Plan of Care  Goal: Plan of Care Review  Outcome: Progressing  Goal: Patient-Specific Goal (Individualized)  Outcome: Progressing  Goal: Absence of Hospital-Acquired Illness or Injury  Outcome: Progressing  Goal: Optimal Comfort and Wellbeing  Outcome: Progressing  Goal: Readiness for Transition of Care  Outcome: Progressing     Problem: Wound  Goal: Optimal Coping  Outcome: Progressing  Goal: Absence of Infection Signs and Symptoms  Outcome: Progressing  Goal: Improved Oral Intake  Outcome: Progressing  Goal: Optimal Wound Healing  Outcome: Progressing     POC reviewed with patient and daughter-in-law. Verbalized understanding. Pain controlled on current regimen. OOB to chair with PT assist x2. Two large pasty alejandro colored stools this shift. Kay care provided. Barrier cream applied to sacrum. Dressings changed per wound care nurse. Surgical dressing to hip dry and intact. Abduction pillow in use. No neuro deficits noted. Potassium replaced x1. Phosphorus replaced x1. Patient to be transferred to [facility] today. Report called to CC.

## 2025-03-06 NOTE — PLAN OF CARE
Pt cleared for discharge from case management to [facility]  Call report to [phone]  is for 215pm.  Nurse notified.     03/06/25 1317   Final Note   Assessment Type Final Discharge Note   Anticipated Discharge Disposition Rehab   Post-Acute Status   Post-Acute Authorization Placement   Post-Acute Placement Status Set-up Complete/Auth obtained

## 2025-03-06 NOTE — PT/OT/SLP PROGRESS
Occupational Therapy   Treatment    Name: Ariana Tiwari  MRN: [id number]  Admitting Diagnosis:  Closed left hip fracture  5 Days Post-Op    Recommendations:     Discharge Recommendations: High Intensity Therapy  Discharge Equipment Recommendations:  other (see comments) (TBD)  Barriers to discharge:       Assessment:     Ariana Tiwari is a 79 y.o. female with a medical diagnosis of Closed left hip fracture.  She presents with the following performance deficits affecting function are weakness, impaired endurance, impaired self care skills, impaired functional mobility, gait instability, impaired balance, decreased upper extremity function, decreased lower extremity function, pain, decreased ROM, impaired coordination, impaired fine motor, orthopedic precautions, impaired cardiopulmonary response to activity. Pt up in chair, tired, and requesting back to bed. Pt required Max assist with scooting to edge of chair. Pt required Max assist x 2 and assist to advance legs for chair to bed transfer. Pt required Max assist x 2 with sit to supine and scooting up in bed.    Rehab Prognosis:  Good; patient would benefit from acute skilled OT services to address these deficits and reach maximum level of function.       Plan:     Patient to be seen 6 x/week to address the above listed problems via self-care/home management, therapeutic activities, therapeutic exercises  Plan of Care Expires: 04/01/25  Plan of Care Reviewed with: patient, daughter    Subjective     Chief Complaint: tired/weak  Patient/Family Comments/goals: To get better  Pain/Comfort:  Pain Rating 1:  (not rated)    Objective:     Communicated with: Nurse Arnett/Carrie  prior to session.  Patient found up in chair with chair check, peripheral IV, PureWick, telemetry upon OT entry to room.    General Precautions: Standard, fall, seizure    Orthopedic Precautions:LLE weight bearing as tolerated, LLE posterior precautions  Braces: N/A  Respiratory Status: Room  air     Occupational Performance:     Bed Mobility:    Patient completed Scooting/Bridging with maximal assistance and 2 persons  Patient completed Sit to Supine with maximal assistance and 2 persons     Functional Mobility/Transfers:  Patient completed Sit <> Stand Transfer with maximal assistance and of 2 persons  with  no assistive device   Patient completed Bed <> Chair Transfer using Max assist x 2 persons and assist to advance legs/observe LLE posterior precautions.          [facility] 6 Click ADL:      Treatment & Education:  Safety and observation of LLE posterior precautions with ADL transfers. Further training indicated.  OT provided education in calling for assist. Patient verbalized understanding.      Patient left HOB elevated with all lines intact, call button in reach, bed alarm on, Nurse Hope/Carrie  notified, and DIL  present    GOALS:   Multidisciplinary Problems       Occupational Therapy Goals          Problem: Occupational Therapy    Goal Priority Disciplines Outcome Interventions   Occupational Therapy Goal     OT, PT/OT Progressing    Description: Goals to be met by: 4/1/25     Patient will increase functional independence with ADLs by performing:    Feeding with Minimal Assistance/SBA utilizing AE and adaptive techniques.  UE Dressing with Moderate Assistance.  LE Dressing with Maximum Assistance.  Grooming while seated with Minimal Assistance.  Toileting from toilet with Maximum Assistance for hygiene and clothing management.   Bathing from  shower chair/bench with Maximum Assistance.  Toilet transfer to toilet with Moderate Assistance.  Increased strength and functional activity tolerance for ADL's/IADL's                         DME Justifications:  No DME recommended requiring DME justifications    Time Tracking:     OT Date of Treatment: 03/06/25  OT Start Time: 1100  OT Stop Time: 1123  OT Total Time (min): 23 min    Billable Minutes:Self Care/Home Management 23    OT/TAL: OT           3/6/2025

## 2025-03-06 NOTE — SUBJECTIVE & OBJECTIVE
Interval History:  Patient seen and examined.  Did well ambulating on room air per nurse.  Awaiting skilled nursing facility placement.    Review of Systems   Constitutional:  Negative for chills and fever.   HENT:  Negative for congestion and trouble swallowing.    Eyes:  Negative for visual disturbance.   Respiratory:  Negative for shortness of breath.    Cardiovascular:  Negative for chest pain.   Gastrointestinal:  Negative for abdominal pain, nausea and vomiting.   Genitourinary:  Negative for dysuria.   Musculoskeletal:  Positive for arthralgias and gait problem.   Skin:         Left hip surgical incision   Neurological:  Negative for dizziness, speech difficulty and light-headedness.   Psychiatric/Behavioral:  Negative for agitation and confusion. The patient is not nervous/anxious.      Objective:     Vital Signs (Most Recent):  Temp: 97.5 °F (36.4 °C) (03/06/25 1124)  Pulse: 80 (03/06/25 1124)  Resp: 14 (03/06/25 1124)  BP: (!) 117/56 (03/06/25 1124)  SpO2: (!) 93 % (03/06/25 1124) Vital Signs (24h Range):  Temp:  [97.5 °F (36.4 °C)-98.4 °F (36.9 °C)] 97.5 °F (36.4 °C)  Pulse:  [] 80  Resp:  [14-18] 14  SpO2:  [93 %-97 %] 93 %  BP: (109-126)/(56-62) 117/56     Weight: 52.8 kg (116 lb 6.5 oz)  Body mass index is 20.62 kg/m².    Intake/Output Summary (Last 24 hours) at 3/6/2025 1139  Last data filed at 3/6/2025 0454  Gross per 24 hour   Intake 600 ml   Output 675 ml   Net -75 ml         Physical Exam  Constitutional:       General: She is not in acute distress.     Comments: Oxymask   HENT:      Head: Normocephalic and atraumatic.      Mouth/Throat:      Mouth: Mucous membranes are moist.   Eyes:      Extraocular Movements: Extraocular movements intact.      Pupils: Pupils are equal, round, and reactive to light.   Cardiovascular:      Rate and Rhythm: Normal rate and regular rhythm.      Pulses: Normal pulses.      Heart sounds: Normal heart sounds.   Pulmonary:      Effort: Pulmonary effort is normal.  No respiratory distress.      Breath sounds: Examination of the right-lower field reveals decreased breath sounds. Examination of the left-lower field reveals decreased breath sounds. Decreased breath sounds present. No wheezing, rhonchi or rales.   Abdominal:      General: Bowel sounds are normal. There is no distension.      Palpations: Abdomen is soft.      Tenderness: There is no abdominal tenderness. There is no guarding or rebound.   Musculoskeletal:      Cervical back: No rigidity.      Left hip: Tenderness present.      Right lower leg: No edema.      Left lower leg: No edema.   Skin:     General: Skin is warm.      Comments: Left hip surgical incision - dressing CDI, no signs of infection or irritation   Neurological:      Mental Status: She is alert and oriented to person, place, and time.      Motor: Tremor present.   Psychiatric:         Mood and Affect: Mood normal.         Behavior: Behavior normal.         Thought Content: Thought content normal.         Judgment: Judgment normal.             Significant Labs: All pertinent labs within the past 24 hours have been reviewed.    Significant Imaging: I have reviewed all pertinent imaging results/findings within the past 24 hours.

## 2025-03-06 NOTE — PROGRESS NOTES
[facility] reassessed pt and accepted. Jing stated family needs to bring the carbidopa-levodopa med to the rehab for pt; they will not provide due to cost.    NATHALY met with pt and daughter in law (DIL), and informed of rehab acceptance and need to bring med to the rehab.  DIL will take med to rehab today.    NATHALY sent orders to [facility] via [employer]

## 2025-03-06 NOTE — PLAN OF CARE
Problem: Wound  Goal: Optimal Coping  Outcome: Progressing  Goal: Optimal Functional Ability  Outcome: Progressing  Goal: Absence of Infection Signs and Symptoms  Outcome: Progressing  Goal: Improved Oral Intake  Outcome: Progressing  Goal: Optimal Pain Control and Function  Outcome: Progressing  Goal: Skin Health and Integrity  Outcome: Progressing  Goal: Optimal Wound Healing  Outcome: Progressing     Problem: Skin Injury Risk Increased  Goal: Skin Health and Integrity  Outcome: Progressing   Patient stable throughout night, x3 BM  white alejandro colored , patient with dressing to left hip minimal drainage noted, old in appearance , daughter in law refused trazadone

## 2025-03-06 NOTE — PROGRESS NOTES
[facility]  Progress Note    Patient Name: Ariana Tiwari  MRN: [id number]  Patient Class: IP- Inpatient   Admission Date: 2/28/2025  Length of Stay: 6 days  Attending Physician: Suzie Tomas MD  Primary Care Provider: Nba Petty MD        Subjective     Principal Problem:Closed left hip fracture        HPI:  Ariana Tiwari 79 y.o.female with a past medical history significant for hypothyroidism, Parkinson's, hernia, seizures, and stomach ulcers.  Patient presented to the emergency department status post witnessed fall at home.  Patient stated that she was attempting to get her walker in a chair was in the way and she fell to the ground.  Patient denies loss of consciousness or head trauma.  Patient denies fever, chills nausea, vomiting.  There are no alleviating factors and pain is aggravated by movement.  Patient denies any recent travel or sick visits.  Patient denies use of blood thinners. Patient denies smoking or use of alcohol.  Patient lives at home with her son and daughter-in-law.  Emergency department workup included:  CBC unremarkable, CMP unremarkable, Mg-1.9.  X-ray left hip revealed mildly displaced transverse fracture of the left femoral neck.  Orthopedic surgeon was consulted.  Patient will be admitted to Hospital Medicine for management and evaluation.       Overview/Hospital Course:  Ariana Tiwari is an 79 year old female w/ PMH hypothyroidism, Parkinson's, hernia, seizures, and stomach ulcers. She was admitted to the hospital for a left femoral neck displace fracture s/p mechanical fall. Orthopedic surgery was consulted and she underwent a hip arthroplasty 3/1. PT/OT were consulted. Case management consulted to assist with discharge planning.    Interval History:  Patient seen and examined.  Did well ambulating on room air per nurse.  Awaiting skilled nursing facility placement.    Review of Systems   Constitutional:  Negative for chills  and fever.   HENT:  Negative for congestion and trouble swallowing.    Eyes:  Negative for visual disturbance.   Respiratory:  Negative for shortness of breath.    Cardiovascular:  Negative for chest pain.   Gastrointestinal:  Negative for abdominal pain, nausea and vomiting.   Genitourinary:  Negative for dysuria.   Musculoskeletal:  Positive for arthralgias and gait problem.   Skin:         Left hip surgical incision   Neurological:  Negative for dizziness, speech difficulty and light-headedness.   Psychiatric/Behavioral:  Negative for agitation and confusion. The patient is not nervous/anxious.      Objective:     Vital Signs (Most Recent):  Temp: 97.5 °F (36.4 °C) (03/06/25 1124)  Pulse: 80 (03/06/25 1124)  Resp: 14 (03/06/25 1124)  BP: (!) 117/56 (03/06/25 1124)  SpO2: (!) 93 % (03/06/25 1124) Vital Signs (24h Range):  Temp:  [97.5 °F (36.4 °C)-98.4 °F (36.9 °C)] 97.5 °F (36.4 °C)  Pulse:  [] 80  Resp:  [14-18] 14  SpO2:  [93 %-97 %] 93 %  BP: (109-126)/(56-62) 117/56     Weight: 52.8 kg (116 lb 6.5 oz)  Body mass index is 20.62 kg/m².    Intake/Output Summary (Last 24 hours) at 3/6/2025 1139  Last data filed at 3/6/2025 0454  Gross per 24 hour   Intake 600 ml   Output 675 ml   Net -75 ml         Physical Exam  Constitutional:       General: She is not in acute distress.     Comments: Oxymask   HENT:      Head: Normocephalic and atraumatic.      Mouth/Throat:      Mouth: Mucous membranes are moist.   Eyes:      Extraocular Movements: Extraocular movements intact.      Pupils: Pupils are equal, round, and reactive to light.   Cardiovascular:      Rate and Rhythm: Normal rate and regular rhythm.      Pulses: Normal pulses.      Heart sounds: Normal heart sounds.   Pulmonary:      Effort: Pulmonary effort is normal. No respiratory distress.      Breath sounds: Examination of the right-lower field reveals decreased breath sounds. Examination of the left-lower field reveals decreased breath sounds. Decreased  breath sounds present. No wheezing, rhonchi or rales.   Abdominal:      General: Bowel sounds are normal. There is no distension.      Palpations: Abdomen is soft.      Tenderness: There is no abdominal tenderness. There is no guarding or rebound.   Musculoskeletal:      Cervical back: No rigidity.      Left hip: Tenderness present.      Right lower leg: No edema.      Left lower leg: No edema.   Skin:     General: Skin is warm.      Comments: Left hip surgical incision - dressing CDI, no signs of infection or irritation   Neurological:      Mental Status: She is alert and oriented to person, place, and time.      Motor: Tremor present.   Psychiatric:         Mood and Affect: Mood normal.         Behavior: Behavior normal.         Thought Content: Thought content normal.         Judgment: Judgment normal.             Significant Labs: All pertinent labs within the past 24 hours have been reviewed.    Significant Imaging: I have reviewed all pertinent imaging results/findings within the past 24 hours.    Assessment and Plan     Assessment & Plan  Closed left hip fracture  Orthopedic surgery following  P.r.n. analgesics  PT/OT  Status post left hip repair  Skilled nursing facility placement pending    Hypothyroidism  Patient has chronic hypothyroidism. TFTs reviewed-   Lab Results   Component Value Date    TSH 2.518 09/15/2024   . Will continue chronic levothyroxine and adjust for and clinical changes.      PD (Parkinson's disease)  Chronic.  Noted.    Continue home medications    Recurrent intestinal obstruction  Chronic.  Noted.  No acute problem  LBM 3/4  Seizure  Chronic.  Noted.    Continue home medications    Gluten free diet  Noted    Hypoxia  Continue to wean oxygen, currently on 2 L  Asymptomatic  New right lung atelectasis noted on CXR 3/3  Strongly encourage I.S. usage and OOB      VTE Risk Mitigation (From admission, onward)           Ordered     enoxaparin injection 40 mg  Daily         02/28/25 6521      IP VTE HIGH RISK PATIENT  Once         02/28/25 1308     Place sequential compression device  Until discontinued         02/28/25 1308                    Discharge Planning   IAIN: 3/7/2025     Code Status: Full Code   Medical Readiness for Discharge Date:   Discharge Plan A: Skilled Nursing Facility                Please place Justification for DME        Suzie Tomas MD  Department of Hospital Medicine   [facility]/Surg

## 2025-03-06 NOTE — PT/OT/SLP PROGRESS
Physical Therapy Treatment    Patient Name:  Ariana Tiwari   MRN:  [id number]    Recommendations:     Discharge Recommendations: High Intensity Therapy  Discharge Equipment Recommendations: none  Barriers to discharge: Decreased caregiver support    Assessment:     Ariana Tiwari is a 79 y.o. female admitted with a medical diagnosis of Closed left hip fracture.  She presents with the following impairments/functional limitations: weakness, impaired endurance, impaired self care skills, impaired functional mobility, gait instability, impaired balance, decreased lower extremity function, decreased safety awareness, pain, impaired cardiopulmonary response to activity, orthopedic precautions, decreased ROM .    Pt seen supine in bed- sleepy- was just cleaned post BM per daughter. Pt seen for thera ex in supine. Mod/max assist to sit EOB and once positioned- able to sustain sitting balance. Pt did bilateral LAQ. Sit to stand mod/max assist and cueing for upright posture. Pt worked on weight shifting. Pt assisted to chair max assist and scooted for better positioning. More alert/interactive and looking good while up. Daughter stated that they have a cruise coming up in April- pt wanting to get better.    Rehab Prognosis: Fair; patient would benefit from acute skilled PT services to address these deficits and reach maximum level of function.    Recent Surgery: Procedure(s) (LRB):  ARTHROPLASTY, HIP (Left) 5 Days Post-Op    Plan:     During this hospitalization, patient to be seen daily to address the identified rehab impairments via gait training, therapeutic activities, therapeutic exercises and progress toward the following goals:    Plan of Care Expires:  03/30/25    Subjective   Daughter stated that pt's RLE non operated leg is her weaker leg due to PD  Daughter stated pt ambulatory with RW at home and has been going to cruises with them  Chief Complaint: sleepy  Patient/Family Comments/goals: go on cruise in  April  Pain/Comfort:  Pain Rating 1:  (not rated)  Location - Side 1: Left  Location 1: hip  Pain Addressed 1: Pre-medicate for activity, Reposition      Objective:     Communicated with nurse Alexander prior to session.  Patient found HOB elevated with hip abduction pillow, oxygen, telemetry, PureWick, SCD, bed alarm upon PT entry to room.     General Precautions: Standard, fall, seizure  Orthopedic Precautions: LLE weight bearing as tolerated, LLE posterior precautions  Braces: N/A  Respiratory Status: Nasal cannula, flow 2 L/min     Functional Mobility:  Bed Mobility:     Scooting: maximal assistance  Supine to Sit: moderate assistance and maximal assistance  Transfers:     Sit to Stand:  moderate assistance and maximal assistance with no AD  Bed to Chair: moderate assistance and maximal assistance with  no AD  using  Stand Pivot      AM-PAC 6 CLICK MOBILITY          Treatment & Education:  Patient was educated on the importance of OOB activity and functional mobility to negate negative effects of prolonged bed rest during hospitalization, safe transfers and ambulation, and D/C planning   Thera ex in supine with cueing for alertness-   Mod/max assist mobility for safe transfers  Pt able to maintain sitting balance  Stood and worked on weight shifting  OOB chair  Pt taken at hallways and wheeled around for stimulation- pt more awake and SAT on RA 95%    Patient left up in chair with all lines intact, call button in reach, chair alarm on, and daughter present..    GOALS:   Multidisciplinary Problems       Physical Therapy Goals          Problem: Physical Therapy    Goal Priority Disciplines Outcome Interventions   Physical Therapy Goal     PT, PT/OT Progressing    Description: Goals to be met by: 2025     Patient will increase functional independence with mobility by performin. Supine to sit with Moderate Assistance  2. Sit to stand transfer with Moderate Assistance  3. Bed to chair transfer with Moderate  Assistance using Rolling Walker  4. Gait  x 10 feet with Moderate Assistance using Rolling Walker.   5. Lower extremity exercise program x20 reps    Post L RITA 03-/WBAT with posterior precautions/abd pillow                         DME Justifications:  No DME recommended requiring DME justifications    Time Tracking:     PT Received On: 03/06/25  PT Start Time: 0907     PT Stop Time: 0935  PT Total Time (min): 28 min     Billable Minutes: Therapeutic Activity 18 and Therapeutic Exercise 10    Treatment Type: Treatment  PT/PTA: PT     Number of PTA visits since last PT visit: 0     03/06/2025

## 2025-03-06 NOTE — PLAN OF CARE
F/u with SNFs:  [facility]declined, no beds;  [facility] declined, facility full.    11:18am - Spoke to Gracie at [facility], no available bed.    Spoke to daughter in law and she selected [facility].  Sent referral to both facilities via [employer].     03/06/25 1726   Post-Acute Status   Post-Acute Authorization Placement   Post-Acute Placement Status Referrals Sent   Discharge Plan   Discharge Plan A Skilled Nursing Facility   Discharge Plan B Skilled Nursing Facility

## 2025-03-06 NOTE — NURSING
Transferred to [facility]. Home medication given to family. Left unit in wheelchair, transported by [place] transportation.

## 2025-03-06 NOTE — DISCHARGE SUMMARY
[facility] Medicine  Discharge Summary      Patient Name: Ariana Tiwari  MRN: [id number]  JAN: [id number]  Patient Class: IP- Inpatient  Admission Date: 2/28/2025  Hospital Length of Stay: 6 days  Discharge Date and Time: 3/6/2025  2:42 PM  Attending Physician: No att. providers found   Discharging Provider: Suzie Tomas MD  Primary Care Provider: Nba Petty MD    Primary Care Team: Networked reference to record PCT     HPI:   Ariana Tiwari 79 y.o.female with a past medical history significant for hypothyroidism, Parkinson's, hernia, seizures, and stomach ulcers.  Patient presented to the emergency department status post witnessed fall at home.  Patient stated that she was attempting to get her walker in a chair was in the way and she fell to the ground.  Patient denies loss of consciousness or head trauma.  Patient denies fever, chills nausea, vomiting.  There are no alleviating factors and pain is aggravated by movement.  Patient denies any recent travel or sick visits.  Patient denies use of blood thinners. Patient denies smoking or use of alcohol.  Patient lives at home with her son and daughter-in-law.  Emergency department workup included:  CBC unremarkable, CMP unremarkable, Mg-1.9.  X-ray left hip revealed mildly displaced transverse fracture of the left femoral neck.  Orthopedic surgeon was consulted.  Patient will be admitted to Hospital Medicine for management and evaluation.       Procedure(s) (LRB):  ARTHROPLASTY, HIP (Left)      Hospital Course:   Ariana Tiwari is an 79 year old female w/ PMH hypothyroidism, Parkinson's, hernia, seizures, and stomach ulcers. She was admitted to the hospital for a left femoral neck displace fracture s/p mechanical fall. Orthopedic surgery was consulted and she underwent a hip arthroplasty 3/1. PT/OT were consulted. Case management consulted to assist with discharge planning.  Patient had oxygen requirement after surgery  secondary to atelectasis which improved with incentive spirometry use.  Patient was discharged to [facility].  Patient seen and examined on day of discharge and deemed suitable for discharge to rehab.  Aspirin 81 b.i.d. for DVT prophylaxis for the next 30 days.  Follow up with ortho in 2 weeks     Goals of Care Treatment Preferences:  Code Status: Full Code      SDOH Screening:  The patient declined to be screened for utility difficulties, food insecurity, transport difficulties, housing insecurity, and interpersonal safety, so no concerns could be identified this admission.     Consults:     Assessment & Plan  Closed left hip fracture  Orthopedic surgery following  P.r.n. analgesics  PT/OT  Status post left hip repair  Skilled nursing facility placement pending    Hypothyroidism  Patient has chronic hypothyroidism. TFTs reviewed-   Lab Results   Component Value Date    TSH 2.518 09/15/2024   . Will continue chronic levothyroxine and adjust for and clinical changes.      PD (Parkinson's disease)  Chronic.  Noted.    Continue home medications    Recurrent intestinal obstruction  Chronic.  Noted.  No acute problem  LBM 3/4  Seizure  Chronic.  Noted.    Continue home medications    Gluten free diet  Noted    Hypoxia  Continue to wean oxygen, currently on 2 L  Asymptomatic  New right lung atelectasis noted on CXR 3/3  Strongly encourage I.S. usage and OOB      Final Active Diagnoses:    Diagnosis Date Noted POA    PRINCIPAL PROBLEM:  Closed left hip fracture [S72.002A] 02/28/2025 Yes    Hypoxia [R09.02] 03/04/2025 No    Gluten free diet [Z78.9] 03/01/2025 Yes    Seizure [R56.9] 01/30/2023 Yes    Recurrent intestinal obstruction [K56.609] 02/03/2020 Yes    PD (Parkinson's disease) [G20.A1] 09/16/2015 Yes    Hypothyroidism [E03.9]  Yes      Problems Resolved During this Admission:    Diagnosis Date Noted Date Resolved POA    Diabetes [E11.9] 02/28/2025 02/28/2025 Yes       Discharged Condition: good    Disposition:  Rehab Facility    Follow Up:   Follow-up Information       Jaswinder Nguyen MD Follow up in 2 week(s).    Specialty: Orthopedic Surgery  Contact information:  [address] [place] [address]  [phone]                           Patient Instructions:      Activity as tolerated       Significant Diagnostic Studies: Labs: BMP:   Recent Labs   Lab 03/05/25  0441 03/06/25  0818    123*   * 133*   K 3.6 3.2*    100   CO2 23 23   BUN 14 13   CREATININE 0.5 0.4*   CALCIUM 7.4* 7.5*   MG 2.0 1.9    and CBC   Recent Labs   Lab 03/05/25  0442 03/06/25  0818   WBC 4.70 4.98   HGB 10.4* 10.8*   HCT 32.1* 32.6*    198   Radiology Results (last 7 days)    Procedure Component Value Units Date/Time   X-Ray Chest 1 View [[id number]] Resulted: 03/03/25 1008   Order Status: Completed Updated: 03/03/25 1011   Narrative:     EXAMINATION:  XR CHEST 1 VIEW    CLINICAL HISTORY:  hypoxia;    TECHNIQUE:  Single frontal view of the chest was performed.    COMPARISON:  Chest x-ray of February 28, 2025    FINDINGS:  The mediastinal and cardiac size and contour is normal.  A band of atelectasis is seen in the right lung base adjacent to the heart.  This is new from the prior study.  The left lung is clear.  No pneumothorax or pleural effusion is noted.   Impression:       A new band of atelectasis is seen in the right lung base adjacent to the heart otherwise negative portable chest x-ray      Electronically signed by: Jadiel Martinez MD  Date: 03/03/2025  Time: 10:08   X-Ray Hip 1 View Left (with Pelvis when performed) [[id number]] Resulted: 03/01/25 0910   Order Status: Completed Updated: 03/01/25 0912   Narrative:     EXAMINATION:  XR HIP 1 VIEW LEFT (WITH PELVIS WHEN PERFORMED)    CLINICAL HISTORY:  s/p  hip arthroplasty;    TECHNIQUE:  Single view of the left hip was performed.    COMPARISON:  February 28, 2025    FINDINGS:  A left total hip prosthesis is now in place.  Alignment and positioning are  good.  There is moderate demineralization.  Views of the bony pelvis are otherwise unremarkable.      Electronically signed by: Jossue Oro MD  Date: 03/01/2025  Time: 09:10   X-Ray Chest 1 View [[id number]] Resulted: 02/28/25 1441   Order Status: Completed Updated: 02/28/25 1443   Narrative:     EXAMINATION:  XR CHEST 1 VIEW    CLINICAL HISTORY:  Encounter for other preprocedural examination    TECHNIQUE:  Single frontal view of the chest was performed.    COMPARISON:  Chest x-ray of September 13, 2023    FINDINGS:  The lungs are clear, with normal appearance of pulmonary vasculature and no pleural effusion or pneumothorax.    The cardiac silhouette is normal in size. The hilar and mediastinal contours are unremarkable.    Bones are intact.   Impression:       No acute abnormality.      Electronically signed by: Jadiel Martinez MD  Date: 02/28/2025  Time: 14:41   X-Ray Hip 2 or 3 views Left with Pelvis when performed [[id number]] (Abnormal) Resulted: 02/28/25 1209   Order Status: Completed Updated: 02/28/25 1211   Narrative:     EXAMINATION:  XR HIP WITH PELVIS WHEN PERFORMED 2 OR 3 VIEWS LEFT    CLINICAL HISTORY:  Left lower quadrant pain    TECHNIQUE:  AP view of the pelvis and frog leg lateral view of the left hip were performed.    COMPARISON:  None    FINDINGS:  There is transverse fracture of the left femoral neck with very mild shift of the femur in relation to the femoral head.  The femoral head remains in the acetabulum.  The bones of the pelvis and right hip are intact..   Impression:       Mildly displaced transverse fracture of the left femoral neck.    This report was flagged in Epic as abnormal.      Electronically signed by: Jadiel Martinez MD  Date: 02/28/2025  Time: 12:09       Pending Diagnostic Studies:       None           Medications:  Reconciled Home Medications:      Medication List        START taking these medications      aspirin 81 MG EC tablet  Commonly known as: ECOTRIN  Take 1  tablet (81 mg total) by mouth 2 (two) times a day.            CHANGE how you take these medications      levothyroxine 137 MCG Tab tablet  Commonly known as: SYNTHROID  TAKE 1/2 (ONE-HALF) TABLET BY MOUTH BEFORE BREAKFAST  What changed: See the new instructions.            CONTINUE taking these medications      acetaminophen 325 mg Cap  Take 650 mg by mouth every 6 (six) hours as needed (pain).     azelastine 137 mcg (0.1 %) nasal spray  Commonly known as: ASTELIN  1 spray by Nasal route 2 (two) times daily.     cyanocobalamin 250 MCG tablet  Commonly known as: VITAMIN B-12  Take 250 mcg by mouth every evening.     denosumab 60 mg/mL Syrg  Commonly known as: PROLIA  Inject 60 mg into the skin every 6 (six) months.     diclofenac sodium 1 % Gel  Commonly known as: VOLTAREN  Apply 4 g topically 4 (four) times daily. Apply to both knees, right hand and right wrist     docusate sodium 100 MG capsule  Commonly known as: COLACE  Take 1 capsule (100 mg total) by mouth once daily.     ENSURE ORIGINAL Liqd  Generic drug: food supplemt, lactose-reduced  Take 237 mLs by mouth 2 (two) times a day.     FRUIT AND VEGETABLE DAILY ORAL  Take 2 tablets by mouth 2 (two) times a day. Patient takes 2 fruit and 2 vegetable balance of nature vitamins in the morning and evening     glucose 4 GM chewable tablet  Take 4 tablets (16 g total) by mouth as needed for Low blood sugar.     ivermectin 1 % Crea  Commonly known as: SOOLANTRA  Apply 1 % topically once daily. Apply to face     levETIRAcetam 500 MG Tab  Commonly known as: KEPPRA  Take 1 tablet by mouth twice daily     RYTARY 23.75-95 mg Cpsr  Generic drug: carbidopa-levodopa  Take 3 capsules by mouth 3 (three) times daily.     simethicone 125 mg Cap capsule  Commonly known as: MYLICON  Take 1 capsule (125 mg total) by mouth 4 (four) times daily as needed for Flatulence.     VITAMIN D3 50 mcg (2,000 unit) Cap capsule  Generic drug: cholecalciferol (vitamin D3)  Take 2,000 Units by mouth  once daily.            ASK your doctor about these medications      polyethylene glycol 17 gram Pwpk  Commonly known as: GLYCOLAX  Take 17 g by mouth once daily.     traZODone 50 MG tablet  Commonly known as: DESYREL  TAKE 1 TABLET BY MOUTH NIGHTLY AS NEEDED FOR INSOMNIA              Indwelling Lines/Drains at time of discharge:   Lines/Drains/Airways       None                   Time spent on the discharge of patient: 35 minutes         Suzie Tomas MD  Department of Hospital Medicine  [facility]/Surg

## 2025-03-06 NOTE — PLAN OF CARE
Problem: Physical Therapy  Goal: Physical Therapy Goal  Description: Goals to be met by: 2025     Patient will increase functional independence with mobility by performin. Supine to sit with Moderate Assistance  2. Sit to stand transfer with Moderate Assistance  3. Bed to chair transfer with Moderate Assistance using Rolling Walker  4. Gait  x 10 feet with Moderate Assistance using Rolling Walker.   5. Lower extremity exercise program x20 reps    Post L RITA 2025/WBAT with posterior precautions/abd pillow    Outcome: Progressing   Thera ex in supine. Max assist to sit EOB- maintaining static sitting balance post positioning. Stood with HHA and OOB chair.  HIT vs KATHARINA

## 2025-03-17 ENCOUNTER — HOSPITAL ENCOUNTER (OUTPATIENT)
Dept: RADIOLOGY | Facility: HOSPITAL | Age: 80
Discharge: HOME | End: 2025-03-17
Attending: PHYSICAL MEDICINE & REHABILITATION
Payer: MEDICARE

## 2025-03-17 PROCEDURE — 74018 RADEX ABDOMEN 1 VIEW: CPT | Mod: 26,,, | Performed by: RADIOLOGY

## 2025-05-17 PROBLEM — K56.609 SMALL BOWEL OBSTRUCTION: Status: RESOLVED | Noted: 2024-09-15 | Resolved: 2025-05-17

## 2025-05-17 PROBLEM — S31.000A SACRAL WOUND: Status: ACTIVE | Noted: 2025-05-17

## 2025-05-17 PROBLEM — F41.3 OTHER MIXED ANXIETY DISORDERS: Status: ACTIVE | Noted: 2025-05-17

## 2025-05-17 PROBLEM — R56.9 SEIZURE: Chronic | Status: ACTIVE | Noted: 2023-01-30

## 2025-05-17 PROBLEM — Q62.11 HYDRONEPHROSIS WITH URETEROPELVIC JUNCTION (UPJ) OBSTRUCTION: Status: ACTIVE | Noted: 2025-05-17

## 2025-05-17 PROBLEM — S31.000A SACRAL WOUND: Chronic | Status: ACTIVE | Noted: 2025-05-17

## 2025-05-17 PROBLEM — N39.0 COMPLICATED UTI (URINARY TRACT INFECTION): Status: ACTIVE | Noted: 2025-05-17

## 2025-05-17 PROBLEM — N20.0 NEPHROLITHIASIS: Status: RESOLVED | Noted: 2025-05-17 | Resolved: 2025-05-17

## 2025-05-17 PROBLEM — F41.3 OTHER MIXED ANXIETY DISORDERS: Chronic | Status: ACTIVE | Noted: 2025-05-17

## 2025-05-17 PROBLEM — R53.81 PHYSICAL DECONDITIONING: Chronic | Status: ACTIVE | Noted: 2021-12-16

## 2025-05-17 PROBLEM — Z71.89 ACP (ADVANCE CARE PLANNING): Chronic | Status: ACTIVE | Noted: 2024-09-15

## 2025-05-17 PROBLEM — K56.609: Chronic | Status: ACTIVE | Noted: 2020-02-03

## 2025-05-17 PROBLEM — N20.0 NEPHROLITHIASIS: Status: ACTIVE | Noted: 2025-05-17

## 2025-05-17 PROBLEM — N39.0 COMPLICATED UTI (URINARY TRACT INFECTION): Chronic | Status: ACTIVE | Noted: 2025-05-17

## 2025-05-17 PROBLEM — Q62.11 HYDRONEPHROSIS WITH URETEROPELVIC JUNCTION (UPJ) OBSTRUCTION: Chronic | Status: ACTIVE | Noted: 2025-05-17

## 2025-05-17 PROBLEM — N13.2 HYDRONEPHROSIS WITH URINARY OBSTRUCTION DUE TO URETERAL CALCULUS: Status: ACTIVE | Noted: 2025-05-17

## 2025-05-19 PROBLEM — Z73.6 LIMITATION OF ACTIVITY DUE TO DISABILITY: Status: ACTIVE | Noted: 2025-05-19

## 2025-05-22 ENCOUNTER — TELEPHONE (OUTPATIENT)
Dept: UROLOGY | Facility: CLINIC | Age: 80
End: 2025-05-22
Payer: MEDICARE

## 2025-05-22 NOTE — TELEPHONE ENCOUNTER
Sent message to scheduling chat for someone to schedule b/c it will have to be over written. QR----- Message from Ana sent at 5/21/2025  1:19 PM CDT -----  Regarding: Sooner Appointment Request  Contact: Nhung with [facility] at [phone]  Type:  Sooner Appointment RequestName of Caller:  Nhung with [facility] at [phone] is the first available appointment?  June 11Symptoms:  1 week hospital follow upAdditional Information:  Please call and advise. Thank you

## 2025-05-22 NOTE — TELEPHONE ENCOUNTER
Called and spoke with Akila at [facility] to inform them of the time and date of pt f/u appt. Akila stated she has it down and will give to transportation. QR

## 2025-05-30 ENCOUNTER — OFFICE VISIT (OUTPATIENT)
Dept: UROLOGY | Facility: CLINIC | Age: 80
End: 2025-05-30
Payer: MEDICARE

## 2025-05-30 ENCOUNTER — HOSPITAL ENCOUNTER (OUTPATIENT)
Dept: RADIOLOGY | Facility: HOSPITAL | Age: 80
Discharge: HOME | End: 2025-05-30
Attending: UROLOGY
Payer: MEDICARE

## 2025-05-30 VITALS — BODY MASS INDEX: 20.62 KG/M2 | WEIGHT: 116.38 LBS | HEIGHT: 63 IN

## 2025-05-30 DIAGNOSIS — N20.1 URETERAL STONE: Primary | ICD-10-CM

## 2025-05-30 DIAGNOSIS — N20.1 URETERAL STONE: ICD-10-CM

## 2025-05-30 PROCEDURE — 74018 RADEX ABDOMEN 1 VIEW: CPT | Mod: 26,,, | Performed by: STUDENT IN AN ORGANIZED HEALTH CARE EDUCATION/TRAINING PROGRAM

## 2025-05-30 PROCEDURE — 74018 RADEX ABDOMEN 1 VIEW: CPT | Mod: TC,FY,PO

## 2025-05-30 PROCEDURE — 99999 PR PBB SHADOW E&M-EST. PATIENT-LVL III: CPT | Mod: PBBFAC,,, | Performed by: UROLOGY

## 2025-05-30 PROCEDURE — 99213 OFFICE O/P EST LOW 20 MIN: CPT | Mod: PBBFAC,25,PO | Performed by: UROLOGY

## 2025-05-30 NOTE — PROGRESS NOTES
Subjective:       Patient ID: Ariana Tiwari is a 80 y.o. female.    Chief Complaint: urs    HPI    CHIEF COMPLAINT:  Patient presents today for follow up after ureteroscopy for left ureteral stone    POST-OPERATIVE COURSE:  She reports improvement in pain and overall feeling better following surgery. The strings are low no longer seen but she is uncertain if the stent has been removed.      ROS:  General: -fever, -chills, -fatigue, -weight gain, -weight loss  Eyes: -vision changes, -redness, -discharge  ENT: -ear pain, -nasal congestion, -sore throat  Cardiovascular: -chest pain, -palpitations, -lower extremity edema  Respiratory: -cough, -shortness of breath  Gastrointestinal: -abdominal pain, -nausea, -vomiting, -diarrhea, -constipation, -blood in stool  Genitourinary: -dysuria, -hematuria, -frequency  Musculoskeletal: -joint pain, -muscle pain  Skin: -rash, -lesion  Neurological: -headache, -dizziness, -numbness, -tingling, +confusion or disorientation  Psychiatric: -anxiety, -depression, -sleep difficulty             Objective:        Physical Exam  Vitals reviewed.   Constitutional:       Appearance: She is well-developed.   Pulmonary:      Effort: Pulmonary effort is normal. No respiratory distress.   Neurological:      Mental Status: She is alert. Mental status is at baseline.   Psychiatric:         Behavior: Behavior normal.           Assessment & Plan    N20.1 Calculus of ureter  T83.192A Other mechanical complication of indwelling ureteral stent, initial encounter    CALCULUS OF URETER:  - Underwent ureteroscopy 2 weeks ago for large calcium oxalate monohydrate stone in left ureter.  - Stone likely present for couple years based on review of previous XRs.  - Ordered XR to confirm stone removal.  - Patient to proceed to radiology on first floor for XR.  - Will send message to patient via [employer] with results.    URETERAL STENT COMPLICATION:  - Considered possibility of stent still in place, planned to  remove if present.        Assessment:         1. Ureteral stone          This note was generated with the assistance of ambient listening technology. I attest to having reviewed and edited the generated note for accuracy, though some syntax or spelling errors may persist. Please contact the author of this note for any clarification.    Plan:       Ureteral stone  -     X-Ray Abdomen AP 1 View; Future; Expected date: 05/30/2025      He is reviewed and the stent has been removed.

## 2025-06-04 ENCOUNTER — TELEPHONE (OUTPATIENT)
Dept: FAMILY MEDICINE | Facility: CLINIC | Age: 80
End: 2025-06-04
Payer: MEDICARE

## 2025-06-04 PROBLEM — K56.609 SMALL BOWEL OBSTRUCTION: Status: ACTIVE | Noted: 2025-06-04

## 2025-06-04 PROBLEM — S31.000A SACRAL WOUND: Chronic | Status: RESOLVED | Noted: 2025-05-17 | Resolved: 2025-06-04

## 2025-06-04 PROBLEM — R09.02 HYPOXIA: Status: RESOLVED | Noted: 2025-03-04 | Resolved: 2025-06-04

## 2025-06-04 PROBLEM — Z78.9 GLUTEN FREE DIET: Status: RESOLVED | Noted: 2025-03-01 | Resolved: 2025-06-04

## 2025-06-04 PROBLEM — F41.3 OTHER MIXED ANXIETY DISORDERS: Chronic | Status: RESOLVED | Noted: 2025-05-17 | Resolved: 2025-06-04

## 2025-06-04 PROBLEM — G40.909 SEIZURE DISORDER: Status: ACTIVE | Noted: 2023-01-30

## 2025-06-04 PROBLEM — N39.0 COMPLICATED UTI (URINARY TRACT INFECTION): Chronic | Status: RESOLVED | Noted: 2025-05-17 | Resolved: 2025-06-04

## 2025-06-04 PROBLEM — F32.A DEPRESSION: Status: ACTIVE | Noted: 2025-06-04

## 2025-06-04 PROBLEM — K56.609 SMALL BOWEL OBSTRUCTION: Status: RESOLVED | Noted: 2024-09-15 | Resolved: 2025-06-04

## 2025-06-04 PROBLEM — N20.0 NEPHROLITHIASIS: Status: ACTIVE | Noted: 2025-06-04

## 2025-06-04 PROBLEM — N13.2 HYDRONEPHROSIS WITH URINARY OBSTRUCTION DUE TO URETERAL CALCULUS: Status: RESOLVED | Noted: 2025-05-17 | Resolved: 2025-06-04

## 2025-06-04 PROBLEM — Z73.6 LIMITATION OF ACTIVITY DUE TO DISABILITY: Status: RESOLVED | Noted: 2025-05-19 | Resolved: 2025-06-04

## 2025-06-04 PROBLEM — S72.002A CLOSED LEFT HIP FRACTURE: Status: RESOLVED | Noted: 2025-02-28 | Resolved: 2025-06-04

## 2025-06-05 PROBLEM — R54 AGE-RELATED PHYSICAL DEBILITY: Status: ACTIVE | Noted: 2025-06-05

## 2025-06-06 PROBLEM — Z71.89 ACP (ADVANCE CARE PLANNING): Chronic | Status: RESOLVED | Noted: 2024-09-15 | Resolved: 2025-06-06

## 2025-06-26 PROBLEM — E43 PROTEIN-CALORIE MALNUTRITION, SEVERE: Status: ACTIVE | Noted: 2025-01-01

## (undated) DEVICE — DRESSING POST OP MEPILEX  AG 4X10

## (undated) DEVICE — SUT ETHILON 3-0 PS2 18 BLK

## (undated) DEVICE — SEE MEDLINE ITEM 157117

## (undated) DEVICE — Device

## (undated) DEVICE — SUT STRATAFIX PDS 1 CTX 18IN

## (undated) DEVICE — SUT MONOCRYL 2-0 S UND

## (undated) DEVICE — COVER LIGHT HANDLE LB53

## (undated) DEVICE — SUTURE PDS 1 TP-1 48 PDP880G

## (undated) DEVICE — SEE MEDLINE ITEM 146270

## (undated) DEVICE — SUTURE MONOCRYL 4-0 PS-2 27 MCP426H

## (undated) DEVICE — BULB DRAIN 100CC 70740

## (undated) DEVICE — GLOVE SENSICARE PI ALOE 7.5

## (undated) DEVICE — PADDING CAST 4IN SPECIALIST

## (undated) DEVICE — SOL NORMAL USPCA 0.9%

## (undated) DEVICE — SUTURE ETHILON 2-0 PSLX 30 1697H

## (undated) DEVICE — DRAPE WARMER ORS-100

## (undated) DEVICE — SUCTION POOLE 0035040

## (undated) DEVICE — SUT 2-0 VICRYL / CT-1

## (undated) DEVICE — UNDERGLOVE BIOGEL PI SZ 6.5 LF

## (undated) DEVICE — SUT STRATAFIX SPRL PS-2 3-0

## (undated) DEVICE — DRESSING XEROFORM FOIL PK 1X8

## (undated) DEVICE — DRAIN 19FR TROCAR  072231

## (undated) DEVICE — SEE MEDLINE ITEM 146298

## (undated) DEVICE — SPONGE DRAIN 4X4 441407

## (undated) DEVICE — DRAPE THREE-QTR REINF 53X77IN

## (undated) DEVICE — GOWN TOGA SYS PEELWY ZIP 2 XL

## (undated) DEVICE — SOL NACL IRR 1000ML BTL

## (undated) DEVICE — RETRACTOR WOUND LG C8303

## (undated) DEVICE — SUTURE SILK 0 18 A186H

## (undated) DEVICE — SUTURE ETHILON 2-0 PS-2 18 593H

## (undated) DEVICE — DURAPREP SURG SCRUB 26ML

## (undated) DEVICE — ALCOHOL 70% ISOP RUBBING 4OZ

## (undated) DEVICE — GLOVE SURGEONS ULTRA TOUCH 6.5

## (undated) DEVICE — DRAPE HIP PCH 112X137X89IN

## (undated) DEVICE — TRAY GENERAL SURGERY

## (undated) DEVICE — BNDG COFLEX FOAM LF2 ST 6X5YD

## (undated) DEVICE — SOL 9P NACL IRR PIC IL

## (undated) DEVICE — WRAP SHLDR HIP ACCU THRM PACK

## (undated) DEVICE — NDL 22GA X1 1/2 REG BEVEL

## (undated) DEVICE — DRAPE STERI U-SHAPED 47X51IN

## (undated) DEVICE — PENCIL SMK EVAC CONNECTOR 10FT

## (undated) DEVICE — SUTURE SILK 3-0 18 A184H

## (undated) DEVICE — APPLICATOR CHLORAPREP ORN 26ML

## (undated) DEVICE — PACK CUSTOM UNIV BASIN SLI

## (undated) DEVICE — INTERPULSE SET

## (undated) DEVICE — ELECTRODE REM PLYHSV RETURN 9

## (undated) DEVICE — DRAPE U SPLIT SHEET 54X76IN

## (undated) DEVICE — SUTURE SILK 2-0 30 A305H

## (undated) DEVICE — DRAPE INCISE IOBAN 2 23X17IN

## (undated) DEVICE — TOGA FLYTE PEEL AWAY XLARGE

## (undated) DEVICE — STERISTRIP 1/2 R1547

## (undated) DEVICE — TIP BOVIE 6.5 0014

## (undated) DEVICE — SOLUTION IRRI NS BOTTLE 1000ML R5200-01

## (undated) DEVICE — SPONGE LAP 18X18

## (undated) DEVICE — GLOVE SENSICARE PI GRN 8

## (undated) DEVICE — ALCOHOL 70% ANTISEPTIC ISO 4OZ

## (undated) DEVICE — SYRINGE HYPODERMC LL 22G 1.5 ECLIPSE 30

## (undated) DEVICE — SLEEVE SCD EXPRESS CALF MEDIUM

## (undated) DEVICE — SEE MEDLINE ITEM 157131

## (undated) DEVICE — CUTTER LINEAR TLC55

## (undated) DEVICE — BLADE SURG #15 CARBON STEEL

## (undated) DEVICE — BLADE SAW SGTL DL STR 25X1.27X

## (undated) DEVICE — RELOAD PROXIMATE LINEAR CUTTER BLUE

## (undated) DEVICE — RELOAD TX XR60B

## (undated) DEVICE — DRAIN ROUND 3/16 100CC  0073320

## (undated) DEVICE — PAD BOVIE ADULT

## (undated) DEVICE — DRAPE STERI INSTRUMENT 1018

## (undated) DEVICE — LINER SUCTION 3000CC

## (undated) DEVICE — SUTURE SILK 2-0 18 A185H

## (undated) DEVICE — GLOVE BIOGELPI GOLD SIZE 7.5

## (undated) DEVICE — SEE MEDLINE ITEM 152622

## (undated) DEVICE — COVER TABLE 44X90 STERILE

## (undated) DEVICE — TIP BOVIE TEFLON E1450X

## (undated) DEVICE — YANKAUER FLEX NO VENT HI CAP

## (undated) DEVICE — MANIFOLD 4 PORT

## (undated) DEVICE — DRAPE INCISE IOBAN 2 23X33IN

## (undated) DEVICE — BLADE SURG CARBON STEEL #10

## (undated) DEVICE — SPONGE GAUZE 2S 4X4 STERILE NON21424

## (undated) DEVICE — ELECTRODE BLADE TEFLON 6

## (undated) DEVICE — GAUZE SPONGE 8X4 12 PLY

## (undated) DEVICE — STRAP TABLE 5X72 M5173

## (undated) DEVICE — PACK SIRUS BASIC V SURG STRL

## (undated) DEVICE — SUTURE SILK 3-0 CR SH 30 C017D

## (undated) DEVICE — TRAY GENERAL LAPAROSCOPY

## (undated) DEVICE — PILLOW HIP ABDUCTION MED

## (undated) DEVICE — SUTURE SILK 3-0 SH 18 C013D

## (undated) DEVICE — SYRINGE 20ML 302830

## (undated) DEVICE — SUTURE PDS 1 TP-1 48 Z880G

## (undated) DEVICE — STRAP OR TABLE 5IN X 72IN

## (undated) DEVICE — SPONGE LAP 18X18 PREWASHED

## (undated) DEVICE — SLEEVE SCD EXPRESS KNEE MEDIUM

## (undated) DEVICE — SOCKINETTE IMPERVIOUS 12X48IN

## (undated) DEVICE — GOWN SURGICAL BASICS XL

## (undated) DEVICE — SUTURE SILK 2-0 CR SH 30 C016D